# Patient Record
Sex: MALE | Race: WHITE | NOT HISPANIC OR LATINO | Employment: OTHER | URBAN - METROPOLITAN AREA
[De-identification: names, ages, dates, MRNs, and addresses within clinical notes are randomized per-mention and may not be internally consistent; named-entity substitution may affect disease eponyms.]

---

## 2017-01-18 ENCOUNTER — ALLSCRIPTS OFFICE VISIT (OUTPATIENT)
Dept: OTHER | Facility: OTHER | Age: 57
End: 2017-01-18

## 2017-01-18 ENCOUNTER — GENERIC CONVERSION - ENCOUNTER (OUTPATIENT)
Dept: OTHER | Facility: OTHER | Age: 57
End: 2017-01-18

## 2017-03-08 ENCOUNTER — GENERIC CONVERSION - ENCOUNTER (OUTPATIENT)
Dept: OTHER | Facility: OTHER | Age: 57
End: 2017-03-08

## 2017-04-03 DIAGNOSIS — E78.5 HYPERLIPIDEMIA: ICD-10-CM

## 2017-04-03 DIAGNOSIS — E11.9 TYPE 2 DIABETES MELLITUS WITHOUT COMPLICATIONS (HCC): ICD-10-CM

## 2017-04-03 DIAGNOSIS — G47.33 OBSTRUCTIVE SLEEP APNEA: ICD-10-CM

## 2017-04-20 ENCOUNTER — ALLSCRIPTS OFFICE VISIT (OUTPATIENT)
Dept: OTHER | Facility: OTHER | Age: 57
End: 2017-04-20

## 2017-04-24 ENCOUNTER — GENERIC CONVERSION - ENCOUNTER (OUTPATIENT)
Dept: OTHER | Facility: OTHER | Age: 57
End: 2017-04-24

## 2017-05-09 ENCOUNTER — GENERIC CONVERSION - ENCOUNTER (OUTPATIENT)
Dept: OTHER | Facility: OTHER | Age: 57
End: 2017-05-09

## 2017-06-27 ENCOUNTER — ALLSCRIPTS OFFICE VISIT (OUTPATIENT)
Dept: OTHER | Facility: OTHER | Age: 57
End: 2017-06-27

## 2017-06-27 DIAGNOSIS — E78.5 HYPERLIPIDEMIA: ICD-10-CM

## 2017-06-27 DIAGNOSIS — M25.562 PAIN IN LEFT KNEE: ICD-10-CM

## 2017-06-27 DIAGNOSIS — E11.9 TYPE 2 DIABETES MELLITUS WITHOUT COMPLICATIONS (HCC): ICD-10-CM

## 2017-06-27 DIAGNOSIS — I10 ESSENTIAL (PRIMARY) HYPERTENSION: ICD-10-CM

## 2017-06-27 DIAGNOSIS — Z12.5 ENCOUNTER FOR SCREENING FOR MALIGNANT NEOPLASM OF PROSTATE: ICD-10-CM

## 2017-06-30 ENCOUNTER — GENERIC CONVERSION - ENCOUNTER (OUTPATIENT)
Dept: OTHER | Facility: OTHER | Age: 57
End: 2017-06-30

## 2017-07-19 ENCOUNTER — ALLSCRIPTS OFFICE VISIT (OUTPATIENT)
Dept: OTHER | Facility: OTHER | Age: 57
End: 2017-07-19

## 2017-07-19 ENCOUNTER — APPOINTMENT (OUTPATIENT)
Dept: RADIOLOGY | Facility: CLINIC | Age: 57
End: 2017-07-19
Payer: COMMERCIAL

## 2017-07-19 DIAGNOSIS — M25.562 PAIN IN LEFT KNEE: ICD-10-CM

## 2017-07-19 PROCEDURE — 73562 X-RAY EXAM OF KNEE 3: CPT

## 2017-07-28 ENCOUNTER — APPOINTMENT (OUTPATIENT)
Dept: LAB | Facility: CLINIC | Age: 57
End: 2017-07-28
Payer: COMMERCIAL

## 2017-07-28 DIAGNOSIS — G47.33 OBSTRUCTIVE SLEEP APNEA: ICD-10-CM

## 2017-07-28 DIAGNOSIS — I10 ESSENTIAL (PRIMARY) HYPERTENSION: ICD-10-CM

## 2017-07-28 DIAGNOSIS — E78.5 HYPERLIPIDEMIA: ICD-10-CM

## 2017-07-28 DIAGNOSIS — E11.9 TYPE 2 DIABETES MELLITUS WITHOUT COMPLICATIONS (HCC): ICD-10-CM

## 2017-07-28 DIAGNOSIS — Z12.5 ENCOUNTER FOR SCREENING FOR MALIGNANT NEOPLASM OF PROSTATE: ICD-10-CM

## 2017-07-28 LAB
ALBUMIN SERPL BCP-MCNC: 3.9 G/DL (ref 3.5–5)
ALP SERPL-CCNC: 87 U/L (ref 46–116)
ALT SERPL W P-5'-P-CCNC: 22 U/L (ref 12–78)
ANION GAP SERPL CALCULATED.3IONS-SCNC: 7 MMOL/L (ref 4–13)
AST SERPL W P-5'-P-CCNC: 15 U/L (ref 5–45)
BASOPHILS # BLD AUTO: 0.03 THOUSANDS/ΜL (ref 0–0.1)
BASOPHILS NFR BLD AUTO: 0 % (ref 0–1)
BILIRUB SERPL-MCNC: 0.86 MG/DL (ref 0.2–1)
BUN SERPL-MCNC: 18 MG/DL (ref 5–25)
CALCIUM SERPL-MCNC: 9.3 MG/DL (ref 8.3–10.1)
CHLORIDE SERPL-SCNC: 101 MMOL/L (ref 100–108)
CHOLEST SERPL-MCNC: 120 MG/DL (ref 50–200)
CO2 SERPL-SCNC: 30 MMOL/L (ref 21–32)
CREAT SERPL-MCNC: 1.09 MG/DL (ref 0.6–1.3)
CREAT UR-MCNC: 161 MG/DL
EOSINOPHIL # BLD AUTO: 0.45 THOUSAND/ΜL (ref 0–0.61)
EOSINOPHIL NFR BLD AUTO: 6 % (ref 0–6)
ERYTHROCYTE [DISTWIDTH] IN BLOOD BY AUTOMATED COUNT: 14.3 % (ref 11.6–15.1)
EST. AVERAGE GLUCOSE BLD GHB EST-MCNC: 131 MG/DL
GFR SERPL CREATININE-BSD FRML MDRD: 75 ML/MIN/1.73SQ M
GLUCOSE P FAST SERPL-MCNC: 120 MG/DL (ref 65–99)
HBA1C MFR BLD: 6.2 % (ref 4.2–6.3)
HCT VFR BLD AUTO: 42.8 % (ref 36.5–49.3)
HDLC SERPL-MCNC: 40 MG/DL (ref 40–60)
HGB BLD-MCNC: 14.9 G/DL (ref 12–17)
LDLC SERPL CALC-MCNC: 50 MG/DL (ref 0–100)
LYMPHOCYTES # BLD AUTO: 1.99 THOUSANDS/ΜL (ref 0.6–4.47)
LYMPHOCYTES NFR BLD AUTO: 26 % (ref 14–44)
MCH RBC QN AUTO: 29.2 PG (ref 26.8–34.3)
MCHC RBC AUTO-ENTMCNC: 34.8 G/DL (ref 31.4–37.4)
MCV RBC AUTO: 84 FL (ref 82–98)
MICROALBUMIN UR-MCNC: 171 MG/L (ref 0–20)
MICROALBUMIN/CREAT 24H UR: 106 MG/G CREATININE (ref 0–30)
MONOCYTES # BLD AUTO: 0.49 THOUSAND/ΜL (ref 0.17–1.22)
MONOCYTES NFR BLD AUTO: 6 % (ref 4–12)
NEUTROPHILS # BLD AUTO: 4.72 THOUSANDS/ΜL (ref 1.85–7.62)
NEUTS SEG NFR BLD AUTO: 62 % (ref 43–75)
NRBC BLD AUTO-RTO: 0 /100 WBCS
PLATELET # BLD AUTO: 247 THOUSANDS/UL (ref 149–390)
PMV BLD AUTO: 10.7 FL (ref 8.9–12.7)
POTASSIUM SERPL-SCNC: 3.5 MMOL/L (ref 3.5–5.3)
PROT SERPL-MCNC: 7.8 G/DL (ref 6.4–8.2)
PSA SERPL-MCNC: 0.6 NG/ML (ref 0–4)
RBC # BLD AUTO: 5.11 MILLION/UL (ref 3.88–5.62)
SODIUM SERPL-SCNC: 138 MMOL/L (ref 136–145)
TRIGL SERPL-MCNC: 151 MG/DL
TSH SERPL DL<=0.05 MIU/L-ACNC: 2.31 UIU/ML (ref 0.36–3.74)
WBC # BLD AUTO: 7.73 THOUSAND/UL (ref 4.31–10.16)

## 2017-07-28 PROCEDURE — 84443 ASSAY THYROID STIM HORMONE: CPT

## 2017-07-28 PROCEDURE — 36415 COLL VENOUS BLD VENIPUNCTURE: CPT

## 2017-07-28 PROCEDURE — 83036 HEMOGLOBIN GLYCOSYLATED A1C: CPT

## 2017-07-28 PROCEDURE — G0103 PSA SCREENING: HCPCS

## 2017-07-28 PROCEDURE — 82570 ASSAY OF URINE CREATININE: CPT

## 2017-07-28 PROCEDURE — 80061 LIPID PANEL: CPT

## 2017-07-28 PROCEDURE — 80053 COMPREHEN METABOLIC PANEL: CPT

## 2017-07-28 PROCEDURE — 85025 COMPLETE CBC W/AUTO DIFF WBC: CPT

## 2017-07-28 PROCEDURE — 82043 UR ALBUMIN QUANTITATIVE: CPT

## 2017-07-30 ENCOUNTER — GENERIC CONVERSION - ENCOUNTER (OUTPATIENT)
Dept: OTHER | Facility: OTHER | Age: 57
End: 2017-07-30

## 2017-07-31 ENCOUNTER — GENERIC CONVERSION - ENCOUNTER (OUTPATIENT)
Dept: OTHER | Facility: OTHER | Age: 57
End: 2017-07-31

## 2017-08-03 ENCOUNTER — ALLSCRIPTS OFFICE VISIT (OUTPATIENT)
Dept: OTHER | Facility: OTHER | Age: 57
End: 2017-08-03

## 2017-08-07 ENCOUNTER — ALLSCRIPTS OFFICE VISIT (OUTPATIENT)
Dept: OTHER | Facility: OTHER | Age: 57
End: 2017-08-07

## 2017-08-10 ENCOUNTER — ALLSCRIPTS OFFICE VISIT (OUTPATIENT)
Dept: OTHER | Facility: OTHER | Age: 57
End: 2017-08-10

## 2017-08-10 DIAGNOSIS — I10 ESSENTIAL (PRIMARY) HYPERTENSION: ICD-10-CM

## 2017-08-10 DIAGNOSIS — K02.9 DENTAL CARIES: ICD-10-CM

## 2017-09-06 ENCOUNTER — APPOINTMENT (OUTPATIENT)
Dept: LAB | Facility: CLINIC | Age: 57
End: 2017-09-06
Payer: COMMERCIAL

## 2017-09-06 ENCOUNTER — GENERIC CONVERSION - ENCOUNTER (OUTPATIENT)
Dept: OTHER | Facility: OTHER | Age: 57
End: 2017-09-06

## 2017-09-06 DIAGNOSIS — K02.9 DENTAL CARIES: ICD-10-CM

## 2017-09-06 DIAGNOSIS — I10 ESSENTIAL (PRIMARY) HYPERTENSION: ICD-10-CM

## 2017-09-06 LAB
ANION GAP SERPL CALCULATED.3IONS-SCNC: 7 MMOL/L (ref 4–13)
BUN SERPL-MCNC: 24 MG/DL (ref 5–25)
CALCIUM SERPL-MCNC: 9.4 MG/DL (ref 8.3–10.1)
CHLORIDE SERPL-SCNC: 99 MMOL/L (ref 100–108)
CO2 SERPL-SCNC: 31 MMOL/L (ref 21–32)
CREAT SERPL-MCNC: 1.49 MG/DL (ref 0.6–1.3)
CREAT UR-MCNC: 293 MG/DL
GFR SERPL CREATININE-BSD FRML MDRD: 51 ML/MIN/1.73SQ M
GLUCOSE P FAST SERPL-MCNC: 127 MG/DL (ref 65–99)
POTASSIUM SERPL-SCNC: 3.1 MMOL/L (ref 3.5–5.3)
PROT UR-MCNC: 33 MG/DL
PROT/CREAT UR: 0.11 MG/G{CREAT} (ref 0–0.1)
SODIUM SERPL-SCNC: 137 MMOL/L (ref 136–145)
TSH SERPL DL<=0.05 MIU/L-ACNC: 2.19 UIU/ML (ref 0.36–3.74)

## 2017-09-06 PROCEDURE — 84443 ASSAY THYROID STIM HORMONE: CPT

## 2017-09-06 PROCEDURE — 80048 BASIC METABOLIC PNL TOTAL CA: CPT

## 2017-09-06 PROCEDURE — 36415 COLL VENOUS BLD VENIPUNCTURE: CPT

## 2017-09-06 PROCEDURE — 82088 ASSAY OF ALDOSTERONE: CPT

## 2017-09-06 PROCEDURE — 83835 ASSAY OF METANEPHRINES: CPT

## 2017-09-06 PROCEDURE — 82570 ASSAY OF URINE CREATININE: CPT

## 2017-09-06 PROCEDURE — 84156 ASSAY OF PROTEIN URINE: CPT

## 2017-09-06 PROCEDURE — 84244 ASSAY OF RENIN: CPT

## 2017-09-12 LAB
ALDOST SERPL-MCNC: 14.4 NG/DL (ref 0–30)
METANEPH FREE SERPL-MCNC: 20 PG/ML (ref 0–62)
NORMETANEPHRINE SERPL-MCNC: 143 PG/ML (ref 0–145)

## 2017-09-13 LAB — RENIN PLAS-CCNC: <0.167 NG/ML/HR (ref 0.17–5.38)

## 2017-09-14 DIAGNOSIS — I10 ESSENTIAL (PRIMARY) HYPERTENSION: ICD-10-CM

## 2017-09-18 ENCOUNTER — GENERIC CONVERSION - ENCOUNTER (OUTPATIENT)
Dept: OTHER | Facility: OTHER | Age: 57
End: 2017-09-18

## 2017-09-18 DIAGNOSIS — I10 ESSENTIAL (PRIMARY) HYPERTENSION: ICD-10-CM

## 2017-09-29 ENCOUNTER — LAB CONVERSION - ENCOUNTER (OUTPATIENT)
Dept: OTHER | Facility: OTHER | Age: 57
End: 2017-09-29

## 2017-09-29 ENCOUNTER — GENERIC CONVERSION - ENCOUNTER (OUTPATIENT)
Dept: OTHER | Facility: OTHER | Age: 57
End: 2017-09-29

## 2017-09-29 LAB
BUN SERPL-MCNC: 18 MG/DL (ref 7–25)
BUN/CREA RATIO (HISTORICAL): ABNORMAL (CALC) (ref 6–22)
CALCIUM SERPL-MCNC: 9.6 MG/DL (ref 8.6–10.3)
CHLORIDE SERPL-SCNC: 100 MMOL/L (ref 98–110)
CO2 SERPL-SCNC: 30 MMOL/L (ref 20–31)
CREAT SERPL-MCNC: 1.18 MG/DL (ref 0.7–1.33)
CREATININE, RANDOM URINE (HISTORICAL): 168 MG/DL (ref 20–370)
CREATININE, RANDOM URINE (HISTORICAL): 168 MG/DL (ref 20–370)
EGFR AFRICAN AMERICAN (HISTORICAL): 78 ML/MIN/1.73M2
EGFR-AMERICAN CALC (HISTORICAL): 68 ML/MIN/1.73M2
GLUCOSE (HISTORICAL): 132 MG/DL (ref 65–99)
Lab: 33 MMOL/G CREAT (ref 13–101)
POTASSIUM SERPL-SCNC: 3.4 MMOL/L (ref 3.5–5.3)
POTASSIUM URINE RANDOM (HISTORICAL): 56 MMOL/L (ref 12–129)
SODIUM SERPL-SCNC: 139 MMOL/L (ref 135–146)
SODIUM UR-SCNC: 67 MMOL/L (ref 28–272)
SODIUM/CREAT RATIO (HISTORICAL): 40 MMOL/G CREAT (ref 20–233)

## 2017-10-02 ENCOUNTER — LAB CONVERSION - ENCOUNTER (OUTPATIENT)
Dept: OTHER | Facility: OTHER | Age: 57
End: 2017-10-02

## 2017-10-02 LAB
BUN SERPL-MCNC: 18 MG/DL (ref 7–25)
BUN/CREA RATIO (HISTORICAL): ABNORMAL (CALC) (ref 6–22)
CALCIUM SERPL-MCNC: 9.6 MG/DL (ref 8.6–10.3)
CHLORIDE SERPL-SCNC: 100 MMOL/L (ref 98–110)
CO2 SERPL-SCNC: 30 MMOL/L (ref 20–31)
CREAT SERPL-MCNC: 1.18 MG/DL (ref 0.7–1.33)
CREATININE, RANDOM URINE (HISTORICAL): 168 MG/DL (ref 20–370)
CREATININE, RANDOM URINE (HISTORICAL): 168 MG/DL (ref 20–370)
EGFR AFRICAN AMERICAN (HISTORICAL): 78 ML/MIN/1.73M2
EGFR-AMERICAN CALC (HISTORICAL): 68 ML/MIN/1.73M2
GLUCOSE (HISTORICAL): 132 MG/DL (ref 65–99)
Lab: 33 MMOL/G CREAT (ref 13–101)
OSMOLALITY UR: 519 MOSM/KG H2O (ref 50–1400)
POTASSIUM SERPL-SCNC: 3.4 MMOL/L (ref 3.5–5.3)
POTASSIUM URINE RANDOM (HISTORICAL): 56 MMOL/L (ref 12–129)
SODIUM SERPL-SCNC: 139 MMOL/L (ref 135–146)
SODIUM UR-SCNC: 67 MMOL/L (ref 28–272)
SODIUM/CREAT RATIO (HISTORICAL): 40 MMOL/G CREAT (ref 20–233)

## 2017-10-20 ENCOUNTER — GENERIC CONVERSION - ENCOUNTER (OUTPATIENT)
Dept: OTHER | Facility: OTHER | Age: 57
End: 2017-10-20

## 2017-10-28 DIAGNOSIS — E11.9 TYPE 2 DIABETES MELLITUS WITHOUT COMPLICATIONS (HCC): ICD-10-CM

## 2017-10-28 DIAGNOSIS — E78.5 HYPERLIPIDEMIA: ICD-10-CM

## 2017-10-28 DIAGNOSIS — I10 ESSENTIAL (PRIMARY) HYPERTENSION: ICD-10-CM

## 2017-11-02 ENCOUNTER — HOSPITAL ENCOUNTER (OUTPATIENT)
Dept: INFUSION CENTER | Facility: CLINIC | Age: 57
Discharge: HOME | End: 2017-11-02
Payer: COMMERCIAL

## 2017-11-02 VITALS
RESPIRATION RATE: 20 BRPM | OXYGEN SATURATION: 96 % | SYSTOLIC BLOOD PRESSURE: 134 MMHG | HEART RATE: 64 BPM | TEMPERATURE: 98.9 F | DIASTOLIC BLOOD PRESSURE: 88 MMHG

## 2017-11-02 PROCEDURE — 82088 ASSAY OF ALDOSTERONE: CPT | Performed by: INTERNAL MEDICINE

## 2017-11-02 PROCEDURE — 84244 ASSAY OF RENIN: CPT | Performed by: INTERNAL MEDICINE

## 2017-11-02 RX ADMIN — SODIUM CHLORIDE 2000 ML: 0.9 INJECTION, SOLUTION INTRAVENOUS at 09:40

## 2017-11-02 NOTE — PROGRESS NOTES
Pt  Tolerated 2 liters of hydration & labs drawn as ordered post hydration  Pt  Has no further appts  Scheduled at this time   Pt  Declined AVS

## 2017-11-05 LAB
RENIN PLAS-CCNC: <0.167 NG/ML/HR (ref 0.17–5.38)
RENIN PLAS-CCNC: <0.167 NG/ML/HR (ref 0.17–5.38)

## 2017-11-06 ENCOUNTER — GENERIC CONVERSION - ENCOUNTER (OUTPATIENT)
Dept: OTHER | Facility: OTHER | Age: 57
End: 2017-11-06

## 2017-11-07 LAB — ALDOST SERPL-MCNC: 13.5 NG/DL (ref 0–30)

## 2017-11-08 LAB — ALDOST SERPL-MCNC: 6.1 NG/DL (ref 0–30)

## 2017-12-11 ENCOUNTER — ALLSCRIPTS OFFICE VISIT (OUTPATIENT)
Dept: OTHER | Facility: OTHER | Age: 57
End: 2017-12-11

## 2017-12-27 ENCOUNTER — GENERIC CONVERSION - ENCOUNTER (OUTPATIENT)
Dept: OTHER | Facility: OTHER | Age: 57
End: 2017-12-27

## 2017-12-27 DIAGNOSIS — I10 ESSENTIAL (PRIMARY) HYPERTENSION: ICD-10-CM

## 2017-12-27 DIAGNOSIS — E78.5 HYPERLIPIDEMIA: ICD-10-CM

## 2017-12-27 DIAGNOSIS — E11.9 TYPE 2 DIABETES MELLITUS WITHOUT COMPLICATIONS (HCC): ICD-10-CM

## 2018-01-11 NOTE — PSYCH
Behavioral Health Outpatient Intake    Referred By: DR CALDERON [facility]  Intake Questions: there are no developmental disabilities  the patient does not have a hearing impairment  the patient does not have an ICM or CTT  patient is not taking injectable psychiatric medications  Employment: The patient is not employed  at disabled  Emergency Contact Information:   Emergency Contact: FIDEL ALLAN   Relationship to Patient: SON   Phone Number: [phone]   Previous Psychiatric Treatment: He has previously been seen by a psychiatrist  40 YRS AGO  He has previously been seen by a therapist  40 YRS AGO   History: He has served in the [employer]  He has not had combat service  He was not activated into federal active duty as a member of the [employer] or [employer]  Insurance Subscriber: [facility]   Primary Insurance: [employer]   Phone number: [phone]   ID number: [id number]   Group number: [id number]         Presenting Problem (in patient's words): DEPRESSION, SLEEPING DAY AWAY, ANXIETY  Substance Abuse: NO DRUGS IN OVER 20 YRS BUT WILL SOCIALLY HAVE A DRINK  Previous Treatment: The patient has not been seen here in the past      Accepted as Patient   DR Severiano Mungo 2/7/17 @ 2:00     Primary Care Physician: DR JANSEN       Signatures   Electronically signed by : Marciano Franco, ; Jan 18 2017  2:56PM EST                       (Author)

## 2018-01-12 NOTE — MISCELLANEOUS
Dear Mr Nima Hardin missed another appointment with Dr Maria E Boss  on  4/20/2017 at 3:00 PM  This is the seventh missed appointment at [facility]  We understand that many situations arise that occasionally prevent patients from keeping scheduled appointments  It is the policy of our office that patients notify us 24 hours in advance  if unable to keep a scheduled appointment  Missed appointments jeopardize strong physician-patient relationships  The appointments  you missed could have easily been made available to another patient if you had contacted us to cancel  We like to accommodate all of our patients, but when patients miss an appointment it prevents us from being able to help everyone  Please be aware that if you miss another appointment we may need to discharge you from the practice  In the future, we request at least 24 hours notice of cancellation so we can make your appointment  available to someone else in need      Sincerely,  The Physicians and Staff of [facility]          Electronically signed by:Liseth Corbin   May  9 2017  9:59AM EST Administrative

## 2018-01-12 NOTE — RESULT NOTES
Message   can patient be notified that renal function is improved to his baseline approximately  his potassium is still on lower end        1) can patient have instruction to increase potassium to 20 meq twice daily  he should be on 20 meq once daily currently  2) can he have BMP in 2-3 weeks (if he has saline suppression test scheduled already, he can have BMP done prior to this)         above task sent  called patient to review, but no answer  Verified Results  (1) BASIC METABOLIC PROFILE [id number] 03:15PM Elise Dollar   REPORT COMMENT:  ONH OLD  FASTING:NO     Test Name Result Flag Reference   GLUCOSE 132 mg/dL H 65-99   Fasting reference interval     For someone without known diabetes, a glucose  value >125 mg/dL indicates that they may have  diabetes and this should be confirmed with a  follow-up test    UREA NITROGEN (BUN) 18 mg/dL  7-25   CREATININE 1 18 mg/dL  0 70-1 33   For patients >52years of age, the reference limit  for Creatinine is approximately 13% higher for people  identified as -American  eGFR NON-AFR   AMERICAN 68 mL/min/1 73m2  > OR = 60   eGFR AFRICAN AMERICAN 78 mL/min/1 73m2  > OR = 60   BUN/CREATININE RATIO   4-08   NOT APPLICABLE (calc)   SODIUM 139 mmol/L  135-146   POTASSIUM 3 4 mmol/L L 3 5-5 3   CHLORIDE 100 mmol/L     CARBON DIOXIDE 30 mmol/L  20-31   CALCIUM 9 6 mg/dL  8 6-10 3     (1) POTASSIUM, URINE RANDOM [id number] 03:15PM Elise Dollar     Test Name Result Flag Reference   POTASSIUM/CREAT RATIO 33 mmol/g creat     POTASSIUM, RANDOM URINE 56 mmol/L     CREATININE, RANDOM URINE 168 mg/dL       (1) SODIUM, URINE RANDOM [id number] 03:15PM Elise Dollar     Test Name Result Flag Reference   SODIUM/CREAT RATIO 40 mmol/g creat     SODIUM, RANDOM URINE 67 mmol/L     CREATININE, RANDOM URINE 168 mg/dL

## 2018-01-13 VITALS
RESPIRATION RATE: 16 BRPM | BODY MASS INDEX: 44.67 KG/M2 | WEIGHT: 312 LBS | SYSTOLIC BLOOD PRESSURE: 132 MMHG | DIASTOLIC BLOOD PRESSURE: 84 MMHG | OXYGEN SATURATION: 96 % | HEART RATE: 61 BPM | HEIGHT: 70 IN

## 2018-01-13 VITALS
WEIGHT: 308 LBS | HEART RATE: 60 BPM | DIASTOLIC BLOOD PRESSURE: 88 MMHG | SYSTOLIC BLOOD PRESSURE: 134 MMHG | BODY MASS INDEX: 54.57 KG/M2 | HEIGHT: 63 IN

## 2018-01-13 NOTE — RESULT NOTES
Verified Results  (1) HEMOGLOBIN A1C [id number] 12:00AM Celso Figueroa     Test Name Result Flag Reference   HEMOGLOBIN A1C 6 0         Plan  Type 2 diabetes mellitus    · (1) HEMOGLOBIN A1C; Status:Complete;   Done: 69TBW1098 12:00AM

## 2018-01-13 NOTE — RESULT NOTES
Message   K improving  Cr improving  awaiting saline suppression test          Verified Results  (1) POTASSIUM, URINE RANDOM [id number] 03:15PM [employer]     Test Name Result Flag Reference   POTASSIUM/CREAT RATIO 33 mmol/g creat     POTASSIUM, RANDOM URINE 56 mmol/L     CREATININE, RANDOM URINE 168 mg/dL       (1) SODIUM, URINE RANDOM [id number] 03:15PM Raina Speak     Test Name Result Flag Reference   SODIUM/CREAT RATIO 40 mmol/g creat     SODIUM, RANDOM URINE 67 mmol/L     CREATININE, RANDOM URINE 168 mg/dL       (1) BASIC METABOLIC PROFILE [id number] 03:15PM [employer]     Test Name Result Flag Reference   GLUCOSE 132 mg/dL H 65-99   Fasting reference interval     For someone without known diabetes, a glucose  value >125 mg/dL indicates that they may have  diabetes and this should be confirmed with a  follow-up test    UREA NITROGEN (BUN) 18 mg/dL  7-25   CREATININE 1 18 mg/dL  0 70-1 33   For patients >52years of age, the reference limit  for Creatinine is approximately 13% higher for people  identified as -American  eGFR NON-AFR  AMERICAN 68 mL/min/1 73m2  > OR = 60   eGFR AFRICAN AMERICAN 78 mL/min/1 73m2  > OR = 60   BUN/CREATININE RATIO   2-46   NOT APPLICABLE (calc)   SODIUM 139 mmol/L  135-146   POTASSIUM 3 4 mmol/L L 3 5-5 3   CHLORIDE 100 mmol/L     CARBON DIOXIDE 30 mmol/L  20-31   CALCIUM 9 6 mg/dL  8 6-10 3     (1) OSMOLALITY, URINE [id number] 03:15PM [employer] Speak   REPORT COMMENT:  ON[facility] OLD  FASTING:NO     Test Name Result Flag Reference   OSMOLALITY (U) 519 mOsm/kg H2O     The normal kidney can accommodate to dehydration by            producing urine with osmolality >850 mOsm/kg H2O

## 2018-01-13 NOTE — PROGRESS NOTES
Assessment    1  Type 2 diabetes mellitus (250 00) (E11 9)   2  Hypertension (401 9) (I10)   3  Chronic back pain (724 5,338 29) (M54 9,G89 29)   4  Lumbar radiculopathy (724 4) (M54 16)   5  Encounter for diabetic foot exam (250 00) (E11 9)   6  Adjustment disorder with depressed mood (309 0) (F43 21)   7  Acute sinusitis (461 9) (J01 90)    Plan  Acute sinusitis    · Cefuroxime Axetil 500 MG Oral Tablet; TAKE 1 TABLET Every twelve hours for 10 days   · HydrALAZINE HCl - 10 MG Oral Tablet; TAKE 1 TABLET EVERY 12 HOURS DAILY FOR BLOOD  PRESSURE  Chronic back pain    · Nabumetone 500 MG Oral Tablet  Chronic back pain, Type 2 diabetes mellitus    · DULoxetine HCl - 60 MG Oral Capsule Delayed Release Particles (Cymbalta); TAKE 1 CAPSULE  DAILY  Hypertension    · Gemfibrozil 600 MG Oral Tablet  Type 2 diabetes mellitus    · OneTouch Ultra Blue In Vitro Strip; TEST TWICE DAILY    Discussion/Summary  Discussion Summary:   1  blood work  2  ultrasound test  3  eye exam  4  increase cymbalta to 60mg once a day - TAKE AT BEDTIME  5  RETURN TO OFFICE IN 2 WEEKS  6  capsaicin cream to feet 2-3 times per day for next few weeks  Counseling Documentation With Imm: The patient was counseled regarding instructions for management, patient and family education, impressions, importance of compliance with treatment  Medication SE Review and Pt Understands Tx: Possible side effects of new medications were reviewed with the patient/guardian today  The treatment plan was reviewed with the patient/guardian  The patient/guardian understands and agrees with the treatment plan      Chief Complaint  Chief Complaint Chronic Condition [facility]: Patient is here today for follow up of chronic conditions described in HPI        History of Present Illness  HPI: 64 y/o male here for follow up    missed appt last month for repeat bp check  did not complete renal U/S or blood work  went to stay with father 2 days after last PCP appt till now, father had 2 coronary stents placed  came back to [place] and told needs to find new place by landlord  under stress and feeling depressed due to circumstances but denies SI or anxiety  had 'good food' when staying with family in [facility], [facility] - gained weight  checked BS 1x was 117 postprandial    having 1+ week of sinus congestion/runny nose/cough productive of discolored phlegm  no fever, chills, SOB  +sick contacts & symptoms not improved with OTC remedies  c/o burning in feet - reports hx of DM neuropathy - cannot take tramadol, lyrica or gabapentin 2nd to SE  started on cymbalta 20mg BID by podiatry and has been helping neuropathy and mood  denies any other complaints    Hospital Based Practices Required Assessment:    Readiness To Learn: Receptive  Barriers To Learning: none  Education Completed: disease/condition, medications, further treatment/follow-up and treatment/procedure   Teaching Method: verbal   Person Taught: patient   Evaluation Of Learning: verbalized/demonstrated understanding      Review of Systems  Complete-Male:   Constitutional: no fever and no chills  ENT: nasal discharge  Cardiovascular: no chest pain  Respiratory: no shortness of breath    The patient presents with complaints of cough, described as productive  Gastrointestinal: no abdominal pain  Psychiatric: sleep disturbances, depression and + recent stress, but not suicidal    ROS Reviewed:   ROS reviewed  Active Problems    1  Allergic rhinitis (477 9) (J30 9)   2  Chronic back pain (724 5,338 29) (M54 9,G89 29)   3  Encounter for screening colonoscopy (V76 51) (Z12 11)   4  Encounter for weight loss counseling (V65 3) (Z71 3)   5  Erectile dysfunction (607 84) (N52 9)   6  Hyperlipidemia (272 4) (E78 5)   7  Hypertension (401 9) (I10)   8  Hypertension, uncontrolled (401 9) (I10)   9  Insomnia (780 52) (G47 00)   10  Morbid obesity (278 01) (E66 01)   11   Need for vaccination with 13-polyvalent pneumococcal conjugate vaccine (V03 82) (Z23)   12  IMELDA on CPAP (327 23) (G47 33)   13  Presence of intrathecal pump (V45 89) (Z96 89)   14  Rosacea (695 3) (L71 9)   15  Screening for prostate cancer (V76 44) (Z12 5)   16  Skin lesion (709 9) (L98 9)   17  Sleep-disordered breathing (780 59) (G47 30)   18  Type 2 diabetes mellitus (250 00) (E11 9)   19  Vitamin D deficiency (268 9) (E55 9)    Past Medical History    1  History of Acute upper respiratory infection (465 9) (J06 9)   2  History of chest pain (V13 89) (Z87 898)   3  History of hypokalemia (V12 29) (Z86 39)   4  Hypertension, uncontrolled (401 9) (I10)   5  Sleep-disordered breathing (780 59) (G47 30)  Active Problems And Past Medical History Reviewed: The active problems and past medical history were reviewed and updated today  Surgical History    1  History of Lumbar Vertebral Fusion    Family History    1  Family history of Colitis   2  Family history of diabetes mellitus (V18 0) (Z83 3)    3  Denied: Family history of Coronary disease   4  Denied: Family history of malignant neoplasm    Social History    · Former consumption of alcohol (V11 3) (Z65 8)   · History of drug use (305 93) (F19 21)   · Retired   ·  (V61 03) (Z63 5)   · Smoker (305 1) (Z72 0)   · Work history    Current Meds   1  AmLODIPine Besylate 10 MG Oral Tablet; Take 1 tablet daily; Therapy: 24YDG2028 to (Last Rx:16Nov2015)  Requested for: 56JOD7801 Ordered   2  Aspirin 81 MG Oral Tablet; TAKE 1 TABLET DAILY; Therapy: 13MTC2275 to (Evaluate:17Nov2015); Last Rx:16Nov2015 Ordered   3  Atorvastatin Calcium 20 MG Oral Tablet; TAKE 1 TABLET DAILY; Therapy: 97TAJ9325 to (Evaluate:14May2016)  Requested for: 71RCY1208; Last Rx:16Nov2015   Ordered   4  CloNIDine HCl - 0 2 MG Oral Tablet; TAKE 1 TABLET TWICE DAILY; Therapy: 96NBF5195 to (Evaluate:14May2016)  Requested for: 52YWS5134; Last Rx:22Jns4831   Ordered   5   Fluticasone Propionate 50 MCG/ACT Nasal Suspension; USE 1- 2 SPRAYS IN EACH NOSTRIL ONCE   DAILY; Therapy: 80SAC2393 to (Last Rx:37Fgc2808)  Requested for: 29BMK9241 Ordered   6  Gemfibrozil 600 MG Oral Tablet; TAKE 1 TABLET TWICE DAILY 30 MINUTES BEFORE MEALS; Therapy: 40KSL7455 to (Evaluate:86Xxa6059)  Requested for: 28MMK0632; Last Rx:19Jun2015;   Status: ACTIVE - Renewal Voided Ordered   7  Hydrochlorothiazide 50 MG Oral Tablet; TAKE 1 TABLET DAILY; Therapy: 94SCU0951 to (Evaluate:14May2016)  Requested for: 37KFT2052; Last Rx:16Nov2015   Ordered   8  Losartan Potassium 100 MG Oral Tablet; TAKE 1 TABLET ONCE DAILY; Therapy: 83BRB8576 to (Evaluate:14May2016)  Requested for: 92EAD5417; Last Rx:16Nov2015   Ordered   9  MetFORMIN HCl - 1000 MG Oral Tablet; TAKE 1 TABLET TWICE DAILY; Therapy: 96JSX1957 to (Evaluate:14May2016)  Requested for: 35LJV5621; Last Rx:16Nov2015   Ordered   10  Metoprolol Tartrate 100 MG Oral Tablet; Take 1 tablet twice daily  Requested for: 62XAP3158; Last    Rx:16Nov2015 Ordered   11  MetroNIDAZOLE 1 % External Gel; APPLY SPARINGLY TO AFFECTED AREA(S) ONCE DAILY; Therapy: 73YBO2650 to (Last Rx:30Jan2015)  Requested for: 61KAL7972 Ordered   12  Nabumetone 500 MG Oral Tablet; TAKE 1 TABLET EVERY 12 HOURS DAILY; Therapy: 26DGY1195 to Recorded   13  OxyCODONE HCl - 30 MG Oral Tablet; TAKE 1 TABLET EVERY 6 HOURS AS NEEDED FOR PAIN;    Therapy: 64EBN1247 to (Evaluate:17Nov2015); Last Rx:16Nov2015 Ordered   14  Viagra 50 MG Oral Tablet; TAKE 1/2 - 1 TABLET DAILY 1 HOUR BEFORE NEEDED; Therapy: 99HYU0738 to (Evaluate:53Kmy0964)  Requested for: 27ADE5015; Last Rx:30Jan2015    Ordered   15  Vitamin D 2000 UNIT Oral Capsule; TAKE 1 CAPSULE Daily; Therapy: 70Rhp8110 to (Evaluate:85Xxm6170); Last Rx:82Wbf3889 Ordered   16  Zolpidem Tartrate 10 MG Oral Tablet; TAKE 1 TABLET DAILY AT BEDTIME, AS NEEDED; Therapy: 21AEM7041 to (Evaluate:35Chu3786); Last Rx:22Oct2015 Ordered  Medication List Reviewed: The medication list was reviewed and updated today  Allergies    1  tramadol   2  Gabapentin TABS   3  Lyrica CAPS    Vitals  Vital Signs [Data Includes: Current Encounter]    Recorded: [id number] 01:55PM Recorded: 10QSM6525 01:18PM   Temperature  97 3 F   Heart Rate 73 84   Respiration 18 18   Systolic 402 881   Diastolic 88 82   Height  5 ft 9 in   Weight  324 lb 5 oz   BMI Calculated  47 89   BSA Calculated  2 54     Physical Exam    Constitutional   General appearance: No acute distress, well appearing and well nourished  Head and Face   Palpation of the face and sinuses: Abnormal   Examination of the Sinuses: right maxillary tenderness, left maxillary tenderness, right frontal tenderness and left frontal tenderness  Eyes   Pupils and irises: Equal, round, reactive to light  Ears, Nose, Mouth, and Throat   Otoscopic examination: Tympanic membranes translucent with normal light reflex  Canals patent without erythema  Oropharynx: Abnormal   The posterior pharynx was erythematous, but did not have an exudate  Pulmonary   Respiratory effort: No increased work of breathing or signs of respiratory distress  Auscultation of lungs: Clear to auscultation  Cardiovascular   Auscultation of heart: Normal rate and rhythm, normal S1 and S2, no murmurs  Abdomen   Abdomen: Non-tender, no masses  Lymphatic   Palpation of lymph nodes in neck: No lymphadenopathy  Psychiatric   Judgment and insight: Normal     Mood and affect: Normal     Right Foot Findings: dryness and +athelete's foot bottom of foot  The sensory exam showed +onychomycosis  Left Foot Findings: dryness and +athlete's foot bottom of foot  The sensory exam showed +onychomcosis  Monofilament Testing: normal tactile sensation with monofilament testing throughout both feet  Vascular: Pulses: 2+ in the posterior tibialis and 2+ in the dorsalis pedis  Pulses: 2+ in the posterior tibialis and 2+ in the dorsalis pedis  Assign Risk Category: 0: No loss of protective sensation, no deformity  No present risk        Provider Comments  Provider Comments:   lab work  renal U/S  pt reports poor previous compliance with CPAP  start hydralazine - d/w pt SE of med, denies SOB, CP with current elevated bp  f/u after tests & in 2 weeks        Future Appointments    Date/Time Provider Specialty Site   02/03/2016 03:00 PM Gianna Romano DO Internal Medicine [address] [facility] INTER[facility]     Signatures   Electronically signed by : Camden Jones DO; Jan 18 2016  4:43PM EST                       (Author)

## 2018-01-13 NOTE — MISCELLANEOUS
Provider Comments  Provider Comments:   9/12/16-PT  MISSED APPT   TODAY/CW    3rd no show appt since feb 2016 - letter sent to patient      Signatures   Electronically signed by : Jeanne Cowden, DO; Sep 14 2016  9:38AM EST                       (Author)

## 2018-01-13 NOTE — PROGRESS NOTES
History of Present Illness  Care Coordination Encounter Information:   Type of Encounter: Telephonic    Spoke to Patient   9/1/17- left VM, await a call back  Care Coordination  Nurse Rick Levine:   The reason for call is to discuss outreach for follow up/needed services  Case opened in care management, PCP referral for compliance  Documentation in caradi  6/30/17 Spoke to patient, scheduled with ortho and nephrology  7/14/17 Left VM, check for med compliance-remind of appointment, await call back  7/17 Patient left me a VM, will get labs done 7/19, states has all meds and taking as prescribed  8/4 No show to Neph, left VM to discuss  8/10 patient will make nephrology appointment at 1600 today 8/10      Active Problems    1  Adjustment disorder with depressed mood (309 0) (F43 21)   2  Allergic rhinitis (477 9) (J30 9)   3  Chronic back pain (724 5,338 29) (M54 9,G89 29)   4  Depression with anxiety (300 4) (F41 8)   5  Encounter for prostate cancer screening (V76 44) (Z12 5)   6  Erectile dysfunction (607 84) (N52 9)   7  Hyperlipidemia (272 4) (E78 5)   8  Hypertension (401 9) (I10)   9  Hypokalemia (276 8) (E87 6)   10  Insomnia (780 52) (G47 00)   11  Left knee pain (719 46) (M25 562)   12  Lumbar radiculopathy (724 4) (M54 16)   13  Morbid obesity (278 01) (E66 01)   14  IMELDA on CPAP (327 23,V46 8) (G47 33,Z99 89)   15  Presence of intrathecal pump (V45 89) (Z96 89)   16  Rosacea (695 3) (L71 9)   17  Type 2 diabetes mellitus (250 00) (E11 9)   18  Vitamin D deficiency (268 9) (E55 9)    Past Medical History    1  History of Acute pain of both knees (338 19,719 46) (M25 561,M25 562)   2  History of Acute upper respiratory infection (465 9) (J06 9)   3  History of Encounter for diabetic foot exam (250 00) (E11 9)   4  History of Encounter for screening colonoscopy (V76 51) (Z12 11)   5  History of Encounter for weight loss counseling (V65 3) (Z71 3)   6  History of acute sinusitis (V12 69) (Z87 09)   7  History of chest pain (V13 89) (Z87 898)   8  History of urinary retention (V13 09) (Z87 898)   9  History of Hospital discharge follow-up (V67 59) (Z09)    Surgical History    1  History of Lumbar Vertebral Fusion    Family History  Mother    1  Family history of Colitis   2  Family history of diabetes mellitus (V18 0) (Z83 3)  Family History    3  Denied: Family history of Coronary disease   4  Denied: Family history of malignant neoplasm    Social History    · Cigar smoker (305 1) (F17 290)   · Consumes alcohol occasionally (V49 89) (Z78 9)   · History of Former consumption of alcohol (V11 3) (M26 006)   · History of drug use (305 93) (Z87 898)   · Retired   ·  (V61 03) (Z63 5)   · Smoker (305 1) (Z72 0)   · Work history    Current Meds    1  Levocetirizine Dihydrochloride 5 MG Oral Tablet; TAKE 1 TABLET DAILY; Therapy: 76YBM0939 to (Evaluate:23Viv8041)  Requested for: 27Jun2017; Last   PF:47LNO7718 Ordered    2  DULoxetine HCl - 60 MG Oral Capsule Delayed Release Particles (Cymbalta); Take 1   capsule by mouth daily; Therapy: 47KYQ4585 to (Evaluate:07Wqv6158)  Requested for: 27Jun2017; Last   NX:15TCV3755 Ordered    3  Atorvastatin Calcium 20 MG Oral Tablet; Take 1 tablet daily; Therapy: 63CXW8950 to (Carol Abreu)  Requested for: 27Jun2017; Last   MD:68XNQ3221 Ordered    4  AmLODIPine Besylate 10 MG Oral Tablet (Norvasc); TAKE 1 TABLET BY MOUTH   EVERYDAY; Therapy: 38ZSA0845 to (Evaluate:87Kjv2288)  Requested for: 06MLK3795; Last   EJ:28CDT4715 Ordered   5  CloNIDine HCl - 0 2 MG Oral Tablet; TAKE 1 TABLET TWICE DAILY; Therapy: 87XRK1340 to (Evaluate:30Cde0698)  Requested for: 27Jun2017; Last   WR:78NEJ3119 Ordered   6  HydrALAZINE HCl - 25 MG Oral Tablet; TAKE 1 TABLET EVERY 12 HOURS FOR BLOOD   PRESSURE; Therapy: 01ZHX8769 to (Evaluate:20Zkb3494)  Requested for: 27Jun2017; Last   QT:21UQQ4158 Ordered   7  HydroCHLOROthiazide 25 MG Oral Tablet; Take 1 tablet daily;    Therapy: 70YUX3902 to (Verena Madrid)  Requested for: 2017; Last   FU:63HCC4516 Ordered   8  Losartan Potassium 100 MG Oral Tablet; TAKE 1 TABLET ONCE DAILY; Therapy: 65UDI8950 to (Evaluate:01Yek8572)  Requested for: 2017; Last   YZ:36AAU6692 Ordered   9  Metoprolol Tartrate 100 MG Oral Tablet; Take 1 tablet twice daily  Requested for:   2017; Last S38JPN1570 Ordered    10  Aspirin 81 MG TABS; TAKE 1 TABLET DAILY; Therapy: 81EVM8873 to (Evaluate:2015); Last Rx:2015 Ordered   11  OxyCODONE HCl - 30 MG Oral Tablet; TAKE 1 TABLET EVERY 6 HOURS AS NEEDED    FOR PAIN;    Therapy: 13XZR2170 to (Evaluate:2015); Last Rx:2015 Ordered    12  MetFORMIN HCl - 1000 MG Oral Tablet; TAKE 1 TABLET TWICE DAILY; Therapy: 12SDX2913 to (Evaluate:2017)  Requested for: 2017; Last    WS:71VPF8986 Ordered   13  OneTouch Lancets Miscellaneous; TEST 1 TIME DAILY; Therapy: 62MHG7650 to (Last JI:96HNG5635)  Requested for: 2017 Ordered   14  OneTouch Ultra Blue In Vitro Strip; TEST ONCE DAILY; Therapy: 12JES3549 to (Last Rx:2017)  Requested for: 34DRD5653 Ordered    Allergies    1  tramadol   2  Gabapentin TABS   3  Lyrica CAPS    End of Encounter Meds    1  Levocetirizine Dihydrochloride 5 MG Oral Tablet; TAKE 1 TABLET DAILY; Therapy: 35KQB0847 to (Evaluate:85Eta2418)  Requested for: 2017; Last   O69RGD9177 Ordered    2  DULoxetine HCl - 60 MG Oral Capsule Delayed Release Particles (Cymbalta); Take 1   capsule by mouth daily; Therapy: 23PXB5443 to (Evaluate:78Qrx9250)  Requested for: 2017; Last   NL:91SEE9519 Ordered    3  Atorvastatin Calcium 20 MG Oral Tablet; Take 1 tablet daily; Therapy: 48BUB8516 to (Verena Madrid)  Requested for: 2017; Last   L99HRT4024 Ordered    4  AmLODIPine Besylate 10 MG Oral Tablet (Norvasc); TAKE 1 TABLET BY MOUTH   EVERYDAY;    Therapy: 92RIV0310 to (Evaluate:14Qoj6759)  Requested for: 68NTL7663; Last   ZV:09VYR1695 Ordered   5  CloNIDine HCl - 0 2 MG Oral Tablet; TAKE 1 TABLET TWICE DAILY; Therapy: 42OPP4336 to (Evaluate:93Zmi5763)  Requested for: 27Jun2017; Last   BH:59OSW3934 Ordered   6  HydrALAZINE HCl - 25 MG Oral Tablet; TAKE 1 TABLET EVERY 12 HOURS FOR BLOOD   PRESSURE; Therapy: 63SLF3035 to (Evaluate:32Zmn2236)  Requested for: 27Jun2017; Last   IW:56JPV1315 Ordered   7  HydroCHLOROthiazide 25 MG Oral Tablet; Take 1 tablet daily; Therapy: 32SDM4054 to (Evaluate:25Sep2017)  Requested for: 49CEA0103; Last   NT:93TXT8655 Ordered   8  Losartan Potassium 100 MG Oral Tablet; TAKE 1 TABLET ONCE DAILY; Therapy: 24BQA6977 to (Evaluate:25Sep2017)  Requested for: 27Jun2017; Last   JN:40OBP3707 Ordered   9  Metoprolol Tartrate 100 MG Oral Tablet; Take 1 tablet twice daily  Requested for:   27Jun2017; Last AC:43SJV1382 Ordered    10  Aspirin 81 MG TABS; TAKE 1 TABLET DAILY; Therapy: 13NCU6788 to (Evaluate:17Nov2015); Last Rx:16Nov2015 Ordered   11  OxyCODONE HCl - 30 MG Oral Tablet; TAKE 1 TABLET EVERY 6 HOURS AS NEEDED    FOR PAIN;    Therapy: 42OLS4756 to (Evaluate:17Nov2015); Last Rx:16Nov2015 Ordered    12  MetFORMIN HCl - 1000 MG Oral Tablet; TAKE 1 TABLET TWICE DAILY; Therapy: 78PGV6012 to (Evaluate:25Sep2017)  Requested for: 27Jun2017; Last    ZV:87YYW5002 Ordered   13  OneTouch Lancets Miscellaneous; TEST 1 TIME DAILY; Therapy: 38MLP5826 to (Last RG:30SXV8170)  Requested for: 27Jun2017 Ordered   14  OneTouch Ultra Blue In Vitro Strip; TEST ONCE DAILY; Therapy: 97WHO1698 to (Last OR:58XAS9647)  Requested for: 27Jun2017 Ordered    Future Appointments    Date/Time Provider Specialty Site   07/31/2017 01:00 PM Tamara Byrnes [facility]     Patient Care Team    Care Team Member Role Specialty Office Number   Abbe JJ    Gastroenterology Adult [phone]     Signatures   Electronically signed by : Ruth Salamanca, ; Jun 30 2017 10:40AM EST (Author)    Electronically signed by : Buck Real, ; Jul 14 2017  4:12PM EST                       (Author)    Electronically signed by : Buck Real, ; Jul 17 2017  3:16PM EST                       (Author)    Electronically signed by : Buck Real, ; Aug  4 2017 11:43AM EST                       (Author)    Electronically signed by : Buck Real, ; Aug 10 2017 12:02PM EST                       (Author)    Electronically signed by : Buck Real, ; Sep  1 2017  2:43PM EST                       (Author)

## 2018-01-14 VITALS — SYSTOLIC BLOOD PRESSURE: 144 MMHG | DIASTOLIC BLOOD PRESSURE: 90 MMHG

## 2018-01-14 VITALS
BODY MASS INDEX: 46.65 KG/M2 | OXYGEN SATURATION: 98 % | HEIGHT: 69 IN | DIASTOLIC BLOOD PRESSURE: 92 MMHG | HEART RATE: 78 BPM | TEMPERATURE: 98 F | WEIGHT: 315 LBS | SYSTOLIC BLOOD PRESSURE: 144 MMHG | RESPIRATION RATE: 18 BRPM

## 2018-01-14 NOTE — RESULT NOTES
Message   have reviewed with patient last week over phone and also left  last week for f/u  will be setting up patient for saline suppression test  replete K first  repeat BMP tomorrow         Verified Results  (1) Luz Maria 06NBW8829 10:27AM [email or web address] Order Number: [id number]     Test Name Result Flag Reference   SODIUM 137 mmol/L  136-145   POTASSIUM 3 1 mmol/L L 3 5-5 3   CHLORIDE 99 mmol/L L 100-108   CARBON DIOXIDE 31 mmol/L  21-32   ANION GAP (CALC) 7 mmol/L  4-13   BLOOD UREA NITROGEN 24 mg/dL  5-25   CREATININE 1 49 mg/dL H 0 60-1 30   Standardized to IDMS reference method   CALCIUM 9 4 mg/dL  8 3-10 1   eGFR 51 ml/min/1 73sq m     National Kidney Disease Education Program recommendations are as follows:  GFR calculation is accurate only with a steady state creatinine  Chronic Kidney disease less than 60 ml/min/1 73 sq  meters  Kidney failure less than 15 ml/min/1 73 sq  meters  GLUCOSE FASTING 127 mg/dL H 65-99   Specimen collection should occur prior to Sulfasalazine administration due to the potential for falsely depressed results  Specimen collection should occur prior to Sulfapyridine administration due to the potential for falsely elevated results  (1) RENIN ACTIVITY 38ZNC9778 10:27AM [email or web address] Order Number: [id number]     Test Name Result Flag Reference   RENIN <0 167 ng/mL/hr L 0 167 - 5 380   This test was developed and its performance characteristics  determined by [facility]  It has not been cleared or approved  by the Food and Drug Administration      Performed at:  [address]  [id number]  : Nelli Steele MD, Phone:  [phone]     (1) ALDOSTERONE, BLOOD 97FTD3964 10:27AM [email or web address] Order Number: [id number]     Test Name Result Flag Reference   ALDOSTER, BLOOD 14 4 ng/dL  0 0 - 30 0   This test was developed and its performance characteristics  determined by [facility]  It has not been cleared or approved  by the Food and Drug Administration  Performed at:  [address]  [id number]  : Susan Clements MD, Phone:  [phone]     (1) [place], PLASMA 26LAY8121 10:27AM Lawrence [facility] TW Order Number: [id number]     Test Name Result Flag Reference   METANEPHRINE, PLASMA 20 pg/mL  0 - 62   Concentrations of Normetanephrine between 146 and 487 pg/mL, and  Metanephrine between 63 and 255 pg/mL are considered indeterminate  Follow-up biochemical testing is recommended when patient levels fall  within this indeterminate range  These tests include repeat testing of  plasma/urinary fractionated metanephrines and plasma catecholamines  Performed at:  [address]  [id number]  : Susan Clements MD, Phone:  [phone]   NORMETANEPHRINE, PLASMA 143 pg/mL  0 - 145     (1) URINE PROTEIN CREATININE RATIO 39SDI8878 10:27AM Lawrence Got[facility] TW Order Number: [id number]     Test Name Result Flag Reference   CREATININE URINE 293 0 mg/dL     URINE PROTEIN:CREATININE RATIO 0 11 H 0 00-0 10   URINE PROTEIN 33 mg/dL       (1) TSH 42OLR4006 10:27AM Lawrence [facility] Order Number: [id number]     Test Name Result Flag Reference   TSH 2 190 uIU/mL  0 358-3 740   Patients undergoing fluorescein dye angiography may retain small amounts of fluorescein in the body for 48-72 hours post procedure  Samples containing fluorescein can produce falsely depressed TSH values  If the patient had this procedure,a specimen should be resubmitted post fluorescein clearance

## 2018-01-15 NOTE — RESULT NOTES
Verified Results  (1) LIPID PANEL FASTING W DIRECT LDL REFLEX 67HTW3706 10:24AM Leslie Ochoa Order Number: [id number]     Test Name Result Flag Reference   CHOLESTEROL 120 mg/dL     LDL CHOLESTEROL CALCULATED 50 mg/dL  0-100   - Patient Instructions: This is a fasting blood test  Water, black tea or black coffee only after 9:00pm the night before test   Drink 2 glasses of water the morning of test       Triglyceride:         Normal              <150 mg/dl       Borderline High    150-199 mg/dl       High               200-499 mg/dl       Very High          >499 mg/dl  Cholesterol:         Desirable        <200 mg/dl      Borderline High  200-239 mg/dl      High             >239 mg/dl  HDL Cholesterol:        High    >59 mg/dL      Low     <41 mg/dL  LDL Cholesterol:        Optimal          <100 mg/dl        Near Optimal     100-129 mg/dl        Above Optimal          Borderline High   130-159 mg/dl          High              160-189 mg/dl          Very High        >189 mg/dl  LDL CALCULATED:    This screening LDL is a calculated result  It does not have the accuracy of the Direct Measured LDL in the monitoring of patients with hyperlipidemia and/or statin therapy  Direct Measure LDL (IQY532) must be ordered separately in these patients  TRIGLYCERIDES 151 mg/dL H <=150   Specimen collection should occur prior to N-Acetylcysteine or Metamizole administration due to the potential for falsely depressed results  HDL,DIRECT 40 mg/dL  40-60   Specimen collection should occur prior to Metamizole administration due to the potential for falsely depressed results

## 2018-01-15 NOTE — RESULT NOTES
Message   await shane  renin reviewed  renin suppressed  Verified Results  (1) RENIN ACTIVITY [id number] 01:56PM Kedar Gutter     Test Name Result Flag Reference   RENIN <0 167 ng/mL/hr L 0 167 - 5 380   This test was developed and its performance characteristics  determined by [facility]  It has not been cleared or approved  by the Food and Drug Administration  Performed at:  [address]  [id number]  : Raymon Amos MD, Phone:  [phone]     (1) RENIN ACTIVITY 40HFU5667 09:36AM Kedar Gutter     Test Name Result Flag Reference   RENIN <0 167 ng/mL/hr L 0 167 - 5 380   This test was developed and its performance characteristics  determined by [facility]  It has not been cleared or approved  by the Food and Drug Administration    Performed at:  [address]  [id number]  : Raymon Amos MD, Phone:  [phone]

## 2018-01-16 NOTE — RESULT NOTES
Verified Results  (1) COMPREHENSIVE METABOLIC PANEL 42LSM6102 40:03QQ Levy Robison     Test Name Result Flag Reference   SODIUM 138 mmol/L  136-145   POTASSIUM 3 5 mmol/L  3 5-5 3   CHLORIDE 101 mmol/L  100-108   CARBON DIOXIDE 30 mmol/L  21-32   ANION GAP (CALC) 7 mmol/L  4-13   BLOOD UREA NITROGEN 18 mg/dL  5-25   CREATININE 1 09 mg/dL  0 60-1 30   Standardized to IDMS reference method   CALCIUM 9 3 mg/dL  8 3-10 1   BILI, TOTAL 0 86 mg/dL  0 20-1 00   ALK PHOSPHATAS 87 U/L     ALT (SGPT) 22 U/L  12-78   AST(SGOT) 15 U/L  5-45   ALBUMIN 3 9 g/dL  3 5-5 0   TOTAL PROTEIN 7 8 g/dL  6 4-8 2   eGFR 75 ml/min/1 73sq m     National Kidney Disease Education Program recommendations are as follows:  GFR calculation is accurate only with a steady state creatinine  Chronic Kidney disease less than 60 ml/min/1 73 sq  meters  Kidney failure less than 15 ml/min/1 73 sq  meters  GLUCOSE FASTING 120 mg/dL H 65-99     (1) CBC/PLT/DIFF [id number] 10:24AM Levy Robison     Test Name Result Flag Reference   WBC COUNT 7 73 Thousand/uL  4 31-10 16   RBC COUNT 5 11 Million/uL  3 88-5 62   HEMOGLOBIN 14 9 g/dL  12 0-17 0   HEMATOCRIT 42 8 %  36 5-49 3   MCV 84 fL  82-98   MCH 29 2 pg  26 8-34 3   MCHC 34 8 g/dL  31 4-37 4   RDW 14 3 %  11 6-15 1   MPV 10 7 fL  8 9-12 7   PLATELET COUNT 188 Thousands/uL  149-390   nRBC AUTOMATED 0 /100 WBCs     NEUTROPHILS RELATIVE PERCENT 62 %  43-75   LYMPHOCYTES RELATIVE PERCENT 26 %  14-44   MONOCYTES RELATIVE PERCENT 6 %  4-12   EOSINOPHILS RELATIVE PERCENT 6 %  0-6   BASOPHILS RELATIVE PERCENT 0 %  0-1   NEUTROPHILS ABSOLUTE COUNT 4 72 Thousands/? ??L  1 85-7 62   LYMPHOCYTES ABSOLUTE COUNT 1 99 Thousands/? ??L  0 60-4 47   MONOCYTES ABSOLUTE COUNT 0 49 Thousand/? ??L  0 17-1 22   EOSINOPHILS ABSOLUTE COUNT 0 45 Thousand/? ??L  0 00-0 61   BASOPHILS ABSOLUTE COUNT 0 03 Thousands/? ??L  0 00-0 10     (1) TSH WITH FT4 REFLEX [id number] 10:24AM Levy Robison     Test Name Result Flag Reference   TSH 2 310 uIU/mL  0 358-3 740   Patients undergoing fluorescein dye angiography may retain small amounts of fluorescein in the body for 48-72 hours post procedure  Samples containing fluorescein can produce falsely depressed TSH values  If the patient had this procedure,a specimen should be resubmitted post fluorescein clearance  (1) HEMOGLOBIN A1C 04Irm2172 10:24AM Mashintonio, Dione Mcardle     Test Name Result Flag Reference   HEMOGLOBIN A1C 6 2 %  4 2-6 3   EST  AVG  GLUCOSE 131 mg/dl       (1) MICROALBUMIN CREATININE RATIO, RANDOM URINE [id number] 10:24AM Mashintonio, Dione Mcardle     Test Name Result Flag Reference   MICROALBUMIN/ CREAT R 106 mg/g creatinine H 0-30   MICROALBUMIN,URINE 171 0 mg/L H 0 0-20 0   CREATININE URINE 161 0 mg/dL       (1) PSA (SCREEN) (Dx V76 44 Screen for Prostate Cancer) [id number] 10:24AM Mashintonio, Dione Mcardle     Test Name Result Flag Reference   PROSTATE SPECIFIC ANTIGEN 0 6 ng/mL  0 0-4 0   American Urological Association Guidelines define biochemical recurrence of prostate cancer as a detectable or rising PSA value post-radical prostatectomy that is greater than or equal to 0 2 ng/mL with a second confirmatory level of greater than or equal to 0 2 ng/mL

## 2018-01-16 NOTE — MISCELLANEOUS
Provider Comments  Provider Comments:   Patient No Showed for Appt on 8/3/2017 //DL      Signatures   Electronically signed by : Clayton Nguyen MD; Aug  7 2017 11:15AM EST

## 2018-01-17 NOTE — MISCELLANEOUS
Message   Recorded as Task   Date: 03/08/2017 09:59 AM, Created By: Holland Soliz   Task Name: Follow Up   Assigned To: Chano Meredith   Regarding Patient: Mannie Puri, Status: Active   Comment:    Gem Patel - 08 Mar 2017 9:59 AM     TASK CREATED  Caller: CLINICAL PHARMACY S; Other  RE: Addy Bain  THEY HAVE SOME QUESTIONS-NEED DIAGNOSIS CODE AND TRIED AND FAILED ALTERNATIVES  PREF'D IS ZOLPIDEM SL  CALL BACK [phone], FAX [phone]  REF  # [id number]     Chano Meredith - 08 Mar 2017 1:17 PM     TASK REPLIED TO: Previously Assigned To Chano Meredith  called and spoke to clinical pharmacy at phone # provided    they are working on prior authorization and will get back to us on their approval/denial of rozerem        Signatures   Electronically signed by : Oralia Tyson DO; Mar  8 2017  1:22PM EST                       (Author)

## 2018-01-17 NOTE — MISCELLANEOUS
Provider Comments  Provider Comments:   4/20/17-PT  MISSED APPT  TODAY/CW      Signatures   Electronically signed by : Trinity Forte DO;  Apr 24 2017 12:12PM EST                       (Author)

## 2018-01-17 NOTE — MISCELLANEOUS
Message   Recorded as Task   Date: 07/27/2016 02:53 PM, Created By: Yung Prado   Task Name: Follow Up   Assigned To: Sherrell Garber   Regarding Patient: Tasha Kaiser, Status: Active   Comment:    Chano Meredith - 27 Jul 2016 2:53 PM     TASK CREATED  patient called, requesting DM blood work -> wants to  tmrw    let patient know - he had HGA1C in march and looked good(6%)    can wait to repeat HGA1C for another month, if does not want to wait - can  lab slip for blood work(A1C, cholesterol, kidney function,electrolytes) now    thanks   plan - lab tests ordered per pt's request      Plan  Type 2 diabetes mellitus    · (1) BASIC METABOLIC PROFILE; Status:Active; Requested for:74Ugr2385;    · (1) HEMOGLOBIN A1C; Status:Active;  Requested for:35Tzq0161;     Signatures   Electronically signed by : Love Mosher DO; Jul 27 2016  2:54PM EST                       (Author)

## 2018-01-22 VITALS
SYSTOLIC BLOOD PRESSURE: 124 MMHG | OXYGEN SATURATION: 98 % | BODY MASS INDEX: 44.01 KG/M2 | HEIGHT: 70 IN | DIASTOLIC BLOOD PRESSURE: 74 MMHG | HEART RATE: 68 BPM | WEIGHT: 307.38 LBS | RESPIRATION RATE: 18 BRPM

## 2018-01-22 VITALS
HEART RATE: 82 BPM | SYSTOLIC BLOOD PRESSURE: 126 MMHG | RESPIRATION RATE: 17 BRPM | OXYGEN SATURATION: 97 % | DIASTOLIC BLOOD PRESSURE: 82 MMHG | BODY MASS INDEX: 42.68 KG/M2 | WEIGHT: 298.13 LBS | HEIGHT: 70 IN

## 2018-01-23 ENCOUNTER — GENERIC CONVERSION - ENCOUNTER (OUTPATIENT)
Dept: OTHER | Facility: OTHER | Age: 58
End: 2018-01-23

## 2018-01-24 VITALS
HEART RATE: 86 BPM | DIASTOLIC BLOOD PRESSURE: 84 MMHG | BODY MASS INDEX: 42.52 KG/M2 | SYSTOLIC BLOOD PRESSURE: 138 MMHG | HEIGHT: 70 IN | RESPIRATION RATE: 16 BRPM | WEIGHT: 297 LBS

## 2018-01-24 NOTE — RESULT NOTES
Message   pt missed mult appts  shane suppresses but not completed  sent task to see if patient can be rescheduled  Verified Results  (1) ALDOSTERONE, BLOOD [id number] 01:56PM Olevia Em     Test Name Result Flag Reference   ALDOSTER, BLOOD 6 1 ng/dL  0 0 - 30 0   This test was developed and its performance characteristics  determined by [facility]  It has not been cleared or approved  by the Food and Drug Administration  Performed at:  [address]  [id number]  : Buffy Azar MD, Phone:  [phone]     (1) ALDOSTERONE, BLOOD [id number] 09:36AM Olevia Em     Test Name Result Flag Reference   ALDOSTER, BLOOD 13 5 ng/dL  0 0 - 30 0   This test was developed and its performance characteristics  determined by [facility]  It has not been cleared or approved  by the Food and Drug Administration    Performed at:  [address]  [id number]  : Buffy Azar MD, Phone:  [phone]

## 2018-01-27 LAB
ALBUMIN SERPL-MCNC: 4.3 G/DL (ref 3.6–5.1)
ALBUMIN/GLOB SERPL: 1.3 (CALC) (ref 1–2.5)
ALP SERPL-CCNC: 96 U/L (ref 40–115)
ALT SERPL-CCNC: 12 U/L (ref 9–46)
AST SERPL-CCNC: 14 U/L (ref 10–35)
BASOPHILS # BLD AUTO: 38 CELLS/UL (ref 0–200)
BASOPHILS NFR BLD AUTO: 0.5 %
BILIRUB SERPL-MCNC: 0.8 MG/DL (ref 0.2–1.2)
BUN SERPL-MCNC: 18 MG/DL (ref 7–25)
BUN/CREAT SERPL: ABNORMAL (CALC) (ref 6–22)
CALCIUM SERPL-MCNC: 9.4 MG/DL (ref 8.6–10.3)
CHLORIDE SERPL-SCNC: 97 MMOL/L (ref 98–110)
CHOLEST SERPL-MCNC: 113 MG/DL
CHOLEST/HDLC SERPL: 3.3 (CALC)
CO2 SERPL-SCNC: 30 MMOL/L (ref 20–31)
CREAT SERPL-MCNC: 1.11 MG/DL (ref 0.7–1.33)
EOSINOPHIL # BLD AUTO: 473 CELLS/UL (ref 15–500)
EOSINOPHIL NFR BLD AUTO: 6.3 %
ERYTHROCYTE [DISTWIDTH] IN BLOOD BY AUTOMATED COUNT: 13.6 % (ref 11–15)
GLOBULIN SER CALC-MCNC: 3.3 G/DL (CALC) (ref 1.9–3.7)
GLUCOSE SERPL-MCNC: 111 MG/DL (ref 65–99)
HBA1C MFR BLD: 5.3 % OF TOTAL HGB
HCT VFR BLD AUTO: 44.2 % (ref 38.5–50)
HDLC SERPL-MCNC: 34 MG/DL
HGB BLD-MCNC: 15.3 G/DL (ref 13.2–17.1)
LDLC SERPL CALC-MCNC: 58 MG/DL (CALC)
LYMPHOCYTES # BLD AUTO: 1470 CELLS/UL (ref 850–3900)
LYMPHOCYTES NFR BLD AUTO: 19.6 %
MCH RBC QN AUTO: 28.9 PG (ref 27–33)
MCHC RBC AUTO-ENTMCNC: 34.6 G/DL (ref 32–36)
MCV RBC AUTO: 83.6 FL (ref 80–100)
MONOCYTES # BLD AUTO: 480 CELLS/UL (ref 200–950)
MONOCYTES NFR BLD AUTO: 6.4 %
NEUTROPHILS # BLD AUTO: 5040 CELLS/UL (ref 1500–7800)
NEUTROPHILS NFR BLD AUTO: 67.2 %
NONHDLC SERPL-MCNC: 79 MG/DL (CALC)
PLATELET # BLD AUTO: 273 THOUSAND/UL (ref 140–400)
PMV BLD REES-ECKER: 10.5 FL (ref 7.5–12.5)
POTASSIUM SERPL-SCNC: 3.5 MMOL/L (ref 3.5–5.3)
PROT SERPL-MCNC: 7.6 G/DL (ref 6.1–8.1)
RBC # BLD AUTO: 5.29 MILLION/UL (ref 4.2–5.8)
SL AMB EGFR AFRICAN AMERICAN: 84 ML/MIN/1.73M2
SL AMB EGFR NON AFRICAN AMERICAN: 73 ML/MIN/1.73M2
SODIUM SERPL-SCNC: 139 MMOL/L (ref 135–146)
TRIGL SERPL-MCNC: 131 MG/DL
TSH SERPL-ACNC: 0.95 MIU/L (ref 0.4–4.5)
WBC # BLD AUTO: 7.5 THOUSAND/UL (ref 3.8–10.8)

## 2018-01-29 ENCOUNTER — OFFICE VISIT (OUTPATIENT)
Dept: FAMILY MEDICINE CLINIC | Facility: CLINIC | Age: 58
End: 2018-01-29
Payer: COMMERCIAL

## 2018-01-29 ENCOUNTER — OFFICE VISIT (OUTPATIENT)
Dept: BEHAVIORAL/MENTAL HEALTH CLINIC | Facility: CLINIC | Age: 58
End: 2018-01-29
Payer: COMMERCIAL

## 2018-01-29 VITALS
WEIGHT: 288 LBS | HEART RATE: 66 BPM | SYSTOLIC BLOOD PRESSURE: 138 MMHG | RESPIRATION RATE: 16 BRPM | DIASTOLIC BLOOD PRESSURE: 90 MMHG | OXYGEN SATURATION: 96 % | BODY MASS INDEX: 42.65 KG/M2 | HEIGHT: 69 IN

## 2018-01-29 DIAGNOSIS — F41.8 DEPRESSION WITH ANXIETY: Primary | ICD-10-CM

## 2018-01-29 DIAGNOSIS — M54.16 LUMBAR RADICULOPATHY: ICD-10-CM

## 2018-01-29 DIAGNOSIS — F41.8 DEPRESSION WITH ANXIETY: ICD-10-CM

## 2018-01-29 DIAGNOSIS — I10 ESSENTIAL HYPERTENSION: ICD-10-CM

## 2018-01-29 DIAGNOSIS — E66.01 MORBID OBESITY (HCC): ICD-10-CM

## 2018-01-29 DIAGNOSIS — Z12.11 SCREENING FOR COLON CANCER: Primary | ICD-10-CM

## 2018-01-29 DIAGNOSIS — E78.5 HYPERLIPIDEMIA, UNSPECIFIED HYPERLIPIDEMIA TYPE: ICD-10-CM

## 2018-01-29 DIAGNOSIS — E11.9 TYPE 2 DIABETES MELLITUS WITHOUT COMPLICATION, WITHOUT LONG-TERM CURRENT USE OF INSULIN (HCC): ICD-10-CM

## 2018-01-29 PROBLEM — M17.12 PATELLOFEMORAL ARTHRITIS OF LEFT KNEE: Status: ACTIVE | Noted: 2017-07-19

## 2018-01-29 PROBLEM — R80.9 MICROALBUMINURIA: Status: ACTIVE | Noted: 2017-08-07

## 2018-01-29 PROBLEM — K02.9 DENTAL CAVITY: Status: ACTIVE | Noted: 2017-08-10

## 2018-01-29 PROBLEM — M17.12 LOCALIZED OSTEOARTHRITIS OF LEFT KNEE: Status: ACTIVE | Noted: 2017-07-19

## 2018-01-29 PROBLEM — R03.0 ELEVATED BLOOD PRESSURE READING WITHOUT DIAGNOSIS OF HYPERTENSION: Status: RESOLVED | Noted: 2017-08-10 | Resolved: 2018-01-29

## 2018-01-29 PROBLEM — M25.562 LEFT KNEE PAIN: Status: ACTIVE | Noted: 2017-06-27

## 2018-01-29 PROBLEM — R03.0 ELEVATED BLOOD PRESSURE READING WITHOUT DIAGNOSIS OF HYPERTENSION: Status: ACTIVE | Noted: 2017-08-10

## 2018-01-29 PROCEDURE — 90834 PSYTX W PT 45 MINUTES: CPT | Performed by: SOCIAL WORKER

## 2018-01-29 PROCEDURE — 99214 OFFICE O/P EST MOD 30 MIN: CPT | Performed by: NURSE PRACTITIONER

## 2018-01-29 RX ORDER — LOSARTAN POTASSIUM 100 MG/1
100 TABLET ORAL DAILY
COMMUNITY
End: 2018-03-16 | Stop reason: SDUPTHER

## 2018-01-29 RX ORDER — CHLORTHALIDONE 25 MG/1
TABLET ORAL
COMMUNITY
Start: 2018-01-17 | End: 2018-01-29

## 2018-01-29 RX ORDER — LEVOCETIRIZINE DIHYDROCHLORIDE 5 MG/1
TABLET, FILM COATED ORAL
COMMUNITY
Start: 2018-01-18 | End: 2018-02-27 | Stop reason: SDUPTHER

## 2018-01-29 RX ORDER — CHLORTHALIDONE 25 MG/1
25 TABLET ORAL DAILY
COMMUNITY
End: 2018-06-12 | Stop reason: ALTCHOICE

## 2018-01-29 RX ORDER — OXYCODONE HYDROCHLORIDE 30 MG/1
TABLET ORAL
COMMUNITY
Start: 2018-01-24 | End: 2018-01-29

## 2018-01-29 NOTE — PROGRESS NOTES
Assessment/Plan:      Diagnoses and all orders for this visit:    Depression with anxiety          Subjective:     Patient ID: Margarita Colbert  is a 61 y o  male  D:Met with pt individually for an initial session  Pt states that he has been feeling more depressed and anxious over the past year  Discussed the precipitating event being that the pt's ex wife has moved in with him in the last year  Discussed how this has made his home environment uncomfortable and made him feel more discouraged to try to make changes as it is difficult with her there  Explored how the pt spends his free time and what he does to get out of the house  Encouraged pt to try to develop a routine in his mornings that includes his hygiene and to do things that he enjoys outside of the home  A:  Pt reports that he is on multiple medications for health reason and that he has been taking Duloxetine for his anxiety/depression  Pt has not been in any counseling in the past but states that he feels it could be helpful  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  Pt's wife and his son are living with him in his 1 bedroom apartment at this time  Pt has good support in his family and his friends  P: Pt was encouraged to get out of the house when he can to do things that he enjoys  Pt is going to try to get back on track with his healthy eating and working out  Pt will follow up as needed in the future  Review of Systems   Psychiatric/Behavioral: Positive for sleep disturbance  Negative for agitation, confusion, decreased concentration, self-injury and suicidal ideas  The patient is nervous/anxious  Objective:     Physical Exam   Psychiatric: His speech is normal and behavior is normal  Judgment and thought content normal  Cognition and memory are normal  He exhibits a depressed mood

## 2018-01-29 NOTE — PROGRESS NOTES
Assessment/Plan:    Essential hypertension  Patient encouraged to continue to follow-up with nephrology  Type 2 diabetes mellitus without complication (HCC)  Hemoglobin A1C is 5 3  Decreased metformin to 500mg BID  Repeat Hemoglobin A1C and CMP ordered  Lumbar radiculopathy  Patient encouraged to continue to follow-up with pain management  Depression with anxiety  Continue cymbalta  Patient encouraged to continue to follow-up with our behavioral health   Hyperlipidemia  Stable  Continue statin  Morbid obesity ([address] )  Patient encouraged to continue to eat a low carb diet and to exercise on a regular basis  Diagnoses and all orders for this visit:    Screening for colon cancer  -     Ambulatory referral to Gastroenterology; Future    Type 2 diabetes mellitus without complication, without long-term current use of insulin (HCC)  -     metFORMIN (GLUCOPHAGE) 500 mg tablet; Take 1 tablet (500 mg total) by mouth 2 (two) times a day with meals  -     Hemoglobin A1c; Future  -     Comprehensive metabolic panel; Future    Depression with anxiety    Hyperlipidemia, unspecified hyperlipidemia type  -     Comprehensive metabolic panel; Future    Morbid obesity ([address] )  -     Comprehensive metabolic panel; Future    Lumbar radiculopathy    Essential hypertension          Subjective:      Patient ID: Tavia Bonilla  is a 61 y o  male  Patient is here for a follow-up for chronic medical conditions  Patient reports that he feels good  Patient is here for a follow-up for depression  Patient reports that his depression has improved miguel  Patient had a counseling session with our behavioral health  today for depression  Patient reports that it went well  Patient reports that he is taking the cymbalta everyday  Denies any suicidal thoughts  Patient follows up with pain management for chronic lower back pain  Patient follows up with nephrology for HTN  Hypertension   This is a chronic ( ) problem  Pertinent negatives include no chest pain, headaches, palpitations, peripheral edema or shortness of breath  Past treatments include beta blockers, angiotensin blockers, calcium channel blockers and diuretics  Hyperlipidemia   This is a chronic problem  The problem is controlled  Pertinent negatives include no chest pain, myalgias or shortness of breath  Current antihyperlipidemic treatment includes statins  There are no compliance problems  Diabetes   He presents for his follow-up diabetic visit  He has type 2 diabetes mellitus  His disease course has been improving  Pertinent negatives for hypoglycemia include no dizziness or headaches  Pertinent negatives for diabetes include no chest pain, no fatigue, no polyuria, no visual change and no weakness  Current diabetic treatment includes oral agent (monotherapy)  He is compliant with treatment all of the time  He is following a generally healthy diet  (BS at breakfast have been in the 110s and after dinner has been in the 120s  ) An ACE inhibitor/angiotensin II receptor blocker is being taken  He sees a podiatrist       The following portions of the patient's history were reviewed and updated as appropriate: allergies, current medications, past family history, past medical history, past social history, past surgical history and problem list     Review of Systems   Constitutional: Negative for chills, fatigue and fever  HENT: Negative for congestion, ear discharge, ear pain, postnasal drip, sinus pressure and sore throat  Eyes: Negative for pain, discharge and redness  Respiratory: Negative for cough, chest tightness, shortness of breath and wheezing  Cardiovascular: Negative for chest pain and palpitations  Gastrointestinal: Negative for abdominal pain, diarrhea, nausea and vomiting  Endocrine: Negative for polyuria  Genitourinary: Negative for dysuria, frequency, hematuria and urgency  Musculoskeletal: Negative for myalgias  Skin: Negative for rash  Neurological: Negative for dizziness, weakness, light-headedness and headaches  Psychiatric/Behavioral: Negative for suicidal ideas  As noted in HPI  Objective:     Physical Exam   Constitutional: He is oriented to person, place, and time  No distress  obese   HENT:   Right Ear: External ear normal    Left Ear: External ear normal    Nose: Nose normal    Mouth/Throat: Oropharynx is clear and moist    Eyes: Conjunctivae are normal  Pupils are equal, round, and reactive to light  Right eye exhibits no discharge  Left eye exhibits no discharge  Neck: Normal range of motion  Cardiovascular: Normal rate, regular rhythm and normal heart sounds  No murmur heard  Pulmonary/Chest: Effort normal and breath sounds normal  No respiratory distress  He has no wheezes  Neurological: He is alert and oriented to person, place, and time  Skin: No rash noted  Psychiatric: He has a normal mood and affect

## 2018-01-30 NOTE — MISCELLANEOUS
Provider Comments  Provider Comments:   Patient no showed for appt on 12/11/2017  /dl  Sent out certified letter 42/46/05 [id number]        1 Amended By: Kartik Márquez; Dec 13 2017 1:19 PM EST    Signatures   Electronically signed by : ZAYRA Jurado ; Dec 12 2017 12:24PM EST                       (Author)    Electronically signed by : Airam West, ; Dec 13 2017  1:20PM EST                       (Author)

## 2018-02-27 DIAGNOSIS — F32.A DEPRESSION, UNSPECIFIED DEPRESSION TYPE: ICD-10-CM

## 2018-02-27 DIAGNOSIS — J30.9 CHRONIC ALLERGIC RHINITIS, UNSPECIFIED SEASONALITY, UNSPECIFIED TRIGGER: ICD-10-CM

## 2018-02-27 DIAGNOSIS — I10 ESSENTIAL HYPERTENSION: Primary | ICD-10-CM

## 2018-02-28 RX ORDER — LEVOCETIRIZINE DIHYDROCHLORIDE 5 MG/1
5 TABLET, FILM COATED ORAL DAILY PRN
Qty: 30 TABLET | Refills: 2 | Status: SHIPPED | OUTPATIENT
Start: 2018-02-28 | End: 2018-04-09 | Stop reason: SDUPTHER

## 2018-02-28 RX ORDER — METOPROLOL TARTRATE 100 MG/1
100 TABLET ORAL 2 TIMES DAILY
Qty: 60 TABLET | Refills: 2 | Status: SHIPPED | OUTPATIENT
Start: 2018-02-28 | End: 2018-06-12 | Stop reason: SDUPTHER

## 2018-02-28 RX ORDER — DULOXETIN HYDROCHLORIDE 60 MG/1
60 CAPSULE, DELAYED RELEASE ORAL DAILY
Qty: 30 CAPSULE | Refills: 2 | Status: SHIPPED | OUTPATIENT
Start: 2018-02-28 | End: 2018-06-12 | Stop reason: SDUPTHER

## 2018-03-16 DIAGNOSIS — I10 ESSENTIAL HYPERTENSION: Primary | ICD-10-CM

## 2018-03-16 RX ORDER — LOSARTAN POTASSIUM 100 MG/1
100 TABLET ORAL DAILY
Qty: 30 TABLET | Refills: 2 | Status: SHIPPED | OUTPATIENT
Start: 2018-03-16 | End: 2018-06-12 | Stop reason: SDUPTHER

## 2018-04-09 DIAGNOSIS — J30.9 ALLERGIC RHINITIS, UNSPECIFIED SEASONALITY, UNSPECIFIED TRIGGER: Primary | ICD-10-CM

## 2018-04-09 DIAGNOSIS — I10 ESSENTIAL HYPERTENSION: ICD-10-CM

## 2018-04-09 RX ORDER — AMLODIPINE BESYLATE 10 MG/1
10 TABLET ORAL DAILY
Qty: 30 TABLET | Refills: 0 | Status: SHIPPED | OUTPATIENT
Start: 2018-04-09 | End: 2018-05-30 | Stop reason: SDUPTHER

## 2018-04-09 RX ORDER — POTASSIUM CHLORIDE 20 MEQ/1
20 TABLET, EXTENDED RELEASE ORAL DAILY
Qty: 30 TABLET | Refills: 0 | Status: CANCELLED | OUTPATIENT
Start: 2018-04-09

## 2018-04-09 RX ORDER — LEVOCETIRIZINE DIHYDROCHLORIDE 5 MG/1
5 TABLET, FILM COATED ORAL DAILY PRN
Qty: 30 TABLET | Refills: 0 | Status: SHIPPED | OUTPATIENT
Start: 2018-04-09 | End: 2018-06-05 | Stop reason: SDUPTHER

## 2018-04-09 NOTE — TELEPHONE ENCOUNTER
Left message letting him know he will have to have his nephrologist fill the potassium for him, since he is the original prescriber

## 2018-04-09 NOTE — TELEPHONE ENCOUNTER
Please call patient and ask who has been prescribing the potassium and how long he has been taking it

## 2018-04-10 NOTE — TELEPHONE ENCOUNTER
Kiersten Eldridge looks like patient has been [facility]

## 2018-04-19 DIAGNOSIS — E87.6 HYPOKALEMIA: Primary | ICD-10-CM

## 2018-04-19 DIAGNOSIS — E78.2 MIXED HYPERLIPIDEMIA: Primary | ICD-10-CM

## 2018-04-19 RX ORDER — ATORVASTATIN CALCIUM 20 MG/1
20 TABLET, FILM COATED ORAL DAILY
Qty: 30 TABLET | Refills: 0 | Status: SHIPPED | OUTPATIENT
Start: 2018-04-19 | End: 2018-06-05 | Stop reason: SDUPTHER

## 2018-04-19 RX ORDER — POTASSIUM CHLORIDE 20 MEQ/1
20 TABLET, EXTENDED RELEASE ORAL DAILY
Qty: 30 TABLET | Refills: 0 | Status: SHIPPED | OUTPATIENT
Start: 2018-04-19 | End: 2018-06-05 | Stop reason: SDUPTHER

## 2018-05-30 DIAGNOSIS — I10 ESSENTIAL HYPERTENSION: ICD-10-CM

## 2018-05-30 RX ORDER — AMLODIPINE BESYLATE 10 MG/1
10 TABLET ORAL DAILY
Qty: 30 TABLET | Refills: 0 | Status: SHIPPED | OUTPATIENT
Start: 2018-05-30 | End: 2018-06-12 | Stop reason: SDUPTHER

## 2018-06-05 DIAGNOSIS — J30.9 ALLERGIC RHINITIS, UNSPECIFIED SEASONALITY, UNSPECIFIED TRIGGER: ICD-10-CM

## 2018-06-05 DIAGNOSIS — E87.6 HYPOKALEMIA: ICD-10-CM

## 2018-06-05 DIAGNOSIS — E78.2 MIXED HYPERLIPIDEMIA: ICD-10-CM

## 2018-06-06 LAB
ALBUMIN SERPL-MCNC: 4.5 G/DL (ref 3.6–5.1)
ALBUMIN/GLOB SERPL: 1.3 (CALC) (ref 1–2.5)
ALP SERPL-CCNC: 102 U/L (ref 40–115)
ALT SERPL-CCNC: 15 U/L (ref 9–46)
AST SERPL-CCNC: 15 U/L (ref 10–35)
BILIRUB SERPL-MCNC: 1.1 MG/DL (ref 0.2–1.2)
BUN SERPL-MCNC: 14 MG/DL (ref 7–25)
BUN/CREAT SERPL: ABNORMAL (CALC) (ref 6–22)
CALCIUM SERPL-MCNC: 9.8 MG/DL (ref 8.6–10.3)
CHLORIDE SERPL-SCNC: 99 MMOL/L (ref 98–110)
CO2 SERPL-SCNC: 30 MMOL/L (ref 20–31)
CREAT SERPL-MCNC: 1.33 MG/DL (ref 0.7–1.33)
GLOBULIN SER CALC-MCNC: 3.4 G/DL (CALC) (ref 1.9–3.7)
GLUCOSE SERPL-MCNC: 135 MG/DL (ref 65–99)
HBA1C MFR BLD: 5.1 % OF TOTAL HGB
POTASSIUM SERPL-SCNC: 3.4 MMOL/L (ref 3.5–5.3)
PROT SERPL-MCNC: 7.9 G/DL (ref 6.1–8.1)
SL AMB EGFR AFRICAN AMERICAN: 68 ML/MIN/1.73M2
SL AMB EGFR NON AFRICAN AMERICAN: 59 ML/MIN/1.73M2
SODIUM SERPL-SCNC: 141 MMOL/L (ref 135–146)

## 2018-06-06 RX ORDER — ATORVASTATIN CALCIUM 20 MG/1
20 TABLET, FILM COATED ORAL DAILY
Qty: 30 TABLET | Refills: 0 | Status: SHIPPED | OUTPATIENT
Start: 2018-06-06 | End: 2018-06-12 | Stop reason: SDUPTHER

## 2018-06-06 RX ORDER — LEVOCETIRIZINE DIHYDROCHLORIDE 5 MG/1
5 TABLET, FILM COATED ORAL DAILY PRN
Qty: 30 TABLET | Refills: 0 | Status: SHIPPED | OUTPATIENT
Start: 2018-06-06 | End: 2018-06-12 | Stop reason: SDUPTHER

## 2018-06-06 RX ORDER — POTASSIUM CHLORIDE 20 MEQ/1
20 TABLET, EXTENDED RELEASE ORAL DAILY
Qty: 30 TABLET | Refills: 0 | Status: SHIPPED | OUTPATIENT
Start: 2018-06-06 | End: 2018-06-12 | Stop reason: SDUPTHER

## 2018-06-12 ENCOUNTER — OFFICE VISIT (OUTPATIENT)
Dept: FAMILY MEDICINE CLINIC | Facility: CLINIC | Age: 58
End: 2018-06-12
Payer: COMMERCIAL

## 2018-06-12 VITALS
WEIGHT: 303 LBS | DIASTOLIC BLOOD PRESSURE: 86 MMHG | BODY MASS INDEX: 44.88 KG/M2 | HEART RATE: 75 BPM | SYSTOLIC BLOOD PRESSURE: 144 MMHG | HEIGHT: 69 IN | RESPIRATION RATE: 16 BRPM | OXYGEN SATURATION: 97 %

## 2018-06-12 DIAGNOSIS — K02.9 DENTAL CAVITY: ICD-10-CM

## 2018-06-12 DIAGNOSIS — M54.16 LUMBAR RADICULOPATHY: ICD-10-CM

## 2018-06-12 DIAGNOSIS — J30.9 ALLERGIC RHINITIS, UNSPECIFIED SEASONALITY, UNSPECIFIED TRIGGER: ICD-10-CM

## 2018-06-12 DIAGNOSIS — E78.2 MIXED HYPERLIPIDEMIA: ICD-10-CM

## 2018-06-12 DIAGNOSIS — Z99.89 OSA ON CPAP: ICD-10-CM

## 2018-06-12 DIAGNOSIS — G47.33 OSA ON CPAP: ICD-10-CM

## 2018-06-12 DIAGNOSIS — E78.5 HYPERLIPIDEMIA, UNSPECIFIED HYPERLIPIDEMIA TYPE: ICD-10-CM

## 2018-06-12 DIAGNOSIS — F41.8 DEPRESSION WITH ANXIETY: ICD-10-CM

## 2018-06-12 DIAGNOSIS — I10 ESSENTIAL HYPERTENSION: ICD-10-CM

## 2018-06-12 DIAGNOSIS — E11.9 TYPE 2 DIABETES MELLITUS WITHOUT COMPLICATION, WITHOUT LONG-TERM CURRENT USE OF INSULIN (HCC): Primary | ICD-10-CM

## 2018-06-12 DIAGNOSIS — E87.6 HYPOKALEMIA: ICD-10-CM

## 2018-06-12 PROCEDURE — 3725F SCREEN DEPRESSION PERFORMED: CPT | Performed by: NURSE PRACTITIONER

## 2018-06-12 PROCEDURE — 99214 OFFICE O/P EST MOD 30 MIN: CPT | Performed by: NURSE PRACTITIONER

## 2018-06-12 RX ORDER — ATORVASTATIN CALCIUM 20 MG/1
20 TABLET, FILM COATED ORAL DAILY
Qty: 30 TABLET | Refills: 1 | Status: SHIPPED | OUTPATIENT
Start: 2018-06-12 | End: 2018-09-20 | Stop reason: SDUPTHER

## 2018-06-12 RX ORDER — DULOXETIN HYDROCHLORIDE 60 MG/1
60 CAPSULE, DELAYED RELEASE ORAL DAILY
Qty: 30 CAPSULE | Refills: 1 | Status: SHIPPED | OUTPATIENT
Start: 2018-06-12 | End: 2018-11-28 | Stop reason: SDUPTHER

## 2018-06-12 RX ORDER — AMLODIPINE BESYLATE 10 MG/1
10 TABLET ORAL DAILY
Qty: 30 TABLET | Refills: 1 | Status: SHIPPED | OUTPATIENT
Start: 2018-06-12 | End: 2018-10-09 | Stop reason: SDUPTHER

## 2018-06-12 RX ORDER — LOSARTAN POTASSIUM 100 MG/1
100 TABLET ORAL DAILY
Qty: 30 TABLET | Refills: 1 | Status: SHIPPED | OUTPATIENT
Start: 2018-06-12 | End: 2018-10-05 | Stop reason: SDUPTHER

## 2018-06-12 RX ORDER — POTASSIUM CHLORIDE 20 MEQ/1
20 TABLET, EXTENDED RELEASE ORAL DAILY
Qty: 30 TABLET | Refills: 0 | Status: SHIPPED | OUTPATIENT
Start: 2018-06-12 | End: 2018-07-12 | Stop reason: SDUPTHER

## 2018-06-12 RX ORDER — LEVOCETIRIZINE DIHYDROCHLORIDE 5 MG/1
5 TABLET, FILM COATED ORAL DAILY PRN
Qty: 30 TABLET | Refills: 1 | Status: SHIPPED | OUTPATIENT
Start: 2018-06-12 | End: 2018-11-28 | Stop reason: SDUPTHER

## 2018-06-12 RX ORDER — METOPROLOL TARTRATE 100 MG/1
100 TABLET ORAL 2 TIMES DAILY
Qty: 60 TABLET | Refills: 1 | Status: SHIPPED | OUTPATIENT
Start: 2018-06-12 | End: 2018-09-19 | Stop reason: SDUPTHER

## 2018-06-12 NOTE — ASSESSMENT & PLAN NOTE
Lab Results   Component Value Date    HGBA1C 5 1 06/05/2018     Decreased metformin to 500mg daily  Continue low carb diet

## 2018-06-12 NOTE — PROGRESS NOTES
Assessment/Plan:    Dental cavity  Patient encouraged to keep following up with the dentist      Type 2 diabetes mellitus without complication ([facility] )  Lab Results   Component Value Date    HGBA1C 5 1 06/05/2018     Decreased metformin to 500mg daily  Continue low carb diet  Allergic rhinitis  Continue xyzal prn  IMELDA on CPAP  Continue CPAP  Essential hypertension  /86  Patient instructed to follow-up with the nephrologist for his regular follow-up for HTN  Lumbar radiculopathy  Patient encouraged to continue to follow-up with pain management  Hypokalemia  Continue to follow-up with nephrology  Depression with anxiety  Doing well  Continue cymbalta  Hyperlipidemia  Continue atorvastatin  Diagnoses and all orders for this visit:    Type 2 diabetes mellitus without complication, without long-term current use of insulin (HCC)  -     metFORMIN (GLUCOPHAGE) 500 mg tablet; Take 1 tablet (500 mg total) by mouth daily with dinner  -     Comprehensive metabolic panel; Future    Allergic rhinitis, unspecified seasonality, unspecified trigger  Continue Xyzal      Essential hypertension  -     Comprehensive metabolic panel; Future  -     amLODIPine (NORVASC) 10 mg tablet; Take 1 tablet (10 mg total) by mouth daily  -     losartan (COZAAR) 100 MG tablet; Take 1 tablet (100 mg total) by mouth daily  -     metoprolol tartrate (LOPRESSOR) 100 mg tablet; Take 1 tablet (100 mg total) by mouth 2 (two) times a day    Lumbar radiculopathy  Continue to follow-up with pain management  Mixed hyperlipidemia  -     atorvastatin (LIPITOR) 20 mg tablet; Take 1 tablet (20 mg total) by mouth daily    Depression with anxiety  -     DULoxetine (CYMBALTA) 60 mg delayed release capsule; Take 1 capsule (60 mg total) by mouth daily    Hypokalemia  -     potassium chloride (K-DUR,KLOR-CON) 20 mEq tablet;  Take 1 tablet (20 mEq total) by mouth daily    Dental cavity  Continue to follow-up with dentist  Patient follows up with nephrology for HTN  Patient has missed his last few visits  Patient instructed to follow-up with nephrology  Patient instructed to follow-up in 1 month for a blood pressure recheck or sooner prn  Subjective:      Patient ID: Yousuf Peña  is a 61 y o  male  Patient is here for a follow-up for chronic medical conditions  Patient is here for a follow-up for type 2 DM  Patient reports that he takes metformin BID  Patient reports that he has been watching his diet  Patient reports that he has not been checking his blood sugars recently  Denies any Has, dizziness, chest pain, SOB, nausea, vomiting, palpitations, or lightheadedness  Denies any foot pain or ulcers  Patient follows up with the podiatrist    Patient reports that he takes cymbalta for depression and anxiety  Denies any suicidal thoughts  Patient reports that his anxiety and depression has improved  Patient is here for a follow-up for hyperlipidemia  Patient reports that he takes atorvastatin daily  Denies any chest pain or mylagias  Patient reports that he did see a cardiologist a few years ago  Patient follows up with nephrology for HTN  Patient reports that he has not been to the neprhologist in [facility]  Patient reports that his blood pressure has been 100s-110s/70s at home  Patient reports that he needs to follow-up with the nephrologist because he has weaned off of 2 of his blood pressure medications since his last visit  Patient is here for a follow-up for sleep apnea  Patient reports that he wears his CPAP at night  Patient follows up with pain management for chronic back pain  Patient reports that he followed up with the dentist for dental cavities but is awaiting approval for treatment  Patient reports that he takes xyzal prn for allergies and it helps  Colonoscopy UTD per patient           The following portions of the patient's history were reviewed and updated as appropriate: allergies, current medications, past family history, past medical history, past social history, past surgical history and problem list     Review of Systems   Constitutional: Negative for appetite change, chills, fatigue and fever  HENT: Negative for congestion, ear pain, postnasal drip, sinus pressure, sore throat and trouble swallowing  Eyes: Negative for pain, discharge and redness  Respiratory: Negative for cough, chest tightness, shortness of breath and wheezing  Cardiovascular: Negative for chest pain, palpitations and leg swelling  Gastrointestinal: Negative for abdominal pain, blood in stool, diarrhea, nausea and vomiting  Genitourinary: Negative for dysuria, frequency, hematuria and urgency  Musculoskeletal:        As noted in HPI  Skin: Negative for rash  Neurological: Negative for dizziness, seizures, syncope, light-headedness and headaches  Psychiatric/Behavioral: Negative for suicidal ideas  As noted in HPI  Objective:      /86   Pulse 75   Resp 16   Ht 5' 9" (1 753 m)   Wt (!) 137 kg (303 lb)   SpO2 97%   BMI 44 75 kg/m²          Physical Exam   Constitutional: He is oriented to person, place, and time  No distress  obese   HENT:   Right Ear: External ear normal    Left Ear: External ear normal    Nose: Nose normal    Mouth/Throat: Oropharynx is clear and moist    Tympanic membranes wnl  Multiple dental cavities noted    Eyes: Conjunctivae are normal  Pupils are equal, round, and reactive to light  Cardiovascular: Normal rate, regular rhythm and normal heart sounds  Pulses are no weak pulses  Pulses:       Dorsalis pedis pulses are 2+ on the right side, and 2+ on the left side  Radial pulses +2 bilaterally  No edema noted  Pulmonary/Chest: Effort normal and breath sounds normal  No respiratory distress  He has no wheezes  Musculoskeletal:   Gait wnl  Feet:   Right Foot:   Skin Integrity: Positive for dry skin   Negative for ulcer, skin breakdown, erythema, warmth or callus  Left Foot:   Skin Integrity: Positive for dry skin  Negative for ulcer, skin breakdown, erythema, warmth or callus  Lymphadenopathy:     He has no cervical adenopathy  Neurological: He is alert and oriented to person, place, and time  Skin: No rash noted  Psychiatric: He has a normal mood and affect  Vitals reviewed  Patient's shoes and socks removed  Right Foot/Ankle   Right Foot Inspection  Skin Exam: skin intact and dry skin no warmth, no callus, no erythema, no maceration, no abnormal color, no pre-ulcer, no ulcer and no callus                          Toe Exam: ROM and strength within normal limitsno swelling, no tenderness, erythema and  no right toe deformity  Sensory   Vibration: intact  Proprioception: intact   Monofilament testing: intact  Vascular  Capillary refills: < 3 seconds  The right DP pulse is 2+  Left Foot/Ankle  Left Foot Inspection  Skin Exam: skin intact and dry skinno warmth, no erythema, no maceration, normal color, no pre-ulcer, no ulcer and no callus                         Toe Exam: ROM and strength within normal limitsno swelling, no tenderness, no erythema and no left toe deformity                   Sensory   Vibration: intact  Proprioception: intact  Monofilament: intact  Vascular  Capillary refills: < 3 seconds  The left DP pulse is 2+  Assign Risk Category:  No deformity present; No loss of protective sensation;  No weak pulses       Risk: 0

## 2018-07-11 ENCOUNTER — OFFICE VISIT (OUTPATIENT)
Dept: FAMILY MEDICINE CLINIC | Facility: CLINIC | Age: 58
End: 2018-07-11
Payer: COMMERCIAL

## 2018-07-11 VITALS
RESPIRATION RATE: 18 BRPM | HEART RATE: 62 BPM | BODY MASS INDEX: 45.77 KG/M2 | SYSTOLIC BLOOD PRESSURE: 164 MMHG | DIASTOLIC BLOOD PRESSURE: 90 MMHG | OXYGEN SATURATION: 96 % | WEIGHT: 309 LBS | HEIGHT: 69 IN

## 2018-07-11 DIAGNOSIS — M54.9 CHRONIC BACK PAIN, UNSPECIFIED BACK LOCATION, UNSPECIFIED BACK PAIN LATERALITY: ICD-10-CM

## 2018-07-11 DIAGNOSIS — E66.01 MORBID OBESITY (HCC): ICD-10-CM

## 2018-07-11 DIAGNOSIS — F41.8 DEPRESSION WITH ANXIETY: ICD-10-CM

## 2018-07-11 DIAGNOSIS — G89.29 CHRONIC BACK PAIN, UNSPECIFIED BACK LOCATION, UNSPECIFIED BACK PAIN LATERALITY: ICD-10-CM

## 2018-07-11 DIAGNOSIS — E11.9 TYPE 2 DIABETES MELLITUS WITHOUT COMPLICATION, WITHOUT LONG-TERM CURRENT USE OF INSULIN (HCC): ICD-10-CM

## 2018-07-11 DIAGNOSIS — E87.6 HYPOKALEMIA: Chronic | ICD-10-CM

## 2018-07-11 DIAGNOSIS — I10 ESSENTIAL HYPERTENSION: Primary | ICD-10-CM

## 2018-07-11 PROCEDURE — 99214 OFFICE O/P EST MOD 30 MIN: CPT | Performed by: NURSE PRACTITIONER

## 2018-07-11 RX ORDER — CHLORTHALIDONE 25 MG/1
25 TABLET ORAL DAILY
Qty: 30 TABLET | Refills: 1 | Status: SHIPPED | OUTPATIENT
Start: 2018-07-11 | End: 2018-07-12 | Stop reason: SDUPTHER

## 2018-07-11 NOTE — ASSESSMENT & PLAN NOTE
Continue losartan, amlodipine, and metoprolol  Restart chlorthalidone  Repeat CMP ordered  Follow-up for BP check in 2 weeks with blood pressure readings from home and cuff

## 2018-07-11 NOTE — PROGRESS NOTES
Assessment/Plan:    Type 2 diabetes mellitus without complication (HCC)  Last hemoglobin A1C was 5 1  Discussed stopping metformin  Patient reports that he would like to keep taking it til we recheck his A1C  Essential hypertension  Continue losartan, amlodipine, and metoprolol  Restart chlorthalidone  Repeat CMP ordered  Follow-up for BP check in 2 weeks with blood pressure readings from home and cuff  Chronic back pain  Follow-up with pain management  Depression with anxiety  Continue cymbalta  Morbid obesity ([facility] )  Patient encouraged to exercise on a regular basis and to eat a low carb diet  Diagnoses and all orders for this visit:    Essential hypertension  -     chlorthalidone 25 mg tablet; Take 1 tablet (25 mg total) by mouth daily  -     Comprehensive metabolic panel; Future    Type 2 diabetes mellitus without complication, without long-term current use of insulin (HCC)  Continue low carb diet  Chronic back pain, unspecified back location, unspecified back pain laterality  Continue to follow-up with pain management  Morbid obesity ([facility] )  Patient encouraged to exercise on a regular basis and to eat a low fat diet  Hypokalemia  -     Comprehensive metabolic panel; Future      Patient is here for a follow-up for chronic medical conditions  /90  Patient reports that his BP is 130s/90s-100s at home  Patient reports that he is not sure why the chlorthalidone was stopped or if he just didn't get a refill  Restart chlorthalidone  Repeat CMP ordered  Continue amlodipine, metoprolol, and losartan  Patient instructed to follow-up with nephrology  Patient instructed to check his BP everyday at home and to bring his BP readings to his follow-up visit in 2 weeks along with his blood pressure cuff  Subjective:      Patient ID: Reggie Portillo  is a 61 y o  male  Patient is here for a follow-up for HTN   Patient reports that he did not get his lab work done til this morning  Denies any chest pain, SOB, palpitations, dizziness, or Has  Patient reports that his blood pressure has been running 130s/90s-100s at home  Patient reports that his blood pressure is always higher in the doctor's office  Patient reports that he takes amlodipine, metoprolol, and losartan  Patient reports that he has a follow-up with the nephrologist in August for his high blood pressure  Patient is here for a follow-up for DM  Patient reports that he takes metformin once a day  Last hemoglogin A1C was 5 1  Denies any urinary frequency, diarrhea, nausea, dizziness, or Has  Patient reports that he has been watching his diet  Patient reports that he has not checked his blood sugar recently  Patient is here for a follow-up for obesity  Patient reports that he has not been exercising on a regular basis recently  Patient reports that he watches his diet  Patient follows up with pain management for chronic back pain  Patient reports that he takes cymbalta for depression and anxiety and it helps  Denies any suicidal thoughts  The following portions of the patient's history were reviewed and updated as appropriate: allergies, current medications, past family history, past medical history, past social history, past surgical history and problem list     Review of Systems   Constitutional: Negative for chills and fever  HENT: Negative for congestion, ear pain, sore throat and trouble swallowing  Eyes: Negative for pain, discharge and redness  Respiratory: Negative for cough, chest tightness, shortness of breath and wheezing  Cardiovascular: Negative for chest pain, palpitations and leg swelling  Gastrointestinal: Negative for abdominal pain, diarrhea, nausea and vomiting  Genitourinary: Negative for dysuria, frequency and hematuria  Musculoskeletal:        As noted in HPI  Skin: Negative for rash  Neurological: Negative for dizziness, syncope, light-headedness and headaches  Psychiatric/Behavioral: Negative for suicidal ideas  As noted in HPI  Objective:      /90   Pulse 62   Resp 18   Ht 5' 9" (1 753 m)   Wt (!) 140 kg (309 lb)   SpO2 96%   BMI 45 63 kg/m²          Physical Exam   Constitutional: He is oriented to person, place, and time  No distress  obese   HENT:   Right Ear: External ear normal    Left Ear: External ear normal    Tympanic membranes wnl  Posterior pharynx wnl  Eyes: Conjunctivae are normal  Pupils are equal, round, and reactive to light  Cardiovascular: Normal rate, regular rhythm and normal heart sounds  Radial pulses +2 bilaterally  No edema noted  Pulmonary/Chest: Effort normal and breath sounds normal  He has no wheezes  Musculoskeletal:   Gait wnl  Neurological: He is alert and oriented to person, place, and time  Skin: No rash noted  Psychiatric: He has a normal mood and affect  Vitals reviewed

## 2018-07-11 NOTE — ASSESSMENT & PLAN NOTE
Last hemoglobin A1C was 5 1  Discussed stopping metformin  Patient reports that he would like to keep taking it til we recheck his A1C

## 2018-07-12 ENCOUNTER — TELEPHONE (OUTPATIENT)
Dept: FAMILY MEDICINE CLINIC | Facility: CLINIC | Age: 58
End: 2018-07-12

## 2018-07-12 DIAGNOSIS — E87.6 HYPOKALEMIA: ICD-10-CM

## 2018-07-12 DIAGNOSIS — I10 ESSENTIAL HYPERTENSION: ICD-10-CM

## 2018-07-12 LAB
ALBUMIN SERPL-MCNC: 4.3 G/DL (ref 3.6–5.1)
ALBUMIN/GLOB SERPL: 1.5 (CALC) (ref 1–2.5)
ALP SERPL-CCNC: 88 U/L (ref 40–115)
ALT SERPL-CCNC: 15 U/L (ref 9–46)
AST SERPL-CCNC: 16 U/L (ref 10–35)
BILIRUB SERPL-MCNC: 0.6 MG/DL (ref 0.2–1.2)
BUN SERPL-MCNC: 15 MG/DL (ref 7–25)
BUN/CREAT SERPL: ABNORMAL (CALC) (ref 6–22)
CALCIUM SERPL-MCNC: 9.5 MG/DL (ref 8.6–10.3)
CHLORIDE SERPL-SCNC: 101 MMOL/L (ref 98–110)
CO2 SERPL-SCNC: 33 MMOL/L (ref 20–31)
CREAT SERPL-MCNC: 1.09 MG/DL (ref 0.7–1.33)
GLOBULIN SER CALC-MCNC: 2.9 G/DL (CALC) (ref 1.9–3.7)
GLUCOSE SERPL-MCNC: 133 MG/DL (ref 65–99)
POTASSIUM SERPL-SCNC: 3.4 MMOL/L (ref 3.5–5.3)
PROT SERPL-MCNC: 7.2 G/DL (ref 6.1–8.1)
SL AMB EGFR AFRICAN AMERICAN: 86 ML/MIN/1.73M2
SL AMB EGFR NON AFRICAN AMERICAN: 74 ML/MIN/1.73M2
SODIUM SERPL-SCNC: 141 MMOL/L (ref 135–146)

## 2018-07-12 RX ORDER — CHLORTHALIDONE 25 MG/1
12.5 TABLET ORAL DAILY
Qty: 30 TABLET | Refills: 0
Start: 2018-07-12 | End: 2018-10-05 | Stop reason: SDUPTHER

## 2018-07-12 RX ORDER — POTASSIUM CHLORIDE 20 MEQ/1
20 TABLET, EXTENDED RELEASE ORAL DAILY
Qty: 30 TABLET | Refills: 1 | Status: SHIPPED | OUTPATIENT
Start: 2018-07-12 | End: 2018-08-16

## 2018-07-12 NOTE — TELEPHONE ENCOUNTER
Myself and Dione Mcardle have been trying to call pt with no luck  If pt calls back, please instruct him that he should only be taking half of his chlorthalidone daily, instead of a whole tablet  Also, Please make sure he is taking his Potassium daily, and ask if he needs a refill  Can we try to contact him regarding this if he does not call back by lunch time? Thanks

## 2018-07-23 ENCOUNTER — OFFICE VISIT (OUTPATIENT)
Dept: FAMILY MEDICINE CLINIC | Facility: CLINIC | Age: 58
End: 2018-07-23
Payer: COMMERCIAL

## 2018-07-23 VITALS
WEIGHT: 309 LBS | DIASTOLIC BLOOD PRESSURE: 84 MMHG | HEIGHT: 69 IN | SYSTOLIC BLOOD PRESSURE: 142 MMHG | OXYGEN SATURATION: 98 % | BODY MASS INDEX: 45.77 KG/M2 | RESPIRATION RATE: 16 BRPM | HEART RATE: 82 BPM

## 2018-07-23 DIAGNOSIS — M79.644 PAIN IN FINGER OF BOTH HANDS: ICD-10-CM

## 2018-07-23 DIAGNOSIS — I10 ESSENTIAL HYPERTENSION: Primary | ICD-10-CM

## 2018-07-23 DIAGNOSIS — M65.332 TRIGGER FINGER, LEFT MIDDLE FINGER: ICD-10-CM

## 2018-07-23 DIAGNOSIS — M79.645 PAIN IN FINGER OF BOTH HANDS: ICD-10-CM

## 2018-07-23 PROCEDURE — 99214 OFFICE O/P EST MOD 30 MIN: CPT | Performed by: NURSE PRACTITIONER

## 2018-07-23 NOTE — PROGRESS NOTES
Assessment/Plan:      Diagnoses and all orders for this visit:    Essential hypertension  Continue current medications  Patient instructed to get his CMP done  Will follow-up results with the patient  Patient instructed to follow-up with nephrology  Pain in finger of both hands  -     Ambulatory referral to Orthopedic Surgery; Future    Trigger finger, left middle finger  -     Ambulatory referral to Orthopedic Surgery; Future      Patient instructed to follow-up in 1 month for blood pressure check and to make sure that he follows up with his nephrologist  Patient has missed his last few appointments with nephrology  Patient referred to ortho for finger pain and left middle trigger finger  Subjective:     Patient ID: Ubaldo Chavarria  is a 61 y o  male  Patient is here for a follow-up for HTN  Patient reports that he is taking losartan, amlodipine, metoprolol, and chlorthalidone daily  Patient reports that he is also taking his potassium supplement  Denies any chest pain, SOB, palpitations, or dizziness  Patient reports that he has not been checking his blood pressure at home because his cuff broke  Patient reports that he has to get a new one  Patient reports that he will follow-up with his nephrologist for his blood pressure in August      Patient reports pain in all of his fingers for the past couple of months  Denies any redness or swelling  Patient reports that his left middle finger also occasionally gets stuck especially in the morning  Patient reports that he takes oxycodone prn for pain for his back and his hands  Patient reports that the pain is not going away  Review of Systems   Constitutional: Negative for chills and fever  Respiratory: Negative for cough, shortness of breath and wheezing  Cardiovascular: Negative for chest pain and palpitations  Gastrointestinal: Negative for abdominal pain, diarrhea, nausea and vomiting  Musculoskeletal:        As noted in HPI      Skin: Negative for rash  Neurological: Negative for dizziness, syncope, light-headedness and headaches  Objective:     Physical Exam   Constitutional: He is oriented to person, place, and time  No distress  HENT:   Right Ear: External ear normal    Left Ear: External ear normal    Mouth/Throat: Oropharynx is clear and moist    Eyes: Conjunctivae are normal  Pupils are equal, round, and reactive to light  Cardiovascular: Normal rate, regular rhythm and normal heart sounds  Radial pulses +2 bilaterally  No edema noted  Pulmonary/Chest: Effort normal and breath sounds normal  He has no wheezes  Musculoskeletal:   Gait wnl  No swelling or redness noted of the finger joints  Left middle trigger finger noted  Neurological: He is alert and oriented to person, place, and time  Skin: No rash noted  Psychiatric: He has a normal mood and affect  Vitals reviewed

## 2018-08-16 ENCOUNTER — OFFICE VISIT (OUTPATIENT)
Dept: NEPHROLOGY | Facility: CLINIC | Age: 58
End: 2018-08-16
Payer: COMMERCIAL

## 2018-08-16 VITALS
HEART RATE: 70 BPM | WEIGHT: 307 LBS | HEIGHT: 69 IN | SYSTOLIC BLOOD PRESSURE: 144 MMHG | BODY MASS INDEX: 45.47 KG/M2 | DIASTOLIC BLOOD PRESSURE: 84 MMHG

## 2018-08-16 DIAGNOSIS — N28.1 RENAL CYST: ICD-10-CM

## 2018-08-16 DIAGNOSIS — I10 BENIGN ESSENTIAL HTN: Primary | ICD-10-CM

## 2018-08-16 DIAGNOSIS — N18.2 CKD (CHRONIC KIDNEY DISEASE), STAGE II: ICD-10-CM

## 2018-08-16 PROCEDURE — 3066F NEPHROPATHY DOC TX: CPT | Performed by: INTERNAL MEDICINE

## 2018-08-16 PROCEDURE — 99213 OFFICE O/P EST LOW 20 MIN: CPT | Performed by: INTERNAL MEDICINE

## 2018-08-16 RX ORDER — SPIRONOLACTONE 25 MG/1
25 TABLET ORAL DAILY
Qty: 30 TABLET | Refills: 3 | Status: SHIPPED | OUTPATIENT
Start: 2018-08-16 | End: 2018-09-17 | Stop reason: SDUPTHER

## 2018-08-16 NOTE — LETTER
August 16, 2018     MARJORIE Ortiz  [address]  [facility] [address]    Patient: Larua Milton  YOB: 1960   Date of Visit: 8/16/2018       Dear Dr Isatu Garcia: Thank you for referring Cj Arnold to me for evaluation  Below are my notes for this consultation  If you have questions, please do not hesitate to call me  I look forward to following your patient along with you  Sincerely,        Ramona Britton MD        CC: No Recipients  Ramona Britton MD  8/16/2018  2:29 PM  Sign at close encounter  [address]  61 y o  male MRN: [id number]  8/16/2018    Reason for Visit:   Hypertensio    ASSESSMENT and PLAN:    I had the pleasure of seeing Mr Lala Brumfield today in the renal clinic for the continued management of HTN  Since our last visit, there has been no ER visits or hospitalizations  He currently has no complaints at this time and is feeling well  Patient denies any chest pain, shortness of breath and swelling  The last blood work was done on 7/11, which we have reviewed together  60 yo male retired , formerly from [employer], [id number] (15 years), DM (years), back surgery 10 years ago Who presents for follow up evaluation of hypertension  Overall, the patient has not been seen since last year  It is unclear why the patient has missed multiple appointments, nor return multiple phone calls  But the patient states that he will follow-up from now on  Patient has stopped using NSAIDs since last visit       1) HTN - BP today 140s    -prior renin and aldosterone level with a renin level suppressed, aldosterone level is not significantly elevated  - check metanephrine if the blood pressures do not become controlled with so the dish no spironolactone today  - check renal u/s with doplar flow-patient is not completed yet  -given the cyst on the kidney 2 years ago, will 1st complete a renal ultrasound  -then will plan for renal Doppler flow study if needed  - check UA, UPCR-no recent labs  - eventually, will switch metoprolol to carvedilol  -patient needs to continue to lose weight  -continue amlodipine, losartan, metoprolol, chlorthalidone  -add spironolactone  -hold potassium supplement  -BMP on Tuesday along with UA, urine protein creatinine ratio, renal ultrasound  -blood pressure check in 2-3 weeks  2) poor dentition    -patient smokes cigars daily  -advised to the risk of oral cancer  -patient is awaiting insurance clearance for further dental work    3) proteinuria -may be related to hypertension versus obesity    - recheck  - if elevated, will check SPEP, UPEP, FLC    4) prior Renal function stable, but monitor K closely     It was a pleasure evaluating Mr Mikki Espinosa in the renal office; thank you for allowing our team to participate in the care of your patient and please do not hesitate to contact our team if any questions arise in the interim  No problem-specific Assessment & Plan notes found for this encounter  HPI:    Patient denies complaints  Denies nausea, vomiting, diarrhea  Taking medications as prescribed  Avoid NSAIDs    PATIENT INSTRUCTIONS:    Patient Instructions   1) start spironolactone 1 tablet once a day in the evening  2) please do your labwork next Tuesday  3) BP check in 2-3 weeks in the office  4) STOP the potassium tablets for now        OBJECTIVE:  Current Weight: Weight - Scale: (!) 139 kg (307 lb)  Vitals:    08/16/18 1346   BP: 144/84   BP Location: Right arm   Patient Position: Sitting   Cuff Size: Adult   Pulse: 70   Weight: (!) 139 kg (307 lb)   Height: 5' 9" (1 753 m)    Body mass index is 45 34 kg/m²  REVIEW OF SYSTEMS:    Review of Systems   Constitutional: Negative  Negative for appetite change and fatigue  HENT: Negative  Eyes: Negative  Respiratory: Negative  Negative for shortness of breath  Cardiovascular: Negative  Negative for leg swelling  Gastrointestinal: Negative  Endocrine: Negative  Genitourinary: Negative  Negative for difficulty urinating  Musculoskeletal: Negative  Allergic/Immunologic: Negative  Neurological: Negative  Hematological: Negative  Psychiatric/Behavioral: Negative  All other systems reviewed and are negative  PHYSICAL EXAM:      Physical Exam   Constitutional: He is oriented to person, place, and time  He appears well-developed and well-nourished  No distress  HENT:   Head: Normocephalic and atraumatic  Mouth/Throat: No oropharyngeal exudate  Eyes: Conjunctivae are normal  Right eye exhibits no discharge  Left eye exhibits no discharge  No scleral icterus  Neck: Normal range of motion  Neck supple  No JVD present  Cardiovascular: Normal rate, regular rhythm and normal heart sounds  Exam reveals no gallop and no friction rub  No murmur heard  Pulmonary/Chest: Effort normal and breath sounds normal  No respiratory distress  He has no wheezes  He has no rales  He exhibits no tenderness  Abdominal: Soft  Bowel sounds are normal  He exhibits no distension  There is no tenderness  There is no rebound  Musculoskeletal: Normal range of motion  He exhibits no edema, tenderness or deformity  Neurological: He is alert and oriented to person, place, and time  He has normal reflexes  No cranial nerve deficit  Coordination normal    Skin: Skin is warm and dry  No rash noted  He is not diaphoretic  No erythema  No pallor  Psychiatric: He has a normal mood and affect  His behavior is normal  Judgment and thought content normal    Nursing note and vitals reviewed        Medications:    Current Outpatient Prescriptions:     amLODIPine (NORVASC) 10 mg tablet, Take 1 tablet (10 mg total) by mouth daily, Disp: 30 tablet, Rfl: 1    atorvastatin (LIPITOR) 20 mg tablet, Take 1 tablet (20 mg total) by mouth daily, Disp: 30 tablet, Rfl: 1    chlorthalidone 25 mg tablet, Take 0 5 tablets (12 5 mg total) by mouth daily, Disp: 30 tablet, Rfl: 0    DULoxetine (CYMBALTA) 60 mg delayed release capsule, Take 1 capsule (60 mg total) by mouth daily, Disp: 30 capsule, Rfl: 1    levocetirizine (XYZAL) 5 MG tablet, Take 1 tablet (5 mg total) by mouth daily as needed for allergies, Disp: 30 tablet, Rfl: 1    losartan (COZAAR) 100 MG tablet, Take 1 tablet (100 mg total) by mouth daily, Disp: 30 tablet, Rfl: 1    metFORMIN (GLUCOPHAGE) 500 mg tablet, Take 1 tablet (500 mg total) by mouth daily with dinner, Disp: 30 tablet, Rfl: 3    metoprolol tartrate (LOPRESSOR) 100 mg tablet, Take 1 tablet (100 mg total) by mouth 2 (two) times a day, Disp: 60 tablet, Rfl: 1    OxyCODONE HCl ER (OxyCONTIN) 30 MG T12A, Take 30 mg by mouth 3 (three) times a day Indications: Chronic Pain , Disp: , Rfl:     tadalafil (CIALIS) 5 MG tablet, Take 5 mg by mouth daily as needed for erectile dysfunction (unsure dosage) Indications: Erectile Dysfunction  , Disp: , Rfl:     spironolactone (ALDACTONE) 25 mg tablet, Take 1 tablet (25 mg total) by mouth daily, Disp: 30 tablet, Rfl: 3    Laboratory Results:        Results for orders placed or performed in visit on 07/11/18   Comprehensive metabolic panel   Result Value Ref Range    SL AMB GLUCOSE 133 (H) 65 - 99 mg/dL    BUN 15 7 - 25 mg/dL    Creatinine, Serum 1 09 0 70 - 1 33 mg/dL    eGFR Non  74 > OR = 60 mL/min/1 73m2    SL AMB EGFR  86 > OR = 60 mL/min/1 73m2    SL AMB BUN/CREATININE RATIO NOT APPLICABLE 6 - 22 (calc)    SL AMB SODIUM 141 135 - 146 mmol/L    SL AMB POTASSIUM 3 4 (L) 3 5 - 5 3 mmol/L    SL AMB CHLORIDE 101 98 - 110 mmol/L    SL AMB CARBON DIOXIDE 33 (H) 20 - 31 mmol/L    SL AMB CALCIUM 9 5 8 6 - 10 3 mg/dL    SL AMB PROTEIN, TOTAL 7 2 6 1 - 8 1 g/dL    Serum Albumin 4 3 3 6 - 5 1 g/dL    Globulin 2 9 1 9 - 3 7 g/dL (calc)    SL AMB ALBUMIN/GLOBULIN RATIO 1 5 1 0 - 2 5 (calc)    SL AMB BILIRUBIN, TOTAL 0 6 0 2 - 1 2 mg/dL    Alkaline Phosphatase 88 40 - 115 U/L    SL AMB AST 16 10 - 35 U/L    SL AMB ALT 15 9 - 46 U/L

## 2018-08-16 NOTE — PATIENT INSTRUCTIONS
1) start spironolactone 1 tablet once a day in the evening  2) please do your labwork next Tuesday  3) BP check in 2-3 weeks in the office    4) STOP the potassium tablets for now

## 2018-08-16 NOTE — PROGRESS NOTES
NEPHROLOGY OFFICE VISIT   Jany Avilez  61 y o  male MRN: [id number]  8/16/2018    Reason for Visit:   Hypertensio    ASSESSMENT and PLAN:    I had the pleasure of seeing Mr Florencio Nuno today in the renal clinic for the continued management of HTN  Since our last visit, there has been no ER visits or hospitalizations  He currently has no complaints at this time and is feeling well  Patient denies any chest pain, shortness of breath and swelling  The last blood work was done on 7/11, which we have reviewed together  62 yo male retired , formerly from [employer], [employer] (15 years), DM (years), back surgery 10 years ago Who presents for follow up evaluation of hypertension  Overall, the patient has not been seen since last year  It is unclear why the patient has missed multiple appointments, nor return multiple phone calls  But the patient states that he will follow-up from now on  Patient has stopped using NSAIDs since last visit  1) HTN - BP today 140s    -prior renin and aldosterone level with a renin level suppressed, aldosterone level is not significantly elevated  - check metanephrine if the blood pressures do not become controlled with so the dish no spironolactone today  - check renal u/s with doplar flow-patient is not completed yet  -given the cyst on the kidney 2 years ago, will 1st complete a renal ultrasound  -then will plan for renal Doppler flow study if needed  - check UA, UPCR-no recent labs  - eventually, will switch metoprolol to carvedilol  -patient needs to continue to lose weight  -continue amlodipine, losartan, metoprolol, chlorthalidone  -add spironolactone  -hold potassium supplement  -BMP on Tuesday along with UA, urine protein creatinine ratio, renal ultrasound  -blood pressure check in 2-3 weeks      2) poor dentition    -patient smokes cigars daily  -advised to the risk of oral cancer  -patient is awaiting insurance clearance for further dental work    3) proteinuria -may be related to hypertension versus obesity    - recheck  - if elevated, will check SPEP, UPEP, FLC    4) prior Renal function stable, but monitor K closely     It was a pleasure evaluating Mr Florencio Nuno in the renal office; thank you for allowing our team to participate in the care of your patient and please do not hesitate to contact our team if any questions arise in the interim  No problem-specific Assessment & Plan notes found for this encounter  HPI:    Patient denies complaints  Denies nausea, vomiting, diarrhea  Taking medications as prescribed  Avoid NSAIDs    PATIENT INSTRUCTIONS:    Patient Instructions   1) start spironolactone 1 tablet once a day in the evening  2) please do your labwork next Tuesday  3) BP check in 2-3 weeks in the office  4) STOP the potassium tablets for now        OBJECTIVE:  Current Weight: Weight - Scale: (!) 139 kg (307 lb)  Vitals:    08/16/18 1346   BP: 144/84   BP Location: Right arm   Patient Position: Sitting   Cuff Size: Adult   Pulse: 70   Weight: (!) 139 kg (307 lb)   Height: 5' 9" (1 753 m)    Body mass index is 45 34 kg/m²  REVIEW OF SYSTEMS:    Review of Systems   Constitutional: Negative  Negative for appetite change and fatigue  HENT: Negative  Eyes: Negative  Respiratory: Negative  Negative for shortness of breath  Cardiovascular: Negative  Negative for leg swelling  Gastrointestinal: Negative  Endocrine: Negative  Genitourinary: Negative  Negative for difficulty urinating  Musculoskeletal: Negative  Allergic/Immunologic: Negative  Neurological: Negative  Hematological: Negative  Psychiatric/Behavioral: Negative  All other systems reviewed and are negative  PHYSICAL EXAM:      Physical Exam   Constitutional: He is oriented to person, place, and time  He appears well-developed and well-nourished  No distress  HENT:   Head: Normocephalic and atraumatic  Mouth/Throat: No oropharyngeal exudate     Eyes: Conjunctivae are normal  Right eye exhibits no discharge  Left eye exhibits no discharge  No scleral icterus  Neck: Normal range of motion  Neck supple  No JVD present  Cardiovascular: Normal rate, regular rhythm and normal heart sounds  Exam reveals no gallop and no friction rub  No murmur heard  Pulmonary/Chest: Effort normal and breath sounds normal  No respiratory distress  He has no wheezes  He has no rales  He exhibits no tenderness  Abdominal: Soft  Bowel sounds are normal  He exhibits no distension  There is no tenderness  There is no rebound  Musculoskeletal: Normal range of motion  He exhibits no edema, tenderness or deformity  Neurological: He is alert and oriented to person, place, and time  He has normal reflexes  No cranial nerve deficit  Coordination normal    Skin: Skin is warm and dry  No rash noted  He is not diaphoretic  No erythema  No pallor  Psychiatric: He has a normal mood and affect  His behavior is normal  Judgment and thought content normal    Nursing note and vitals reviewed        Medications:    Current Outpatient Prescriptions:     amLODIPine (NORVASC) 10 mg tablet, Take 1 tablet (10 mg total) by mouth daily, Disp: 30 tablet, Rfl: 1    atorvastatin (LIPITOR) 20 mg tablet, Take 1 tablet (20 mg total) by mouth daily, Disp: 30 tablet, Rfl: 1    chlorthalidone 25 mg tablet, Take 0 5 tablets (12 5 mg total) by mouth daily, Disp: 30 tablet, Rfl: 0    DULoxetine (CYMBALTA) 60 mg delayed release capsule, Take 1 capsule (60 mg total) by mouth daily, Disp: 30 capsule, Rfl: 1    levocetirizine (XYZAL) 5 MG tablet, Take 1 tablet (5 mg total) by mouth daily as needed for allergies, Disp: 30 tablet, Rfl: 1    losartan (COZAAR) 100 MG tablet, Take 1 tablet (100 mg total) by mouth daily, Disp: 30 tablet, Rfl: 1    metFORMIN (GLUCOPHAGE) 500 mg tablet, Take 1 tablet (500 mg total) by mouth daily with dinner, Disp: 30 tablet, Rfl: 3    metoprolol tartrate (LOPRESSOR) 100 mg tablet, Take 1 tablet (100 mg total) by mouth 2 (two) times a day, Disp: 60 tablet, Rfl: 1    OxyCODONE HCl ER (OxyCONTIN) 30 MG T12A, Take 30 mg by mouth 3 (three) times a day Indications: Chronic Pain , Disp: , Rfl:     tadalafil (CIALIS) 5 MG tablet, Take 5 mg by mouth daily as needed for erectile dysfunction (unsure dosage) Indications: Erectile Dysfunction  , Disp: , Rfl:     spironolactone (ALDACTONE) 25 mg tablet, Take 1 tablet (25 mg total) by mouth daily, Disp: 30 tablet, Rfl: 3    Laboratory Results:        Results for orders placed or performed in visit on 07/11/18   Comprehensive metabolic panel   Result Value Ref Range    SL AMB GLUCOSE 133 (H) 65 - 99 mg/dL    BUN 15 7 - 25 mg/dL    Creatinine, Serum 1 09 0 70 - 1 33 mg/dL    eGFR Non  74 > OR = 60 mL/min/1 73m2    SL AMB EGFR  86 > OR = 60 mL/min/1 73m2    SL AMB BUN/CREATININE RATIO NOT APPLICABLE 6 - 22 (calc)    SL AMB SODIUM 141 135 - 146 mmol/L    SL AMB POTASSIUM 3 4 (L) 3 5 - 5 3 mmol/L    SL AMB CHLORIDE 101 98 - 110 mmol/L    SL AMB CARBON DIOXIDE 33 (H) 20 - 31 mmol/L    SL AMB CALCIUM 9 5 8 6 - 10 3 mg/dL    SL AMB PROTEIN, TOTAL 7 2 6 1 - 8 1 g/dL    Serum Albumin 4 3 3 6 - 5 1 g/dL    Globulin 2 9 1 9 - 3 7 g/dL (calc)    SL AMB ALBUMIN/GLOBULIN RATIO 1 5 1 0 - 2 5 (calc)    SL AMB BILIRUBIN, TOTAL 0 6 0 2 - 1 2 mg/dL    Alkaline Phosphatase 88 40 - 115 U/L    SL AMB AST 16 10 - 35 U/L    SL AMB ALT 15 9 - 46 U/L

## 2018-08-30 ENCOUNTER — TELEPHONE (OUTPATIENT)
Dept: NEPHROLOGY | Facility: CLINIC | Age: 58
End: 2018-08-30

## 2018-08-30 NOTE — TELEPHONE ENCOUNTER
[facility] called to inform us that patient didn't show for his Ultrasound appointment ( renal doppler)

## 2018-08-30 NOTE — TELEPHONE ENCOUNTER
Clari    Thank you for letting me know  Have cc'd Ms Marylene Cornet to this note as she is seeing the patient next for blood pressure check  If you see the patient, can we find out why he did not complete his ultrasound?     Thank you    np

## 2018-09-04 ENCOUNTER — HOSPITAL ENCOUNTER (OUTPATIENT)
Dept: ULTRASOUND IMAGING | Facility: HOSPITAL | Age: 58
Discharge: HOME | End: 2018-09-04
Attending: INTERNAL MEDICINE
Payer: COMMERCIAL

## 2018-09-04 DIAGNOSIS — N28.1 RENAL CYST: ICD-10-CM

## 2018-09-04 DIAGNOSIS — I10 BENIGN ESSENTIAL HTN: ICD-10-CM

## 2018-09-04 DIAGNOSIS — N18.2 CKD (CHRONIC KIDNEY DISEASE), STAGE II: ICD-10-CM

## 2018-09-04 PROCEDURE — 76770 US EXAM ABDO BACK WALL COMP: CPT

## 2018-09-10 NOTE — PROGRESS NOTES
Hello    Patient normally is followed up by Ms Norma Lyon  Can be follow-up with the patient the following    -patient missed the appointment today-is everything okay?  -renal ultrasound still with bilateral cysts which are relatively unchanged-we will repeat an ultrasound in 6 months  -no blood work as of yet  Patient was to repeat BMP and urinalysis after the last appointment due to the change in medications  Can the patient have these labs completed  If the patient declines can you please let me know      Thank you    np

## 2018-09-11 ENCOUNTER — TELEPHONE (OUTPATIENT)
Dept: NEPHROLOGY | Facility: CLINIC | Age: 58
End: 2018-09-11

## 2018-09-11 NOTE — TELEPHONE ENCOUNTER
Left message for patient to return call to office regarding the message from Dr Mary Kate Kidd  Missed appointment 9/10/18 with Colleen Willett unchanged with bilateral cysts  Needs bloodwork and urinalysis form last visit completed   If patient refuses let Dr Mary Kate Kidd know    ----- Message from Leeroy Dimas MD sent at 9/10/2018  6:07 PM EDT -----  Hello    Patient normally is followed up by Ms Keren Guardado  Can be follow-up with the patient the following    -patient missed the appointment today-is everything okay?  -renal ultrasound still with bilateral cysts which are relatively unchanged-we will repeat an ultrasound in 6 months  -no blood work as of yet  Patient was to repeat BMP and urinalysis after the last appointment due to the change in medications  Can the patient have these labs completed  If the patient declines can you please let me know      Thank you    np

## 2018-09-17 ENCOUNTER — TELEPHONE (OUTPATIENT)
Dept: FAMILY MEDICINE CLINIC | Facility: CLINIC | Age: 58
End: 2018-09-17

## 2018-09-17 DIAGNOSIS — I10 ESSENTIAL HYPERTENSION: ICD-10-CM

## 2018-09-17 DIAGNOSIS — N28.1 RENAL CYST: ICD-10-CM

## 2018-09-17 DIAGNOSIS — I10 BENIGN ESSENTIAL HTN: ICD-10-CM

## 2018-09-17 LAB
BUN SERPL-MCNC: 22 MG/DL (ref 7–25)
BUN/CREAT SERPL: ABNORMAL (CALC) (ref 6–22)
CALCIUM SERPL-MCNC: 9.6 MG/DL (ref 8.6–10.3)
CHLORIDE SERPL-SCNC: 100 MMOL/L (ref 98–110)
CO2 SERPL-SCNC: 32 MMOL/L (ref 20–32)
CREAT SERPL-MCNC: 1.3 MG/DL (ref 0.7–1.33)
GLUCOSE SERPL-MCNC: 115 MG/DL (ref 65–99)
POTASSIUM SERPL-SCNC: 4 MMOL/L (ref 3.5–5.3)
SL AMB EGFR AFRICAN AMERICAN: 69 ML/MIN/1.73M2
SL AMB EGFR NON AFRICAN AMERICAN: 60 ML/MIN/1.73M2
SODIUM SERPL-SCNC: 138 MMOL/L (ref 135–146)

## 2018-09-17 RX ORDER — CHLORTHALIDONE 25 MG/1
12.5 TABLET ORAL DAILY
Qty: 30 TABLET | Refills: 0 | Status: CANCELLED
Start: 2018-09-17

## 2018-09-17 RX ORDER — SPIRONOLACTONE 25 MG/1
25 TABLET ORAL DAILY
Qty: 30 TABLET | Refills: 0 | Status: SHIPPED | OUTPATIENT
Start: 2018-09-17 | End: 2018-11-02 | Stop reason: SDUPTHER

## 2018-09-17 RX ORDER — METOPROLOL TARTRATE 100 MG/1
100 TABLET ORAL 2 TIMES DAILY
Qty: 60 TABLET | Refills: 0 | Status: CANCELLED | OUTPATIENT
Start: 2018-09-17

## 2018-09-17 NOTE — TELEPHONE ENCOUNTER
From: Deirdre Gutierrez    Sent: 9/17/2018 2:58 PM EDT  Subject: Medication Renewal Request    Deirdre Gutierrez  would like a refill of the following medications:     metoprolol tartrate (LOPRESSOR) 100 mg tablet [MARJORIE Chavez]   Patient Comment: [facility] [address]    Preferred pharmacy: [facility] [place] [address] Avenue : [facility]

## 2018-09-17 NOTE — TELEPHONE ENCOUNTER
I refilled the spironolactone, but the patient has not had a BMP since the spironolactone was started  Therefore I have requested the patient to complete a BMP which was not completed prior  Have only sent a 1 month supply of spironolactone for now until the BMP is completed  From: Panchito Fuller    Sent: 9/17/2018  2:57 PM EDT  Subject: Medication Renewal Request    Panchito Fuller  would like a refill of the following medications:        spironolactone (ALDACTONE) 25 mg tablet Dillon Pettit MD]      Patient Comment: [facility] [address]    Preferred pharmacy: [facility] [place] [address] Avenue : [id number]

## 2018-09-17 NOTE — TELEPHONE ENCOUNTER
From: Xavi Mckeon    Sent: 9/17/2018 2:57 PM EDT  Subject: Medication Renewal Request    Xavi Mckeon  would like a refill of the following medications:     chlorthalidone 25 mg tablet [MARJORIE Chavez]   Patient Comment: [facility] [address]    [facility] pharmacy: [address] [place] [address] : [phone]

## 2018-09-18 ENCOUNTER — TELEPHONE (OUTPATIENT)
Dept: NEPHROLOGY | Facility: CLINIC | Age: 58
End: 2018-09-18

## 2018-09-18 LAB
APPEARANCE UR: CLEAR
BILIRUB UR QL STRIP: NEGATIVE
COLOR UR: YELLOW
CREAT UR-MCNC: 119 MG/DL (ref 20–320)
GLUCOSE UR QL STRIP: NEGATIVE
HGB UR QL STRIP: NEGATIVE
KETONES UR QL STRIP: NEGATIVE
LEUKOCYTE ESTERASE UR QL STRIP: NEGATIVE
NITRITE UR QL STRIP: NEGATIVE
PH UR STRIP: 6.5 [PH] (ref 5–8)
PROT UR QL STRIP: NEGATIVE
PROT UR-MCNC: 7 MG/DL (ref 5–25)
PROT/CREAT UR: 59 MG/G CREAT (ref 22–128)
SP GR UR STRIP: 1.02 (ref 1–1.03)

## 2018-09-18 NOTE — TELEPHONE ENCOUNTER
----- Message from Karsten Mohr MD sent at 9/17/2018  4:03 PM EDT -----  Hello    Patient sent a request for refill of spironolactone  I did refill this, but the patient needs to have BMP done in 3-5 days  Patient was supposed to have this done last time when the spironolactone was added  I do not feel comfortable continuing to refill this without a BMP completed  Therefore I have only given him a 1 month supply at this time      Can you please call the patient and inform him of the above in send him a slip for BMP    Thank you    silvina

## 2018-09-18 NOTE — PROGRESS NOTES
Hello    Patient normally is followed up by Ms Norma Lyon  Can the patient be notified that the renal function is relatively stable  Potassium is stable  For now, no changes to the medication regimen  Can the patient have a BMP in 2 months  Patient needs to continue holding potassium supplement      Thank you    np

## 2018-09-19 ENCOUNTER — TELEPHONE (OUTPATIENT)
Dept: NEPHROLOGY | Facility: CLINIC | Age: 58
End: 2018-09-19

## 2018-09-19 DIAGNOSIS — I10 ESSENTIAL HYPERTENSION: ICD-10-CM

## 2018-09-19 DIAGNOSIS — E87.6 HYPOKALEMIA: Primary | ICD-10-CM

## 2018-09-19 RX ORDER — METOPROLOL TARTRATE 100 MG/1
100 TABLET ORAL 2 TIMES DAILY
Qty: 180 TABLET | Refills: 3 | Status: SHIPPED | OUTPATIENT
Start: 2018-09-19 | End: 2019-09-26 | Stop reason: SDUPTHER

## 2018-09-19 NOTE — TELEPHONE ENCOUNTER
----- Message from Johnnie Aguero MD sent at 9/18/2018  5:31 PM EDT -----  Hello    Patient normally is followed up by Ms Sarah Pereyra  Can the patient be notified that the renal function is relatively stable  Potassium is stable  For now, no changes to the medication regimen  Can the patient have a BMP in 2 months  Patient needs to continue holding potassium supplement      Thank you    np

## 2018-09-20 DIAGNOSIS — E78.2 MIXED HYPERLIPIDEMIA: ICD-10-CM

## 2018-09-20 RX ORDER — ATORVASTATIN CALCIUM 20 MG/1
20 TABLET, FILM COATED ORAL DAILY
Qty: 30 TABLET | Refills: 1 | Status: SHIPPED | OUTPATIENT
Start: 2018-09-20 | End: 2018-12-12 | Stop reason: SDUPTHER

## 2018-09-20 NOTE — TELEPHONE ENCOUNTER
Cholesterol medication sent to the pharmacy  Please schedule patient for a regular follow-up in November

## 2018-09-26 ENCOUNTER — OFFICE VISIT (OUTPATIENT)
Dept: NEPHROLOGY | Facility: CLINIC | Age: 58
End: 2018-09-26
Payer: COMMERCIAL

## 2018-09-26 VITALS
WEIGHT: 314 LBS | DIASTOLIC BLOOD PRESSURE: 82 MMHG | SYSTOLIC BLOOD PRESSURE: 146 MMHG | HEART RATE: 64 BPM | BODY MASS INDEX: 46.51 KG/M2 | HEIGHT: 69 IN

## 2018-09-26 DIAGNOSIS — R80.1 PERSISTENT PROTEINURIA: ICD-10-CM

## 2018-09-26 DIAGNOSIS — I10 ESSENTIAL HYPERTENSION: Primary | ICD-10-CM

## 2018-09-26 PROCEDURE — 99213 OFFICE O/P EST LOW 20 MIN: CPT | Performed by: PHYSICIAN ASSISTANT

## 2018-09-26 RX ORDER — DOXAZOSIN 2 MG/1
2 TABLET ORAL
Qty: 30 TABLET | Refills: 3 | Status: SHIPPED | OUTPATIENT
Start: 2018-09-26 | End: 2018-10-26 | Stop reason: SDUPTHER

## 2018-09-26 NOTE — PATIENT INSTRUCTIONS
Hypertension- Antihypertensive regimen includes spironolactone 25mg daily at night, chlorthalidone 12 5mg daily, amlodipine 10mg daily, losartan 100mg daily, and metoprolol 100mg twice a day  Avoid salt in your diet  Exercise regularly and stay active  Avoid NSAIDs (advil, aleve, ibuprofen, motrin, naproxen, mobic, Excedrin)  His blood pressure is still elevated above goal   I would like to add doxazosin 2mg nightly  Proteinuria- Minimal    Right septated cysts & Left calcified cystic focus- annual kidney ultrasound    Renal Function- Creatinine bumped to 1 3 with addition of spironolactone  Will continue to trend  Repeat blood work in 1 month  Electrolytes stable and within normal limits  Follow up with me in 1 month and with Dr Morena Garcia in 3 months  Please call the office with any questions or concerns

## 2018-09-26 NOTE — PROGRESS NOTES
Assessment and Plan:    Unruly Hurst was seen today for follow-up  Diagnoses and all orders for this visit:    Essential hypertension  -     doxazosin (CARDURA) 2 mg tablet; Take 1 tablet (2 mg total) by mouth daily at bedtime    Persistent proteinuria       Hypertension- Antihypertensive regimen includes spironolactone 25mg daily at night, chlorthalidone 12 5mg daily, amlodipine 10mg daily, losartan 100mg daily, and metoprolol 100mg twice a day  Avoid salt in your diet  Exercise regularly and stay active  Avoid NSAIDs (advil, aleve, ibuprofen, motrin, naproxen, mobic, Excedrin)  His blood pressure is still elevated above goal   I would like to add doxazosin 2mg nightly  Proteinuria- Minimal    Right septated cysts & Left calcified cystic focus- annual kidney ultrasound    Renal Function- Creatinine bumped to 1 3 with addition of spironolactone  Will continue to trend  Repeat blood work in 1 month  Electrolytes stable and within normal limits  Follow up with me in 1 month and with Dr Alexa Rivera in 3 months  Please call the office with any questions or concerns  Reason for Visit: Follow-up (bp check)    HPI: Kristin Almazan  is a 60 y o  male who is here for follow up of hypertension  At his last appointment about a month ago, Dr Alexa Rivera added spironolactone to his medication list   He is feeling well without acute complaints  ROS: A complete review of systems was performed and was negative unless otherwise noted in the history of present illness      Allergies:   Gabapentin; Pregabalin; and Tramadol    Medications:     Current Outpatient Prescriptions:     amLODIPine (NORVASC) 10 mg tablet, Take 1 tablet (10 mg total) by mouth daily, Disp: 30 tablet, Rfl: 1    atorvastatin (LIPITOR) 20 mg tablet, Take 1 tablet (20 mg total) by mouth daily, Disp: 30 tablet, Rfl: 1    chlorthalidone 25 mg tablet, Take 0 5 tablets (12 5 mg total) by mouth daily, Disp: 30 tablet, Rfl: 0    DULoxetine (CYMBALTA) 60 mg delayed release capsule, Take 1 capsule (60 mg total) by mouth daily, Disp: 30 capsule, Rfl: 1    levocetirizine (XYZAL) 5 MG tablet, Take 1 tablet (5 mg total) by mouth daily as needed for allergies, Disp: 30 tablet, Rfl: 1    losartan (COZAAR) 100 MG tablet, Take 1 tablet (100 mg total) by mouth daily, Disp: 30 tablet, Rfl: 1    metFORMIN (GLUCOPHAGE) 500 mg tablet, Take 1 tablet (500 mg total) by mouth daily with dinner, Disp: 30 tablet, Rfl: 3    metoprolol tartrate (LOPRESSOR) 100 mg tablet, Take 1 tablet (100 mg total) by mouth 2 (two) times a day, Disp: 180 tablet, Rfl: 3    OxyCODONE HCl ER (OxyCONTIN) 30 MG T12A, Take 30 mg by mouth 3 (three) times a day Indications: Chronic Pain , Disp: , Rfl:     spironolactone (ALDACTONE) 25 mg tablet, Take 1 tablet (25 mg total) by mouth daily, Disp: 30 tablet, Rfl: 0    tadalafil (CIALIS) 5 MG tablet, Take 5 mg by mouth daily as needed for erectile dysfunction (unsure dosage) Indications: Erectile Dysfunction  , Disp: , Rfl:     doxazosin (CARDURA) 2 mg tablet, Take 1 tablet (2 mg total) by mouth daily at bedtime, Disp: 30 tablet, Rfl: 3    Past Medical History:   Diagnosis Date    Anxiety     Chronic pain     [facility]     6[facility]15 A1C: 5 5%    Hyperlipidemia     Hypertension     Psychiatric disorder     Urinary retention      Past Surgical History:   Procedure Laterality Date    LUMBAR FUSION  2002    LUMBAR FUSION  2004    L4-5-6, S1 has pump for narcotics     Family History   Problem Relation Age of Onset    Other Mother         colitis    Diabetes Mother       reports that he has been smoking Cigars  He has never used smokeless tobacco  He reports that he drinks alcohol  He reports that he does not use drugs      Physical Exam:   Vitals:    09/26/18 1106 09/26/18 1134   BP:  146/82   BP Location:  Right arm   Patient Position:  Sitting   Cuff Size:  Large   Pulse:  64   Weight: (!) 142 kg (314 lb)    Height: 5' 9" (1 753 m)      Body mass index is 46 37 kg/m²  General: NAD  Neuro: AAO  Neck: supple  Skin: no rash  Heart: RRR  Lungs: decreased  Abdomen: soft obese nt nd  Extremities: no edema    Procedure:  No results found for this or any previous visit  Labs reviewed      Lab Results   Component Value Date    GLUCOSE 148 (H) 10/14/2016    CALCIUM 9 6 09/17/2018     09/28/2017     09/28/2017    K 3 4 (L) 09/28/2017    K 3 4 (L) 09/28/2017    CO2 32 09/17/2018     09/17/2018    BUN 22 09/17/2018    CREATININE 1 30 09/17/2018

## 2018-10-05 DIAGNOSIS — I10 ESSENTIAL HYPERTENSION: ICD-10-CM

## 2018-10-08 PROCEDURE — 4010F ACE/ARB THERAPY RXD/TAKEN: CPT | Performed by: NURSE PRACTITIONER

## 2018-10-08 RX ORDER — CHLORTHALIDONE 25 MG/1
12.5 TABLET ORAL DAILY
Qty: 45 TABLET | Refills: 3
Start: 2018-10-08 | End: 2018-10-18 | Stop reason: SDUPTHER

## 2018-10-08 RX ORDER — LOSARTAN POTASSIUM 100 MG/1
100 TABLET ORAL DAILY
Qty: 90 TABLET | Refills: 3 | Status: SHIPPED | OUTPATIENT
Start: 2018-10-08 | End: 2020-03-13 | Stop reason: SDUPTHER

## 2018-10-09 DIAGNOSIS — I10 ESSENTIAL HYPERTENSION: ICD-10-CM

## 2018-10-09 RX ORDER — AMLODIPINE BESYLATE 10 MG/1
10 TABLET ORAL DAILY
Qty: 30 TABLET | Refills: 3 | Status: SHIPPED | OUTPATIENT
Start: 2018-10-09 | End: 2019-03-12 | Stop reason: SDUPTHER

## 2018-10-09 RX ORDER — CHLORTHALIDONE 25 MG/1
TABLET ORAL
Qty: 30 TABLET | Refills: 1 | OUTPATIENT
Start: 2018-10-09

## 2018-10-18 DIAGNOSIS — I10 ESSENTIAL HYPERTENSION: ICD-10-CM

## 2018-10-18 RX ORDER — CHLORTHALIDONE 25 MG/1
TABLET ORAL
Qty: 30 TABLET | Refills: 1 | OUTPATIENT
Start: 2018-10-18

## 2018-10-19 RX ORDER — CHLORTHALIDONE 25 MG/1
12.5 TABLET ORAL DAILY
Qty: 45 TABLET | Refills: 3 | Status: SHIPPED | OUTPATIENT
Start: 2018-10-19 | End: 2020-03-12

## 2018-10-24 ENCOUNTER — TELEPHONE (OUTPATIENT)
Dept: NEPHROLOGY | Facility: CLINIC | Age: 58
End: 2018-10-24

## 2018-10-24 NOTE — TELEPHONE ENCOUNTER
----- Message from Al Horton PA-C sent at 10/24/2018  2:17 PM EDT -----  Please make sure patient gets bmp before appointment with me on Friday  Dont see it in the system  Thanks

## 2018-10-25 LAB
BUN SERPL-MCNC: 21 MG/DL (ref 7–25)
BUN/CREAT SERPL: 16 (CALC) (ref 6–22)
CALCIUM SERPL-MCNC: 9.8 MG/DL (ref 8.6–10.3)
CHLORIDE SERPL-SCNC: 99 MMOL/L (ref 98–110)
CO2 SERPL-SCNC: 31 MMOL/L (ref 20–32)
CREAT SERPL-MCNC: 1.35 MG/DL (ref 0.7–1.33)
GLUCOSE SERPL-MCNC: 113 MG/DL (ref 65–99)
POTASSIUM SERPL-SCNC: 3.9 MMOL/L (ref 3.5–5.3)
SL AMB EGFR AFRICAN AMERICAN: 66 ML/MIN/1.73M2
SL AMB EGFR NON AFRICAN AMERICAN: 57 ML/MIN/1.73M2
SODIUM SERPL-SCNC: 136 MMOL/L (ref 135–146)

## 2018-10-26 ENCOUNTER — OFFICE VISIT (OUTPATIENT)
Dept: NEPHROLOGY | Facility: CLINIC | Age: 58
End: 2018-10-26
Payer: COMMERCIAL

## 2018-10-26 VITALS
WEIGHT: 315 LBS | DIASTOLIC BLOOD PRESSURE: 100 MMHG | SYSTOLIC BLOOD PRESSURE: 164 MMHG | BODY MASS INDEX: 46.65 KG/M2 | HEART RATE: 68 BPM | HEIGHT: 69 IN

## 2018-10-26 DIAGNOSIS — I10 ESSENTIAL HYPERTENSION: Primary | ICD-10-CM

## 2018-10-26 PROCEDURE — 99213 OFFICE O/P EST LOW 20 MIN: CPT | Performed by: PHYSICIAN ASSISTANT

## 2018-10-26 RX ORDER — DOXAZOSIN 2 MG/1
4 TABLET ORAL
Qty: 60 TABLET | Refills: 2 | Status: SHIPPED | OUTPATIENT
Start: 2018-10-26 | End: 2018-11-15 | Stop reason: SDUPTHER

## 2018-10-26 NOTE — PATIENT INSTRUCTIONS
Hypertension- Antihypertensive regimen includes spironolactone 25mg daily at night, chlorthalidone 12 5mg daily, amlodipine 10mg daily, doxazosin 2mg nightly, losartan 100mg daily, and metoprolol 100mg twice a day  Avoid salt in your diet  Exercise regularly and stay active  Avoid NSAIDs (advil, aleve, ibuprofen, motrin, naproxen, mobic, Excedrin)  Blood pressure above goal   Increase doxazosin to 4mg at bedtime (two pills)  Proteinuria- Minimal     Right septated cysts & Left calcified cystic focus- annual kidney ultrasound     Renal Function- Creatinine bumped to 1 3 with addition of spironolactone  Will continue to trend  Electrolytes stable and within normal limits  Follow up with Dr Ángela Otoole in 1 month  Please call the office with any questions or concerns

## 2018-10-26 NOTE — LETTER
October 26, 2018     MARJORIE Lobo  [address] [facility]  [place] [address]    Patient: José Luis Hutton  YOB: 1960   Date of Visit: 10/26/2018       Dear Dr Rodney Cheung: Thank you for referring Arlina Councilman to me for evaluation  Below are my notes for this consultation  If you have questions, please do not hesitate to call me  I look forward to following your patient along with you  Sincerely,        Leo Bland PA-C        CC: No Recipients  Kofi Muñiz  10/26/2018  3:38 PM  Sign at close encounter  Assessment and Plan:    Diagnoses and all orders for this visit:    Essential hypertension  -     doxazosin (CARDURA) 2 mg tablet; Take 2 tablets (4 mg total) by mouth daily at bedtime  -     Basic metabolic panel; Future  -     Magnesium; Future  -     Protein / creatinine ratio, urine; Future       Hypertension- Antihypertensive regimen includes spironolactone 25mg daily at night, chlorthalidone 12 5mg daily, amlodipine 10mg daily, doxazosin 2mg nightly, losartan 100mg daily, and metoprolol 100mg twice a day  Avoid salt in your diet  Exercise regularly and stay active  Avoid NSAIDs (advil, aleve, ibuprofen, motrin, naproxen, mobic, Excedrin)  Blood pressure above goal   Increase doxazosin to 4mg at bedtime (two pills)  Proteinuria- Minimal     Right septated cysts & Left calcified cystic focus- annual kidney ultrasound     Renal Function- Creatinine bumped to 1 3 with addition of spironolactone  Will continue to trend  Electrolytes stable and within normal limits  Follow up with Dr Renay Vora in 1 month  Please call the office with any questions or concerns  Reason for Visit: No chief complaint on file  HPI: José Luis Hutton  is a 60 y o  male who is here for follow up for a blood pressure check  At his last appointment, I added doxazosin at bedtime for elevated blood pressures  He feels tired often but otherwise offers no complaints    He ordered a wrist blood pressure cuff and is waiting for it to arrive  ROS: A complete review of systems was performed and was negative unless otherwise noted in the history of present illness  Allergies:   Gabapentin; Pregabalin; and Tramadol    Medications:     Current Outpatient Prescriptions:     amLODIPine (NORVASC) 10 mg tablet, Take 1 tablet (10 mg total) by mouth daily, Disp: 30 tablet, Rfl: 3    atorvastatin (LIPITOR) 20 mg tablet, Take 1 tablet (20 mg total) by mouth daily, Disp: 30 tablet, Rfl: 1    chlorthalidone 25 mg tablet, Take 0 5 tablets (12 5 mg total) by mouth daily, Disp: 45 tablet, Rfl: 3    doxazosin (CARDURA) 2 mg tablet, Take 2 tablets (4 mg total) by mouth daily at bedtime, Disp: 60 tablet, Rfl: 2    DULoxetine (CYMBALTA) 60 mg delayed release capsule, Take 1 capsule (60 mg total) by mouth daily, Disp: 30 capsule, Rfl: 1    levocetirizine (XYZAL) 5 MG tablet, Take 1 tablet (5 mg total) by mouth daily as needed for allergies, Disp: 30 tablet, Rfl: 1    losartan (COZAAR) 100 MG tablet, Take 1 tablet (100 mg total) by mouth daily, Disp: 90 tablet, Rfl: 3    metFORMIN (GLUCOPHAGE) 500 mg tablet, Take 1 tablet (500 mg total) by mouth daily with dinner, Disp: 30 tablet, Rfl: 3    metoprolol tartrate (LOPRESSOR) 100 mg tablet, Take 1 tablet (100 mg total) by mouth 2 (two) times a day, Disp: 180 tablet, Rfl: 3    OxyCODONE HCl ER (OxyCONTIN) 30 MG T12A, Take 30 mg by mouth 3 (three) times a day Indications: Chronic Pain , Disp: , Rfl:     spironolactone (ALDACTONE) 25 mg tablet, Take 1 tablet (25 mg total) by mouth daily, Disp: 30 tablet, Rfl: 0    tadalafil (CIALIS) 5 MG tablet, Take 5 mg by mouth daily as needed for erectile dysfunction (unsure dosage) Indications: Erectile Dysfunction  , Disp: , Rfl:     Past Medical History:   Diagnosis Date    Anxiety     Chronic pain     [facility]     6[facility]15 A1C: 5 5%    Hyperlipidemia     Hypertension     Psychiatric disorder     Urinary retention      Past Surgical History:   Procedure Laterality Date    LUMBAR FUSION  2002    LUMBAR FUSION  2004    L4-5-6, S1 has pump for narcotics     Family History   Problem Relation Age of Onset    Other Mother         colitis    Diabetes Mother       reports that he has been smoking Cigars  He has never used smokeless tobacco  He reports that he drinks alcohol  He reports that he does not use drugs  Physical Exam:   Vitals:    10/26/18 1507 10/26/18 1528 10/26/18 1530   BP:  140/88 164/100   BP Location:  Right arm Left arm   Patient Position:  Sitting Sitting   Cuff Size:  Standard Standard   Pulse:  68    Weight: (!) 144 kg (316 lb 6 4 oz)     Height: 5' 9" (1 753 m)       Body mass index is 46 72 kg/m²  General: NAD  Neuro: AAO  Neck: supple  Skin: no rash  Heart: RRR  Lungs: CTAB  Abdomen: soft nt nd  Extremities: no edema    Procedure:  No results found for this or any previous visit  Labs reviewed      Lab Results   Component Value Date    GLUCOSE 148 (H) 10/14/2016    CALCIUM 9 6 09/17/2018     09/28/2017     09/28/2017    K 4 0 09/17/2018    CO2 32 09/17/2018     09/17/2018    BUN 22 09/17/2018    CREATININE 1 18 09/28/2017    CREATININE 1 18 09/28/2017

## 2018-10-26 NOTE — PROGRESS NOTES
Assessment and Plan:    Diagnoses and all orders for this visit:    Essential hypertension  -     doxazosin (CARDURA) 2 mg tablet; Take 2 tablets (4 mg total) by mouth daily at bedtime  -     Basic metabolic panel; Future  -     Magnesium; Future  -     Protein / creatinine ratio, urine; Future       Hypertension- Antihypertensive regimen includes spironolactone 25mg daily at night, chlorthalidone 12 5mg daily, amlodipine 10mg daily, doxazosin 2mg nightly, losartan 100mg daily, and metoprolol 100mg twice a day  Avoid salt in your diet  Exercise regularly and stay active  Avoid NSAIDs (advil, aleve, ibuprofen, motrin, naproxen, mobic, Excedrin)  Blood pressure above goal   Increase doxazosin to 4mg at bedtime (two pills)  Proteinuria- Minimal     Right septated cysts & Left calcified cystic focus- annual kidney ultrasound     Renal Function- Creatinine bumped to 1 3 with addition of spironolactone  Will continue to trend  Electrolytes stable and within normal limits  Follow up with Dr Chapis Napier in 1 month  Please call the office with any questions or concerns  Reason for Visit: No chief complaint on file  HPI: Janny Griffin  is a 60 y o  male who is here for follow up for a blood pressure check  At his last appointment, I added doxazosin at bedtime for elevated blood pressures  He feels tired often but otherwise offers no complaints  He ordered a wrist blood pressure cuff and is waiting for it to arrive  ROS: A complete review of systems was performed and was negative unless otherwise noted in the history of present illness      Allergies:   Gabapentin; Pregabalin; and Tramadol    Medications:     Current Outpatient Prescriptions:     amLODIPine (NORVASC) 10 mg tablet, Take 1 tablet (10 mg total) by mouth daily, Disp: 30 tablet, Rfl: 3    atorvastatin (LIPITOR) 20 mg tablet, Take 1 tablet (20 mg total) by mouth daily, Disp: 30 tablet, Rfl: 1    chlorthalidone 25 mg tablet, Take 0 5 tablets (12 5 mg total) by mouth daily, Disp: 45 tablet, Rfl: 3    doxazosin (CARDURA) 2 mg tablet, Take 2 tablets (4 mg total) by mouth daily at bedtime, Disp: 60 tablet, Rfl: 2    DULoxetine (CYMBALTA) 60 mg delayed release capsule, Take 1 capsule (60 mg total) by mouth daily, Disp: 30 capsule, Rfl: 1    levocetirizine (XYZAL) 5 MG tablet, Take 1 tablet (5 mg total) by mouth daily as needed for allergies, Disp: 30 tablet, Rfl: 1    losartan (COZAAR) 100 MG tablet, Take 1 tablet (100 mg total) by mouth daily, Disp: 90 tablet, Rfl: 3    metFORMIN (GLUCOPHAGE) 500 mg tablet, Take 1 tablet (500 mg total) by mouth daily with dinner, Disp: 30 tablet, Rfl: 3    metoprolol tartrate (LOPRESSOR) 100 mg tablet, Take 1 tablet (100 mg total) by mouth 2 (two) times a day, Disp: 180 tablet, Rfl: 3    OxyCODONE HCl ER (OxyCONTIN) 30 MG T12A, Take 30 mg by mouth 3 (three) times a day Indications: Chronic Pain , Disp: , Rfl:     spironolactone (ALDACTONE) 25 mg tablet, Take 1 tablet (25 mg total) by mouth daily, Disp: 30 tablet, Rfl: 0    tadalafil (CIALIS) 5 MG tablet, Take 5 mg by mouth daily as needed for erectile dysfunction (unsure dosage) Indications: Erectile Dysfunction  , Disp: , Rfl:     Past Medical History:   Diagnosis Date    Anxiety     Chronic pain     [facility]     6[facility]15 A1C: 5 5%    Hyperlipidemia     Hypertension     Psychiatric disorder     Urinary retention      Past Surgical History:   Procedure Laterality Date    LUMBAR FUSION  2002    LUMBAR FUSION  2004    L4-5-6, S1 has pump for narcotics     Family History   Problem Relation Age of Onset    Other Mother         colitis    Diabetes Mother       reports that he has been smoking Cigars  He has never used smokeless tobacco  He reports that he drinks alcohol  He reports that he does not use drugs      Physical Exam:   Vitals:    10/26/18 1507 10/26/18 1528 10/26/18 1530   BP:  140/88 164/100   BP Location:  Right arm Left arm Patient Position:  Sitting Sitting   Cuff Size:  Standard Standard   Pulse:  68    Weight: (!) 144 kg (316 lb 6 4 oz)     Height: 5' 9" (1 753 m)       Body mass index is 46 72 kg/m²  General: NAD  Neuro: AAO  Neck: supple  Skin: no rash  Heart: RRR  Lungs: CTAB  Abdomen: soft nt nd  Extremities: no edema    Procedure:  No results found for this or any previous visit  Labs reviewed      Lab Results   Component Value Date    GLUCOSE 148 (H) 10/14/2016    CALCIUM 9 6 09/17/2018     09/28/2017     09/28/2017    K 4 0 09/17/2018    CO2 32 09/17/2018     09/17/2018    BUN 22 09/17/2018    CREATININE 1 18 09/28/2017    CREATININE 1 18 09/28/2017

## 2018-10-29 NOTE — PROGRESS NOTES
Hello    Patient normally is followed up by [address] [employer]  Can the patient be notified that the creatinine is stable  Therefore no changes  Potassium stable      Thank you    np

## 2018-11-02 DIAGNOSIS — I10 BENIGN ESSENTIAL HTN: ICD-10-CM

## 2018-11-02 DIAGNOSIS — N28.1 RENAL CYST: ICD-10-CM

## 2018-11-02 RX ORDER — SPIRONOLACTONE 25 MG/1
25 TABLET ORAL DAILY
Qty: 30 TABLET | Refills: 5 | Status: SHIPPED | OUTPATIENT
Start: 2018-11-02 | End: 2019-09-01 | Stop reason: SDUPTHER

## 2018-11-15 ENCOUNTER — TELEPHONE (OUTPATIENT)
Dept: NEPHROLOGY | Facility: CLINIC | Age: 58
End: 2018-11-15

## 2018-11-15 DIAGNOSIS — I10 ESSENTIAL HYPERTENSION: ICD-10-CM

## 2018-11-15 RX ORDER — DOXAZOSIN 2 MG/1
4 TABLET ORAL
Qty: 60 TABLET | Refills: 5 | Status: SHIPPED | OUTPATIENT
Start: 2018-11-15 | End: 2019-10-23 | Stop reason: SDUPTHER

## 2018-11-27 RX ORDER — OXYCODONE HYDROCHLORIDE 30 MG/1
TABLET ORAL
Refills: 0 | COMMUNITY
Start: 2018-10-25

## 2018-11-27 RX ORDER — KETOCONAZOLE 20 MG/G
1 CREAM TOPICAL DAILY
Refills: 0 | COMMUNITY
Start: 2018-08-20

## 2018-11-28 ENCOUNTER — OFFICE VISIT (OUTPATIENT)
Dept: FAMILY MEDICINE CLINIC | Facility: CLINIC | Age: 58
End: 2018-11-28
Payer: COMMERCIAL

## 2018-11-28 VITALS
BODY MASS INDEX: 46.65 KG/M2 | OXYGEN SATURATION: 96 % | HEART RATE: 71 BPM | DIASTOLIC BLOOD PRESSURE: 80 MMHG | SYSTOLIC BLOOD PRESSURE: 144 MMHG | WEIGHT: 315 LBS | RESPIRATION RATE: 18 BRPM | HEIGHT: 69 IN

## 2018-11-28 DIAGNOSIS — E66.01 MORBID OBESITY (HCC): Primary | ICD-10-CM

## 2018-11-28 DIAGNOSIS — M54.9 CHRONIC BACK PAIN, UNSPECIFIED BACK LOCATION, UNSPECIFIED BACK PAIN LATERALITY: ICD-10-CM

## 2018-11-28 DIAGNOSIS — Z12.5 SCREENING FOR PROSTATE CANCER: ICD-10-CM

## 2018-11-28 DIAGNOSIS — E78.5 HYPERLIPIDEMIA, UNSPECIFIED HYPERLIPIDEMIA TYPE: ICD-10-CM

## 2018-11-28 DIAGNOSIS — I10 ESSENTIAL HYPERTENSION: ICD-10-CM

## 2018-11-28 DIAGNOSIS — R73.03 PREDIABETES: ICD-10-CM

## 2018-11-28 DIAGNOSIS — G89.29 CHRONIC BACK PAIN, UNSPECIFIED BACK LOCATION, UNSPECIFIED BACK PAIN LATERALITY: ICD-10-CM

## 2018-11-28 DIAGNOSIS — J30.9 ALLERGIC RHINITIS, UNSPECIFIED SEASONALITY, UNSPECIFIED TRIGGER: ICD-10-CM

## 2018-11-28 DIAGNOSIS — F41.8 DEPRESSION WITH ANXIETY: ICD-10-CM

## 2018-11-28 PROBLEM — M25.562 LEFT KNEE PAIN: Status: RESOLVED | Noted: 2017-06-27 | Resolved: 2018-11-28

## 2018-11-28 PROCEDURE — 3008F BODY MASS INDEX DOCD: CPT | Performed by: FAMILY MEDICINE

## 2018-11-28 PROCEDURE — 4004F PT TOBACCO SCREEN RCVD TLK: CPT | Performed by: FAMILY MEDICINE

## 2018-11-28 PROCEDURE — 99214 OFFICE O/P EST MOD 30 MIN: CPT | Performed by: FAMILY MEDICINE

## 2018-11-28 RX ORDER — LEVOCETIRIZINE DIHYDROCHLORIDE 5 MG/1
5 TABLET, FILM COATED ORAL DAILY PRN
Qty: 30 TABLET | Refills: 5 | Status: SHIPPED | OUTPATIENT
Start: 2018-11-28 | End: 2019-07-08 | Stop reason: SDUPTHER

## 2018-11-28 RX ORDER — DULOXETIN HYDROCHLORIDE 60 MG/1
60 CAPSULE, DELAYED RELEASE ORAL DAILY
Qty: 30 CAPSULE | Refills: 5 | Status: SHIPPED | OUTPATIENT
Start: 2018-11-28 | End: 2019-04-09 | Stop reason: SDUPTHER

## 2018-11-28 NOTE — PROGRESS NOTES
Assessment/Plan:      Diagnoses and all orders for this visit:    Morbid obesity ([address] )  -     TSH, 3rd generation with Free T4 reflex; Future  -     CBC and differential; Future  -     Hepatic function panel; Future  -     Lipid Panel with Direct LDL reflex; Future  -     Ambulatory referral to Weight Management; Future    Patient encouraged to exercise on a regular basis and to eat a low fat diet  Patient referred to the weight management center for follow-up  Prediabetes  -     metFORMIN (GLUCOPHAGE) 500 mg tablet; Take 1 tablet (500 mg total) by mouth daily with dinner  -     TSH, 3rd generation with Free T4 reflex; Future  -     HEMOGLOBIN A1C W/ EAG ESTIMATION; Future    Depression with anxiety  -     DULoxetine (CYMBALTA) 60 mg delayed release capsule; Take 1 capsule (60 mg total) by mouth daily  -     CBC and differential; Future  Stable  Continue cymbalta  Allergic rhinitis, unspecified seasonality, unspecified trigger  -     levocetirizine (XYZAL) 5 MG tablet; Take 1 tablet (5 mg total) by mouth daily as needed for allergies  Stable  Continue xyzal prn  Essential hypertension  -     Hepatic function panel; Future  -     Lipid Panel with Direct LDL reflex; Future  Patient instructed to continue to follow-up with nephrology  Hyperlipidemia, unspecified hyperlipidemia type  -     Hepatic function panel; Future  -     Lipid Panel with Direct LDL reflex; Future    Chronic back pain, unspecified back location, unspecified back pain laterality  Patient encouraged to continue to follow-up with pain management  Screening for prostate cancer  -     PSA, Total Screen; Future      Patient instructed to follow-up with the dentist for a cleaning and to evaluate his cavities  Lab work ordered  Patient instructed to follow-up in 1 month or sooner prn  Subjective:     Patient ID: Margarita Colbert  is a 60 y o  male  Patient is here for a follow-up for chronic medical conditions     Patient is here for a follow-up for prediabetes  Patient reports that he is taking metformin every night with dinner  Last hemoglobin A1C was 5 1 Patient wants to stay on his medication until he has his next hemoglobin A1C done  Denies any nausea, vomiting, or dizziness  Patient reports that he follows up with nephrology for HTN  Patient reports that he takes his medications as prescribed by nephrology  Patient is here for a follow-up for hyperlipidemia  Patient reports that he takes atorvastatin daily  Denies any chest pain, SOB, palpitations, or dizziness  Patient is here for a follow-up for depression and anxiety  Patient reports that his symptoms are stable  Patient reports that he takes cymbalta daily  Denies any suicidal thoughts  Patient is here for a follow-up for obesity  Patient reports that he has not been exercising on a regular basis  Patient reports that he tries to watch his diet  Patient reports that he is interested in going to the weight management center  Patient reports that he takes xyzal prn for allergies and it helps  Patient denies any symptoms currently  Colonoscopy UTD per patient  Patient follows up with pain management for chronic back pain  Review of Systems   Constitutional: Negative for appetite change, chills, fatigue and fever  HENT: Negative for congestion, ear pain, sinus pressure, sore throat and trouble swallowing  Eyes: Negative for pain, discharge and redness  Respiratory: Negative for cough, chest tightness, shortness of breath and wheezing  Cardiovascular: Negative for chest pain, palpitations and leg swelling  Gastrointestinal: Negative for abdominal pain, blood in stool, diarrhea, nausea and vomiting  Genitourinary: Negative for dysuria, frequency, hematuria and urgency  Musculoskeletal:        As noted in HPI  Skin: Negative for rash  Neurological: Negative for dizziness, seizures, syncope, light-headedness and numbness  Psychiatric/Behavioral: Negative for suicidal ideas  As noted in HPI  Objective:     Physical Exam   Constitutional: He is oriented to person, place, and time  No distress  obese   HENT:   Right Ear: External ear normal    Left Ear: External ear normal    Mouth/Throat: Oropharynx is clear and moist    Eyes: Pupils are equal, round, and reactive to light  Conjunctivae are normal    Cardiovascular: Normal rate, regular rhythm and normal heart sounds  Radial pulses +2 bilaterally  No edema noted  Pulmonary/Chest: Effort normal and breath sounds normal  No respiratory distress  He has no wheezes  Musculoskeletal:   Gait wnl  Lymphadenopathy:     He has no cervical adenopathy  Neurological: He is alert and oriented to person, place, and time  Skin: No rash noted  Psychiatric: He has a normal mood and affect  Vitals reviewed  BMI Counseling: Body mass index is 46 67 kg/m²  Discussed with patient's BMI with him  The BMI is above average  BMI counseling and education was provided to the patient  Nutrition recommendations include reducing portion sizes, 3-5 servings of fruits/vegetables daily, increasing intake of lean protein, reducing intake of saturated fat and trans fat and reducing intake of cholesterol  Exercise recommendations include exercising 3-5 times per week  Referral to weight management was provided to the patient

## 2018-12-12 DIAGNOSIS — E78.2 MIXED HYPERLIPIDEMIA: ICD-10-CM

## 2018-12-12 RX ORDER — ATORVASTATIN CALCIUM 20 MG/1
TABLET, FILM COATED ORAL
Qty: 30 TABLET | Refills: 1 | Status: SHIPPED | OUTPATIENT
Start: 2018-12-12 | End: 2019-04-09 | Stop reason: SDUPTHER

## 2019-01-22 DIAGNOSIS — R73.03 PREDIABETES: ICD-10-CM

## 2019-01-22 DIAGNOSIS — E78.2 MIXED HYPERLIPIDEMIA: ICD-10-CM

## 2019-01-22 RX ORDER — ATORVASTATIN CALCIUM 20 MG/1
20 TABLET, FILM COATED ORAL DAILY
Qty: 30 TABLET | Refills: 0 | OUTPATIENT
Start: 2019-01-22

## 2019-03-12 DIAGNOSIS — I10 ESSENTIAL HYPERTENSION: ICD-10-CM

## 2019-03-13 ENCOUNTER — TELEPHONE (OUTPATIENT)
Dept: NEPHROLOGY | Facility: CLINIC | Age: 59
End: 2019-03-13

## 2019-03-13 DIAGNOSIS — N18.2 CKD (CHRONIC KIDNEY DISEASE), STAGE II: Primary | ICD-10-CM

## 2019-03-13 RX ORDER — AMLODIPINE BESYLATE 10 MG/1
TABLET ORAL
Qty: 30 TABLET | Refills: 3 | Status: SHIPPED | OUTPATIENT
Start: 2019-03-13 | End: 2019-10-04 | Stop reason: SDUPTHER

## 2019-03-13 NOTE — TELEPHONE ENCOUNTER
I scheduled patient follow up and I faxed labs to [facility] per patient's request  He stated he will go sometime this week

## 2019-03-14 ENCOUNTER — TELEPHONE (OUTPATIENT)
Dept: FAMILY MEDICINE CLINIC | Facility: CLINIC | Age: 59
End: 2019-03-14

## 2019-03-14 LAB
BUN SERPL-MCNC: 16 MG/DL (ref 7–25)
BUN/CREAT SERPL: ABNORMAL (CALC) (ref 6–22)
CALCIUM SERPL-MCNC: 9.4 MG/DL (ref 8.6–10.3)
CHLORIDE SERPL-SCNC: 101 MMOL/L (ref 98–110)
CO2 SERPL-SCNC: 29 MMOL/L (ref 20–32)
CREAT SERPL-MCNC: 1.26 MG/DL (ref 0.7–1.33)
GLUCOSE SERPL-MCNC: 126 MG/DL (ref 65–99)
POTASSIUM SERPL-SCNC: 3.9 MMOL/L (ref 3.5–5.3)
SL AMB EGFR AFRICAN AMERICAN: 72 ML/MIN/1.73M2
SL AMB EGFR NON AFRICAN AMERICAN: 62 ML/MIN/1.73M2
SODIUM SERPL-SCNC: 139 MMOL/L (ref 135–146)

## 2019-03-14 NOTE — TELEPHONE ENCOUNTER
Patient called in to request Janes Zhang order him his labwork  Once she does, he would like them mailed to his house  He will then schedule an appointment with her to follow-up on his labwork results  He mentioned it his for his medication refill?

## 2019-03-15 LAB
APPEARANCE UR: CLEAR
BACTERIA UR QL AUTO: ABNORMAL /HPF
BILIRUB UR QL STRIP: NEGATIVE
COLOR UR: ABNORMAL
CREAT UR-MCNC: 284 MG/DL (ref 20–320)
GLUCOSE UR QL STRIP: NEGATIVE
HGB UR QL STRIP: NEGATIVE
HYALINE CASTS #/AREA URNS LPF: ABNORMAL /LPF
KETONES UR QL STRIP: NEGATIVE
LEUKOCYTE ESTERASE UR QL STRIP: NEGATIVE
NITRITE UR QL STRIP: NEGATIVE
PH UR STRIP: 5.5 [PH] (ref 5–8)
PROT UR QL STRIP: ABNORMAL
PROT UR-MCNC: 41 MG/DL (ref 5–25)
PROT/CREAT UR: 144 MG/G CREAT (ref 22–128)
RBC #/AREA URNS HPF: ABNORMAL /HPF
SP GR UR STRIP: 1.02 (ref 1–1.03)
SQUAMOUS #/AREA URNS HPF: ABNORMAL /HPF
WBC #/AREA URNS HPF: ABNORMAL /HPF

## 2019-03-19 NOTE — PROGRESS NOTES
NEPHROLOGY OFFICE VISIT   Snehal Kennedy  60 y o  male MRN: [id number]  3/20/2019    Reason for Visit:  Hypertension    Interval history and subjective    I had the pleasure of seeing Loi Carvalho today in the renal clinic for the continued management of hypertension and CKD  Follows with Dr Sheila Navarro  He also has a history of pre diabetes on metformin, morbid obesity, depression and anxiety  He was last seen in the nephrology clinic October 2018  At that visit blood pressure was above goal and doxazosin dose was increased to 4 mg hs  Since the last visit, there has been no ER visits or hospitilizations  Patient denies any chest pain, shortness of breath and swelling  C/o difficulty sleeping and fatigue  Previously evaluated for sleep apnea  "Can't turn off his mind and fall asleep"  The last blood work was done on 03/14/2019, which we have reviewed together  ASSESSMENT and PLAN:  1  Hypertension:    · History of hypertension for >15 years  · Prior renin and aldosterone level:  Renin level suppressed, aldosterone level not significantly elevated  · Antihypertensives:  on Aldactone 25 mg daily in the evening, chlorthalidone 12 5 mg daily, amlodipine 10 mg daily, doxazosin 4 mg nightly, losartan 100 mg daily and metoprolol 100 mg twice a day  · BP controlled  · No significant proteinuria- goal <135/85  · Questioning the safety of losartan-Will check and make sure it was not 1 of the lots affected  2  CKD, likely stage II:    · Baseline creatinine near 1 3  · Creatinine 1 26; GFR 62  · Renal function stable  · Protein excretion minimal   Urine protein creatinine ratio 144 mg/g (59 milligrams/gram 09/2018)  · Urinalysis:  No RBCs, 1+ protein  · Not using NSAIDS- don't work  3  Renal cysts:    · Bilateral septated renal cysts  Last ultrasound 09/2018  · Follow-up ultrasound yearly- can be scheduled at next appointment in September 4  Electrolytes:  Potassium level acceptable  5   Mineral bone disorder:  Calcium level within normal limits  6  Poor dentition: follow up with his dentist  7  Pre diabetes: On metformin  8  Chronic back pain/knee pain: On prescription pain relievers  9  Fatigue: previously evaluated for sleep apnea     Renal ultrasound from 09/2018    Right kidney:  12 9 x 5 8 cm  Left kidney:  13 7 x 6 6 cm  Right kidney-normal echogenicity and contour  Right renal cyst measuring 2 4 x 2 9 x 3 1 cm, previously 2 5 x 2 5 x 2 4 cm  There is a thin avascular septation  No hydronephrosis  Slight increase in size since previous study  Left kidney  There is cortical scarring  Calcified cyst in the left upper pole measuring 2 0 x 1 6 x 2 4 cm, not significantly changed from the previous CT  There is a small left upper pole septated cyst present on CT measuring 1 1 x 1 2 x 1 0 cm  No hydronephrosis       PATIENT INSTRUCTIONS:    Patient Instructions   1  NO NSAIDS  2  No medication changes at this time  3  Goal blood pressure less than 135/85  Please call if blood pressure readings remain consistently greater  4  Follow-up blood work and urine studies before next appointment in September 5  Please call with any questions or concerns [phone]        Orders Placed This Encounter   Procedures    Comprehensive metabolic panel     This is a patient instruction: Patient fasting for 8 hours or longer recommended       Standing Status:   Future     Standing Expiration Date:   1/20/2020    PTH, intact     Standing Status:   Future     Standing Expiration Date:   1/20/2020    CBC and Platelet     Standing Status:   Future     Standing Expiration Date:   1/20/2020    Urinalysis with reflex to microscopic     Standing Status:   Future     Standing Expiration Date:   1/20/2020    Protein / creatinine ratio, urine     Standing Status:   Future     Standing Expiration Date:   1/20/2020    Phosphorus     Standing Status:   Future     Standing Expiration Date:   1/20/2020    Vitamin D 25 hydroxy     Standing Status: Future     Standing Expiration Date:   3/20/2020       OBJECTIVE:  Current Weight: Weight - Scale: (!) 150 kg (331 lb 3 2 oz)  Vitals:    03/20/19 1539 03/20/19 1550 03/20/19 1551   BP:  134/80 128/80   BP Location:  Right arm Left arm   Patient Position:  Sitting Sitting   Cuff Size:  Large Large   Weight: (!) 150 kg (331 lb 3 2 oz)     Height: 5' 9" (1 753 m)      Body mass index is 48 91 kg/m²  REVIEW OF SYSTEMS:    Review of Systems   Constitutional: Positive for fatigue  Negative for activity change, appetite change, chills, fever and unexpected weight change  HENT: Negative  Respiratory: Negative for shortness of breath and wheezing  Cardiovascular: Negative for chest pain, palpitations and leg swelling  Gastrointestinal: Negative  Genitourinary: Negative  Musculoskeletal: Positive for arthralgias and back pain  Skin: Negative  Allergic/Immunologic: Negative for immunocompromised state  Neurological: Negative  Hematological: Negative  Psychiatric/Behavioral: Positive for sleep disturbance  PHYSICAL EXAM:      Physical Exam   Constitutional: He is oriented to person, place, and time  He appears well-developed and well-nourished  No distress  HENT:   Head: Normocephalic and atraumatic  Mouth/Throat: Oropharynx is clear and moist  No oropharyngeal exudate  Poor dentitiion   Eyes: Pupils are equal, round, and reactive to light  Conjunctivae and EOM are normal  Right eye exhibits no discharge  Left eye exhibits no discharge  No scleral icterus  Neck: Normal range of motion  Neck supple  No JVD present  No bruit   Cardiovascular: Normal rate, regular rhythm and normal heart sounds  Exam reveals no gallop and no friction rub  No murmur heard  Pulmonary/Chest: Effort normal and breath sounds normal  No stridor  No respiratory distress  He has no wheezes  He has no rales  Abdominal: Soft  Bowel sounds are normal  He exhibits no distension and no mass   There is no tenderness  There is no rebound and no guarding  Musculoskeletal: He exhibits no edema or tenderness  Neurological: He is alert and oriented to person, place, and time  Skin: Skin is warm and dry  No rash noted  He is not diaphoretic  No erythema  No pallor  Psychiatric: He has a normal mood and affect  His behavior is normal  Judgment and thought content normal        Medications:    Current Outpatient Medications:     amLODIPine (NORVASC) 10 mg tablet, take 1 tablet by mouth once daily, Disp: 30 tablet, Rfl: 3    atorvastatin (LIPITOR) 20 mg tablet, take 1 tablet by mouth once daily, Disp: 30 tablet, Rfl: 1    chlorthalidone 25 mg tablet, Take 0 5 tablets (12 5 mg total) by mouth daily, Disp: 45 tablet, Rfl: 3    doxazosin (CARDURA) 2 mg tablet, Take 2 tablets (4 mg total) by mouth daily at bedtime, Disp: 60 tablet, Rfl: 5    DULoxetine (CYMBALTA) 60 mg delayed release capsule, Take 1 capsule (60 mg total) by mouth daily, Disp: 30 capsule, Rfl: 5    ketoconazole (NIZORAL) 2 % cream, 1 application daily Apply to affected area, Disp: , Rfl: 0    levocetirizine (XYZAL) 5 MG tablet, Take 1 tablet (5 mg total) by mouth daily as needed for allergies, Disp: 30 tablet, Rfl: 5    losartan (COZAAR) 100 MG tablet, Take 1 tablet (100 mg total) by mouth daily, Disp: 90 tablet, Rfl: 3    metFORMIN (GLUCOPHAGE) 500 mg tablet, Take 1 tablet (500 mg total) by mouth daily with dinner, Disp: 30 tablet, Rfl: 5    metoprolol tartrate (LOPRESSOR) 100 mg tablet, Take 1 tablet (100 mg total) by mouth 2 (two) times a day, Disp: 180 tablet, Rfl: 3    morphine (MS CONTIN) 15 mg 12 hr tablet, take 1 tablet every 8 hours if needed pain, Disp: , Rfl: 0    oxyCODONE (ROXICODONE) 30 MG immediate release tablet, take 1 tablet by mouth every 5 hours if needed for pain   Cathlebernarda Dawsones MAX 4/DAY, Disp: , Rfl: 0    spironolactone (ALDACTONE) 25 mg tablet, Take 1 tablet (25 mg total) by mouth daily, Disp: 30 tablet, Rfl: 5    OxyCODONE HCl ER (OxyCONTIN) 30 MG T12A, Take 30 mg by mouth 3 (three) times a day Indications: Chronic Pain , Disp: , Rfl:     tadalafil (CIALIS) 5 MG tablet, Take 5 mg by mouth daily as needed for erectile dysfunction (unsure dosage) Indications: Erectile Dysfunction  , Disp: , Rfl:     Laboratory Results:  Results from last 7 days   Lab Units 03/14/19  0824   POTASSIUM mmol/L 3 9   CHLORIDE mmol/L 101   CO2 mmol/L 29   BUN mg/dL 16   SL AMB CALCIUM mg/dL 9 4       Results for orders placed or performed in visit on 03/14/19   Urinalysis with microscopic   Result Value Ref Range    Color UA DARK YELLOW YELLOW    Urine Appearance CLEAR CLEAR    Specific Gravity 1 024 1 001 - 1 035    Ph 5 5 5 0 - 8 0    Glucose, Urine NEGATIVE NEGATIVE    Bilirubin, Urine NEGATIVE NEGATIVE    Ketone, Urine NEGATIVE NEGATIVE    Blood, Urine NEGATIVE NEGATIVE    Protein, Urine 1+ (A) NEGATIVE    SL AMB NITRITES URINE, QUAL   NEGATIVE NEGATIVE    Leukocyte Esterase NEGATIVE NEGATIVE    SL AMB WBC, URINE 0-5 < OR = 5 /HPF    RBC, Urine NONE SEEN < OR = 2 /HPF    Squamous Epithelial Cells NONE SEEN < OR = 5 /HPF    Bacteria, UA NONE SEEN NONE SEEN /HPF    Hyaline Casts NONE SEEN NONE SEEN /LPF   Protein, Total w/Creat, Random Urine   Result Value Ref Range    Creatinine, Urine 284 20 - 320 mg/dL    Protein/Creat Ratio 144 (H) 22 - 128 mg/g creat    Total Protein, Urine 41 (H) 5 - 25 mg/dL

## 2019-03-20 ENCOUNTER — OFFICE VISIT (OUTPATIENT)
Dept: NEPHROLOGY | Facility: CLINIC | Age: 59
End: 2019-03-20
Payer: COMMERCIAL

## 2019-03-20 VITALS
BODY MASS INDEX: 46.65 KG/M2 | HEIGHT: 69 IN | SYSTOLIC BLOOD PRESSURE: 128 MMHG | DIASTOLIC BLOOD PRESSURE: 80 MMHG | WEIGHT: 315 LBS

## 2019-03-20 DIAGNOSIS — E87.6 HYPOKALEMIA: Chronic | ICD-10-CM

## 2019-03-20 DIAGNOSIS — E78.5 HYPERLIPIDEMIA, UNSPECIFIED HYPERLIPIDEMIA TYPE: ICD-10-CM

## 2019-03-20 DIAGNOSIS — I10 ESSENTIAL HYPERTENSION: Primary | ICD-10-CM

## 2019-03-20 DIAGNOSIS — E55.9 VITAMIN D DEFICIENCY: ICD-10-CM

## 2019-03-20 DIAGNOSIS — R80.1 PERSISTENT PROTEINURIA: ICD-10-CM

## 2019-03-20 PROCEDURE — 3066F NEPHROPATHY DOC TX: CPT | Performed by: NURSE PRACTITIONER

## 2019-03-20 PROCEDURE — 99214 OFFICE O/P EST MOD 30 MIN: CPT | Performed by: NURSE PRACTITIONER

## 2019-03-20 NOTE — PATIENT INSTRUCTIONS
1  NO NSAIDS  2  No medication changes at this time  3  Goal blood pressure less than 135/85  Please call if blood pressure readings remain consistently greater  4  Follow-up blood work and urine studies before next appointment in September 5   Please call with any questions or concerns [phone]

## 2019-03-29 ENCOUNTER — TELEPHONE (OUTPATIENT)
Dept: OTHER | Facility: HOSPITAL | Age: 59
End: 2019-03-29

## 2019-03-29 NOTE — TELEPHONE ENCOUNTER
Mr  Serina Joy had a question regarding his losartan and the recall  I assured him that all the pharmacy is have checked the lots and made sure that the medications are safe  He is feeling great and has no current issues

## 2019-03-30 LAB
ALBUMIN SERPL-MCNC: 4.2 G/DL (ref 3.6–5.1)
ALBUMIN/GLOB SERPL: 1.3 (CALC) (ref 1–2.5)
ALP SERPL-CCNC: 79 U/L (ref 40–115)
ALT SERPL-CCNC: 18 U/L (ref 9–46)
AST SERPL-CCNC: 16 U/L (ref 10–35)
BASOPHILS # BLD AUTO: 52 CELLS/UL (ref 0–200)
BASOPHILS NFR BLD AUTO: 0.8 %
BILIRUB DIRECT SERPL-MCNC: 0.1 MG/DL
BILIRUB INDIRECT SERPL-MCNC: 0.6 MG/DL (CALC) (ref 0.2–1.2)
BILIRUB SERPL-MCNC: 0.7 MG/DL (ref 0.2–1.2)
CHOLEST SERPL-MCNC: 162 MG/DL
CHOLEST/HDLC SERPL: 4.5 (CALC)
EOSINOPHIL # BLD AUTO: 488 CELLS/UL (ref 15–500)
EOSINOPHIL NFR BLD AUTO: 7.5 %
ERYTHROCYTE [DISTWIDTH] IN BLOOD BY AUTOMATED COUNT: 13.5 % (ref 11–15)
EST. AVERAGE GLUCOSE BLD GHB EST-MCNC: 123 (CALC)
EST. AVERAGE GLUCOSE BLD GHB EST-SCNC: 6.8 (CALC)
GLOBULIN SER CALC-MCNC: 3.3 G/DL (CALC) (ref 1.9–3.7)
HBA1C MFR BLD: 5.9 % OF TOTAL HGB
HCT VFR BLD AUTO: 43.8 % (ref 38.5–50)
HDLC SERPL-MCNC: 36 MG/DL
HGB BLD-MCNC: 15.2 G/DL (ref 13.2–17.1)
LDLC SERPL CALC-MCNC: 99 MG/DL (CALC)
LYMPHOCYTES # BLD AUTO: 1398 CELLS/UL (ref 850–3900)
LYMPHOCYTES NFR BLD AUTO: 21.5 %
MCH RBC QN AUTO: 29.3 PG (ref 27–33)
MCHC RBC AUTO-ENTMCNC: 34.7 G/DL (ref 32–36)
MCV RBC AUTO: 84.4 FL (ref 80–100)
MONOCYTES # BLD AUTO: 416 CELLS/UL (ref 200–950)
MONOCYTES NFR BLD AUTO: 6.4 %
NEUTROPHILS # BLD AUTO: 4147 CELLS/UL (ref 1500–7800)
NEUTROPHILS NFR BLD AUTO: 63.8 %
NONHDLC SERPL-MCNC: 126 MG/DL (CALC)
PLATELET # BLD AUTO: 220 THOUSAND/UL (ref 140–400)
PMV BLD REES-ECKER: 10.8 FL (ref 7.5–12.5)
PROT SERPL-MCNC: 7.5 G/DL (ref 6.1–8.1)
PSA SERPL-MCNC: 1 NG/ML
RBC # BLD AUTO: 5.19 MILLION/UL (ref 4.2–5.8)
TRIGL SERPL-MCNC: 175 MG/DL
TSH SERPL-ACNC: 1.87 MIU/L (ref 0.4–4.5)
WBC # BLD AUTO: 6.5 THOUSAND/UL (ref 3.8–10.8)

## 2019-04-09 ENCOUNTER — OFFICE VISIT (OUTPATIENT)
Dept: FAMILY MEDICINE CLINIC | Facility: CLINIC | Age: 59
End: 2019-04-09
Payer: COMMERCIAL

## 2019-04-09 VITALS
HEART RATE: 73 BPM | RESPIRATION RATE: 20 BRPM | SYSTOLIC BLOOD PRESSURE: 130 MMHG | DIASTOLIC BLOOD PRESSURE: 86 MMHG | OXYGEN SATURATION: 98 % | WEIGHT: 315 LBS | HEIGHT: 69 IN | BODY MASS INDEX: 46.65 KG/M2

## 2019-04-09 DIAGNOSIS — Z99.89 OSA ON CPAP: ICD-10-CM

## 2019-04-09 DIAGNOSIS — E66.01 MORBID OBESITY (HCC): ICD-10-CM

## 2019-04-09 DIAGNOSIS — G47.33 OSA ON CPAP: ICD-10-CM

## 2019-04-09 DIAGNOSIS — I10 ESSENTIAL HYPERTENSION: ICD-10-CM

## 2019-04-09 DIAGNOSIS — Z12.11 SCREENING FOR COLON CANCER: ICD-10-CM

## 2019-04-09 DIAGNOSIS — K02.9 DENTAL CAVITY: ICD-10-CM

## 2019-04-09 DIAGNOSIS — G89.29 CHRONIC BACK PAIN, UNSPECIFIED BACK LOCATION, UNSPECIFIED BACK PAIN LATERALITY: ICD-10-CM

## 2019-04-09 DIAGNOSIS — F41.8 DEPRESSION WITH ANXIETY: Primary | ICD-10-CM

## 2019-04-09 DIAGNOSIS — M54.9 CHRONIC BACK PAIN, UNSPECIFIED BACK LOCATION, UNSPECIFIED BACK PAIN LATERALITY: ICD-10-CM

## 2019-04-09 DIAGNOSIS — N52.9 ERECTILE DYSFUNCTION, UNSPECIFIED ERECTILE DYSFUNCTION TYPE: ICD-10-CM

## 2019-04-09 DIAGNOSIS — E78.5 HYPERLIPIDEMIA, UNSPECIFIED HYPERLIPIDEMIA TYPE: ICD-10-CM

## 2019-04-09 DIAGNOSIS — R73.03 PREDIABETES: ICD-10-CM

## 2019-04-09 PROCEDURE — 3079F DIAST BP 80-89 MM HG: CPT | Performed by: NURSE PRACTITIONER

## 2019-04-09 PROCEDURE — 4004F PT TOBACCO SCREEN RCVD TLK: CPT | Performed by: NURSE PRACTITIONER

## 2019-04-09 PROCEDURE — 3075F SYST BP GE 130 - 139MM HG: CPT | Performed by: NURSE PRACTITIONER

## 2019-04-09 PROCEDURE — 3008F BODY MASS INDEX DOCD: CPT | Performed by: NURSE PRACTITIONER

## 2019-04-09 PROCEDURE — 99214 OFFICE O/P EST MOD 30 MIN: CPT | Performed by: NURSE PRACTITIONER

## 2019-04-09 RX ORDER — ATORVASTATIN CALCIUM 20 MG/1
20 TABLET, FILM COATED ORAL DAILY
Qty: 30 TABLET | Refills: 5 | Status: SHIPPED | OUTPATIENT
Start: 2019-04-09 | End: 2019-04-09 | Stop reason: SDUPTHER

## 2019-04-09 RX ORDER — ATORVASTATIN CALCIUM 20 MG/1
20 TABLET, FILM COATED ORAL DAILY
Qty: 30 TABLET | Refills: 5
Start: 2019-04-09 | End: 2019-10-28 | Stop reason: SDUPTHER

## 2019-04-09 RX ORDER — DULOXETIN HYDROCHLORIDE 60 MG/1
60 CAPSULE, DELAYED RELEASE ORAL DAILY
Qty: 30 CAPSULE | Refills: 5 | Status: SHIPPED | OUTPATIENT
Start: 2019-04-09 | End: 2019-10-28 | Stop reason: SDUPTHER

## 2019-06-14 ENCOUNTER — APPOINTMENT (OUTPATIENT)
Dept: RADIOLOGY | Facility: AMBULARY SURGERY CENTER | Age: 59
End: 2019-06-14
Payer: COMMERCIAL

## 2019-06-14 ENCOUNTER — OFFICE VISIT (OUTPATIENT)
Dept: OBGYN CLINIC | Facility: CLINIC | Age: 59
End: 2019-06-14
Payer: COMMERCIAL

## 2019-06-14 VITALS
WEIGHT: 315 LBS | HEIGHT: 70 IN | DIASTOLIC BLOOD PRESSURE: 77 MMHG | BODY MASS INDEX: 45.1 KG/M2 | HEART RATE: 63 BPM | SYSTOLIC BLOOD PRESSURE: 131 MMHG

## 2019-06-14 DIAGNOSIS — M76.31 IT BAND SYNDROME, RIGHT: ICD-10-CM

## 2019-06-14 DIAGNOSIS — M25.562 PAIN IN BOTH KNEES, UNSPECIFIED CHRONICITY: ICD-10-CM

## 2019-06-14 DIAGNOSIS — M25.561 PAIN IN BOTH KNEES, UNSPECIFIED CHRONICITY: ICD-10-CM

## 2019-06-14 DIAGNOSIS — M17.0 BILATERAL PRIMARY OSTEOARTHRITIS OF KNEE: Primary | ICD-10-CM

## 2019-06-14 PROCEDURE — 73562 X-RAY EXAM OF KNEE 3: CPT

## 2019-06-14 PROCEDURE — 20610 DRAIN/INJ JOINT/BURSA W/O US: CPT | Performed by: ORTHOPAEDIC SURGERY

## 2019-06-14 PROCEDURE — 99214 OFFICE O/P EST MOD 30 MIN: CPT | Performed by: ORTHOPAEDIC SURGERY

## 2019-06-14 RX ORDER — BUPIVACAINE HYDROCHLORIDE 2.5 MG/ML
1 INJECTION, SOLUTION INFILTRATION; PERINEURAL
Status: COMPLETED | OUTPATIENT
Start: 2019-06-14 | End: 2019-06-14

## 2019-06-14 RX ORDER — LIDOCAINE HYDROCHLORIDE 10 MG/ML
1 INJECTION, SOLUTION INFILTRATION; PERINEURAL
Status: COMPLETED | OUTPATIENT
Start: 2019-06-14 | End: 2019-06-14

## 2019-06-14 RX ORDER — BETAMETHASONE SODIUM PHOSPHATE AND BETAMETHASONE ACETATE 3; 3 MG/ML; MG/ML
12 INJECTION, SUSPENSION INTRA-ARTICULAR; INTRALESIONAL; INTRAMUSCULAR; SOFT TISSUE
Status: COMPLETED | OUTPATIENT
Start: 2019-06-14 | End: 2019-06-14

## 2019-06-14 RX ADMIN — BETAMETHASONE SODIUM PHOSPHATE AND BETAMETHASONE ACETATE 12 MG: 3; 3 INJECTION, SUSPENSION INTRA-ARTICULAR; INTRALESIONAL; INTRAMUSCULAR; SOFT TISSUE at 09:37

## 2019-06-14 RX ADMIN — LIDOCAINE HYDROCHLORIDE 1 ML: 10 INJECTION, SOLUTION INFILTRATION; PERINEURAL at 09:37

## 2019-06-14 RX ADMIN — BUPIVACAINE HYDROCHLORIDE 1 ML: 2.5 INJECTION, SOLUTION INFILTRATION; PERINEURAL at 09:37

## 2019-07-08 DIAGNOSIS — J30.9 ALLERGIC RHINITIS, UNSPECIFIED SEASONALITY, UNSPECIFIED TRIGGER: ICD-10-CM

## 2019-07-15 RX ORDER — LEVOCETIRIZINE DIHYDROCHLORIDE 5 MG/1
TABLET, FILM COATED ORAL
Qty: 30 TABLET | Refills: 0 | Status: SHIPPED | OUTPATIENT
Start: 2019-07-15 | End: 2019-09-10 | Stop reason: SDUPTHER

## 2019-08-16 ENCOUNTER — TELEPHONE (OUTPATIENT)
Dept: NEPHROLOGY | Facility: CLINIC | Age: 59
End: 2019-08-16

## 2019-08-16 NOTE — TELEPHONE ENCOUNTER
I left a message for patient to schedule September follow up with Dr Tonya Avalos in the [facility]

## 2019-09-01 DIAGNOSIS — N28.1 RENAL CYST: ICD-10-CM

## 2019-09-01 DIAGNOSIS — I10 BENIGN ESSENTIAL HTN: ICD-10-CM

## 2019-09-01 RX ORDER — SPIRONOLACTONE 25 MG/1
TABLET ORAL
Qty: 30 TABLET | Refills: 5 | Status: SHIPPED | OUTPATIENT
Start: 2019-09-01 | End: 2020-03-13 | Stop reason: SDUPTHER

## 2019-09-01 NOTE — TELEPHONE ENCOUNTER
I have refilled the spironolactone for now    Patient needs blood work prior to appointment with [facility] - BMP, UA, UPCR, CBC    Thank you    np

## 2019-09-03 DIAGNOSIS — N18.2 CKD (CHRONIC KIDNEY DISEASE), STAGE II: Primary | ICD-10-CM

## 2019-09-10 ENCOUNTER — OFFICE VISIT (OUTPATIENT)
Dept: FAMILY MEDICINE CLINIC | Facility: CLINIC | Age: 59
End: 2019-09-10
Payer: COMMERCIAL

## 2019-09-10 VITALS
DIASTOLIC BLOOD PRESSURE: 80 MMHG | RESPIRATION RATE: 18 BRPM | WEIGHT: 315 LBS | OXYGEN SATURATION: 97 % | BODY MASS INDEX: 45.1 KG/M2 | HEIGHT: 70 IN | HEART RATE: 70 BPM | SYSTOLIC BLOOD PRESSURE: 130 MMHG

## 2019-09-10 DIAGNOSIS — J30.9 ALLERGIC RHINITIS, UNSPECIFIED SEASONALITY, UNSPECIFIED TRIGGER: ICD-10-CM

## 2019-09-10 DIAGNOSIS — R73.03 PREDIABETES: ICD-10-CM

## 2019-09-10 DIAGNOSIS — E66.01 MORBID OBESITY (HCC): ICD-10-CM

## 2019-09-10 DIAGNOSIS — G47.33 OSA ON CPAP: ICD-10-CM

## 2019-09-10 DIAGNOSIS — F41.8 DEPRESSION WITH ANXIETY: ICD-10-CM

## 2019-09-10 DIAGNOSIS — F43.21 GRIEF: Primary | ICD-10-CM

## 2019-09-10 DIAGNOSIS — Z12.11 COLON CANCER SCREENING: ICD-10-CM

## 2019-09-10 DIAGNOSIS — Z99.89 OSA ON CPAP: ICD-10-CM

## 2019-09-10 DIAGNOSIS — I10 ESSENTIAL HYPERTENSION: ICD-10-CM

## 2019-09-10 DIAGNOSIS — E78.5 HYPERLIPIDEMIA, UNSPECIFIED HYPERLIPIDEMIA TYPE: ICD-10-CM

## 2019-09-10 PROCEDURE — 99214 OFFICE O/P EST MOD 30 MIN: CPT | Performed by: NURSE PRACTITIONER

## 2019-09-10 PROCEDURE — G0438 PPPS, INITIAL VISIT: HCPCS | Performed by: NURSE PRACTITIONER

## 2019-09-10 RX ORDER — MELOXICAM 7.5 MG/1
TABLET ORAL
COMMUNITY
End: 2019-11-13

## 2019-09-10 RX ORDER — LEVOCETIRIZINE DIHYDROCHLORIDE 5 MG/1
5 TABLET, FILM COATED ORAL DAILY PRN
Qty: 30 TABLET | Refills: 3 | Status: SHIPPED | OUTPATIENT
Start: 2019-09-10 | End: 2020-03-11

## 2019-09-10 RX ORDER — CLONIDINE HYDROCHLORIDE 0.2 MG/1
0.1 TABLET ORAL DAILY
COMMUNITY
End: 2020-03-13

## 2019-09-10 RX ORDER — POTASSIUM CHLORIDE 20 MEQ/1
TABLET, EXTENDED RELEASE ORAL
COMMUNITY
End: 2019-11-13

## 2019-09-10 NOTE — PATIENT INSTRUCTIONS
Medicare Preventive Visit Patient Instructions  Thank you for completing your Welcome to Medicare Visit or Medicare Annual Wellness Visit today  Your next wellness visit will be due in one year (9/10/2020)  The screening/preventive services that you may require over the next 5-10 years are detailed below  Some tests may not apply to you based off risk factors and/or age  Screening tests ordered at today's visit but not completed yet may show as past due  Also, please note that scanned in results may not display below  Preventive Screenings:  Service Recommendations Previous Testing/Comments   Colorectal Cancer Screening  · Colonoscopy    · Fecal Occult Blood Test (FOBT)/Fecal Immunochemical Test (FIT)  · Fecal DNA/Cologuard Test  · Flexible Sigmoidoscopy Age: 54-65 years old   Colonoscopy: every 10 years (May be performed more frequently if at higher risk)  OR  FOBT/FIT: every 1 year  OR  Cologuard: every 3 years  OR  Sigmoidoscopy: every 5 years  Screening may be recommended earlier than age 48 if at higher risk for colorectal cancer  Also, an individualized decision between you and your healthcare provider will decide whether screening between the ages of 74-80 would be appropriate   Colonoscopy: Not on file  FOBT/FIT: Not on file  Cologuard: Not on file  Sigmoidoscopy: Not on file         Prostate Cancer Screening Individualized decision between patient and health care provider in men between ages of 53-78   Medicare will cover every 12 months beginning on the day after your 50th birthday PSA: 1 0 ng/mL     Screening Current     Hepatitis C Screening Once for adults born between [facility]  More frequently in patients at high risk for Hepatitis C Hep C Antibody: Not on file       Diabetes Screening 1-2 times per year if you're at risk for diabetes or have pre-diabetes Fasting glucose: 127 mg/dL   A1C: 5 9 % of total Hgb    Screening Current   Cholesterol Screening Once every 5 years if you don't have a lipid disorder  May order more often based on risk factors  Lipid panel: 03/29/2019    Screening Not Indicated  History Lipid Disorder      Other Preventive Screenings Covered by Medicare:  1  Abdominal Aortic Aneurysm (AAA) Screening: covered once if your at risk  You're considered to be at risk if you have a family history of AAA or a male between the age of 73-68 who smoking at least 100 cigarettes in your lifetime  2  Lung Cancer Screening: covers low dose CT scan once per year if you meet all of the following conditions: (1) Age 50-69; (2) No signs or symptoms of lung cancer; (3) Current smoker or have quit smoking within the last 15 years; (4) You have a tobacco smoking history of at least 30 pack years (packs per day x number of years you smoked); (5) You get a written order from a healthcare provider  3  Glaucoma Screening: covered annually if you're considered high risk: (1) You have diabetes OR (2) Family history of glaucoma OR (3)  aged 48 and older OR (3)  American aged 72 and older  3  Osteoporosis Screening: covered every 2 years if you meet one of the following conditions: (1) Have a vertebral abnormality; (2) On glucocorticoid therapy for more than 3 months; (3) Have primary hyperparathyroidism; (4) On osteoporosis medications and need to assess response to drug therapy  5  HIV Screening: covered annually if you're between the age of 12-76  Also covered annually if you are younger than 13 and older than 72 with risk factors for HIV infection  For pregnant patients, it is covered up to 3 times per pregnancy      Immunizations:  Immunization Recommendations   Influenza Vaccine Annual influenza vaccination during flu season is recommended for all persons aged >= 6 months who do not have contraindications   Pneumococcal Vaccine (Prevnar and Pneumovax)  * Prevnar = PCV13  * Pneumovax = PPSV23 Adults 25-60 years old: 1-3 doses may be recommended based on certain risk factors  Adults 72 years old: Prevnar (PCV13) vaccine recommended followed by Pneumovax (PPSV23) vaccine  If already received PPSV23 since turning 65, then PCV13 recommended at least one year after PPSV23 dose  Hepatitis B Vaccine 3 dose series if at intermediate or high risk (ex: diabetes, end stage renal disease, liver disease)   Tetanus (Td) Vaccine - COST NOT COVERED BY MEDICARE PART B Following completion of primary series, a booster dose should be given every 10 years to maintain immunity against tetanus  Td may also be given as tetanus wound prophylaxis  Tdap Vaccine - COST NOT COVERED BY MEDICARE PART B Recommended at least once for all adults  For pregnant patients, recommended with each pregnancy  Shingles Vaccine (Shingrix) - COST NOT COVERED BY MEDICARE PART B  2 shot series recommended in those aged 48 and above     Health Maintenance Due:      Topic Date Due    Hepatitis C Screening  1960     Immunizations Due:      Topic Date Due    Pneumococcal Vaccine: Pediatrics (0 to 5 Years) and At-Risk Patients (6 to 59 Years) (2 of 3 - PCV13) 11/16/2016    INFLUENZA VACCINE  07/01/2019     Advance Directives   What are advance directives? Advance directives are legal documents that state your wishes and plans for medical care  These plans are made ahead of time in case you lose your ability to make decisions for yourself  Advance directives can apply to any medical decision, such as the treatments you want, and if you want to donate organs  What are the types of advance directives? There are many types of advance directives, and each state has rules about how to use them  You may choose a combination of any of the following:  · Living will: This is a written record of the treatment you want  You can also choose which treatments you do not want, which to limit, and which to stop at a certain time  This includes surgery, medicine, IV fluid, and tube feedings     · Durable power of  for [facility]): This is a written record that states who you want to make healthcare choices for you when you are unable to make them for yourself  This person, called a proxy, is usually a family member or a friend  You may choose more than 1 proxy  · Do not resuscitate (DNR) order:  A DNR order is used in case your heart stops beating or you stop breathing  It is a request not to have certain forms of treatment, such as CPR  A DNR order may be included in other types of advance directives  · Medical directive: This covers the care that you want if you are in a coma, near death, or unable to make decisions for yourself  You can list the treatments you want for each condition  Treatment may include pain medicine, surgery, blood transfusions, dialysis, IV or tube feedings, and a ventilator (breathing machine)  · Values history: This document has questions about your views, beliefs, and how you feel and think about life  This information can help others choose the care that you would choose  Why are advance directives important? An advance directive helps you control your care  Although spoken wishes may be used, it is better to have your wishes written down  Spoken wishes can be misunderstood, or not followed  Treatments may be given even if you do not want them  An advance directive may make it easier for your family to make difficult choices about your care  Depression   Depression  is a medical condition that causes feelings of sadness or hopelessness that do not go away  Depression may cause you to lose interest in things you used to enjoy  These feelings may interfere with your daily life  Call your local emergency number (911 in the [address] [place] [address],[address]) if:   · You think about harming yourself or someone else  · You have done something on purpose to hurt yourself    The following resources are available at any time to help you, if needed:   · [address] [place] [address]: 5-886.909.1565 (1-495-534-TH) · Suicide Hotline: 0-781.842.2148 (8-048-LTYCEDS)   · For a list of international numbers: https://save org/find-help/international-resources/  Treatment for depression may include medicine to relieve depression  Medicine is often used together with therapy  Therapy is a way for you to talk about your feelings and anything that may be causing depression  Therapy can be done alone or in a group  It may also be done with family members or a significant other  · Get regular physical activity  · Create a regular sleep schedule  · Eat a variety of healthy foods  · Do not drink alcohol or use drugs  Cigarette Smoking and Your Health   Risks to your health if you smoke:  Nicotine and other chemicals found in tobacco damage every cell in your body  Even if you are a light smoker, you have an increased risk for cancer, heart disease, and lung disease  If you are pregnant or have diabetes, smoking increases your risk for complications  Benefits to your health if you stop smoking:   · You decrease respiratory symptoms such as coughing, wheezing, and shortness of breath  · You reduce your risk for cancers of the lung, mouth, throat, kidney, bladder, pancreas, stomach, and cervix  If you already have cancer, you increase the benefits of chemotherapy  You also reduce your risk for cancer returning or a second cancer from developing  · You reduce your risk for heart disease, blood clots, heart attack, and stroke  · You reduce your risk for lung infections, and diseases such as pneumonia, asthma, chronic bronchitis, and emphysema  · Your circulation improves  More oxygen can be delivered to your body  If you have diabetes, you lower your risk for complications, such as kidney, artery, and eye diseases  You also lower your risk for nerve damage  Nerve damage can lead to amputations, poor vision, and blindness  · You improve your body's ability to heal and to fight infections    For more information and support to stop smoking:   · [employer]  Phone: 0- 280 - 995-3984  Web Address: www [employer]  Weight Management   Why it is important to manage your weight:  Being overweight increases your risk of health conditions such as heart disease, high blood pressure, type 2 diabetes, and certain types of cancer  It can also increase your risk for osteoarthritis, sleep apnea, and other respiratory problems  Aim for a slow, steady weight loss  Even a small amount of weight loss can lower your risk of health problems  How to lose weight safely:  A safe and healthy way to lose weight is to eat fewer calories and get regular exercise  You can lose up about 1 pound a week by decreasing the number of calories you eat by 500 calories each day  Healthy meal plan for weight management:  A healthy meal plan includes a variety of foods, contains fewer calories, and helps you stay healthy  A healthy meal plan includes the following:  · Eat whole-grain foods more often  A healthy meal plan should contain fiber  Fiber is the part of grains, fruits, and vegetables that is not broken down by your body  Whole-grain foods are healthy and provide extra fiber in your diet  Some examples of whole-grain foods are whole-wheat breads and pastas, oatmeal, brown rice, and bulgur  · Eat a variety of vegetables every day  Include dark, leafy greens such as spinach, kale, christy greens, and mustard greens  Eat yellow and orange vegetables such as carrots, sweet potatoes, and winter squash  · Eat a variety of fruits every day  Choose fresh or canned fruit (canned in its own juice or light syrup) instead of juice  Fruit juice has very little or no fiber  · Eat low-fat dairy foods  Drink fat-free (skim) milk or 1% milk  Eat fat-free yogurt and low-fat cottage cheese  Try low-fat cheeses such as mozzarella and other reduced-fat cheeses  · Choose meat and other protein foods that are low in fat    Choose beans or other legumes such as split peas or lentils  Choose fish, skinless poultry (chicken or turkey), or lean cuts of red meat (beef or pork)  Before you cook meat or poultry, cut off any visible fat  · Use less fat and oil  Try baking foods instead of frying them  Add less fat, such as margarine, sour cream, regular salad dressing and mayonnaise to foods  Eat fewer high-fat foods  Some examples of high-fat foods include french fries, doughnuts, ice cream, and cakes  · Eat fewer sweets  Limit foods and drinks that are high in sugar  This includes candy, cookies, regular soda, and sweetened drinks  Exercise:  Exercise at least 30 minutes per day on most days of the week  Some examples of exercise include walking, biking, dancing, and swimming  You can also fit in more physical activity by taking the stairs instead of the elevator or parking farther away from stores  Ask your healthcare provider about the best exercise plan for you  © Copyright [employer] 2018 Information is for End User's use only and may not be sold, redistributed or otherwise used for commercial purposes   All illustrations and images included in CareNotes® are the copyrighted property of A [employer] A M , Inc  or [address] [place] [address]

## 2019-09-10 NOTE — PROGRESS NOTES
Assessment/Plan:      Diagnoses and all orders for this visit:    Grief  Patient just lost his son 2 months ago  Patient contacted a psychiatrist for follow-up  Depression with anxiety  Denies any suicidal or homicidal thoughts  Continue cymbalta  Patient encouraged to follow-up with the psychiatrist      Essential hypertension  -     Ambulatory referral to Cardiology; Future  Continue to follow-up with nephrology  Patient referred to cardiology due to son's death and multiple risk factors for heart disease  Hyperlipidemia, unspecified hyperlipidemia type  -     Ambulatory referral to Cardiology; Future  Continue atorvastatin  Morbid obesity ([address] )  -     Ambulatory referral to Cardiology; Future  Patient encouraged to exercise on a regular basis and to eat a low fat diet  Prediabetes  Continue low carb diet  IMELDA on CPAP  Continue CPAP    Allergic rhinitis, unspecified seasonality, unspecified trigger  -     levocetirizine (XYZAL) 5 MG tablet; Take 1 tablet (5 mg total) by mouth daily as needed for allergies    Colon cancer screening  -     Ambulatory referral to Gastroenterology; Future      Patient instructed to follow-up in 1 month or sooner prn  Will review lab results at follow-up visit  Subjective:     Patient ID: Reggie Portillo  is a 60 y o  male  Patient is here for a follow-up for chronic medical conditions  Patient reports that his son passed away 2 months ago from a heart attack  Patient reports that he really misses his son  Patient reports that he contacted a psychiatrist to set up a follow-up appointment  Patient reports that he is taking the cymbalta daily for depression and anxiety  Denies any suicidal or homicidal thoughts  Patient reports that he needs to stay healthy to take care of his grandchildren  Patient follows up with nephrology for HTN  Patient reports that he is taking his medications as prescribed  Denies any chest pain, SOB, or palpitations  Patient is here for a follow-up for hyperlipidemia  Patient reports that he takes atorvastatin daily  Denies any side effects  Patient is here for a follow-up for prediabetes  Patient reports that he has not been watching his diet recently  Patient reports that he is taking the metformin daily  Denies any Has, dizziness, vomiting, numbness, or tingling  Patient follows up with pain management for chronic back pain  Patient reports that he wears his CPAP at night for sleep apnea  Patient reports that he got lab work done today  Patient reports that he takes xyzal for allergies and it helps  Review of Systems   Constitutional: Negative for chills, fatigue and fever  HENT: Negative for congestion, ear pain and sore throat  Eyes: Negative for pain, discharge and redness  Respiratory: Negative for cough, chest tightness, shortness of breath and wheezing  Cardiovascular: Negative for chest pain and palpitations  Gastrointestinal: Negative for abdominal pain, blood in stool, diarrhea, nausea and vomiting  Genitourinary: Negative for dysuria, frequency and hematuria  Musculoskeletal:        As noted in HPI  Skin: Negative for rash  Neurological: Negative for dizziness, seizures, syncope, light-headedness and headaches  Psychiatric/Behavioral: Negative for suicidal ideas  As noted in HPI  Objective:     Physical Exam   Constitutional: He is oriented to person, place, and time  No distress  obese   HENT:   Right Ear: External ear normal    Left Ear: External ear normal    Posterior pharynx wnl  Eyes: Pupils are equal, round, and reactive to light  Conjunctivae are normal    Cardiovascular: Normal rate, regular rhythm and normal heart sounds  Radial pulses +2 bilaterally  No edema noted  Pulmonary/Chest: Effort normal and breath sounds normal  He has no wheezes  Musculoskeletal:   Gait wnl  Neurological: He is alert and oriented to person, place, and time  Skin: No rash noted  Psychiatric: He has a normal mood and affect  Vitals reviewed

## 2019-09-10 NOTE — PROGRESS NOTES
Assessment and Plan:     Problem List Items Addressed This Visit        Respiratory    Allergic rhinitis    Relevant Medications    levocetirizine (XYZAL) 5 MG tablet    IMELDA on CPAP       Cardiovascular and Mediastinum    Essential hypertension    Relevant Orders    Ambulatory referral to Cardiology       Other    Depression with anxiety    Hyperlipidemia    Relevant Orders    Ambulatory referral to Cardiology    Morbid obesity [facility])    Relevant Orders    Ambulatory referral to Cardiology    Prediabetes    Grief - Primary      Other Visit Diagnoses     Colon cancer screening        Relevant Orders    Ambulatory referral to Gastroenterology          Depression Screening and Follow-up Plan: Patient's depression screening was positive with a PHQ-2 score of 3  Their PHQ-9 score was 12  Patient assessed for underlying major depression  Brief counseling provided and recommend additional follow-up/re-evaluation next office visit  Patient is currently on cymbalta for depression and anxiety  Patient recently lost his son  Denies any suicidal thoughts  Patient is going to follow-up with a psychiatrist      Tobacco Cessation Counseling: Tobacco cessation counseling was provided  The patient is sincerely urged to quit consumption of tobacco  He is not ready to quit tobacco  Patient occasionally smokes cigars  Preventive health issues were discussed with patient, and age appropriate screening tests were ordered as noted in patient's After Visit Summary  Personalized health advice and appropriate referrals for health education or preventive services given if needed, as noted in patient's After Visit Summary       History of Present Illness:     Patient presents for Medicare Annual Wellness visit    Patient Care Team:  Ana Lilia Shin as PCP - Curt Hunter MD Sueann Skill, MD (Nephrology)     Problem List:     Patient Active Problem List   Diagnosis    Hypokalemia    Allergic rhinitis    Chronic back pain    Dental cavity    Depression with anxiety    Erectile dysfunction    Hyperlipidemia    Essential hypertension    Localized osteoarthritis of left knee    Lumbar radiculopathy    Microalbuminuria    Morbid obesity (HCC)    IMELDA on CPAP    Patellofemoral arthritis of left knee    Rosacea    Vitamin D deficiency    Pain in finger of both hands    Trigger finger, left middle finger    Persistent proteinuria    Screening for prostate cancer    Prediabetes    Screening for colon cancer    It band syndrome, right    Bilateral primary osteoarthritis of knee    Grief      Past Medical and Surgical History:     Past Medical History:   Diagnosis Date    Anxiety     Chronic pain     Diabetes mellitus ([place] [address] )     6/19/15 A1C: 5 5%    Hyperlipidemia     Hypertension     Psychiatric disorder     Type 2 diabetes mellitus without complication ([place] [address] ) 8/73/0633    Urinary retention      Past Surgical History:   Procedure Laterality Date    LUMBAR FUSION  2002    LUMBAR FUSION  2004    L4-5-6, S1 has pump for narcotics      Family History:     Family History   Problem Relation Age of Onset    Other Mother         colitis    Diabetes Mother       Social History:     Social History     Socioeconomic History    Marital status:      Spouse name: None    Number of children: None    Years of education: None    Highest education level: None   Occupational History    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[place], injured back   Social Needs    Financial resource strain: None    Food insecurity:     Worry: None     Inability: None    Transportation needs:     Medical: None     Non-medical: None   Tobacco Use    Smoking status: Current Every Day Smoker     Types: Cigars    Smokeless tobacco: Never Used    Tobacco comment: 1 cigar daily,  50 pack years, 1/2 pack at age 12, 2 ppd x 20 yrs, quit 2004   Substance and Sexual Activity    Alcohol use:  Yes Comment: occasional    Drug use: No     Comment: History of drug use, meena, cocaine, heroind, no IVDA-as per [employer]    Sexual activity: Yes     Partners: Female     Birth control/protection: Condom Male   Lifestyle    Physical activity:     Days per week: None     Minutes per session: None    Stress: None   Relationships    Social connections:     Talks on phone: None     Gets together: None     Attends Catholic service: None     Active member of club or organization: None     Attends meetings of clubs or organizations: None     Relationship status: None    Intimate partner violence:     Fear of current or ex partner: None     Emotionally abused: None     Physically abused: None     Forced sexual activity: None   Other Topics Concern    None   Social History Narrative    Patient lives with his ex wife and son          Medications and Allergies:     Current Outpatient Medications   Medication Sig Dispense Refill    amLODIPine (NORVASC) 10 mg tablet take 1 tablet by mouth once daily 30 tablet 3    atorvastatin (LIPITOR) 20 mg tablet Take 1 tablet (20 mg total) by mouth daily 30 tablet 5    chlorthalidone 25 mg tablet Take 0 5 tablets (12 5 mg total) by mouth daily 45 tablet 3    cloNIDine (CATAPRES) 0 2 mg tablet clonidine HCl 0 2 mg tablet      doxazosin (CARDURA) 2 mg tablet Take 2 tablets (4 mg total) by mouth daily at bedtime 60 tablet 5    DULoxetine (CYMBALTA) 60 mg delayed release capsule Take 1 capsule (60 mg total) by mouth daily 30 capsule 5    ketoconazole (NIZORAL) 2 % cream 1 application daily Apply to affected area  0    levocetirizine (XYZAL) 5 MG tablet Take 1 tablet (5 mg total) by mouth daily as needed for allergies 30 tablet 3    losartan (COZAAR) 100 MG tablet Take 1 tablet (100 mg total) by mouth daily 90 tablet 3    meloxicam (MOBIC) 7 5 mg tablet meloxicam 7 5 mg tablet      metFORMIN (GLUCOPHAGE) 500 mg tablet Take 1 tablet (500 mg total) by mouth daily with dinner 30 tablet 5    metoprolol tartrate (LOPRESSOR) 100 mg tablet Take 1 tablet (100 mg total) by mouth 2 (two) times a day 180 tablet 3    morphine (MS CONTIN) 15 mg 12 hr tablet take 1 tablet every 8 hours if needed pain  0    oxyCODONE (ROXICODONE) 30 MG immediate release tablet take 1 tablet by mouth every 5 hours if needed for pain   Ferol Chente MAX 4/DAY  0    potassium chloride (K-DUR,KLOR-CON) 20 mEq tablet potassium chloride ER 20 mEq tablet,extended release(part/cryst)      spironolactone (ALDACTONE) 25 mg tablet take 1 tablet by mouth once daily 30 tablet 5    tadalafil (CIALIS) 5 MG tablet Take 5 mg by mouth daily as needed for erectile dysfunction (unsure dosage) Indications: Erectile Dysfunction  No current facility-administered medications for this visit  Allergies   Allergen Reactions    Gabapentin      Annotation - [id number]: dizziness    Pregabalin      Annotation - [id number]: vision changes and mood changes    Tramadol Nausea Only      Immunizations:     Immunization History   Administered Date(s) Administered    Influenza Quadrivalent, 6-35 Months IM 11/16/2015, 10/19/2016    Influenza TIV (IM) 01/01/2013    Pneumococcal Polysaccharide PPV23 11/16/2015      Health Maintenance:         Topic Date Due    Hepatitis C Screening  1960         Topic Date Due    Pneumococcal Vaccine: Pediatrics (0 to 5 Years) and At-Risk Patients (6 to 64 Years) (2 of 3 - PCV13) 11/16/2016    INFLUENZA VACCINE  07/01/2019      Medicare Health Risk Assessment:     /80   Pulse 70   Resp 18   Ht 5' 9 5" (1 765 m)   Wt (!) 149 kg (328 lb)   SpO2 97%   BMI 47 74 kg/m²      Roro Cavanaugh is here for his Initial Wellness visit  Health Risk Assessment:   Patient rates overall health as fair  Patient feels that their physical health rating is same  Eyesight was rated as same  Hearing was rated as same  Patient feels that their emotional and mental health rating is slightly worse   Pain experienced in the last 7 days has been a lot  Patient's pain rating has been 9/10  Patient states that he has experienced no weight loss or gain in last 6 months  Patient follows up with pain management for chronic back pain  Depression Screening:   PHQ-2 Score: 3  PHQ-9 Score: 12      Fall Risk Screening: In the past year, patient has experienced: no history of falling in past year      Home Safety:  Patient does not have trouble with stairs inside or outside of their home  Patient has working smoke alarms and has working carbon monoxide detector  Home safety hazards include: none  Nutrition:   Current diet is Diabetic, No Added Salt and Limited junk food  Medications:   Patient is not currently taking any over-the-counter supplements  Patient is able to manage medications  Activities of Daily Living (ADLs)/Instrumental Activities of Daily Living (IADLs):   Walk and transfer into and out of bed and chair?: Yes  Dress and groom yourself?: Yes    Bathe or shower yourself?: Yes    Feed yourself?  Yes  Do your laundry/housekeeping?: Yes  Manage your money, pay your bills and track your expenses?: Yes  Make your own meals?: Yes    Do your own shopping?: Yes    Previous Hospitalizations:   Any hospitalizations or ED visits within the last 12 months?: No      Advance Care Planning:   Living will: No    Durable POA for healthcare: No    Advanced directive: No    Advanced directive counseling given: Yes      Cognitive Screening:   Provider or family/friend/caregiver concerned regarding cognition?: No    PREVENTIVE SCREENINGS      Cardiovascular Screening:    General: History Lipid Disorder, Screening Current and Risks and Benefits Discussed      Diabetes Screening:     General: Risks and Benefits Discussed and Screening Current      Colorectal Cancer Screening:     General: Risks and Benefits Discussed    Due for: Colonoscopy - Low Risk      Prostate Cancer Screening:    General: Screening Current and Risks and Benefits Discussed      Osteoporosis Screening:    General: Risks and Benefits Discussed and Patient Declines      Abdominal Aortic Aneurysm (AAA) Screening:    Risk factors include: tobacco use        General: Risks and Benefits Discussed and Patient Declines      Lung Cancer Screening:     General: Risks and Benefits Discussed and Patient Declines      Hepatitis C Screening:    General: Risks and Benefits Discussed and Patient Declines    Hep C Screening Accepted: No     Other Counseling Topics:   Car/seat belt/driving safety, skin self-exam, sunscreen and regular weightbearing exercise         Sharon Brewer

## 2019-09-11 ENCOUNTER — OFFICE VISIT (OUTPATIENT)
Dept: OBGYN CLINIC | Facility: CLINIC | Age: 59
End: 2019-09-11
Payer: COMMERCIAL

## 2019-09-11 VITALS
SYSTOLIC BLOOD PRESSURE: 142 MMHG | DIASTOLIC BLOOD PRESSURE: 74 MMHG | BODY MASS INDEX: 46.65 KG/M2 | HEIGHT: 69 IN | HEART RATE: 96 BPM | WEIGHT: 315 LBS

## 2019-09-11 DIAGNOSIS — M17.0 BILATERAL PRIMARY OSTEOARTHRITIS OF KNEE: Primary | ICD-10-CM

## 2019-09-11 LAB
ALBUMIN SERPL-MCNC: 4.5 G/DL (ref 3.6–5.1)
ALBUMIN/GLOB SERPL: 1.4 (CALC) (ref 1–2.5)
ALP SERPL-CCNC: 105 U/L (ref 40–115)
ALT SERPL-CCNC: 12 U/L (ref 9–46)
AST SERPL-CCNC: 12 U/L (ref 10–35)
BASOPHILS # BLD AUTO: 58 CELLS/UL (ref 0–200)
BASOPHILS NFR BLD AUTO: 0.7 %
BILIRUB SERPL-MCNC: 0.6 MG/DL (ref 0.2–1.2)
BUN SERPL-MCNC: 17 MG/DL (ref 7–25)
BUN/CREAT SERPL: 13 (CALC) (ref 6–22)
CALCIUM SERPL-MCNC: 9.8 MG/DL (ref 8.6–10.3)
CHLORIDE SERPL-SCNC: 102 MMOL/L (ref 98–110)
CHOLEST SERPL-MCNC: 125 MG/DL
CHOLEST/HDLC SERPL: 3.4 (CALC)
CO2 SERPL-SCNC: 31 MMOL/L (ref 20–32)
CREAT SERPL-MCNC: 1.36 MG/DL (ref 0.7–1.33)
EOSINOPHIL # BLD AUTO: 573 CELLS/UL (ref 15–500)
EOSINOPHIL NFR BLD AUTO: 6.9 %
ERYTHROCYTE [DISTWIDTH] IN BLOOD BY AUTOMATED COUNT: 13.5 % (ref 11–15)
EST. AVERAGE GLUCOSE BLD GHB EST-MCNC: 128 (CALC)
EST. AVERAGE GLUCOSE BLD GHB EST-SCNC: 7.1 (CALC)
GLOBULIN SER CALC-MCNC: 3.2 G/DL (CALC) (ref 1.9–3.7)
GLUCOSE SERPL-MCNC: 124 MG/DL (ref 65–99)
HBA1C MFR BLD: 6.1 % OF TOTAL HGB
HCT VFR BLD AUTO: 43.9 % (ref 38.5–50)
HDLC SERPL-MCNC: 37 MG/DL
HGB BLD-MCNC: 14.8 G/DL (ref 13.2–17.1)
LDLC SERPL CALC-MCNC: 65 MG/DL (CALC)
LYMPHOCYTES # BLD AUTO: 1544 CELLS/UL (ref 850–3900)
LYMPHOCYTES NFR BLD AUTO: 18.6 %
MCH RBC QN AUTO: 28.8 PG (ref 27–33)
MCHC RBC AUTO-ENTMCNC: 33.7 G/DL (ref 32–36)
MCV RBC AUTO: 85.6 FL (ref 80–100)
MONOCYTES # BLD AUTO: 531 CELLS/UL (ref 200–950)
MONOCYTES NFR BLD AUTO: 6.4 %
NEUTROPHILS # BLD AUTO: 5594 CELLS/UL (ref 1500–7800)
NEUTROPHILS NFR BLD AUTO: 67.4 %
NONHDLC SERPL-MCNC: 88 MG/DL (CALC)
PLATELET # BLD AUTO: 237 THOUSAND/UL (ref 140–400)
PMV BLD REES-ECKER: 10.9 FL (ref 7.5–12.5)
POTASSIUM SERPL-SCNC: 4.5 MMOL/L (ref 3.5–5.3)
PROT SERPL-MCNC: 7.7 G/DL (ref 6.1–8.1)
RBC # BLD AUTO: 5.13 MILLION/UL (ref 4.2–5.8)
SL AMB EGFR AFRICAN AMERICAN: 66 ML/MIN/1.73M2
SL AMB EGFR NON AFRICAN AMERICAN: 57 ML/MIN/1.73M2
SODIUM SERPL-SCNC: 138 MMOL/L (ref 135–146)
TRIGL SERPL-MCNC: 142 MG/DL
WBC # BLD AUTO: 8.3 THOUSAND/UL (ref 3.8–10.8)

## 2019-09-11 PROCEDURE — 99213 OFFICE O/P EST LOW 20 MIN: CPT | Performed by: ORTHOPAEDIC SURGERY

## 2019-09-11 PROCEDURE — 20610 DRAIN/INJ JOINT/BURSA W/O US: CPT | Performed by: ORTHOPAEDIC SURGERY

## 2019-09-11 RX ORDER — BETAMETHASONE SODIUM PHOSPHATE AND BETAMETHASONE ACETATE 3; 3 MG/ML; MG/ML
12 INJECTION, SUSPENSION INTRA-ARTICULAR; INTRALESIONAL; INTRAMUSCULAR; SOFT TISSUE
Status: COMPLETED | OUTPATIENT
Start: 2019-09-11 | End: 2019-09-11

## 2019-09-11 RX ORDER — LIDOCAINE HYDROCHLORIDE 10 MG/ML
1 INJECTION, SOLUTION INFILTRATION; PERINEURAL
Status: COMPLETED | OUTPATIENT
Start: 2019-09-11 | End: 2019-09-11

## 2019-09-11 RX ORDER — BUPIVACAINE HYDROCHLORIDE 5 MG/ML
2 INJECTION, SOLUTION EPIDURAL; INTRACAUDAL
Status: COMPLETED | OUTPATIENT
Start: 2019-09-11 | End: 2019-09-11

## 2019-09-11 RX ADMIN — BETAMETHASONE SODIUM PHOSPHATE AND BETAMETHASONE ACETATE 12 MG: 3; 3 INJECTION, SUSPENSION INTRA-ARTICULAR; INTRALESIONAL; INTRAMUSCULAR; SOFT TISSUE at 15:34

## 2019-09-11 RX ADMIN — LIDOCAINE HYDROCHLORIDE 1 ML: 10 INJECTION, SOLUTION INFILTRATION; PERINEURAL at 15:34

## 2019-09-11 RX ADMIN — BUPIVACAINE HYDROCHLORIDE 2 ML: 5 INJECTION, SOLUTION EPIDURAL; INTRACAUDAL at 15:34

## 2019-09-11 NOTE — PROGRESS NOTES
Assessment/Plan:  1  Bilateral primary osteoarthritis of knee  Medial  Knee Brace    Injection procedure prior authorization    Large joint arthrocentesis: R knee     Patient Active Problem List   Diagnosis    Hypokalemia    Allergic rhinitis    Chronic back pain    Dental cavity    Depression with anxiety    Erectile dysfunction    Hyperlipidemia    Essential hypertension    Localized osteoarthritis of left knee    Lumbar radiculopathy    Microalbuminuria    Morbid obesity (HCC)    IMELDA on CPAP    Patellofemoral arthritis of left knee    Rosacea    Vitamin D deficiency    Pain in finger of both hands    Trigger finger, left middle finger    Persistent proteinuria    Screening for prostate cancer    Prediabetes    Screening for colon cancer    It band syndrome, right    Bilateral primary osteoarthritis of knee    Grief       Discussion/Summary:    60 y o  male  Bilateral knee pain secondary to osteoarthritis  He received intra-articular cortisone injection of the right knee today tolerated this well  We will order prior authorization for Visco injections for the bilateral knees left knee pain was not symptomatic enough today that he wanted a cortisone injection for that  Gave him prescription for medial  brace for the right knee today  He will follow up on Visco injections ready  The assessment and plan were formulated by Dr Angélica Kan and I assisted in carrying it out  Subjective:   Patient ID: Kwasi Tan  is a 60 y o  male   HPI    Patient presents to the office for follow up of bilateral knee pain  Since the last visit, the patient reports that left knee has been doing great since CSI 3 months ago, no real pain there, right knee started hurting again about a week ago  Pain is worse anteromedial knee worse with activity better with rest  Patient denies any new injuries to the right knee since the last visit  No fevers chills or swelling      The following portions of the patient's history were reviewed and updated as appropriate: allergies, current medications, past family history, past social history, past surgical history and problem list     Social History     Socioeconomic History    Marital status:      Spouse name: Not on file    Number of children: Not on file    Years of education: Not on file    Highest education level: Not on file   Occupational History    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[place], injured back     [address] strain: Not on file    Food insecurity:     Worry: Not on file     Inability: Not on file   [employer] needs:     Medical: Not on file     Non-medical: Not on file   Tobacco Use    Smoking status: Current Every Day Smoker     Types: Cigars    Smokeless tobacco: Never Used    Tobacco comment: 1 cigar daily,  50 pack years, 1/2 pack at age 12, 3 ppd x 21 yrs, quit 2004   Substance and Sexual Activity    Alcohol use: Yes     Comment: occasional    Drug use: No     Comment: History of drug use, meena, cocaine, heroind, no IVDA-as per [employer]    Sexual activity: Yes     Partners: Female     Birth control/protection: Condom Male   Lifestyle    Physical activity:     Days per week: Not on file     Minutes per session: Not on file    Stress: Not on file   Relationships    Social connections:     Talks on phone: Not on file     Gets together: Not on file     Attends Muslim service: Not on file     Active member of club or organization: Not on file     Attends meetings of clubs or organizations: Not on file     Relationship status: Not on file    Intimate partner violence:     Fear of current or ex partner: Not on file     Emotionally abused: Not on file     Physically abused: Not on file     Forced sexual activity: Not on file   Other Topics Concern    Not on file   Social History Narrative    Patient lives with his ex wife and son        Past Medical History:   Diagnosis Date  Anxiety     Chronic pain     Diabetes mellitus ([facility] )     6/19/15 A1C: 5 5%    Hyperlipidemia     Hypertension     Psychiatric disorder     Type 2 diabetes mellitus without complication ([place] [address] ) 5/78/6346    Urinary retention      Past Surgical History:   Procedure Laterality Date    LUMBAR FUSION  2002    LUMBAR FUSION  2004    L4-5-6, S1 has pump for narcotics     Allergies   Allergen Reactions    Gabapentin      Annotation - [id number]: dizziness    Pregabalin      Annotation - [id number]: vision changes and mood changes    Tramadol Nausea Only     Current Outpatient Medications on File Prior to Visit   Medication Sig Dispense Refill    amLODIPine (NORVASC) 10 mg tablet take 1 tablet by mouth once daily 30 tablet 3    atorvastatin (LIPITOR) 20 mg tablet Take 1 tablet (20 mg total) by mouth daily 30 tablet 5    chlorthalidone 25 mg tablet Take 0 5 tablets (12 5 mg total) by mouth daily 45 tablet 3    cloNIDine (CATAPRES) 0 2 mg tablet clonidine HCl 0 2 mg tablet      doxazosin (CARDURA) 2 mg tablet Take 2 tablets (4 mg total) by mouth daily at bedtime 60 tablet 5    DULoxetine (CYMBALTA) 60 mg delayed release capsule Take 1 capsule (60 mg total) by mouth daily 30 capsule 5    ketoconazole (NIZORAL) 2 % cream 1 application daily Apply to affected area  0    levocetirizine (XYZAL) 5 MG tablet Take 1 tablet (5 mg total) by mouth daily as needed for allergies 30 tablet 3    losartan (COZAAR) 100 MG tablet Take 1 tablet (100 mg total) by mouth daily 90 tablet 3    meloxicam (MOBIC) 7 5 mg tablet meloxicam 7 5 mg tablet      metFORMIN (GLUCOPHAGE) 500 mg tablet Take 1 tablet (500 mg total) by mouth daily with dinner 30 tablet 5    metoprolol tartrate (LOPRESSOR) 100 mg tablet Take 1 tablet (100 mg total) by mouth 2 (two) times a day 180 tablet 3    morphine (MS CONTIN) 15 mg 12 hr tablet take 1 tablet every 8 hours if needed pain  0    oxyCODONE (ROXICODONE) 30 MG immediate release tablet take 1 tablet by mouth every 5 hours if needed for pain   Kaye Lay MAX 4/DAY  0    potassium chloride (K-DUR,KLOR-CON) 20 mEq tablet potassium chloride ER 20 mEq tablet,extended release(part/cryst)      spironolactone (ALDACTONE) 25 mg tablet take 1 tablet by mouth once daily 30 tablet 5    tadalafil (CIALIS) 5 MG tablet Take 5 mg by mouth daily as needed for erectile dysfunction (unsure dosage) Indications: Erectile Dysfunction  No current facility-administered medications on file prior to visit  Review of Systems      Objective:    Vitals:    09/11/19 1511   BP: 142/74   Pulse: 96       Physical Exam   Constitutional: He is oriented to person, place, and time  He appears well-developed and well-nourished  No distress  HENT:   Head: Normocephalic and atraumatic  Eyes: Conjunctivae are normal  No scleral icterus  Neck: Neck supple  No tracheal deviation present  Cardiovascular:   No discernible arrhthymias    Pulmonary/Chest: Effort normal  No respiratory distress  Musculoskeletal:        Right knee: He exhibits no effusion  Left knee: He exhibits no effusion  Neurological: He is alert and oriented to person, place, and time  Skin: Skin is warm and dry  Psychiatric: He has a normal mood and affect  His behavior is normal        Right Knee Exam     Tenderness   The patient is experiencing tenderness in the medial joint line  Range of Motion   The patient has normal right knee ROM  Extension: normal   Flexion: normal     Tests   Varus: negative Valgus: negative    Other   Erythema: absent  Scars: absent  Sensation: normal  Pulse: present  Swelling: none  Effusion: no effusion present    Comments:  No ecchymosis or deformity        Left Knee Exam     Muscle Strength   The patient has normal left knee strength  Range of Motion   The patient has normal left knee ROM    Extension: normal   Flexion: normal     Tests   Varus: negative Valgus: negative    Other   Erythema: absent  Scars: absent  Sensation: normal  Pulse: present  Swelling: none  Effusion: no effusion present    Comments:  No ecchymosis or deformity              I have personally reviewed pertinent films in PACS  Large joint arthrocentesis: R knee  Date/Time: 9/11/2019 3:34 PM  Consent given by: patient  Site marked: site marked  Timeout: Immediately prior to procedure a time out was called to verify the correct patient, procedure, equipment, support staff and site/side marked as required   Supporting Documentation  Indications: pain   Procedure Details  Location: knee - R knee  Preparation: Patient was prepped and draped in the usual sterile fashion  Needle size: 22 G  Approach: anterolateral  Medications administered: 1 mL lidocaine 1 %; 12 mg betamethasone acetate-betamethasone sodium phosphate 6 (3-3) mg/mL; 2 mL bupivacaine (PF) 0 5 %    Patient tolerance: patient tolerated the procedure well with no immediate complications  Dressing:  Sterile dressing applied             Portions of the record may have been created with voice recognition software  Occasional wrong word or "sound a like" substitutions may have occurred due to the inherent limitations of voice recognition software  Read the chart carefully and recognize, using context, where substitutions have occurred

## 2019-09-16 ENCOUNTER — TELEPHONE (OUTPATIENT)
Dept: OBGYN CLINIC | Facility: OTHER | Age: 59
End: 2019-09-16

## 2019-09-16 NOTE — TELEPHONE ENCOUNTER
TO BE COMPLETED BY :     Office location appointment scheduled: [facility]    Date of First Appointment scheduled: 10/23/19    Additional Comments:

## 2019-09-16 NOTE — TELEPHONE ENCOUNTER
TO BE COMPLETED BY CENTRAL AUTH TEAM:     Physician: DR Anabella Fragoso    Medication:  Amada Filter 3    Number of Injections in Series  3   (Appointments scheduled 1 week apart from one another):     Schedule after this date: 10/23/19    Billing Info: Buy and Bill/Specialty Pharmacy-Patient Supply  BUY & BILL     Appointment Message Line:  [address], [address], 3  B&B    (please copy and paste appointment message line when scheduling appointment)    Additional Comments:    LEFT MSG TO SCHEDULE 3 SHOT VISCO

## 2019-09-25 LAB
BUN SERPL-MCNC: 22 MG/DL (ref 7–25)
BUN/CREAT SERPL: 17 (CALC) (ref 6–22)
CALCIUM SERPL-MCNC: 9.4 MG/DL (ref 8.6–10.3)
CHLORIDE SERPL-SCNC: 101 MMOL/L (ref 98–110)
CO2 SERPL-SCNC: 30 MMOL/L (ref 20–32)
CREAT SERPL-MCNC: 1.33 MG/DL (ref 0.7–1.25)
ERYTHROCYTE [DISTWIDTH] IN BLOOD BY AUTOMATED COUNT: 13.6 % (ref 11–15)
GLUCOSE SERPL-MCNC: 122 MG/DL (ref 65–99)
HCT VFR BLD AUTO: 39.1 % (ref 38.5–50)
HGB BLD-MCNC: 13 G/DL (ref 13.2–17.1)
MCH RBC QN AUTO: 28.4 PG (ref 27–33)
MCHC RBC AUTO-ENTMCNC: 33.2 G/DL (ref 32–36)
MCV RBC AUTO: 85.4 FL (ref 80–100)
PLATELET # BLD AUTO: 229 THOUSAND/UL (ref 140–400)
PMV BLD REES-ECKER: 11 FL (ref 7.5–12.5)
POTASSIUM SERPL-SCNC: 4.3 MMOL/L (ref 3.5–5.3)
RBC # BLD AUTO: 4.58 MILLION/UL (ref 4.2–5.8)
SL AMB EGFR AFRICAN AMERICAN: 67 ML/MIN/1.73M2
SL AMB EGFR NON AFRICAN AMERICAN: 58 ML/MIN/1.73M2
SODIUM SERPL-SCNC: 138 MMOL/L (ref 135–146)
WBC # BLD AUTO: 8.7 THOUSAND/UL (ref 3.8–10.8)

## 2019-09-25 NOTE — RESULT ENCOUNTER NOTE
Hello    You are seeing pt this week  Renal function stable  Electrolytes/K stable      Thank you    np

## 2019-09-26 DIAGNOSIS — I10 ESSENTIAL HYPERTENSION: ICD-10-CM

## 2019-09-27 PROBLEM — N18.2 STAGE 2 CHRONIC KIDNEY DISEASE: Status: ACTIVE | Noted: 2019-09-27

## 2019-09-27 RX ORDER — METOPROLOL TARTRATE 100 MG/1
TABLET ORAL
Qty: 180 TABLET | Refills: 3 | Status: SHIPPED | OUTPATIENT
Start: 2019-09-27 | End: 2020-03-13 | Stop reason: SDUPTHER

## 2019-10-01 ENCOUNTER — TELEPHONE (OUTPATIENT)
Dept: NEPHROLOGY | Facility: CLINIC | Age: 59
End: 2019-10-01

## 2019-10-01 DIAGNOSIS — I10 BENIGN ESSENTIAL HTN: Primary | ICD-10-CM

## 2019-10-01 NOTE — TELEPHONE ENCOUNTER
----- Message from Willy Colon MD sent at 9/30/2019  3:04 PM EDT -----  Hello    Thank you    MA team - can we send pt letter and also see if he wants to reschedule  We cannot keep refilling meds if he is not able to make appts  He missed a few last year but he had emergency in his home country   I am not sure the reason for missing this time  - can pt have renal art doplar prior to next appt to eval for renal art stenosis    Thank you    np  ----- Message -----  From: DIETER Ceballos  Sent: 9/30/2019   8:33 AM EDT  To: Willy Colon MD    Patient did not show for his appointment with me last week     ----- Message -----  From: Willy Colon MD  Sent: 9/27/2019   4:19 PM EDT  To: DIETER Ceballos    I believe you are seeing pt today or have seen pt    I have not seen him in a while    Would you mind having him complete a renal doplar u/s to look for renal art stenosis due to sig HTN requiring many meds    Thank you    np

## 2019-10-04 DIAGNOSIS — I10 ESSENTIAL HYPERTENSION: ICD-10-CM

## 2019-10-04 RX ORDER — AMLODIPINE BESYLATE 10 MG/1
TABLET ORAL
Qty: 30 TABLET | Refills: 3 | Status: SHIPPED | OUTPATIENT
Start: 2019-10-04 | End: 2020-03-13 | Stop reason: SDUPTHER

## 2019-10-10 ENCOUNTER — TELEPHONE (OUTPATIENT)
Dept: PSYCHIATRY | Facility: CLINIC | Age: 59
End: 2019-10-10

## 2019-10-10 NOTE — TELEPHONE ENCOUNTER
Behavorial Health Outpatient Intake Questions    Referred by: PCP    Please advised interviewee that they need to answer all questions truthfully to allow for best care and any misrepresentations of information may affect their ability to be seen at this clinic   => Was this discussed? Yes     Behavorial Health Outpatient Intake History -     Presenting Problem (in patient's words): depression, child passed away in July, doesn't want to leave bed, easily triggered    Has the patient ever seen or is currently seeing a psychiatrist? No   If yes who/when? If seen as outpatient, have they been seen here (and by whom)? If not seen here, which provider(s) did the patient see and for how long? Has the patient ever seen or currently see a therapist? Yes If yes who/when? Long time ago-doesn't remember who  Has a member of the patient's family been in therapy here? No  If yes, with whom? Has the patient been hospitalized for mental health? No   If yes, how long ago was last hospitalization and where was it? Substance Abuse:No concerns of substance abuse are reported  Does the patient have ICM or CTT? No    Is the patient taking injectable psychiatric medications? No    => If yes, patient cannot be seen here  Communications  Are there any developmental disabilities? No    Does the patient have hearing impairment? No       History-    Has the patient served in the [place] [address]? Yes  [employer]  If yes, have you had combat services? Yes    Was the patient activated into federal active duty as a member of the [employer] or reserve? No    Legal History-     Does the patient have any history of arrests, assisted/[facility] time, or DUIs? No  If Yes-  1) What types of charges? 2) When were they last incarcerated? 3) Are they currently on parole or probation? Minor Child-    Who has custody of the child? Is there a custody agreement?      If there is a custody agreement remind parent that they must bring a copy to the first appt or they will not be seen  Intake Team, please check with provider before scheduling if flags come up such as:  - complex case  - legal history (other than DUI)  - communication barrier concerns are present  - if, in your judgment, this needs further review    ACCEPTED as a patient Yes  => Appointment Date: Wednesday, 11/27/19 @ 1pm w/ Dr Abby Titus? No    Name of Insurance Co: [place] ID# [id number]  Insurance Phone # [phone]  If ins is primary or secondary  If patient is a minor, parents information such as Name, D  O B of guarantor

## 2019-10-23 ENCOUNTER — OFFICE VISIT (OUTPATIENT)
Dept: OBGYN CLINIC | Facility: CLINIC | Age: 59
End: 2019-10-23
Payer: COMMERCIAL

## 2019-10-23 VITALS — BODY MASS INDEX: 46.65 KG/M2 | WEIGHT: 315 LBS | HEIGHT: 69 IN

## 2019-10-23 DIAGNOSIS — M17.11 LOCALIZED OSTEOARTHRITIS OF RIGHT KNEE: ICD-10-CM

## 2019-10-23 DIAGNOSIS — M17.12 LOCALIZED OSTEOARTHRITIS OF LEFT KNEE: Primary | ICD-10-CM

## 2019-10-23 DIAGNOSIS — I10 ESSENTIAL HYPERTENSION: ICD-10-CM

## 2019-10-23 PROCEDURE — 20610 DRAIN/INJ JOINT/BURSA W/O US: CPT | Performed by: ORTHOPAEDIC SURGERY

## 2019-10-23 RX ORDER — DOXAZOSIN 2 MG/1
TABLET ORAL
Qty: 60 TABLET | Refills: 2 | Status: SHIPPED | OUTPATIENT
Start: 2019-10-23 | End: 2020-02-04

## 2019-10-28 ENCOUNTER — OFFICE VISIT (OUTPATIENT)
Dept: FAMILY MEDICINE CLINIC | Facility: CLINIC | Age: 59
End: 2019-10-28
Payer: COMMERCIAL

## 2019-10-28 VITALS
RESPIRATION RATE: 18 BRPM | HEIGHT: 69 IN | HEART RATE: 77 BPM | BODY MASS INDEX: 46.65 KG/M2 | DIASTOLIC BLOOD PRESSURE: 80 MMHG | SYSTOLIC BLOOD PRESSURE: 138 MMHG | WEIGHT: 315 LBS | OXYGEN SATURATION: 98 %

## 2019-10-28 DIAGNOSIS — E66.01 MORBID OBESITY (HCC): ICD-10-CM

## 2019-10-28 DIAGNOSIS — G89.29 CHRONIC BACK PAIN, UNSPECIFIED BACK LOCATION, UNSPECIFIED BACK PAIN LATERALITY: ICD-10-CM

## 2019-10-28 DIAGNOSIS — F41.8 DEPRESSION WITH ANXIETY: ICD-10-CM

## 2019-10-28 DIAGNOSIS — E78.5 HYPERLIPIDEMIA, UNSPECIFIED HYPERLIPIDEMIA TYPE: ICD-10-CM

## 2019-10-28 DIAGNOSIS — Z11.59 NEED FOR HEPATITIS C SCREENING TEST: ICD-10-CM

## 2019-10-28 DIAGNOSIS — F43.21 GRIEF: Primary | ICD-10-CM

## 2019-10-28 DIAGNOSIS — D64.9 ANEMIA, UNSPECIFIED TYPE: ICD-10-CM

## 2019-10-28 DIAGNOSIS — M54.9 CHRONIC BACK PAIN, UNSPECIFIED BACK LOCATION, UNSPECIFIED BACK PAIN LATERALITY: ICD-10-CM

## 2019-10-28 DIAGNOSIS — R73.03 PREDIABETES: ICD-10-CM

## 2019-10-28 DIAGNOSIS — G47.33 OSA ON CPAP: ICD-10-CM

## 2019-10-28 DIAGNOSIS — Z99.89 OSA ON CPAP: ICD-10-CM

## 2019-10-28 DIAGNOSIS — I10 ESSENTIAL HYPERTENSION: ICD-10-CM

## 2019-10-28 PROBLEM — E11.51 TYPE 2 DIABETES MELLITUS WITH PERIPHERAL ANGIOPATHY (HCC): Status: ACTIVE | Noted: 2019-10-28

## 2019-10-28 PROCEDURE — 3008F BODY MASS INDEX DOCD: CPT | Performed by: NURSE PRACTITIONER

## 2019-10-28 PROCEDURE — 3079F DIAST BP 80-89 MM HG: CPT | Performed by: NURSE PRACTITIONER

## 2019-10-28 PROCEDURE — 3075F SYST BP GE 130 - 139MM HG: CPT | Performed by: NURSE PRACTITIONER

## 2019-10-28 PROCEDURE — 99214 OFFICE O/P EST MOD 30 MIN: CPT | Performed by: NURSE PRACTITIONER

## 2019-10-28 RX ORDER — ATORVASTATIN CALCIUM 20 MG/1
20 TABLET, FILM COATED ORAL DAILY
Qty: 30 TABLET | Refills: 5
Start: 2019-10-28 | End: 2020-01-03

## 2019-10-28 RX ORDER — DULOXETIN HYDROCHLORIDE 60 MG/1
60 CAPSULE, DELAYED RELEASE ORAL DAILY
Qty: 30 CAPSULE | Refills: 5 | Status: SHIPPED | OUTPATIENT
Start: 2019-10-28 | End: 2020-01-09

## 2019-10-28 NOTE — PROGRESS NOTES
Assessment/Plan:      Diagnoses and all orders for this visit:    Grief  -     Ambulatory referral to Social Work; Future    Patient's son passed away in July  Denies any suicidal or homicidal thoughts  Patient has a follow-up in psychiatry in November  Patient referred to our behavioral health  for counseling  Appointment scheduled for next week  Prediabetes  -     metFORMIN (GLUCOPHAGE) 500 mg tablet; Take 1 tablet (500 mg total) by mouth daily with dinner  Hemoglobin A1C 6 1 Continue metformin  Essential hypertension  Continue to follow-up with nephrology  Anemia, unspecified type  -     CBC and differential; Future  Hemoglobin 13 0 Repeat CBC ordered  Depression with anxiety  -     Ambulatory referral to Social Work; Future  -     DULoxetine (CYMBALTA) 60 mg delayed release capsule; Take 1 capsule (60 mg total) by mouth daily  Continue cymbalta  Patient has a follow-up scheduled with psychiatry  Morbid obesity ([facility] [address] )  Patient instructed to eat a low fat diet  Patient instructed to exercise on a regular basis  Hyperlipidemia, unspecified hyperlipidemia type  -     atorvastatin (LIPITOR) 20 mg tablet; Take 1 tablet (20 mg total) by mouth daily    IMELDA on CPAP  Continue CPAP  Chronic back pain, unspecified back location, unspecified back pain laterality  Continue to follow-up with pain management  Need for hepatitis C screening test  -     Hepatitis C antibody; Future    Patient refuses the influenza and pneumococcal vaccines  Patient instructed to follow-up in 2 months or sooner prn  Subjective:     Patient ID: Juaquin Orellana  is a 60 y o  male  Patient is here for a follow-up for chronic medical conditions  Patient is here for a follow-up for grief  Patient's son passed away in July  Patient has a follow-up with a psychiatrist in November  Patient reports that his granddaughter was born 2 weeks ago   Patient reports that he takes cymbalta for anxiety and depression  Denies any suicidal or homicidal thoughts  Patient is interested in seeing our behavioral health  for counseling until he sees the psychiatrist    Patient follows up with nephrology for his HTN  Patient reports that he takes his medications as prescribed  Denies any chest pain, SOB, or palpitations  Patient reports that he thinks he has a follow-up scheduled with cardiology  Patient reports that he wears CPAP at night for sleep apnea  Patient reports that he takes metformin for prediabetes  Denies any dizziness, Has, vomiting, or nausea  Patient reports that he has a follow-up scheduled with GI for a consultation for colonoscopy  Patient follows up with pain management for chronic back pain  Review of Systems   Constitutional: Negative for chills and fever  HENT: Negative for congestion, ear pain and sore throat  Respiratory: Negative for cough and shortness of breath  Cardiovascular: Negative for chest pain and palpitations  Gastrointestinal: Negative for abdominal pain, diarrhea, nausea and vomiting  Musculoskeletal:        As noted in HPI  Skin: Negative for rash  Neurological: Negative for dizziness, seizures, syncope, light-headedness and headaches  Psychiatric/Behavioral: Negative for suicidal ideas  As noted in HPI  Objective:     Physical Exam   Constitutional: He is oriented to person, place, and time  No distress  obese   HENT:   Right Ear: External ear normal    Left Ear: External ear normal    Mouth/Throat: Oropharynx is clear and moist    Eyes: Pupils are equal, round, and reactive to light  Conjunctivae are normal    Cardiovascular: Normal rate, regular rhythm and normal heart sounds  Pulmonary/Chest: Effort normal and breath sounds normal  He has no wheezes  Musculoskeletal:   Gait wnl  Neurological: He is alert and oriented to person, place, and time  Skin: No rash noted     Psychiatric: He has a normal mood and affect  Vitals reviewed

## 2019-10-30 ENCOUNTER — OFFICE VISIT (OUTPATIENT)
Dept: OBGYN CLINIC | Facility: CLINIC | Age: 59
End: 2019-10-30
Payer: COMMERCIAL

## 2019-10-30 VITALS — HEIGHT: 69 IN | WEIGHT: 315 LBS | BODY MASS INDEX: 46.65 KG/M2

## 2019-10-30 DIAGNOSIS — M17.11 LOCALIZED OSTEOARTHRITIS OF RIGHT KNEE: ICD-10-CM

## 2019-10-30 DIAGNOSIS — M17.12 LOCALIZED OSTEOARTHRITIS OF LEFT KNEE: Primary | ICD-10-CM

## 2019-10-30 PROCEDURE — 20610 DRAIN/INJ JOINT/BURSA W/O US: CPT | Performed by: SURGERY

## 2019-10-30 NOTE — PROGRESS NOTES
1  Localized osteoarthritis of left knee  Large joint arthrocentesis   2  Localized osteoarthritis of right knee  Large joint arthrocentesis     Patient is here for his  2nd injection of  Orthovisc into the bilateral knee  Patient reports slight improvement in his pain since last week injections  Physical exam of the knee shows no effusion no ecchymosis        Large joint arthrocentesis: R knee  Date/Time: 10/30/2019 3:41 PM  Consent given by: patient  Site marked: site marked  Timeout: Immediately prior to procedure a time out was called to verify the correct patient, procedure, equipment, support staff and site/side marked as required   Supporting Documentation  Indications: pain   Procedure Details  Location: knee - R knee  Preparation: Patient was prepped and draped in the usual sterile fashion  Needle size: 22 G  Ultrasound guidance: no  Approach: anterolateral  Medications administered: 30 mg sodium hyaluronate 30 mg/2 mL    Patient tolerance: patient tolerated the procedure well with no immediate complications  Dressing:  Sterile dressing applied    Large joint arthrocentesis: L knee  Date/Time: 10/30/2019 3:41 PM  Consent given by: patient  Site marked: site marked  Timeout: Immediately prior to procedure a time out was called to verify the correct patient, procedure, equipment, support staff and site/side marked as required   Supporting Documentation  Indications: pain   Procedure Details  Location: knee - L knee  Preparation: Patient was prepped and draped in the usual sterile fashion  Needle size: 22 G  Ultrasound guidance: no  Approach: anterolateral  Medications administered: 30 mg sodium hyaluronate 30 mg/2 mL    Patient tolerance: patient tolerated the procedure well with no immediate complications  Dressing:  Sterile dressing applied          Patient tolerated procedure follow up  1 week

## 2019-11-04 ENCOUNTER — SOCIAL WORK (OUTPATIENT)
Dept: BEHAVIORAL/MENTAL HEALTH CLINIC | Facility: CLINIC | Age: 59
End: 2019-11-04
Payer: COMMERCIAL

## 2019-11-04 ENCOUNTER — HOSPITAL ENCOUNTER (OUTPATIENT)
Dept: NON INVASIVE DIAGNOSTICS | Facility: CLINIC | Age: 59
Discharge: HOME | End: 2019-11-04
Payer: COMMERCIAL

## 2019-11-04 DIAGNOSIS — F33.2 SEVERE EPISODE OF RECURRENT MAJOR DEPRESSIVE DISORDER, WITHOUT PSYCHOTIC FEATURES (HCC): Primary | ICD-10-CM

## 2019-11-04 DIAGNOSIS — I10 BENIGN ESSENTIAL HTN: ICD-10-CM

## 2019-11-04 DIAGNOSIS — F43.21 GRIEF REACTION: ICD-10-CM

## 2019-11-04 PROBLEM — F43.20 GRIEF REACTION: Status: ACTIVE | Noted: 2019-09-10

## 2019-11-04 PROCEDURE — 93975 VASCULAR STUDY: CPT

## 2019-11-04 PROCEDURE — 90834 PSYTX W PT 45 MINUTES: CPT | Performed by: PSYCHIATRY & NEUROLOGY

## 2019-11-04 PROCEDURE — 93975 VASCULAR STUDY: CPT | Performed by: SURGERY

## 2019-11-04 NOTE — PSYCH
Assessment/Plan:      Diagnoses and all orders for this visit:    Severe episode of recurrent major depressive disorder, without psychotic features ([facility] [address] )    Grief reaction      Session time 0346-7135 (total time 46 minutes)    Subjective:     Patient ID: Alison Lazcano  is a 60 y o  male  HPI Met with Jeremie Pena for initial session  He shared that his 44year old son, his only child,  of a massive heart attack two months ago  They worked together in a hotel, and were together that day  Jeremie Pena said that his son complained that he was feeling lightheaded and collapsed, and Jeremie Pena was told he was dead by the time he hit the floor  He shared intense grief over this loss, especially because he was not always "there" as a father when his son was young due to drug use (has been sober for 25 years)  They had more recently been getting close and were "best friends "  His son was engaged, and left behind a 15year old daughter and another daughter that was born two weeks after his death  Jeremie Pena shared feelings of guilt and anger, that he should have been the one to die first because he has HTN and diabetes and was not feeling well at the time  He now feels very depressed, unmotivated, has racing thoughts, extreme difficulty sleeping, fatigue and poor appetite  He also gets angry or tearful extremely easily  He reports not being suicidal, but that sometimes he wishes that he was reunited with his son  He is struggling to connect with his granddaughters, but needs to "get off the pot" and reconnect with them, as they need a father figure in their lives  He also said that he is stressed due to his ex wife, from whom he has been  for 5 years, is living with him again, which is stressful for him because he does not love her anymore  She does not contribute to the household at all, financially or in cleaning, etc   He wants her to leave but feels guilt knowing that she has no place to go    Jeremie Pena will work on having a talk with his older granddaughter and getting out of his house more, and will return in two weeks for follow up  Review of Systems      Objective:     Physical Exam    Psychiatric: calm and cooperative with good eye contact; mood anxious and depressed; affect congruent with mood; thought process logical and organized; concentration and memory grossly intact; speech and behavior normal; insight and judgment intact; denies SI HI and psychosis

## 2019-11-05 ENCOUNTER — TELEPHONE (OUTPATIENT)
Dept: NEPHROLOGY | Facility: CLINIC | Age: 59
End: 2019-11-05

## 2019-11-05 ENCOUNTER — TELEPHONE (OUTPATIENT)
Dept: OBGYN CLINIC | Facility: HOSPITAL | Age: 59
End: 2019-11-05

## 2019-11-05 NOTE — RESULT ENCOUNTER NOTE
Hello    Patient normally is followed up by Ms Michelle James  Can the patient be notified that there is no significant renal artery stenosis  No changes for now      Thank you    np

## 2019-11-05 NOTE — TELEPHONE ENCOUNTER
Patient sees Dr Florence Cornejo from [facility] left a voicemail stating that the order for the knee brace needs to be signed  Please fax when completed to [phone]

## 2019-11-05 NOTE — TELEPHONE ENCOUNTER
----- Message from Medina Prabhakar MD sent at 11/5/2019  8:29 AM EST -----  Hello    Patient normally is followed up by Ms Westley Sommers  Can the patient be notified that there is no significant renal artery stenosis  No changes for now      Thank you    np

## 2019-11-06 ENCOUNTER — OFFICE VISIT (OUTPATIENT)
Dept: OBGYN CLINIC | Facility: CLINIC | Age: 59
End: 2019-11-06
Payer: COMMERCIAL

## 2019-11-06 VITALS — HEIGHT: 69 IN | WEIGHT: 315 LBS | BODY MASS INDEX: 46.65 KG/M2

## 2019-11-06 DIAGNOSIS — M17.12 LOCALIZED OSTEOARTHRITIS OF LEFT KNEE: Primary | ICD-10-CM

## 2019-11-06 DIAGNOSIS — M17.11 LOCALIZED OSTEOARTHRITIS OF RIGHT KNEE: ICD-10-CM

## 2019-11-06 PROCEDURE — 20610 DRAIN/INJ JOINT/BURSA W/O US: CPT | Performed by: ORTHOPAEDIC SURGERY

## 2019-11-06 NOTE — PROGRESS NOTES
1  Localized osteoarthritis of left knee     2  Localized osteoarthritis of right knee       Patient is here for his 3/3 injection of Gelsyn into the bilateral knee  Patient reports improvement following the 1st and 2nd injections, bilaterally  He can expect improvements up to 1 month following the last injection  All organ systems normal  Physical exam of the knee shows no effusion no ecchymosis  Large joint arthrocentesis: bilateral knee  Date/Time: 11/6/2019 3:06 PM  Consent given by: patient  Site marked: site marked  Timeout: Immediately prior to procedure a time out was called to verify the correct patient, procedure, equipment, support staff and site/side marked as required   Supporting Documentation  Indications: pain   Procedure Details  Location: knee - bilateral knee  Preparation: Patient was prepped and draped in the usual sterile fashion  Needle size: 22 G  Ultrasound guidance: no    Medications (Right): 2 mL sodium hyaluronate 16 8 MG/2MLMedications (Left): 2 mL sodium hyaluronate 16 8 MG/2ML   Patient tolerance: patient tolerated the procedure well with no immediate complications  Dressing:  Sterile dressing applied          Patient tolerated procedure follow up as needed if symptoms worsen or fail to improve         Scribe Attestation    I,:   Fadi Cabral am acting as a scribe while in the presence of the attending physician :        I,:   Yvette Lubin DO personally performed the services described in this documentation    as scribed in my presence :

## 2019-11-13 ENCOUNTER — OFFICE VISIT (OUTPATIENT)
Dept: NEPHROLOGY | Facility: CLINIC | Age: 59
End: 2019-11-13
Payer: COMMERCIAL

## 2019-11-13 VITALS
BODY MASS INDEX: 46.65 KG/M2 | WEIGHT: 315 LBS | DIASTOLIC BLOOD PRESSURE: 80 MMHG | SYSTOLIC BLOOD PRESSURE: 132 MMHG | HEIGHT: 69 IN

## 2019-11-13 DIAGNOSIS — N28.1 RENAL CYST: ICD-10-CM

## 2019-11-13 DIAGNOSIS — N18.30 CKD (CHRONIC KIDNEY DISEASE), STAGE III (HCC): ICD-10-CM

## 2019-11-13 DIAGNOSIS — I10 ESSENTIAL HYPERTENSION: Primary | ICD-10-CM

## 2019-11-13 PROCEDURE — 3066F NEPHROPATHY DOC TX: CPT | Performed by: INTERNAL MEDICINE

## 2019-11-13 PROCEDURE — 99214 OFFICE O/P EST MOD 30 MIN: CPT | Performed by: INTERNAL MEDICINE

## 2019-11-13 NOTE — LETTER
November 13, 2019     MARJORIE Dee  [address]  [place] [address]    Patient: Eve Davis  YOB: 1960   Date of Visit: 11/13/2019       Dear Dr Yodit Myers: Thank you for referring Sindy Raimrez to me for evaluation  Below are my notes for this consultation  If you have questions, please do not hesitate to call me  I look forward to following your patient along with you  Sincerely,        Kerry Bryan MD        CC: No Recipients  Kerry Bryan MD  11/13/2019 11:09 AM  Sign at close encounter  [address]  60 y o  male MRN: [id number]  11/13/2019    Reason for Visit: HTN    ASSESSMENT and PLAN:    I had the pleasure of seeing Mr Rosie Linton today in the renal clinic for the continued management of HTN  Since our last visit, patient's son has passed away in September 2019  Patient has not been able to cope with this well thus far  Patient is able to take his daily medications and complete is daily ADLs  But has moments where his mood is very depressed he states  And has other days where he is feeling a little bit better  59 yo male retired , formerly from [employer] (15 years), DM (years), back surgery 10 years ago Who presents for follow up evaluation of hypertension  patient has had sporadic follow-up but has been slightly more consistent with follow-up since end of last year  I last saw the patient in August 2018 and then patient followed up with our advanced practitioner  Patient returns today for follow-up visit      Patient has stopped using NSAIDs since last visit        1) HTN - BP today 140s     -prior renin and aldosterone level with a renin level suppressed, aldosterone level is not significantly elevated  - prior metanephrine unrevealing  - prior CT scan with unremarkable adrenals  - renal Doppler study in November 2019 with no significant occlusive disease   - patient is on spironolactone, chlorthalidone, amlodipine, losartan, metoprolol, doxazosin, clonidine   -BMP in 3 months  Appointment in 3-4 months  - repeat renal ultrasound   -continue with Psychiatry appointments in 2 weeks     2) poor dentition     -patient smokes cigars daily  -patient needs to see dentist     3) proteinuria -may be related to hypertension versus obesity     - recheck  - if elevated, will check SPEP, UPEP, FLC     4) CKD with baseline creatinine approximately 1 3 mg/dL     -renal ultrasound in September 2018 was septated cyst bilaterally with mildly increased on the right kidney  And a calcified cystic foci in the left kidney  -patient needs repeat ultrasound  Ordered  5) grief counseling-patient needs to see psychiatrist and psychologist and patient has the appointment scheduled      It was a pleasure evaluating Mr Tyler Lewis in the renal office; thank you for allowing our team to participate in the care of your patient and please do not hesitate to contact our team if any questions arise in the interim            No problem-specific Assessment & Plan notes found for this encounter  HPI:    Patient denies fevers, chills, nausea, vomiting  Recently lost his son 2 months ago  Has not been doing well from a mental health perspective since this  PATIENT INSTRUCTIONS:    Patient Instructions   1) Avoid NSAIDS - (Example - motrin, advil, ibuprofen, aleve, exederin, etc)  2) Always follow a low salt diet  3) labwork in 3 months  4) appointment in 3-4 months  5) please call me any time you need a listening ear        OBJECTIVE:  Current Weight: Weight - Scale: (!) 152 kg (335 lb 9 6 oz)  Vitals:    11/13/19 1029   BP: 132/80   BP Location: Left arm   Patient Position: Sitting   Cuff Size: Large   Weight: (!) 152 kg (335 lb 9 6 oz)   Height: 5' 9" (1 753 m)    Body mass index is 49 56 kg/m²  REVIEW OF SYSTEMS:    Review of Systems   Constitutional: Negative  Negative for appetite change and fatigue  HENT: Negative  Eyes: Negative  Respiratory: Negative  Negative for shortness of breath  Cardiovascular: Negative  Negative for leg swelling  Gastrointestinal: Negative  Endocrine: Negative  Genitourinary: Negative  Negative for difficulty urinating  Musculoskeletal: Negative  Allergic/Immunologic: Negative  Neurological: Negative  Hematological: Negative  Psychiatric/Behavioral: Negative  Depressed mood   All other systems reviewed and are negative  PHYSICAL EXAM:      Physical Exam   Constitutional: He is oriented to person, place, and time  He appears well-developed and well-nourished  No distress  HENT:   Head: Normocephalic and atraumatic  Mouth/Throat: No oropharyngeal exudate  Eyes: Conjunctivae are normal  Right eye exhibits no discharge  Left eye exhibits no discharge  No scleral icterus  Neck: Normal range of motion  Neck supple  No JVD present  Cardiovascular: Normal rate, regular rhythm and normal heart sounds  Exam reveals no gallop and no friction rub  No murmur heard  Pulmonary/Chest: Effort normal and breath sounds normal  No respiratory distress  He has no wheezes  He has no rales  He exhibits no tenderness  Abdominal: Soft  Bowel sounds are normal  He exhibits no distension  There is no tenderness  There is no rebound  Musculoskeletal: Normal range of motion  He exhibits no edema, tenderness or deformity  Neurological: He is alert and oriented to person, place, and time  He has normal reflexes  No cranial nerve deficit  Coordination normal    Skin: Skin is warm and dry  No rash noted  He is not diaphoretic  No erythema  No pallor  Psychiatric: He has a normal mood and affect  His behavior is normal  Judgment and thought content normal    Nursing note and vitals reviewed        Medications:    Current Outpatient Medications:     amLODIPine (NORVASC) 10 mg tablet, take 1 tablet by mouth once daily, Disp: 30 tablet, Rfl: 3   atorvastatin (LIPITOR) 20 mg tablet, Take 1 tablet (20 mg total) by mouth daily, Disp: 30 tablet, Rfl: 5    chlorthalidone 25 mg tablet, Take 0 5 tablets (12 5 mg total) by mouth daily, Disp: 45 tablet, Rfl: 3    cloNIDine (CATAPRES) 0 2 mg tablet, daily , Disp: , Rfl:     doxazosin (CARDURA) 2 mg tablet, take 2 tablets by mouth at bedtime, Disp: 60 tablet, Rfl: 2    DULoxetine (CYMBALTA) 60 mg delayed release capsule, Take 1 capsule (60 mg total) by mouth daily, Disp: 30 capsule, Rfl: 5    levocetirizine (XYZAL) 5 MG tablet, Take 1 tablet (5 mg total) by mouth daily as needed for allergies, Disp: 30 tablet, Rfl: 3    losartan (COZAAR) 100 MG tablet, Take 1 tablet (100 mg total) by mouth daily, Disp: 90 tablet, Rfl: 3    metFORMIN (GLUCOPHAGE) 500 mg tablet, Take 1 tablet (500 mg total) by mouth daily with dinner, Disp: 30 tablet, Rfl: 5    metoprolol tartrate (LOPRESSOR) 100 mg tablet, take 1 tablet by mouth twice a day, Disp: 180 tablet, Rfl: 3    morphine (MS CONTIN) 15 mg 12 hr tablet, take 1 tablet every 8 hours if needed pain, Disp: , Rfl: 0    oxyCODONE (ROXICODONE) 30 MG immediate release tablet, take 1 tablet by mouth every 5 hours if needed for pain   Patsi Reil MAX 4/DAY, Disp: , Rfl: 0    spironolactone (ALDACTONE) 25 mg tablet, take 1 tablet by mouth once daily, Disp: 30 tablet, Rfl: 5    tadalafil (CIALIS) 5 MG tablet, Take 5 mg by mouth daily as needed for erectile dysfunction (unsure dosage) Indications: Erectile Dysfunction  , Disp: , Rfl:     ketoconazole (NIZORAL) 2 % cream, 1 application daily Apply to affected area, Disp: , Rfl: 0    Laboratory Results:        Invalid input(s): ALBUMIN    Results for orders placed or performed in visit on 27/23/21   Basic metabolic panel   Result Value Ref Range    Glucose, Random 122 (H) 65 - 99 mg/dL    BUN 22 7 - 25 mg/dL    Creatinine 1 33 (H) 0 70 - 1 25 mg/dL    eGFR Non  58 (L) > OR = 60 mL/min/1 73m2    eGFR  67 > OR = 60 mL/min/1 73m2    SL AMB BUN/CREATININE RATIO 17 6 - 22 (calc)    Sodium 138 135 - 146 mmol/L    Potassium 4 3 3 5 - 5 3 mmol/L    Chloride 101 98 - 110 mmol/L    CO2 30 20 - 32 mmol/L    SL AMB CALCIUM 9 4 8 6 - 10 3 mg/dL   CBC   Result Value Ref Range    White Blood Cell Count 8 7 3 8 - 10 8 Thousand/uL    Red Blood Cell Count 4 58 4 20 - 5 80 Million/uL    Hemoglobin 13 0 (L) 13 2 - 17 1 g/dL    HCT 39 1 38 5 - 50 0 %    MCV 85 4 80 0 - 100 0 fL    MCH 28 4 27 0 - 33 0 pg    MCHC 33 2 32 0 - 36 0 g/dL    RDW 13 6 11 0 - 15 0 %    Platelet Count 517 327 - 400 Thousand/uL    SL AMB MPV 11 0 7 5 - 12 5 fL    Always Message

## 2019-11-13 NOTE — PROGRESS NOTES
NEPHROLOGY OFFICE VISIT   Princess Shabazz  60 y o  male MRN: [id number]  11/13/2019    Reason for Visit: HTN    ASSESSMENT and PLAN:    I had the pleasure of seeing Mr Farideh Lange today in the renal clinic for the continued management of HTN  Since our last visit, patient's son has passed away in September 2019  Patient has not been able to cope with this well thus far  Patient is able to take his daily medications and complete is daily ADLs  But has moments where his mood is very depressed he states  And has other days where he is feeling a little bit better  59 yo male retired , formerly from [address] [place] [address] (15 years), DM (years), back surgery 10 years ago Who presents for follow up evaluation of hypertension  patient has had sporadic follow-up but has been slightly more consistent with follow-up since end of last year  I last saw the patient in August 2018 and then patient followed up with our advanced practitioner  Patient returns today for follow-up visit      Patient has stopped using NSAIDs since last visit     1) HTN - BP today 140s     -prior renin and aldosterone level with a renin level suppressed, aldosterone level is not significantly elevated  - prior metanephrine unrevealing  - prior CT scan with unremarkable adrenals  - renal Doppler study in November 2019 with no significant occlusive disease   - patient is on spironolactone, chlorthalidone, amlodipine, losartan, metoprolol, doxazosin, clonidine   -BMP in 3 months  Appointment in 3-4 months    - repeat renal ultrasound   -continue with Psychiatry appointments in 2 weeks     2) poor dentition     -patient smokes cigars daily  -patient needs to see dentist     3) proteinuria -may be related to hypertension versus obesity     - recheck  - if elevated, will check SPEP, UPEP, FLC     4) CKD with baseline creatinine approximately 1 3 mg/dL     -renal ultrasound in September 2018 was septated cyst bilaterally with mildly increased on the right kidney  And a calcified cystic foci in the left kidney  -patient needs repeat ultrasound  Ordered  5) grief counseling-patient needs to see psychiatrist and psychologist and patient has the appointment scheduled      It was a pleasure evaluating Mr Johnson Palacio in the renal office; thank you for allowing our team to participate in the care of your patient and please do not hesitate to contact our team if any questions arise in the interim            No problem-specific Assessment & Plan notes found for this encounter  HPI:    Patient denies fevers, chills, nausea, vomiting  Recently lost his son 2 months ago  Has not been doing well from a mental health perspective since this  PATIENT INSTRUCTIONS:    Patient Instructions   1) Avoid NSAIDS - (Example - motrin, advil, ibuprofen, aleve, exederin, etc)  2) Always follow a low salt diet  3) labwork in 3 months  4) appointment in 3-4 months  5) please call me any time you need a listening ear        OBJECTIVE:  Current Weight: Weight - Scale: (!) 152 kg (335 lb 9 6 oz)  Vitals:    11/13/19 1029   BP: 132/80   BP Location: Left arm   Patient Position: Sitting   Cuff Size: Large   Weight: (!) 152 kg (335 lb 9 6 oz)   Height: 5' 9" (1 753 m)    Body mass index is 49 56 kg/m²  REVIEW OF SYSTEMS:    Review of Systems   Constitutional: Negative  Negative for appetite change and fatigue  HENT: Negative  Eyes: Negative  Respiratory: Negative  Negative for shortness of breath  Cardiovascular: Negative  Negative for leg swelling  Gastrointestinal: Negative  Endocrine: Negative  Genitourinary: Negative  Negative for difficulty urinating  Musculoskeletal: Negative  Allergic/Immunologic: Negative  Neurological: Negative  Hematological: Negative  Psychiatric/Behavioral: Negative  Depressed mood   All other systems reviewed and are negative        PHYSICAL EXAM:      Physical Exam   Constitutional: He is oriented to person, place, and time  He appears well-developed and well-nourished  No distress  HENT:   Head: Normocephalic and atraumatic  Mouth/Throat: No oropharyngeal exudate  Eyes: Conjunctivae are normal  Right eye exhibits no discharge  Left eye exhibits no discharge  No scleral icterus  Neck: Normal range of motion  Neck supple  No JVD present  Cardiovascular: Normal rate, regular rhythm and normal heart sounds  Exam reveals no gallop and no friction rub  No murmur heard  Pulmonary/Chest: Effort normal and breath sounds normal  No respiratory distress  He has no wheezes  He has no rales  He exhibits no tenderness  Abdominal: Soft  Bowel sounds are normal  He exhibits no distension  There is no tenderness  There is no rebound  Musculoskeletal: Normal range of motion  He exhibits no edema, tenderness or deformity  Neurological: He is alert and oriented to person, place, and time  He has normal reflexes  No cranial nerve deficit  Coordination normal    Skin: Skin is warm and dry  No rash noted  He is not diaphoretic  No erythema  No pallor  Psychiatric: He has a normal mood and affect  His behavior is normal  Judgment and thought content normal    Nursing note and vitals reviewed        Medications:    Current Outpatient Medications:     amLODIPine (NORVASC) 10 mg tablet, take 1 tablet by mouth once daily, Disp: 30 tablet, Rfl: 3    atorvastatin (LIPITOR) 20 mg tablet, Take 1 tablet (20 mg total) by mouth daily, Disp: 30 tablet, Rfl: 5    chlorthalidone 25 mg tablet, Take 0 5 tablets (12 5 mg total) by mouth daily, Disp: 45 tablet, Rfl: 3    cloNIDine (CATAPRES) 0 2 mg tablet, daily , Disp: , Rfl:     doxazosin (CARDURA) 2 mg tablet, take 2 tablets by mouth at bedtime, Disp: 60 tablet, Rfl: 2    DULoxetine (CYMBALTA) 60 mg delayed release capsule, Take 1 capsule (60 mg total) by mouth daily, Disp: 30 capsule, Rfl: 5    levocetirizine (XYZAL) 5 MG tablet, Take 1 tablet (5 mg total) by mouth daily as needed for allergies, Disp: 30 tablet, Rfl: 3    losartan (COZAAR) 100 MG tablet, Take 1 tablet (100 mg total) by mouth daily, Disp: 90 tablet, Rfl: 3    metFORMIN (GLUCOPHAGE) 500 mg tablet, Take 1 tablet (500 mg total) by mouth daily with dinner, Disp: 30 tablet, Rfl: 5    metoprolol tartrate (LOPRESSOR) 100 mg tablet, take 1 tablet by mouth twice a day, Disp: 180 tablet, Rfl: 3    morphine (MS CONTIN) 15 mg 12 hr tablet, take 1 tablet every 8 hours if needed pain, Disp: , Rfl: 0    oxyCODONE (ROXICODONE) 30 MG immediate release tablet, take 1 tablet by mouth every 5 hours if needed for pain   [place] MAX 4/DAY, Disp: , Rfl: 0    spironolactone (ALDACTONE) 25 mg tablet, take 1 tablet by mouth once daily, Disp: 30 tablet, Rfl: 5    tadalafil (CIALIS) 5 MG tablet, Take 5 mg by mouth daily as needed for erectile dysfunction (unsure dosage) Indications: Erectile Dysfunction  , Disp: , Rfl:     ketoconazole (NIZORAL) 2 % cream, 1 application daily Apply to affected area, Disp: , Rfl: 0    Laboratory Results:        Invalid input(s): ALBUMIN    Results for orders placed or performed in visit on 57/19/13   Basic metabolic panel   Result Value Ref Range    Glucose, Random 122 (H) 65 - 99 mg/dL    BUN 22 7 - 25 mg/dL    Creatinine 1 33 (H) 0 70 - 1 25 mg/dL    eGFR Non  58 (L) > OR = 60 mL/min/1 73m2    eGFR  67 > OR = 60 mL/min/1 73m2    SL AMB BUN/CREATININE RATIO 17 6 - 22 (calc)    Sodium 138 135 - 146 mmol/L    Potassium 4 3 3 5 - 5 3 mmol/L    Chloride 101 98 - 110 mmol/L    CO2 30 20 - 32 mmol/L    SL AMB CALCIUM 9 4 8 6 - 10 3 mg/dL   CBC   Result Value Ref Range    White Blood Cell Count 8 7 3 8 - 10 8 Thousand/uL    Red Blood Cell Count 4 58 4 20 - 5 80 Million/uL    Hemoglobin 13 0 (L) 13 2 - 17 1 g/dL    HCT 39 1 38 5 - 50 0 %    MCV 85 4 80 0 - 100 0 fL    MCH 28 4 27 0 - 33 0 pg    MCHC 33 2 32 0 - 36 0 g/dL    RDW 13 6 11 0 - 15 0 %    Platelet Count 122 765 - 400 Thousand/uL SL AMB MPV 11 0 7 5 - 12 5 fL    Always Message

## 2019-11-13 NOTE — PATIENT INSTRUCTIONS
1) Avoid NSAIDS - (Example - motrin, advil, ibuprofen, aleve, exederin, etc)  2) Always follow a low salt diet  3) labwork in 3 months  4) appointment in 3-4 months  5) please call me any time you need a listening ear

## 2019-11-27 ENCOUNTER — OFFICE VISIT (OUTPATIENT)
Dept: PSYCHIATRY | Facility: CLINIC | Age: 59
End: 2019-11-27
Payer: COMMERCIAL

## 2019-11-27 VITALS
HEIGHT: 69 IN | BODY MASS INDEX: 46.65 KG/M2 | SYSTOLIC BLOOD PRESSURE: 130 MMHG | WEIGHT: 315 LBS | DIASTOLIC BLOOD PRESSURE: 73 MMHG | HEART RATE: 71 BPM

## 2019-11-27 DIAGNOSIS — F32.1 CURRENT MODERATE EPISODE OF MAJOR DEPRESSIVE DISORDER WITHOUT PRIOR EPISODE (HCC): Primary | ICD-10-CM

## 2019-11-27 PROCEDURE — 90792 PSYCH DIAG EVAL W/MED SRVCS: CPT | Performed by: PSYCHIATRY & NEUROLOGY

## 2019-11-27 RX ORDER — TRAZODONE HYDROCHLORIDE 50 MG/1
50 TABLET ORAL
Qty: 30 TABLET | Refills: 0 | Status: SHIPPED | OUTPATIENT
Start: 2019-11-27 | End: 2020-01-03

## 2019-11-27 RX ORDER — ATORVASTATIN CALCIUM 20 MG/1
TABLET, FILM COATED ORAL
Qty: 30 TABLET | Refills: 5 | OUTPATIENT
Start: 2019-11-27

## 2019-11-27 RX ORDER — DULOXETIN HYDROCHLORIDE 20 MG/1
20 CAPSULE, DELAYED RELEASE ORAL DAILY
Qty: 30 CAPSULE | Refills: 0 | Status: SHIPPED | OUTPATIENT
Start: 2019-11-27 | End: 2020-01-03

## 2019-11-27 NOTE — PSYCH
[facility]    Name and Date of Birth:  Eve Davis  60 y o  1960 MRN: [id number]    Date of Visit: 2019    Source of Information:  The patient himself who seems to be good historian  His medical records in epic was also briefly reviewed  Chief Complaint:  Depression    HPI:  This is a 48-year-old  male, divorce, had only 1 child (son) who passed away in July this year from cardiac arrest, currently lives with his ex-wife in his own house in [address]  The patient is on disability since   The patient was referred to me by his primary care physician for his mental health management  The patient reported that he has struggled with depression and anxiety since last 2 years and was treated by his primary care physician for it  He denies any history of any mental health illness or management before that  He reported his depression and anxiety over last 2 years was mostly situational due to the stress of his ex-wife living with him  He reported that usually will experience sad mood for couple of days at a time, where he will not feel like doing anything, wanting to sleep all the time but had broken sleep, getting angry  He denied ever lasting more than 2 days at a time  He though reported since his son passed away in July this year unexpectedly, he has been feeling very depressed  He reports his son left behind 15 yr old daughter, and had another daughter two weeks after he   He describes feeling like not doing anything since July, no motivation, wants to sleep all the time, though reports broken sleep, getting angry at times, yelling but denies any physical violence or breaking or throwing stuff  He denied any suicidal ideation but he reports occasionally mild hopelessness  He though denies having any feeling of not wanting to live anymore    He reports his focus has been very poor, reports low energy level, rates his depression nowadays as 8 on a scale of 0-10 with 10 being the worst depression  He denies his depression has improved over last 3-4 months and has been the same every day  He reports his appetite has been poor  He also reports poor concentration and endorses anhedonia  The patient reported that he felt guilt about his son initially but it has resolved now  He also reported he is very stressed out due to his ex-wife living at home with him for last couple of years  He reports he does not love her and due to her some behaviors, he gets upset  He denies ever having any thoughts to hurt her  He reports his sleep for last 3 days has been extremely poor with not even getting 1 hour of sleep  He though reported at times his sleep too much and before 3 days, he was sleeping most of the time  The patient reported that 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 and was standing  next to his son when he collapsed and passed away  Denies any nightmares but sometime weird dreams  Denies any other symptoms for PTSD  The patient also reports feeling anxious since last couple of years, initially it would be mostly situational but since his son passed away, he reports he worries about his grandkids future or getting stressed out due to his ex wife  The patient reports having racing mind all the time  He denies any panic attacks or any history of social anxiety  He though reported for last few weeks he has been having anxiety going out of the home  He denies any history of auditory or visual hallucination but reports since his son passed away, He thought he heard his son voice and 2-3 times thought he saw his son in the crowd  He does not seem to endorse any paranoia and, delusional or grandiose ideation    He denies any history of manic or hypomanic episodes ever in the past   He also denies any history of obsessive-compulsive disorder symptoms or any history of eating disorder  Review Of Systems:    Constitutional feeling tired   ENT negative   Cardiovascular negative   Respiratory negative   Gastrointestinal negative   Genitourinary negative   Musculoskeletal back pain and knee pain   Integumentary negative   Neurological negative   Endocrine negative   Other Symptoms none       Past Psychiatric History:  As mentioned above the patient has been in mental health treatment for last 2 years, mainly by his primary care physician for depression and anxiety  The patient reports being on Cymbalta for last 2 years and denies being on any other psychiatric medication ever in the past   He denies ever seeing a psychiatrist or ever admitted in a psychiatric inpatient unit  The patient also denies ever being in counseling in the past   Denies any history of suicide ideation or suicide attempt in the past   Also denies any history of violence        Traumatic History:     Abuse: no history of physical or sexual abuse  Other Traumatic Events: none       Substance Abuse History:  The patient reported that he used to use substances and abuse alcohol in the past but since last 20 year has not been using any substances  He reported 1 of the main drug of choice was cocaine which he started using at age 12 years but was heavily using for 8-9 years on daily basis  Last use was 20 years back  The patient also reported he started smoking marijuana when he was 14year-old and last use was when he was 13-year-old  Denies any history of opiates or heroin use  Denies ever using benzodiazepines  He reports he drinks alcohol occasionally and last use was 2 days after his son passed away  He though reported he started drinking alcohol at age 14 years and was drinking heavily till he was 13-year-old  Since then he was he drinking alcohol occasionally, couple of beers at a time  Reports history of being charged with either selling or possession of drugs when he was young  Longest clean time: 20 years  History of Inpatient/Outpatient rehabilitation program: no  Smoking history: Smokes cigar 1-2 a day  Use of caffeine: a few cups of coffee per week    Family Psychiatric/Substance Use History:     Psychiatric Illness:  no family history of psychiatric illness  Substance Abuse:  no family history of substance abuse  Suicide Attempts:  no family history of suicide attempts    Social History:  Born & Raised in :  [place]  Childhood Experiences:  Difficult  The patient reported his parents  when he was very young and raised by his mother  Denies though any trauma during childhood    Education: some college  Learning Disabilities: none  Marital History:   Children: 1 son  Living Arrangement: lives in home with ex-wife  Occupational History: on permanent disability  Functioning Relationships: good support system his mother  Legal History: no current legal problems   History: discharge type: honorable discharge      Past Medical History:    Past Medical History:   Diagnosis Date    Anxiety     Back pain     Chronic pain     Diabetes mellitus ([facility] )     6/19/15 A1C: 5 5%    Hyperlipidemia     Hypertension     Knee pain     Psychiatric disorder     Type 2 diabetes mellitus without complication ([place] [address] ) 4/89/1165    Urinary retention      Past Medical History Pertinent Negatives:   Diagnosis Date Noted    Heart failure ([facility] ) 03/17/2016     Past Surgical History:   Procedure Laterality Date    BACK SURGERY      LUMBAR FUSION  2002    LUMBAR FUSION  2004    L4-5-6, S1 has pump for narcotics     Allergies   Allergen Reactions    Gabapentin      Annotation - [id number]: dizziness    Pregabalin      Annotation - [id number]: vision changes and mood changes    Tramadol Nausea Only         Current Medications:      Current Outpatient Medications:     amLODIPine (NORVASC) 10 mg tablet, take 1 tablet by mouth once daily, Disp: 30 tablet, Rfl: 3    atorvastatin (LIPITOR) 20 mg tablet, Take 1 tablet (20 mg total) by mouth daily, Disp: 30 tablet, Rfl: 5    chlorthalidone 25 mg tablet, Take 0 5 tablets (12 5 mg total) by mouth daily, Disp: 45 tablet, Rfl: 3    cloNIDine (CATAPRES) 0 2 mg tablet, Take 0 1 mg by mouth daily , Disp: , Rfl:     doxazosin (CARDURA) 2 mg tablet, take 2 tablets by mouth at bedtime, Disp: 60 tablet, Rfl: 2    DULoxetine (CYMBALTA) 60 mg delayed release capsule, Take 1 capsule (60 mg total) by mouth daily, Disp: 30 capsule, Rfl: 5    levocetirizine (XYZAL) 5 MG tablet, Take 1 tablet (5 mg total) by mouth daily as needed for allergies, Disp: 30 tablet, Rfl: 3    losartan (COZAAR) 100 MG tablet, Take 1 tablet (100 mg total) by mouth daily, Disp: 90 tablet, Rfl: 3    metFORMIN (GLUCOPHAGE) 500 mg tablet, Take 1 tablet (500 mg total) by mouth daily with dinner, Disp: 30 tablet, Rfl: 5    metoprolol tartrate (LOPRESSOR) 100 mg tablet, take 1 tablet by mouth twice a day, Disp: 180 tablet, Rfl: 3    morphine (MS CONTIN) 15 mg 12 hr tablet, take 1 tablet every 8 hours if needed pain, Disp: , Rfl: 0    oxyCODONE (ROXICODONE) 30 MG immediate release tablet, take 1 tablet by mouth every 5 hours if needed for pain   Darletta Pac MAX 4/DAY, Disp: , Rfl: 0    spironolactone (ALDACTONE) 25 mg tablet, take 1 tablet by mouth once daily, Disp: 30 tablet, Rfl: 5    DULoxetine (CYMBALTA) 20 mg capsule, Take 1 capsule (20 mg total) by mouth daily In addition to 60 mg po daily for ttd 80mg daily, Disp: 30 capsule, Rfl: 0    ketoconazole (NIZORAL) 2 % cream, 1 application daily Apply to affected area, Disp: , Rfl: 0    tadalafil (CIALIS) 5 MG tablet, Take 5 mg by mouth daily as needed for erectile dysfunction (unsure dosage) Indications: Erectile Dysfunction  , Disp: , Rfl:     traZODone (DESYREL) 50 mg tablet, Take 1 tablet (50 mg total) by mouth daily at bedtime, Disp: 30 tablet, Rfl: 0       OBJECTIVE:    Vital signs in last 24 hours:    Vitals:    11/27/19 1513   BP: 130/73   Pulse: 71   Weight: (!) 150 kg (330 lb)   Height: 5' 9" (1 753 m)       Mental Status Evaluation:    Appearance age appropriate, casually dressed, overweight   Behavior cooperative, calm   Speech normal rate, normal volume, normal pitch   Mood depressed   Affect constricted   Thought Processes organized, goal directed   Associations intact associations   Thought Content no overt delusions   Perceptual Disturbances: no auditory hallucinations, no visual hallucinations   Abnormal Thoughts  Risk Potential Suicidal ideation - None  Homicidal ideation - None  Potential for aggression - No   Orientation oriented to person, place, time/date and situation   Memory recent and remote memory grossly intact   Consciousness alert and awake   Attention Span Concentration Span attention span and concentration are age appropriate   Intellect appears to be of average intelligence   Insight intact   Judgement intact   Muscle Strength and  Gait normal gait and normal balance   Motor Activity no abnormal movements   Language no difficulty naming common objects   Fund of Knowledge adequate knowledge of current events  adequate fund of knowledge regarding past history  adequate fund of knowledge regarding vocabulary    Pain mild   Pain Scale 3       Laboratory Results: cbc, cmp and s tsh has been ordered again  I have personally reviewed all pertinent laboratory/tests results    Most Recent Labs:   Lab Results   Component Value Date    WBC 8 7 09/24/2019    RBC 4 58 09/24/2019    HGB 13 0 (L) 09/24/2019    HCT 39 1 09/24/2019     09/24/2019    RDW 13 6 09/24/2019    NEUTROABS 5,594 09/10/2019     09/28/2017     09/28/2017    K 4 3 09/24/2019     09/24/2019    CO2 30 09/24/2019    BUN 22 09/24/2019    CREATININE 1 33 (H) 09/24/2019    GLUCOSE 148 (H) 10/14/2016    CALCIUM 9 4 09/24/2019    AST 12 09/10/2019    ALT 12 09/10/2019    ALKPHOS 105 09/10/2019    PROT 8 3 (H) 06/19/2015    BILITOT 0 5 06/19/2015 CHOL 109 10/14/2016    HDL 37 (L) 09/10/2019    TRIG 142 09/10/2019    LDLCALC 50 07/28/2017    [id number] 2 190 09/06/2017       Assessment/Plan:  From evaluation of the patient today and review of the history, I believe the patient meets the criteria for major depressive disorder, single episode without psychotic features  The patient reports history of depression and anxiety for last 2 years but from evaluation it seems it did not fit the criteria for major depressive episode as it would not last for more than 1-2 days  The patient though has been feeling depressed continuously for last 4 months after his son passed away  Initially his symptoms can be explained by grief but given his symptom lasting more than 2 months without any change in the intensity and presence of most of the neurovegetative symptoms of depression as mentioned above, I feel the patient meets the criteria for major depressive disorder  The patient also is struggling with going out of his home specially to attend any family or social gatherings  The patient initially was feeling lot of guilt though it has been better  The patient also does not report any suicidal ideation  But given the duration of the symptoms along with presence of most of the neurovegetative symptoms of depression along with affecting his social life, I still feel at this time it is meets the criteria for major depressive episode  The patient also struggles with anxiety which she has was mostly situational before, he but since July seems to be more on daily basis  The generalized anxiety disorder criteria still not being fully met but needs to be assessed further over next few visits to confirm or rule it out  The patient reports occasionally hearing his son voice and seeing him at times in the crowd but it seems due to recent loss and does not seem psychotic in nature    The patient though needs to be assessed for it over next few visits to see if it has been gradually resolving  The patient does not endorse any other symptoms for mental health disorder  He also denies any current alcohol abuse or substance use  Reports good support in his mother and other family members  Still stressed about his ex-wife who lives with him it increases depression and anxiety, but reports he is focusing on himself nowadays to get better  Reports his granddaughters are his protective factor  Plan:  I discussed with the patient in detail both pharmacologic and non pharmacologic treatment options  The patient had seen a  recently but was not sure if he is going to follow up again with her for therapy  The patient will check with her and in case the patient needs to get connected with the different therapist, he has been advised to call me for me to send a referral   The patient is currently on duloxetine 60 mg once a day for depression and anxiety and he agreed for the increase in the dose to 80 mg once a day for depression and anxiety  Given that the patient not sleeping well, I will add trazodone 50 mg half to 1 tablet at bedtime for poor sleep  Patient serum creatinine level is slightly elevated with slight decrease in the GFR but I believe it's not bad enough to restrict any change in the duloxetine dose at this time  Patient will get blood work for [facility] soon and will monitor for any change in the kidney function test   Will follow with me in 4 weeks or sooner if needed  He has been educated in detail about benefit, risk, side effects, alternatives, dosage, frequency and contraindication of his current medications  He has been advised to call us if there is any concern and to call or visit nearby ER if he has any thoughts to hurt himself, others or any emergency  The patient agreed     Diagnoses and all orders for this visit:    Current moderate episode of major depressive disorder without prior episode (HCC)  -     DULoxetine (CYMBALTA) 20 mg capsule;  Take 1 capsule (20 mg total) by mouth daily In addition to 60 mg po daily for ttd 80mg daily  -     traZODone (DESYREL) 50 mg tablet; Take 1 tablet (50 mg total) by mouth daily at bedtime  -     TSH Stimulation; Future          Treatment Recommendations:    Check Assessment/Plan section for details  Risks/Benefits/Precautions:      Risks, Benefits And Possible Side Effects Of Medications:    Risks, benefits, and possible side effects of medications explained to [facility] [place] and he verbalizes understanding and agreement for treatment      Controlled Medication Discussion:     Not applicable    Treatment Plan;    Completed and signed during the session: Yes - with Nader Corbett MD 11/27/19

## 2019-12-01 RX ORDER — ATORVASTATIN CALCIUM 20 MG/1
TABLET, FILM COATED ORAL
Qty: 30 TABLET | Refills: 5 | OUTPATIENT
Start: 2019-12-01

## 2020-01-03 ENCOUNTER — TELEPHONE (OUTPATIENT)
Dept: PSYCHIATRY | Facility: CLINIC | Age: 60
End: 2020-01-03

## 2020-01-03 DIAGNOSIS — F32.1 CURRENT MODERATE EPISODE OF MAJOR DEPRESSIVE DISORDER WITHOUT PRIOR EPISODE (HCC): ICD-10-CM

## 2020-01-03 DIAGNOSIS — F41.8 DEPRESSION WITH ANXIETY: ICD-10-CM

## 2020-01-03 DIAGNOSIS — E78.5 HYPERLIPIDEMIA, UNSPECIFIED HYPERLIPIDEMIA TYPE: ICD-10-CM

## 2020-01-03 RX ORDER — ATORVASTATIN CALCIUM 20 MG/1
TABLET, FILM COATED ORAL
Qty: 30 TABLET | Refills: 1 | Status: SHIPPED | OUTPATIENT
Start: 2020-01-03 | End: 2020-01-20 | Stop reason: SDUPTHER

## 2020-01-03 RX ORDER — TRAZODONE HYDROCHLORIDE 50 MG/1
TABLET ORAL
Qty: 30 TABLET | Refills: 0 | Status: SHIPPED | OUTPATIENT
Start: 2020-01-03 | End: 2020-01-08 | Stop reason: SDUPTHER

## 2020-01-03 RX ORDER — DULOXETIN HYDROCHLORIDE 20 MG/1
CAPSULE, DELAYED RELEASE ORAL
Qty: 30 CAPSULE | Refills: 0 | Status: SHIPPED | OUTPATIENT
Start: 2020-01-03 | End: 2020-01-09

## 2020-01-03 NOTE — TELEPHONE ENCOUNTER
Refill sent  Please call patient and schedule him for his regular follow-up  Patient no showed in December

## 2020-01-08 ENCOUNTER — OFFICE VISIT (OUTPATIENT)
Dept: PSYCHIATRY | Facility: CLINIC | Age: 60
End: 2020-01-08
Payer: COMMERCIAL

## 2020-01-08 ENCOUNTER — APPOINTMENT (OUTPATIENT)
Dept: LAB | Facility: HOSPITAL | Age: 60
End: 2020-01-08
Attending: PSYCHIATRY & NEUROLOGY
Payer: COMMERCIAL

## 2020-01-08 ENCOUNTER — APPOINTMENT (OUTPATIENT)
Dept: LAB | Facility: HOSPITAL | Age: 60
End: 2020-01-08
Payer: COMMERCIAL

## 2020-01-08 VITALS — HEART RATE: 73 BPM | SYSTOLIC BLOOD PRESSURE: 108 MMHG | DIASTOLIC BLOOD PRESSURE: 69 MMHG

## 2020-01-08 DIAGNOSIS — F32.1 CURRENT MODERATE EPISODE OF MAJOR DEPRESSIVE DISORDER WITHOUT PRIOR EPISODE (HCC): ICD-10-CM

## 2020-01-08 DIAGNOSIS — E66.01 MORBID OBESITY (HCC): ICD-10-CM

## 2020-01-08 DIAGNOSIS — E87.6 HYPOKALEMIA: Chronic | ICD-10-CM

## 2020-01-08 DIAGNOSIS — R80.1 PERSISTENT PROTEINURIA: ICD-10-CM

## 2020-01-08 DIAGNOSIS — D64.9 ANEMIA, UNSPECIFIED TYPE: ICD-10-CM

## 2020-01-08 DIAGNOSIS — E55.9 VITAMIN D DEFICIENCY: ICD-10-CM

## 2020-01-08 DIAGNOSIS — Z11.59 NEED FOR HEPATITIS C SCREENING TEST: ICD-10-CM

## 2020-01-08 DIAGNOSIS — I10 ESSENTIAL HYPERTENSION: ICD-10-CM

## 2020-01-08 DIAGNOSIS — F41.8 DEPRESSION WITH ANXIETY: ICD-10-CM

## 2020-01-08 DIAGNOSIS — E78.5 HYPERLIPIDEMIA, UNSPECIFIED HYPERLIPIDEMIA TYPE: ICD-10-CM

## 2020-01-08 DIAGNOSIS — R73.03 PREDIABETES: ICD-10-CM

## 2020-01-08 LAB
25(OH)D3 SERPL-MCNC: 10.6 NG/ML (ref 30–100)
ALBUMIN SERPL BCP-MCNC: 4.4 G/DL (ref 3–5.2)
ALP SERPL-CCNC: 70 U/L (ref 43–122)
ALT SERPL W P-5'-P-CCNC: 22 U/L (ref 9–52)
ANION GAP SERPL CALCULATED.3IONS-SCNC: 9 MMOL/L (ref 5–14)
AST SERPL W P-5'-P-CCNC: 16 U/L (ref 17–59)
BACTERIA UR QL AUTO: ABNORMAL /HPF
BASOPHILS # BLD AUTO: 0.1 THOUSANDS/ΜL (ref 0–0.1)
BASOPHILS NFR BLD AUTO: 1 % (ref 0–1)
BILIRUB SERPL-MCNC: 0.4 MG/DL
BILIRUB UR QL STRIP: NEGATIVE
BUN SERPL-MCNC: 23 MG/DL (ref 5–25)
CALCIUM SERPL-MCNC: 9.8 MG/DL (ref 8.4–10.2)
CHLORIDE SERPL-SCNC: 100 MMOL/L (ref 97–108)
CHOLEST SERPL-MCNC: 175 MG/DL
CLARITY UR: CLEAR
CO2 SERPL-SCNC: 27 MMOL/L (ref 22–30)
COLOR UR: YELLOW
CREAT SERPL-MCNC: 1.44 MG/DL (ref 0.7–1.5)
CREAT UR-MCNC: 131 MG/DL
EOSINOPHIL # BLD AUTO: 0.5 THOUSAND/ΜL (ref 0–0.4)
EOSINOPHIL NFR BLD AUTO: 7 % (ref 0–6)
ERYTHROCYTE [DISTWIDTH] IN BLOOD BY AUTOMATED COUNT: 13.8 %
EST. AVERAGE GLUCOSE BLD GHB EST-MCNC: 126 MG/DL
GFR SERPL CREATININE-BSD FRML MDRD: 52 ML/MIN/1.73SQ M
GLUCOSE SERPL-MCNC: 190 MG/DL (ref 70–99)
GLUCOSE UR STRIP-MCNC: NEGATIVE MG/DL
HBA1C MFR BLD: 6 % (ref 4.2–6.3)
HCT VFR BLD AUTO: 40.9 % (ref 41–53)
HCV AB SER QL: NORMAL
HDLC SERPL-MCNC: 30 MG/DL
HGB BLD-MCNC: 14.2 G/DL (ref 13.5–17.5)
HGB UR QL STRIP.AUTO: NEGATIVE
KETONES UR STRIP-MCNC: NEGATIVE MG/DL
LDLC SERPL CALC-MCNC: 108 MG/DL
LEUKOCYTE ESTERASE UR QL STRIP: NEGATIVE
LYMPHOCYTES # BLD AUTO: 1.3 THOUSANDS/ΜL (ref 0.5–4)
LYMPHOCYTES NFR BLD AUTO: 18 % (ref 25–45)
MCH RBC QN AUTO: 29.2 PG (ref 26–34)
MCHC RBC AUTO-ENTMCNC: 34.8 G/DL (ref 31–36)
MCV RBC AUTO: 84 FL (ref 80–100)
MONOCYTES # BLD AUTO: 0.5 THOUSAND/ΜL (ref 0.2–0.9)
MONOCYTES NFR BLD AUTO: 7 % (ref 1–10)
NEUTROPHILS # BLD AUTO: 4.8 THOUSANDS/ΜL (ref 1.8–7.8)
NEUTS SEG NFR BLD AUTO: 66 % (ref 45–65)
NITRITE UR QL STRIP: NEGATIVE
NON-SQ EPI CELLS URNS QL MICRO: ABNORMAL /HPF
PH UR STRIP.AUTO: 6 [PH]
PLATELET # BLD AUTO: 214 THOUSANDS/UL (ref 150–450)
PMV BLD AUTO: 7.8 FL (ref 8.9–12.7)
POTASSIUM SERPL-SCNC: 4 MMOL/L (ref 3.6–5)
PROT SERPL-MCNC: 8.1 G/DL (ref 5.9–8.4)
PROT UR STRIP-MCNC: NEGATIVE MG/DL
PROT UR-MCNC: 10 MG/DL
PROT/CREAT UR: 0.08 MG/G{CREAT} (ref 0–0.1)
RBC # BLD AUTO: 4.87 MILLION/UL (ref 4.5–5.9)
RBC #/AREA URNS AUTO: ABNORMAL /HPF
SODIUM SERPL-SCNC: 136 MMOL/L (ref 137–147)
SP GR UR STRIP.AUTO: 1.01 (ref 1–1.04)
TRIGL SERPL-MCNC: 183 MG/DL
TSH SERPL DL<=0.05 MIU/L-ACNC: 1.94 UIU/ML (ref 0.47–4.68)
UROBILINOGEN UA: NEGATIVE MG/DL
WBC # BLD AUTO: 7.2 THOUSAND/UL (ref 4.5–11)
WBC #/AREA URNS AUTO: ABNORMAL /HPF

## 2020-01-08 PROCEDURE — 85025 COMPLETE CBC W/AUTO DIFF WBC: CPT

## 2020-01-08 PROCEDURE — 84156 ASSAY OF PROTEIN URINE: CPT

## 2020-01-08 PROCEDURE — 83036 HEMOGLOBIN GLYCOSYLATED A1C: CPT

## 2020-01-08 PROCEDURE — 99214 OFFICE O/P EST MOD 30 MIN: CPT | Performed by: PSYCHIATRY & NEUROLOGY

## 2020-01-08 PROCEDURE — 80061 LIPID PANEL: CPT

## 2020-01-08 PROCEDURE — 82570 ASSAY OF URINE CREATININE: CPT

## 2020-01-08 PROCEDURE — 86803 HEPATITIS C AB TEST: CPT

## 2020-01-08 PROCEDURE — 84443 ASSAY THYROID STIM HORMONE: CPT

## 2020-01-08 PROCEDURE — 80053 COMPREHEN METABOLIC PANEL: CPT

## 2020-01-08 PROCEDURE — 82306 VITAMIN D 25 HYDROXY: CPT

## 2020-01-08 PROCEDURE — 81001 URINALYSIS AUTO W/SCOPE: CPT

## 2020-01-08 PROCEDURE — 36415 COLL VENOUS BLD VENIPUNCTURE: CPT

## 2020-01-08 RX ORDER — TRAZODONE HYDROCHLORIDE 100 MG/1
100 TABLET ORAL
Qty: 30 TABLET | Refills: 1 | Status: SHIPPED | OUTPATIENT
Start: 2020-01-08 | End: 2020-02-18 | Stop reason: SDUPTHER

## 2020-01-08 NOTE — PSYCH
MEDICATION MANAGEMENT NOTE        [facility]      Name and Date of Birth:  Graeme Ware  60 y o  1960 MRN: [id number]    Date of Visit: January 8, 2020    Reason for Visit:  Follow-up for medication management    Subjective: The patient reports he is still feels the same  He reports increase in Cymbalta does help him to cope with the symptoms better but there are days when he can get terribly depressed  He reports specially the holidays was tough for him as it was 1st time without his son  He also reports his son birthday is coming mid of this month and he is anxious  Patient denies any suicidal thoughts but reports feeling hopeless at times  He reports his sleep slightly has been better on trazodone but still at times he would wake up in the middle of the night and will have difficulty falling back to sleep  He reports his appetite has been okay  Feels exhausted most of the time  He also reports he can get anxious specially when he has to get out of his home  Denies though any panic attack  Denies any other concern today  Reports frustration with his wife who stays with her but does not take care of the home  The patient has finally decided to see a counselor and will follow with therapist in his primary care physician office  He has next appointment on 20th of this month  He reports compliant with the medication and denied any side effects  Patient got the blood work done just today before he came to my appointment  Result is still not available  Review Of Systems:      Constitutional negative   ENT negative   Cardiovascular negative   Respiratory negative   Gastrointestinal negative   Genitourinary negative   Musculoskeletal knee pain   Integumentary negative   Neurological negative   Endocrine negative   Other Symptoms none       Alcohol/Substance Abuse:  Denies          Past Medical History:    Past Medical History:   Diagnosis Date  Anxiety     Back pain     Chronic pain     Diabetes mellitus ([place] [address] )     6/19/15 A1C: 5 5%    Hyperlipidemia     Hypertension     Knee pain     Psychiatric disorder     Type 2 diabetes mellitus without complication ([place] [address] ) 6/37/1140    Urinary retention      Past Medical History Pertinent Negatives:   Diagnosis Date Noted    Heart failure ([place] [address] ) 03/17/2016     Past Surgical History:   Procedure Laterality Date    BACK SURGERY      LUMBAR FUSION  2002    LUMBAR FUSION  2004    L4-5-6, S1 has pump for narcotics     Allergies   Allergen Reactions    Gabapentin      Annotation - [id number]: dizziness    Pregabalin      Annotation - [id number]: vision changes and mood changes    Tramadol Nausea Only       Current Medications:       Current Outpatient Medications:     amLODIPine (NORVASC) 10 mg tablet, take 1 tablet by mouth once daily, Disp: 30 tablet, Rfl: 3    atorvastatin (LIPITOR) 20 mg tablet, take 1 tablet by mouth daily, Disp: 30 tablet, Rfl: 1    chlorthalidone 25 mg tablet, Take 0 5 tablets (12 5 mg total) by mouth daily, Disp: 45 tablet, Rfl: 3    cloNIDine (CATAPRES) 0 2 mg tablet, Take 0 1 mg by mouth daily , Disp: , Rfl:     doxazosin (CARDURA) 2 mg tablet, take 2 tablets by mouth at bedtime, Disp: 60 tablet, Rfl: 2    DULoxetine (CYMBALTA) 20 mg capsule, take 1 capsule by mouth once daily IN ADDITION TO 60MG CAP, Disp: 30 capsule, Rfl: 0    DULoxetine (CYMBALTA) 60 mg delayed release capsule, Take 1 capsule (60 mg total) by mouth daily, Disp: 30 capsule, Rfl: 5    ketoconazole (NIZORAL) 2 % cream, 1 application daily Apply to affected area, Disp: , Rfl: 0    levocetirizine (XYZAL) 5 MG tablet, Take 1 tablet (5 mg total) by mouth daily as needed for allergies, Disp: 30 tablet, Rfl: 3    losartan (COZAAR) 100 MG tablet, Take 1 tablet (100 mg total) by mouth daily, Disp: 90 tablet, Rfl: 3    metoprolol tartrate (LOPRESSOR) 100 mg tablet, take 1 tablet by mouth twice a day, Disp: 180 tablet, Rfl: 3    morphine (MS CONTIN) 15 mg 12 hr tablet, take 1 tablet every 8 hours if needed pain, Disp: , Rfl: 0    oxyCODONE (ROXICODONE) 30 MG immediate release tablet, take 1 tablet by mouth every 5 hours if needed for pain   Surinder Given MAX 4/DAY, Disp: , Rfl: 0    spironolactone (ALDACTONE) 25 mg tablet, take 1 tablet by mouth once daily, Disp: 30 tablet, Rfl: 5    tadalafil (CIALIS) 5 MG tablet, Take 5 mg by mouth daily as needed for erectile dysfunction (unsure dosage) Indications: Erectile Dysfunction  , Disp: , Rfl:     traZODone (DESYREL) 100 mg tablet, Take 1 tablet (100 mg total) by mouth daily at bedtime, Disp: 30 tablet, Rfl: 1    metFORMIN (GLUCOPHAGE) 500 mg tablet, Take 1 tablet (500 mg total) by mouth daily with dinner, Disp: 30 tablet, Rfl: 5       History Review:  The following portions of the patient's history were reviewed and updated as appropriate: allergies, current medications, past family history, past medical history, past social history, past surgical history and problem list          OBJECTIVE:     Vital signs in last 24 hours:    Vitals:    01/08/20 1513   BP: 108/69   Pulse: 73       Mental Status Evaluation:    Appearance age appropriate, casually dressed, overweight   Behavior cooperative, calm   Speech normal rate, normal volume, normal pitch   Mood depressed   Affect constricted   Thought Processes organized, goal directed   Associations intact associations   Thought Content no overt delusions   Perceptual Disturbances: no auditory hallucinations, no visual hallucinations   Abnormal Thoughts  Risk Potential Suicidal ideation - None  Homicidal ideation - None  Potential for aggression - No   Orientation oriented to person, place, time/date and situation   Memory recent and remote memory grossly intact   Consciousness alert and awake   Attention Span Concentration Span attention span and concentration are age appropriate   Intellect appears to be of average intelligence   Insight intact   Judgement intact   Muscle Strength and  Gait normal gait and normal balance   Motor activity no abnormal movements   Language no difficulty naming common objects   Fund of Knowledge adequate knowledge of current events  adequate fund of knowledge regarding past history  adequate fund of knowledge regarding vocabulary    Pain moderate   Pain Scale 7       Laboratory Results:   I have personally reviewed all pertinent laboratory/tests results  Most Recent Labs:   Lab Results   Component Value Date    WBC 8 7 09/24/2019    RBC 4 58 09/24/2019    HGB 13 0 (L) 09/24/2019    HCT 39 1 09/24/2019     09/24/2019    RDW 13 6 09/24/2019    NEUTROABS 5,594 09/10/2019     09/28/2017     09/28/2017    K 4 3 09/24/2019     09/24/2019    CO2 30 09/24/2019    BUN 22 09/24/2019    CREATININE 1 33 (H) 09/24/2019    GLUCOSE 148 (H) 10/14/2016    CALCIUM 9 4 09/24/2019    AST 12 09/10/2019    ALT 12 09/10/2019    ALKPHOS 105 09/10/2019    PROT 8 3 (H) 06/19/2015    BILITOT 0 5 06/19/2015    CHOL 109 10/14/2016    HDL 37 (L) 09/10/2019    TRIG 142 09/10/2019    LDLCALC 50 07/28/2017    HJY0HGSBXQIE 2 190 09/06/2017       Assessment/Plan:  Depression and anxiety mildly better but still struggles with depression some days specially during holiday season recently  Sleep also slightly better  No SI or HI  Plan:  I will increase the dose of trazodone to 100 mg 1 tablet at bedtime for poor sleep  I will wait for the result of his blood work and if his kidney function is not bad then will consider increasing the dose of Cymbalta to 120 mg once a day for depression and anxiety  The patient was educated in detail about the plan, he verbalized understanding and agrees with it  He specially was advised about risk of serotonin syndrome and if he notices any symptoms for it which was educated to him to call us or visit nearby ER    He also was advised to call us if there is any concern and to call 911 or visit nearby ER if he has any thoughts to hurt himself, others or any emergency  The patient agreed  Diagnoses and all orders for this visit:    Current moderate episode of major depressive disorder without prior episode ([facility] [address] )  -     traZODone (DESYREL) 100 mg tablet; Take 1 tablet (100 mg total) by mouth daily at bedtime          Treatment Recommendations/Precautions:        Aware of 24 hour and weekend coverage for urgent situations accessed by calling [facility] main practice number    Risks/Benefits      Risks, Benefits And Possible Side Effects Of Medications:    Risks, benefits, and possible side effects of medications explained to [facility] and he verbalizes understanding and agreement for treatment      Controlled Medication Discussion:     [facility] has been filling controlled prescriptions on time as prescribed according to [facility] Monitoring Program    Psychotherapy Provided:     Individual psychotherapy provided: No     Treatment Plan;    Completed and signed during the session: Not applicable - Treatment Plan not due at this session    Monica Garcia MD 01/08/20

## 2020-01-09 DIAGNOSIS — E55.9 VITAMIN D DEFICIENCY: Primary | ICD-10-CM

## 2020-01-09 DIAGNOSIS — F41.8 DEPRESSION WITH ANXIETY: ICD-10-CM

## 2020-01-09 RX ORDER — DULOXETIN HYDROCHLORIDE 60 MG/1
120 CAPSULE, DELAYED RELEASE ORAL DAILY
Qty: 60 CAPSULE | Refills: 1 | Status: SHIPPED | OUTPATIENT
Start: 2020-01-09 | End: 2020-02-18 | Stop reason: SDUPTHER

## 2020-01-09 RX ORDER — ERGOCALCIFEROL (VITAMIN D2) 1250 MCG
50000 CAPSULE ORAL WEEKLY
Qty: 12 CAPSULE | Refills: 0 | Status: SHIPPED | OUTPATIENT
Start: 2020-01-09 | End: 2020-11-23

## 2020-01-09 NOTE — PROGRESS NOTES
Patient kidney function result still shows elevated creatinine and decrease GFR at 52  But given gfr above 30, I will increase the dose of duloxetine as discussed with patient yesterday to 120 mg po daily  His sodium is slightly low at 136  Will continue to monitor and check again at next visit

## 2020-01-10 ENCOUNTER — TELEPHONE (OUTPATIENT)
Dept: NEPHROLOGY | Facility: CLINIC | Age: 60
End: 2020-01-10

## 2020-01-10 NOTE — TELEPHONE ENCOUNTER
----- Message from [address] [place] [address] sent at 1/9/2020  4:31 PM EST -----  Please call Zayra Baca and tell him that overall labs are stable  His vitamin-D level is low and I would like him to start a weekly dose of ergo calciferol 38767 units times 12 weeks  He should follow-up with Dr Davis Choudhury next month for his 3 month follow-up    I placed the order

## 2020-01-14 NOTE — TELEPHONE ENCOUNTER
Pt called back and is aware to start Vitamin D  He has f/u visit scheduled with Dr Ana Palencia in February

## 2020-01-20 ENCOUNTER — OFFICE VISIT (OUTPATIENT)
Dept: FAMILY MEDICINE CLINIC | Facility: CLINIC | Age: 60
End: 2020-01-20
Payer: COMMERCIAL

## 2020-01-20 VITALS
OXYGEN SATURATION: 97 % | SYSTOLIC BLOOD PRESSURE: 126 MMHG | HEIGHT: 69 IN | DIASTOLIC BLOOD PRESSURE: 82 MMHG | HEART RATE: 92 BPM | WEIGHT: 315 LBS | RESPIRATION RATE: 18 BRPM | BODY MASS INDEX: 46.65 KG/M2

## 2020-01-20 DIAGNOSIS — F41.8 DEPRESSION WITH ANXIETY: ICD-10-CM

## 2020-01-20 DIAGNOSIS — R79.89 ABNORMAL CBC: ICD-10-CM

## 2020-01-20 DIAGNOSIS — E66.01 MORBID OBESITY (HCC): ICD-10-CM

## 2020-01-20 DIAGNOSIS — R73.03 PREDIABETES: ICD-10-CM

## 2020-01-20 DIAGNOSIS — I10 ESSENTIAL HYPERTENSION: ICD-10-CM

## 2020-01-20 DIAGNOSIS — E78.5 HYPERLIPIDEMIA, UNSPECIFIED HYPERLIPIDEMIA TYPE: Primary | ICD-10-CM

## 2020-01-20 PROBLEM — E11.51 TYPE 2 DIABETES MELLITUS WITH PERIPHERAL ANGIOPATHY (HCC): Status: RESOLVED | Noted: 2019-10-28 | Resolved: 2020-01-20

## 2020-01-20 PROCEDURE — 99214 OFFICE O/P EST MOD 30 MIN: CPT | Performed by: NURSE PRACTITIONER

## 2020-01-20 RX ORDER — ATORVASTATIN CALCIUM 20 MG/1
20 TABLET, FILM COATED ORAL DAILY
Qty: 30 TABLET | Refills: 2 | Status: SHIPPED | OUTPATIENT
Start: 2020-01-20 | End: 2020-03-30 | Stop reason: SDUPTHER

## 2020-01-21 NOTE — PROGRESS NOTES
Assessment/Plan:      Diagnoses and all orders for this visit:    Hyperlipidemia, unspecified hyperlipidemia type  -     atorvastatin (LIPITOR) 20 mg tablet; Take 1 tablet (20 mg total) by mouth daily  -     Lipid Panel with Direct LDL reflex; Future    Total cholesterol 175, triglycerides 183, and   Patient reports that he has not been watching his diet recently  Continue atorvastatin  Patient encouraged to eat a healthy low fat/low carb diet and to exercise on a regular basis  Repeat lipid panel ordered  Prediabetes  -     Lipid Panel with Direct LDL reflex; Future    Hemoglobin A1C 6 0  Continue metformin  Continue low carb diet  Morbid obesity ([address] )  Patient encouraged to continue to exercise on a regular basis and to eat a low fat diet  Essential hypertension  Continue to follow-up with nephrology  Depression with anxiety  Denies any suicidal thoughts  Patient reports that his symptoms have improved  Continue to follow-up with the psychiatrist and our behavioral health  for counseling  Abnormal CBC  -     CBC and differential; Future  Will follow-up results with the patient  Patient instructed to follow-up in 3 months or sooner prn  Subjective:     Patient ID: Alison Lazcano  is a 60 y o  male  Patient is here for a follow-up for chronic medical conditions  Patient follows up with a psychiatrist for depression and anxiety  Denies any suicidal or homicidal thoughts  Patient reports that his symptoms have improved with his medications  Patient reports that he would like to see the  here for counseling  Patient reports that he needs to make a follow-up appointment  Patient follows up with nephrology for HTN  Denies any chest pain, SOB, dizziness, or Has  Patient reports that he takes his medications as prescribed  Patient is here for a follow-up for prediabetes  Patient reports that he takes metformin daily   Denies any dizziness, Has, nausea, or vomiting  Patient reports that he has not been watching his diet as well recently  Patient is here for a follow-up for hyperlipidemia and obesity  Patient reports that he has not been exercising on a regular basis  Patient reports that he has not been watching his diet as well recently  Patient reports that he got lab work done  Patient reports that he follows up with pain management for chronic low back pain  Review of Systems   Constitutional: Negative for appetite change, chills and fever  HENT: Negative for congestion, ear pain and sore throat  Respiratory: Negative for cough, chest tightness, shortness of breath and wheezing  Cardiovascular: Negative for chest pain and palpitations  Gastrointestinal: Negative for abdominal pain, blood in stool, diarrhea, nausea and vomiting  Genitourinary: Negative for dysuria and hematuria  Musculoskeletal:        As noted in HPI  Skin: Negative for rash  Neurological: Negative for dizziness, seizures, syncope, light-headedness and headaches  Psychiatric/Behavioral: Negative for suicidal ideas  As noted in HPI  Objective:     Physical Exam   Constitutional: He is oriented to person, place, and time  No distress  obese   HENT:   Right Ear: External ear normal    Left Ear: External ear normal    Mouth/Throat: Oropharynx is clear and moist    Eyes: Conjunctivae are normal    Cardiovascular: Normal rate, regular rhythm and normal heart sounds  Radial pulses +2 bilaterally  Pulmonary/Chest: Effort normal and breath sounds normal    Musculoskeletal:   Gait wnl  Neurological: He is alert and oriented to person, place, and time  Skin: No rash noted  Psychiatric: He has a normal mood and affect  Vitals reviewed

## 2020-01-24 ENCOUNTER — OFFICE VISIT (OUTPATIENT)
Dept: OBGYN CLINIC | Facility: CLINIC | Age: 60
End: 2020-01-24
Payer: COMMERCIAL

## 2020-01-24 VITALS — HEIGHT: 69 IN | BODY MASS INDEX: 46.65 KG/M2 | WEIGHT: 315 LBS

## 2020-01-24 DIAGNOSIS — M17.0 BILATERAL PRIMARY OSTEOARTHRITIS OF KNEE: Primary | ICD-10-CM

## 2020-01-24 PROCEDURE — 20610 DRAIN/INJ JOINT/BURSA W/O US: CPT | Performed by: ORTHOPAEDIC SURGERY

## 2020-01-24 PROCEDURE — 99212 OFFICE O/P EST SF 10 MIN: CPT | Performed by: ORTHOPAEDIC SURGERY

## 2020-01-24 RX ORDER — BETAMETHASONE SODIUM PHOSPHATE AND BETAMETHASONE ACETATE 3; 3 MG/ML; MG/ML
6 INJECTION, SUSPENSION INTRA-ARTICULAR; INTRALESIONAL; INTRAMUSCULAR; SOFT TISSUE
Status: COMPLETED | OUTPATIENT
Start: 2020-01-24 | End: 2020-01-24

## 2020-01-24 RX ORDER — LIDOCAINE HYDROCHLORIDE 10 MG/ML
1 INJECTION, SOLUTION INFILTRATION; PERINEURAL
Status: COMPLETED | OUTPATIENT
Start: 2020-01-24 | End: 2020-01-24

## 2020-01-24 RX ADMIN — BETAMETHASONE SODIUM PHOSPHATE AND BETAMETHASONE ACETATE 6 MG: 3; 3 INJECTION, SUSPENSION INTRA-ARTICULAR; INTRALESIONAL; INTRAMUSCULAR; SOFT TISSUE at 14:24

## 2020-01-24 RX ADMIN — LIDOCAINE HYDROCHLORIDE 1 ML: 10 INJECTION, SOLUTION INFILTRATION; PERINEURAL at 14:24

## 2020-01-24 NOTE — PROGRESS NOTES
Assessment:  1  Bilateral primary osteoarthritis of knee       Patient Active Problem List   Diagnosis    Hypokalemia    Allergic rhinitis    Chronic back pain    Dental cavity    Depression with anxiety    Erectile dysfunction    Hyperlipidemia    Essential hypertension    Localized osteoarthritis of left knee    Lumbar radiculopathy    Microalbuminuria    Morbid obesity (HCC)    IMELDA on CPAP    Patellofemoral arthritis of left knee    Rosacea    Vitamin D deficiency    Pain in finger of both hands    Trigger finger, left middle finger    Persistent proteinuria    Screening for prostate cancer    Prediabetes    Screening for colon cancer    It band syndrome, right    Bilateral primary osteoarthritis of knee    Grief reaction    Stage 2 chronic kidney disease    Localized osteoarthritis of right knee    Need for hepatitis C screening test    Anemia    Abnormal CBC           Plan      Cortisone injections given into both knees and physical therapy ordered  Injections were given medially because that is where most of his pain is  I did explain that the lubricant and cortisone should get their even for given 3 lateral but I given the medial side to also give some local anesthetic injection into the soft tissue            Subjective:     Patient ID:    Chief Complaint:Nasir Tidwell  60 y o  male      HPI    Patient comes in today with regards to bilateral knee pain  He had lubricant injections done about 3 months ago  He is having pain on the medial side which is where his arthritis is        The following portions of the patient's history were reviewed and updated as appropriate: allergies, current medications, past family history, past social history, past surgical history and problem list     All organ systems normal    Social History     Socioeconomic History    Marital status:      Spouse name: Not on file    Number of children: Not on file    Years of education: Not on file    Highest education level: Not on file   Occupational History    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[place], injured back     J Carlos Muller strain: Not on file    Food insecurity:     Worry: Not on file     Inability: Not on file   [place] needs:     Medical: Not on file     Non-medical: Not on file   Tobacco Use    Smoking status: Current Every Day Smoker     Types: Cigars    Smokeless tobacco: Never Used    Tobacco comment: 1 cigar daily,  50 pack years, 1/2 pack at age 12, 3 ppd x 20 yrs, quit 2004   Substance and Sexual Activity    Alcohol use: Yes     Comment: occasional    Drug use: No     Comment: History of drug use, meena, cocaine, heroind, no IVDA-as per [employer]    Sexual activity: Yes     Partners: Female     Birth control/protection: Condom Male   Lifestyle    Physical activity:     Days per week: Not on file     Minutes per session: Not on file    Stress: Not on file   Relationships    Social connections:     Talks on phone: Not on file     Gets together: Not on file     Attends Mosque service: Not on file     Active member of club or organization: Not on file     Attends meetings of clubs or organizations: Not on file     Relationship status: Not on file    Intimate partner violence:     Fear of current or ex partner: Not on file     Emotionally abused: Not on file     Physically abused: Not on file     Forced sexual activity: Not on file   Other Topics Concern    Not on file   Social History Narrative    Patient lives with his ex wife and son        Past Medical History:   Diagnosis Date    Anxiety     Back pain     Chronic pain     Diabetes mellitus ([facility] )     6/19/15 A1C: 5 5%    Hyperlipidemia     Hypertension     Knee pain     Psychiatric disorder     Type 2 diabetes mellitus without complication ([facility] ) 1/59/4896    Urinary retention      Past Surgical History:   Procedure Laterality Date    BACK SURGERY      LUMBAR FUSION 2002    LUMBAR FUSION  2004    L4-5-6, S1 has pump for narcotics     Allergies   Allergen Reactions    Gabapentin      Annotation - [id number]: dizziness    Pregabalin      Annotation - [id number]: vision changes and mood changes    Tramadol Nausea Only     Current Outpatient Medications on File Prior to Visit   Medication Sig Dispense Refill    amLODIPine (NORVASC) 10 mg tablet take 1 tablet by mouth once daily 30 tablet 3    atorvastatin (LIPITOR) 20 mg tablet Take 1 tablet (20 mg total) by mouth daily 30 tablet 2    chlorthalidone 25 mg tablet Take 0 5 tablets (12 5 mg total) by mouth daily 45 tablet 3    cloNIDine (CATAPRES) 0 2 mg tablet Take 0 1 mg by mouth daily       doxazosin (CARDURA) 2 mg tablet take 2 tablets by mouth at bedtime 60 tablet 2    DULoxetine (CYMBALTA) 60 mg delayed release capsule Take 2 capsules (120 mg total) by mouth daily 60 capsule 1    ergocalciferol (ERGOCALCIFEROL) 1 25 MG (81312 UT) capsule Take 1 capsule (50,000 Units total) by mouth once a week 12 capsule 0    ketoconazole (NIZORAL) 2 % cream 1 application daily Apply to affected area  0    levocetirizine (XYZAL) 5 MG tablet Take 1 tablet (5 mg total) by mouth daily as needed for allergies 30 tablet 3    losartan (COZAAR) 100 MG tablet Take 1 tablet (100 mg total) by mouth daily 90 tablet 3    metFORMIN (GLUCOPHAGE) 500 mg tablet Take 1 tablet (500 mg total) by mouth daily with dinner 30 tablet 5    metoprolol tartrate (LOPRESSOR) 100 mg tablet take 1 tablet by mouth twice a day 180 tablet 3    morphine (MS CONTIN) 15 mg 12 hr tablet take 1 tablet every 8 hours if needed pain  0    oxyCODONE (ROXICODONE) 30 MG immediate release tablet take 1 tablet by mouth every 5 hours if needed for pain   Unk Nikunj MAX 4/DAY  0    spironolactone (ALDACTONE) 25 mg tablet take 1 tablet by mouth once daily 30 tablet 5    tadalafil (CIALIS) 5 MG tablet Take 5 mg by mouth daily as needed for erectile dysfunction (unsure dosage) Indications: Erectile Dysfunction   traZODone (DESYREL) 100 mg tablet Take 1 tablet (100 mg total) by mouth daily at bedtime 30 tablet 1     No current facility-administered medications on file prior to visit  Objective:    Large joint arthrocentesis: R knee  Date/Time: 1/24/2020 2:24 PM  Consent given by: patient  Supporting Documentation  Indications: pain   Procedure Details  Location: knee - R knee  Needle size: 22 G  Ultrasound guidance: no  Approach: anteromedial  Medications administered: 1 mL lidocaine 1 %; 6 mg betamethasone acetate-betamethasone sodium phosphate 6 (3-3) mg/mL      Large joint arthrocentesis: L knee  Date/Time: 1/24/2020 2:24 PM  Consent given by: patient  Timeout: Immediately prior to procedure a time out was called to verify the correct patient, procedure, equipment, support staff and site/side marked as required   Supporting Documentation  Indications: pain   Procedure Details  Location: knee - L knee  Needle size: 22 G  Ultrasound guidance: no  Approach: anteromedial  Medications administered: 1 mL lidocaine 1 %; 6 mg betamethasone acetate-betamethasone sodium phosphate 6 (3-3) mg/mL              Ortho Exam    Pinpoint tender on anterior medial joint line  Crepitus minimal   No effusion no ecchymosis  I have personally reviewed pertinent films in PACS and my interpretation is Medial joint arthritis  Portions of the record may have been created with voice recognition software   Occasional wrong word or "sound a like" substitutions may have occurred due to the inherent limitations of voice recognition software   Read the chart carefully and recognize, using context, where substitutions have occurred

## 2020-02-04 DIAGNOSIS — I10 ESSENTIAL HYPERTENSION: ICD-10-CM

## 2020-02-04 RX ORDER — DOXAZOSIN 2 MG/1
TABLET ORAL
Qty: 90 TABLET | Refills: 3 | Status: SHIPPED | OUTPATIENT
Start: 2020-02-04 | End: 2020-03-13 | Stop reason: SDUPTHER

## 2020-02-15 ENCOUNTER — TELEPHONE (OUTPATIENT)
Dept: GASTROENTEROLOGY | Facility: AMBULARY SURGERY CENTER | Age: 60
End: 2020-02-15

## 2020-02-18 ENCOUNTER — OFFICE VISIT (OUTPATIENT)
Dept: PSYCHIATRY | Facility: CLINIC | Age: 60
End: 2020-02-18
Payer: COMMERCIAL

## 2020-02-18 ENCOUNTER — APPOINTMENT (OUTPATIENT)
Dept: LAB | Facility: HOSPITAL | Age: 60
End: 2020-02-18
Payer: COMMERCIAL

## 2020-02-18 ENCOUNTER — LAB (OUTPATIENT)
Dept: LAB | Facility: HOSPITAL | Age: 60
End: 2020-02-18
Attending: INTERNAL MEDICINE
Payer: COMMERCIAL

## 2020-02-18 VITALS — SYSTOLIC BLOOD PRESSURE: 119 MMHG | HEART RATE: 68 BPM | DIASTOLIC BLOOD PRESSURE: 73 MMHG

## 2020-02-18 DIAGNOSIS — N18.30 CKD (CHRONIC KIDNEY DISEASE), STAGE III (HCC): ICD-10-CM

## 2020-02-18 DIAGNOSIS — F41.8 DEPRESSION WITH ANXIETY: ICD-10-CM

## 2020-02-18 DIAGNOSIS — F32.1 CURRENT MODERATE EPISODE OF MAJOR DEPRESSIVE DISORDER WITHOUT PRIOR EPISODE (HCC): ICD-10-CM

## 2020-02-18 DIAGNOSIS — R79.89 ABNORMAL CBC: ICD-10-CM

## 2020-02-18 DIAGNOSIS — I10 ESSENTIAL HYPERTENSION: ICD-10-CM

## 2020-02-18 DIAGNOSIS — N28.1 RENAL CYST: ICD-10-CM

## 2020-02-18 LAB
BACTERIA UR QL AUTO: ABNORMAL /HPF
BASOPHILS # BLD AUTO: 0 THOUSANDS/ΜL (ref 0–0.1)
BASOPHILS NFR BLD AUTO: 1 % (ref 0–1)
BILIRUB UR QL STRIP: NEGATIVE
CLARITY UR: CLEAR
COLOR UR: ABNORMAL
CREAT UR-MCNC: 310 MG/DL
EOSINOPHIL # BLD AUTO: 0.5 THOUSAND/ΜL (ref 0–0.4)
EOSINOPHIL NFR BLD AUTO: 7 % (ref 0–6)
ERYTHROCYTE [DISTWIDTH] IN BLOOD BY AUTOMATED COUNT: 14.2 %
GLUCOSE UR STRIP-MCNC: NEGATIVE MG/DL
HCT VFR BLD AUTO: 41.3 % (ref 41–53)
HGB BLD-MCNC: 14.3 G/DL (ref 13.5–17.5)
HGB UR QL STRIP.AUTO: NEGATIVE
KETONES UR STRIP-MCNC: ABNORMAL MG/DL
LEUKOCYTE ESTERASE UR QL STRIP: NEGATIVE
LYMPHOCYTES # BLD AUTO: 1.6 THOUSANDS/ΜL (ref 0.5–4)
LYMPHOCYTES NFR BLD AUTO: 21 % (ref 25–45)
MAGNESIUM SERPL-MCNC: 2.1 MG/DL (ref 1.6–2.3)
MCH RBC QN AUTO: 29.3 PG (ref 26–34)
MCHC RBC AUTO-ENTMCNC: 34.7 G/DL (ref 31–36)
MCV RBC AUTO: 85 FL (ref 80–100)
MONOCYTES # BLD AUTO: 0.4 THOUSAND/ΜL (ref 0.2–0.9)
MONOCYTES NFR BLD AUTO: 6 % (ref 1–10)
MUCOUS THREADS UR QL AUTO: ABNORMAL
NEUTROPHILS # BLD AUTO: 5.2 THOUSANDS/ΜL (ref 1.8–7.8)
NEUTS SEG NFR BLD AUTO: 67 % (ref 45–65)
NITRITE UR QL STRIP: NEGATIVE
NON-SQ EPI CELLS URNS QL MICRO: ABNORMAL /HPF
PH UR STRIP.AUTO: 5 [PH]
PHOSPHATE SERPL-MCNC: 3.7 MG/DL (ref 2.5–4.8)
PLATELET # BLD AUTO: 205 THOUSANDS/UL (ref 150–450)
PMV BLD AUTO: 8.6 FL (ref 8.9–12.7)
PROT UR STRIP-MCNC: NEGATIVE MG/DL
PROT UR-MCNC: 19 MG/DL
PROT/CREAT UR: 0.06 MG/G{CREAT} (ref 0–0.1)
PTH-INTACT SERPL-MCNC: 56 PG/ML (ref 16.7–78.9)
RBC # BLD AUTO: 4.88 MILLION/UL (ref 4.5–5.9)
RBC #/AREA URNS AUTO: ABNORMAL /HPF
SP GR UR STRIP.AUTO: 1.02 (ref 1–1.04)
UROBILINOGEN UA: 1 MG/DL
WBC # BLD AUTO: 7.8 THOUSAND/UL (ref 4.5–11)
WBC #/AREA URNS AUTO: ABNORMAL /HPF

## 2020-02-18 PROCEDURE — 85025 COMPLETE CBC W/AUTO DIFF WBC: CPT

## 2020-02-18 PROCEDURE — 3066F NEPHROPATHY DOC TX: CPT | Performed by: PSYCHIATRY & NEUROLOGY

## 2020-02-18 PROCEDURE — 83970 ASSAY OF PARATHORMONE: CPT

## 2020-02-18 PROCEDURE — 4004F PT TOBACCO SCREEN RCVD TLK: CPT | Performed by: PSYCHIATRY & NEUROLOGY

## 2020-02-18 PROCEDURE — 36415 COLL VENOUS BLD VENIPUNCTURE: CPT

## 2020-02-18 PROCEDURE — 3074F SYST BP LT 130 MM HG: CPT | Performed by: PSYCHIATRY & NEUROLOGY

## 2020-02-18 PROCEDURE — 81001 URINALYSIS AUTO W/SCOPE: CPT

## 2020-02-18 PROCEDURE — 80048 BASIC METABOLIC PNL TOTAL CA: CPT

## 2020-02-18 PROCEDURE — 84156 ASSAY OF PROTEIN URINE: CPT

## 2020-02-18 PROCEDURE — 99214 OFFICE O/P EST MOD 30 MIN: CPT | Performed by: PSYCHIATRY & NEUROLOGY

## 2020-02-18 PROCEDURE — 84100 ASSAY OF PHOSPHORUS: CPT

## 2020-02-18 PROCEDURE — 3078F DIAST BP <80 MM HG: CPT | Performed by: PSYCHIATRY & NEUROLOGY

## 2020-02-18 PROCEDURE — 82570 ASSAY OF URINE CREATININE: CPT

## 2020-02-18 PROCEDURE — 3044F HG A1C LEVEL LT 7.0%: CPT | Performed by: PSYCHIATRY & NEUROLOGY

## 2020-02-18 PROCEDURE — 83735 ASSAY OF MAGNESIUM: CPT

## 2020-02-18 RX ORDER — TRAZODONE HYDROCHLORIDE 100 MG/1
100 TABLET ORAL
Qty: 30 TABLET | Refills: 1 | Status: SHIPPED | OUTPATIENT
Start: 2020-02-18 | End: 2020-04-17 | Stop reason: SDUPTHER

## 2020-02-18 RX ORDER — DULOXETIN HYDROCHLORIDE 60 MG/1
120 CAPSULE, DELAYED RELEASE ORAL DAILY
Qty: 60 CAPSULE | Refills: 1 | Status: SHIPPED | OUTPATIENT
Start: 2020-02-18 | End: 2020-04-17

## 2020-02-18 RX ORDER — BUSPIRONE HYDROCHLORIDE 5 MG/1
5 TABLET ORAL 2 TIMES DAILY
Qty: 60 TABLET | Refills: 0 | Status: SHIPPED | OUTPATIENT
Start: 2020-02-18 | End: 2020-03-20 | Stop reason: SDUPTHER

## 2020-02-18 NOTE — PSYCH
MEDICATION MANAGEMENT NOTE        [facility]      Name and Date of Birth:  Jenna Poole  60 y o  1960 MRN: [id number]    Date of Visit: February 18, 2020    Reason for Visit:  Follow-up for medication management  Subjective: The patient reports her depression has been better but he feels anxiety has been worsening over last to 3 weeks  He reports he has been having recurrent thoughts about his son dying in front of him and and it has been increasing his anxiety to the point that he will start feeling shaky at times  He also reports he is not doing much nowadays and mostly stays at home  He denies feeling sad or hopeless  Denies any suicidal thoughts  Reports his appetite has been good  He reports sleep has been mostly good  Denies any nightmares  Reports energy level has been better  Reports compliant with the medication and denied any side effect  He still talks about his ex-wife who is major stressor but reports he is coping with it well  Denies any any other concern nowadays  Review Of Systems:      Constitutional negative   ENT negative   Cardiovascular negative   Respiratory negative   Gastrointestinal negative   Genitourinary negative   Musculoskeletal knee pain   Integumentary negative   Neurological negative   Endocrine negative   Other Symptoms none       Alcohol/Substance Abuse:  Denies  The patient reports taking his narcotic pain medication as prescribed          Past Medical History:    Past Medical History:   Diagnosis Date    Anxiety     Back pain     Chronic pain     Diabetes mellitus [facility]     6/19/15 A1C: 5 5%    Hyperlipidemia     Hypertension     Knee pain     Psychiatric disorder     Type 2 diabetes mellitus without complication ([facility] ) 8/29/3159    Urinary retention      Past Medical History Pertinent Negatives:   Diagnosis Date Noted    Heart failure ([facility] ) 03/17/2016     Past Surgical History:   Procedure Laterality Date    BACK SURGERY      LUMBAR FUSION  2002    LUMBAR FUSION  2004    L4-5-6, S1 has pump for narcotics     Allergies   Allergen Reactions    Gabapentin      Annotation - [id number]: dizziness    Pregabalin      Annotation - [id number]: vision changes and mood changes    Tramadol Nausea Only       Current Medications:       Current Outpatient Medications:     amLODIPine (NORVASC) 10 mg tablet, take 1 tablet by mouth once daily, Disp: 30 tablet, Rfl: 3    atorvastatin (LIPITOR) 20 mg tablet, Take 1 tablet (20 mg total) by mouth daily, Disp: 30 tablet, Rfl: 2    busPIRone (BUSPAR) 5 mg tablet, Take 1 tablet (5 mg total) by mouth 2 (two) times a day, Disp: 60 tablet, Rfl: 0    chlorthalidone 25 mg tablet, Take 0 5 tablets (12 5 mg total) by mouth daily, Disp: 45 tablet, Rfl: 3    cloNIDine (CATAPRES) 0 2 mg tablet, Take 0 1 mg by mouth daily , Disp: , Rfl:     doxazosin (CARDURA) 2 mg tablet, take 2 tablets by mouth at bedtime, Disp: 90 tablet, Rfl: 3    DULoxetine (CYMBALTA) 60 mg delayed release capsule, Take 2 capsules (120 mg total) by mouth daily, Disp: 60 capsule, Rfl: 1    ergocalciferol (ERGOCALCIFEROL) 1 25 MG (88249 UT) capsule, Take 1 capsule (50,000 Units total) by mouth once a week, Disp: 12 capsule, Rfl: 0    ketoconazole (NIZORAL) 2 % cream, 1 application daily Apply to affected area, Disp: , Rfl: 0    levocetirizine (XYZAL) 5 MG tablet, Take 1 tablet (5 mg total) by mouth daily as needed for allergies, Disp: 30 tablet, Rfl: 3    losartan (COZAAR) 100 MG tablet, Take 1 tablet (100 mg total) by mouth daily, Disp: 90 tablet, Rfl: 3    metFORMIN (GLUCOPHAGE) 500 mg tablet, Take 1 tablet (500 mg total) by mouth daily with dinner, Disp: 30 tablet, Rfl: 5    metoprolol tartrate (LOPRESSOR) 100 mg tablet, take 1 tablet by mouth twice a day, Disp: 180 tablet, Rfl: 3    morphine (MS CONTIN) 15 mg 12 hr tablet, take 1 tablet every 8 hours if needed pain, Disp: , Rfl: 0    oxyCODONE (ROXICODONE) 30 MG immediate release tablet, take 1 tablet by mouth every 5 hours if needed for pain   Larri Angeles MAX 4/DAY, Disp: , Rfl: 0    spironolactone (ALDACTONE) 25 mg tablet, take 1 tablet by mouth once daily, Disp: 30 tablet, Rfl: 5    tadalafil (CIALIS) 5 MG tablet, Take 5 mg by mouth daily as needed for erectile dysfunction (unsure dosage) Indications: Erectile Dysfunction  , Disp: , Rfl:     traZODone (DESYREL) 100 mg tablet, Take 1 tablet (100 mg total) by mouth daily at bedtime, Disp: 30 tablet, Rfl: 1       History Review:  The following portions of the patient's history were reviewed and updated as appropriate: allergies, current medications, past family history, past medical history, past social history, past surgical history and problem list          OBJECTIVE:     Vital signs in last 24 hours:    Vitals:    02/18/20 1534   BP: 119/73   Pulse: 68       Mental Status Evaluation:    Appearance age appropriate, casually dressed   Behavior cooperative, calm   Speech normal rate, normal volume, normal pitch   Mood anxious   Affect normal range and intensity, appropriate   Thought Processes organized, goal directed   Associations intact associations   Thought Content no overt delusions   Perceptual Disturbances: no auditory hallucinations, no visual hallucinations   Abnormal Thoughts  Risk Potential Suicidal ideation - None  Homicidal ideation - None  Potential for aggression - No   Orientation oriented to person, place, time/date and situation   Memory recent and remote memory grossly intact   Consciousness alert and awake   Attention Span Concentration Span attention span and concentration are age appropriate   Intellect appears to be of average intelligence   Insight intact   Judgement intact   Muscle Strength and  Gait normal gait and normal balance   Motor activity no abnormal movements   Language no difficulty naming common objects   Fund of Knowledge adequate knowledge of current events  adequate fund of knowledge regarding past history  adequate fund of knowledge regarding vocabulary    Pain mild   Pain Scale 3       Laboratory Results:   Most Recent Labs:   Lab Results   Component Value Date    WBC 7 20 01/08/2020    RBC 4 87 01/08/2020    HGB 14 2 01/08/2020    HCT 40 9 (L) 01/08/2020     01/08/2020    RDW 13 8 01/08/2020    NEUTROABS 4 80 01/08/2020     09/28/2017     09/28/2017    K 4 0 01/08/2020     01/08/2020    CO2 27 01/08/2020    BUN 23 01/08/2020    CREATININE 1 44 01/08/2020    GLUCOSE 148 (H) 10/14/2016    CALCIUM 9 8 01/08/2020    AST 16 (L) 01/08/2020    ALT 22 01/08/2020    ALKPHOS 70 01/08/2020    PROT 8 3 (H) 06/19/2015    BILITOT 0 5 06/19/2015    CHOL 109 10/14/2016    HDL 30 (L) 01/08/2020    TRIG 183 (H) 01/08/2020    LDLCALC 108 01/08/2020    DHC8MXTTGFAK 2 190 09/06/2017     Most Recent Labs ([facility]):   Lab Results   Component Value Date    WBC 7 20 01/08/2020    RBC 4 87 01/08/2020    HGB 14 2 01/08/2020    HCT 40 9 (L) 01/08/2020     01/08/2020    RDW 13 8 01/08/2020    NEUTROABS 4 80 01/08/2020    SODIUM 136 (L) 01/08/2020    K 4 0 01/08/2020     01/08/2020    CO2 27 01/08/2020    BUN 23 01/08/2020    CREATININE 1 44 01/08/2020    GLUC 190 (H) 01/08/2020    CALCIUM 9 8 01/08/2020    AST 16 (L) 01/08/2020    ALT 22 01/08/2020    ALKPHOS 70 01/08/2020    TP 8 1 01/08/2020    TBILI 0 40 01/08/2020    CHOL 109 10/14/2016    HDL 30 (L) 01/08/2020    TRIG 183 (H) 01/08/2020    LDLCALC 108 01/08/2020    [id number] 2 190 09/06/2017     I have personally reviewed all pertinent laboratory/tests results  Assessment/Plan:  The patient struggling with anxiety nowadays  Depression has been better but the patient still seems to be isolating himself at home as per evaluation  No SI HI reported    Plan:  Patient lab result was reviewed from last visit  His creatinine stills remains elevated at 1 44    His duloxetine was increased to 120 mg once a day for depression and anxiety which she reports has been working well for depression  Given his still struggles anxiety I will add buspirone 5 mg twice a day for now for his anxiety and if he tolerates the medication well, will then consider further increase in the dose  The patient agreed with the plan  He will be continued on his other meds with no change  The patient was educated in detail about benefit, risk, side effects, alternative, contraindication, dosage, frequency of his medication  He was specially educated about the serotonin syndrome and was advised to call us or visit nearby ER if he notices or experiences any of its symptoms  The patient also was advised to call us if there is any concern and to call or visit ER if he has any thoughts to hurt himself, others or any emergency  Patient agreed  Diagnoses and all orders for this visit:    Depression with anxiety  -     DULoxetine (CYMBALTA) 60 mg delayed release capsule; Take 2 capsules (120 mg total) by mouth daily    Current moderate episode of major depressive disorder without prior episode (HCC)  -     busPIRone (BUSPAR) 5 mg tablet; Take 1 tablet (5 mg total) by mouth 2 (two) times a day  -     traZODone (DESYREL) 100 mg tablet; Take 1 tablet (100 mg total) by mouth daily at bedtime          Treatment Recommendations/Precautions:      Aware of 24 hour and weekend coverage for urgent situations accessed by calling [facility] main practice number    Risks/Benefits      Risks, Benefits And Possible Side Effects Of Medications:    Risks, benefits, and possible side effects of medications explained to [facility] and he verbalizes understanding and agreement for treatment      Controlled Medication Discussion:     Not applicable    Psychotherapy Provided:     Individual psychotherapy provided: No     Treatment Plan;    Completed and signed during the session: Not applicable - Treatment Plan not due at this session    Lynda Dolan Lynnell Koyanagi, MD 02/18/20

## 2020-02-19 DIAGNOSIS — N18.30 CKD (CHRONIC KIDNEY DISEASE), STAGE III (HCC): Primary | ICD-10-CM

## 2020-02-19 LAB
ANION GAP SERPL CALCULATED.3IONS-SCNC: 10 MMOL/L (ref 5–14)
BUN SERPL-MCNC: 24 MG/DL (ref 5–25)
CALCIUM SERPL-MCNC: 9.9 MG/DL (ref 8.4–10.2)
CHLORIDE SERPL-SCNC: 100 MMOL/L (ref 97–108)
CO2 SERPL-SCNC: 29 MMOL/L (ref 22–30)
CREAT SERPL-MCNC: 1.42 MG/DL (ref 0.7–1.5)
GFR SERPL CREATININE-BSD FRML MDRD: 53 ML/MIN/1.73SQ M
GLUCOSE SERPL-MCNC: 125 MG/DL (ref 70–99)
POTASSIUM SERPL-SCNC: 5 MMOL/L (ref 3.6–5)
SODIUM SERPL-SCNC: 139 MMOL/L (ref 137–147)

## 2020-02-19 NOTE — RESULT ENCOUNTER NOTE
Hello    Patient normally is followed up by Ms Conrad Pandya  Can we see if lab can run BMP on yest labs       Thank you    np

## 2020-02-20 NOTE — RESULT ENCOUNTER NOTE
Hello    Patient normally is followed up by Ms Westley Sommers  Will review labs in detail with pt next week  For now can you please let pt know that Cr stable  Potassium upper limit of nl   If he can follow low K diet    Thank you    np

## 2020-02-21 ENCOUNTER — TELEPHONE (OUTPATIENT)
Dept: NEPHROLOGY | Facility: CLINIC | Age: 60
End: 2020-02-21

## 2020-02-21 NOTE — TELEPHONE ENCOUNTER
----- Message from Patricia Ramirez MD sent at 2/20/2020  3:35 PM EST -----  Hello    Patient normally is followed up by Ms Bhavani Lord  Will review labs in detail with pt next week  For now can you please let pt know that Cr stable  Potassium upper limit of nl   If he can follow low K diet    Thank you    np

## 2020-02-24 DIAGNOSIS — R79.89 ABNORMAL CBC: Primary | ICD-10-CM

## 2020-02-28 ENCOUNTER — TELEPHONE (OUTPATIENT)
Dept: NEPHROLOGY | Facility: CLINIC | Age: 60
End: 2020-02-28

## 2020-02-28 NOTE — TELEPHONE ENCOUNTER
I called and left message for patient to call back and reschedule no show appointment from today w/ Dr Negrete Patches for follow up  Patient's phone went right to voicemail

## 2020-03-02 NOTE — TELEPHONE ENCOUNTER
Hello    Can we call the patient again for a wellness check  Patient has had significant family issues and we should make sure that he is safe      If the phone goes to voicemail, please send a certified letter    Thank you    np

## 2020-03-03 NOTE — TELEPHONE ENCOUNTER
Thank you    Ms Mariana Chen  We have had difficulty reaching pt so sending a certified letter  Could your office try to reach him too? And if you are not successful, then he may need a wellness check   He has times when we do not hear from him for months, but he recently lost his son so wanted to make sure he is safe    Thank you    np

## 2020-03-03 NOTE — TELEPHONE ENCOUNTER
Patient has a regular follow-up with me in April  Can you try to reach out to the patient and make sure that he follows up with nephrology and if he needs anything to please schedule a sooner follow-up with me

## 2020-03-11 DIAGNOSIS — J30.9 ALLERGIC RHINITIS, UNSPECIFIED SEASONALITY, UNSPECIFIED TRIGGER: ICD-10-CM

## 2020-03-11 RX ORDER — LEVOCETIRIZINE DIHYDROCHLORIDE 5 MG/1
TABLET, FILM COATED ORAL
Qty: 30 TABLET | Refills: 3 | Status: SHIPPED | OUTPATIENT
Start: 2020-03-11 | End: 2020-06-19 | Stop reason: SDUPTHER

## 2020-03-12 DIAGNOSIS — I10 ESSENTIAL HYPERTENSION: ICD-10-CM

## 2020-03-12 RX ORDER — CHLORTHALIDONE 25 MG/1
TABLET ORAL
Qty: 45 TABLET | Refills: 3 | Status: SHIPPED | OUTPATIENT
Start: 2020-03-12 | End: 2020-03-13 | Stop reason: SDUPTHER

## 2020-03-13 ENCOUNTER — OFFICE VISIT (OUTPATIENT)
Dept: NEPHROLOGY | Facility: CLINIC | Age: 60
End: 2020-03-13
Payer: COMMERCIAL

## 2020-03-13 VITALS
WEIGHT: 315 LBS | DIASTOLIC BLOOD PRESSURE: 80 MMHG | BODY MASS INDEX: 46.65 KG/M2 | HEIGHT: 69 IN | SYSTOLIC BLOOD PRESSURE: 128 MMHG

## 2020-03-13 DIAGNOSIS — N28.1 RENAL CYST: ICD-10-CM

## 2020-03-13 DIAGNOSIS — I10 BENIGN ESSENTIAL HTN: ICD-10-CM

## 2020-03-13 DIAGNOSIS — N18.30 CKD (CHRONIC KIDNEY DISEASE), STAGE III (HCC): Primary | ICD-10-CM

## 2020-03-13 DIAGNOSIS — I10 ESSENTIAL HYPERTENSION: ICD-10-CM

## 2020-03-13 PROCEDURE — 3074F SYST BP LT 130 MM HG: CPT | Performed by: INTERNAL MEDICINE

## 2020-03-13 PROCEDURE — 3008F BODY MASS INDEX DOCD: CPT | Performed by: INTERNAL MEDICINE

## 2020-03-13 PROCEDURE — 4010F ACE/ARB THERAPY RXD/TAKEN: CPT | Performed by: INTERNAL MEDICINE

## 2020-03-13 PROCEDURE — 3066F NEPHROPATHY DOC TX: CPT | Performed by: INTERNAL MEDICINE

## 2020-03-13 PROCEDURE — 99214 OFFICE O/P EST MOD 30 MIN: CPT | Performed by: INTERNAL MEDICINE

## 2020-03-13 PROCEDURE — 3044F HG A1C LEVEL LT 7.0%: CPT | Performed by: INTERNAL MEDICINE

## 2020-03-13 PROCEDURE — 3079F DIAST BP 80-89 MM HG: CPT | Performed by: INTERNAL MEDICINE

## 2020-03-13 PROCEDURE — 4004F PT TOBACCO SCREEN RCVD TLK: CPT | Performed by: INTERNAL MEDICINE

## 2020-03-13 RX ORDER — SPIRONOLACTONE 25 MG/1
25 TABLET ORAL DAILY
Qty: 30 TABLET | Refills: 5 | Status: SHIPPED | OUTPATIENT
Start: 2020-03-13 | End: 2021-01-03

## 2020-03-13 RX ORDER — DOXAZOSIN 2 MG/1
4 TABLET ORAL
Qty: 180 TABLET | Refills: 3 | Status: SHIPPED | OUTPATIENT
Start: 2020-03-13 | End: 2021-05-27

## 2020-03-13 RX ORDER — METOPROLOL TARTRATE 100 MG/1
100 TABLET ORAL 2 TIMES DAILY
Qty: 180 TABLET | Refills: 3 | Status: SHIPPED | OUTPATIENT
Start: 2020-03-13 | End: 2021-05-27

## 2020-03-13 RX ORDER — CHLORTHALIDONE 25 MG/1
25 TABLET ORAL DAILY
Qty: 90 TABLET | Refills: 3 | Status: SHIPPED | OUTPATIENT
Start: 2020-03-13 | End: 2021-05-27

## 2020-03-13 RX ORDER — AMLODIPINE BESYLATE 10 MG/1
10 TABLET ORAL DAILY
Qty: 90 TABLET | Refills: 3 | Status: SHIPPED | OUTPATIENT
Start: 2020-03-13 | End: 2020-05-01 | Stop reason: SDUPTHER

## 2020-03-13 RX ORDER — LOSARTAN POTASSIUM 100 MG/1
100 TABLET ORAL DAILY
Qty: 90 TABLET | Refills: 3 | Status: SHIPPED | OUTPATIENT
Start: 2020-03-13 | End: 2021-05-27

## 2020-03-13 NOTE — PATIENT INSTRUCTIONS
1) Avoid NSAIDS - (Example - motrin, advil, ibuprofen, aleve, exederin, etc)  2) Always follow a low salt diet  3) follow a low potassium diet  4) please have labwork in 2-4 weeks   5) CLONIDINE - only take Sunday, Tuesday, Thursday next week and stop  6) increase chlorthalidone to full tablet (25 mg) daily starting tomorrow  7) if any questions about above when you go home and you find that you are on different doses of meds, please call first

## 2020-03-13 NOTE — LETTER
March 13, 2020     MARJORIE Rene  [address]  [facility] [address]    Patient: Cora Dickson  YOB: 1960   Date of Visit: 3/13/2020       Dear Dr Jackeline Felix: Thank you for referring Twan Gregory to me for evaluation  Below are my notes for this consultation  If you have questions, please do not hesitate to call me  I look forward to following your patient along with you  Sincerely,        Waqas Arenas MD        CC: No Recipients  Waqas Arenas MD  3/13/2020 11:11 AM  Sign at close encounter  [address]  60 y o  male MRN: [id number]  3/13/2020    Reason for Visit: HTN    ASSESSMENT and PLAN:    I had the pleasure of seeing Mr Saroj Joyner today in the renal clinic for the continued management of HTN  59 yo male retired , formerly from [employer], [employer] (15 years), DM (years), back surgery 10 years ago Who presents for follow up evaluation of hypertension   patient has had sporadic follow-up but has been slightly more consistent with follow-up since end of last year  I last saw the patient in August 2018 and then patient followed up with our advanced practitioner  Patient returns today for follow-up visit      Patient has stopped using NSAIDs since last visit     1) HTN - BP DULSQ 373Z     -prior renin and aldosterone level with a renin level suppressed, aldosterone level is not significantly elevated  - prior metanephrine unrevealing  - prior CT scan with unremarkable adrenals  - renal Doppler study in November 2019 with no significant occlusive disease   - patient is on spironolactone, chlorthalidone, amlodipine, losartan, metoprolol, doxazosin, clonidine   -to note, patient is taking clonidine once a day only  We will taper this and increase chlorthalidone to 25 mg a day  Given the higher potassium level also  -BMP in 3 months  Appointment in 3-4 months      Plan:  - low-potassium diet  - lab work in 2 weeks  - repeat renal ultrasound is pending  -chlorthalidone to 25 mg  -taper clonidine to off     2) poor dentition     -patient smokes cigars daily  -patient needs to see dentist     3) proteinuria -may be related to hypertension versus obesity     - recheck at next visit  - if elevated, will check SPEP, UPEP, FLC     4) CKD with baseline creatinine approximately 1 4 mg/dL      -renal ultrasound in September 2018 was septated cyst bilaterally with mildly increased on the right kidney  And a calcified cystic foci in the left kidney  -patient needs repeat ultrasound  Ordered  prior     5) grief counseling-patient needs to see psychiatrist and psychologist and patient has the appointment scheduled  6) Vit D deficiency    - pt was started on 50,000 units vit D weekly on 1/10/2020    7) osteoarthritis of knees - sees Ortho team  PT and cortisone inj started 1/2020    8) upper limit normal potassium level    - check BMP in 1-2 weeks  - low-potassium diet  - see above     It was a pleasure evaluating Mr Sharif Palm in the renal office; thank you for allowing our team to participate in the care of your patient and please do not hesitate to contact our team if any questions arise in the interim         No problem-specific Assessment & Plan notes found for this encounter  HPI:    Patient denies complaints  No nausea, vomiting, diarrhea, fevers, chills  Still depressed  Following closely with Psychiatry and primary team regarding this  PATIENT INSTRUCTIONS:    There are no Patient Instructions on file for this visit  OBJECTIVE:  Current Weight:    There were no vitals filed for this visit  There is no height or weight on file to calculate BMI  REVIEW OF SYSTEMS:    Review of Systems   Constitutional: Negative  Negative for appetite change and fatigue  HENT: Negative  Eyes: Negative  Respiratory: Negative  Negative for shortness of breath  Cardiovascular: Negative  Negative for leg swelling  Gastrointestinal: Negative  Endocrine: Negative  Genitourinary: Negative  Negative for difficulty urinating  Musculoskeletal: Negative  Allergic/Immunologic: Negative  Neurological: Negative  Hematological: Negative  Psychiatric/Behavioral: Negative  All other systems reviewed and are negative  PHYSICAL EXAM:      Physical Exam   Constitutional: He is oriented to person, place, and time  He appears well-developed and well-nourished  No distress  HENT:   Head: Normocephalic and atraumatic  Mouth/Throat: No oropharyngeal exudate  Eyes: Conjunctivae are normal  Right eye exhibits no discharge  Left eye exhibits no discharge  No scleral icterus  Neck: Normal range of motion  Neck supple  No JVD present  Cardiovascular: Normal rate, regular rhythm and normal heart sounds  Exam reveals no gallop and no friction rub  No murmur heard  Pulmonary/Chest: Effort normal and breath sounds normal  No respiratory distress  He has no wheezes  He has no rales  He exhibits no tenderness  Abdominal: Soft  Bowel sounds are normal  He exhibits no distension  There is no tenderness  There is no rebound  Musculoskeletal: Normal range of motion  He exhibits no edema, tenderness or deformity  Neurological: He is alert and oriented to person, place, and time  He has normal reflexes  No cranial nerve deficit  Coordination normal    Skin: Skin is warm and dry  No rash noted  He is not diaphoretic  No erythema  No pallor  Psychiatric: He has a normal mood and affect  His behavior is normal  Judgment and thought content normal    Nursing note and vitals reviewed        Medications:    Current Outpatient Medications:     amLODIPine (NORVASC) 10 mg tablet, take 1 tablet by mouth once daily, Disp: 30 tablet, Rfl: 3    atorvastatin (LIPITOR) 20 mg tablet, Take 1 tablet (20 mg total) by mouth daily, Disp: 30 tablet, Rfl: 2    busPIRone (BUSPAR) 5 mg tablet, Take 1 tablet (5 mg total) by mouth 2 (two) times a day, Disp: 60 tablet, Rfl: 0    chlorthalidone 25 mg tablet, take 1/2 tablet by mouth once daily, Disp: 45 tablet, Rfl: 3    cloNIDine (CATAPRES) 0 2 mg tablet, Take 0 1 mg by mouth daily , Disp: , Rfl:     doxazosin (CARDURA) 2 mg tablet, take 2 tablets by mouth at bedtime, Disp: 90 tablet, Rfl: 3    DULoxetine (CYMBALTA) 60 mg delayed release capsule, Take 2 capsules (120 mg total) by mouth daily, Disp: 60 capsule, Rfl: 1    ergocalciferol (ERGOCALCIFEROL) 1 25 MG (21305 UT) capsule, Take 1 capsule (50,000 Units total) by mouth once a week, Disp: 12 capsule, Rfl: 0    ketoconazole (NIZORAL) 2 % cream, 1 application daily Apply to affected area, Disp: , Rfl: 0    levocetirizine (XYZAL) 5 MG tablet, take 1 tablet by mouth once daily if needed for allergies, Disp: 30 tablet, Rfl: 3    losartan (COZAAR) 100 MG tablet, Take 1 tablet (100 mg total) by mouth daily, Disp: 90 tablet, Rfl: 3    metFORMIN (GLUCOPHAGE) 500 mg tablet, Take 1 tablet (500 mg total) by mouth daily with dinner, Disp: 30 tablet, Rfl: 5    metoprolol tartrate (LOPRESSOR) 100 mg tablet, take 1 tablet by mouth twice a day, Disp: 180 tablet, Rfl: 3    morphine (MS CONTIN) 15 mg 12 hr tablet, take 1 tablet every 8 hours if needed pain, Disp: , Rfl: 0    oxyCODONE (ROXICODONE) 30 MG immediate release tablet, take 1 tablet by mouth every 5 hours if needed for pain   Miguel Ángel Dorothy MAX 4/DAY, Disp: , Rfl: 0    spironolactone (ALDACTONE) 25 mg tablet, take 1 tablet by mouth once daily, Disp: 30 tablet, Rfl: 5    tadalafil (CIALIS) 5 MG tablet, Take 5 mg by mouth daily as needed for erectile dysfunction (unsure dosage) Indications: Erectile Dysfunction  , Disp: , Rfl:     traZODone (DESYREL) 100 mg tablet, Take 1 tablet (100 mg total) by mouth daily at bedtime, Disp: 30 tablet, Rfl: 1    Laboratory Results:        Invalid input(s): ALBUMIN    Results for orders placed or performed in visit on 02/18/20   CBC and differential   Result Value Ref Range    WBC 7 80 4 50 - 11 00 Thousand/uL    RBC 4 88 4 50 - 5 90 Million/uL    Hemoglobin 14 3 13 5 - 17 5 g/dL    Hematocrit 41 3 41 0 - 53 0 %    MCV 85 80 - 100 fL    MCH 29 3 26 0 - 34 0 pg    MCHC 34 7 31 0 - 36 0 g/dL    RDW 14 2 <15 3 %    MPV 8 6 (L) 8 9 - 12 7 fL    Platelets 078 678 - 338 Thousands/uL    Neutrophils Relative 67 (H) 45 - 65 %    Lymphocytes Relative 21 (L) 25 - 45 %    Monocytes Relative 6 1 - 10 %    Eosinophils Relative 7 (H) 0 - 6 %    Basophils Relative 1 0 - 1 %    Neutrophils Absolute 5 20 1 80 - 7 80 Thousands/µL    Lymphocytes Absolute 1 60 0 50 - 4 00 Thousands/µL    Monocytes Absolute 0 40 0 20 - 0 90 Thousand/µL    Eosinophils Absolute 0 50 (H) 0 00 - 0 40 Thousand/µL    Basophils Absolute 0 00 0 00 - 0 10 Thousands/µL

## 2020-03-13 NOTE — PROGRESS NOTES
NEPHROLOGY OFFICE VISIT   Kulwinder Strauss  60 y o  male MRN: [id number]  3/13/2020    Reason for Visit: HTN    ASSESSMENT and PLAN:    I had the pleasure of seeing Mr Gordon Pierce today in the renal clinic for the continued management of HTN  59 yo male retired , formerly from [employer], [employer] (15 years), DM (years), back surgery 10 years ago Who presents for follow up evaluation of hypertension   patient has had sporadic follow-up but has been slightly more consistent with follow-up since end of last year  I last saw the patient in August 2018 and then patient followed up with our advanced practitioner  Patient returns today for follow-up visit      Patient has stopped using NSAIDs since last visit     1) HTN - BP GALCR 726E     -prior renin and aldosterone level with a renin level suppressed, aldosterone level is not significantly elevated  - prior metanephrine unrevealing  - prior CT scan with unremarkable adrenals  - renal Doppler study in November 2019 with no significant occlusive disease   - patient is on spironolactone, chlorthalidone, amlodipine, losartan, metoprolol, doxazosin, clonidine   -to note, patient is taking clonidine once a day only  We will taper this and increase chlorthalidone to 25 mg a day  Given the higher potassium level also  -BMP in 3 months  Appointment in 3-4 months  Plan:  - low-potassium diet  - lab work in 2 weeks  - repeat renal ultrasound is pending  -chlorthalidone to 25 mg  -taper clonidine to off     2) poor dentition     -patient smokes cigars daily  -patient needs to see dentist     3) proteinuria -may be related to hypertension versus obesity     - recheck at next visit  - if elevated, will check SPEP, UPEP, FLC     4) CKD with baseline creatinine approximately 1 4 mg/dL      -renal ultrasound in September 2018 was septated cyst bilaterally with mildly increased on the right kidney    And a calcified cystic foci in the left kidney  -patient needs repeat ultrasound  Ordered prior     5) grief counseling-patient needs to see psychiatrist and psychologist and patient has the appointment scheduled  6) Vit D deficiency    - pt was started on 50,000 units vit D weekly on 1/10/2020    7) osteoarthritis of knees - sees Ortho team  PT and cortisone inj started 1/2020    8) upper limit normal potassium level    - check BMP in 1-2 weeks  - low-potassium diet  - see above     It was a pleasure evaluating Mr Dom Lang in the renal office; thank you for allowing our team to participate in the care of your patient and please do not hesitate to contact our team if any questions arise in the interim         No problem-specific Assessment & Plan notes found for this encounter  HPI:    Patient denies complaints  No nausea, vomiting, diarrhea, fevers, chills  Still depressed  Following closely with Psychiatry and primary team regarding this  PATIENT INSTRUCTIONS:    There are no Patient Instructions on file for this visit  OBJECTIVE:  Current Weight:    There were no vitals filed for this visit  There is no height or weight on file to calculate BMI  REVIEW OF SYSTEMS:    Review of Systems   Constitutional: Negative  Negative for appetite change and fatigue  HENT: Negative  Eyes: Negative  Respiratory: Negative  Negative for shortness of breath  Cardiovascular: Negative  Negative for leg swelling  Gastrointestinal: Negative  Endocrine: Negative  Genitourinary: Negative  Negative for difficulty urinating  Musculoskeletal: Negative  Allergic/Immunologic: Negative  Neurological: Negative  Hematological: Negative  Psychiatric/Behavioral: Negative  All other systems reviewed and are negative  PHYSICAL EXAM:      Physical Exam   Constitutional: He is oriented to person, place, and time  He appears well-developed and well-nourished  No distress  HENT:   Head: Normocephalic and atraumatic     Mouth/Throat: No oropharyngeal exudate  Eyes: Conjunctivae are normal  Right eye exhibits no discharge  Left eye exhibits no discharge  No scleral icterus  Neck: Normal range of motion  Neck supple  No JVD present  Cardiovascular: Normal rate, regular rhythm and normal heart sounds  Exam reveals no gallop and no friction rub  No murmur heard  Pulmonary/Chest: Effort normal and breath sounds normal  No respiratory distress  He has no wheezes  He has no rales  He exhibits no tenderness  Abdominal: Soft  Bowel sounds are normal  He exhibits no distension  There is no tenderness  There is no rebound  Musculoskeletal: Normal range of motion  He exhibits no edema, tenderness or deformity  Neurological: He is alert and oriented to person, place, and time  He has normal reflexes  No cranial nerve deficit  Coordination normal    Skin: Skin is warm and dry  No rash noted  He is not diaphoretic  No erythema  No pallor  Psychiatric: He has a normal mood and affect  His behavior is normal  Judgment and thought content normal    Nursing note and vitals reviewed        Medications:    Current Outpatient Medications:     amLODIPine (NORVASC) 10 mg tablet, take 1 tablet by mouth once daily, Disp: 30 tablet, Rfl: 3    atorvastatin (LIPITOR) 20 mg tablet, Take 1 tablet (20 mg total) by mouth daily, Disp: 30 tablet, Rfl: 2    busPIRone (BUSPAR) 5 mg tablet, Take 1 tablet (5 mg total) by mouth 2 (two) times a day, Disp: 60 tablet, Rfl: 0    chlorthalidone 25 mg tablet, take 1/2 tablet by mouth once daily, Disp: 45 tablet, Rfl: 3    cloNIDine (CATAPRES) 0 2 mg tablet, Take 0 1 mg by mouth daily , Disp: , Rfl:     doxazosin (CARDURA) 2 mg tablet, take 2 tablets by mouth at bedtime, Disp: 90 tablet, Rfl: 3    DULoxetine (CYMBALTA) 60 mg delayed release capsule, Take 2 capsules (120 mg total) by mouth daily, Disp: 60 capsule, Rfl: 1    ergocalciferol (ERGOCALCIFEROL) 1 25 MG (23738 UT) capsule, Take 1 capsule (50,000 Units total) by mouth once a week, Disp: 12 capsule, Rfl: 0    ketoconazole (NIZORAL) 2 % cream, 1 application daily Apply to affected area, Disp: , Rfl: 0    levocetirizine (XYZAL) 5 MG tablet, take 1 tablet by mouth once daily if needed for allergies, Disp: 30 tablet, Rfl: 3    losartan (COZAAR) 100 MG tablet, Take 1 tablet (100 mg total) by mouth daily, Disp: 90 tablet, Rfl: 3    metFORMIN (GLUCOPHAGE) 500 mg tablet, Take 1 tablet (500 mg total) by mouth daily with dinner, Disp: 30 tablet, Rfl: 5    metoprolol tartrate (LOPRESSOR) 100 mg tablet, take 1 tablet by mouth twice a day, Disp: 180 tablet, Rfl: 3    morphine (MS CONTIN) 15 mg 12 hr tablet, take 1 tablet every 8 hours if needed pain, Disp: , Rfl: 0    oxyCODONE (ROXICODONE) 30 MG immediate release tablet, take 1 tablet by mouth every 5 hours if needed for pain   Durga KNIGHT 4/DAY, Disp: , Rfl: 0    spironolactone (ALDACTONE) 25 mg tablet, take 1 tablet by mouth once daily, Disp: 30 tablet, Rfl: 5    tadalafil (CIALIS) 5 MG tablet, Take 5 mg by mouth daily as needed for erectile dysfunction (unsure dosage) Indications: Erectile Dysfunction  , Disp: , Rfl:     traZODone (DESYREL) 100 mg tablet, Take 1 tablet (100 mg total) by mouth daily at bedtime, Disp: 30 tablet, Rfl: 1    Laboratory Results:        Invalid input(s): ALBUMIN    Results for orders placed or performed in visit on 02/18/20   CBC and differential   Result Value Ref Range    WBC 7 80 4 50 - 11 00 Thousand/uL    RBC 4 88 4 50 - 5 90 Million/uL    Hemoglobin 14 3 13 5 - 17 5 g/dL    Hematocrit 41 3 41 0 - 53 0 %    MCV 85 80 - 100 fL    MCH 29 3 26 0 - 34 0 pg    MCHC 34 7 31 0 - 36 0 g/dL    RDW 14 2 <15 3 %    MPV 8 6 (L) 8 9 - 12 7 fL    Platelets 176 131 - 828 Thousands/uL    Neutrophils Relative 67 (H) 45 - 65 %    Lymphocytes Relative 21 (L) 25 - 45 %    Monocytes Relative 6 1 - 10 %    Eosinophils Relative 7 (H) 0 - 6 %    Basophils Relative 1 0 - 1 %    Neutrophils Absolute 5 20 1 80 - 7 80 Thousands/µL Lymphocytes Absolute 1 60 0 50 - 4 00 Thousands/µL    Monocytes Absolute 0 40 0 20 - 0 90 Thousand/µL    Eosinophils Absolute 0 50 (H) 0 00 - 0 40 Thousand/µL    Basophils Absolute 0 00 0 00 - 0 10 Thousands/µL

## 2020-03-19 ENCOUNTER — TELEPHONE (OUTPATIENT)
Dept: PSYCHIATRY | Facility: CLINIC | Age: 60
End: 2020-03-19

## 2020-03-19 NOTE — TELEPHONE ENCOUNTER
LM for Aniyah Conde to call us back in regards to his appt tomorrow to please call  back and confirm if he is still coming in to office and if so, explained we will need to ask him some screening questions

## 2020-03-19 NOTE — TELEPHONE ENCOUNTER
"Hello, we are calling to ask a few questions as we prepare for your appointment tomorrow  "  Are you able to attend your appointment in person tomorrow? Yes  (if yes, proceed with the next questions) If NO, reschedule or offer the appointment to be a Brief telephone visit with their provider (15 minute check in that is a billable service)    Have you traveled to a community with widespread COVID-19 in the past 14 days? No  (IF YES, provide quarantine recommendations)    Have you had close contact with someone that has traveled to an affected area or tested positive to COVID-19? No  (IF YES, go to next question)     Do you currently have a fever, trouble breathing or a cough?  No   (If YES, refer them to PCP AND reschedule this patient or offer the appointment to be a Brief telephone visit with their provider (15 minute check in and that is a billable service)

## 2020-03-20 ENCOUNTER — OFFICE VISIT (OUTPATIENT)
Dept: PSYCHIATRY | Facility: CLINIC | Age: 60
End: 2020-03-20
Payer: COMMERCIAL

## 2020-03-20 DIAGNOSIS — F32.1 CURRENT MODERATE EPISODE OF MAJOR DEPRESSIVE DISORDER WITHOUT PRIOR EPISODE (HCC): ICD-10-CM

## 2020-03-20 PROCEDURE — 3066F NEPHROPATHY DOC TX: CPT | Performed by: PSYCHIATRY & NEUROLOGY

## 2020-03-20 PROCEDURE — 99214 OFFICE O/P EST MOD 30 MIN: CPT | Performed by: PSYCHIATRY & NEUROLOGY

## 2020-03-20 PROCEDURE — 3074F SYST BP LT 130 MM HG: CPT | Performed by: PSYCHIATRY & NEUROLOGY

## 2020-03-20 PROCEDURE — 4004F PT TOBACCO SCREEN RCVD TLK: CPT | Performed by: PSYCHIATRY & NEUROLOGY

## 2020-03-20 PROCEDURE — 3079F DIAST BP 80-89 MM HG: CPT | Performed by: PSYCHIATRY & NEUROLOGY

## 2020-03-20 PROCEDURE — 3044F HG A1C LEVEL LT 7.0%: CPT | Performed by: PSYCHIATRY & NEUROLOGY

## 2020-03-20 RX ORDER — BUSPIRONE HYDROCHLORIDE 10 MG/1
10 TABLET ORAL 2 TIMES DAILY
Qty: 60 TABLET | Refills: 0 | Status: SHIPPED | OUTPATIENT
Start: 2020-03-20 | End: 2020-04-17 | Stop reason: SDUPTHER

## 2020-03-20 NOTE — PSYCH
MEDICATION MANAGEMENT NOTE        [facility]      Name and Date of Birth:  Dallas Edward  60 y o  1960 MRN: [id number]    Date of Visit: March 20, 2020    Reason for Visit:  Follow-up for medication management    Subjective:  Patient reports he feels better than last visit reports his anxiety has been slightly better and rated at 7 on a scale of 0-10 with 10 being the worst   He reported last visit he was feeling more anxious and rated around 9  Patient also reports depression has been better but still misses his son  Still reports having dreams related to him but not any nightmares  Reports has low energy  Goes out for grocery and other activities of daily living  Denies any suicidal thoughts  Denies any urges to hurt others  Reports appetite has been okay  Compliant with the medication and denies any side effect  Has seen a nephrologist recently and blood work was ordered in 2 weeks  Review Of Systems:      Constitutional low energy   ENT negative   Cardiovascular negative   Respiratory negative   Gastrointestinal negative   Genitourinary negative   Musculoskeletal negative   Integumentary negative   Neurological negative   Endocrine negative   Other Symptoms none       Alcohol/Substance Abuse:  Denies          Past Medical History:    Past Medical History:   Diagnosis Date    Anxiety     Back pain     Chronic pain     Diabetes mellitus ([facility] [address] )     6/19/15 A1C: 5 5%    Hyperlipidemia     Hypertension     Knee pain     Psychiatric disorder     Type 2 diabetes mellitus without complication ([facility] [address] ) 7/16/4365    Urinary retention      Past Medical History Pertinent Negatives:   Diagnosis Date Noted    Heart failure ([facility] [address] ) 03/17/2016     Past Surgical History:   Procedure Laterality Date    BACK SURGERY      LUMBAR FUSION  2002    LUMBAR FUSION  2004    L4-5-6, S1 has pump for narcotics     Allergies   Allergen Reactions    Gabapentin Annotation - [id number]: dizziness    Pregabalin      Annotation - [id number]: vision changes and mood changes    Tramadol Nausea Only       Current Medications:       Current Outpatient Medications:     amLODIPine (NORVASC) 10 mg tablet, Take 1 tablet (10 mg total) by mouth daily, Disp: 90 tablet, Rfl: 3    atorvastatin (LIPITOR) 20 mg tablet, Take 1 tablet (20 mg total) by mouth daily, Disp: 30 tablet, Rfl: 2    busPIRone (BUSPAR) 10 mg tablet, Take 1 tablet (10 mg total) by mouth 2 (two) times a day, Disp: 60 tablet, Rfl: 0    chlorthalidone 25 mg tablet, Take 1 tablet (25 mg total) by mouth daily, Disp: 90 tablet, Rfl: 3    doxazosin (CARDURA) 2 mg tablet, Take 2 tablets (4 mg total) by mouth daily at bedtime, Disp: 180 tablet, Rfl: 3    DULoxetine (CYMBALTA) 60 mg delayed release capsule, Take 2 capsules (120 mg total) by mouth daily, Disp: 60 capsule, Rfl: 1    ergocalciferol (ERGOCALCIFEROL) 1 25 MG (79725 UT) capsule, Take 1 capsule (50,000 Units total) by mouth once a week, Disp: 12 capsule, Rfl: 0    ketoconazole (NIZORAL) 2 % cream, 1 application daily Apply to affected area, Disp: , Rfl: 0    levocetirizine (XYZAL) 5 MG tablet, take 1 tablet by mouth once daily if needed for allergies, Disp: 30 tablet, Rfl: 3    losartan (COZAAR) 100 MG tablet, Take 1 tablet (100 mg total) by mouth daily, Disp: 90 tablet, Rfl: 3    metFORMIN (GLUCOPHAGE) 500 mg tablet, Take 1 tablet (500 mg total) by mouth daily with dinner, Disp: 30 tablet, Rfl: 5    metoprolol tartrate (LOPRESSOR) 100 mg tablet, Take 1 tablet (100 mg total) by mouth 2 (two) times a day, Disp: 180 tablet, Rfl: 3    morphine (MS CONTIN) 15 mg 12 hr tablet, take 1 tablet every 8 hours if needed pain, Disp: , Rfl: 0    oxyCODONE (ROXICODONE) 30 MG immediate release tablet, take 1 tablet by mouth every 5 hours if needed for pain   Francella Crooked MAX 4/DAY, Disp: , Rfl: 0    spironolactone (ALDACTONE) 25 mg tablet, Take 1 tablet (25 mg total) by mouth daily, Disp: 30 tablet, Rfl: 5    traZODone (DESYREL) 100 mg tablet, Take 1 tablet (100 mg total) by mouth daily at bedtime, Disp: 30 tablet, Rfl: 1    tadalafil (CIALIS) 5 MG tablet, Take 5 mg by mouth daily as needed for erectile dysfunction (unsure dosage) Indications: Erectile Dysfunction  , Disp: , Rfl:        History Review: The following portions of the patient's history were reviewed and updated as appropriate: allergies, current medications, past family history, past medical history, past social history, past surgical history and problem list          OBJECTIVE:     Vital signs in last 24 hours: There were no vitals filed for this visit      Mental Status Evaluation:    Appearance age appropriate, casually dressed   Behavior cooperative, calm   Speech normal rate, normal volume, normal pitch   Mood depressed, anxious   Affect normal range and intensity, appropriate   Thought Processes organized, goal directed   Associations intact associations   Thought Content no overt delusions   Perceptual Disturbances: no auditory hallucinations, no visual hallucinations   Abnormal Thoughts  Risk Potential Suicidal ideation - None  Homicidal ideation - None  Potential for aggression - No   Orientation oriented to person, place, time/date and situation   Memory recent and remote memory grossly intact   Consciousness alert and awake   Attention Span Concentration Span attention span and concentration are age appropriate   Intellect appears to be of average intelligence   Insight intact   Judgement intact   Muscle Strength and  Gait normal gait and normal balance   Motor activity no abnormal movements   Language no difficulty naming common objects   Fund of Knowledge adequate knowledge of current events  adequate fund of knowledge regarding past history  adequate fund of knowledge regarding vocabulary    Pain none   Pain Scale 0       Laboratory Results:   Most Recent Labs:   Lab Results   Component Value Date    WBC 7 80 02/18/2020    RBC 4 88 02/18/2020    HGB 14 3 02/18/2020    HCT 41 3 02/18/2020     02/18/2020    RDW 14 2 02/18/2020    NEUTROABS 5 20 02/18/2020     09/28/2017     09/28/2017    K 5 0 02/18/2020     02/18/2020    CO2 29 02/18/2020    BUN 24 02/18/2020    CREATININE 1 42 02/18/2020    GLUCOSE 148 (H) 10/14/2016    CALCIUM 9 9 02/18/2020    AST 16 (L) 01/08/2020    ALT 22 01/08/2020    ALKPHOS 70 01/08/2020    PROT 8 3 (H) 06/19/2015    BILITOT 0 5 06/19/2015    CHOL 109 10/14/2016    HDL 30 (L) 01/08/2020    TRIG 183 (H) 01/08/2020    LDLCALC 108 01/08/2020    LRI9AHFCJKLR 2 190 09/06/2017     I have personally reviewed all pertinent laboratory/tests results  Assessment/Plan:  The patient anxiety and depression slightly better but still present as mentioned above  No SI reported  Plan:  I will increase the dose of BuSpar to 10 mg twice a day for anxiety and depression  I will continue with other medication which includes duloxetine and trazodone with no change  Patient will follow with me in 4 weeks or sooner if needed  He was advised to call us if there is any concern and to call 911 or visit nearby ER if he has any emergency or any SI or HI  The patient agreed the patient was also educated about his medication including benefit, risk, side effects, alternative, contraindication, dosage and frequency  Patient verbalized understanding agrees with the plan  Diagnoses and all orders for this visit:    Current moderate episode of major depressive disorder without prior episode (HCC)  -     busPIRone (BUSPAR) 10 mg tablet;  Take 1 tablet (10 mg total) by mouth 2 (two) times a day          Treatment Recommendations/Precautions:      Aware of 24 hour and weekend coverage for urgent situations accessed by calling [facility] main practice number    Risks/Benefits      Risks, Benefits And Possible Side Effects Of Medications:    Risks, benefits, and possible side effects of medications explained to [facility] and he verbalizes understanding and agreement for treatment  Controlled Medication Discussion:     Not applicable    Psychotherapy Provided:     Individual psychotherapy provided: Counseling was provided during the session today for 10 minutes  Emotional support was provided  Brief grief counseling was also given      Treatment Plan;    Completed and signed during the session: Not applicable - Treatment Plan not due at this session    Shiva Zuniga MD 03/20/20

## 2020-03-30 DIAGNOSIS — E78.5 HYPERLIPIDEMIA, UNSPECIFIED HYPERLIPIDEMIA TYPE: ICD-10-CM

## 2020-03-30 RX ORDER — ATORVASTATIN CALCIUM 20 MG/1
20 TABLET, FILM COATED ORAL DAILY
Qty: 90 TABLET | Refills: 0 | Status: SHIPPED | OUTPATIENT
Start: 2020-03-30 | End: 2020-06-29

## 2020-04-03 ENCOUNTER — TELEPHONE (OUTPATIENT)
Dept: OBGYN CLINIC | Facility: CLINIC | Age: 60
End: 2020-04-03

## 2020-04-03 DIAGNOSIS — M17.0 BILATERAL PRIMARY OSTEOARTHRITIS OF KNEE: Primary | ICD-10-CM

## 2020-04-17 ENCOUNTER — TELEMEDICINE (OUTPATIENT)
Dept: PSYCHIATRY | Facility: CLINIC | Age: 60
End: 2020-04-17
Payer: COMMERCIAL

## 2020-04-17 DIAGNOSIS — F32.1 CURRENT MODERATE EPISODE OF MAJOR DEPRESSIVE DISORDER WITHOUT PRIOR EPISODE (HCC): ICD-10-CM

## 2020-04-17 PROCEDURE — 99214 OFFICE O/P EST MOD 30 MIN: CPT | Performed by: PSYCHIATRY & NEUROLOGY

## 2020-04-17 RX ORDER — FLUOXETINE 10 MG/1
CAPSULE ORAL
Qty: 49 CAPSULE | Refills: 0 | Status: SHIPPED | OUTPATIENT
Start: 2020-04-17 | End: 2020-05-08 | Stop reason: SDUPTHER

## 2020-04-17 RX ORDER — BUSPIRONE HYDROCHLORIDE 10 MG/1
10 TABLET ORAL 2 TIMES DAILY
Qty: 60 TABLET | Refills: 0 | Status: SHIPPED | OUTPATIENT
Start: 2020-04-17 | End: 2020-05-08 | Stop reason: SDUPTHER

## 2020-04-17 RX ORDER — TRAZODONE HYDROCHLORIDE 100 MG/1
100 TABLET ORAL
Qty: 30 TABLET | Refills: 0 | Status: SHIPPED | OUTPATIENT
Start: 2020-04-17 | End: 2020-05-12

## 2020-04-17 RX ORDER — DULOXETIN HYDROCHLORIDE 30 MG/1
30 CAPSULE, DELAYED RELEASE ORAL DAILY
Qty: 7 CAPSULE | Refills: 0 | Status: SHIPPED | OUTPATIENT
Start: 2020-04-24 | End: 2020-05-08

## 2020-04-24 ENCOUNTER — TELEPHONE (OUTPATIENT)
Dept: OBGYN CLINIC | Facility: MEDICAL CENTER | Age: 60
End: 2020-04-24

## 2020-05-01 DIAGNOSIS — I10 ESSENTIAL HYPERTENSION: ICD-10-CM

## 2020-05-04 RX ORDER — AMLODIPINE BESYLATE 10 MG/1
10 TABLET ORAL DAILY
Qty: 90 TABLET | Refills: 0 | Status: SHIPPED | OUTPATIENT
Start: 2020-05-04 | End: 2020-07-30

## 2020-05-08 ENCOUNTER — TELEMEDICINE (OUTPATIENT)
Dept: PSYCHIATRY | Facility: CLINIC | Age: 60
End: 2020-05-08
Payer: COMMERCIAL

## 2020-05-08 DIAGNOSIS — F32.1 CURRENT MODERATE EPISODE OF MAJOR DEPRESSIVE DISORDER WITHOUT PRIOR EPISODE (HCC): ICD-10-CM

## 2020-05-08 PROCEDURE — 99214 OFFICE O/P EST MOD 30 MIN: CPT | Performed by: PSYCHIATRY & NEUROLOGY

## 2020-05-08 RX ORDER — FLUOXETINE 10 MG/1
30 CAPSULE ORAL DAILY
Qty: 90 CAPSULE | Refills: 0 | Status: SHIPPED | OUTPATIENT
Start: 2020-05-08 | End: 2020-05-27 | Stop reason: SDUPTHER

## 2020-05-08 RX ORDER — BUSPIRONE HYDROCHLORIDE 10 MG/1
10 TABLET ORAL 2 TIMES DAILY
Qty: 60 TABLET | Refills: 0 | Status: SHIPPED | OUTPATIENT
Start: 2020-05-08 | End: 2020-05-27 | Stop reason: SDUPTHER

## 2020-05-12 DIAGNOSIS — F32.1 CURRENT MODERATE EPISODE OF MAJOR DEPRESSIVE DISORDER WITHOUT PRIOR EPISODE (HCC): ICD-10-CM

## 2020-05-12 RX ORDER — TRAZODONE HYDROCHLORIDE 100 MG/1
TABLET ORAL
Qty: 30 TABLET | Refills: 0 | Status: SHIPPED | OUTPATIENT
Start: 2020-05-12 | End: 2020-06-11

## 2020-05-27 ENCOUNTER — TELEMEDICINE (OUTPATIENT)
Dept: PSYCHIATRY | Facility: CLINIC | Age: 60
End: 2020-05-27
Payer: COMMERCIAL

## 2020-05-27 DIAGNOSIS — F32.1 CURRENT MODERATE EPISODE OF MAJOR DEPRESSIVE DISORDER WITHOUT PRIOR EPISODE (HCC): ICD-10-CM

## 2020-05-27 PROCEDURE — 99213 OFFICE O/P EST LOW 20 MIN: CPT | Performed by: PSYCHIATRY & NEUROLOGY

## 2020-05-27 RX ORDER — BUSPIRONE HYDROCHLORIDE 10 MG/1
10 TABLET ORAL 2 TIMES DAILY
Qty: 60 TABLET | Refills: 1 | Status: SHIPPED | OUTPATIENT
Start: 2020-05-27 | End: 2020-08-04

## 2020-05-27 RX ORDER — FLUOXETINE HYDROCHLORIDE 40 MG/1
40 CAPSULE ORAL DAILY
Qty: 30 CAPSULE | Refills: 1 | Status: SHIPPED | OUTPATIENT
Start: 2020-05-27 | End: 2020-08-04

## 2020-05-29 ENCOUNTER — TELEPHONE (OUTPATIENT)
Dept: OBGYN CLINIC | Facility: CLINIC | Age: 60
End: 2020-05-29

## 2020-06-02 DIAGNOSIS — F32.1 CURRENT MODERATE EPISODE OF MAJOR DEPRESSIVE DISORDER WITHOUT PRIOR EPISODE (HCC): ICD-10-CM

## 2020-06-02 RX ORDER — FLUOXETINE 10 MG/1
CAPSULE ORAL
Qty: 90 CAPSULE | Refills: 0 | OUTPATIENT
Start: 2020-06-02

## 2020-06-11 ENCOUNTER — TELEPHONE (OUTPATIENT)
Dept: NEPHROLOGY | Facility: CLINIC | Age: 60
End: 2020-06-11

## 2020-06-11 DIAGNOSIS — F32.1 CURRENT MODERATE EPISODE OF MAJOR DEPRESSIVE DISORDER WITHOUT PRIOR EPISODE (HCC): ICD-10-CM

## 2020-06-11 RX ORDER — TRAZODONE HYDROCHLORIDE 100 MG/1
TABLET ORAL
Qty: 30 TABLET | Refills: 0 | Status: SHIPPED | OUTPATIENT
Start: 2020-06-11 | End: 2020-08-04

## 2020-06-12 DIAGNOSIS — F32.1 CURRENT MODERATE EPISODE OF MAJOR DEPRESSIVE DISORDER WITHOUT PRIOR EPISODE (HCC): ICD-10-CM

## 2020-06-15 ENCOUNTER — TELEPHONE (OUTPATIENT)
Dept: NEPHROLOGY | Facility: CLINIC | Age: 60
End: 2020-06-15

## 2020-06-15 ENCOUNTER — TELEMEDICINE (OUTPATIENT)
Dept: NEPHROLOGY | Facility: CLINIC | Age: 60
End: 2020-06-15
Payer: COMMERCIAL

## 2020-06-15 DIAGNOSIS — I10 BENIGN ESSENTIAL HTN: Primary | ICD-10-CM

## 2020-06-15 DIAGNOSIS — E55.9 VITAMIN D DEFICIENCY: ICD-10-CM

## 2020-06-15 DIAGNOSIS — N28.1 RENAL CYST: ICD-10-CM

## 2020-06-15 DIAGNOSIS — F32.1 CURRENT MODERATE EPISODE OF MAJOR DEPRESSIVE DISORDER WITHOUT PRIOR EPISODE (HCC): ICD-10-CM

## 2020-06-15 PROCEDURE — 99213 OFFICE O/P EST LOW 20 MIN: CPT | Performed by: INTERNAL MEDICINE

## 2020-06-16 RX ORDER — TRAZODONE HYDROCHLORIDE 50 MG/1
TABLET ORAL
Qty: 30 TABLET | Refills: 0 | OUTPATIENT
Start: 2020-06-16

## 2020-06-16 RX ORDER — FLUOXETINE 10 MG/1
CAPSULE ORAL
Qty: 90 CAPSULE | Refills: 0 | OUTPATIENT
Start: 2020-06-16

## 2020-06-16 RX ORDER — TRAZODONE HYDROCHLORIDE 100 MG/1
100 TABLET ORAL
Qty: 30 TABLET | Refills: 0 | OUTPATIENT
Start: 2020-06-16

## 2020-06-16 RX ORDER — FLUOXETINE HYDROCHLORIDE 40 MG/1
40 CAPSULE ORAL DAILY
Qty: 30 CAPSULE | Refills: 1 | OUTPATIENT
Start: 2020-06-16 | End: 2020-08-15

## 2020-06-19 ENCOUNTER — OFFICE VISIT (OUTPATIENT)
Dept: OBGYN CLINIC | Facility: CLINIC | Age: 60
End: 2020-06-19
Payer: COMMERCIAL

## 2020-06-19 DIAGNOSIS — E66.01 MORBID OBESITY (HCC): ICD-10-CM

## 2020-06-19 DIAGNOSIS — J30.9 ALLERGIC RHINITIS, UNSPECIFIED SEASONALITY, UNSPECIFIED TRIGGER: ICD-10-CM

## 2020-06-19 DIAGNOSIS — M17.0 BILATERAL PRIMARY OSTEOARTHRITIS OF KNEE: Primary | ICD-10-CM

## 2020-06-19 PROCEDURE — 3066F NEPHROPATHY DOC TX: CPT | Performed by: ORTHOPAEDIC SURGERY

## 2020-06-19 PROCEDURE — 4004F PT TOBACCO SCREEN RCVD TLK: CPT | Performed by: ORTHOPAEDIC SURGERY

## 2020-06-19 PROCEDURE — 3079F DIAST BP 80-89 MM HG: CPT | Performed by: ORTHOPAEDIC SURGERY

## 2020-06-19 PROCEDURE — 3074F SYST BP LT 130 MM HG: CPT | Performed by: ORTHOPAEDIC SURGERY

## 2020-06-19 PROCEDURE — 20610 DRAIN/INJ JOINT/BURSA W/O US: CPT | Performed by: ORTHOPAEDIC SURGERY

## 2020-06-19 PROCEDURE — 3044F HG A1C LEVEL LT 7.0%: CPT | Performed by: ORTHOPAEDIC SURGERY

## 2020-06-19 RX ORDER — LEVOCETIRIZINE DIHYDROCHLORIDE 5 MG/1
5 TABLET, FILM COATED ORAL DAILY PRN
Qty: 30 TABLET | Refills: 0 | Status: SHIPPED | OUTPATIENT
Start: 2020-06-19 | End: 2021-05-03

## 2020-06-26 ENCOUNTER — OFFICE VISIT (OUTPATIENT)
Dept: OBGYN CLINIC | Facility: CLINIC | Age: 60
End: 2020-06-26
Payer: COMMERCIAL

## 2020-06-26 DIAGNOSIS — M17.0 BILATERAL PRIMARY OSTEOARTHRITIS OF KNEE: Primary | ICD-10-CM

## 2020-06-26 PROCEDURE — 20610 DRAIN/INJ JOINT/BURSA W/O US: CPT | Performed by: ORTHOPAEDIC SURGERY

## 2020-06-28 DIAGNOSIS — E78.5 HYPERLIPIDEMIA, UNSPECIFIED HYPERLIPIDEMIA TYPE: ICD-10-CM

## 2020-06-29 RX ORDER — ATORVASTATIN CALCIUM 20 MG/1
TABLET, FILM COATED ORAL
Qty: 90 TABLET | Refills: 0 | Status: SHIPPED | OUTPATIENT
Start: 2020-06-29 | End: 2020-08-17 | Stop reason: SDUPTHER

## 2020-07-01 ENCOUNTER — OFFICE VISIT (OUTPATIENT)
Dept: OBGYN CLINIC | Facility: CLINIC | Age: 60
End: 2020-07-01
Payer: COMMERCIAL

## 2020-07-01 DIAGNOSIS — M17.11 LOCALIZED OSTEOARTHRITIS OF RIGHT KNEE: ICD-10-CM

## 2020-07-01 DIAGNOSIS — M17.12 LOCALIZED OSTEOARTHRITIS OF LEFT KNEE: Primary | ICD-10-CM

## 2020-07-01 PROCEDURE — 20610 DRAIN/INJ JOINT/BURSA W/O US: CPT | Performed by: ORTHOPAEDIC SURGERY

## 2020-07-01 NOTE — PROGRESS NOTES
1  Localized osteoarthritis of left knee     2  Localized osteoarthritis of right knee       Patient is here for his 3rd injection of gelsyn 3 into the bilateral knee  Patient reports improvements  All organ systems normal  Physical exam of the knee shows no effusion no ecchymosis  Large joint arthrocentesis: L knee  Date/Time: 7/1/2020 3:00 PM  Consent given by: patient  Timeout: Immediately prior to procedure a time out was called to verify the correct patient, procedure, equipment, support staff and site/side marked as required   Supporting Documentation  Indications: pain   Procedure Details  Location: knee - L knee  Needle size: 22 G  Ultrasound guidance: no  Approach: anterolateral  Medications administered: 2 mL sodium hyaluronate 16 8 MG/2ML    Patient tolerance: patient tolerated the procedure well with no immediate complications    Large joint arthrocentesis: R knee  Date/Time: 7/1/2020 3:00 PM  Consent given by: patient  Supporting Documentation  Indications: pain   Procedure Details  Location: knee - R knee  Needle size: 22 G  Ultrasound guidance: no  Approach: anterolateral  Medications administered: 2 mL sodium hyaluronate 16 8 MG/2ML    Patient tolerance: patient tolerated the procedure well with no immediate complications          Patient tolerated procedure follow up p r n

## 2020-07-16 ENCOUNTER — TELEPHONE (OUTPATIENT)
Dept: BARIATRICS | Facility: CLINIC | Age: 60
End: 2020-07-16

## 2020-07-24 ENCOUNTER — TELEPHONE (OUTPATIENT)
Dept: NEPHROLOGY | Facility: CLINIC | Age: 60
End: 2020-07-24

## 2020-07-30 DIAGNOSIS — I10 ESSENTIAL HYPERTENSION: ICD-10-CM

## 2020-07-30 RX ORDER — AMLODIPINE BESYLATE 10 MG/1
TABLET ORAL
Qty: 90 TABLET | Refills: 0 | Status: SHIPPED | OUTPATIENT
Start: 2020-07-30 | End: 2021-10-18

## 2020-08-04 DIAGNOSIS — R73.03 PREDIABETES: ICD-10-CM

## 2020-08-04 DIAGNOSIS — F32.1 CURRENT MODERATE EPISODE OF MAJOR DEPRESSIVE DISORDER WITHOUT PRIOR EPISODE (HCC): ICD-10-CM

## 2020-08-04 RX ORDER — BUSPIRONE HYDROCHLORIDE 10 MG/1
TABLET ORAL
Qty: 60 TABLET | Refills: 1 | Status: SHIPPED | OUTPATIENT
Start: 2020-08-04 | End: 2020-10-26

## 2020-08-04 RX ORDER — TRAZODONE HYDROCHLORIDE 100 MG/1
TABLET ORAL
Qty: 30 TABLET | Refills: 0 | Status: SHIPPED | OUTPATIENT
Start: 2020-08-04 | End: 2020-09-02

## 2020-08-04 RX ORDER — FLUOXETINE HYDROCHLORIDE 40 MG/1
CAPSULE ORAL
Qty: 30 CAPSULE | Refills: 1 | Status: SHIPPED | OUTPATIENT
Start: 2020-08-04 | End: 2020-10-26

## 2020-08-11 ENCOUNTER — LAB (OUTPATIENT)
Dept: LAB | Facility: CLINIC | Age: 60
End: 2020-08-11
Payer: COMMERCIAL

## 2020-08-11 ENCOUNTER — TELEPHONE (OUTPATIENT)
Dept: OTHER | Facility: HOSPITAL | Age: 60
End: 2020-08-11

## 2020-08-11 ENCOUNTER — TRANSCRIBE ORDERS (OUTPATIENT)
Dept: LAB | Facility: CLINIC | Age: 60
End: 2020-08-11

## 2020-08-11 DIAGNOSIS — I10 BENIGN ESSENTIAL HTN: ICD-10-CM

## 2020-08-11 DIAGNOSIS — E55.9 VITAMIN D DEFICIENCY: ICD-10-CM

## 2020-08-11 DIAGNOSIS — E78.5 HYPERLIPIDEMIA, UNSPECIFIED HYPERLIPIDEMIA TYPE: ICD-10-CM

## 2020-08-11 DIAGNOSIS — N17.9 AKI (ACUTE KIDNEY INJURY) (HCC): Primary | ICD-10-CM

## 2020-08-11 DIAGNOSIS — R73.03 PREDIABETES: ICD-10-CM

## 2020-08-11 LAB
25(OH)D3 SERPL-MCNC: 25.5 NG/ML (ref 30–100)
ANION GAP SERPL CALCULATED.3IONS-SCNC: 7 MMOL/L (ref 4–13)
BACTERIA UR QL AUTO: ABNORMAL /HPF
BILIRUB UR QL STRIP: NEGATIVE
BUN SERPL-MCNC: 30 MG/DL (ref 5–25)
CALCIUM SERPL-MCNC: 9.3 MG/DL (ref 8.3–10.1)
CHLORIDE SERPL-SCNC: 101 MMOL/L (ref 100–108)
CHOLEST SERPL-MCNC: 101 MG/DL (ref 50–200)
CLARITY UR: CLEAR
CO2 SERPL-SCNC: 28 MMOL/L (ref 21–32)
COLOR UR: YELLOW
CREAT SERPL-MCNC: 2.24 MG/DL (ref 0.6–1.3)
CREAT UR-MCNC: 386 MG/DL
ERYTHROCYTE [DISTWIDTH] IN BLOOD BY AUTOMATED COUNT: 13.2 % (ref 11.6–15.1)
GFR SERPL CREATININE-BSD FRML MDRD: 31 ML/MIN/1.73SQ M
GLUCOSE P FAST SERPL-MCNC: 113 MG/DL (ref 65–99)
GLUCOSE UR STRIP-MCNC: NEGATIVE MG/DL
HCT VFR BLD AUTO: 40.2 % (ref 36.5–49.3)
HDLC SERPL-MCNC: 29 MG/DL
HGB BLD-MCNC: 13.9 G/DL (ref 12–17)
HGB UR QL STRIP.AUTO: NEGATIVE
HYALINE CASTS #/AREA URNS LPF: ABNORMAL /LPF
KETONES UR STRIP-MCNC: ABNORMAL MG/DL
LDLC SERPL CALC-MCNC: 49 MG/DL (ref 0–100)
LEUKOCYTE ESTERASE UR QL STRIP: NEGATIVE
MAGNESIUM SERPL-MCNC: 1.9 MG/DL (ref 1.6–2.6)
MCH RBC QN AUTO: 29 PG (ref 26.8–34.3)
MCHC RBC AUTO-ENTMCNC: 34.6 G/DL (ref 31.4–37.4)
MCV RBC AUTO: 84 FL (ref 82–98)
NITRITE UR QL STRIP: NEGATIVE
NON-SQ EPI CELLS URNS QL MICRO: ABNORMAL /HPF
OTHER STN SPEC: ABNORMAL
PH UR STRIP.AUTO: 5.5 [PH]
PHOSPHATE SERPL-MCNC: 3.3 MG/DL (ref 2.3–4.1)
PLATELET # BLD AUTO: 214 THOUSANDS/UL (ref 149–390)
PMV BLD AUTO: 10.1 FL (ref 8.9–12.7)
POTASSIUM SERPL-SCNC: 4.1 MMOL/L (ref 3.5–5.3)
PROT UR STRIP-MCNC: NEGATIVE MG/DL
PROT UR-MCNC: 22 MG/DL
PROT/CREAT UR: 0.06 MG/G{CREAT} (ref 0–0.1)
RBC # BLD AUTO: 4.8 MILLION/UL (ref 3.88–5.62)
RBC #/AREA URNS AUTO: ABNORMAL /HPF
SODIUM SERPL-SCNC: 136 MMOL/L (ref 136–145)
SP GR UR STRIP.AUTO: 1.02 (ref 1–1.03)
TRIGL SERPL-MCNC: 113 MG/DL
UROBILINOGEN UR QL STRIP.AUTO: 0.2 E.U./DL
WBC # BLD AUTO: 8.94 THOUSAND/UL (ref 4.31–10.16)
WBC #/AREA URNS AUTO: ABNORMAL /HPF

## 2020-08-11 PROCEDURE — 81001 URINALYSIS AUTO W/SCOPE: CPT

## 2020-08-11 PROCEDURE — 36415 COLL VENOUS BLD VENIPUNCTURE: CPT

## 2020-08-11 PROCEDURE — 82306 VITAMIN D 25 HYDROXY: CPT

## 2020-08-11 PROCEDURE — 83735 ASSAY OF MAGNESIUM: CPT

## 2020-08-11 PROCEDURE — 84100 ASSAY OF PHOSPHORUS: CPT

## 2020-08-11 PROCEDURE — 85027 COMPLETE CBC AUTOMATED: CPT

## 2020-08-11 PROCEDURE — 82570 ASSAY OF URINE CREATININE: CPT

## 2020-08-11 PROCEDURE — 80061 LIPID PANEL: CPT

## 2020-08-11 PROCEDURE — 84156 ASSAY OF PROTEIN URINE: CPT

## 2020-08-11 PROCEDURE — 80048 BASIC METABOLIC PNL TOTAL CA: CPT

## 2020-08-11 NOTE — RESULT ENCOUNTER NOTE
Hello    Patient normally is followed up by Ms Gloria Sutton  Please mail pt BMP slip  Pt is aware   Will go friday    Thank you    np

## 2020-08-11 NOTE — TELEPHONE ENCOUNTER
Called pt to review abn labs  Cr 2 2  pt feeling well  No fevers, chills, nausea, vomiting  States may not be drinking enough fluids but otherwise feels well    Will ask pt to hold losartan for two days and restart Friday    BMP Friday    Increase fluid intake    Pt has other agents that can also contribute to NORRIS with spironolactone, chlorthalidone  But will attempt one change at a time due to pt's HTN issues    BP stable 118-120 which is unchanged

## 2020-08-14 ENCOUNTER — LAB (OUTPATIENT)
Dept: LAB | Facility: CLINIC | Age: 60
End: 2020-08-14
Payer: COMMERCIAL

## 2020-08-14 DIAGNOSIS — N17.9 AKI (ACUTE KIDNEY INJURY) (HCC): ICD-10-CM

## 2020-08-14 LAB
ANION GAP SERPL CALCULATED.3IONS-SCNC: 7 MMOL/L (ref 4–13)
BUN SERPL-MCNC: 34 MG/DL (ref 5–25)
CALCIUM SERPL-MCNC: 9.1 MG/DL (ref 8.3–10.1)
CHLORIDE SERPL-SCNC: 101 MMOL/L (ref 100–108)
CO2 SERPL-SCNC: 26 MMOL/L (ref 21–32)
CREAT SERPL-MCNC: 1.97 MG/DL (ref 0.6–1.3)
GFR SERPL CREATININE-BSD FRML MDRD: 36 ML/MIN/1.73SQ M
GLUCOSE SERPL-MCNC: 90 MG/DL (ref 65–140)
POTASSIUM SERPL-SCNC: 4.8 MMOL/L (ref 3.5–5.3)
SODIUM SERPL-SCNC: 134 MMOL/L (ref 136–145)

## 2020-08-14 PROCEDURE — 80048 BASIC METABOLIC PNL TOTAL CA: CPT

## 2020-08-14 PROCEDURE — 36415 COLL VENOUS BLD VENIPUNCTURE: CPT

## 2020-08-16 NOTE — RESULT ENCOUNTER NOTE
Hello    Patient normally is followed up by Ms Kyle Garay  Please let the patient know that the renal function is improving but not yet back to baseline   -please have the patient continue his medications with the exception of the following  -please ask the patient to lower chlorthalidone to half a tablet a day    Patient is currently on 1 tablet of 25 mg a day, if you can confirm  -please have the patient repeat a BMP in 1 week    Thank you    np

## 2020-08-17 ENCOUNTER — OFFICE VISIT (OUTPATIENT)
Dept: FAMILY MEDICINE CLINIC | Facility: CLINIC | Age: 60
End: 2020-08-17
Payer: COMMERCIAL

## 2020-08-17 ENCOUNTER — DOCUMENTATION (OUTPATIENT)
Dept: NEPHROLOGY | Facility: CLINIC | Age: 60
End: 2020-08-17

## 2020-08-17 ENCOUNTER — TELEPHONE (OUTPATIENT)
Dept: NEPHROLOGY | Facility: CLINIC | Age: 60
End: 2020-08-17

## 2020-08-17 VITALS
DIASTOLIC BLOOD PRESSURE: 78 MMHG | HEART RATE: 68 BPM | RESPIRATION RATE: 18 BRPM | HEIGHT: 69 IN | TEMPERATURE: 97.7 F | OXYGEN SATURATION: 98 % | WEIGHT: 315 LBS | BODY MASS INDEX: 46.65 KG/M2 | SYSTOLIC BLOOD PRESSURE: 128 MMHG

## 2020-08-17 DIAGNOSIS — R73.03 PREDIABETES: Primary | ICD-10-CM

## 2020-08-17 DIAGNOSIS — I10 ESSENTIAL HYPERTENSION: ICD-10-CM

## 2020-08-17 DIAGNOSIS — E66.01 MORBID OBESITY (HCC): ICD-10-CM

## 2020-08-17 DIAGNOSIS — R79.89 ELEVATED SERUM CREATININE: Primary | ICD-10-CM

## 2020-08-17 DIAGNOSIS — E78.5 HYPERLIPIDEMIA, UNSPECIFIED HYPERLIPIDEMIA TYPE: ICD-10-CM

## 2020-08-17 DIAGNOSIS — F41.8 DEPRESSION WITH ANXIETY: ICD-10-CM

## 2020-08-17 LAB — SL AMB POCT HEMOGLOBIN AIC: 5.6 (ref ?–6.5)

## 2020-08-17 PROCEDURE — 3066F NEPHROPATHY DOC TX: CPT | Performed by: NURSE PRACTITIONER

## 2020-08-17 PROCEDURE — 99214 OFFICE O/P EST MOD 30 MIN: CPT | Performed by: NURSE PRACTITIONER

## 2020-08-17 PROCEDURE — 3008F BODY MASS INDEX DOCD: CPT | Performed by: PSYCHIATRY & NEUROLOGY

## 2020-08-17 PROCEDURE — 83036 HEMOGLOBIN GLYCOSYLATED A1C: CPT | Performed by: NURSE PRACTITIONER

## 2020-08-17 PROCEDURE — 3074F SYST BP LT 130 MM HG: CPT | Performed by: NURSE PRACTITIONER

## 2020-08-17 PROCEDURE — 4004F PT TOBACCO SCREEN RCVD TLK: CPT | Performed by: NURSE PRACTITIONER

## 2020-08-17 PROCEDURE — 3044F HG A1C LEVEL LT 7.0%: CPT | Performed by: NURSE PRACTITIONER

## 2020-08-17 PROCEDURE — 3078F DIAST BP <80 MM HG: CPT | Performed by: NURSE PRACTITIONER

## 2020-08-17 PROCEDURE — 3008F BODY MASS INDEX DOCD: CPT | Performed by: NURSE PRACTITIONER

## 2020-08-17 RX ORDER — ATORVASTATIN CALCIUM 20 MG/1
20 TABLET, FILM COATED ORAL DAILY
Qty: 90 TABLET | Refills: 0 | Status: SHIPPED | OUTPATIENT
Start: 2020-08-17 | End: 2020-12-18

## 2020-08-17 NOTE — PROGRESS NOTES
Assessment/Plan:      Diagnoses and all orders for this visit:    Prediabetes  -     POCT hemoglobin A1c  -     Lipid Panel with Direct LDL reflex; Future  -     HEMOGLOBIN A1C W/ EAG ESTIMATION; Future    POCT hemoglobin A1C 5 6  Patient's GRF 36 and Creatinine is 1 97  Patient instructed to stop his metformin  Low carb diet reviewed  Repeat Hemoglobin A1C ordered  Hyperlipidemia, unspecified hyperlipidemia type  -     atorvastatin (LIPITOR) 20 mg tablet; Take 1 tablet (20 mg total) by mouth daily  -     Lipid Panel with Direct LDL reflex; Future  LDL 49  Continue lipitor 20mg daily  Repeat lipid panel ordered  Patient instructed to follow-up with cardiology due to multiple risk factors for cardiac disease  Essential hypertension  -     Lipid Panel with Direct LDL reflex; Future  Continue to follow-up with nephrology  Depression with anxiety  Denies any suicidal thoughts  Patient reports that his symptoms have improved  Continue to follow-up with psychiatry  Morbid obesity ([facility] [address] )  -     Lipid Panel with Direct LDL reflex; Future  -     HEMOGLOBIN A1C W/ EAG ESTIMATION; Future  Patient instructed to eat a healthy low fat/low carb diet  Patient instructed to exercise on a regular basis  Patient instructed to follow-up with weight management  Patient instructed to follow-up in 6 months or sooner prn  Subjective:     Patient ID: Janny Griffin  is a 60 y o  male  Patient is here for a follow-up for chronic medical conditions  Patient is here for a follow-up for prediabetes and obesity  Patient reports that he takes metformin with dinner  Patient reports that he tries to watch his diet  Patient is here for a follow-up for hyperlipidemia  Patient reports that he takes atorvastatin daily  Denies any side effects  Patient reports that he tries to watch his diet  Patient follows up with psychiatry for depression and anxiety   Patient reports that he takes his medications as prescribed  Patient reports that his symptoms have improved  Denies any suicidal or homicidal thoughts  Patient follows up with nephrology for HTN  Patient reports that he takes his medications prescribed  Patient reports that he recently had lab work done for nephrology  Denies any chest pain, SOB, palpitations, Has, or dizziness  Patient follows up with pain management for chronic back pain  Review of Systems   Constitutional: Negative for chills and fever  HENT: Negative for congestion, ear pain and sore throat  Respiratory: Negative for cough, chest tightness, shortness of breath and wheezing  Cardiovascular: Negative for chest pain, palpitations and leg swelling  Gastrointestinal: Negative for abdominal pain, diarrhea, nausea and vomiting  Musculoskeletal:        As noted in HPI  Skin: Negative for rash  Neurological: Negative for dizziness, seizures, syncope, light-headedness and headaches  Psychiatric/Behavioral: Negative for suicidal ideas  As noted in HPI  Objective:     Physical Exam  Constitutional:       General: He is not in acute distress  Appearance: He is obese  He is not ill-appearing  HENT:      Right Ear: External ear normal       Left Ear: External ear normal       Mouth/Throat:      Mouth: Mucous membranes are moist       Pharynx: Oropharynx is clear  Eyes:      Conjunctiva/sclera: Conjunctivae normal       Pupils: Pupils are equal, round, and reactive to light  Cardiovascular:      Rate and Rhythm: Normal rate and regular rhythm  Comments: No edema noted  Pulmonary:      Effort: Pulmonary effort is normal  No respiratory distress  Breath sounds: Normal breath sounds  No wheezing  Musculoskeletal:      Comments: Gait wnl  Skin:     Findings: No rash  Neurological:      Mental Status: He is alert and oriented to person, place, and time  Psychiatric:         Mood and Affect: Mood normal        BMI Counseling:  Body mass index is 47 55 kg/m²  The BMI is above normal  Nutrition recommendations include 3-5 servings of fruits/vegetables daily, consuming healthier snacks, reducing intake of saturated fat and trans fat and reducing intake of cholesterol  Exercise recommendations include exercising 3-5 times per week

## 2020-08-17 NOTE — TELEPHONE ENCOUNTER
----- Message from Eduin Key MD sent at 8/16/2020  3:51 PM EDT -----  Hello    Patient normally is followed up by Ms Bernie Vallecillo  Please let the patient know that the renal function is improving but not yet back to baseline   -please have the patient continue his medications with the exception of the following  -please ask the patient to lower chlorthalidone to half a tablet a day    Patient is currently on 1 tablet of 25 mg a day, if you can confirm  -please have the patient repeat a BMP in 1 week    Thank you    np

## 2020-08-17 NOTE — TELEPHONE ENCOUNTER
Pt advised to decrease Chlorthalidone to 1/2 tablet daily and repeat BMP in one week per Dr Damian   CR improving, not quite back to baseline

## 2020-08-19 ENCOUNTER — OFFICE VISIT (OUTPATIENT)
Dept: PSYCHIATRY | Facility: CLINIC | Age: 60
End: 2020-08-19
Payer: COMMERCIAL

## 2020-08-19 DIAGNOSIS — F41.9 ANXIETY DISORDER, UNSPECIFIED TYPE: Primary | ICD-10-CM

## 2020-08-19 DIAGNOSIS — F32.1 CURRENT MODERATE EPISODE OF MAJOR DEPRESSIVE DISORDER WITHOUT PRIOR EPISODE (HCC): ICD-10-CM

## 2020-08-19 PROCEDURE — 4004F PT TOBACCO SCREEN RCVD TLK: CPT | Performed by: PSYCHIATRY & NEUROLOGY

## 2020-08-19 PROCEDURE — 99213 OFFICE O/P EST LOW 20 MIN: CPT | Performed by: PSYCHIATRY & NEUROLOGY

## 2020-08-19 NOTE — PSYCH
MEDICATION MANAGEMENT NOTE        [place] [address] [employer] [facility]      Name and Date of Birth:  Razia Esquivel  60 y o  1960 MRN: [id number]    Date of Visit: August 19, 2020    Reason for Visit:  Follow-up for medication management    Subjective: The patient reports July was tough month for him as it was 1 year anniversary of his son passing away  The patient reports he was emotional, feeling very low, tired all the time, not motivated but has been doing better since then  Reports still gets emotional at times but able to cope with it well  Reports though his sleep has been erratic lately with some days he might sleep too much and other days not sleeping too well  The patient was educated about sleep hygiene and was advised to follow recommendation  He also takes trazodone for sleep at night  The patient also reported he has been anxious lately procrastinating about everything  Denies any suicidal thoughts  Reports appetite has been okay  Compliant with the medication and denies any side effect  Followed with primary care physician recently  The patient's sodium was slightly low last check on August 14 and also kidney function test was abnormal   The patient is going to do follow-up blood work again this Friday  Review Of Systems:      Constitutional negative   ENT negative   Cardiovascular negative   Respiratory negative   Gastrointestinal negative   Genitourinary negative   Musculoskeletal negative   Integumentary negative   Neurological negative   Endocrine negative   Other Symptoms none       Alcohol/Substance Abuse:  Denies          Past Medical History:    Past Medical History:   Diagnosis Date    Anxiety     Back pain     Chronic pain     Diabetes mellitus ([facility] [address] )     6/19/15 A1C: 5 5%    Hyperlipidemia     Hypertension     Knee pain     Psychiatric disorder     Type 2 diabetes mellitus without complication ([facility] [address] ) 9/36/0935    Urinary retention Past Medical History Pertinent Negatives:   Diagnosis Date Noted    Heart failure ([facility] [address] ) 03/17/2016     Past Surgical History:   Procedure Laterality Date    BACK SURGERY      LUMBAR FUSION  2002    LUMBAR FUSION  2004    L4-5-6, S1 has pump for narcotics     Allergies   Allergen Reactions    Gabapentin      Annotation - [id number]: dizziness    Pregabalin      Annotation - [id number]: vision changes and mood changes    Tramadol Nausea Only       Current Medications:       Current Outpatient Medications:     amLODIPine (NORVASC) 10 mg tablet, take 1 tablet by mouth once daily, Disp: 90 tablet, Rfl: 0    atorvastatin (LIPITOR) 20 mg tablet, Take 1 tablet (20 mg total) by mouth daily, Disp: 90 tablet, Rfl: 0    busPIRone (BUSPAR) 10 mg tablet, take 1 tablet by mouth twice a day, Disp: 60 tablet, Rfl: 1    chlorthalidone 25 mg tablet, Take 1 tablet (25 mg total) by mouth daily (Patient taking differently: Take 25 mg by mouth daily Take one half tablet daily), Disp: 90 tablet, Rfl: 3    diclofenac sodium (VOLTAREN) 1 %, Apply 2 g topically 4 (four) times a day, Disp: 1 Tube, Rfl: 3    doxazosin (CARDURA) 2 mg tablet, Take 2 tablets (4 mg total) by mouth daily at bedtime, Disp: 180 tablet, Rfl: 3    ergocalciferol (ERGOCALCIFEROL) 1 25 MG (66228 UT) capsule, Take 1 capsule (50,000 Units total) by mouth once a week, Disp: 12 capsule, Rfl: 0    FLUoxetine (PROzac) 40 MG capsule, take 1 capsule by mouth once daily, Disp: 30 capsule, Rfl: 1    ketoconazole (NIZORAL) 2 % cream, 1 application daily Apply to affected area, Disp: , Rfl: 0    levocetirizine (XYZAL) 5 MG tablet, Take 1 tablet (5 mg total) by mouth daily as needed for allergies, Disp: 30 tablet, Rfl: 0    losartan (COZAAR) 100 MG tablet, Take 1 tablet (100 mg total) by mouth daily, Disp: 90 tablet, Rfl: 3    metoprolol tartrate (LOPRESSOR) 100 mg tablet, Take 1 tablet (100 mg total) by mouth 2 (two) times a day, Disp: 180 tablet, Rfl: 3   morphine (MS CONTIN) 15 mg 12 hr tablet, take 1 tablet every 8 hours if needed pain, Disp: , Rfl: 0    oxyCODONE (ROXICODONE) 30 MG immediate release tablet, take 1 tablet by mouth every 5 hours if needed for pain   Ericka Freeman MAX 4/DAY, Disp: , Rfl: 0    spironolactone (ALDACTONE) 25 mg tablet, Take 1 tablet (25 mg total) by mouth daily, Disp: 30 tablet, Rfl: 5    tadalafil (CIALIS) 5 MG tablet, Take 5 mg by mouth daily as needed for erectile dysfunction (unsure dosage) Indications: Erectile Dysfunction  , Disp: , Rfl:     traZODone (DESYREL) 100 mg tablet, take 1 tablet by mouth at bedtime if needed for sleep, Disp: 30 tablet, Rfl: 0       History Review: The following portions of the patient's history were reviewed and updated as appropriate: allergies, current medications, past family history, past medical history, past social history, past surgical history and problem list          OBJECTIVE:     Vital signs in last 24 hours: There were no vitals filed for this visit      Mental Status Evaluation:    Appearance age appropriate, casually dressed   Behavior cooperative, calm   Speech normal rate, normal volume, normal pitch   Mood depressed, anxious   Affect normal range and intensity, appropriate   Thought Processes organized, goal directed   Associations intact associations   Thought Content no overt delusions   Perceptual Disturbances: no auditory hallucinations, no visual hallucinations   Abnormal Thoughts  Risk Potential Suicidal ideation - None  Homicidal ideation - None  Potential for aggression - No   Orientation oriented to person, place, time/date and situation   Memory recent and remote memory grossly intact   Consciousness alert and awake   Attention Span Concentration Span attention span and concentration are age appropriate   Intellect appears to be of average intelligence   Insight intact   Judgement intact   Muscle Strength and  Gait normal gait and normal balance   Motor activity no abnormal movements Language no difficulty naming common objects, no difficulty repeating a phrase, no difficulty writing a sentence   Fund of Knowledge adequate knowledge of current events  adequate fund of knowledge regarding past history  adequate fund of knowledge regarding vocabulary    Pain none   Pain Scale 0       Laboratory Results:   Most Recent Labs:   Lab Results   Component Value Date    WBC 8 94 08/11/2020    RBC 4 80 08/11/2020    HGB 13 9 08/11/2020    HCT 40 2 08/11/2020     08/11/2020    RDW 13 2 08/11/2020    NEUTROABS 5 20 02/18/2020     09/28/2017     09/28/2017    K 4 8 08/14/2020     08/14/2020    CO2 26 08/14/2020    BUN 34 (H) 08/14/2020    CREATININE 1 97 (H) 08/14/2020    GLUCOSE 148 (H) 10/14/2016    CALCIUM 9 1 08/14/2020    AST 16 (L) 01/08/2020    ALT 22 01/08/2020    ALKPHOS 70 01/08/2020    PROT 8 3 (H) 06/19/2015    BILITOT 0 5 06/19/2015    CHOL 109 10/14/2016    HDL 29 (L) 08/11/2020    TRIG 113 08/11/2020    LDLCALC 49 08/11/2020    RQZ1TRENKNBR 2 190 09/06/2017     I have personally reviewed all pertinent laboratory/tests results  Assessment/Plan:  The patient endorsing mild to moderate depression and anxiety recently  No SI  Sleep varies from night to night  Plan is to continue with the current medication at this time with no change  The patient has been advised to call us after he does the blood work  If the patient's sodium level is back to normal range and improvement in kidney function then I will consider going up on the BuSpar dose to 15 mg twice a day for anxiety and depression  Patient was educated about this plan and he agrees with it  Patient was again educated about medication including benefit, risk, side effects, alternative, contraindication, dosage and frequency  He verbalized understanding agrees with the plan  He will follow-up with me in 6 weeks or sooner if needed    He was advised to call us if there is any concern and to call 911 or visit nearby ER in case of any emergency or having any SI  The patient agreed  Diagnoses and all orders for this visit:    Anxiety disorder, unspecified type    Current moderate episode of major depressive disorder without prior episode [facility])          Treatment Recommendations/Precautions:      Aware of 24 hour and weekend coverage for urgent situations accessed by calling [facility] main practice number    Risks/Benefits      Risks, Benefits And Possible Side Effects Of Medications:    Risks, benefits, and possible side effects of medications explained to [facility] and he verbalizes understanding and agreement for treatment  Controlled Medication Discussion:     Not applicable    Psychotherapy Provided:     Individual psychotherapy provided: Reassurance and supportive therapy provided        Treatment Plan;    Completed and signed during the session: Not applicable - Treatment Plan not due at this session    Windy Drummond MD 08/19/20

## 2020-09-02 DIAGNOSIS — F32.1 CURRENT MODERATE EPISODE OF MAJOR DEPRESSIVE DISORDER WITHOUT PRIOR EPISODE (HCC): ICD-10-CM

## 2020-09-02 RX ORDER — TRAZODONE HYDROCHLORIDE 100 MG/1
TABLET ORAL
Qty: 30 TABLET | Refills: 0 | Status: SHIPPED | OUTPATIENT
Start: 2020-09-02 | End: 2020-10-26

## 2020-10-19 DIAGNOSIS — F32.1 CURRENT MODERATE EPISODE OF MAJOR DEPRESSIVE DISORDER WITHOUT PRIOR EPISODE (HCC): ICD-10-CM

## 2020-10-23 ENCOUNTER — TELEPHONE (OUTPATIENT)
Dept: PSYCHIATRY | Facility: CLINIC | Age: 60
End: 2020-10-23

## 2020-10-26 RX ORDER — FLUOXETINE HYDROCHLORIDE 40 MG/1
CAPSULE ORAL
Qty: 30 CAPSULE | Refills: 1 | Status: SHIPPED | OUTPATIENT
Start: 2020-10-26 | End: 2020-10-27 | Stop reason: SDUPTHER

## 2020-10-26 RX ORDER — BUSPIRONE HYDROCHLORIDE 10 MG/1
TABLET ORAL
Qty: 60 TABLET | Refills: 1 | Status: SHIPPED | OUTPATIENT
Start: 2020-10-26 | End: 2020-10-27 | Stop reason: SDUPTHER

## 2020-10-26 RX ORDER — TRAZODONE HYDROCHLORIDE 100 MG/1
TABLET ORAL
Qty: 30 TABLET | Refills: 0 | Status: SHIPPED | OUTPATIENT
Start: 2020-10-26 | End: 2020-10-27 | Stop reason: SDUPTHER

## 2020-10-27 ENCOUNTER — TELEPHONE (OUTPATIENT)
Dept: PSYCHIATRY | Facility: CLINIC | Age: 60
End: 2020-10-27

## 2020-10-27 DIAGNOSIS — F32.1 CURRENT MODERATE EPISODE OF MAJOR DEPRESSIVE DISORDER WITHOUT PRIOR EPISODE (HCC): ICD-10-CM

## 2020-10-27 RX ORDER — FLUOXETINE HYDROCHLORIDE 40 MG/1
40 CAPSULE ORAL DAILY
Qty: 30 CAPSULE | Refills: 1 | Status: SHIPPED | OUTPATIENT
Start: 2020-10-27 | End: 2021-05-19

## 2020-10-27 RX ORDER — BUSPIRONE HYDROCHLORIDE 10 MG/1
10 TABLET ORAL 2 TIMES DAILY
Qty: 60 TABLET | Refills: 1 | Status: SHIPPED | OUTPATIENT
Start: 2020-10-27 | End: 2021-04-04

## 2020-10-27 RX ORDER — TRAZODONE HYDROCHLORIDE 100 MG/1
100 TABLET ORAL
Qty: 30 TABLET | Refills: 1 | Status: SHIPPED | OUTPATIENT
Start: 2020-10-27 | End: 2020-12-11

## 2020-11-06 ENCOUNTER — LAB (OUTPATIENT)
Dept: LAB | Facility: CLINIC | Age: 60
End: 2020-11-06
Payer: COMMERCIAL

## 2020-11-06 DIAGNOSIS — R79.89 ELEVATED SERUM CREATININE: ICD-10-CM

## 2020-11-06 LAB
ANION GAP SERPL CALCULATED.3IONS-SCNC: 10 MMOL/L (ref 4–13)
BUN SERPL-MCNC: 23 MG/DL (ref 5–25)
CALCIUM SERPL-MCNC: 9.3 MG/DL (ref 8.3–10.1)
CHLORIDE SERPL-SCNC: 102 MMOL/L (ref 100–108)
CO2 SERPL-SCNC: 26 MMOL/L (ref 21–32)
CREAT SERPL-MCNC: 1.65 MG/DL (ref 0.6–1.3)
GFR SERPL CREATININE-BSD FRML MDRD: 44 ML/MIN/1.73SQ M
GLUCOSE P FAST SERPL-MCNC: 120 MG/DL (ref 65–99)
POTASSIUM SERPL-SCNC: 4.1 MMOL/L (ref 3.5–5.3)
SODIUM SERPL-SCNC: 138 MMOL/L (ref 136–145)

## 2020-11-06 PROCEDURE — 36415 COLL VENOUS BLD VENIPUNCTURE: CPT

## 2020-11-06 PROCEDURE — 80048 BASIC METABOLIC PNL TOTAL CA: CPT

## 2020-11-09 ENCOUNTER — TELEPHONE (OUTPATIENT)
Dept: NEPHROLOGY | Facility: CLINIC | Age: 60
End: 2020-11-09

## 2020-11-11 ENCOUNTER — TELEMEDICINE (OUTPATIENT)
Dept: PSYCHIATRY | Facility: CLINIC | Age: 60
End: 2020-11-11
Payer: COMMERCIAL

## 2020-11-11 DIAGNOSIS — F32.1 CURRENT MODERATE EPISODE OF MAJOR DEPRESSIVE DISORDER WITHOUT PRIOR EPISODE (HCC): Primary | ICD-10-CM

## 2020-11-11 PROCEDURE — 99213 OFFICE O/P EST LOW 20 MIN: CPT | Performed by: PSYCHIATRY & NEUROLOGY

## 2020-11-11 RX ORDER — ARIPIPRAZOLE 5 MG/1
TABLET ORAL
Qty: 28 TABLET | Refills: 0 | Status: SHIPPED | OUTPATIENT
Start: 2020-11-11 | End: 2020-12-01 | Stop reason: SDUPTHER

## 2020-11-16 ENCOUNTER — OFFICE VISIT (OUTPATIENT)
Dept: OBGYN CLINIC | Facility: CLINIC | Age: 60
End: 2020-11-16
Payer: COMMERCIAL

## 2020-11-16 VITALS
DIASTOLIC BLOOD PRESSURE: 70 MMHG | BODY MASS INDEX: 46.65 KG/M2 | HEART RATE: 70 BPM | WEIGHT: 315 LBS | HEIGHT: 69 IN | SYSTOLIC BLOOD PRESSURE: 128 MMHG

## 2020-11-16 DIAGNOSIS — M17.0 BILATERAL PRIMARY OSTEOARTHRITIS OF KNEE: Primary | ICD-10-CM

## 2020-11-16 PROCEDURE — 3078F DIAST BP <80 MM HG: CPT | Performed by: ORTHOPAEDIC SURGERY

## 2020-11-16 PROCEDURE — 3074F SYST BP LT 130 MM HG: CPT | Performed by: ORTHOPAEDIC SURGERY

## 2020-11-16 PROCEDURE — 20610 DRAIN/INJ JOINT/BURSA W/O US: CPT | Performed by: ORTHOPAEDIC SURGERY

## 2020-11-16 PROCEDURE — 99213 OFFICE O/P EST LOW 20 MIN: CPT | Performed by: ORTHOPAEDIC SURGERY

## 2020-11-16 RX ORDER — BETAMETHASONE SODIUM PHOSPHATE AND BETAMETHASONE ACETATE 3; 3 MG/ML; MG/ML
12 INJECTION, SUSPENSION INTRA-ARTICULAR; INTRALESIONAL; INTRAMUSCULAR; SOFT TISSUE
Status: COMPLETED | OUTPATIENT
Start: 2020-11-16 | End: 2020-11-16

## 2020-11-16 RX ORDER — LIDOCAINE HYDROCHLORIDE 10 MG/ML
1 INJECTION, SOLUTION INFILTRATION; PERINEURAL
Status: COMPLETED | OUTPATIENT
Start: 2020-11-16 | End: 2020-11-16

## 2020-11-16 RX ORDER — BUPIVACAINE HYDROCHLORIDE 5 MG/ML
2 INJECTION, SOLUTION EPIDURAL; INTRACAUDAL
Status: COMPLETED | OUTPATIENT
Start: 2020-11-16 | End: 2020-11-16

## 2020-11-16 RX ADMIN — BUPIVACAINE HYDROCHLORIDE 2 ML: 5 INJECTION, SOLUTION EPIDURAL; INTRACAUDAL at 11:25

## 2020-11-16 RX ADMIN — BETAMETHASONE SODIUM PHOSPHATE AND BETAMETHASONE ACETATE 12 MG: 3; 3 INJECTION, SUSPENSION INTRA-ARTICULAR; INTRALESIONAL; INTRAMUSCULAR; SOFT TISSUE at 11:25

## 2020-11-16 RX ADMIN — BUPIVACAINE HYDROCHLORIDE 2 ML: 5 INJECTION, SOLUTION EPIDURAL; INTRACAUDAL at 11:24

## 2020-11-16 RX ADMIN — LIDOCAINE HYDROCHLORIDE 1 ML: 10 INJECTION, SOLUTION INFILTRATION; PERINEURAL at 11:24

## 2020-11-16 RX ADMIN — LIDOCAINE HYDROCHLORIDE 1 ML: 10 INJECTION, SOLUTION INFILTRATION; PERINEURAL at 11:25

## 2020-11-16 RX ADMIN — BETAMETHASONE SODIUM PHOSPHATE AND BETAMETHASONE ACETATE 12 MG: 3; 3 INJECTION, SUSPENSION INTRA-ARTICULAR; INTRALESIONAL; INTRAMUSCULAR; SOFT TISSUE at 11:24

## 2020-11-23 ENCOUNTER — OFFICE VISIT (OUTPATIENT)
Dept: NEPHROLOGY | Facility: CLINIC | Age: 60
End: 2020-11-23
Payer: COMMERCIAL

## 2020-11-23 VITALS
WEIGHT: 315 LBS | HEIGHT: 69 IN | DIASTOLIC BLOOD PRESSURE: 78 MMHG | BODY MASS INDEX: 46.65 KG/M2 | SYSTOLIC BLOOD PRESSURE: 132 MMHG

## 2020-11-23 DIAGNOSIS — N18.31 STAGE 3A CHRONIC KIDNEY DISEASE (HCC): Primary | ICD-10-CM

## 2020-11-23 PROCEDURE — 3008F BODY MASS INDEX DOCD: CPT | Performed by: INTERNAL MEDICINE

## 2020-11-23 PROCEDURE — 99214 OFFICE O/P EST MOD 30 MIN: CPT | Performed by: INTERNAL MEDICINE

## 2020-11-23 PROCEDURE — 4004F PT TOBACCO SCREEN RCVD TLK: CPT | Performed by: INTERNAL MEDICINE

## 2020-12-01 ENCOUNTER — TELEMEDICINE (OUTPATIENT)
Dept: PSYCHIATRY | Facility: CLINIC | Age: 60
End: 2020-12-01
Payer: COMMERCIAL

## 2020-12-01 ENCOUNTER — HOSPITAL ENCOUNTER (OUTPATIENT)
Dept: ULTRASOUND IMAGING | Facility: HOSPITAL | Age: 60
Discharge: HOME | End: 2020-12-01
Attending: INTERNAL MEDICINE
Payer: COMMERCIAL

## 2020-12-01 DIAGNOSIS — F32.1 CURRENT MODERATE EPISODE OF MAJOR DEPRESSIVE DISORDER WITHOUT PRIOR EPISODE (HCC): ICD-10-CM

## 2020-12-01 DIAGNOSIS — N28.1 RENAL CYST: ICD-10-CM

## 2020-12-01 DIAGNOSIS — I10 BENIGN ESSENTIAL HTN: ICD-10-CM

## 2020-12-01 PROCEDURE — 76770 US EXAM ABDO BACK WALL COMP: CPT

## 2020-12-01 PROCEDURE — 99214 OFFICE O/P EST MOD 30 MIN: CPT | Performed by: PSYCHIATRY & NEUROLOGY

## 2020-12-01 RX ORDER — ARIPIPRAZOLE 5 MG/1
7.5 TABLET ORAL DAILY
Qty: 45 TABLET | Refills: 0 | Status: SHIPPED | OUTPATIENT
Start: 2020-12-01 | End: 2020-12-11 | Stop reason: SDUPTHER

## 2020-12-02 ENCOUNTER — TELEPHONE (OUTPATIENT)
Dept: NEPHROLOGY | Facility: CLINIC | Age: 60
End: 2020-12-02

## 2020-12-10 DIAGNOSIS — F32.1 CURRENT MODERATE EPISODE OF MAJOR DEPRESSIVE DISORDER WITHOUT PRIOR EPISODE (HCC): ICD-10-CM

## 2020-12-11 ENCOUNTER — TELEPHONE (OUTPATIENT)
Dept: PSYCHIATRY | Facility: CLINIC | Age: 60
End: 2020-12-11

## 2020-12-11 DIAGNOSIS — F32.1 CURRENT MODERATE EPISODE OF MAJOR DEPRESSIVE DISORDER WITHOUT PRIOR EPISODE (HCC): Primary | ICD-10-CM

## 2020-12-11 DIAGNOSIS — F32.1 CURRENT MODERATE EPISODE OF MAJOR DEPRESSIVE DISORDER WITHOUT PRIOR EPISODE (HCC): ICD-10-CM

## 2020-12-11 RX ORDER — TRAZODONE HYDROCHLORIDE 100 MG/1
TABLET ORAL
Qty: 30 TABLET | Refills: 1 | Status: SHIPPED | OUTPATIENT
Start: 2020-12-11 | End: 2021-05-03

## 2020-12-11 RX ORDER — ARIPIPRAZOLE 15 MG/1
7.5 TABLET ORAL DAILY
Qty: 15 TABLET | Refills: 0 | Status: SHIPPED | OUTPATIENT
Start: 2020-12-11 | End: 2021-01-07

## 2020-12-11 RX ORDER — ARIPIPRAZOLE 5 MG/1
5 TABLET ORAL DAILY
Qty: 3 TABLET | Refills: 0 | Status: SHIPPED | OUTPATIENT
Start: 2020-12-11 | End: 2021-05-03

## 2020-12-14 DIAGNOSIS — F32.1 CURRENT MODERATE EPISODE OF MAJOR DEPRESSIVE DISORDER WITHOUT PRIOR EPISODE (HCC): ICD-10-CM

## 2020-12-14 RX ORDER — ARIPIPRAZOLE 5 MG/1
TABLET ORAL
Qty: 3 TABLET | Refills: 0 | OUTPATIENT
Start: 2020-12-14

## 2020-12-18 DIAGNOSIS — E78.5 HYPERLIPIDEMIA, UNSPECIFIED HYPERLIPIDEMIA TYPE: ICD-10-CM

## 2020-12-18 RX ORDER — ATORVASTATIN CALCIUM 20 MG/1
TABLET, FILM COATED ORAL
Qty: 90 TABLET | Refills: 0 | Status: SHIPPED | OUTPATIENT
Start: 2020-12-18 | End: 2021-05-27

## 2021-01-02 DIAGNOSIS — I10 BENIGN ESSENTIAL HTN: ICD-10-CM

## 2021-01-02 DIAGNOSIS — N28.1 RENAL CYST: ICD-10-CM

## 2021-01-03 RX ORDER — SPIRONOLACTONE 25 MG/1
TABLET ORAL
Qty: 30 TABLET | Refills: 5 | Status: SHIPPED | OUTPATIENT
Start: 2021-01-03 | End: 2021-11-11

## 2021-01-07 DIAGNOSIS — F32.1 CURRENT MODERATE EPISODE OF MAJOR DEPRESSIVE DISORDER WITHOUT PRIOR EPISODE (HCC): ICD-10-CM

## 2021-01-07 RX ORDER — ARIPIPRAZOLE 15 MG/1
TABLET ORAL
Qty: 15 TABLET | Refills: 0 | Status: SHIPPED | OUTPATIENT
Start: 2021-01-07 | End: 2021-02-08

## 2021-01-08 ENCOUNTER — APPOINTMENT (OUTPATIENT)
Dept: LAB | Facility: CLINIC | Age: 61
End: 2021-01-08
Payer: COMMERCIAL

## 2021-01-08 ENCOUNTER — TRANSCRIBE ORDERS (OUTPATIENT)
Dept: LAB | Facility: CLINIC | Age: 61
End: 2021-01-08

## 2021-01-08 DIAGNOSIS — F11.20 OPIOID TYPE DEPENDENCE, CONTINUOUS (HCC): Primary | ICD-10-CM

## 2021-01-08 PROCEDURE — 80307 DRUG TEST PRSMV CHEM ANLYZR: CPT | Performed by: PHYSICIAN ASSISTANT

## 2021-01-08 PROCEDURE — 80365 DRUG SCREENING OXYCODONE: CPT | Performed by: PHYSICIAN ASSISTANT

## 2021-01-12 LAB
AMPHETAMINES UR QL SCN: NEGATIVE NG/ML
BARBITURATES UR QL SCN: NEGATIVE NG/ML
BENZODIAZ UR QL: NEGATIVE NG/ML
BZE UR QL: NEGATIVE NG/ML
CANNABINOIDS UR QL SCN: NEGATIVE NG/ML
METHADONE UR QL SCN: NEGATIVE NG/ML
OPIATES UR QL: POSITIVE
PCP UR QL: NEGATIVE NG/ML
PROPOXYPH UR QL SCN: NEGATIVE NG/ML

## 2021-01-18 LAB
OXYCODONE UR QL CFM: 2154 NG/ML
OXYCODONE+OXYMORPHONE SERPLBLD QL SCN: POSITIVE
OXYCODONE+OXYMORPHONE UR QL SCN: NORMAL NG/ML
OXYCODONE+OXYMORPHONE UR QL SCN: POSITIVE
OXYMORPHONE UR CFM-MCNC: 116 NG/ML
OXYMORPHONE UR QL CFM: POSITIVE

## 2021-02-08 DIAGNOSIS — F32.1 CURRENT MODERATE EPISODE OF MAJOR DEPRESSIVE DISORDER WITHOUT PRIOR EPISODE (HCC): ICD-10-CM

## 2021-02-08 RX ORDER — ARIPIPRAZOLE 15 MG/1
TABLET ORAL
Qty: 15 TABLET | Refills: 1 | Status: SHIPPED | OUTPATIENT
Start: 2021-02-08 | End: 2021-05-03

## 2021-02-16 ENCOUNTER — TELEPHONE (OUTPATIENT)
Dept: NEPHROLOGY | Facility: CLINIC | Age: 61
End: 2021-02-16

## 2021-03-09 ENCOUNTER — TELEPHONE (OUTPATIENT)
Dept: FAMILY MEDICINE CLINIC | Facility: CLINIC | Age: 61
End: 2021-03-09

## 2021-03-28 DIAGNOSIS — F32.1 CURRENT MODERATE EPISODE OF MAJOR DEPRESSIVE DISORDER WITHOUT PRIOR EPISODE (HCC): ICD-10-CM

## 2021-03-29 RX ORDER — FLUOXETINE HYDROCHLORIDE 40 MG/1
CAPSULE ORAL
Qty: 30 CAPSULE | Refills: 1 | Status: SHIPPED | OUTPATIENT
Start: 2021-03-29 | End: 2021-05-03

## 2021-03-30 DIAGNOSIS — F32.1 CURRENT MODERATE EPISODE OF MAJOR DEPRESSIVE DISORDER WITHOUT PRIOR EPISODE (HCC): ICD-10-CM

## 2021-04-04 RX ORDER — BUSPIRONE HYDROCHLORIDE 10 MG/1
TABLET ORAL
Qty: 60 TABLET | Refills: 1 | Status: SHIPPED | OUTPATIENT
Start: 2021-04-04 | End: 2021-05-03

## 2021-05-03 ENCOUNTER — OFFICE VISIT (OUTPATIENT)
Dept: FAMILY MEDICINE CLINIC | Facility: CLINIC | Age: 61
End: 2021-05-03
Payer: COMMERCIAL

## 2021-05-03 ENCOUNTER — TELEPHONE (OUTPATIENT)
Dept: NEPHROLOGY | Facility: CLINIC | Age: 61
End: 2021-05-03

## 2021-05-03 VITALS
OXYGEN SATURATION: 96 % | RESPIRATION RATE: 18 BRPM | TEMPERATURE: 98.3 F | DIASTOLIC BLOOD PRESSURE: 84 MMHG | HEIGHT: 69 IN | WEIGHT: 315 LBS | BODY MASS INDEX: 46.65 KG/M2 | SYSTOLIC BLOOD PRESSURE: 144 MMHG | HEART RATE: 92 BPM

## 2021-05-03 DIAGNOSIS — Z12.5 ENCOUNTER FOR PROSTATE CANCER SCREENING: ICD-10-CM

## 2021-05-03 DIAGNOSIS — E78.5 HYPERLIPIDEMIA, UNSPECIFIED HYPERLIPIDEMIA TYPE: ICD-10-CM

## 2021-05-03 DIAGNOSIS — R73.03 PREDIABETES: ICD-10-CM

## 2021-05-03 DIAGNOSIS — F41.9 ANXIETY DISORDER, UNSPECIFIED TYPE: ICD-10-CM

## 2021-05-03 DIAGNOSIS — E66.01 MORBID OBESITY (HCC): ICD-10-CM

## 2021-05-03 DIAGNOSIS — I10 ESSENTIAL HYPERTENSION: ICD-10-CM

## 2021-05-03 DIAGNOSIS — Z12.11 SCREENING FOR COLON CANCER: ICD-10-CM

## 2021-05-03 DIAGNOSIS — Z00.00 MEDICARE ANNUAL WELLNESS VISIT, SUBSEQUENT: Primary | ICD-10-CM

## 2021-05-03 PROBLEM — F32.1 CURRENT MODERATE EPISODE OF MAJOR DEPRESSIVE DISORDER WITHOUT PRIOR EPISODE (HCC): Status: RESOLVED | Noted: 2020-08-19 | Resolved: 2021-05-03

## 2021-05-03 PROCEDURE — 99214 OFFICE O/P EST MOD 30 MIN: CPT | Performed by: NURSE PRACTITIONER

## 2021-05-03 PROCEDURE — G0439 PPPS, SUBSEQ VISIT: HCPCS | Performed by: NURSE PRACTITIONER

## 2021-05-03 NOTE — PATIENT INSTRUCTIONS
Medicare Preventive Visit Patient Instructions  Thank you for completing your Welcome to Medicare Visit or Medicare Annual Wellness Visit today  Your next wellness visit will be due in one year (5/4/2022)  The screening/preventive services that you may require over the next 5-10 years are detailed below  Some tests may not apply to you based off risk factors and/or age  Screening tests ordered at today's visit but not completed yet may show as past due  Also, please note that scanned in results may not display below  Preventive Screenings:  Service Recommendations Previous Testing/Comments   Colorectal Cancer Screening  · Colonoscopy    · Fecal Occult Blood Test (FOBT)/Fecal Immunochemical Test (FIT)  · Fecal DNA/Cologuard Test  · Flexible Sigmoidoscopy Age: 54-65 years old   Colonoscopy: every 10 years (May be performed more frequently if at higher risk)  OR  FOBT/FIT: every 1 year  OR  Cologuard: every 3 years  OR  Sigmoidoscopy: every 5 years  Screening may be recommended earlier than age 48 if at higher risk for colorectal cancer  Also, an individualized decision between you and your healthcare provider will decide whether screening between the ages of 74-80 would be appropriate  Colonoscopy: Not on file  FOBT/FIT: Not on file  Cologuard: Not on file  Sigmoidoscopy: Not on file          Prostate Cancer Screening Individualized decision between patient and health care provider in men between ages of 53-78   Medicare will cover every 12 months beginning on the day after your 50th birthday PSA: 1 0 ng/mL           Hepatitis C Screening Once for adults born between 1945 and 1965  More frequently in patients at high risk for Hepatitis C Hep C Antibody: 01/08/2020    Screening Current   Diabetes Screening 1-2 times per year if you're at risk for diabetes or have pre-diabetes Fasting glucose: 120 mg/dL   A1C: 5 6    Screening Current   Cholesterol Screening Once every 5 years if you don't have a lipid disorder   May order more often based on risk factors  Lipid panel: 08/11/2020    Screening Not Indicated  History Lipid Disorder      Other Preventive Screenings Covered by Medicare:  1  Abdominal Aortic Aneurysm (AAA) Screening: covered once if your at risk  You're considered to be at risk if you have a family history of AAA or a male between the age of 73-68 who smoking at least 100 cigarettes in your lifetime  2  Lung Cancer Screening: covers low dose CT scan once per year if you meet all of the following conditions: (1) Age 50-69; (2) No signs or symptoms of lung cancer; (3) Current smoker or have quit smoking within the last 15 years; (4) You have a tobacco smoking history of at least 30 pack years (packs per day x number of years you smoked); (5) You get a written order from a healthcare provider  3  Glaucoma Screening: covered annually if you're considered high risk: (1) You have diabetes OR (2) Family history of glaucoma OR (3)  aged 48 and older OR (3)  American aged 72 and older  3  Osteoporosis Screening: covered every 2 years if you meet one of the following conditions: (1) Have a vertebral abnormality; (2) On glucocorticoid therapy for more than 3 months; (3) Have primary hyperparathyroidism; (4) On osteoporosis medications and need to assess response to drug therapy  5  HIV Screening: covered annually if you're between the age of 12-76  Also covered annually if you are younger than 13 and older than 72 with risk factors for HIV infection  For pregnant patients, it is covered up to 3 times per pregnancy      Immunizations:  Immunization Recommendations   Influenza Vaccine Annual influenza vaccination during flu season is recommended for all persons aged >= 6 months who do not have contraindications   Pneumococcal Vaccine (Prevnar and Pneumovax)  * Prevnar = PCV13  * Pneumovax = PPSV23 Adults 25-60 years old: 1-3 doses may be recommended based on certain risk factors  Adults 72 years old: Prevnar (PCV13) vaccine recommended followed by Pneumovax (PPSV23) vaccine  If already received PPSV23 since turning 65, then PCV13 recommended at least one year after PPSV23 dose  Hepatitis B Vaccine 3 dose series if at intermediate or high risk (ex: diabetes, end stage renal disease, liver disease)   Tetanus (Td) Vaccine - COST NOT COVERED BY MEDICARE PART B Following completion of primary series, a booster dose should be given every 10 years to maintain immunity against tetanus  Td may also be given as tetanus wound prophylaxis  Tdap Vaccine - COST NOT COVERED BY MEDICARE PART B Recommended at least once for all adults  For pregnant patients, recommended with each pregnancy  Shingles Vaccine (Shingrix) - COST NOT COVERED BY MEDICARE PART B  2 shot series recommended in those aged 48 and above     Health Maintenance Due:      Topic Date Due    Colorectal Cancer Screening  Never done    HIV Screening  02/20/2023 (Originally 9/15/1975)    Hepatitis C Screening  Completed     Immunizations Due:      Topic Date Due    COVID-19 Vaccine (1) Never done     Advance Directives   What are advance directives? Advance directives are legal documents that state your wishes and plans for medical care  These plans are made ahead of time in case you lose your ability to make decisions for yourself  Advance directives can apply to any medical decision, such as the treatments you want, and if you want to donate organs  What are the types of advance directives? There are many types of advance directives, and each state has rules about how to use them  You may choose a combination of any of the following:  · Living will: This is a written record of the treatment you want  You can also choose which treatments you do not want, which to limit, and which to stop at a certain time  This includes surgery, medicine, IV fluid, and tube feedings  · Durable power of  for healthcare [facility]):   This is a written record that states who you want to make healthcare choices for you when you are unable to make them for yourself  This person, called a proxy, is usually a family member or a friend  You may choose more than 1 proxy  · Do not resuscitate (DNR) order:  A DNR order is used in case your heart stops beating or you stop breathing  It is a request not to have certain forms of treatment, such as CPR  A DNR order may be included in other types of advance directives  · Medical directive: This covers the care that you want if you are in a coma, near death, or unable to make decisions for yourself  You can list the treatments you want for each condition  Treatment may include pain medicine, surgery, blood transfusions, dialysis, IV or tube feedings, and a ventilator (breathing machine)  · Values history: This document has questions about your views, beliefs, and how you feel and think about life  This information can help others choose the care that you would choose  Why are advance directives important? An advance directive helps you control your care  Although spoken wishes may be used, it is better to have your wishes written down  Spoken wishes can be misunderstood, or not followed  Treatments may be given even if you do not want them  An advance directive may make it easier for your family to make difficult choices about your care  Cigarette Smoking and Your Health   Risks to your health if you smoke:  Nicotine and other chemicals found in tobacco damage every cell in your body  Even if you are a light smoker, you have an increased risk for cancer, heart disease, and lung disease  If you are pregnant or have diabetes, smoking increases your risk for complications  Benefits to your health if you stop smoking:   · You decrease respiratory symptoms such as coughing, wheezing, and shortness of breath  · You reduce your risk for cancers of the lung, mouth, throat, kidney, bladder, pancreas, stomach, and cervix   If you already have cancer, you increase the benefits of chemotherapy  You also reduce your risk for cancer returning or a second cancer from developing  · You reduce your risk for heart disease, blood clots, heart attack, and stroke  · You reduce your risk for lung infections, and diseases such as pneumonia, asthma, chronic bronchitis, and emphysema  · Your circulation improves  More oxygen can be delivered to your body  If you have diabetes, you lower your risk for complications, such as kidney, artery, and eye diseases  You also lower your risk for nerve damage  Nerve damage can lead to amputations, poor vision, and blindness  · You improve your body's ability to heal and to fight infections  For more information and support to stop smoking:   · [place]  Phone: 8- 750 - [phone]  Web Address: [employer]  Weight Management   Why it is important to manage your weight:  Being overweight increases your risk of health conditions such as heart disease, high blood pressure, type 2 diabetes, and certain types of cancer  It can also increase your risk for osteoarthritis, sleep apnea, and other respiratory problems  Aim for a slow, steady weight loss  Even a small amount of weight loss can lower your risk of health problems  How to lose weight safely:  A safe and healthy way to lose weight is to eat fewer calories and get regular exercise  You can lose up about 1 pound a week by decreasing the number of calories you eat by 500 calories each day  Healthy meal plan for weight management:  A healthy meal plan includes a variety of foods, contains fewer calories, and helps you stay healthy  A healthy meal plan includes the following:  · Eat whole-grain foods more often  A healthy meal plan should contain fiber  Fiber is the part of grains, fruits, and vegetables that is not broken down by your body  Whole-grain foods are healthy and provide extra fiber in your diet   Some examples of whole-grain foods are whole-wheat breads and pastas, oatmeal, brown rice, and bulgur  · Eat a variety of vegetables every day  Include dark, leafy greens such as spinach, kale, christy greens, and mustard greens  Eat yellow and orange vegetables such as carrots, sweet potatoes, and winter squash  · Eat a variety of fruits every day  Choose fresh or canned fruit (canned in its own juice or light syrup) instead of juice  Fruit juice has very little or no fiber  · Eat low-fat dairy foods  Drink fat-free (skim) milk or 1% milk  Eat fat-free yogurt and low-fat cottage cheese  Try low-fat cheeses such as mozzarella and other reduced-fat cheeses  · Choose meat and other protein foods that are low in fat  Choose beans or other legumes such as split peas or lentils  Choose fish, skinless poultry (chicken or turkey), or lean cuts of red meat (beef or pork)  Before you cook meat or poultry, cut off any visible fat  · Use less fat and oil  Try baking foods instead of frying them  Add less fat, such as margarine, sour cream, regular salad dressing and mayonnaise to foods  Eat fewer high-fat foods  Some examples of high-fat foods include french fries, doughnuts, ice cream, and cakes  · Eat fewer sweets  Limit foods and drinks that are high in sugar  This includes candy, cookies, regular soda, and sweetened drinks  Exercise:  Exercise at least 30 minutes per day on most days of the week  Some examples of exercise include walking, biking, dancing, and swimming  You can also fit in more physical activity by taking the stairs instead of the elevator or parking farther away from stores  Ask your healthcare provider about the best exercise plan for you     Narcotic (Opioid) Safety    Use narcotics safely:  · Take prescribed narcotics exactly as directed  · Do not give narcotics to others or take narcotics that belong to someone else  · Do not mix narcotics without medicines or alcohol  · Do not drive or operate heavy machinery after you take the narcotic  · Monitor for side effects and notify your healthcare provider if you experienced side effects such as nausea, sleepiness, itching, or trouble thinking clearly  Manage constipation:    Constipation is the most common side effect of narcotic medicine  Constipation is when you have hard, dry bowel movements, or you go longer than usual between bowel movements  Tell your healthcare provider about all changes in your bowel movements while you are taking narcotics  He or she may recommend laxative medicine to help you have a bowel movement  He or she may also change the kind of narcotic you are taking, or change when you take it  The following are more ways you can prevent or relieve constipation:    · Drink liquids as directed  You may need to drink extra liquids to help soften and move your bowels  Ask how much liquid to drink each day and which liquids are best for you  · Eat high-fiber foods  This may help decrease constipation by adding bulk to your bowel movements  High-fiber foods include fruits, vegetables, whole-grain breads and cereals, and beans  Your healthcare provider or dietitian can help you create a high-fiber meal plan  Your provider may also recommend a fiber supplement if you cannot get enough fiber from food  · Exercise regularly  Regular physical activity can help stimulate your intestines  Walking is a good exercise to prevent or relieve constipation  Ask which exercises are best for you  · Schedule a time each day to have a bowel movement  This may help train your body to have regular bowel movements  Bend forward while you are on the toilet to help move the bowel movement out  Sit on the toilet for at least 10 minutes, even if you do not have a bowel movement  Store narcotics safely:   · Store narcotics where others cannot easily get them  Keep them in a locked cabinet or secure area  Do not  keep them in a purse or other bag you carry with you   A person may be looking for something else and find the narcotics  · Make sure narcotics are stored out of the reach of children  A child can easily overdose on narcotics  Narcotics may look like candy to a small child  The best way to dispose of narcotics: The laws vary by country and area  In the [place], the best way is to return the narcotics through a take-back program  This program is offered by the [employer]  The following are options for using the program:  · Take the narcotics to a [employer] collection site  The site is often a law enforcement center  Call your local law enforcement center for scheduled take-back days in your area  You will be given information on where to go if the collection site is in a different location  · Take the narcotics to an approved pharmacy or hospital   A pharmacy or hospital may be set up as a collection site  You will need to ask if it is a [employer] collection site if you were not directed there  A pharmacy or doctor's office may not be able to take back narcotics unless it is a [employer] site  · Use a mail-back system  This means you are given containers to put the narcotics into  You will then mail them in the containers  · Use a take-back drop box  This is a place to leave the narcotics at any time  People and animals will not be able to get into the box  Your local law enforcement agency can tell you where to find a drop box in your area  Other ways to manage pain:   · Ask your healthcare provider about non-narcotic medicines to control pain  Nonprescription medicines include NSAIDs (such as ibuprofen) and acetaminophen  Prescription medicines include muscle relaxers, antidepressants, and steroids  · Pain may be managed without any medicines  Some ways to relieve pain include massage, aromatherapy, or meditation  Physical or occupational therapy may also help      For more information:   · Drug Enforcement Administration  [facility] [place] [address]  Phone: 8- 749 - 502-6119  Web Address: [facility] gov/drug_disposal/    · [address]  and Drug Administration  [place]  [place] , [address]  Phone: 3- 154 - 219-2706  Web Address: http://[employer]/     © Copyright [address] 2018 Information is for End User's use only and may not be sold, redistributed or otherwise used for commercial purposes   All illustrations and images included in CareNotes® are the copyrighted property of A D A M , Inc  or [address] [place] [address]

## 2021-05-03 NOTE — PROGRESS NOTES
Assessment and Plan:     Problem List Items Addressed This Visit        Cardiovascular and Mediastinum    Essential hypertension  Continue to follow-up with nephrology  Other    Anxiety disorder  Patient instructed to follow-up with a counselor  Hyperlipidemia  Patient instructed to get his lab work done  Morbid obesity ([address] )  Patient instructed to eat a healthy low fat/low carb diet  Prediabetes  Patient instructed to eat a healthy low fat/low carb diet  Screening for colon cancer    Relevant Orders    Cologuard    Medicare annual wellness visit, subsequent - Primary  Healthy diet reviewed  Other Visit Diagnoses     Encounter for prostate cancer screening        Relevant Orders    PSA, Total Screen        BMI Counseling: Body mass index is 48 73 kg/m²  The BMI is above normal  Nutrition recommendations include encouraging healthy choices of fruits and vegetables, consuming healthier snacks, reducing intake of saturated and trans fat and reducing intake of cholesterol  Exercise recommendations include exercising 3-5 times per week  Tobacco Cessation Counseling: Tobacco cessation counseling was provided  The patient is sincerely urged to quit consumption of tobacco  He is not ready to quit tobacco      Preventive health issues were discussed with patient, and age appropriate screening tests were ordered as noted in patient's After Visit Summary  Personalized health advice and appropriate referrals for health education or preventive services given if needed, as noted in patient's After Visit Summary       History of Present Illness:     Patient presents for Medicare Annual Wellness visit    Patient Care Team:  MARJORIE Villa as PCP - Arun Amos MD Ismael Curlin, MD (Nephrology)     Problem List:     Patient Active Problem List   Diagnosis    Hypokalemia    Allergic rhinitis    Chronic back pain    Dental cavity    Anxiety disorder    Erectile dysfunction    Hyperlipidemia    Essential hypertension    Localized osteoarthritis of left knee    Lumbar radiculopathy    Microalbuminuria    Morbid obesity (HCC)    IMELDA on CPAP    Patellofemoral arthritis of left knee    Rosacea    Vitamin D deficiency    Pain in finger of both hands    Trigger finger, left middle finger    Persistent proteinuria    Screening for prostate cancer    Prediabetes    Screening for colon cancer    It band syndrome, right    Bilateral primary osteoarthritis of knee    Grief reaction    Stage 2 chronic kidney disease    Localized osteoarthritis of right knee    Need for hepatitis C screening test    Anemia    Abnormal CBC    Medicare annual wellness visit, subsequent      Past Medical and Surgical History:     Past Medical History:   Diagnosis Date    Anxiety     Back pain     Chronic pain     Diabetes mellitus ([facility] )     6/19/15 A1C: 5 5%    Hyperlipidemia     Hypertension     Knee pain     Psychiatric disorder     Type 2 diabetes mellitus without complication [facility] ) 2/66/1186    Urinary retention      Past Surgical History:   Procedure Laterality Date    BACK SURGERY      LUMBAR FUSION  2002    LUMBAR FUSION  2004    L4-5-6, S1 has pump for narcotics      Family History:     Family History   Problem Relation Age of Onset    Other Mother         colitis    Diabetes Mother       Social History:        Social History     Socioeconomic History    Marital status: Legally      Spouse name: None    Number of children: None    Years of education: None    Highest education level: None   Occupational History    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[place], injured back   Social Needs    Financial resource strain: None    Food insecurity     Worry: None     Inability: None    Transportation needs     Medical: None     Non-medical: None   Tobacco Use    Smoking status: Current Every Day Smoker     Types: Cigars  Smokeless tobacco: Never Used    Tobacco comment: 1 cigar daily,  50 pack years, 1/2 pack at age 12, 3 ppd x 20 yrs, quit 2004   Substance and Sexual Activity    Alcohol use: Yes     Comment: occasional    Drug use: No     Comment: History of drug use, meena, cocaine, heroind, no IVDA-as per [employer]    Sexual activity: Yes     Partners: Female     Birth control/protection: Condom Male   Lifestyle    Physical activity     Days per week: None     Minutes per session: None    Stress: None   Relationships    Social connections     Talks on phone: None     Gets together: None     Attends Sikhism service: None     Active member of club or organization: None     Attends meetings of clubs or organizations: None     Relationship status: None    Intimate partner violence     Fear of current or ex partner: None     Emotionally abused: None     Physically abused: None     Forced sexual activity: None   Other Topics Concern    None   Social History Narrative    Patient lives with his ex wife and son         Medications and Allergies:     Current Outpatient Medications   Medication Sig Dispense Refill    amLODIPine (NORVASC) 10 mg tablet take 1 tablet by mouth once daily 90 tablet 0    atorvastatin (LIPITOR) 20 mg tablet take 1 tablet by mouth once daily 90 tablet 0    chlorthalidone 25 mg tablet Take 1 tablet (25 mg total) by mouth daily (Patient taking differently: Take 25 mg by mouth daily Take one half tablet daily) 90 tablet 3    diclofenac sodium (VOLTAREN) 1 % Apply 2 g topically 4 (four) times a day 1 Tube 3    doxazosin (CARDURA) 2 mg tablet Take 2 tablets (4 mg total) by mouth daily at bedtime 180 tablet 3    losartan (COZAAR) 100 MG tablet Take 1 tablet (100 mg total) by mouth daily 90 tablet 3    metoprolol tartrate (LOPRESSOR) 100 mg tablet Take 1 tablet (100 mg total) by mouth 2 (two) times a day 180 tablet 3    morphine (MS CONTIN) 15 mg 12 hr tablet take 1 tablet every 8 hours if needed pain  0    oxyCODONE (ROXICODONE) 30 MG immediate release tablet take 1 tablet by mouth every 5 hours if needed for pain   [place] MAX 4/DAY  0    spironolactone (ALDACTONE) 25 mg tablet take 1 tablet by mouth once daily 30 tablet 5    tadalafil (CIALIS) 5 MG tablet Take 5 mg by mouth daily as needed for erectile dysfunction (unsure dosage) Indications: Erectile Dysfunction   ketoconazole (NIZORAL) 2 % cream 1 application daily Apply to affected area  0     No current facility-administered medications for this visit  Allergies   Allergen Reactions    Gabapentin      Annotation - [id number]: dizziness    Pregabalin      Annotation - [id number]: vision changes and mood changes    Tramadol Nausea Only      Immunizations:     Immunization History   Administered Date(s) Administered    Influenza Quadrivalent, 6-35 Months IM 11/16/2015, 10/19/2016    Influenza, seasonal, injectable 01/01/2013    Pneumococcal Polysaccharide PPV23 11/16/2015      Health Maintenance:         Topic Date Due    Colorectal Cancer Screening  Never done    HIV Screening  02/20/2023 (Originally 9/15/1975)    Hepatitis C Screening  Completed         Topic Date Due    COVID-19 Vaccine (1) Never done      Medicare Health Risk Assessment:     /84   Pulse 92   Temp 98 3 °F (36 8 °C) (Tympanic)   Resp 18   Ht 5' 9" (1 753 m)   Wt (!) 150 kg (330 lb)   SpO2 96%   BMI 48 73 kg/m²      Charles Osman is here for his Subsequent Wellness visit  Health Risk Assessment:   Patient rates overall health as good  Patient feels that their physical health rating is slightly better  Patient is satisfied with their life  Eyesight was rated as same  Hearing was rated as same  Patient feels that their emotional and mental health rating is much better  Patients states they are sometimes angry  Patient states they are sometimes unusually tired/fatigued  Pain experienced in the last 7 days has been a lot  Patient's pain rating has been 8/10   Patient states that he has experienced no weight loss or gain in last 6 months  Patient follows up with pain management for chronic back pain  Depression Screening:   PHQ-2 Score: 1  PHQ-9 Score: 6      Fall Risk Screening: In the past year, patient has experienced: no history of falling in past year      Home Safety:  Patient does not have trouble with stairs inside or outside of their home  Patient has working smoke alarms and has working carbon monoxide detector  Home safety hazards include: none  Nutrition:   Current diet is Regular  Medications:   Patient is not currently taking any over-the-counter supplements  Patient is able to manage medications  Activities of Daily Living (ADLs)/Instrumental Activities of Daily Living (IADLs):   Walk and transfer into and out of bed and chair?: Yes  Dress and groom yourself?: Yes    Bathe or shower yourself?: Yes    Feed yourself?  Yes  Do your laundry/housekeeping?: Yes  Manage your money, pay your bills and track your expenses?: Yes  Make your own meals?: Yes    Do your own shopping?: Yes    Previous Hospitalizations:   Any hospitalizations or ED visits within the last 12 months?: No      Advance Care Planning:   Living will: No    Durable POA for healthcare: No    Advanced directive: No    Advanced directive counseling given: Yes      Cognitive Screening:   Provider or family/friend/caregiver concerned regarding cognition?: No    PREVENTIVE SCREENINGS      Cardiovascular Screening:    General: History Lipid Disorder    Due for: Lipid Panel      Diabetes Screening:     General: Risks and Benefits Discussed    Due for: Blood Glucose      Colorectal Cancer Screening:     General: Risks and Benefits Discussed    Due for: Cologuard      Prostate Cancer Screening:    General: Risks and Benefits Discussed    Due for: PSA      Osteoporosis Screening:    General: Risks and Benefits Discussed and Patient Declines      Abdominal Aortic Aneurysm (AAA) Screening:    Risk factors include: tobacco use        General: Risks and Benefits Discussed and Patient Declines      Lung Cancer Screening:     General: Risks and Benefits Discussed and Patient Declines      Hepatitis C Screening:    General: Screening Current    Screening, Brief Intervention, and Referral to Treatment (SBIRT)    Screening  Typical number of drinks in a day: 0  Typical number of drinks in a week: 2  Interpretation: Low risk drinking behavior  AUDIT-C Screenin) How often did you have a drink containing alcohol in the past year? 2 to 4 times a month  2) How many drinks did you have on a typical day when you were drinking in the past year? 1 to 2  3) How often did you have 6 or more drinks on one occasion in the past year? less than monthly    AUDIT-C Score: 3  Interpretation: Score 0-3 (male): Negative screen for alcohol misuse    Single Item Drug Screening:  How often have you used an illegal drug (including marijuana) or a prescription medication for non-medical reasons in the past year? never    Single Item Drug Screen Score: 0  Interpretation: Negative screen for possible drug use disorder    Brief Intervention  Alcohol & drug use screenings were reviewed  No concerns regarding substance use disorder identified  Review of Current Opioid Use  Opioid Risk Tool (ORT) Score: 7  Opioid Risk Tool (ORT) Interpretation: Score 4-7: Moderate risk for opioid misuse    [place]MP or [place]  reviewed  No red flags were identified    Other Counseling Topics:   Car/seat belt/driving safety, skin self-exam, sunscreen and calcium and vitamin D intake and regular weightbearing exercise         Elizabeth Alexander

## 2021-05-03 NOTE — TELEPHONE ENCOUNTER
We received an appointment requested through [facility] from the patient for a follow up with Dr Rojelio Lebron  I left message for patient to call the office to schedule a follow up appointment with Dr Rojelio Lebron

## 2021-05-03 NOTE — PROGRESS NOTES
Assessment/Plan:      Diagnoses and all orders for this visit:    Medicare annual wellness visit, subsequent  Healthy diet reviewed  Essential hypertension  Continue to follow-up with nephrology  Hyperlipidemia, unspecified hyperlipidemia type  Patient instructed to get his lab work done  Morbid obesity ([address] )  Patient instructed to eat a healthy low fat/low carb diet  Prediabetes  Patient instructed to eat a healthy low fat/low carb diet  Anxiety disorder, unspecified type  Patient instructed to follow-up with a counselor  Encounter for prostate cancer screening  -     PSA, Total Screen; Future    Screening for colon cancer  -     Cologuard; Future    Patient instructed to get his lab work done  Patient instructed to follow-up in 1 month or sooner prn  Subjective:     Patient ID: Ana Paula Malcolm  is a 63 y o  male  Patient is here for a follow-up for chronic medical conditions  Patient reports that he did not get his lab work done  Patient follows up with nephrology for HTN  Denies any chest pain, SOB, dizziness, or Has  Patient was following up with a psychiatrist for depression and anxiety  Patient stopped following up with psychiatrist  Denies anymore depression  Denies any suicidal thoughts  Patient reports that his anxiety has improved  Patient is here for a follow-up for obesity and hyperlipidemia  Patient reports that he takes atorvastatin daily  Denies any side effects  Patient reports that he watches his diet  Patient is here for a follow-up for prediabetes  Patient reports that he will get his lab work done tomorrow  Review of Systems   Constitutional: Negative for chills, fatigue and fever  HENT: Negative for ear pain, sinus pressure and sore throat  Respiratory: Negative for cough, chest tightness, shortness of breath and wheezing  Cardiovascular: Negative for chest pain, palpitations and leg swelling     Gastrointestinal: Negative for abdominal pain, diarrhea, nausea and vomiting  Genitourinary: Negative for dysuria, frequency and hematuria  Skin: Negative for rash  Neurological: Negative for dizziness, syncope, light-headedness and headaches  Psychiatric/Behavioral: Negative for suicidal ideas  As noted in HPI  Objective:     Physical Exam  Vitals signs reviewed  Constitutional:       General: He is not in acute distress  Appearance: He is obese  He is not ill-appearing or diaphoretic  HENT:      Right Ear: External ear normal       Left Ear: External ear normal       Mouth/Throat:      Mouth: Mucous membranes are moist       Pharynx: Oropharynx is clear  No posterior oropharyngeal erythema  Eyes:      Conjunctiva/sclera: Conjunctivae normal       Pupils: Pupils are equal, round, and reactive to light  Cardiovascular:      Rate and Rhythm: Normal rate and regular rhythm  Pulses: Normal pulses  Heart sounds: Normal heart sounds  Comments: No edema noted  Pulmonary:      Effort: Pulmonary effort is normal  No respiratory distress  Breath sounds: Normal breath sounds  No wheezing  Musculoskeletal:      Comments: Gait wnl  Skin:     Findings: No rash  Neurological:      Mental Status: He is alert and oriented to person, place, and time  Psychiatric:         Mood and Affect: Mood normal        Depression Screening Follow-up Plan: Patient's depression screening was positive with a PHQ-2 score of 1  Their PHQ-9 score was 6  Patient assessed for underlying major depression  They have no active suicidal ideations  Brief counseling provided and recommend additional follow-up/re-evaluation next office visit

## 2021-05-07 NOTE — TELEPHONE ENCOUNTER
I left message for patient to called office to schedule a follow up appointment with Dr Jennifer Dietz

## 2021-05-13 ENCOUNTER — TRANSCRIBE ORDERS (OUTPATIENT)
Dept: LAB | Facility: CLINIC | Age: 61
End: 2021-05-13

## 2021-05-13 ENCOUNTER — LAB (OUTPATIENT)
Dept: LAB | Facility: CLINIC | Age: 61
End: 2021-05-13
Payer: COMMERCIAL

## 2021-05-13 DIAGNOSIS — Z12.5 ENCOUNTER FOR PROSTATE CANCER SCREENING: ICD-10-CM

## 2021-05-13 DIAGNOSIS — R73.03 PREDIABETES: ICD-10-CM

## 2021-05-13 DIAGNOSIS — I10 ESSENTIAL HYPERTENSION: ICD-10-CM

## 2021-05-13 DIAGNOSIS — N18.31 STAGE 3A CHRONIC KIDNEY DISEASE (HCC): ICD-10-CM

## 2021-05-13 DIAGNOSIS — E66.01 MORBID OBESITY (HCC): ICD-10-CM

## 2021-05-13 DIAGNOSIS — E78.5 HYPERLIPIDEMIA, UNSPECIFIED HYPERLIPIDEMIA TYPE: ICD-10-CM

## 2021-05-13 LAB
ANION GAP SERPL CALCULATED.3IONS-SCNC: 5 MMOL/L (ref 4–13)
BACTERIA UR QL AUTO: ABNORMAL /HPF
BILIRUB UR QL STRIP: NEGATIVE
BUN SERPL-MCNC: 13 MG/DL (ref 5–25)
CALCIUM SERPL-MCNC: 9.5 MG/DL (ref 8.3–10.1)
CHLORIDE SERPL-SCNC: 101 MMOL/L (ref 100–108)
CHOLEST SERPL-MCNC: 125 MG/DL (ref 50–200)
CLARITY UR: CLEAR
CO2 SERPL-SCNC: 30 MMOL/L (ref 21–32)
COLOR UR: ABNORMAL
CREAT SERPL-MCNC: 1.3 MG/DL (ref 0.6–1.3)
CREAT UR-MCNC: 215 MG/DL
ERYTHROCYTE [DISTWIDTH] IN BLOOD BY AUTOMATED COUNT: 13.3 % (ref 11.6–15.1)
EST. AVERAGE GLUCOSE BLD GHB EST-MCNC: 117 MG/DL
GFR SERPL CREATININE-BSD FRML MDRD: 59 ML/MIN/1.73SQ M
GLUCOSE P FAST SERPL-MCNC: 116 MG/DL (ref 65–99)
GLUCOSE UR STRIP-MCNC: NEGATIVE MG/DL
HBA1C MFR BLD: 5.7 %
HCT VFR BLD AUTO: 41.7 % (ref 36.5–49.3)
HDLC SERPL-MCNC: 46 MG/DL
HGB BLD-MCNC: 14.1 G/DL (ref 12–17)
HGB UR QL STRIP.AUTO: NEGATIVE
HYALINE CASTS #/AREA URNS LPF: ABNORMAL /LPF
KETONES UR STRIP-MCNC: NEGATIVE MG/DL
LDLC SERPL CALC-MCNC: 64 MG/DL (ref 0–100)
LEUKOCYTE ESTERASE UR QL STRIP: NEGATIVE
MAGNESIUM SERPL-MCNC: 2 MG/DL (ref 1.6–2.6)
MCH RBC QN AUTO: 29.1 PG (ref 26.8–34.3)
MCHC RBC AUTO-ENTMCNC: 33.8 G/DL (ref 31.4–37.4)
MCV RBC AUTO: 86 FL (ref 82–98)
NITRITE UR QL STRIP: NEGATIVE
NON-SQ EPI CELLS URNS QL MICRO: ABNORMAL /HPF
PH UR STRIP.AUTO: 6 [PH]
PHOSPHATE SERPL-MCNC: 2.7 MG/DL (ref 2.3–4.1)
PLATELET # BLD AUTO: 191 THOUSANDS/UL (ref 149–390)
PMV BLD AUTO: 11.9 FL (ref 8.9–12.7)
POTASSIUM SERPL-SCNC: 3.8 MMOL/L (ref 3.5–5.3)
PROT UR STRIP-MCNC: NEGATIVE MG/DL
PROT UR-MCNC: 16 MG/DL
PROT/CREAT UR: 0.07 MG/G{CREAT} (ref 0–0.1)
PSA SERPL-MCNC: 1.1 NG/ML (ref 0–4)
PTH-INTACT SERPL-MCNC: 77.9 PG/ML (ref 18.4–80.1)
RBC # BLD AUTO: 4.85 MILLION/UL (ref 3.88–5.62)
RBC #/AREA URNS AUTO: ABNORMAL /HPF
SODIUM SERPL-SCNC: 136 MMOL/L (ref 136–145)
SP GR UR STRIP.AUTO: 1.02 (ref 1–1.03)
TRIGL SERPL-MCNC: 75 MG/DL
UROBILINOGEN UR QL STRIP.AUTO: 1 E.U./DL
WBC # BLD AUTO: 8.1 THOUSAND/UL (ref 4.31–10.16)
WBC #/AREA URNS AUTO: ABNORMAL /HPF

## 2021-05-13 PROCEDURE — 82570 ASSAY OF URINE CREATININE: CPT

## 2021-05-13 PROCEDURE — 83970 ASSAY OF PARATHORMONE: CPT

## 2021-05-13 PROCEDURE — 36415 COLL VENOUS BLD VENIPUNCTURE: CPT

## 2021-05-13 PROCEDURE — 80061 LIPID PANEL: CPT

## 2021-05-13 PROCEDURE — 83036 HEMOGLOBIN GLYCOSYLATED A1C: CPT

## 2021-05-13 PROCEDURE — 81001 URINALYSIS AUTO W/SCOPE: CPT

## 2021-05-13 PROCEDURE — 84100 ASSAY OF PHOSPHORUS: CPT

## 2021-05-13 PROCEDURE — G0103 PSA SCREENING: HCPCS

## 2021-05-13 PROCEDURE — 83735 ASSAY OF MAGNESIUM: CPT

## 2021-05-13 PROCEDURE — 85027 COMPLETE CBC AUTOMATED: CPT

## 2021-05-13 PROCEDURE — 80048 BASIC METABOLIC PNL TOTAL CA: CPT

## 2021-05-13 PROCEDURE — 84156 ASSAY OF PROTEIN URINE: CPT

## 2021-05-16 NOTE — RESULT ENCOUNTER NOTE
Hello    Patient normally is followed up by Ms Patricia Hong  please let the patient know that the renal function is stable  No changes for now  Will review in detail the appointment next month  Creatinine is below recent baseline      Thank you    np

## 2021-05-17 ENCOUNTER — TELEPHONE (OUTPATIENT)
Dept: NEPHROLOGY | Facility: CLINIC | Age: 61
End: 2021-05-17

## 2021-05-17 NOTE — TELEPHONE ENCOUNTER
----- Message from Ambrosio Myrick MD sent at 5/16/2021  3:58 PM EDT -----  Hello    Patient normally is followed up by Ms Nestor Cueva  please let the patient know that the renal function is stable  No changes for now  Will review in detail the appointment next month  Creatinine is below recent baseline      Thank you    np

## 2021-05-19 DIAGNOSIS — F32.1 CURRENT MODERATE EPISODE OF MAJOR DEPRESSIVE DISORDER WITHOUT PRIOR EPISODE (HCC): ICD-10-CM

## 2021-05-19 RX ORDER — FLUOXETINE HYDROCHLORIDE 40 MG/1
CAPSULE ORAL
Qty: 30 CAPSULE | Refills: 1 | Status: SHIPPED | OUTPATIENT
Start: 2021-05-19 | End: 2022-01-17 | Stop reason: ALTCHOICE

## 2021-05-19 RX ORDER — BUSPIRONE HYDROCHLORIDE 10 MG/1
TABLET ORAL
Qty: 60 TABLET | Refills: 1 | Status: SHIPPED | OUTPATIENT
Start: 2021-05-19 | End: 2022-01-07 | Stop reason: ALTCHOICE

## 2021-05-27 DIAGNOSIS — I10 ESSENTIAL HYPERTENSION: ICD-10-CM

## 2021-05-27 DIAGNOSIS — E78.5 HYPERLIPIDEMIA, UNSPECIFIED HYPERLIPIDEMIA TYPE: ICD-10-CM

## 2021-05-27 RX ORDER — CHLORTHALIDONE 25 MG/1
25 TABLET ORAL DAILY
Qty: 90 TABLET | Refills: 3 | Status: SHIPPED | OUTPATIENT
Start: 2021-05-27

## 2021-05-27 RX ORDER — ATORVASTATIN CALCIUM 20 MG/1
TABLET, FILM COATED ORAL
Qty: 90 TABLET | Refills: 0 | Status: SHIPPED | OUTPATIENT
Start: 2021-05-27 | End: 2021-11-08

## 2021-05-27 RX ORDER — DOXAZOSIN 2 MG/1
TABLET ORAL
Qty: 180 TABLET | Refills: 3 | Status: SHIPPED | OUTPATIENT
Start: 2021-05-27 | End: 2022-06-14

## 2021-05-27 RX ORDER — METOPROLOL TARTRATE 100 MG/1
TABLET ORAL
Qty: 180 TABLET | Refills: 3 | Status: SHIPPED | OUTPATIENT
Start: 2021-05-27 | End: 2022-05-31

## 2021-05-27 RX ORDER — LOSARTAN POTASSIUM 100 MG/1
TABLET ORAL
Qty: 90 TABLET | Refills: 3 | Status: SHIPPED | OUTPATIENT
Start: 2021-05-27

## 2021-06-02 ENCOUNTER — OFFICE VISIT (OUTPATIENT)
Dept: NEPHROLOGY | Facility: CLINIC | Age: 61
End: 2021-06-02
Payer: COMMERCIAL

## 2021-06-02 VITALS
BODY MASS INDEX: 46.65 KG/M2 | DIASTOLIC BLOOD PRESSURE: 82 MMHG | WEIGHT: 315 LBS | HEART RATE: 57 BPM | HEIGHT: 69 IN | SYSTOLIC BLOOD PRESSURE: 134 MMHG

## 2021-06-02 DIAGNOSIS — E66.01 CLASS 3 SEVERE OBESITY WITH SERIOUS COMORBIDITY AND BODY MASS INDEX (BMI) OF 45.0 TO 49.9 IN ADULT, UNSPECIFIED OBESITY TYPE (HCC): ICD-10-CM

## 2021-06-02 DIAGNOSIS — N18.31 STAGE 3A CHRONIC KIDNEY DISEASE (HCC): Primary | ICD-10-CM

## 2021-06-02 PROCEDURE — 99214 OFFICE O/P EST MOD 30 MIN: CPT | Performed by: INTERNAL MEDICINE

## 2021-06-02 PROCEDURE — 3079F DIAST BP 80-89 MM HG: CPT | Performed by: INTERNAL MEDICINE

## 2021-06-02 NOTE — LETTER
June 2, 2021     MARJORIE Guerrier  [address]  [facility] [address]    Patient: Lili Bailey  YOB: 1960   Date of Visit: 6/2/2021       Dear Dr Lexii Iyer: Thank you for referring Yasmeen Be to me for evaluation  Below are my notes for this consultation  If you have questions, please do not hesitate to call me  I look forward to following your patient along with you  Sincerely,        Catarina Tomlinson MD        CC: No Recipients  Catarina Tomlisnon MD  6/2/2021  4:42 PM  Sign when Signing Visit  NEPHROLOGY OFFICE VISIT   Lili Bailey  63 y o  male MRN: [id number]  6/2/2021    Reason for Visit: HTN    ASSESSMENT and PLAN:    I had the pleasure of seeing Mr Elli Meyer today in the renal clinic for the continued management of HTN  55 yo male retired , formerly from [employer], [place] (15 years), DM (years), back surgery 10 years ago Who presents for follow up evaluation of hypertension   patient has had sporadic follow-up but has been slightly more consistent with follow-up since end of last year  Thomas Jackson last saw the patient in August 2018 and then patient followed up with our advanced practitioner  Pieter Adames returns today for follow-up visit      Patient has stopped using NSAIDs since 2019      1) HTN -      -prior renin and aldosterone level with a renin level suppressed, aldosterone level is not significantly elevated  - prior metanephrine unrevealing  - prior CT scan with unremarkable adrenals  - renal Doppler study in November 2019 with no significant occlusive disease   - patient is on spironolactone, chlorthalidone, amlodipine, losartan, metoprolol, doxazosin,   - during visit in march clonidine was tapered off, chlorthalidone was increased chlorthalidone to 25 mg daily  -  Creatinine stable in May 2021 at 1 3 mg/dL  Blood pressures are appropriate    Patient is still gaining weight      Plan:     - repeat renal ultrasound  And this year or early next year   - bariatric program referral for weight loss management  - check TSH with next lab work due to weight gain without increasing portion intake and actually patient has reduced portion intake  Though has been sedentary  - no changes to medication regimen - reviewed with pt    - labwork in 3 months  - appointment 3-4 month     2) poor dentition     -patient smokes cigars daily  -patient needs to see dentist     3) proteinuria -  Stable U PCR 0 07     - recheck at next visit  - if elevated, will check SPEP, UPEP, FLC     4) CKD III with baseline creatinine approximately 1 4 mg/dL      -renal ultrasound in September 2018 was septated cyst bilaterally with mildly increased on the right kidney   And a calcified cystic foci in the left kidney  -patient needs repeat ultrasound   Ordered prior  Will assist pt in scheduling  - creat 2 2 in august  Held losartan for 2 days and restarted  Inc fluid intake  - 8/17 - chlorthalidone reduced to 12 5 mg daily as creat improving but not back to baseline  -  05/2021-creatinine improving to baseline 1 3 mg/dL     5) grief counseling-patient needs to see psychiatrist and psychologist and patient has the appointment scheduled      - pt is depressed - seeing psychiatrist      6) Vit D deficiency     - pt was started on 50,000 units vit D weekly on 1/10/2020  - Vit D improving 25 5 on 8/2020 -->   Continuevit D supplement     7) osteoarthritis of knees - sees Ortho team  PT and cortisone inj started 1/2020     8) DM     - A1c 5 6%  - pt states is no longer on metformin    9)  Weight gain -patient advised to see bariatric program   Patient agreeable      It was a pleasure evaluating Mr Janeth Byrd; thank you for allowing our team to participate in the care of your patient and please do not hesitate to contact our team if any questions arise in the interim         No problem-specific Assessment & Plan notes found for this encounter        HPI:      Patient denies complaints with the exception of knee pain  Weight gain  No shortness of breath  PATIENT INSTRUCTIONS:    Patient Instructions   1) Avoid NSAIDS - (Example - motrin, advil, ibuprofen, aleve, exederin, etc)  2) Always follow a low salt diet  3) labwork before next appointment  4) appointment in 4 months  5) no changes to your medications  6) please schedule to see the bariatric program        OBJECTIVE:  Current Weight: Weight - Scale: (!) 153 kg (337 lb)  Vitals:    06/02/21 1600   BP: 134/82   BP Location: Right arm   Patient Position: Sitting   Cuff Size: Large   Pulse: 57   Weight: (!) 153 kg (337 lb)   Height: 5' 9" (1 753 m)    Body mass index is 49 77 kg/m²  REVIEW OF SYSTEMS:    Review of Systems   Constitutional:          Gaining weight  Musculoskeletal:          Knee pain  All other systems reviewed and are negative  PHYSICAL EXAM:      Physical Exam  Vitals signs and nursing note reviewed  Constitutional:       General: He is not in acute distress  Appearance: He is well-developed  He is not diaphoretic  HENT:      Head: Normocephalic and atraumatic  Eyes:      General: No scleral icterus  Right eye: No discharge  Left eye: No discharge  Conjunctiva/sclera: Conjunctivae normal    Neck:      Musculoskeletal: Normal range of motion and neck supple  Vascular: No JVD  Cardiovascular:      Rate and Rhythm: Normal rate and regular rhythm  Heart sounds: Normal heart sounds  No murmur  No friction rub  No gallop  Pulmonary:      Effort: Pulmonary effort is normal  No respiratory distress  Breath sounds: Normal breath sounds  No wheezing or rales  Chest:      Chest wall: No tenderness  Abdominal:      General: Bowel sounds are normal  There is no distension  Palpations: Abdomen is soft  Tenderness: There is no abdominal tenderness  There is no rebound  Musculoskeletal: Normal range of motion  General: No tenderness or deformity        Comments: Trace lower extremity edema bilaterally  Skin:     General: Skin is warm and dry  Coloration: Skin is not pale  Findings: No erythema or rash  Neurological:      Mental Status: He is alert and oriented to person, place, and time  Cranial Nerves: No cranial nerve deficit  Coordination: Coordination normal       Deep Tendon Reflexes: Reflexes are normal and symmetric  Psychiatric:         Behavior: Behavior normal          Thought Content: Thought content normal          Judgment: Judgment normal          Medications:    Current Outpatient Medications:     amLODIPine (NORVASC) 10 mg tablet, take 1 tablet by mouth once daily, Disp: 90 tablet, Rfl: 0    atorvastatin (LIPITOR) 20 mg tablet, take 1 tablet by mouth once daily, Disp: 90 tablet, Rfl: 0    busPIRone (BUSPAR) 10 mg tablet, take 1 tablet by mouth twice a day, Disp: 60 tablet, Rfl: 1    chlorthalidone 25 mg tablet, Take 1 tablet (25 mg total) by mouth daily Take one half tablet daily, Disp: 90 tablet, Rfl: 3    diclofenac sodium (VOLTAREN) 1 %, Apply 2 g topically 4 (four) times a day, Disp: 1 Tube, Rfl: 3    doxazosin (CARDURA) 2 mg tablet, take 2 tablets by mouth daily at bedtime, Disp: 180 tablet, Rfl: 3    FLUoxetine (PROzac) 40 MG capsule, take 1 capsule by mouth once daily, Disp: 30 capsule, Rfl: 1    ketoconazole (NIZORAL) 2 % cream, 1 application daily Apply to affected area, Disp: , Rfl: 0    losartan (COZAAR) 100 MG tablet, take 1 tablet by mouth once daily, Disp: 90 tablet, Rfl: 3    metoprolol tartrate (LOPRESSOR) 100 mg tablet, take 1 tablet by mouth twice a day, Disp: 180 tablet, Rfl: 3    morphine (MS CONTIN) 15 mg 12 hr tablet, take 1 tablet every 8 hours if needed pain, Disp: , Rfl: 0    oxyCODONE (ROXICODONE) 30 MG immediate release tablet, take 1 tablet by mouth every 5 hours if needed for pain   Lajean Cassette MAX 4/DAY, Disp: , Rfl: 0    spironolactone (ALDACTONE) 25 mg tablet, take 1 tablet by mouth once daily, Disp: 30 tablet, Rfl: 5    tadalafil (CIALIS) 5 MG tablet, Take 5 mg by mouth daily as needed for erectile dysfunction (unsure dosage) Indications: Erectile Dysfunction  , Disp: , Rfl:     Laboratory Results:        Invalid input(s): ALBUMIN    Results for orders placed or performed in visit on 05/13/21   Lipid Panel with Direct LDL reflex   Result Value Ref Range    Cholesterol 125 50 - 200 mg/dL    Triglycerides 75 <=150 mg/dL    HDL, Direct 46 >=40 mg/dL    LDL Calculated 64 0 - 100 mg/dL   HEMOGLOBIN A1C W/ EAG ESTIMATION   Result Value Ref Range    Hemoglobin A1C 5 7 (H) Normal 3 8-5 6%; PreDiabetic 5 7-6 4%;  Diabetic >=6 5%; Glycemic control for adults with diabetes <7 0% %     mg/dl   Basic metabolic panel   Result Value Ref Range    Sodium 136 136 - 145 mmol/L    Potassium 3 8 3 5 - 5 3 mmol/L    Chloride 101 100 - 108 mmol/L    CO2 30 21 - 32 mmol/L    ANION GAP 5 4 - 13 mmol/L    BUN 13 5 - 25 mg/dL    Creatinine 1 30 0 60 - 1 30 mg/dL    Glucose, Fasting 116 (H) 65 - 99 mg/dL    Calcium 9 5 8 3 - 10 1 mg/dL    eGFR 59 ml/min/1 73sq m   CBC   Result Value Ref Range    WBC 8 10 4 31 - 10 16 Thousand/uL    RBC 4 85 3 88 - 5 62 Million/uL    Hemoglobin 14 1 12 0 - 17 0 g/dL    Hematocrit 41 7 36 5 - 49 3 %    MCV 86 82 - 98 fL    MCH 29 1 26 8 - 34 3 pg    MCHC 33 8 31 4 - 37 4 g/dL    RDW 13 3 11 6 - 15 1 %    Platelets 456 700 - 011 Thousands/uL    MPV 11 9 8 9 - 12 7 fL   Magnesium   Result Value Ref Range    Magnesium 2 0 1 6 - 2 6 mg/dL   Phosphorus   Result Value Ref Range    Phosphorus 2 7 2 3 - 4 1 mg/dL   Protein / creatinine ratio, urine   Result Value Ref Range    Creatinine, Ur 215 0 mg/dL    Protein Urine Random 16 mg/dL    Prot/Creat Ratio, Ur 0 07 0 00 - 0 10   Urinalysis with microscopic   Result Value Ref Range    Clarity, UA Clear     Color, UA Dk Yellow     Specific [place], UA 1 017 1 003 - 1 030    pH, UA 6 0 4 5, 5 0, 5 5, 6 0, 6 5, 7 0, 7 5, 8 0    Glucose, UA Negative Negative mg/dl    Ketones, UA Negative Negative mg/dl    Blood, UA Negative Negative    Protein, UA Negative Negative mg/dl    Nitrite, UA Negative Negative    Bilirubin, UA Negative Negative    Urobilinogen, UA 1 0 0 2, 1 0 E U /dl E U /dl    Leukocytes, UA Negative Negative    WBC, UA None Seen None Seen, 2-4 /hpf    RBC, UA None Seen None Seen, 2-4 /hpf    Hyaline Casts, UA 3-5 (A) None Seen /lpf    Bacteria, UA None Seen None Seen, Occasional /hpf    Epithelial Cells None Seen None Seen, Occasional /hpf   PTH, intact   Result Value Ref Range    PTH 77 9 18 4 - 80 1 pg/mL   PSA, Total Screen   Result Value Ref Range    PSA 1 1 0 0 - 4 0 ng/mL

## 2021-06-02 NOTE — PATIENT INSTRUCTIONS
1) Avoid NSAIDS - (Example - motrin, advil, ibuprofen, aleve, exederin, etc)  2) Always follow a low salt diet  3) labwork before next appointment  4) appointment in 4 months  5) no changes to your medications  6) please schedule to see the bariatric program

## 2021-06-02 NOTE — PROGRESS NOTES
NEPHROLOGY OFFICE VISIT   Stanley Nieto  63 y o  male MRN: [id number]  6/2/2021    Reason for Visit: HTN    ASSESSMENT and PLAN:    I had the pleasure of seeing Mr Earl Espitia today in the renal clinic for the continued management of HTN  55 yo male retired , formerly from [place], [place] [address] (15 years), DM (years), back surgery 10 years ago Who presents for follow up evaluation of hypertension   patient has had sporadic follow-up but has been slightly more consistent with follow-up since end of last year  Lucia Causey last saw the patient in August 2018 and then patient followed up with our advanced practitioner  Claude Gums returns today for follow-up visit      Patient has stopped using NSAIDs since 2019      1) HTN -      -prior renin and aldosterone level with a renin level suppressed, aldosterone level is not significantly elevated  - prior metanephrine unrevealing  - prior CT scan with unremarkable adrenals  - renal Doppler study in November 2019 with no significant occlusive disease   - patient is on spironolactone, chlorthalidone, amlodipine, losartan, metoprolol, doxazosin,   - during visit in march clonidine was tapered off, chlorthalidone was increased chlorthalidone to 25 mg daily  -  Creatinine stable in May 2021 at 1 3 mg/dL  Blood pressures are appropriate  Patient is still gaining weight      Plan:     - repeat renal ultrasound  And this year or early next year   - bariatric program referral for weight loss management  - check TSH with next lab work due to weight gain without increasing portion intake and actually patient has reduced portion intake  Though has been sedentary    - no changes to medication regimen - reviewed with pt    - labwork in 3 months  - appointment 3-4 month     2) poor dentition     -patient smokes cigars daily  -patient needs to see dentist     3) proteinuria -  Stable U PCR 0 07     - recheck at next visit  - if elevated, will check SPEP, UPEP, FLC     4) CKD III with baseline creatinine approximately 1 4 mg/dL      -renal ultrasound in September 2018 was septated cyst bilaterally with mildly increased on the right kidney   And a calcified cystic foci in the left kidney  -patient needs repeat ultrasound   Ordered prior  Will assist pt in scheduling  - creat 2 2 in august  Held losartan for 2 days and restarted  Inc fluid intake  - 8/17 - chlorthalidone reduced to 12 5 mg daily as creat improving but not back to baseline  -  05/2021-creatinine improving to baseline 1 3 mg/dL     5) grief counseling-patient needs to see psychiatrist and psychologist and patient has the appointment scheduled      - pt is depressed - seeing psychiatrist      6) Vit D deficiency     - pt was started on 50,000 units vit D weekly on 1/10/2020  - Vit D improving 25 5 on 8/2020 -->   Continuevit D supplement     7) osteoarthritis of knees - sees Ortho team  PT and cortisone inj started 1/2020     8) DM     - A1c 5 6%  - pt states is no longer on metformin    9)  Weight gain -patient advised to see bariatric program   Patient agreeable      It was a pleasure evaluating Mr Roxann Aguilera; thank you for allowing our team to participate in the care of your patient and please do not hesitate to contact our team if any questions arise in the interim         No problem-specific Assessment & Plan notes found for this encounter  HPI:      Patient denies complaints with the exception of knee pain  Weight gain  No shortness of breath      PATIENT INSTRUCTIONS:    Patient Instructions   1) Avoid NSAIDS - (Example - motrin, advil, ibuprofen, aleve, exederin, etc)  2) Always follow a low salt diet  3) labwork before next appointment  4) appointment in 4 months  5) no changes to your medications  6) please schedule to see the bariatric program        OBJECTIVE:  Current Weight: Weight - Scale: (!) 153 kg (337 lb)  Vitals:    06/02/21 1600   BP: 134/82   BP Location: Right arm   Patient Position: Sitting   Cuff Size: Large Pulse: 57   Weight: (!) 153 kg (337 lb)   Height: 5' 9" (1 753 m)    Body mass index is 49 77 kg/m²  REVIEW OF SYSTEMS:    Review of Systems   Constitutional:          Gaining weight  Musculoskeletal:          Knee pain  All other systems reviewed and are negative  PHYSICAL EXAM:      Physical Exam  Vitals signs and nursing note reviewed  Constitutional:       General: He is not in acute distress  Appearance: He is well-developed  He is not diaphoretic  HENT:      Head: Normocephalic and atraumatic  Eyes:      General: No scleral icterus  Right eye: No discharge  Left eye: No discharge  Conjunctiva/sclera: Conjunctivae normal    Neck:      Musculoskeletal: Normal range of motion and neck supple  Vascular: No JVD  Cardiovascular:      Rate and Rhythm: Normal rate and regular rhythm  Heart sounds: Normal heart sounds  No murmur  No friction rub  No gallop  Pulmonary:      Effort: Pulmonary effort is normal  No respiratory distress  Breath sounds: Normal breath sounds  No wheezing or rales  Chest:      Chest wall: No tenderness  Abdominal:      General: Bowel sounds are normal  There is no distension  Palpations: Abdomen is soft  Tenderness: There is no abdominal tenderness  There is no rebound  Musculoskeletal: Normal range of motion  General: No tenderness or deformity  Comments:   Trace lower extremity edema bilaterally  Skin:     General: Skin is warm and dry  Coloration: Skin is not pale  Findings: No erythema or rash  Neurological:      Mental Status: He is alert and oriented to person, place, and time  Cranial Nerves: No cranial nerve deficit  Coordination: Coordination normal       Deep Tendon Reflexes: Reflexes are normal and symmetric  Psychiatric:         Behavior: Behavior normal          Thought Content:  Thought content normal          Judgment: Judgment normal  Medications:    Current Outpatient Medications:     amLODIPine (NORVASC) 10 mg tablet, take 1 tablet by mouth once daily, Disp: 90 tablet, Rfl: 0    atorvastatin (LIPITOR) 20 mg tablet, take 1 tablet by mouth once daily, Disp: 90 tablet, Rfl: 0    busPIRone (BUSPAR) 10 mg tablet, take 1 tablet by mouth twice a day, Disp: 60 tablet, Rfl: 1    chlorthalidone 25 mg tablet, Take 1 tablet (25 mg total) by mouth daily Take one half tablet daily, Disp: 90 tablet, Rfl: 3    diclofenac sodium (VOLTAREN) 1 %, Apply 2 g topically 4 (four) times a day, Disp: 1 Tube, Rfl: 3    doxazosin (CARDURA) 2 mg tablet, take 2 tablets by mouth daily at bedtime, Disp: 180 tablet, Rfl: 3    FLUoxetine (PROzac) 40 MG capsule, take 1 capsule by mouth once daily, Disp: 30 capsule, Rfl: 1    ketoconazole (NIZORAL) 2 % cream, 1 application daily Apply to affected area, Disp: , Rfl: 0    losartan (COZAAR) 100 MG tablet, take 1 tablet by mouth once daily, Disp: 90 tablet, Rfl: 3    metoprolol tartrate (LOPRESSOR) 100 mg tablet, take 1 tablet by mouth twice a day, Disp: 180 tablet, Rfl: 3    morphine (MS CONTIN) 15 mg 12 hr tablet, take 1 tablet every 8 hours if needed pain, Disp: , Rfl: 0    oxyCODONE (ROXICODONE) 30 MG immediate release tablet, take 1 tablet by mouth every 5 hours if needed for pain   Gutierrez Ingles MAX 4/DAY, Disp: , Rfl: 0    spironolactone (ALDACTONE) 25 mg tablet, take 1 tablet by mouth once daily, Disp: 30 tablet, Rfl: 5    tadalafil (CIALIS) 5 MG tablet, Take 5 mg by mouth daily as needed for erectile dysfunction (unsure dosage) Indications: Erectile Dysfunction  , Disp: , Rfl:     Laboratory Results:        Invalid input(s): ALBUMIN    Results for orders placed or performed in visit on 05/13/21   Lipid Panel with Direct LDL reflex   Result Value Ref Range    Cholesterol 125 50 - 200 mg/dL    Triglycerides 75 <=150 mg/dL    HDL, Direct 46 >=40 mg/dL    LDL Calculated 64 0 - 100 mg/dL   HEMOGLOBIN A1C W/ EAG ESTIMATION Result Value Ref Range    Hemoglobin A1C 5 7 (H) Normal 3 8-5 6%; PreDiabetic 5 7-6 4%;  Diabetic >=6 5%; Glycemic control for adults with diabetes <7 0% %     mg/dl   Basic metabolic panel   Result Value Ref Range    Sodium 136 136 - 145 mmol/L    Potassium 3 8 3 5 - 5 3 mmol/L    Chloride 101 100 - 108 mmol/L    CO2 30 21 - 32 mmol/L    ANION GAP 5 4 - 13 mmol/L    BUN 13 5 - 25 mg/dL    Creatinine 1 30 0 60 - 1 30 mg/dL    Glucose, Fasting 116 (H) 65 - 99 mg/dL    Calcium 9 5 8 3 - 10 1 mg/dL    eGFR 59 ml/min/1 73sq m   CBC   Result Value Ref Range    WBC 8 10 4 31 - 10 16 Thousand/uL    RBC 4 85 3 88 - 5 62 Million/uL    Hemoglobin 14 1 12 0 - 17 0 g/dL    Hematocrit 41 7 36 5 - 49 3 %    MCV 86 82 - 98 fL    MCH 29 1 26 8 - 34 3 pg    MCHC 33 8 31 4 - 37 4 g/dL    RDW 13 3 11 6 - 15 1 %    Platelets 114 351 - 139 Thousands/uL    MPV 11 9 8 9 - 12 7 fL   Magnesium   Result Value Ref Range    Magnesium 2 0 1 6 - 2 6 mg/dL   Phosphorus   Result Value Ref Range    Phosphorus 2 7 2 3 - 4 1 mg/dL   Protein / creatinine ratio, urine   Result Value Ref Range    Creatinine, Ur 215 0 mg/dL    Protein Urine Random 16 mg/dL    Prot/Creat Ratio, Ur 0 07 0 00 - 0 10   Urinalysis with microscopic   Result Value Ref Range    Clarity, UA Clear     Color, UA Dk Yellow     Specific [place], UA 1 017 1 003 - 1 030    pH, UA 6 0 4 5, 5 0, 5 5, 6 0, 6 5, 7 0, 7 5, 8 0    Glucose, UA Negative Negative mg/dl    Ketones, UA Negative Negative mg/dl    Blood, UA Negative Negative    Protein, UA Negative Negative mg/dl    Nitrite, UA Negative Negative    Bilirubin, UA Negative Negative    Urobilinogen, UA 1 0 0 2, 1 0 E U /dl E U /dl    Leukocytes, UA Negative Negative    WBC, UA None Seen None Seen, 2-4 /hpf    RBC, UA None Seen None Seen, 2-4 /hpf    Hyaline Casts, UA 3-5 (A) None Seen /lpf    Bacteria, UA None Seen None Seen, Occasional /hpf    Epithelial Cells None Seen None Seen, Occasional /hpf   PTH, intact   Result Value Ref Range    PTH 77 9 18 4 - 80 1 pg/mL   PSA, Total Screen   Result Value Ref Range    PSA 1 1 0 0 - 4 0 ng/mL

## 2021-06-03 ENCOUNTER — RA CDI HCC (OUTPATIENT)
Dept: OTHER | Facility: HOSPITAL | Age: 61
End: 2021-06-03

## 2021-06-03 ENCOUNTER — TELEPHONE (OUTPATIENT)
Dept: NEPHROLOGY | Facility: CLINIC | Age: 61
End: 2021-06-03

## 2021-06-03 NOTE — PROGRESS NOTES
[facility]  coding opportunities          Chart reviewed, no opportunity found: CHART REVIEWED, NO OPPORTUNITY FOUND              Patients insurance company: [employer]

## 2021-06-07 ENCOUNTER — OFFICE VISIT (OUTPATIENT)
Dept: FAMILY MEDICINE CLINIC | Facility: CLINIC | Age: 61
End: 2021-06-07
Payer: COMMERCIAL

## 2021-06-07 VITALS
OXYGEN SATURATION: 96 % | HEIGHT: 69 IN | RESPIRATION RATE: 16 BRPM | DIASTOLIC BLOOD PRESSURE: 80 MMHG | TEMPERATURE: 98.5 F | BODY MASS INDEX: 46.65 KG/M2 | HEART RATE: 64 BPM | SYSTOLIC BLOOD PRESSURE: 110 MMHG | WEIGHT: 315 LBS

## 2021-06-07 DIAGNOSIS — I10 ESSENTIAL HYPERTENSION: ICD-10-CM

## 2021-06-07 DIAGNOSIS — R73.03 PREDIABETES: Primary | ICD-10-CM

## 2021-06-07 DIAGNOSIS — F32.A DEPRESSION, UNSPECIFIED DEPRESSION TYPE: ICD-10-CM

## 2021-06-07 DIAGNOSIS — E78.5 HYPERLIPIDEMIA, UNSPECIFIED HYPERLIPIDEMIA TYPE: ICD-10-CM

## 2021-06-07 DIAGNOSIS — E66.01 MORBID OBESITY (HCC): ICD-10-CM

## 2021-06-07 PROCEDURE — 99214 OFFICE O/P EST MOD 30 MIN: CPT | Performed by: NURSE PRACTITIONER

## 2021-06-07 PROCEDURE — 3008F BODY MASS INDEX DOCD: CPT | Performed by: NURSE PRACTITIONER

## 2021-06-07 PROCEDURE — 4004F PT TOBACCO SCREEN RCVD TLK: CPT | Performed by: NURSE PRACTITIONER

## 2021-06-07 NOTE — PROGRESS NOTES
Assessment/Plan:      Diagnoses and all orders for this visit:    Prediabetes  -     HEMOGLOBIN A1C W/ EAG ESTIMATION; Future  -     Lipid Panel with Direct LDL reflex; Future    Hemoglobin A1C 5 7  Low fat/low carb diet reviewed  Repeat hemoglobin A1C in 6 months  Morbid obesity ([address] )  -     HEMOGLOBIN A1C W/ EAG ESTIMATION; Future  -     Lipid Panel with Direct LDL reflex; Future    Patient instructed to eat a healthy low fat/low carb diet  Hyperlipidemia, unspecified hyperlipidemia type  -     HEMOGLOBIN A1C W/ EAG ESTIMATION; Future  -     Lipid Panel with Direct LDL reflex; Future    Stable  Continue atorvastatin  Essential hypertension  Continue to follow-up with nephrology  Patient is going to check with his nephrologist about continuing cialis prn for ED  Depression, unspecified depression type  Denies any suicidal thoughts  Continue to follow-up with psychiatry  Reviewed lab results with the patient  Patient instructed to follow-up in 6 months or sooner prn  Subjective:     Patient ID: Angie Patton  is a 63 y o  male  Patient is here for a follow-up for chronic medical conditions  Patient reports that he feels well  Patient is here for a follow-up for prediabetes and obesity  Patient reports that he is trying to watch his diet  Patient is here for a follow-up for hyperlipidemia  Patient reports that he takes the atorvastatin daily  Denies any side effects  Patient follows up with nephrology for HTN  Denies any chest pain, SOB, dizziness, or Has  Patient follows up with psychiatry for depression  Patient reports that he takes his medication as prescribed  Denies any suicidal thoughts  Patient reports that he takes cialis prn for ED  Patient reports that he needs a refill  Review of Systems   Constitutional: Negative for chills, fatigue and fever  HENT: Negative for congestion, ear pain and sore throat      Respiratory: Negative for cough, chest tightness, shortness of breath and wheezing  Cardiovascular: Negative for chest pain and palpitations  Gastrointestinal: Negative for abdominal pain, blood in stool, diarrhea, nausea and vomiting  Genitourinary: Negative for dysuria and hematuria  Skin: Negative for rash  Neurological: Negative for dizziness, seizures, syncope, light-headedness and headaches  Psychiatric/Behavioral: Negative for suicidal ideas  As noted in HPI  Objective:     Physical Exam  Vitals signs reviewed  Constitutional:       General: He is not in acute distress  Appearance: He is obese  He is not ill-appearing or diaphoretic  HENT:      Right Ear: External ear normal       Left Ear: External ear normal    Eyes:      Conjunctiva/sclera: Conjunctivae normal       Pupils: Pupils are equal, round, and reactive to light  Cardiovascular:      Rate and Rhythm: Normal rate and regular rhythm  Pulses: Normal pulses  Heart sounds: Normal heart sounds  Pulmonary:      Effort: Pulmonary effort is normal  No respiratory distress  Breath sounds: Normal breath sounds  No wheezing  Musculoskeletal:      Comments: Gait wnl  Skin:     Findings: No rash  Neurological:      Mental Status: He is alert and oriented to person, place, and time     Psychiatric:         Mood and Affect: Mood normal

## 2021-07-20 ENCOUNTER — OFFICE VISIT (OUTPATIENT)
Dept: BARIATRICS | Facility: CLINIC | Age: 61
End: 2021-07-20
Payer: COMMERCIAL

## 2021-07-20 VITALS
BODY MASS INDEX: 45.1 KG/M2 | RESPIRATION RATE: 12 BRPM | TEMPERATURE: 99.4 F | HEART RATE: 60 BPM | WEIGHT: 315 LBS | DIASTOLIC BLOOD PRESSURE: 70 MMHG | SYSTOLIC BLOOD PRESSURE: 128 MMHG | HEIGHT: 70 IN

## 2021-07-20 DIAGNOSIS — I10 ESSENTIAL HYPERTENSION: ICD-10-CM

## 2021-07-20 DIAGNOSIS — E66.01 MORBID (SEVERE) OBESITY DUE TO EXCESS CALORIES (HCC): Primary | ICD-10-CM

## 2021-07-20 DIAGNOSIS — N18.2 STAGE 2 CHRONIC KIDNEY DISEASE: ICD-10-CM

## 2021-07-20 DIAGNOSIS — Z99.89 OSA ON CPAP: ICD-10-CM

## 2021-07-20 DIAGNOSIS — F32.A DEPRESSION, UNSPECIFIED DEPRESSION TYPE: ICD-10-CM

## 2021-07-20 DIAGNOSIS — G47.33 OSA ON CPAP: ICD-10-CM

## 2021-07-20 DIAGNOSIS — Z87.891 HISTORY OF CIGAR SMOKING: ICD-10-CM

## 2021-07-20 DIAGNOSIS — M17.0 BILATERAL PRIMARY OSTEOARTHRITIS OF KNEE: ICD-10-CM

## 2021-07-20 DIAGNOSIS — E78.5 HYPERLIPIDEMIA, UNSPECIFIED HYPERLIPIDEMIA TYPE: ICD-10-CM

## 2021-07-20 DIAGNOSIS — G89.29 CHRONIC BACK PAIN, UNSPECIFIED BACK LOCATION, UNSPECIFIED BACK PAIN LATERALITY: ICD-10-CM

## 2021-07-20 DIAGNOSIS — M54.9 CHRONIC BACK PAIN, UNSPECIFIED BACK LOCATION, UNSPECIFIED BACK PAIN LATERALITY: ICD-10-CM

## 2021-07-20 PROCEDURE — 4004F PT TOBACCO SCREEN RCVD TLK: CPT | Performed by: SURGERY

## 2021-07-20 PROCEDURE — 3074F SYST BP LT 130 MM HG: CPT | Performed by: SURGERY

## 2021-07-20 PROCEDURE — 99204 OFFICE O/P NEW MOD 45 MIN: CPT | Performed by: SURGERY

## 2021-07-20 PROCEDURE — 3008F BODY MASS INDEX DOCD: CPT | Performed by: SURGERY

## 2021-07-20 PROCEDURE — 3078F DIAST BP <80 MM HG: CPT | Performed by: SURGERY

## 2021-07-20 NOTE — PROGRESS NOTES
BARIATRIC INITIAL CONSULT - BARIATRIC SURGERY    Emma Quiroz  63 y o  male MRN: [id number]  Unit/Bed#:  Encounter: [id number]      HPI:  Emma Quiroz  is a very pleasant 63 y o  male who presents with a longstanding history of morbid obesity and inability to sustain a meaningful weight loss  Here today to discuss bariatric options  He is a former  from [facility] who currently resides in [facility]  Body mass index is 49 34 kg/m²  ++Suffers from HTN, OA knees, HLD, CKD2, chronic back pain, IMELDA on CPAP  S/p lumbar sx    Visit type: consultation     Symptoms: excess weight, weight increase, inability to loss weight, fatigue and knee pain    Associated Symptoms: depressed mood    Associated Conditions: hyperlipidemia, sleep apnea and abdominal obesity  Disease Complications: hypertension, sleep apnea and osteoarthritis  Weight Loss Interest: high  Previous Diet Trials: low calorie     Exercise Frequency:infrequency  Types of Exercise: walking    Review of Systems   Constitutional: Negative  Respiratory: Negative  Cardiovascular: Negative  Gastrointestinal: Negative  Musculoskeletal: Negative  Neurological: Negative  All other systems reviewed and are negative        Historical Information   Past Medical History:   Diagnosis Date    Anxiety     Back pain     Chronic pain     Diabetes mellitus ([facility] )     6/19/15 A1C: 5 5%    Hyperlipidemia     Hypertension     Knee pain     Morbid obesity with BMI of 50 0-59 9, adult ([facility] )     Psychiatric disorder     Type 2 diabetes mellitus without complication ([place] [address] ) 46/62/9363    Urinary retention      Past Surgical History:   Procedure Laterality Date    BACK SURGERY      LUMBAR FUSION  2002    LUMBAR FUSION  2004    L4-5-6, S1 has pump for narcotics     Social History   Social History     Substance and Sexual Activity   Alcohol Use Yes    Comment: occasional     Social History     Substance and Sexual Activity   Drug Use No    Comment: History of drug use, meena, cocaine, heroind, no IVDA-as per [employer]     Social History     Tobacco Use   Smoking Status Current Every Day Smoker    Types: Cigars   Smokeless Tobacco Never Used   Tobacco Comment    1 cigar daily,  50 pack years, 1/2 pack at age 12, 2 ppd x 21 yrs, quit 2004     Family History:   Family History   Problem Relation Age of Onset    Other Mother         colitis    Diabetes Mother        Meds/Allergies   all medications and allergies reviewed  Allergies   Allergen Reactions    Gabapentin      Annotation - [id number]: dizziness    Pregabalin      Annotation - [id number]: vision changes and mood changes    Tramadol Nausea Only       Objective     Current Vitals:   /70   Pulse 60   Temp 99 4 °F (37 4 °C)   Resp 12   Ht 5' 9 75" (1 772 m)   Wt (!) 155 kg (341 lb 6 4 oz)   BMI 49 34 kg/m²       Physical Exam  Vitals reviewed  Constitutional:       Appearance: He is well-developed  HENT:      Head: Normocephalic  Eyes:      Extraocular Movements: Extraocular movements intact  Cardiovascular:      Rate and Rhythm: Normal rate  Pulmonary:      Effort: Pulmonary effort is normal    Abdominal:      General: There is no distension  Musculoskeletal:         General: Normal range of motion  Cervical back: Normal range of motion  Neurological:      Mental Status: He is alert and oriented to person, place, and time  Psychiatric:         Mood and Affect: Mood normal          Behavior: Behavior normal          Thought Content: Thought content normal          Judgment: Judgment normal          Lab Results: I have personally reviewed pertinent lab results  Imaging: I have personally reviewed pertinent reports  EKG, Pathology, and Other Studies: I have personally reviewed pertinent reports          Assessment/PLAN:    63 y o  yo male with a long standing h/o of obesity and inability to sustain any meaningful weight loss on his own despite several attempts  He is interested in the Laparoscopic didi-en-y gastric bypass  ++Suffers from HTN, OA knees, HLD, CKD2, chronic back pain, IMELDA on CPAP  S/p lumbar sx    I have explained our Enhanced Recovery After Bariatric Surgery (ERABS) protocol and benefits including preoperative, intraoperative and postoperative elements  As a part of his pre op process, he will undergo evaluation appointments with out dietician, licensed care , and   After the evaluation, he will be referred to a cardiologist for risk stratification  He needs an EGD to evaluate the anatomy of his GI tract  I have spent over 45 minutes with him face to face in the office today discussing his options and details of the surgery  We have seen an animation of the surgery on the computer that illustrates how the operation is done and how the anatomy will be altered with the procedure  Over 50% of this was coordinating care  I have discussed and educated the patient with regards to the components of our multidisciplinary program and the importance of compliance and follow up in the post operative period  He was given the opportunity to ask questions and I have answered all of them  The patient was also instructed with regards to the importance of behavior modification, nutritional counseling, support meeting attendance and lifestyle changes that are important to ensure success  Although there is a great statistical chance of improvement or even resolution of most of his associated comorbidities, the results vary from patient to patient and they largely depend on his commitment and compliance         Dee Dee Kat MD, FACS, [facility]  7/20/2021  2:33 PM

## 2021-07-20 NOTE — LETTER
July 20, 2021     Magalie Forte DO  [address]  [address]  [place]    Patient: Alea Rosario  YOB: 1960   Date of Visit: 7/20/2021       Dear Dr Omar Eldridge:    Thank you for referring Ni Shepherd to me for evaluation for bariatric surgery  Below are my notes for this consultation  If you have questions, please do not hesitate to call me on my cell phone at [phone]  I look forward to following your patient along with you  Sincerely,    Ofe Chavarria MD, Fadia Reilly, [facility]  Metabolic & Bariatric Surgery Director  [facility] - [facility]   7/20/2021  2:37 PM      CC: Arloa Gaucher, CRNP Adonica [place], MD Nila Perone, MD  7/20/2021  2:36 PM  Sign when Signing Visit      BARIATRIC INITIAL CONSULT - [address]  63 y o  male MRN: [id number]  Unit/Bed#:  Encounter: [id number]      HPI:  Alea Rosario  is a very pleasant 63 y o  male who presents with a longstanding history of morbid obesity and inability to sustain a meaningful weight loss  Here today to discuss bariatric options  He is a former  from [facility] who currently resides in [place]  Body mass index is 49 34 kg/m²  ++Suffers from HTN, OA knees, HLD, CKD2, chronic back pain, IMELDA on CPAP  S/p lumbar sx    Visit type: consultation     Symptoms: excess weight, weight increase, inability to loss weight, fatigue and knee pain    Associated Symptoms: depressed mood    Associated Conditions: hyperlipidemia, sleep apnea and abdominal obesity  Disease Complications: hypertension, sleep apnea and osteoarthritis  Weight Loss Interest: high  Previous Diet Trials: low calorie     Exercise Frequency:infrequency  Types of Exercise: walking    Review of Systems   Constitutional: Negative  Respiratory: Negative  Cardiovascular: Negative  Gastrointestinal: Negative  Musculoskeletal: Negative  Neurological: Negative      All other systems reviewed and are negative  Historical Information   Past Medical History:   Diagnosis Date    Anxiety     Back pain     Chronic pain     Diabetes mellitus ([facility] )     6/19/15 A1C: 5 5%    Hyperlipidemia     Hypertension     Knee pain     Morbid obesity with BMI of 50 0-59 9, adult ([facility] )     Psychiatric disorder     Type 2 diabetes mellitus without complication ([facility] ) 77/91/5673    Urinary retention      Past Surgical History:   Procedure Laterality Date    BACK SURGERY      LUMBAR FUSION  2002    LUMBAR FUSION  2004    L4-5-6, S1 has pump for narcotics     Social History   Social History     Substance and Sexual Activity   Alcohol Use Yes    Comment: occasional     Social History     Substance and Sexual Activity   Drug Use No    Comment: History of drug use, meena, cocaine, heroind, no IVDA-as per [employer]     Social History     Tobacco Use   Smoking Status Current Every Day Smoker    Types: Cigars   Smokeless Tobacco Never Used   Tobacco Comment    1 cigar daily,  50 pack years, 1/2 pack at age 12, 2 ppd x 21 yrs, quit 2004     Family History:   Family History   Problem Relation Age of Onset    Other Mother         colitis    Diabetes Mother        Meds/Allergies   all medications and allergies reviewed  Allergies   Allergen Reactions    Gabapentin      Annotation - [id number]: dizziness    Pregabalin      Annotation - [id number]: vision changes and mood changes    Tramadol Nausea Only       Objective     Current Vitals:   /70   Pulse 60   Temp 99 4 °F (37 4 °C)   Resp 12   Ht 5' 9 75" (1 772 m)   Wt (!) 155 kg (341 lb 6 4 oz)   BMI 49 34 kg/m²       Physical Exam  Vitals reviewed  Constitutional:       Appearance: He is well-developed  HENT:      Head: Normocephalic  Eyes:      Extraocular Movements: Extraocular movements intact  Cardiovascular:      Rate and Rhythm: Normal rate     Pulmonary:      Effort: Pulmonary effort is normal    Abdominal:      General: There is no distension  Musculoskeletal:         General: Normal range of motion  Cervical back: Normal range of motion  Neurological:      Mental Status: He is alert and oriented to person, place, and time  Psychiatric:         Mood and Affect: Mood normal          Behavior: Behavior normal          Thought Content: Thought content normal          Judgment: Judgment normal          Lab Results: I have personally reviewed pertinent lab results  Imaging: I have personally reviewed pertinent reports  EKG, Pathology, and Other Studies: I have personally reviewed pertinent reports  Assessment/PLAN:    63 y o  yo male with a long standing h/o of obesity and inability to sustain any meaningful weight loss on his own despite several attempts  He is interested in the Laparoscopic didi-en-y gastric bypass  ++Suffers from HTN, OA knees, HLD, CKD2, chronic back pain, IMELDA on CPAP  S/p lumbar sx    I have explained our Enhanced Recovery After Bariatric Surgery (ERABS) protocol and benefits including preoperative, intraoperative and postoperative elements  As a part of his pre op process, he will undergo evaluation appointments with out dietician, licensed care , and   After the evaluation, he will be referred to a cardiologist for risk stratification  He needs an EGD to evaluate the anatomy of his GI tract  I have spent over 45 minutes with him face to face in the office today discussing his options and details of the surgery  We have seen an animation of the surgery on the computer that illustrates how the operation is done and how the anatomy will be altered with the procedure  Over 50% of this was coordinating care  I have discussed and educated the patient with regards to the components of our multidisciplinary program and the importance of compliance and follow up in the post operative period      He was given the opportunity to ask questions and I have answered all of them  The patient was also instructed with regards to the importance of behavior modification, nutritional counseling, support meeting attendance and lifestyle changes that are important to ensure success  Although there is a great statistical chance of improvement or even resolution of most of his associated comorbidities, the results vary from patient to patient and they largely depend on his commitment and compliance         Evie Peña MD, FACS, [facility]  7/20/2021  2:33 PM

## 2021-08-31 ENCOUNTER — TELEPHONE (OUTPATIENT)
Dept: NEPHROLOGY | Facility: CLINIC | Age: 61
End: 2021-08-31

## 2021-08-31 NOTE — TELEPHONE ENCOUNTER
Called patient to schedule follow up visit with Dr Bobbi Newell in October  [facility]

## 2021-09-08 ENCOUNTER — TELEPHONE (OUTPATIENT)
Dept: NEPHROLOGY | Facility: CLINIC | Age: 61
End: 2021-09-08

## 2021-09-10 ENCOUNTER — TELEPHONE (OUTPATIENT)
Dept: NEPHROLOGY | Facility: CLINIC | Age: 61
End: 2021-09-10

## 2021-09-10 NOTE — TELEPHONE ENCOUNTER
Tried to call patient to schedule October follow up with Dr Kaur Lazar -- received dialtone  Letter sent

## 2021-10-16 DIAGNOSIS — I10 ESSENTIAL HYPERTENSION: ICD-10-CM

## 2021-10-18 RX ORDER — AMLODIPINE BESYLATE 10 MG/1
TABLET ORAL
Qty: 90 TABLET | Refills: 0 | Status: SHIPPED | OUTPATIENT
Start: 2021-10-18 | End: 2022-01-10

## 2021-10-21 ENCOUNTER — TELEPHONE (OUTPATIENT)
Dept: NEPHROLOGY | Facility: CLINIC | Age: 61
End: 2021-10-21

## 2021-10-25 ENCOUNTER — TELEPHONE (OUTPATIENT)
Dept: NEPHROLOGY | Facility: CLINIC | Age: 61
End: 2021-10-25

## 2021-10-25 ENCOUNTER — LAB (OUTPATIENT)
Dept: LAB | Facility: CLINIC | Age: 61
End: 2021-10-25
Payer: COMMERCIAL

## 2021-10-25 DIAGNOSIS — N18.31 STAGE 3A CHRONIC KIDNEY DISEASE (HCC): ICD-10-CM

## 2021-10-25 LAB
ANION GAP SERPL CALCULATED.3IONS-SCNC: 14 MMOL/L (ref 4–13)
BUN SERPL-MCNC: 22 MG/DL (ref 5–25)
CALCIUM SERPL-MCNC: 9.2 MG/DL (ref 8.3–10.1)
CHLORIDE SERPL-SCNC: 100 MMOL/L (ref 100–108)
CO2 SERPL-SCNC: 23 MMOL/L (ref 21–32)
CREAT SERPL-MCNC: 1.73 MG/DL (ref 0.6–1.3)
ERYTHROCYTE [DISTWIDTH] IN BLOOD BY AUTOMATED COUNT: 13.4 % (ref 11.6–15.1)
GFR SERPL CREATININE-BSD FRML MDRD: 41 ML/MIN/1.73SQ M
GLUCOSE SERPL-MCNC: 190 MG/DL (ref 65–140)
HCT VFR BLD AUTO: 42.2 % (ref 36.5–49.3)
HGB BLD-MCNC: 14 G/DL (ref 12–17)
MAGNESIUM SERPL-MCNC: 1.7 MG/DL (ref 1.6–2.6)
MCH RBC QN AUTO: 28.5 PG (ref 26.8–34.3)
MCHC RBC AUTO-ENTMCNC: 33.2 G/DL (ref 31.4–37.4)
MCV RBC AUTO: 86 FL (ref 82–98)
PHOSPHATE SERPL-MCNC: 3.1 MG/DL (ref 2.3–4.1)
PLATELET # BLD AUTO: 240 THOUSANDS/UL (ref 149–390)
PMV BLD AUTO: 10.5 FL (ref 8.9–12.7)
POTASSIUM SERPL-SCNC: 4 MMOL/L (ref 3.5–5.3)
PTH-INTACT SERPL-MCNC: 96.8 PG/ML (ref 18.4–80.1)
RBC # BLD AUTO: 4.92 MILLION/UL (ref 3.88–5.62)
SODIUM SERPL-SCNC: 137 MMOL/L (ref 136–145)
TSH SERPL DL<=0.05 MIU/L-ACNC: 4.75 UIU/ML (ref 0.36–3.74)
WBC # BLD AUTO: 9.22 THOUSAND/UL (ref 4.31–10.16)

## 2021-10-25 PROCEDURE — 36415 COLL VENOUS BLD VENIPUNCTURE: CPT

## 2021-10-25 PROCEDURE — 80048 BASIC METABOLIC PNL TOTAL CA: CPT

## 2021-10-25 PROCEDURE — 84443 ASSAY THYROID STIM HORMONE: CPT

## 2021-10-25 PROCEDURE — 83735 ASSAY OF MAGNESIUM: CPT

## 2021-10-25 PROCEDURE — 83970 ASSAY OF PARATHORMONE: CPT

## 2021-10-25 PROCEDURE — 84100 ASSAY OF PHOSPHORUS: CPT

## 2021-10-25 PROCEDURE — 85027 COMPLETE CBC AUTOMATED: CPT

## 2021-10-26 ENCOUNTER — TELEPHONE (OUTPATIENT)
Dept: NEPHROLOGY | Facility: CLINIC | Age: 61
End: 2021-10-26

## 2021-10-26 DIAGNOSIS — N28.1 RENAL CYST: Primary | ICD-10-CM

## 2021-10-26 DIAGNOSIS — N18.31 STAGE 3A CHRONIC KIDNEY DISEASE (HCC): ICD-10-CM

## 2021-11-07 DIAGNOSIS — E78.5 HYPERLIPIDEMIA, UNSPECIFIED HYPERLIPIDEMIA TYPE: ICD-10-CM

## 2021-11-08 RX ORDER — ATORVASTATIN CALCIUM 20 MG/1
TABLET, FILM COATED ORAL
Qty: 90 TABLET | Refills: 0 | Status: SHIPPED | OUTPATIENT
Start: 2021-11-08 | End: 2022-02-07 | Stop reason: SDUPTHER

## 2021-11-11 DIAGNOSIS — N28.1 RENAL CYST: ICD-10-CM

## 2021-11-11 DIAGNOSIS — I10 BENIGN ESSENTIAL HTN: ICD-10-CM

## 2021-11-11 RX ORDER — SPIRONOLACTONE 25 MG/1
TABLET ORAL
Qty: 30 TABLET | Refills: 5 | Status: SHIPPED | OUTPATIENT
Start: 2021-11-11 | End: 2021-12-27 | Stop reason: SDUPTHER

## 2021-11-22 ENCOUNTER — HOSPITAL ENCOUNTER (OUTPATIENT)
Dept: ULTRASOUND IMAGING | Facility: HOSPITAL | Age: 61
Discharge: HOME | End: 2021-11-22
Attending: INTERNAL MEDICINE
Payer: COMMERCIAL

## 2021-11-22 DIAGNOSIS — N28.1 RENAL CYST: ICD-10-CM

## 2021-11-22 PROCEDURE — 76770 US EXAM ABDO BACK WALL COMP: CPT

## 2021-11-29 ENCOUNTER — TELEPHONE (OUTPATIENT)
Dept: NEPHROLOGY | Facility: CLINIC | Age: 61
End: 2021-11-29

## 2021-12-01 DIAGNOSIS — N28.1 RENAL CYST: Primary | ICD-10-CM

## 2021-12-27 DIAGNOSIS — N28.1 RENAL CYST: ICD-10-CM

## 2021-12-27 DIAGNOSIS — I10 BENIGN ESSENTIAL HTN: ICD-10-CM

## 2021-12-28 RX ORDER — SPIRONOLACTONE 25 MG/1
25 TABLET ORAL DAILY
Qty: 30 TABLET | Refills: 0 | Status: SHIPPED | OUTPATIENT
Start: 2021-12-28 | End: 2022-02-04

## 2022-01-07 ENCOUNTER — RA CDI HCC (OUTPATIENT)
Dept: OTHER | Facility: HOSPITAL | Age: 62
End: 2022-01-07

## 2022-01-07 NOTE — PROGRESS NOTES
[facility]  coding opportunities          Number of diagnosis code(s) already on the problem list added to FYI fla     Chart Reviewed * (Number of) Inbasket suggestions sent to Provider: 1            Number of suggestions used: 2      Number of suggestions NOT actually used: 1     Patients insurance company: [facility]     Visit status: Patient arrived for their scheduled appointment        [place] [address]  coding opportunities     N18 31 Chronic kidney disease, stage 3a  Per Nephrology on 2021    DX: E66 01 Morbid (severe) obesity due to excess calories- on problem list  DX: F32 1 Current moderate episode of major depressive disorder without prior episode- Per Rx    If this is correct, please document and assess at your next visit on 1/10/2022         Number of diagnosis code(s) already on the problem list added to FYI fla     Chart Reviewed * (Number of) Inbasket suggestions sent to Provider: 1                  Patients insurance company: [facility]

## 2022-01-10 DIAGNOSIS — I10 ESSENTIAL HYPERTENSION: ICD-10-CM

## 2022-01-10 RX ORDER — AMLODIPINE BESYLATE 10 MG/1
TABLET ORAL
Qty: 90 TABLET | Refills: 0 | Status: SHIPPED | OUTPATIENT
Start: 2022-01-10 | End: 2022-04-25

## 2022-01-13 ENCOUNTER — APPOINTMENT (OUTPATIENT)
Dept: LAB | Facility: CLINIC | Age: 62
End: 2022-01-13
Payer: COMMERCIAL

## 2022-01-13 DIAGNOSIS — E78.5 HYPERLIPIDEMIA, UNSPECIFIED HYPERLIPIDEMIA TYPE: ICD-10-CM

## 2022-01-13 DIAGNOSIS — R73.03 PREDIABETES: ICD-10-CM

## 2022-01-13 DIAGNOSIS — E66.01 MORBID OBESITY (HCC): ICD-10-CM

## 2022-01-13 LAB
CHOLEST SERPL-MCNC: 104 MG/DL
EST. AVERAGE GLUCOSE BLD GHB EST-MCNC: 212 MG/DL
HBA1C MFR BLD: 9 %
HDLC SERPL-MCNC: 44 MG/DL
LDLC SERPL CALC-MCNC: 47 MG/DL (ref 0–100)
TRIGL SERPL-MCNC: 64 MG/DL

## 2022-01-13 PROCEDURE — 36415 COLL VENOUS BLD VENIPUNCTURE: CPT

## 2022-01-13 PROCEDURE — 83036 HEMOGLOBIN GLYCOSYLATED A1C: CPT

## 2022-01-13 PROCEDURE — 80061 LIPID PANEL: CPT

## 2022-02-04 DIAGNOSIS — N28.1 RENAL CYST: ICD-10-CM

## 2022-02-04 DIAGNOSIS — I10 BENIGN ESSENTIAL HTN: ICD-10-CM

## 2022-02-04 PROCEDURE — 3066F NEPHROPATHY DOC TX: CPT | Performed by: INTERNAL MEDICINE

## 2022-02-04 RX ORDER — SPIRONOLACTONE 25 MG/1
TABLET ORAL
Qty: 30 TABLET | Refills: 0 | Status: SHIPPED | OUTPATIENT
Start: 2022-02-04 | End: 2022-03-11

## 2022-02-07 ENCOUNTER — OFFICE VISIT (OUTPATIENT)
Dept: NEPHROLOGY | Facility: CLINIC | Age: 62
End: 2022-02-07
Payer: COMMERCIAL

## 2022-02-07 ENCOUNTER — OFFICE VISIT (OUTPATIENT)
Dept: FAMILY MEDICINE CLINIC | Facility: CLINIC | Age: 62
End: 2022-02-07
Payer: COMMERCIAL

## 2022-02-07 VITALS
HEART RATE: 96 BPM | WEIGHT: 315 LBS | SYSTOLIC BLOOD PRESSURE: 124 MMHG | HEIGHT: 70 IN | BODY MASS INDEX: 45.1 KG/M2 | DIASTOLIC BLOOD PRESSURE: 70 MMHG

## 2022-02-07 VITALS
DIASTOLIC BLOOD PRESSURE: 76 MMHG | SYSTOLIC BLOOD PRESSURE: 128 MMHG | OXYGEN SATURATION: 98 % | WEIGHT: 315 LBS | BODY MASS INDEX: 45.1 KG/M2 | HEART RATE: 88 BPM | HEIGHT: 70 IN | RESPIRATION RATE: 18 BRPM

## 2022-02-07 DIAGNOSIS — R06.02 SOB (SHORTNESS OF BREATH) ON EXERTION: ICD-10-CM

## 2022-02-07 DIAGNOSIS — I10 ESSENTIAL HYPERTENSION: ICD-10-CM

## 2022-02-07 DIAGNOSIS — N18.31 STAGE 3A CHRONIC KIDNEY DISEASE (HCC): ICD-10-CM

## 2022-02-07 DIAGNOSIS — R68.81 EARLY SATIETY: Primary | ICD-10-CM

## 2022-02-07 DIAGNOSIS — E66.01 MORBID OBESITY (HCC): ICD-10-CM

## 2022-02-07 DIAGNOSIS — E11.22 TYPE 2 DIABETES MELLITUS WITH STAGE 3B CHRONIC KIDNEY DISEASE, WITHOUT LONG-TERM CURRENT USE OF INSULIN (HCC): Primary | ICD-10-CM

## 2022-02-07 DIAGNOSIS — F32.A DEPRESSION, UNSPECIFIED DEPRESSION TYPE: ICD-10-CM

## 2022-02-07 DIAGNOSIS — E78.5 HYPERLIPIDEMIA, UNSPECIFIED HYPERLIPIDEMIA TYPE: ICD-10-CM

## 2022-02-07 DIAGNOSIS — N18.32 TYPE 2 DIABETES MELLITUS WITH STAGE 3B CHRONIC KIDNEY DISEASE, WITHOUT LONG-TERM CURRENT USE OF INSULIN (HCC): Primary | ICD-10-CM

## 2022-02-07 PROBLEM — E11.9 TYPE 2 DIABETES MELLITUS, WITHOUT LONG-TERM CURRENT USE OF INSULIN (HCC): Status: ACTIVE | Noted: 2022-02-07

## 2022-02-07 PROCEDURE — 3074F SYST BP LT 130 MM HG: CPT | Performed by: NURSE PRACTITIONER

## 2022-02-07 PROCEDURE — 4004F PT TOBACCO SCREEN RCVD TLK: CPT | Performed by: NURSE PRACTITIONER

## 2022-02-07 PROCEDURE — 3008F BODY MASS INDEX DOCD: CPT | Performed by: NURSE PRACTITIONER

## 2022-02-07 PROCEDURE — 99214 OFFICE O/P EST MOD 30 MIN: CPT | Performed by: INTERNAL MEDICINE

## 2022-02-07 PROCEDURE — 3078F DIAST BP <80 MM HG: CPT | Performed by: NURSE PRACTITIONER

## 2022-02-07 PROCEDURE — 99214 OFFICE O/P EST MOD 30 MIN: CPT | Performed by: NURSE PRACTITIONER

## 2022-02-07 RX ORDER — ATORVASTATIN CALCIUM 20 MG/1
20 TABLET, FILM COATED ORAL DAILY
Qty: 90 TABLET | Refills: 1 | Status: SHIPPED | OUTPATIENT
Start: 2022-02-07

## 2022-02-07 NOTE — LETTER
February 9, 2022     MARJORIE Borrego  [address]  [place] [address] [place] [address]    Patient: Spring Paulino  YOB: 1960   Date of Visit: 2/7/2022       Dear Dr Svetlana Nugent: Thank you for referring Lane Clemons to me for evaluation  Below are my notes for this consultation  If you have questions, please do not hesitate to call me  I look forward to following your patient along with you  Sincerely,        Heriberto Spencer MD        CC: No Recipients  Heriberto Spencer MD  2/9/2022  4:45 PM  Sign when Signing Visit  NEPHROLOGY OFFICE VISIT   Spring Paulino  57 y o  male MRN: [id number]  2/7/2022    Reason for Visit: HTN, CKD    ASSESSMENT and PLAN:    I had the pleasure of seeing Mr Jany Quinn today in the renal clinic for the continued management of HTN  61 yo male retired , formerly from [employer], [place] (15 years), DM (years), back surgery 10 years ago Who presents for follow up evaluation of hypertension   patient has had sporadic follow-up but has been slightly more consistent with follow-up since end of last year  Danelle Singh last saw the patient in August 2018 and then patient followed up with our advanced practitioner  Joana Diamond returns today for follow-up visit      Patient has stopped using NSAIDs since 2019      1) HTN -      -prior renin and aldosterone level with a renin level suppressed, aldosterone level is not significantly elevated  - prior metanephrine unrevealing  - prior CT scan with unremarkable adrenals  - renal Doppler study in November 2019 with no significant occlusive disease   - patient is on spironolactone, chlorthalidone, amlodipine, losartan, metoprolol, doxazosin,   - during visit in march clonidine was tapered off, chlorthalidone was increased chlorthalidone to 25 mg daily  -  Creatinine stable in May 2021 at 1 3 mg/dL  Blood pressures are appropriate  Patient is still gaining weight   -February 2022 appointment-no lab work since October  Creatinine in October was rising and patient has not gone for follow-up lab work      Plan:     - repeat renal ultrasound   end of 2022 to follow cyst  - bariatric program referral for weight loss management-patient advised to reestablish care  - patient appears significantly depressed-patient is agreeable to see Psychiatry and Psychology teams  -lab work this month and further changes if needed at that time     2) poor dentition     -patient smokes cigars daily  -patient needs to see dentist     3) proteinuria -  Stable U PCR 0 07     - recheck at next visit  - if elevated, will check SPEP, UPEP, FLC     4) CKD III with baseline creatinine approximately 1 4 mg/dL      -renal ultrasound in September 2018 was septated cyst bilaterally with mildly increased on the right kidney   And a calcified cystic foci in the left kidney  -patient needs repeat ultrasound   Ordered prior  Will assist pt in scheduling  - creat 2 2 in august  Held losartan for 2 days and restarted  Inc fluid intake  - 8/17 - chlorthalidone reduced to 12 5 mg daily as creat improving but not back to baseline  -  05/2021-creatinine improving to baseline 1 3 mg/dL  -creatinine worsening October 2022 but no follow-up lab work  Patient will get lab work now     5) grief counseling-patient needs to see psychiatrist and psychologist and patient has the appointment scheduled      - pt is depressed - referred to Psychiatry and Psychology       6) Vit D deficiency     - pt was started on 50,000 units vit D weekly on 1/10/2020  - Vit D improving 25 5 on 8/2020 -->   Continuevit D supplement     7) osteoarthritis of knees - sees Ortho team  PT and cortisone inj started 1/2020     8) DM     - A1c worsening     9)  Weight gain -patient advised to see bariatric program   Patient agreeable     -patient saw the bariatric team July 2022  -needs to reestablish care    10) abdominal discomfort-patient is awaiting GI appointment     It was a pleasure evaluating Mr Tawana De La Torre; thank you for allowing our team to participate in the care of your patient and please do not hesitate to contact our team if any questions arise in the interim            No problem-specific Assessment & Plan notes found for this encounter  HPI:    Patient continue so abdominal discomfort and belching significant  Poor appetite  Early satiety  Feeling palpitation in anxiety when leaving his house  Still feeling significantly depressed  PATIENT INSTRUCTIONS:    There are no Patient Instructions on file for this visit  OBJECTIVE:  Current Weight: Weight - Scale: (!) 156 kg (345 lb)  Vitals:    02/07/22 1551   BP: 124/70   BP Location: Left arm   Patient Position: Sitting   Cuff Size: Extra-Large   Pulse: 96   Weight: (!) 156 kg (345 lb)   Height: 5' 9 75" (1 772 m)    Body mass index is 49 86 kg/m²  REVIEW OF SYSTEMS:    Review of Systems   All other systems reviewed and are negative  PHYSICAL EXAM:      Physical Exam  Vitals and nursing note reviewed  Constitutional:       General: He is not in acute distress  Appearance: He is well-developed  He is not diaphoretic  HENT:      Head: Normocephalic and atraumatic  Eyes:      General: No scleral icterus  Right eye: No discharge  Left eye: No discharge  Conjunctiva/sclera: Conjunctivae normal    Neck:      Vascular: No JVD  Cardiovascular:      Rate and Rhythm: Normal rate and regular rhythm  Heart sounds: Normal heart sounds  No murmur heard  No friction rub  No gallop  Pulmonary:      Effort: Pulmonary effort is normal  No respiratory distress  Breath sounds: Normal breath sounds  No wheezing or rales  Chest:      Chest wall: No tenderness  Abdominal:      General: Bowel sounds are normal  There is no distension  Palpations: Abdomen is soft  Tenderness: There is no abdominal tenderness  There is no rebound  Musculoskeletal:         General: No tenderness or deformity   Normal range of motion  Cervical back: Normal range of motion and neck supple  Skin:     General: Skin is warm and dry  Coloration: Skin is not pale  Findings: No erythema or rash  Neurological:      Mental Status: He is alert and oriented to person, place, and time  Cranial Nerves: No cranial nerve deficit  Coordination: Coordination normal       Deep Tendon Reflexes: Reflexes are normal and symmetric  Psychiatric:         Behavior: Behavior normal          Thought Content: Thought content normal          Judgment: Judgment normal          Medications:    Current Outpatient Medications:     amLODIPine (NORVASC) 10 mg tablet, take 1 tablet by mouth once daily, Disp: 90 tablet, Rfl: 0    atorvastatin (LIPITOR) 20 mg tablet, take 1 tablet by mouth once daily, Disp: 90 tablet, Rfl: 0    chlorthalidone 25 mg tablet, Take 1 tablet (25 mg total) by mouth daily Take one half tablet daily, Disp: 90 tablet, Rfl: 3    diclofenac sodium (VOLTAREN) 1 %, Apply 2 g topically 4 (four) times a day, Disp: 1 Tube, Rfl: 3    doxazosin (CARDURA) 2 mg tablet, take 2 tablets by mouth daily at bedtime, Disp: 180 tablet, Rfl: 3    ketoconazole (NIZORAL) 2 % cream, 1 application daily Apply to affected area , Disp: , Rfl: 0    losartan (COZAAR) 100 MG tablet, take 1 tablet by mouth once daily, Disp: 90 tablet, Rfl: 3    metoprolol tartrate (LOPRESSOR) 100 mg tablet, take 1 tablet by mouth twice a day, Disp: 180 tablet, Rfl: 3    morphine (MS CONTIN) 15 mg 12 hr tablet, take 1 tablet every 8 hours if needed pain, Disp: , Rfl: 0    oxyCODONE (ROXICODONE) 30 MG immediate release tablet, take 1 tablet by mouth every 5 hours if needed for pain   [place] MAX 4/DAY, Disp: , Rfl: 0    spironolactone (ALDACTONE) 25 mg tablet, take 1 tablet by mouth once daily, Disp: 30 tablet, Rfl: 0    tadalafil (CIALIS) 5 MG tablet, Take 5 mg by mouth daily as needed for erectile dysfunction (unsure dosage) Indications: Erectile Dysfunction  , Disp: , Rfl:     Laboratory Results:        Invalid input(s): ALBUMIN    Results for orders placed or performed in visit on 01/13/22   HEMOGLOBIN A1C W/ EAG ESTIMATION   Result Value Ref Range    Hemoglobin A1C 9 0 (H) Normal 3 8-5 6%; PreDiabetic 5 7-6 4%;  Diabetic >=6 5%; Glycemic control for adults with diabetes <7 0% %     mg/dl   Lipid Panel with Direct LDL reflex   Result Value Ref Range    Cholesterol 104 See Comment mg/dL    Triglycerides 64 See Comment mg/dL    HDL, Direct 44 >=40 mg/dL    LDL Calculated 47 0 - 100 mg/dL

## 2022-02-07 NOTE — PATIENT INSTRUCTIONS
1) Avoid NSAIDS - (Example - motrin, advil, ibuprofen, aleve, exederin, etc)  2) Always follow a low salt diet  3) please see the GI team due to stomach issues we discussed  4) please establish care with psychiatrist    5) please call your urologist back to schedule appointment (for the renal cyst)  6) labwork this week or next week  7) appointment in 4 months

## 2022-02-07 NOTE — PROGRESS NOTES
NEPHROLOGY OFFICE VISIT   Judith Wilcox  57 y o  male MRN: [id number]  2/7/2022    Reason for Visit: HTN, CKD    ASSESSMENT and PLAN:    I had the pleasure of seeing Mr Denise Beckford today in the renal clinic for the continued management of HTN  63 yo male retired , formerly from [place], [place] [address] (15 years), DM (years), back surgery 10 years ago Who presents for follow up evaluation of hypertension   patient has had sporadic follow-up but has been slightly more consistent with follow-up since end of last year  Roxann Zhong last saw the patient in August 2018 and then patient followed up with our advanced practitioner  Daniela Hogan returns today for follow-up visit      Patient has stopped using NSAIDs since 2019      1) HTN -      -prior renin and aldosterone level with a renin level suppressed, aldosterone level is not significantly elevated  - prior metanephrine unrevealing  - prior CT scan with unremarkable adrenals  - renal Doppler study in November 2019 with no significant occlusive disease   - patient is on spironolactone, chlorthalidone, amlodipine, losartan, metoprolol, doxazosin,   - during visit in march clonidine was tapered off, chlorthalidone was increased chlorthalidone to 25 mg daily  -  Creatinine stable in May 2021 at 1 3 mg/dL  Blood pressures are appropriate  Patient is still gaining weight   -February 2022 appointment-no lab work since October    Creatinine in October was rising and patient has not gone for follow-up lab work      Plan:     - repeat renal ultrasound   end of 2022 to follow cyst  - bariatric program referral for weight loss management-patient advised to reestablish care  - patient appears significantly depressed-patient is agreeable to see Psychiatry and Psychology teams  -lab work this month and further changes if needed at that time     2) poor dentition     -patient smokes cigars daily  -patient needs to see dentist     3) proteinuria -  Stable U PCR 0 07     - recheck at next visit  - if elevated, will check SPEP, UPEP, FLC     4) CKD III with baseline creatinine approximately 1 4 mg/dL      -renal ultrasound in September 2018 was septated cyst bilaterally with mildly increased on the right kidney   And a calcified cystic foci in the left kidney  -patient needs repeat ultrasound   Ordered prior  Will assist pt in scheduling  - creat 2 2 in august  Held losartan for 2 days and restarted  Inc fluid intake  - 8/17 - chlorthalidone reduced to 12 5 mg daily as creat improving but not back to baseline  -  05/2021-creatinine improving to baseline 1 3 mg/dL  -creatinine worsening October 2022 but no follow-up lab work  Patient will get lab work now     5) grief counseling-patient needs to see psychiatrist and psychologist and patient has the appointment scheduled      - pt is depressed - referred to Psychiatry and Psychology     6) Vit D deficiency     - pt was started on 50,000 units vit D weekly on 1/10/2020  - Vit D improving 25 5 on 8/2020 -->   Continuevit D supplement     7) osteoarthritis of knees - sees Ortho team  PT and cortisone inj started 1/2020     8) DM     - A1c worsening     9)  Weight gain -patient advised to see bariatric program   Patient agreeable     -patient saw the bariatric team July 2022  -needs to reestablish care    10) abdominal discomfort-patient is awaiting GI appointment     It was a pleasure evaluating Mr Katie Booker; thank you for allowing our team to participate in the care of your patient and please do not hesitate to contact our team if any questions arise in the interim            No problem-specific Assessment & Plan notes found for this encounter  HPI:    Patient continue so abdominal discomfort and belching significant  Poor appetite  Early satiety  Feeling palpitation in anxiety when leaving his house  Still feeling significantly depressed      PATIENT INSTRUCTIONS:    There are no Patient Instructions on file for this visit  OBJECTIVE:  Current Weight: Weight - Scale: (!) 156 kg (345 lb)  Vitals:    02/07/22 1551   BP: 124/70   BP Location: Left arm   Patient Position: Sitting   Cuff Size: Extra-Large   Pulse: 96   Weight: (!) 156 kg (345 lb)   Height: 5' 9 75" (1 772 m)    Body mass index is 49 86 kg/m²  REVIEW OF SYSTEMS:    Review of Systems   All other systems reviewed and are negative  PHYSICAL EXAM:      Physical Exam  Vitals and nursing note reviewed  Constitutional:       General: He is not in acute distress  Appearance: He is well-developed  He is not diaphoretic  HENT:      Head: Normocephalic and atraumatic  Eyes:      General: No scleral icterus  Right eye: No discharge  Left eye: No discharge  Conjunctiva/sclera: Conjunctivae normal    Neck:      Vascular: No JVD  Cardiovascular:      Rate and Rhythm: Normal rate and regular rhythm  Heart sounds: Normal heart sounds  No murmur heard  No friction rub  No gallop  Pulmonary:      Effort: Pulmonary effort is normal  No respiratory distress  Breath sounds: Normal breath sounds  No wheezing or rales  Chest:      Chest wall: No tenderness  Abdominal:      General: Bowel sounds are normal  There is no distension  Palpations: Abdomen is soft  Tenderness: There is no abdominal tenderness  There is no rebound  Musculoskeletal:         General: No tenderness or deformity  Normal range of motion  Cervical back: Normal range of motion and neck supple  Skin:     General: Skin is warm and dry  Coloration: Skin is not pale  Findings: No erythema or rash  Neurological:      Mental Status: He is alert and oriented to person, place, and time  Cranial Nerves: No cranial nerve deficit  Coordination: Coordination normal       Deep Tendon Reflexes: Reflexes are normal and symmetric  Psychiatric:         Behavior: Behavior normal          Thought Content:  Thought content normal  Judgment: Judgment normal          Medications:    Current Outpatient Medications:     amLODIPine (NORVASC) 10 mg tablet, take 1 tablet by mouth once daily, Disp: 90 tablet, Rfl: 0    atorvastatin (LIPITOR) 20 mg tablet, take 1 tablet by mouth once daily, Disp: 90 tablet, Rfl: 0    chlorthalidone 25 mg tablet, Take 1 tablet (25 mg total) by mouth daily Take one half tablet daily, Disp: 90 tablet, Rfl: 3    diclofenac sodium (VOLTAREN) 1 %, Apply 2 g topically 4 (four) times a day, Disp: 1 Tube, Rfl: 3    doxazosin (CARDURA) 2 mg tablet, take 2 tablets by mouth daily at bedtime, Disp: 180 tablet, Rfl: 3    ketoconazole (NIZORAL) 2 % cream, 1 application daily Apply to affected area , Disp: , Rfl: 0    losartan (COZAAR) 100 MG tablet, take 1 tablet by mouth once daily, Disp: 90 tablet, Rfl: 3    metoprolol tartrate (LOPRESSOR) 100 mg tablet, take 1 tablet by mouth twice a day, Disp: 180 tablet, Rfl: 3    morphine (MS CONTIN) 15 mg 12 hr tablet, take 1 tablet every 8 hours if needed pain, Disp: , Rfl: 0    oxyCODONE (ROXICODONE) 30 MG immediate release tablet, take 1 tablet by mouth every 5 hours if needed for pain   Mi KNIGHT 4/DAY, Disp: , Rfl: 0    spironolactone (ALDACTONE) 25 mg tablet, take 1 tablet by mouth once daily, Disp: 30 tablet, Rfl: 0    tadalafil (CIALIS) 5 MG tablet, Take 5 mg by mouth daily as needed for erectile dysfunction (unsure dosage) Indications: Erectile Dysfunction  , Disp: , Rfl:     Laboratory Results:        Invalid input(s): ALBUMIN    Results for orders placed or performed in visit on 01/13/22   HEMOGLOBIN A1C W/ EAG ESTIMATION   Result Value Ref Range    Hemoglobin A1C 9 0 (H) Normal 3 8-5 6%; PreDiabetic 5 7-6 4%;  Diabetic >=6 5%; Glycemic control for adults with diabetes <7 0% %     mg/dl   Lipid Panel with Direct LDL reflex   Result Value Ref Range    Cholesterol 104 See Comment mg/dL    Triglycerides 64 See Comment mg/dL    HDL, Direct 44 >=40 mg/dL    LDL Calculated 47 0 - 100 mg/dL

## 2022-02-07 NOTE — PROGRESS NOTES
Assessment/Plan:      Diagnoses and all orders for this visit:    Type 2 diabetes mellitus with stage 3b chronic kidney disease, without long-term current use of insulin ([facility] )  -     Ambulatory referral to Diabetic Education; Future  -     sitaGLIPtin (JANUVIA) 50 mg tablet; Take 1 tablet (50 mg total) by mouth daily  -     HEMOGLOBIN A1C W/ EAG ESTIMATION; Future    Hemoglobin A1C 9 0  Patient instructed to eat a diabetic diet  October 2021 GFR 41  Januvia 50mg daily prescribed  Medication information and side effects reviewed  Repeat hemoglobin A1C in 3 months  Patient referred for Diabetic education  SOB (shortness of breath) on exertion  -     Ambulatory Referral to Cardiology; Future    Patient reports occasional SOB with going up stairs for the past year  Denies any chest pain with rest or exertion  Denies any palpitations  Discussed cardiac testing  Patient would like to see a cardiologist first    Patient referred to [facility]  Essential hypertension  Continue to follow-up with nephrology  Hyperlipidemia, unspecified hyperlipidemia type  -     atorvastatin (LIPITOR) 20 mg tablet; Take 1 tablet (20 mg total) by mouth daily    Total cholesterol 104, LDL 47  Continue atorvastatin 20mg daily  Morbid obesity ([facility] )  Patient instructed to eat a healthy low fat/diabetic diet  Reviewed lab results with the patient  Patient has additional lab work ordered by nephrology  Patient instructed to follow-up in 3 months or sooner prn  Subjective:     Patient ID: Arin Dominique  is a 57 y o  male  Patient is here for a follow-up for chronic medical conditions  Patient follows up with nephrology for HTN and chronic kidney disease  Patient is here for a follow-up for DM 2  Patient's hemoglobin A1C was 9 0  Denies any Has  Denies any vomiting  Patient reports that he is only eating alittle bit at a time   Patient reports that nephrology referred him to a gastroenterologist    Denies any abdominal pain, diarrhea, or blood in the stool  Patient is here for a follow-up for hyperlipidemia and morbid obesity  Patient reports that he takes atorvastatin daily  Denies any chest pain with exertion or at rest  Denies any palpitations  Patient reports occasional SOB going up stairs for the past year  Denies any wheezing or cough  Review of Systems   Constitutional: Negative for chills and fever  HENT: Negative for congestion, ear pain and sore throat  Respiratory: Positive for shortness of breath  Negative for cough and wheezing  Cardiovascular: Negative for chest pain and palpitations  Gastrointestinal: Negative for abdominal pain, diarrhea and vomiting  Skin: Negative for rash  Neurological: Negative for seizures, syncope and headaches  Objective:     Physical Exam  Vitals reviewed  Constitutional:       General: He is not in acute distress  Appearance: He is obese  He is not ill-appearing or diaphoretic  HENT:      Right Ear: External ear normal       Left Ear: External ear normal       Mouth/Throat:      Comments: Patient is masked  Cardiovascular:      Rate and Rhythm: Normal rate and regular rhythm  Pulses: Normal pulses  Heart sounds: Normal heart sounds  Pulmonary:      Effort: Pulmonary effort is normal  No respiratory distress  Breath sounds: Normal breath sounds  No wheezing  Musculoskeletal:      Comments: Gait wnl  Skin:     Findings: No rash  Neurological:      Mental Status: He is alert and oriented to person, place, and time     Psychiatric:         Mood and Affect: Mood normal

## 2022-02-10 ENCOUNTER — TELEPHONE (OUTPATIENT)
Dept: PSYCHIATRY | Facility: CLINIC | Age: 62
End: 2022-02-10

## 2022-03-11 PROCEDURE — 3066F NEPHROPATHY DOC TX: CPT | Performed by: NURSE PRACTITIONER

## 2022-04-25 DIAGNOSIS — I10 ESSENTIAL HYPERTENSION: ICD-10-CM

## 2022-04-25 RX ORDER — AMLODIPINE BESYLATE 10 MG/1
TABLET ORAL
Qty: 90 TABLET | Refills: 0 | Status: SHIPPED | OUTPATIENT
Start: 2022-04-25

## 2022-05-09 ENCOUNTER — RA CDI HCC (OUTPATIENT)
Dept: OTHER | Facility: HOSPITAL | Age: 62
End: 2022-05-09

## 2022-05-10 NOTE — PROGRESS NOTES
[facility]  coding opportunities       Chart reviewed, no opportunity found:   [address]        Patients Insurance     Medicare Insurance: [employer]

## 2022-05-16 ENCOUNTER — APPOINTMENT (OUTPATIENT)
Dept: LAB | Facility: CLINIC | Age: 62
End: 2022-05-16
Payer: COMMERCIAL

## 2022-05-16 DIAGNOSIS — E11.22 TYPE 2 DIABETES MELLITUS WITH STAGE 3B CHRONIC KIDNEY DISEASE, WITHOUT LONG-TERM CURRENT USE OF INSULIN (HCC): ICD-10-CM

## 2022-05-16 DIAGNOSIS — N18.32 TYPE 2 DIABETES MELLITUS WITH STAGE 3B CHRONIC KIDNEY DISEASE, WITHOUT LONG-TERM CURRENT USE OF INSULIN (HCC): ICD-10-CM

## 2022-05-16 LAB
EST. AVERAGE GLUCOSE BLD GHB EST-MCNC: 137 MG/DL
HBA1C MFR BLD: 6.4 %

## 2022-05-16 PROCEDURE — 83036 HEMOGLOBIN GLYCOSYLATED A1C: CPT

## 2022-05-16 PROCEDURE — 36415 COLL VENOUS BLD VENIPUNCTURE: CPT

## 2022-05-16 PROCEDURE — 3066F NEPHROPATHY DOC TX: CPT | Performed by: NURSE PRACTITIONER

## 2022-05-16 PROCEDURE — 3044F HG A1C LEVEL LT 7.0%: CPT | Performed by: NURSE PRACTITIONER

## 2022-05-25 ENCOUNTER — OFFICE VISIT (OUTPATIENT)
Dept: FAMILY MEDICINE CLINIC | Facility: CLINIC | Age: 62
End: 2022-05-25
Payer: COMMERCIAL

## 2022-05-25 VITALS
BODY MASS INDEX: 45.1 KG/M2 | RESPIRATION RATE: 16 BRPM | SYSTOLIC BLOOD PRESSURE: 124 MMHG | DIASTOLIC BLOOD PRESSURE: 78 MMHG | WEIGHT: 315 LBS | HEIGHT: 70 IN | HEART RATE: 67 BPM | OXYGEN SATURATION: 96 %

## 2022-05-25 DIAGNOSIS — Z12.5 SCREENING FOR PROSTATE CANCER: ICD-10-CM

## 2022-05-25 DIAGNOSIS — E66.01 MORBID OBESITY (HCC): ICD-10-CM

## 2022-05-25 DIAGNOSIS — I10 ESSENTIAL HYPERTENSION: ICD-10-CM

## 2022-05-25 DIAGNOSIS — N52.9 ERECTILE DYSFUNCTION, UNSPECIFIED ERECTILE DYSFUNCTION TYPE: ICD-10-CM

## 2022-05-25 DIAGNOSIS — N18.32 TYPE 2 DIABETES MELLITUS WITH STAGE 3B CHRONIC KIDNEY DISEASE, WITHOUT LONG-TERM CURRENT USE OF INSULIN (HCC): Primary | ICD-10-CM

## 2022-05-25 DIAGNOSIS — E11.22 TYPE 2 DIABETES MELLITUS WITH STAGE 3B CHRONIC KIDNEY DISEASE, WITHOUT LONG-TERM CURRENT USE OF INSULIN (HCC): Primary | ICD-10-CM

## 2022-05-25 DIAGNOSIS — E78.5 HYPERLIPIDEMIA, UNSPECIFIED HYPERLIPIDEMIA TYPE: ICD-10-CM

## 2022-05-25 DIAGNOSIS — R06.02 SOB (SHORTNESS OF BREATH) ON EXERTION: ICD-10-CM

## 2022-05-25 PROBLEM — F32.A DEPRESSION: Status: RESOLVED | Noted: 2021-06-07 | Resolved: 2022-05-25

## 2022-05-25 PROBLEM — F43.21 GRIEF REACTION: Status: RESOLVED | Noted: 2019-09-10 | Resolved: 2022-05-25

## 2022-05-25 PROBLEM — F43.20 GRIEF REACTION: Status: RESOLVED | Noted: 2019-09-10 | Resolved: 2022-05-25

## 2022-05-25 PROCEDURE — 3074F SYST BP LT 130 MM HG: CPT | Performed by: NURSE PRACTITIONER

## 2022-05-25 PROCEDURE — 99214 OFFICE O/P EST MOD 30 MIN: CPT | Performed by: NURSE PRACTITIONER

## 2022-05-25 PROCEDURE — 4004F PT TOBACCO SCREEN RCVD TLK: CPT | Performed by: NURSE PRACTITIONER

## 2022-05-25 PROCEDURE — 3078F DIAST BP <80 MM HG: CPT | Performed by: NURSE PRACTITIONER

## 2022-05-25 PROCEDURE — 3008F BODY MASS INDEX DOCD: CPT | Performed by: NURSE PRACTITIONER

## 2022-05-25 NOTE — PROGRESS NOTES
Assessment/Plan:      Diagnoses and all orders for this visit:    Type 2 diabetes mellitus with stage 3b chronic kidney disease, without long-term current use of insulin ([facility])  -     HEMOGLOBIN A1C W/ EAG ESTIMATION; Future  -     Lipid Panel with Direct LDL reflex; Future  -     TSH, 3rd generation with Free T4 reflex; Future    Hemoglobin A1c went from 9 to 6 4  Patient reports that he never started the [facility] [place] because it was too expensive and not covered by insurance  Patient reports that he has been watching his diet  Continue to eat a healthy diabetic diet  Repeat hemoglobin A1c in 3 months  SOB (shortness of breath) on exertion  Patient reports that he still has occasional SOB with going up stairs  Patient reports that it has been going on for over a year and is most likely due to his weight  Patient reports that he never followed up with cardiology  Discussed cardiac testing but patient wants to follow-up with cardiology first    Denies any chest pain with exertion or at rest    Denies any palpitations or dizziness  Patient instructed to follow-up with cardiology  Essential hypertension  -     Lipid Panel with Direct LDL reflex; Future  -     TSH, 3rd generation with Free T4 reflex; Future    Continue to follow-up with nephrology  Hyperlipidemia, unspecified hyperlipidemia type  Lipid panel ordered  Patient instructed to eat a healthy diabetic diet  Morbid obesity ([address] )  -     HEMOGLOBIN A1C W/ EAG ESTIMATION; Future  -     Lipid Panel with Direct LDL reflex; Future  -     TSH, 3rd generation with Free T4 reflex; Future    Lab work ordered  Patient instructed to eat a healthy diabetic diet  Screening for prostate cancer  -     PSA, Total Screen; Future    Erectile dysfunction  Patient reports that he will discuss further treatment with his nephrologist due to his multiple blood pressure medications and chronic kidney disease       Patient instructed to follow-up in 3 months or sooner prn  Subjective:     Patient ID: Cora Dickson  is a 57 y o  male  Patient is here for a follow-up for chronic medical conditions  Patient reports that he feels well  Patient is here for a follow-up for DM 2  Patient reports that he has been watching his diet  Patient reports that he never started the [facility] [employer] [place] because it was too expensive and not covered by insurance  Denies any Has, dizziness, nausea, or vomiting  Patient follows up with nephrology for HTN and chronic kidney disease  Denies any chest pain, SOB, dizziness, or Has  Patient reports that he still has occasional SOB with going up stairs  Denies any chest pain with exertion or at rest  Denies any palpitations or lightheadedness  Patient reports that he never followed up with cardiology  Patient reports that he would like the information again  Patient reports that he takes atorvastatin for hyperlipidemia  Patient reports that he would like to take cialis for erectile dysfunction and will discuss with his nephrologist        Review of Systems   Constitutional: Negative for chills and fever  HENT: Negative for congestion, ear pain, sore throat and trouble swallowing  Eyes: Negative for pain, discharge and redness  Respiratory: Negative for cough, chest tightness and wheezing  Cardiovascular: Negative for chest pain and palpitations  Gastrointestinal: Negative for abdominal pain, blood in stool, diarrhea, nausea and vomiting  Genitourinary: Negative for dysuria and hematuria  Skin: Negative for rash  Neurological: Negative for dizziness, seizures, syncope, light-headedness and headaches  Objective:     Physical Exam  Vitals reviewed  Constitutional:       General: He is not in acute distress  Appearance: He is obese  He is not ill-appearing or diaphoretic     HENT:      Right Ear: External ear normal       Left Ear: External ear normal       Mouth/Throat:      Mouth: Mucous membranes are moist       Pharynx: Oropharynx is clear  No posterior oropharyngeal erythema  Eyes:      Conjunctiva/sclera: Conjunctivae normal       Pupils: Pupils are equal, round, and reactive to light  Cardiovascular:      Rate and Rhythm: Normal rate and regular rhythm  Pulses: Normal pulses  Heart sounds: Normal heart sounds  Pulmonary:      Effort: Pulmonary effort is normal  No respiratory distress  Breath sounds: Normal breath sounds  No wheezing  Musculoskeletal:      Comments: Gait wnl  Skin:     Findings: No rash  Neurological:      Mental Status: He is alert and oriented to person, place, and time     Psychiatric:         Mood and Affect: Mood normal

## 2022-05-30 DIAGNOSIS — I10 ESSENTIAL HYPERTENSION: ICD-10-CM

## 2022-05-31 RX ORDER — METOPROLOL TARTRATE 100 MG/1
TABLET ORAL
Qty: 180 TABLET | Refills: 3 | Status: SHIPPED | OUTPATIENT
Start: 2022-05-31

## 2022-06-10 ENCOUNTER — TELEPHONE (OUTPATIENT)
Dept: NEPHROLOGY | Facility: CLINIC | Age: 62
End: 2022-06-10

## 2022-06-14 DIAGNOSIS — I10 ESSENTIAL HYPERTENSION: ICD-10-CM

## 2022-06-14 RX ORDER — DOXAZOSIN 2 MG/1
TABLET ORAL
Qty: 180 TABLET | Refills: 3 | Status: SHIPPED | OUTPATIENT
Start: 2022-06-14

## 2022-08-23 ENCOUNTER — TELEPHONE (OUTPATIENT)
Dept: FAMILY MEDICINE CLINIC | Facility: CLINIC | Age: 62
End: 2022-08-23

## 2022-08-23 ENCOUNTER — TELEPHONE (OUTPATIENT)
Dept: NEPHROLOGY | Facility: CLINIC | Age: 62
End: 2022-08-23

## 2022-08-23 ENCOUNTER — RA CDI HCC (OUTPATIENT)
Dept: OTHER | Facility: HOSPITAL | Age: 62
End: 2022-08-23

## 2022-08-23 NOTE — TELEPHONE ENCOUNTER
Patient is scheduled for a 3 month follow up AWV on 8/29  He called and stated he thought he was to follow up with Meryle Broker after following up with Nephrology, but is not sure  His appointment with Nephrology is on 9/22 and he wanted to know if he should reschedule the AWV follow up until after the Nephrology appointment

## 2022-08-23 NOTE — PROGRESS NOTES
[facility]  coding opportunities       Chart reviewed, no opportunity found:   [address]        Patients Insurance     Medicare Insurance: [employer]

## 2022-08-23 NOTE — TELEPHONE ENCOUNTER
Spoke with Patient and schedule appointment for 09/22 with Dr Venus Flanagan in the [facility] [place]

## 2022-09-09 ENCOUNTER — LAB (OUTPATIENT)
Dept: LAB | Facility: CLINIC | Age: 62
End: 2022-09-09
Payer: COMMERCIAL

## 2022-09-09 DIAGNOSIS — I10 ESSENTIAL HYPERTENSION: ICD-10-CM

## 2022-09-09 DIAGNOSIS — E66.01 MORBID OBESITY (HCC): ICD-10-CM

## 2022-09-09 DIAGNOSIS — N18.31 STAGE 3A CHRONIC KIDNEY DISEASE (HCC): ICD-10-CM

## 2022-09-09 DIAGNOSIS — Z12.5 SCREENING FOR PROSTATE CANCER: ICD-10-CM

## 2022-09-09 DIAGNOSIS — E11.22 TYPE 2 DIABETES MELLITUS WITH STAGE 3B CHRONIC KIDNEY DISEASE, WITHOUT LONG-TERM CURRENT USE OF INSULIN (HCC): ICD-10-CM

## 2022-09-09 DIAGNOSIS — N18.32 TYPE 2 DIABETES MELLITUS WITH STAGE 3B CHRONIC KIDNEY DISEASE, WITHOUT LONG-TERM CURRENT USE OF INSULIN (HCC): ICD-10-CM

## 2022-09-09 LAB
ALBUMIN SERPL BCP-MCNC: 3.8 G/DL (ref 3.5–5)
ALP SERPL-CCNC: 76 U/L (ref 46–116)
ALT SERPL W P-5'-P-CCNC: 23 U/L (ref 12–78)
ANION GAP SERPL CALCULATED.3IONS-SCNC: 1 MMOL/L (ref 4–13)
AST SERPL W P-5'-P-CCNC: 7 U/L (ref 5–45)
BILIRUB SERPL-MCNC: 0.3 MG/DL (ref 0.2–1)
BUN SERPL-MCNC: 32 MG/DL (ref 5–25)
CALCIUM SERPL-MCNC: 9.5 MG/DL (ref 8.3–10.1)
CHLORIDE SERPL-SCNC: 109 MMOL/L (ref 96–108)
CHOLEST SERPL-MCNC: 114 MG/DL
CO2 SERPL-SCNC: 28 MMOL/L (ref 21–32)
CREAT SERPL-MCNC: 1.61 MG/DL (ref 0.6–1.3)
ERYTHROCYTE [DISTWIDTH] IN BLOOD BY AUTOMATED COUNT: 14.3 % (ref 11.6–15.1)
EST. AVERAGE GLUCOSE BLD GHB EST-MCNC: 128 MG/DL
GFR SERPL CREATININE-BSD FRML MDRD: 45 ML/MIN/1.73SQ M
GLUCOSE P FAST SERPL-MCNC: 125 MG/DL (ref 65–99)
HBA1C MFR BLD: 6.1 %
HCT VFR BLD AUTO: 40.9 % (ref 36.5–49.3)
HDLC SERPL-MCNC: 42 MG/DL
HGB BLD-MCNC: 13.2 G/DL (ref 12–17)
LDLC SERPL CALC-MCNC: 57 MG/DL (ref 0–100)
MAGNESIUM SERPL-MCNC: 2.2 MG/DL (ref 1.6–2.6)
MCH RBC QN AUTO: 28 PG (ref 26.8–34.3)
MCHC RBC AUTO-ENTMCNC: 32.3 G/DL (ref 31.4–37.4)
MCV RBC AUTO: 87 FL (ref 82–98)
PHOSPHATE SERPL-MCNC: 3.4 MG/DL (ref 2.3–4.1)
PLATELET # BLD AUTO: 228 THOUSANDS/UL (ref 149–390)
PMV BLD AUTO: 11.3 FL (ref 8.9–12.7)
POTASSIUM SERPL-SCNC: 4.5 MMOL/L (ref 3.5–5.3)
PROT SERPL-MCNC: 8.4 G/DL (ref 6.4–8.4)
PSA SERPL-MCNC: 1.1 NG/ML (ref 0–4)
PTH-INTACT SERPL-MCNC: 69.4 PG/ML (ref 18.4–80.1)
RBC # BLD AUTO: 4.71 MILLION/UL (ref 3.88–5.62)
SODIUM SERPL-SCNC: 138 MMOL/L (ref 135–147)
TRIGL SERPL-MCNC: 73 MG/DL
TSH SERPL DL<=0.05 MIU/L-ACNC: 1.58 UIU/ML (ref 0.45–4.5)
WBC # BLD AUTO: 8.93 THOUSAND/UL (ref 4.31–10.16)

## 2022-09-09 PROCEDURE — 83970 ASSAY OF PARATHORMONE: CPT

## 2022-09-09 PROCEDURE — 85027 COMPLETE CBC AUTOMATED: CPT

## 2022-09-09 PROCEDURE — 83036 HEMOGLOBIN GLYCOSYLATED A1C: CPT

## 2022-09-09 PROCEDURE — 84100 ASSAY OF PHOSPHORUS: CPT

## 2022-09-09 PROCEDURE — 83735 ASSAY OF MAGNESIUM: CPT

## 2022-09-09 PROCEDURE — 3066F NEPHROPATHY DOC TX: CPT | Performed by: ORTHOPAEDIC SURGERY

## 2022-09-09 PROCEDURE — 80053 COMPREHEN METABOLIC PANEL: CPT

## 2022-09-09 PROCEDURE — 84443 ASSAY THYROID STIM HORMONE: CPT

## 2022-09-09 PROCEDURE — G0103 PSA SCREENING: HCPCS

## 2022-09-09 PROCEDURE — 80061 LIPID PANEL: CPT

## 2022-09-09 PROCEDURE — 36415 COLL VENOUS BLD VENIPUNCTURE: CPT

## 2022-09-09 PROCEDURE — 3044F HG A1C LEVEL LT 7.0%: CPT | Performed by: INTERNAL MEDICINE

## 2022-09-11 NOTE — RESULT ENCOUNTER NOTE
Hello    Patient normally is followed up by Ms Nadine Bah  Can you please let the patient know that the creatinine is 1 6 mg/dL  Within his baseline  Hemoglobin is stable  Electrolytes are stable    No changes for now    Thank you    np

## 2022-09-12 ENCOUNTER — OFFICE VISIT (OUTPATIENT)
Dept: FAMILY MEDICINE CLINIC | Facility: CLINIC | Age: 62
End: 2022-09-12
Payer: COMMERCIAL

## 2022-09-12 ENCOUNTER — APPOINTMENT (OUTPATIENT)
Dept: LAB | Facility: CLINIC | Age: 62
End: 2022-09-12
Payer: COMMERCIAL

## 2022-09-12 ENCOUNTER — TELEPHONE (OUTPATIENT)
Dept: NEPHROLOGY | Facility: CLINIC | Age: 62
End: 2022-09-12

## 2022-09-12 VITALS
BODY MASS INDEX: 45.1 KG/M2 | HEART RATE: 67 BPM | DIASTOLIC BLOOD PRESSURE: 72 MMHG | WEIGHT: 315 LBS | SYSTOLIC BLOOD PRESSURE: 120 MMHG | RESPIRATION RATE: 18 BRPM | OXYGEN SATURATION: 97 % | HEIGHT: 70 IN

## 2022-09-12 DIAGNOSIS — E66.01 MORBID OBESITY (HCC): ICD-10-CM

## 2022-09-12 DIAGNOSIS — Z00.00 MEDICARE ANNUAL WELLNESS VISIT, SUBSEQUENT: Primary | ICD-10-CM

## 2022-09-12 DIAGNOSIS — Z59.9 FINANCIAL DIFFICULTIES: ICD-10-CM

## 2022-09-12 DIAGNOSIS — E78.5 HYPERLIPIDEMIA, UNSPECIFIED HYPERLIPIDEMIA TYPE: ICD-10-CM

## 2022-09-12 DIAGNOSIS — I10 ESSENTIAL HYPERTENSION: ICD-10-CM

## 2022-09-12 DIAGNOSIS — N18.32 TYPE 2 DIABETES MELLITUS WITH STAGE 3B CHRONIC KIDNEY DISEASE, WITHOUT LONG-TERM CURRENT USE OF INSULIN (HCC): ICD-10-CM

## 2022-09-12 DIAGNOSIS — M54.16 LUMBAR RADICULOPATHY: ICD-10-CM

## 2022-09-12 DIAGNOSIS — E11.22 TYPE 2 DIABETES MELLITUS WITH STAGE 3B CHRONIC KIDNEY DISEASE, WITHOUT LONG-TERM CURRENT USE OF INSULIN (HCC): ICD-10-CM

## 2022-09-12 DIAGNOSIS — F11.20 CONTINUOUS OPIOID DEPENDENCE (HCC): ICD-10-CM

## 2022-09-12 PROBLEM — R73.03 PREDIABETES: Status: RESOLVED | Noted: 2019-04-09 | Resolved: 2022-09-12

## 2022-09-12 PROBLEM — R06.02 SOB (SHORTNESS OF BREATH) ON EXERTION: Status: RESOLVED | Noted: 2022-02-07 | Resolved: 2022-09-12

## 2022-09-12 LAB
BACTERIA UR QL AUTO: ABNORMAL /HPF
BILIRUB UR QL STRIP: NEGATIVE
CLARITY UR: CLEAR
COLOR UR: YELLOW
CREAT UR-MCNC: 178 MG/DL
GLUCOSE UR STRIP-MCNC: NEGATIVE MG/DL
HGB UR QL STRIP.AUTO: NEGATIVE
KETONES UR STRIP-MCNC: NEGATIVE MG/DL
LEUKOCYTE ESTERASE UR QL STRIP: ABNORMAL
NITRITE UR QL STRIP: NEGATIVE
NON-SQ EPI CELLS URNS QL MICRO: ABNORMAL /HPF
PH UR STRIP.AUTO: 5.5 [PH]
PROT UR STRIP-MCNC: ABNORMAL MG/DL
PROT UR-MCNC: 12 MG/DL
PROT/CREAT UR: 0.07 MG/G{CREAT} (ref 0–0.1)
RBC #/AREA URNS AUTO: ABNORMAL /HPF
SP GR UR STRIP.AUTO: 1.02 (ref 1–1.03)
UROBILINOGEN UR STRIP-ACNC: <2 MG/DL
WBC #/AREA URNS AUTO: ABNORMAL /HPF

## 2022-09-12 PROCEDURE — G0439 PPPS, SUBSEQ VISIT: HCPCS | Performed by: NURSE PRACTITIONER

## 2022-09-12 PROCEDURE — 82570 ASSAY OF URINE CREATININE: CPT

## 2022-09-12 PROCEDURE — 84156 ASSAY OF PROTEIN URINE: CPT

## 2022-09-12 PROCEDURE — 3061F NEG MICROALBUMINURIA REV: CPT | Performed by: INTERNAL MEDICINE

## 2022-09-12 PROCEDURE — 99214 OFFICE O/P EST MOD 30 MIN: CPT | Performed by: NURSE PRACTITIONER

## 2022-09-12 PROCEDURE — 3725F SCREEN DEPRESSION PERFORMED: CPT | Performed by: NURSE PRACTITIONER

## 2022-09-12 PROCEDURE — 81001 URINALYSIS AUTO W/SCOPE: CPT

## 2022-09-12 RX ORDER — ATORVASTATIN CALCIUM 20 MG/1
20 TABLET, FILM COATED ORAL DAILY
Qty: 90 TABLET | Refills: 1 | Status: SHIPPED | OUTPATIENT
Start: 2022-09-12

## 2022-09-12 SDOH — ECONOMIC STABILITY - INCOME SECURITY: PROBLEM RELATED TO HOUSING AND ECONOMIC CIRCUMSTANCES, UNSPECIFIED: Z59.9

## 2022-09-12 NOTE — TELEPHONE ENCOUNTER
----- Message from Pal Hernandez MD sent at 9/11/2022  5:56 PM EDT -----  Hello    Patient normally is followed up by Ms Graham Michaels  Can you please let the patient know that the creatinine is 1 6 mg/dL  Within his baseline  Hemoglobin is stable  Electrolytes are stable    No changes for now    Thank you    np

## 2022-09-12 NOTE — PROGRESS NOTES
Assessment and Plan:     Problem List Items Addressed This Visit        Cardiovascular and Mediastinum    Essential hypertension  Continue to follow-up with nephrology  Other        Hyperlipidemia    Total cholesterol 114, LDL 57  Continue atorvastatin 20mg daily  Morbid obesity ([address] )  Patient instructed to eat a healthy low fat/low carb diet  Type 2 diabetes mellitus with stage 3b chronic kidney disease without long term current use of insulin  Hemoglobin A1c 6 1  Patient instructed to eat a healthy low fat/low carb diet  Repeat hemoglobin A1c in 6 months  Medicare annual wellness visit, subsequent - Primary  Healthy diet reviewed  Lumbar radiculopathy  Continue to follow-up with pain management  Continuous opioid dependence  Patient follows up with pain management  Reviewed lab results with the patient  Patient instructed to follow-up in 6 months or sooner prn  BMI Counseling: Body mass index is 46 87 kg/m²  The BMI is above normal  Nutrition recommendations include encouraging healthy choices of fruits and vegetables, consuming healthier snacks, reducing intake of saturated and trans fat and reducing intake of cholesterol  Exercise recommendations include exercising 3-5 times per week  Depression Screening and Follow-up Plan: Patient was screened for depression during today's encounter  They screened negative with a PHQ-2 score of 1  Tobacco Cessation Counseling: Tobacco cessation counseling was provided  The patient is sincerely urged to quit consumption of tobacco  He is not ready to quit tobacco        Preventive health issues were discussed with patient, and age appropriate screening tests were ordered as noted in patient's After Visit Summary  Personalized health advice and appropriate referrals for health education or preventive services given if needed, as noted in patient's After Visit Summary       History of Present Illness:     Patient presents for Medicare Annual Wellness visit    Patient Care Team:  Keysha Najera as PCP - Juan Sofia MD Lequita Cass, MD (Nephrology)     Problem List:     Patient Active Problem List   Diagnosis    Hypokalemia    Allergic rhinitis    Chronic back pain    Dental cavity    Anxiety disorder    Erectile dysfunction    Hyperlipidemia    Essential hypertension    Localized osteoarthritis of left knee    Lumbar radiculopathy    Microalbuminuria    Morbid obesity ([facility] )    IMELDA on CPAP    Patellofemoral arthritis of left knee    Rosacea    Vitamin D deficiency    Pain in finger of both hands    Trigger finger, left middle finger    Persistent proteinuria    Screening for prostate cancer    Prediabetes    Screening for colon cancer    It band syndrome, right    Bilateral primary osteoarthritis of knee    Stage 3b chronic kidney disease ([facility] )    Localized osteoarthritis of right knee    Need for hepatitis C screening test    Anemia    Abnormal CBC    Medicare annual wellness visit, subsequent    History of cigar smoking    Type 2 diabetes mellitus, without long-term current use of insulin ([facility] )    SOB (shortness of breath) on exertion      Past Medical and Surgical History:     Past Medical History:   Diagnosis Date    Anxiety     Back pain     Chronic pain     Depression 6/7/2021    Diabetes mellitus ([facility] )     6/19/15 A1C: 5 5%    Grief reaction 9/10/2019    Hyperlipidemia     Hypertension     Knee pain     Morbid obesity with BMI of 50 0-59 9, adult ([facility] )     Psychiatric disorder     Type 2 diabetes mellitus without complication ([facility] ) 44/44/3664    Urinary retention      Past Surgical History:   Procedure Laterality Date    BACK SURGERY      LUMBAR FUSION  2002    LUMBAR FUSION  2004    L4-5-6, S1 has pump for narcotics      Family History:     Family History   Problem Relation Age of Onset    Other Mother         colitis    Diabetes Mother       Social History:     E-Cigarette/Vaping    E-Cigarette Use Never User      E-Cigarette/Vaping Substances    Nicotine No     THC No     CBD No     Flavoring No     Other No      Social History     Socioeconomic History    Marital status: Legally      Spouse name: None    Number of children: None    Years of education: None    Highest education level: None   Occupational History    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[place], injured back   Tobacco Use    Smoking status: Current Every Day Smoker     Types: Cigars    Smokeless tobacco: Never Used    Tobacco comment: 1 cigar daily,  50 pack years, 1/2 pack at age 12, 3 ppd x 21 yrs, quit 2004   Vaping Use    Vaping Use: Never used   Substance and Sexual Activity    Alcohol use: Yes     Comment: occasional    Drug use: No     Comment: History of drug use, meena, cocaine, heroind, no IVDA-as per [employer]    Sexual activity: Yes     Partners: Female     Birth control/protection: Condom Male   Other Topics Concern    None   Social History Narrative    Patient lives with his ex wife and son  Social Determinants of Health     Financial Resource Strain: Medium Risk    Difficulty of Paying Living Expenses: Somewhat hard   Food Insecurity: Not on file   Transportation Needs: No Transportation Needs    Lack of Transportation (Medical): No    Lack of Transportation (Non-Medical):  No   Physical Activity: Not on file   Stress: Not on file   Social Connections: Not on file   Intimate Partner Violence: Not on file   Housing Stability: Not on file      Medications and Allergies:     Current Outpatient Medications   Medication Sig Dispense Refill    amLODIPine (NORVASC) 10 mg tablet take 1 tablet by mouth once daily 90 tablet 0    atorvastatin (LIPITOR) 20 mg tablet Take 1 tablet (20 mg total) by mouth daily 90 tablet 1    chlorthalidone 25 mg tablet Take 1 tablet (25 mg total) by mouth daily Take one half tablet daily 90 tablet 3    diclofenac sodium (VOLTAREN) 1 % Apply 2 g topically 4 (four) times a day 1 Tube 3    doxazosin (CARDURA) 2 mg tablet take 2 tablets by mouth daily at bedtime 180 tablet 3    ketoconazole (NIZORAL) 2 % cream 1 application daily Apply to affected area   0    losartan (COZAAR) 100 MG tablet take 1 tablet by mouth once daily 90 tablet 3    metoprolol tartrate (LOPRESSOR) 100 mg tablet take 1 tablet by mouth twice a day 180 tablet 3    morphine (MS CONTIN) 15 mg 12 hr tablet take 1 tablet every 8 hours if needed pain  0    oxyCODONE (ROXICODONE) 30 MG immediate release tablet take 1 tablet by mouth every 5 hours if needed for pain   Shelvy Mingle MAX 4/DAY  0    spironolactone (ALDACTONE) 25 mg tablet take 1 tablet by mouth once daily 30 tablet 3    tadalafil (CIALIS) 5 MG tablet Take 5 mg by mouth daily as needed for erectile dysfunction (unsure dosage) Indications: Erectile Dysfunction  No current facility-administered medications for this visit       Allergies   Allergen Reactions    Gabapentin      Annotation - [id number]: dizziness    Pregabalin      Annotation - [id number]: vision changes and mood changes    Tramadol Nausea Only      Immunizations:     Immunization History   Administered Date(s) Administered    Influenza Quadrivalent, 6-35 Months IM 11/16/2015, 10/19/2016    Influenza, seasonal, injectable 01/01/2013    Pneumococcal Polysaccharide PPV23 11/16/2015      Health Maintenance:         Topic Date Due    HIV Screening  02/20/2023 (Originally 9/15/1975)    Colorectal Cancer Screening  06/01/2024    Hepatitis C Screening  Completed         Topic Date Due    COVID-19 Vaccine (1) Never done    Pneumococcal Vaccine: Pediatrics (0 to 5 Years) and At-Risk Patients (6 to 59 Years) (2 - PCV) 11/16/2016    Influenza Vaccine (1) 09/01/2022      Medicare Health Risk Assessment:     /72   Pulse 67   Ht 5' 9 5" (1 765 m)   Wt (!) 146 kg (322 lb)   SpO2 97% BMI 46 87 kg/m²      Roberto Frank is here for his Subsequent Wellness visit  Health Risk Assessment:   Patient rates overall health as good  Patient feels that their physical health rating is same  Patient is satisfied with their life  Eyesight was rated as slightly worse  Hearing was rated as same  Patient feels that their emotional and mental health rating is slightly better  Patients states they are never, rarely angry  Patient states they are never, rarely unusually tired/fatigued  Pain experienced in the last 7 days has been a lot  Patient's pain rating has been 7/10  Patient states that he has experienced weight loss or gain in last 6 months  Patient follows up with pain management for lumbar radiculopathy  Depression Screening:   PHQ-2 Score: 1      Fall Risk Screening: In the past year, patient has experienced: no history of falling in past year      Home Safety:  Patient does not have trouble with stairs inside or outside of their home  Patient has working smoke alarms and has working carbon monoxide detector  Home safety hazards include: none  Nutrition:   Current diet is Regular  Medications:   Patient is currently taking over-the-counter supplements  OTC medications include: multi vitamans , melatonin  Patient is able to manage medications  Activities of Daily Living (ADLs)/Instrumental Activities of Daily Living (IADLs):   Walk and transfer into and out of bed and chair?: Yes  Dress and groom yourself?: Yes    Bathe or shower yourself?: Yes    Feed yourself?  Yes  Do your laundry/housekeeping?: Yes  Manage your money, pay your bills and track your expenses?: Yes  Make your own meals?: Yes    Do your own shopping?: Yes    Previous Hospitalizations:   Any hospitalizations or ED visits within the last 12 months?: No      Advance Care Planning:   Living will: No    Durable POA for healthcare: No    Advanced directive: No    Advanced directive counseling given: Yes      Cognitive Screening: Provider or family/friend/caregiver concerned regarding cognition?: No    PREVENTIVE SCREENINGS      Cardiovascular Screening:    General: History Lipid Disorder and Screening Current      Diabetes Screening:     General: History Diabetes and Screening Current      Colorectal Cancer Screening:     General: Screening Current      Prostate Cancer Screening:    General: Screening Current      Osteoporosis Screening:    General: Risks and Benefits Discussed and Patient Declines      Abdominal Aortic Aneurysm (AAA) Screening:    Risk factors include: tobacco use        General: Risks and Benefits Discussed and Patient Declines      Lung Cancer Screening:     General: Risks and Benefits Discussed and Patient Declines      Hepatitis C Screening:    General: Screening Current    Screening, Brief Intervention, and Referral to Treatment (SBIRT)    Screening  Typical number of drinks in a day: 0  Typical number of drinks in a week: 0  Interpretation: Low risk drinking behavior  AUDIT-C Screenin) How often did you have a drink containing alcohol in the past year? 2 to 4 times a month  2) How many drinks did you have on a typical day when you were drinking in the past year? 1 to 2  3) How often did you have 6 or more drinks on one occasion in the past year? never    AUDIT-C Score: 2  Interpretation: Score 0-3 (male): Negative screen for alcohol misuse    Single Item Drug Screening:  How often have you used an illegal drug (including marijuana) or a prescription medication for non-medical reasons in the past year? never    Single Item Drug Screen Score: 0  Interpretation: Negative screen for possible drug use disorder    Brief Intervention  Alcohol & drug use screenings were reviewed  No concerns regarding substance use disorder identified  Review of Current Opioid Use  Opioid Risk Tool (ORT) Score: 4  Opioid Risk Tool (ORT) Interpretation: Score 4-7: Moderate risk for opioid misuse    [place]MP or [place]  reviewed   No red flags were identified    Patient follows up with pain management for chronic back pain       Other Counseling Topics:   Car/seat belt/driving safety, skin self-exam, sunscreen and calcium and vitamin D intake and regular weightbearing exercise  MARJORIE Shoemaker    Patient is here for a follow-up for chronic medical conditions  Patient follows up with nephrology for HTN and chronic kidney disease  Patient follows up with pain management for lumbar radiculopathy  Patient reports that he takes atorvastatin for hyperlipidemia  Denies any chest pain or palpitations  Denies any SOB  Patient reports that he is going to follow-up with cardiology due to his risk factors for heart disease  Patient is here for a follow-up for type 2 diabetes  Patient reports that he watches his diet  Denies any dizziness, Has, nausea, or vomiting  Review of Systems   Constitutional: Negative for chills, fatigue and fever  HENT: Negative for congestion, ear pain, sinus pressure, sore throat and trouble swallowing  Respiratory: Negative for cough, chest tightness and shortness of breath  Cardiovascular: Negative for chest pain, palpitations and leg swelling  Gastrointestinal: Negative for abdominal pain, blood in stool, diarrhea, nausea and vomiting  Genitourinary: Negative for dysuria, frequency, hematuria and urgency  Musculoskeletal:        As noted in HPI  Skin: Negative for rash  Neurological: Negative for dizziness, seizures, syncope, light-headedness and headaches  Psychiatric/Behavioral: Negative for suicidal ideas  Denies any depression  Physical Exam  Vitals reviewed  Constitutional:       General: He is not in acute distress  Appearance: He is obese  He is not ill-appearing or diaphoretic     HENT:      Right Ear: External ear normal       Left Ear: External ear normal       Nose: Nose normal       Mouth/Throat:      Mouth: Mucous membranes are moist  Pharynx: Oropharynx is clear  No posterior oropharyngeal erythema  Eyes:      Conjunctiva/sclera: Conjunctivae normal       Pupils: Pupils are equal, round, and reactive to light  Cardiovascular:      Rate and Rhythm: Normal rate and regular rhythm  Pulses: Normal pulses  no weak pulses          Dorsalis pedis pulses are 2+ on the right side and 2+ on the left side  Heart sounds: Normal heart sounds  Pulmonary:      Effort: Pulmonary effort is normal  No respiratory distress  Breath sounds: Normal breath sounds  No wheezing  Musculoskeletal:      Comments: Gait wnl  Feet:      Right foot:      Skin integrity: Dry skin present  No ulcer, skin breakdown, erythema, warmth or callus  Left foot:      Skin integrity: Dry skin present  No ulcer, skin breakdown, erythema, warmth or callus  Skin:     Findings: No rash  Neurological:      Mental Status: He is alert and oriented to person, place, and time  Psychiatric:         Mood and Affect: Mood normal      Patient's shoes and socks removed  Right Foot/Ankle   Right Foot Inspection  Skin Exam: skin intact and dry skin  No warmth, no callus, no erythema, no maceration, no abnormal color, no pre-ulcer, no ulcer and no callus  Toe Exam: ROM and strength within normal limits and right toe deformity (thickened brittle nails)  No swelling, no tenderness and erythema    Sensory   Monofilament testing: intact    Vascular  Capillary refills: < 3 seconds  The right DP pulse is 2+  Left Foot/Ankle  Left Foot Inspection  Skin Exam: skin intact and dry skin  No warmth, no erythema, no maceration, normal color, no pre-ulcer, no ulcer and no callus  Toe Exam: ROM and strength within normal limits and left toe deformity (thickened brittle nails)  No swelling, no tenderness and no erythema  Sensory   Monofilament testing: intact    Vascular  Capillary refills: < 3 seconds  The left DP pulse is 2+       Assign Risk Category  Deformity present  No loss of protective sensation  No weak pulses  Risk: 2 (Patient follows up with podiatry)

## 2022-09-12 NOTE — PATIENT INSTRUCTIONS
Medicare Preventive Visit Patient Instructions  Thank you for completing your Welcome to Medicare Visit or Medicare Annual Wellness Visit today  Your next wellness visit will be due in one year (9/13/2023)  The screening/preventive services that you may require over the next 5-10 years are detailed below  Some tests may not apply to you based off risk factors and/or age  Screening tests ordered at today's visit but not completed yet may show as past due  Also, please note that scanned in results may not display below  Preventive Screenings:  Service Recommendations Previous Testing/Comments   Colorectal Cancer Screening  · Colonoscopy    · Fecal Occult Blood Test (FOBT)/Fecal Immunochemical Test (FIT)  · Fecal DNA/Cologuard Test  · Flexible Sigmoidoscopy Age: 39-70 years old   Colonoscopy: every 10 years (May be performed more frequently if at higher risk)  OR  FOBT/FIT: every 1 year  OR  Cologuard: every 3 years  OR  Sigmoidoscopy: every 5 years  Screening may be recommended earlier than age 39 if at higher risk for colorectal cancer  Also, an individualized decision between you and your healthcare provider will decide whether screening between the ages of 74-80 would be appropriate   Colonoscopy: Not on file  FOBT/FIT: Not on file  Cologuard: 06/01/2021  Sigmoidoscopy: Not on file    Screening Current  Due for Cologuard     Prostate Cancer Screening Individualized decision between patient and health care provider in men between ages of 53-78   Medicare will cover every 12 months beginning on the day after your 50th birthday PSA: 1 1 ng/mL     Screening Current  Due for PSA     Hepatitis C Screening Once for adults born between 1945 and 1965  More frequently in patients at high risk for Hepatitis C Hep C Antibody: 01/08/2020    Screening Current   Diabetes Screening 1-2 times per year if you're at risk for diabetes or have pre-diabetes Fasting glucose: 125 mg/dL (9/9/2022)  A1C: 6 1 % (9/9/2022)  Screening Not Indicated  History Diabetes  Due for Blood Glucose   Cholesterol Screening Once every 5 years if you don't have a lipid disorder  May order more often based on risk factors  Lipid panel: 09/09/2022  Screening Not Indicated  History Lipid Disorder  Due for Lipid Panel      Other Preventive Screenings Covered by Medicare:  1  Abdominal Aortic Aneurysm (AAA) Screening: covered once if your at risk  You're considered to be at risk if you have a family history of AAA or a male between the age of 73-68 who smoking at least 100 cigarettes in your lifetime  2  Lung Cancer Screening: covers low dose CT scan once per year if you meet all of the following conditions: (1) Age 50-69; (2) No signs or symptoms of lung cancer; (3) Current smoker or have quit smoking within the last 15 years; (4) You have a tobacco smoking history of at least 20 pack years (packs per day x number of years you smoked); (5) You get a written order from a healthcare provider  3  Glaucoma Screening: covered annually if you're considered high risk: (1) You have diabetes OR (2) Family history of glaucoma OR (3)  aged 48 and older OR (3)  American aged 72 and older  3  Osteoporosis Screening: covered every 2 years if you meet one of the following conditions: (1) Have a vertebral abnormality; (2) On glucocorticoid therapy for more than 3 months; (3) Have primary hyperparathyroidism; (4) On osteoporosis medications and need to assess response to drug therapy  5  HIV Screening: covered annually if you're between the age of 12-76  Also covered annually if you are younger than 13 and older than 72 with risk factors for HIV infection  For pregnant patients, it is covered up to 3 times per pregnancy      Immunizations:  Immunization Recommendations   Influenza Vaccine Annual influenza vaccination during flu season is recommended for all persons aged >= 6 months who do not have contraindications   Pneumococcal Vaccine   * Pneumococcal conjugate vaccine = PCV13 (Prevnar 13), PCV15 (Vaxneuvance), PCV20 (Prevnar 20)  * Pneumococcal polysaccharide vaccine = PPSV23 (Pneumovax) Adults 2364 years old: 1-3 doses may be recommended based on certain risk factors  Adults 72 years old: 1-2 doses may be recommended based off what pneumonia vaccine you previously received   Hepatitis B Vaccine 3 dose series if at intermediate or high risk (ex: diabetes, end stage renal disease, liver disease)   Tetanus (Td) Vaccine - COST NOT COVERED BY MEDICARE PART B Following completion of primary series, a booster dose should be given every 10 years to maintain immunity against tetanus  Td may also be given as tetanus wound prophylaxis  Tdap Vaccine - COST NOT COVERED BY MEDICARE PART B Recommended at least once for all adults  For pregnant patients, recommended with each pregnancy  Shingles Vaccine (Shingrix) - COST NOT COVERED BY MEDICARE PART B  2 shot series recommended in those aged 48 and above     Health Maintenance Due:      Topic Date Due    HIV Screening  02/20/2023 (Originally 9/15/1975)    Colorectal Cancer Screening  06/01/2024    Hepatitis C Screening  Completed     Immunizations Due:      Topic Date Due    COVID-19 Vaccine (1) Never done    Pneumococcal Vaccine: Pediatrics (0 to 5 Years) and At-Risk Patients (6 to 59 Years) (2 - PCV) 11/16/2016    Influenza Vaccine (1) 09/01/2022     Advance Directives   What are advance directives? Advance directives are legal documents that state your wishes and plans for medical care  These plans are made ahead of time in case you lose your ability to make decisions for yourself  Advance directives can apply to any medical decision, such as the treatments you want, and if you want to donate organs  What are the types of advance directives? There are many types of advance directives, and each state has rules about how to use them  You may choose a combination of any of the following:  · Living will:   This is a written record of the treatment you want  You can also choose which treatments you do not want, which to limit, and which to stop at a certain time  This includes surgery, medicine, IV fluid, and tube feedings  · Durable power of  for healthcare [facility]): This is a written record that states who you want to make healthcare choices for you when you are unable to make them for yourself  This person, called a proxy, is usually a family member or a friend  You may choose more than 1 proxy  · Do not resuscitate (DNR) order:  A DNR order is used in case your heart stops beating or you stop breathing  It is a request not to have certain forms of treatment, such as CPR  A DNR order may be included in other types of advance directives  · Medical directive: This covers the care that you want if you are in a coma, near death, or unable to make decisions for yourself  You can list the treatments you want for each condition  Treatment may include pain medicine, surgery, blood transfusions, dialysis, IV or tube feedings, and a ventilator (breathing machine)  · Values history: This document has questions about your views, beliefs, and how you feel and think about life  This information can help others choose the care that you would choose  Why are advance directives important? An advance directive helps you control your care  Although spoken wishes may be used, it is better to have your wishes written down  Spoken wishes can be misunderstood, or not followed  Treatments may be given even if you do not want them  An advance directive may make it easier for your family to make difficult choices about your care  Cigarette Smoking and Your Health   Risks to your health if you smoke:  Nicotine and other chemicals found in tobacco damage every cell in your body  Even if you are a light smoker, you have an increased risk for cancer, heart disease, and lung disease   If you are pregnant or have diabetes, smoking increases your risk for complications  Benefits to your health if you stop smoking:   · You decrease respiratory symptoms such as coughing, wheezing, and shortness of breath  · You reduce your risk for cancers of the lung, mouth, throat, kidney, bladder, pancreas, stomach, and cervix  If you already have cancer, you increase the benefits of chemotherapy  You also reduce your risk for cancer returning or a second cancer from developing  · You reduce your risk for heart disease, blood clots, heart attack, and stroke  · You reduce your risk for lung infections, and diseases such as pneumonia, asthma, chronic bronchitis, and emphysema  · Your circulation improves  More oxygen can be delivered to your body  If you have diabetes, you lower your risk for complications, such as kidney, artery, and eye diseases  You also lower your risk for nerve damage  Nerve damage can lead to amputations, poor vision, and blindness  · You improve your body's ability to heal and to fight infections  For more information and support to stop smoking:   · [employer]  Phone: 0- 352 - 462-3538  Web Address: [employer]  Weight Management   Why it is important to manage your weight:  Being overweight increases your risk of health conditions such as heart disease, high blood pressure, type 2 diabetes, and certain types of cancer  It can also increase your risk for osteoarthritis, sleep apnea, and other respiratory problems  Aim for a slow, steady weight loss  Even a small amount of weight loss can lower your risk of health problems  How to lose weight safely:  A safe and healthy way to lose weight is to eat fewer calories and get regular exercise  You can lose up about 1 pound a week by decreasing the number of calories you eat by 500 calories each day  Healthy meal plan for weight management:  A healthy meal plan includes a variety of foods, contains fewer calories, and helps you stay healthy   A healthy meal plan includes the following:  · Eat whole-grain foods more often  A healthy meal plan should contain fiber  Fiber is the part of grains, fruits, and vegetables that is not broken down by your body  Whole-grain foods are healthy and provide extra fiber in your diet  Some examples of whole-grain foods are whole-wheat breads and pastas, oatmeal, brown rice, and bulgur  · Eat a variety of vegetables every day  Include dark, leafy greens such as spinach, kale, christy greens, and mustard greens  Eat yellow and orange vegetables such as carrots, sweet potatoes, and winter squash  · Eat a variety of fruits every day  Choose fresh or canned fruit (canned in its own juice or light syrup) instead of juice  Fruit juice has very little or no fiber  · Eat low-fat dairy foods  Drink fat-free (skim) milk or 1% milk  Eat fat-free yogurt and low-fat cottage cheese  Try low-fat cheeses such as mozzarella and other reduced-fat cheeses  · Choose meat and other protein foods that are low in fat  Choose beans or other legumes such as split peas or lentils  Choose fish, skinless poultry (chicken or turkey), or lean cuts of red meat (beef or pork)  Before you cook meat or poultry, cut off any visible fat  · Use less fat and oil  Try baking foods instead of frying them  Add less fat, such as margarine, sour cream, regular salad dressing and mayonnaise to foods  Eat fewer high-fat foods  Some examples of high-fat foods include french fries, doughnuts, ice cream, and cakes  · Eat fewer sweets  Limit foods and drinks that are high in sugar  This includes candy, cookies, regular soda, and sweetened drinks  Exercise:  Exercise at least 30 minutes per day on most days of the week  Some examples of exercise include walking, biking, dancing, and swimming  You can also fit in more physical activity by taking the stairs instead of the elevator or parking farther away from stores  Ask your healthcare provider about the best exercise plan for you  Narcotic (Opioid) Safety    Use narcotics safely:  · Take prescribed narcotics exactly as directed  · Do not give narcotics to others or take narcotics that belong to someone else  · Do not mix narcotics without medicines or alcohol  · Do not drive or operate heavy machinery after you take the narcotic  · Monitor for side effects and notify your healthcare provider if you experienced side effects such as nausea, sleepiness, itching, or trouble thinking clearly  Manage constipation:    Constipation is the most common side effect of narcotic medicine  Constipation is when you have hard, dry bowel movements, or you go longer than usual between bowel movements  Tell your healthcare provider about all changes in your bowel movements while you are taking narcotics  He or she may recommend laxative medicine to help you have a bowel movement  He or she may also change the kind of narcotic you are taking, or change when you take it  The following are more ways you can prevent or relieve constipation:    · Drink liquids as directed  You may need to drink extra liquids to help soften and move your bowels  Ask how much liquid to drink each day and which liquids are best for you  · Eat high-fiber foods  This may help decrease constipation by adding bulk to your bowel movements  High-fiber foods include fruits, vegetables, whole-grain breads and cereals, and beans  Your healthcare provider or dietitian can help you create a high-fiber meal plan  Your provider may also recommend a fiber supplement if you cannot get enough fiber from food  · Exercise regularly  Regular physical activity can help stimulate your intestines  Walking is a good exercise to prevent or relieve constipation  Ask which exercises are best for you  · Schedule a time each day to have a bowel movement  This may help train your body to have regular bowel movements  Bend forward while you are on the toilet to help move the bowel movement out   Sit on the toilet for at least 10 minutes, even if you do not have a bowel movement  Store narcotics safely:   · Store narcotics where others cannot easily get them  Keep them in a locked cabinet or secure area  Do not  keep them in a purse or other bag you carry with you  A person may be looking for something else and find the narcotics  · Make sure narcotics are stored out of the reach of children  A child can easily overdose on narcotics  Narcotics may look like candy to a small child  The best way to dispose of narcotics: The laws vary by country and area  In the [place] [facility], the best way is to return the narcotics through a take-back program  This program is offered by the [employer]  The following are options for using the program:  · Take the narcotics to a [employer] collection site  The site is often a law enforcement center  Call your local law enforcement center for scheduled take-back days in your area  You will be given information on where to go if the collection site is in a different location  · Take the narcotics to an approved pharmacy or hospital   A pharmacy or hospital may be set up as a collection site  You will need to ask if it is a [employer] collection site if you were not directed there  A pharmacy or doctor's office may not be able to take back narcotics unless it is a [employer] site  · Use a mail-back system  This means you are given containers to put the narcotics into  You will then mail them in the containers  · Use a take-back drop box  This is a place to leave the narcotics at any time  People and animals will not be able to get into the box  Your local law enforcement agency can tell you where to find a drop box in your area  Other ways to manage pain:   · Ask your healthcare provider about non-narcotic medicines to control pain  Nonprescription medicines include NSAIDs (such as ibuprofen) and acetaminophen   Prescription medicines include muscle relaxers, antidepressants, and steroids  · Pain may be managed without any medicines  Some ways to relieve pain include massage, aromatherapy, or meditation  Physical or occupational therapy may also help  For more information:   · Drug Enforcement Administration  [facility] [address]  Phone: 1- 512 - 826-5400  Web Address: [facility]/drug_disposal/    · [address]  and Drug Administration  [facility] , [address] [facility]  Phone: 9- 737 - 118-6961  Web Address: http://[employer]/     © Copyright [employer] 2018 Information is for End User's use only and may not be sold, redistributed or otherwise used for commercial purposes   All illustrations and images included in CareNotes® are the copyrighted property of A D A M , Inc  or [address] [place] [address]

## 2022-09-13 NOTE — RESULT ENCOUNTER NOTE
Hello    Patient normally is followed up by Ms Tereso Jackson  Please see if the patient has any urinary complaints  Small amount of white cells in urine  If no urinary complaints, no changes for now    Will review in more detail  at the upcoming appointment    Thank you    np

## 2022-09-14 ENCOUNTER — TELEPHONE (OUTPATIENT)
Dept: NEPHROLOGY | Facility: CLINIC | Age: 62
End: 2022-09-14

## 2022-09-14 PROCEDURE — 4010F ACE/ARB THERAPY RXD/TAKEN: CPT | Performed by: INTERNAL MEDICINE

## 2022-09-14 NOTE — TELEPHONE ENCOUNTER
----- Message from Catarina Tomlinson MD sent at 9/13/2022  3:48 PM EDT -----  Hello    Patient normally is followed up by Ms Tereso Jackson  Please see if the patient has any urinary complaints  Small amount of white cells in urine  If no urinary complaints, no changes for now    Will review in more detail  at the upcoming appointment    Thank you    np

## 2022-09-19 ENCOUNTER — PATIENT OUTREACH (OUTPATIENT)
Dept: FAMILY MEDICINE CLINIC | Facility: CLINIC | Age: 62
End: 2022-09-19

## 2022-09-19 NOTE — PROGRESS NOTES
[facility] SW received SDOH referral from PCP office  Chart reviewed and phone call placed to patient  Patient not available, voicemail left, awaiting call back from patient

## 2022-09-22 ENCOUNTER — APPOINTMENT (OUTPATIENT)
Dept: RADIOLOGY | Facility: AMBULARY SURGERY CENTER | Age: 62
End: 2022-09-22
Attending: ORTHOPAEDIC SURGERY
Payer: COMMERCIAL

## 2022-09-22 ENCOUNTER — OFFICE VISIT (OUTPATIENT)
Dept: OBGYN CLINIC | Facility: CLINIC | Age: 62
End: 2022-09-22
Payer: COMMERCIAL

## 2022-09-22 ENCOUNTER — OFFICE VISIT (OUTPATIENT)
Dept: NEPHROLOGY | Facility: CLINIC | Age: 62
End: 2022-09-22
Payer: COMMERCIAL

## 2022-09-22 VITALS
DIASTOLIC BLOOD PRESSURE: 72 MMHG | HEIGHT: 70 IN | BODY MASS INDEX: 45.1 KG/M2 | SYSTOLIC BLOOD PRESSURE: 124 MMHG | WEIGHT: 315 LBS | HEART RATE: 67 BPM

## 2022-09-22 VITALS
WEIGHT: 315 LBS | DIASTOLIC BLOOD PRESSURE: 58 MMHG | HEIGHT: 70 IN | SYSTOLIC BLOOD PRESSURE: 103 MMHG | BODY MASS INDEX: 45.1 KG/M2 | HEART RATE: 66 BPM

## 2022-09-22 DIAGNOSIS — N18.31 STAGE 3A CHRONIC KIDNEY DISEASE (HCC): Primary | ICD-10-CM

## 2022-09-22 DIAGNOSIS — M17.0 BILATERAL PRIMARY OSTEOARTHRITIS OF KNEE: Primary | ICD-10-CM

## 2022-09-22 DIAGNOSIS — M17.0 BILATERAL PRIMARY OSTEOARTHRITIS OF KNEE: ICD-10-CM

## 2022-09-22 PROCEDURE — 3074F SYST BP LT 130 MM HG: CPT | Performed by: ORTHOPAEDIC SURGERY

## 2022-09-22 PROCEDURE — 73562 X-RAY EXAM OF KNEE 3: CPT

## 2022-09-22 PROCEDURE — 99212 OFFICE O/P EST SF 10 MIN: CPT | Performed by: ORTHOPAEDIC SURGERY

## 2022-09-22 PROCEDURE — 3078F DIAST BP <80 MM HG: CPT | Performed by: ORTHOPAEDIC SURGERY

## 2022-09-22 PROCEDURE — 20610 DRAIN/INJ JOINT/BURSA W/O US: CPT | Performed by: ORTHOPAEDIC SURGERY

## 2022-09-22 PROCEDURE — 99213 OFFICE O/P EST LOW 20 MIN: CPT | Performed by: INTERNAL MEDICINE

## 2022-09-22 RX ORDER — BUPIVACAINE HYDROCHLORIDE 2.5 MG/ML
1 INJECTION, SOLUTION EPIDURAL; INFILTRATION; INTRACAUDAL
Status: COMPLETED | OUTPATIENT
Start: 2022-09-22 | End: 2022-09-22

## 2022-09-22 RX ORDER — BETAMETHASONE SODIUM PHOSPHATE AND BETAMETHASONE ACETATE 3; 3 MG/ML; MG/ML
6 INJECTION, SUSPENSION INTRA-ARTICULAR; INTRALESIONAL; INTRAMUSCULAR; SOFT TISSUE
Status: COMPLETED | OUTPATIENT
Start: 2022-09-22 | End: 2022-09-22

## 2022-09-22 RX ADMIN — BUPIVACAINE HYDROCHLORIDE 1 ML: 2.5 INJECTION, SOLUTION EPIDURAL; INFILTRATION; INTRACAUDAL at 13:24

## 2022-09-22 RX ADMIN — BETAMETHASONE SODIUM PHOSPHATE AND BETAMETHASONE ACETATE 6 MG: 3; 3 INJECTION, SUSPENSION INTRA-ARTICULAR; INTRALESIONAL; INTRAMUSCULAR; SOFT TISSUE at 13:24

## 2022-09-22 NOTE — PROGRESS NOTES
Assessment:  1  Bilateral primary osteoarthritis of knee  XR knee 3 vw right non injury    XR knee 3 vw left non injury    Large joint arthrocentesis: bilateral knee    Injection Procedure Prior Authorization     Patient Active Problem List   Diagnosis    Hypokalemia    Allergic rhinitis    Chronic back pain    Dental cavity    Anxiety disorder    Erectile dysfunction    Hyperlipidemia    Essential hypertension    Localized osteoarthritis of left knee    Lumbar radiculopathy    Microalbuminuria    Morbid obesity (HCC)    IMELDA on CPAP    Patellofemoral arthritis of left knee    Rosacea    Vitamin D deficiency    Pain in finger of both hands    Trigger finger, left middle finger    Persistent proteinuria    Screening for prostate cancer    Screening for colon cancer    It band syndrome, right    Bilateral primary osteoarthritis of knee    Stage 3b chronic kidney disease (HCC)    Localized osteoarthritis of right knee    Need for hepatitis C screening test    Anemia    Abnormal CBC    Medicare annual wellness visit, subsequent    History of cigar smoking    Type 2 diabetes mellitus, without long-term current use of insulin ([facility])    Financial difficulties    Continuous opioid dependence ([facility] )           Plan      60 y o  male with primary osteoarthritis bilateral knees  · X-ray's taken and reviewed  · Repeat corticosteroid injection given, procedure tolerated well  Post injection protocol advised  · Lubricant injections were ordered, patient will follow up in 2 weeks            Subjective:     Patient ID:    Chief Complaint:Nasir Mccormack  60 y o  male      HPI    Patient comes in today with regards to bilateral knee pain  Patient has previously been diagnosed with bilateral knee osteoarthritis  Patient states since his last visit a year ago his knee pain has progressively worsened  He indicates the entire knee is location of his pain  He describes it as a severe ache    He says that it is constant and throbbing  Prolonged weight-bearing, and stairs aggravate will rest alleviates  He denies any new trauma or injury  He has gotten symptomatic relief in the past from corticosteroid injections and he would like another today  The following portions of the patient's history were reviewed and updated as appropriate: allergies, current medications, past family history, past social history, past surgical history and problem list         Social History     Socioeconomic History    Marital status: Legally      Spouse name: Not on file    Number of children: Not on file    Years of education: Not on file    Highest education level: Not on file   Occupational History    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[place], injured back   Tobacco Use    Smoking status: Current Every Day Smoker     Types: Cigars    Smokeless tobacco: Never Used    Tobacco comment: 1 cigar daily,  50 pack years, 1/2 pack at age 12, 2 ppd x 21 yrs, quit 2004   Vaping Use    Vaping Use: Never used   Substance and Sexual Activity    Alcohol use: Yes     Comment: occasional    Drug use: No     Comment: History of drug use, meena, cocaine, heroind, no IVDA-as per [employer]    Sexual activity: Yes     Partners: Female     Birth control/protection: Condom Male   Other Topics Concern    Not on file   Social History Narrative    Patient lives with his ex wife and son  Social Determinants of Health     Financial Resource Strain: Medium Risk    Difficulty of Paying Living Expenses: Somewhat hard   Food Insecurity: Not on file   Transportation Needs: No Transportation Needs    Lack of Transportation (Medical): No    Lack of Transportation (Non-Medical):  No   Physical Activity: Not on file   Stress: Not on file   Social Connections: Not on file   Intimate Partner Violence: Not on file   Housing Stability: Not on file     Past Medical History:   Diagnosis Date    Anxiety     Back pain     Chronic pain  Depression 6/7/2021    Diabetes mellitus ([facility] )     6/19/15 A1C: 5 5%    Grief reaction 9/10/2019    Hyperlipidemia     Hypertension     Knee pain     Morbid obesity with BMI of 50 0-59 9, adult ([place] [address] )     Psychiatric disorder     Type 2 diabetes mellitus without complication ([place] [address] ) 44/09/4969    Urinary retention      Past Surgical History:   Procedure Laterality Date    BACK SURGERY      LUMBAR FUSION  2002    LUMBAR FUSION  2004    L4-5-6, S1 has pump for narcotics     Allergies   Allergen Reactions    Gabapentin      Annotation - [id number]: dizziness    Pregabalin      Annotation - [id number]: vision changes and mood changes    Tramadol Nausea Only     Current Outpatient Medications on File Prior to Visit   Medication Sig Dispense Refill    amLODIPine (NORVASC) 10 mg tablet take 1 tablet by mouth once daily 90 tablet 3    atorvastatin (LIPITOR) 20 mg tablet Take 1 tablet (20 mg total) by mouth daily 90 tablet 1    chlorthalidone 25 mg tablet Take 1 tablet (25 mg total) by mouth daily Take one half tablet daily 90 tablet 3    diclofenac sodium (VOLTAREN) 1 % Apply 2 g topically 4 (four) times a day 1 Tube 3    doxazosin (CARDURA) 2 mg tablet take 2 tablets by mouth daily at bedtime 180 tablet 3    ketoconazole (NIZORAL) 2 % cream 1 application daily Apply to affected area   0    losartan (COZAAR) 100 MG tablet take 1 tablet by mouth once daily 90 tablet 3    metoprolol tartrate (LOPRESSOR) 100 mg tablet take 1 tablet by mouth twice a day 180 tablet 3    morphine (MS CONTIN) 15 mg 12 hr tablet take 1 tablet every 8 hours if needed pain  0    oxyCODONE (ROXICODONE) 30 MG immediate release tablet take 1 tablet by mouth every 5 hours if needed for pain   Keaton Po MAX 4/DAY  0    spironolactone (ALDACTONE) 25 mg tablet take 1 tablet by mouth once daily 30 tablet 3    tadalafil (CIALIS) 5 MG tablet Take 5 mg by mouth daily as needed for erectile dysfunction (unsure dosage) Indications: Erectile Dysfunction  No current facility-administered medications on file prior to visit  Objective:    Review of Systems   Constitutional: Negative for chills and fever  HENT: Negative for ear pain and sore throat  Eyes: Negative for pain and visual disturbance  Respiratory: Negative for cough and shortness of breath  Cardiovascular: Negative for chest pain and palpitations  Gastrointestinal: Negative for abdominal pain and vomiting  Genitourinary: Negative for dysuria and hematuria  Musculoskeletal: Negative for arthralgias and back pain  Skin: Negative for color change and rash  Neurological: Negative for seizures and syncope  All other systems reviewed and are negative  Right Knee Exam     Tenderness   The patient is experiencing tenderness in the medial joint line  Range of Motion   The patient has normal right knee ROM  Other   Erythema: absent  Sensation: normal  Swelling: none  Effusion: no effusion present      Left Knee Exam     Tenderness   The patient is experiencing tenderness in the medial joint line  Other   Erythema: absent  Sensation: normal  Swelling: none  Effusion: no effusion present            Physical Exam  HENT:      Head: Normocephalic  Eyes:      Pupils: Pupils are equal, round, and reactive to light  Cardiovascular:      Rate and Rhythm: Normal rate  Pulmonary:      Effort: Pulmonary effort is normal    Musculoskeletal:         General: Normal range of motion  Cervical back: Normal range of motion  Right knee: No effusion  Left knee: No effusion  Skin:     General: Skin is warm and dry  Neurological:      General: No focal deficit present  Mental Status: He is alert  Psychiatric:         Behavior: Behavior normal          Large joint arthrocentesis: bilateral knee  Universal Protocol:  Consent: Verbal consent obtained    Risks and benefits: risks, benefits and alternatives were discussed  Consent given by: patient  Timeout called at: 9/22/2022 1:23 PM   Patient understanding: patient states understanding of the procedure being performed  Patient identity confirmed: verbally with patient    Supporting Documentation  Indications: pain and diagnostic evaluation   Procedure Details  Location: knee - bilateral knee  Preparation: Patient was prepped and draped in the usual sterile fashion  Needle size: 22 G  Ultrasound guidance: no    Medications (Right): 1 mL bupivacaine (PF) 0 25 %; 6 mg betamethasone acetate-betamethasone sodium phosphate 6 (3-3) mg/mLMedications (Left): 1 mL bupivacaine (PF) 0 25 %; 6 mg betamethasone acetate-betamethasone sodium phosphate 6 (3-3) mg/mL   Patient tolerance: patient tolerated the procedure well with no immediate complications  Dressing:  Sterile dressing applied             I have personally reviewed pertinent films in PACS and my interpretation is knee x-rays show bilateral patellofemoral arthritis, medial joint space narrowing  Scribe Attestation    I,:  Kenneth Kuhn am acting as a scribe while in the presence of the attending physician :       I,:  Mynor Arellano DO personally performed the services described in this documentation    as scribed in my presence :           Portions of the record may have been created with voice recognition software   Occasional wrong word or "sound a like" substitutions may have occurred due to the inherent limitations of voice recognition software   Read the chart carefully and recognize, using context, where substitutions have occurred

## 2022-09-22 NOTE — LETTER
September 22, 2022     MARJORIE Calvo  [address]  [place] [address]    Patient: Roxana Dubose  YOB: 1960   Date of Visit: 9/22/2022       Dear Dr Yvette Meadeoter: Thank you for referring Roger Sparrow to me for evaluation  Below are my notes for this consultation  If you have questions, please do not hesitate to call me  I look forward to following your patient along with you  Sincerely,        Sabine Keys MD        CC: No Recipients  Sabine Keys MD  9/22/2022  4:46 PM  Sign when Signing Visit  NEPHROLOGY OFFICE VISIT   Roxana Dubose  60 y o  male MRN: [id number]  9/22/2022    Reason for Visit: CKD III    ASSESSMENT and PLAN:    I had the pleasure of seeing Mr Miah Ledezma today in the renal clinic for the continued management of CKD III  58 yo male retired , formerly from [place], [place] [address] (15 years), DM (years), back surgery 10 years ago Who presents for follow up evaluation of hypertension   patient has had sporadic follow-up but has been slightly more consistent with follow-up since end of last year  Veronica Valerio last saw the patient in August 2018 and then patient followed up with our advanced practitioner  Marco A Mabry returns today for follow-up visit      Patient has stopped using NSAIDs since 2019      1) HTN -      -prior renin and aldosterone level with a renin level suppressed, aldosterone level is not significantly elevated  - prior metanephrine unrevealing  - prior CT scan with unremarkable adrenals  - renal Doppler study in November 2019 with no significant occlusive disease   - patient is on spironolactone, chlorthalidone, amlodipine, losartan, metoprolol,    - during visit in march clonidine was tapered off, chlorthalidone was increased chlorthalidone to 25 mg daily  -  Creatinine stable in May 2021 at 1 3 mg/dL   Blood pressures are appropriate   Patient is still gaining weight   -February 2022 appointment-no lab work since October  Creatinine in October was rising and patient has not gone for follow-up lab work  -September 2022-hold doxazosin    Overall, the patient's creatinine is slightly above baseline at 1 6 mg/dL  Patient has not had blood work since October 2021 and then completed blood work in September 2022 and renal function has stayed relatively stable  Patient has now moved closer to his granddaughter, his mood has improved, stress has improved and is home and social situation has improved  He is more active  His blood pressures are decreased at times to low 788 systolic  Slight rising creatinine may be in the setting of progression of renal dysfunction versus hypotension  For now we will hold doxazosin  Patient's sodium, potassium, bicarbonate, magnesium and phosphorus are appropriate  Protein creatinine ratio is minimal 0 07  Blood sugar control has also improved     Plan:     - repeat renal ultrasound   end of 2022 to follow cyst-ordered  -patient is appropriately losing weight   -blood pressures are improved and will hold doxazosin   -lab work in 3 months   -appointment in 3-4 months     2) poor dentition     -patient smokes cigars daily  -patient needs to see dentist     3) proteinuria -  Stable U PCR 0 07     - recheck at next visit  - if elevated, will check SPEP, UPEP, FLC     4) CKD III with baseline creatinine approximately 1 4 mg/dL with slight progression     -renal ultrasound in September 2018 was septated cyst bilaterally with mildly increased on the right kidney   And a calcified cystic foci in the left kidney  -patient needs repeat ultrasound   Ordered prior  Will assist pt in scheduling  - creat 2 2 in august  Held losartan for 2 days and restarted  Inc fluid intake  - 8/17 - chlorthalidone reduced to 12 5 mg daily as creat improving but not back to baseline  -  05/2021-creatinine improving to baseline 1 3 mg/dL  -September 2022-creatinine 1 6 mg/dL  Holding doxazosin       5) grief counseling-patient needs to see psychiatrist and psychologist and patient has the appointment scheduled      -patient's mood is improving     6) Vit D deficiency     - pt was started on 50,000 units vit D weekly on 1/10/2020  - Vit D improving 25 5 on 8/2020 -->   Continuevit D supplement  -check PTH next appointment     7) osteoarthritis of knees - sees Ortho team  PT and cortisone inj started 1/2020    -patient had recent injection this morning 09/2022     8) DM     - A1c worsening     9)  Weight gain -patient advised to see bariatric program   Patient agreeable      -patient saw the bariatric team July 2022    10) minimal pyuria  Patient without urinary complaints  Patient states on the day of the urinalysis or the day before, felt slight burning but since then no issues  Therefore will monitor for now  It was a pleasure evaluating Mr Romina Núñez; thank you for allowing our team to participate in the care of your patient and please do not hesitate to contact our team if any questions arise in the interim               No problem-specific Assessment & Plan notes found for this encounter  HPI:    No fevers, no nausea, no vomiting  More active  Moved to [facility]  PATIENT INSTRUCTIONS:    Patient Instructions   1) Avoid NSAIDS - (Example - motrin, advil, ibuprofen, aleve, exederin, etc)  2) Always follow a low salt diet  3) please have ultrasound completed before next appointment  4) labwork in 3 months  5) appointment in 3 months  6) please hold doxazosin for now  Check your BP once daily  If your blood pressures are above 140 (top number) please start it back and please call        OBJECTIVE:  Current Weight: Weight - Scale: (!) 147 kg (325 lb)  Vitals:    09/22/22 1553   BP: 124/72   BP Location: Left arm   Patient Position: Sitting   Cuff Size: Large   Pulse: 67   Weight: (!) 147 kg (325 lb)   Height: 5' 9 5" (1 765 m)    Body mass index is 47 31 kg/m²        REVIEW OF SYSTEMS:    Review of Systems   Constitutional: Negative  Negative for appetite change and fatigue  HENT: Negative  Eyes: Negative  Respiratory: Negative  Negative for shortness of breath  Cardiovascular: Negative  Negative for leg swelling  Gastrointestinal: Negative  Endocrine: Negative  Genitourinary: Negative  Negative for difficulty urinating  Musculoskeletal: Negative  Allergic/Immunologic: Negative  Neurological: Negative  Hematological: Negative  Psychiatric/Behavioral: Negative  All other systems reviewed and are negative  PHYSICAL EXAM:      Physical Exam  Vitals and nursing note reviewed  Constitutional:       General: He is not in acute distress  Appearance: He is well-developed  He is not diaphoretic  HENT:      Head: Normocephalic and atraumatic  Eyes:      General: No scleral icterus  Right eye: No discharge  Left eye: No discharge  Conjunctiva/sclera: Conjunctivae normal    Neck:      Vascular: No JVD  Cardiovascular:      Rate and Rhythm: Normal rate and regular rhythm  Heart sounds: Normal heart sounds  No murmur heard  No friction rub  No gallop  Pulmonary:      Effort: Pulmonary effort is normal  No respiratory distress  Breath sounds: Normal breath sounds  No wheezing or rales  Chest:      Chest wall: No tenderness  Abdominal:      General: Bowel sounds are normal  There is no distension  Palpations: Abdomen is soft  Tenderness: There is no abdominal tenderness  There is no rebound  Musculoskeletal:         General: No tenderness or deformity  Normal range of motion  Cervical back: Normal range of motion and neck supple  Skin:     General: Skin is warm and dry  Coloration: Skin is not pale  Findings: No erythema or rash  Neurological:      Mental Status: He is alert and oriented to person, place, and time  Cranial Nerves: No cranial nerve deficit        Coordination: Coordination normal       Deep Tendon Reflexes: Reflexes are normal and symmetric  Psychiatric:         Behavior: Behavior normal          Thought Content: Thought content normal          Judgment: Judgment normal          Medications:    Current Outpatient Medications:     amLODIPine (NORVASC) 10 mg tablet, take 1 tablet by mouth once daily, Disp: 90 tablet, Rfl: 3    chlorthalidone 25 mg tablet, Take 1 tablet (25 mg total) by mouth daily Take one half tablet daily, Disp: 90 tablet, Rfl: 3    doxazosin (CARDURA) 2 mg tablet, take 2 tablets by mouth daily at bedtime, Disp: 180 tablet, Rfl: 3    losartan (COZAAR) 100 MG tablet, take 1 tablet by mouth once daily, Disp: 90 tablet, Rfl: 3    metoprolol tartrate (LOPRESSOR) 100 mg tablet, take 1 tablet by mouth twice a day, Disp: 180 tablet, Rfl: 3    morphine (MS CONTIN) 15 mg 12 hr tablet, take 1 tablet every 8 hours if needed pain, Disp: , Rfl: 0    oxyCODONE (ROXICODONE) 30 MG immediate release tablet, take 1 tablet by mouth every 5 hours if needed for pain   Juan Manifold MAX 4/DAY, Disp: , Rfl: 0    spironolactone (ALDACTONE) 25 mg tablet, take 1 tablet by mouth once daily, Disp: 30 tablet, Rfl: 3    tadalafil (CIALIS) 5 MG tablet, Take 5 mg by mouth daily as needed for erectile dysfunction (unsure dosage) Indications: Erectile Dysfunction  , Disp: , Rfl:     atorvastatin (LIPITOR) 20 mg tablet, Take 1 tablet (20 mg total) by mouth daily (Patient not taking: Reported on 9/22/2022), Disp: 90 tablet, Rfl: 1    diclofenac sodium (VOLTAREN) 1 %, Apply 2 g topically 4 (four) times a day, Disp: 1 Tube, Rfl: 3    ketoconazole (NIZORAL) 2 % cream, 1 application daily Apply to affected area , Disp: , Rfl: 0  No current facility-administered medications for this visit      Laboratory Results:        Invalid input(s): ALBUMIN    Results for orders placed or performed in visit on 09/09/22   Comprehensive metabolic panel   Result Value Ref Range    Sodium 138 135 - 147 mmol/L    Potassium 4 5 3 5 - 5 3 mmol/L    Chloride 109 (H) 96 - 108 mmol/L    CO2 28 21 - 32 mmol/L    ANION GAP 1 (L) 4 - 13 mmol/L    BUN 32 (H) 5 - 25 mg/dL    Creatinine 1 61 (H) 0 60 - 1 30 mg/dL    Glucose, Fasting 125 (H) 65 - 99 mg/dL    Calcium 9 5 8 3 - 10 1 mg/dL    AST 7 5 - 45 U/L    ALT 23 12 - 78 U/L    Alkaline Phosphatase 76 46 - 116 U/L    Total Protein 8 4 6 4 - 8 4 g/dL    Albumin 3 8 3 5 - 5 0 g/dL    Total Bilirubin 0 30 0 20 - 1 00 mg/dL    eGFR 45 ml/min/1 73sq m   CBC   Result Value Ref Range    WBC 8 93 4 31 - 10 16 Thousand/uL    RBC 4 71 3 88 - 5 62 Million/uL    Hemoglobin 13 2 12 0 - 17 0 g/dL    Hematocrit 40 9 36 5 - 49 3 %    MCV 87 82 - 98 fL    MCH 28 0 26 8 - 34 3 pg    MCHC 32 3 31 4 - 37 4 g/dL    RDW 14 3 11 6 - 15 1 %    Platelets 386 353 - 876 Thousands/uL    MPV 11 3 8 9 - 12 7 fL   Magnesium   Result Value Ref Range    Magnesium 2 2 1 6 - 2 6 mg/dL   Phosphorus   Result Value Ref Range    Phosphorus 3 4 2 3 - 4 1 mg/dL   Protein / creatinine ratio, urine   Result Value Ref Range    Creatinine, Ur 178 0 mg/dL    Protein Urine Random 12 mg/dL    Prot/Creat Ratio, Ur 0 07 0 00 - 0 10   PTH, intact   Result Value Ref Range    PTH 69 4 18 4 - 80 1 pg/mL   Urinalysis with microscopic   Result Value Ref Range    Color, UA Yellow     Clarity, UA Clear     Specific Gravity, UA 1 020 1 003 - 1 030    pH, UA 5 5 4 5, 5 0, 5 5, 6 0, 6 5, 7 0, 7 5, 8 0    Leukocytes, UA Small (A) Negative    Nitrite, UA Negative Negative    Protein, UA Trace (A) Negative mg/dl    Glucose, UA Negative Negative mg/dl    Ketones, UA Negative Negative mg/dl    Urobilinogen, UA <2 0 <2 0 mg/dl mg/dl    Bilirubin, UA Negative Negative    Occult Blood, UA Negative Negative    RBC, UA 1-2 None Seen, 1-2 /hpf    WBC, UA 4-10 (A) None Seen, 1-2 /hpf    Epithelial Cells Occasional None Seen, Occasional /hpf    Bacteria, UA None Seen None Seen, Occasional /hpf   HEMOGLOBIN A1C W/ EAG ESTIMATION   Result Value Ref Range    Hemoglobin A1C 6 1 (H) Normal 3 8-5 6%; PreDiabetic 5 7-6 4%;  Diabetic >=6 5%; Glycemic control for adults with diabetes <7 0% %     mg/dl   Lipid Panel with Direct LDL reflex   Result Value Ref Range    Cholesterol 114 See Comment mg/dL    Triglycerides 73 See Comment mg/dL    HDL, Direct 42 >=40 mg/dL    LDL Calculated 57 0 - 100 mg/dL   TSH, 3rd generation with Free T4 reflex   Result Value Ref Range    TSH 3RD GENERATON 1 580 0 450 - 4 500 uIU/mL   PSA, Total Screen   Result Value Ref Range    PSA 1 1 0 0 - 4 0 ng/mL

## 2022-09-22 NOTE — PROGRESS NOTES
NEPHROLOGY OFFICE VISIT   Ana Paula Maclolm  60 y o  male MRN: [id number]  9/22/2022    Reason for Visit: CKD III    ASSESSMENT and PLAN:    I had the pleasure of seeing Mr Chelsey Villareal today in the renal clinic for the continued management of CKD III  58 yo male retired , formerly from [employer], [employer] (15 years), DM (years), back surgery 10 years ago Who presents for follow up evaluation of hypertension   patient has had sporadic follow-up but has been slightly more consistent with follow-up since end of last year  Lanny Sanchez last saw the patient in August 2018 and then patient followed up with our advanced practitioner  Christina Salvador returns today for follow-up visit      Patient has stopped using NSAIDs since 2019      1) HTN -      -prior renin and aldosterone level with a renin level suppressed, aldosterone level is not significantly elevated  - prior metanephrine unrevealing  - prior CT scan with unremarkable adrenals  - renal Doppler study in November 2019 with no significant occlusive disease   - patient is on spironolactone, chlorthalidone, amlodipine, losartan, metoprolol,    - during visit in march clonidine was tapered off, chlorthalidone was increased chlorthalidone to 25 mg daily  -  Creatinine stable in May 2021 at 1 3 mg/dL   Blood pressures are appropriate   Patient is still gaining weight   -February 2022 appointment-no lab work since October  Creatinine in October was rising and patient has not gone for follow-up lab work  -September 2022-hold doxazosin    Overall, the patient's creatinine is slightly above baseline at 1 6 mg/dL  Patient has not had blood work since October 2021 and then completed blood work in September 2022 and renal function has stayed relatively stable  Patient has now moved closer to his granddaughter, his mood has improved, stress has improved and is home and social situation has improved  He is more active  His blood pressures are decreased at times to low 014 systolic  Slight rising creatinine may be in the setting of progression of renal dysfunction versus hypotension  For now we will hold doxazosin  Patient's sodium, potassium, bicarbonate, magnesium and phosphorus are appropriate  Protein creatinine ratio is minimal 0 07  Blood sugar control has also improved     Plan:     - repeat renal ultrasound   end of 2022 to follow cyst-ordered  -patient is appropriately losing weight   -blood pressures are improved and will hold doxazosin   -lab work in 3 months   -appointment in 3-4 months     2) poor dentition     -patient smokes cigars daily  -patient needs to see dentist     3) proteinuria -  Stable U PCR 0 07     - recheck at next visit  - if elevated, will check SPEP, UPEP, FLC     4) CKD III with baseline creatinine approximately 1 4 mg/dL with slight progression     -renal ultrasound in September 2018 was septated cyst bilaterally with mildly increased on the right kidney   And a calcified cystic foci in the left kidney  -patient needs repeat ultrasound   Ordered prior  Will assist pt in scheduling  - creat 2 2 in august  Held losartan for 2 days and restarted  Inc fluid intake  - 8/17 - chlorthalidone reduced to 12 5 mg daily as creat improving but not back to baseline  -  05/2021-creatinine improving to baseline 1 3 mg/dL  -September 2022-creatinine 1 6 mg/dL  Holding doxazosin       5) grief counseling-patient needs to see psychiatrist and psychologist and patient has the appointment scheduled      -patient's mood is improving     6) Vit D deficiency     - pt was started on 50,000 units vit D weekly on 1/10/2020  - Vit D improving 25 5 on 8/2020 -->   Continuevit D supplement  -check PTH next appointment     7) osteoarthritis of knees - sees Ortho team  PT and cortisone inj started 1/2020    -patient had recent injection this morning 09/2022     8) DM     - A1c worsening     9)  Weight gain -patient advised to see bariatric program   Patient agreeable      -patient saw the bariatric team July 2022    10) minimal pyuria  Patient without urinary complaints  Patient states on the day of the urinalysis or the day before, felt slight burning but since then no issues  Therefore will monitor for now  It was a pleasure evaluating Mr Earl Espitia; thank you for allowing our team to participate in the care of your patient and please do not hesitate to contact our team if any questions arise in the interim               No problem-specific Assessment & Plan notes found for this encounter  HPI:    No fevers, no nausea, no vomiting  More active  Moved to [facility], [place]  PATIENT INSTRUCTIONS:    Patient Instructions   1) Avoid NSAIDS - (Example - motrin, advil, ibuprofen, aleve, exederin, etc)  2) Always follow a low salt diet  3) please have ultrasound completed before next appointment  4) labwork in 3 months  5) appointment in 3 months  6) please hold doxazosin for now  Check your BP once daily  If your blood pressures are above 140 (top number) please start it back and please call        OBJECTIVE:  Current Weight: Weight - Scale: (!) 147 kg (325 lb)  Vitals:    09/22/22 1553   BP: 124/72   BP Location: Left arm   Patient Position: Sitting   Cuff Size: Large   Pulse: 67   Weight: (!) 147 kg (325 lb)   Height: 5' 9 5" (1 765 m)    Body mass index is 47 31 kg/m²  REVIEW OF SYSTEMS:    Review of Systems   Constitutional: Negative  Negative for appetite change and fatigue  HENT: Negative  Eyes: Negative  Respiratory: Negative  Negative for shortness of breath  Cardiovascular: Negative  Negative for leg swelling  Gastrointestinal: Negative  Endocrine: Negative  Genitourinary: Negative  Negative for difficulty urinating  Musculoskeletal: Negative  Allergic/Immunologic: Negative  Neurological: Negative  Hematological: Negative  Psychiatric/Behavioral: Negative  All other systems reviewed and are negative        PHYSICAL EXAM:      Physical Exam  Vitals and nursing note reviewed  Constitutional:       General: He is not in acute distress  Appearance: He is well-developed  He is not diaphoretic  HENT:      Head: Normocephalic and atraumatic  Eyes:      General: No scleral icterus  Right eye: No discharge  Left eye: No discharge  Conjunctiva/sclera: Conjunctivae normal    Neck:      Vascular: No JVD  Cardiovascular:      Rate and Rhythm: Normal rate and regular rhythm  Heart sounds: Normal heart sounds  No murmur heard  No friction rub  No gallop  Pulmonary:      Effort: Pulmonary effort is normal  No respiratory distress  Breath sounds: Normal breath sounds  No wheezing or rales  Chest:      Chest wall: No tenderness  Abdominal:      General: Bowel sounds are normal  There is no distension  Palpations: Abdomen is soft  Tenderness: There is no abdominal tenderness  There is no rebound  Musculoskeletal:         General: No tenderness or deformity  Normal range of motion  Cervical back: Normal range of motion and neck supple  Skin:     General: Skin is warm and dry  Coloration: Skin is not pale  Findings: No erythema or rash  Neurological:      Mental Status: He is alert and oriented to person, place, and time  Cranial Nerves: No cranial nerve deficit  Coordination: Coordination normal       Deep Tendon Reflexes: Reflexes are normal and symmetric  Psychiatric:         Behavior: Behavior normal          Thought Content:  Thought content normal          Judgment: Judgment normal          Medications:    Current Outpatient Medications:     amLODIPine (NORVASC) 10 mg tablet, take 1 tablet by mouth once daily, Disp: 90 tablet, Rfl: 3    chlorthalidone 25 mg tablet, Take 1 tablet (25 mg total) by mouth daily Take one half tablet daily, Disp: 90 tablet, Rfl: 3    doxazosin (CARDURA) 2 mg tablet, take 2 tablets by mouth daily at bedtime, Disp: 180 tablet, Rfl: 3   losartan (COZAAR) 100 MG tablet, take 1 tablet by mouth once daily, Disp: 90 tablet, Rfl: 3    metoprolol tartrate (LOPRESSOR) 100 mg tablet, take 1 tablet by mouth twice a day, Disp: 180 tablet, Rfl: 3    morphine (MS CONTIN) 15 mg 12 hr tablet, take 1 tablet every 8 hours if needed pain, Disp: , Rfl: 0    oxyCODONE (ROXICODONE) 30 MG immediate release tablet, take 1 tablet by mouth every 5 hours if needed for pain   [place] MAX 4/DAY, Disp: , Rfl: 0    spironolactone (ALDACTONE) 25 mg tablet, take 1 tablet by mouth once daily, Disp: 30 tablet, Rfl: 3    tadalafil (CIALIS) 5 MG tablet, Take 5 mg by mouth daily as needed for erectile dysfunction (unsure dosage) Indications: Erectile Dysfunction  , Disp: , Rfl:     atorvastatin (LIPITOR) 20 mg tablet, Take 1 tablet (20 mg total) by mouth daily (Patient not taking: Reported on 9/22/2022), Disp: 90 tablet, Rfl: 1    diclofenac sodium (VOLTAREN) 1 %, Apply 2 g topically 4 (four) times a day, Disp: 1 Tube, Rfl: 3    ketoconazole (NIZORAL) 2 % cream, 1 application daily Apply to affected area , Disp: , Rfl: 0  No current facility-administered medications for this visit      Laboratory Results:        Invalid input(s): ALBUMIN    Results for orders placed or performed in visit on 09/09/22   Comprehensive metabolic panel   Result Value Ref Range    Sodium 138 135 - 147 mmol/L    Potassium 4 5 3 5 - 5 3 mmol/L    Chloride 109 (H) 96 - 108 mmol/L    CO2 28 21 - 32 mmol/L    ANION GAP 1 (L) 4 - 13 mmol/L    BUN 32 (H) 5 - 25 mg/dL    Creatinine 1 61 (H) 0 60 - 1 30 mg/dL    Glucose, Fasting 125 (H) 65 - 99 mg/dL    Calcium 9 5 8 3 - 10 1 mg/dL    AST 7 5 - 45 U/L    ALT 23 12 - 78 U/L    Alkaline Phosphatase 76 46 - 116 U/L    Total Protein 8 4 6 4 - 8 4 g/dL    Albumin 3 8 3 5 - 5 0 g/dL    Total Bilirubin 0 30 0 20 - 1 00 mg/dL    eGFR 45 ml/min/1 73sq m   CBC   Result Value Ref Range    WBC 8 93 4 31 - 10 16 Thousand/uL    RBC 4 71 3 88 - 5 62 Million/uL    Hemoglobin 13 2 12 0 - 17 0 g/dL    Hematocrit 40 9 36 5 - 49 3 %    MCV 87 82 - 98 fL    MCH 28 0 26 8 - 34 3 pg    MCHC 32 3 31 4 - 37 4 g/dL    RDW 14 3 11 6 - 15 1 %    Platelets 349 928 - 370 Thousands/uL    MPV 11 3 8 9 - 12 7 fL   Magnesium   Result Value Ref Range    Magnesium 2 2 1 6 - 2 6 mg/dL   Phosphorus   Result Value Ref Range    Phosphorus 3 4 2 3 - 4 1 mg/dL   Protein / creatinine ratio, urine   Result Value Ref Range    Creatinine, Ur 178 0 mg/dL    Protein Urine Random 12 mg/dL    Prot/Creat Ratio, Ur 0 07 0 00 - 0 10   PTH, intact   Result Value Ref Range    PTH 69 4 18 4 - 80 1 pg/mL   Urinalysis with microscopic   Result Value Ref Range    Color, UA Yellow     Clarity, UA Clear     Specific Gravity, UA 1 020 1 003 - 1 030    pH, UA 5 5 4 5, 5 0, 5 5, 6 0, 6 5, 7 0, 7 5, 8 0    Leukocytes, UA Small (A) Negative    Nitrite, UA Negative Negative    Protein, UA Trace (A) Negative mg/dl    Glucose, UA Negative Negative mg/dl    Ketones, UA Negative Negative mg/dl    Urobilinogen, UA <2 0 <2 0 mg/dl mg/dl    Bilirubin, UA Negative Negative    Occult Blood, UA Negative Negative    RBC, UA 1-2 None Seen, 1-2 /hpf    WBC, UA 4-10 (A) None Seen, 1-2 /hpf    Epithelial Cells Occasional None Seen, Occasional /hpf    Bacteria, UA None Seen None Seen, Occasional /hpf   HEMOGLOBIN A1C W/ EAG ESTIMATION   Result Value Ref Range    Hemoglobin A1C 6 1 (H) Normal 3 8-5 6%; PreDiabetic 5 7-6 4%;  Diabetic >=6 5%; Glycemic control for adults with diabetes <7 0% %     mg/dl   Lipid Panel with Direct LDL reflex   Result Value Ref Range    Cholesterol 114 See Comment mg/dL    Triglycerides 73 See Comment mg/dL    HDL, Direct 42 >=40 mg/dL    LDL Calculated 57 0 - 100 mg/dL   TSH, 3rd generation with Free T4 reflex   Result Value Ref Range    TSH 3RD GENERATON 1 580 0 450 - 4 500 uIU/mL   PSA, Total Screen   Result Value Ref Range    PSA 1 1 0 0 - 4 0 ng/mL

## 2022-09-22 NOTE — PATIENT INSTRUCTIONS
1) Avoid NSAIDS - (Example - motrin, advil, ibuprofen, aleve, exederin, etc)  2) Always follow a low salt diet  3) please have ultrasound completed before next appointment  4) labwork in 3 months  5) appointment in 3 months  6) please hold doxazosin for now  Check your BP once daily   If your blood pressures are above 140 (top number) please start it back and please call

## 2022-09-26 ENCOUNTER — PATIENT OUTREACH (OUTPATIENT)
Dept: FAMILY MEDICINE CLINIC | Facility: CLINIC | Age: 62
End: 2022-09-26

## 2022-09-26 NOTE — PROGRESS NOTES
OP[facility] SW placed phone call to patient to follow up on SDOH referral   Patient reports that he had recently moved and fell behind on some bills  At this time, all bills are paid to date and patient is registered in all necessary assistance programs  Patient reports not food insecurity or transpiration needs  Patient provided contact information or can reach this SW through office for any future needs    Referral closed at this time

## 2022-10-07 ENCOUNTER — TELEPHONE (OUTPATIENT)
Dept: PSYCHIATRY | Facility: CLINIC | Age: 62
End: 2022-10-07

## 2022-10-07 NOTE — TELEPHONE ENCOUNTER
Called patient off med mgmt wait list to see if services were still needed  LVM for patient to call intake dept

## 2022-10-10 ENCOUNTER — TELEPHONE (OUTPATIENT)
Dept: PSYCHIATRY | Facility: CLINIC | Age: 62
End: 2022-10-10

## 2022-10-10 NOTE — TELEPHONE ENCOUNTER
" Called ( patient) regarding, LVM advising to return call to intake @ [phone] to be ( scheduled)  "

## 2022-10-10 NOTE — TELEPHONE ENCOUNTER
Patient returned call in regards to if services are still needed   Patient stated services are no longer need and can me taken off wait list

## 2022-10-12 PROBLEM — Z00.00 MEDICARE ANNUAL WELLNESS VISIT, SUBSEQUENT: Status: RESOLVED | Noted: 2021-05-03 | Resolved: 2022-10-12

## 2022-10-24 ENCOUNTER — APPOINTMENT (OUTPATIENT)
Dept: RADIOLOGY | Facility: CLINIC | Age: 62
End: 2022-10-24
Payer: COMMERCIAL

## 2022-10-24 ENCOUNTER — OFFICE VISIT (OUTPATIENT)
Dept: URGENT CARE | Facility: CLINIC | Age: 62
End: 2022-10-24
Payer: COMMERCIAL

## 2022-10-24 VITALS
SYSTOLIC BLOOD PRESSURE: 132 MMHG | HEART RATE: 74 BPM | TEMPERATURE: 99.3 F | RESPIRATION RATE: 20 BRPM | BODY MASS INDEX: 45.1 KG/M2 | OXYGEN SATURATION: 94 % | DIASTOLIC BLOOD PRESSURE: 78 MMHG | WEIGHT: 315 LBS | HEIGHT: 70 IN

## 2022-10-24 DIAGNOSIS — S86.912A STRAIN OF LEFT KNEE, INITIAL ENCOUNTER: ICD-10-CM

## 2022-10-24 DIAGNOSIS — S86.912A STRAIN OF LEFT KNEE, INITIAL ENCOUNTER: Primary | ICD-10-CM

## 2022-10-24 PROCEDURE — 73562 X-RAY EXAM OF KNEE 3: CPT

## 2022-10-24 PROCEDURE — S9088 SERVICES PROVIDED IN URGENT: HCPCS

## 2022-10-24 PROCEDURE — 99213 OFFICE O/P EST LOW 20 MIN: CPT

## 2022-10-24 NOTE — PROGRESS NOTES
[facility]        NAME: Serg Howe  is a 60 y o  male  : 1960    MRN: [id number]  DATE: 2022  TIME: 6:21 PM    /78   Pulse 74   Temp 99 3 °F (37 4 °C)   Resp 20   Ht 5' 10" (1 778 m)   Wt (!) 147 kg (325 lb)   SpO2 94%   BMI 46 63 kg/m²     Assessment and Plan   Strain of left knee, initial encounter [S86 912A]  1  Strain of left knee, initial encounter  Diclofenac Sodium (VOLTAREN) 1 %    CANCELED: XR knee 4+ vw left injury         Patient Instructions       Follow up with PCP in 3-5 days  Proceed to  ER if symptoms worsen  Chief Complaint     Chief Complaint   Patient presents with   • Mass     Pt has had large massw on lateral side of left knee x 2d of unknown cause-causing 8/10 sharp burning pain to knee-denies falls or trauma to area         History of Present Illness       Pt with left knee pain for several days,  Pt with no known trauma       Review of Systems   Review of Systems   Constitutional: Negative  HENT: Negative  Eyes: Negative  Respiratory: Negative  Cardiovascular: Negative  Gastrointestinal: Negative  Endocrine: Negative  Genitourinary: Negative  Musculoskeletal: Negative  Skin: Negative  Allergic/Immunologic: Negative  Neurological: Negative  Hematological: Negative  Psychiatric/Behavioral: Negative  All other systems reviewed and are negative          Current Medications       Current Outpatient Medications:   •  amLODIPine (NORVASC) 10 mg tablet, take 1 tablet by mouth once daily, Disp: 90 tablet, Rfl: 3  •  atorvastatin (LIPITOR) 20 mg tablet, Take 1 tablet (20 mg total) by mouth daily, Disp: 90 tablet, Rfl: 1  •  chlorthalidone 25 mg tablet, Take 1 tablet (25 mg total) by mouth daily Take one half tablet daily, Disp: 90 tablet, Rfl: 3  •  Diclofenac Sodium (VOLTAREN) 1 %, Apply 2 g topically 4 (four) times a day, Disp: 150 g, Rfl: 0  •  losartan (COZAAR) 100 MG tablet, take 1 tablet by mouth once daily, Disp: 90 tablet, Rfl: 3  •  metoprolol tartrate (LOPRESSOR) 100 mg tablet, take 1 tablet by mouth twice a day, Disp: 180 tablet, Rfl: 3  •  morphine (MS CONTIN) 15 mg 12 hr tablet, take 1 tablet every 8 hours if needed pain, Disp: , Rfl: 0  •  oxyCODONE (ROXICODONE) 30 MG immediate release tablet, take 1 tablet by mouth every 5 hours if needed for pain   [place] MAX 4/DAY, Disp: , Rfl: 0  •  spironolactone (ALDACTONE) 25 mg tablet, take 1 tablet by mouth once daily, Disp: 30 tablet, Rfl: 3  •  tadalafil (CIALIS) 5 MG tablet, Take 5 mg by mouth daily as needed for erectile dysfunction (unsure dosage) Indications: Erectile Dysfunction  , Disp: , Rfl:   •  diclofenac sodium (VOLTAREN) 1 %, Apply 2 g topically 4 (four) times a day (Patient not taking: Reported on 10/24/2022), Disp: 1 Tube, Rfl: 3  •  doxazosin (CARDURA) 2 mg tablet, take 2 tablets by mouth daily at bedtime (Patient not taking: Reported on 10/24/2022), Disp: 180 tablet, Rfl: 3  •  ketoconazole (NIZORAL) 2 % cream, 1 application daily Apply to affected area  (Patient not taking: Reported on 10/24/2022), Disp: , Rfl: 0    Current Allergies     Allergies as of 10/24/2022 - Reviewed 10/24/2022   Allergen Reaction Noted   • Gabapentin  11/16/2015   • Pregabalin  11/16/2015   • Tramadol Nausea Only 01/18/2016            The following portions of the patient's history were reviewed and updated as appropriate: allergies, current medications, past family history, past medical history, past social history, past surgical history and problem list      Past Medical History:   Diagnosis Date   • Anxiety    • Back pain    • Chronic pain    • Depression 6/7/2021   • [facility] )     6/19/15 A1C: 5 5%   • Grief reaction 9/10/2019   • Hyperlipidemia    • Hypertension    • Knee pain    • Morbid obesity with BMI of 50 0-59 9, adult ([facility] )    • Psychiatric disorder    • Type 2 diabetes mellitus without complication ([facility] ) 43/87/4498   • Urinary retention        Past Surgical History:   Procedure Laterality Date   • BACK SURGERY     • LUMBAR FUSION  2002   • LUMBAR FUSION  2004    L4-5-6, S1 has pump for narcotics       Family History   Problem Relation Age of Onset   • Other Mother         colitis   • Diabetes Mother          Medications have been verified  Objective   /78   Pulse 74   Temp 99 3 °F (37 4 °C)   Resp 20   Ht 5' 10" (1 778 m)   Wt (!) 147 kg (325 lb)   SpO2 94%   BMI 46 63 kg/m²        Physical Exam     Physical Exam  Vitals reviewed  Constitutional:       Appearance: Normal appearance  He is normal weight  HENT:      Head: Normocephalic and atraumatic  Right Ear: Tympanic membrane, ear canal and external ear normal       Left Ear: Tympanic membrane, ear canal and external ear normal       Nose: Nose normal       Mouth/Throat:      Mouth: Mucous membranes are moist       Pharynx: Oropharynx is clear  Eyes:      Extraocular Movements: Extraocular movements intact  Conjunctiva/sclera: Conjunctivae normal       Pupils: Pupils are equal, round, and reactive to light  Cardiovascular:      Rate and Rhythm: Normal rate and regular rhythm  Pulses: Normal pulses  Heart sounds: Normal heart sounds  Pulmonary:      Effort: Pulmonary effort is normal       Breath sounds: Normal breath sounds  Abdominal:      General: Abdomen is flat  Bowel sounds are normal       Palpations: Abdomen is soft  Musculoskeletal:         General: Normal range of motion  Cervical back: Normal range of motion and neck supple  Comments: Left knee from, lateral swelling  No erythema no warmth  No crepitace no ballotment no popliteal tenderness no ant/post drawer lateral ligament tenderness no laxity    Skin:     General: Skin is warm  Capillary Refill: Capillary refill takes less than 2 seconds  Neurological:      General: No focal deficit present  Mental Status: He is alert and oriented to person, place, and time  Psychiatric:         Mood and Affect: Mood normal          Behavior: Behavior normal

## 2022-10-27 ENCOUNTER — PROCEDURE VISIT (OUTPATIENT)
Dept: OBGYN CLINIC | Facility: CLINIC | Age: 62
End: 2022-10-27
Payer: COMMERCIAL

## 2022-10-27 VITALS — HEIGHT: 70 IN | WEIGHT: 315 LBS | BODY MASS INDEX: 45.1 KG/M2

## 2022-10-27 DIAGNOSIS — M25.462 EFFUSION OF LEFT KNEE: ICD-10-CM

## 2022-10-27 DIAGNOSIS — M17.0 BILATERAL PRIMARY OSTEOARTHRITIS OF KNEE: Primary | ICD-10-CM

## 2022-10-27 PROCEDURE — 20610 DRAIN/INJ JOINT/BURSA W/O US: CPT | Performed by: PHYSICIAN ASSISTANT

## 2022-10-27 RX ORDER — HYALURONATE SODIUM 10 MG/ML
20 SYRINGE (ML) INTRAARTICULAR
Status: COMPLETED | OUTPATIENT
Start: 2022-10-27 | End: 2022-10-27

## 2022-10-27 RX ADMIN — Medication 20 MG: at 13:37

## 2022-10-27 NOTE — PROGRESS NOTES
1  Bilateral primary osteoarthritis of knee  Large joint arthrocentesis: bilateral knee   2  Effusion of left knee  Large joint arthrocentesis: bilateral knee     Patient is here for his 1st injection of Euflexxa into the bilateral knee  Patient reports he woke up the other day with pain in the left knee  Also had some swelling  No injury  ALEXUS Mcgregor Physical exam of the knee shows no effusion in the right knee there is a moderate joint effusion left knee, no ecchymosis  All other organ systems normal    Large joint arthrocentesis: bilateral knee  Universal Protocol:  Consent given by: patient  Time out: Immediately prior to procedure a "time out" was called to verify the correct patient, procedure, equipment, support staff and site/side marked as required  Site marked: the operative site was marked  Supporting Documentation  Indications: pain   Procedure Details  Location: knee - bilateral knee  Preparation: Patient was prepped and draped in the usual sterile fashion  Needle size: 22 G  Ultrasound guidance: no  Approach: anterolateral    Medications (Right): 20 mg Sodium Hyaluronate 20 MG/2MLMedications (Left): 20 mg Sodium Hyaluronate 20 MG/2ML   Aspirate amount (Left): 45 mL  Aspirate (Left): clear, serous and yellow    Patient tolerance: patient tolerated the procedure well with no immediate complications  Dressing:  Sterile dressing applied          Patient tolerated procedure follow up 1 week  Aspiration left knee was performed today healed in 45 cc slightly cloudy synovial fluid  Likely arthritic flare    If this recurs may consider testing for crystals as synovial fluid analysis

## 2022-11-03 ENCOUNTER — PROCEDURE VISIT (OUTPATIENT)
Dept: OBGYN CLINIC | Facility: CLINIC | Age: 62
End: 2022-11-03

## 2022-11-03 VITALS — BODY MASS INDEX: 45.1 KG/M2 | HEIGHT: 70 IN | WEIGHT: 315 LBS

## 2022-11-03 DIAGNOSIS — M17.0 BILATERAL PRIMARY OSTEOARTHRITIS OF KNEE: Primary | ICD-10-CM

## 2022-11-03 RX ORDER — HYALURONATE SODIUM 10 MG/ML
20 SYRINGE (ML) INTRAARTICULAR
Status: COMPLETED | OUTPATIENT
Start: 2022-11-03 | End: 2022-11-03

## 2022-11-03 RX ADMIN — Medication 20 MG: at 14:11

## 2022-11-03 NOTE — PROGRESS NOTES
1  Bilateral primary osteoarthritis of knee  Large joint arthrocentesis     Patient is here for his 2nd injection of Euflexxa into the bilateral knee  Patient reports no changes yet  Physical exam of the knee shows no effusion no ecchymosis  All other organ systems normal    Large joint arthrocentesis: bilateral knee  Universal Protocol:  Consent given by: patient  Time out: Immediately prior to procedure a "time out" was called to verify the correct patient, procedure, equipment, support staff and site/side marked as required    Site marked: the operative site was marked  Supporting Documentation  Indications: pain   Procedure Details  Location: knee - bilateral knee  Preparation: Patient was prepped and draped in the usual sterile fashion  Needle size: 22 G  Ultrasound guidance: no  Approach: anterolateral    Medications (Right): 20 mg Sodium Hyaluronate 20 MG/2MLMedications (Left): 20 mg Sodium Hyaluronate 20 MG/2ML   Patient tolerance: patient tolerated the procedure well with no immediate complications  Dressing:  Sterile dressing applied          Patient tolerated procedure follow up 1 week

## 2022-11-10 ENCOUNTER — PROCEDURE VISIT (OUTPATIENT)
Dept: OBGYN CLINIC | Facility: CLINIC | Age: 62
End: 2022-11-10

## 2022-11-10 VITALS — WEIGHT: 315 LBS | BODY MASS INDEX: 45.1 KG/M2 | HEIGHT: 70 IN

## 2022-11-10 DIAGNOSIS — M17.0 BILATERAL PRIMARY OSTEOARTHRITIS OF KNEE: Primary | ICD-10-CM

## 2022-11-10 RX ORDER — MELOXICAM 7.5 MG/1
7.5 TABLET ORAL DAILY
Qty: 60 TABLET | Refills: 0 | Status: SHIPPED | OUTPATIENT
Start: 2022-11-10

## 2022-11-10 RX ORDER — HYALURONATE SODIUM 10 MG/ML
20 SYRINGE (ML) INTRAARTICULAR
Status: COMPLETED | OUTPATIENT
Start: 2022-11-10 | End: 2022-11-10

## 2022-11-10 RX ADMIN — Medication 20 MG: at 11:32

## 2022-11-10 NOTE — PROGRESS NOTES
1  Bilateral primary osteoarthritis of knee       Patient is here for his 3rd injection of Euflexxa into the bilateral knee  Patient reports right knees bother the left knee is okay  All organ systems normal  Physical exam of the knee shows no effusion no ecchymosis  Large joint arthrocentesis: bilateral knee  Universal Protocol:  Consent: Verbal consent obtained    Risks and benefits: risks, benefits and alternatives were discussed  Consent given by: patient  Timeout called at: 11/10/2022 11:32 AM   Patient understanding: patient states understanding of the procedure being performed  Patient identity confirmed: verbally with patient    Supporting Documentation  Indications: pain and diagnostic evaluation   Procedure Details  Location: knee - bilateral knee  Needle size: 22 G  Ultrasound guidance: no    Medications (Right): 20 mg Sodium Hyaluronate 20 MG/2MLMedications (Left): 20 mg Sodium Hyaluronate 20 MG/2ML           Patient tolerated procedure follow up PRN

## 2022-11-30 ENCOUNTER — TELEPHONE (OUTPATIENT)
Dept: NEPHROLOGY | Facility: CLINIC | Age: 62
End: 2022-11-30

## 2022-11-30 NOTE — TELEPHONE ENCOUNTER
Left message for patient to reschedule his 1/25 appointment with Dr Cristofer Delgado in the [place] office due to provider out of the office that day  This is the 1st attempt

## 2022-12-09 DIAGNOSIS — I10 BENIGN ESSENTIAL HTN: ICD-10-CM

## 2022-12-09 DIAGNOSIS — N28.1 RENAL CYST: ICD-10-CM

## 2022-12-09 RX ORDER — SPIRONOLACTONE 25 MG/1
TABLET ORAL
Qty: 30 TABLET | Refills: 3 | Status: SHIPPED | OUTPATIENT
Start: 2022-12-09 | End: 2022-12-14

## 2022-12-14 DIAGNOSIS — I10 ESSENTIAL HYPERTENSION: ICD-10-CM

## 2022-12-14 DIAGNOSIS — N28.1 RENAL CYST: ICD-10-CM

## 2022-12-14 DIAGNOSIS — I10 BENIGN ESSENTIAL HTN: ICD-10-CM

## 2022-12-14 RX ORDER — CHLORTHALIDONE 25 MG/1
TABLET ORAL
Qty: 90 TABLET | Refills: 3 | Status: SHIPPED | OUTPATIENT
Start: 2022-12-14

## 2022-12-14 RX ORDER — SPIRONOLACTONE 25 MG/1
TABLET ORAL
Qty: 30 TABLET | Refills: 3 | Status: SHIPPED | OUTPATIENT
Start: 2022-12-14

## 2023-01-27 ENCOUNTER — HOSPITAL ENCOUNTER (OUTPATIENT)
Dept: ULTRASOUND IMAGING | Facility: HOSPITAL | Age: 63
Discharge: HOME | End: 2023-01-27
Attending: INTERNAL MEDICINE

## 2023-01-27 DIAGNOSIS — N18.31 STAGE 3A CHRONIC KIDNEY DISEASE (HCC): ICD-10-CM

## 2023-02-02 ENCOUNTER — APPOINTMENT (OUTPATIENT)
Dept: LAB | Facility: CLINIC | Age: 63
End: 2023-02-02

## 2023-02-02 DIAGNOSIS — E78.5 HYPERLIPIDEMIA, UNSPECIFIED HYPERLIPIDEMIA TYPE: ICD-10-CM

## 2023-02-02 DIAGNOSIS — N18.31 STAGE 3A CHRONIC KIDNEY DISEASE (HCC): ICD-10-CM

## 2023-02-02 DIAGNOSIS — E66.01 MORBID OBESITY (HCC): ICD-10-CM

## 2023-02-02 DIAGNOSIS — I10 ESSENTIAL HYPERTENSION: ICD-10-CM

## 2023-02-02 DIAGNOSIS — N18.32 TYPE 2 DIABETES MELLITUS WITH STAGE 3B CHRONIC KIDNEY DISEASE, WITHOUT LONG-TERM CURRENT USE OF INSULIN (HCC): ICD-10-CM

## 2023-02-02 DIAGNOSIS — E11.22 TYPE 2 DIABETES MELLITUS WITH STAGE 3B CHRONIC KIDNEY DISEASE, WITHOUT LONG-TERM CURRENT USE OF INSULIN (HCC): ICD-10-CM

## 2023-02-02 LAB
ANION GAP SERPL CALCULATED.3IONS-SCNC: 7 MMOL/L (ref 4–13)
BACTERIA UR QL AUTO: NORMAL /HPF
BILIRUB UR QL STRIP: NEGATIVE
BUN SERPL-MCNC: 34 MG/DL (ref 5–25)
CALCIUM SERPL-MCNC: 9.8 MG/DL (ref 8.3–10.1)
CHLORIDE SERPL-SCNC: 105 MMOL/L (ref 96–108)
CLARITY UR: CLEAR
CO2 SERPL-SCNC: 24 MMOL/L (ref 21–32)
COLOR UR: COLORLESS
CREAT SERPL-MCNC: 1.76 MG/DL (ref 0.6–1.3)
CREAT UR-MCNC: 58.3 MG/DL
ERYTHROCYTE [DISTWIDTH] IN BLOOD BY AUTOMATED COUNT: 13.2 % (ref 11.6–15.1)
GFR SERPL CREATININE-BSD FRML MDRD: 40 ML/MIN/1.73SQ M
GLUCOSE P FAST SERPL-MCNC: 129 MG/DL (ref 65–99)
GLUCOSE UR STRIP-MCNC: NEGATIVE MG/DL
HCT VFR BLD AUTO: 44.4 % (ref 36.5–49.3)
HGB BLD-MCNC: 14.2 G/DL (ref 12–17)
HGB UR QL STRIP.AUTO: NEGATIVE
KETONES UR STRIP-MCNC: NEGATIVE MG/DL
LEUKOCYTE ESTERASE UR QL STRIP: NEGATIVE
MAGNESIUM SERPL-MCNC: 1.8 MG/DL (ref 1.6–2.6)
MCH RBC QN AUTO: 28.5 PG (ref 26.8–34.3)
MCHC RBC AUTO-ENTMCNC: 32 G/DL (ref 31.4–37.4)
MCV RBC AUTO: 89 FL (ref 82–98)
NITRITE UR QL STRIP: NEGATIVE
NON-SQ EPI CELLS URNS QL MICRO: NORMAL /HPF
PH UR STRIP.AUTO: 6 [PH]
PHOSPHATE SERPL-MCNC: 3.3 MG/DL (ref 2.3–4.1)
PLATELET # BLD AUTO: 260 THOUSANDS/UL (ref 149–390)
PMV BLD AUTO: 10.2 FL (ref 8.9–12.7)
POTASSIUM SERPL-SCNC: 4.6 MMOL/L (ref 3.5–5.3)
PROT UR STRIP-MCNC: NEGATIVE MG/DL
PROT UR-MCNC: 6 MG/DL
PROT/CREAT UR: 0.1 MG/G{CREAT} (ref 0–0.1)
RBC # BLD AUTO: 4.99 MILLION/UL (ref 3.88–5.62)
RBC #/AREA URNS AUTO: NORMAL /HPF
SODIUM SERPL-SCNC: 136 MMOL/L (ref 135–147)
SP GR UR STRIP.AUTO: 1.01 (ref 1–1.03)
UROBILINOGEN UR STRIP-ACNC: <2 MG/DL
WBC # BLD AUTO: 10.49 THOUSAND/UL (ref 4.31–10.16)
WBC #/AREA URNS AUTO: NORMAL /HPF

## 2023-02-06 ENCOUNTER — CONSULT (OUTPATIENT)
Dept: DERMATOLOGY | Facility: CLINIC | Age: 63
End: 2023-02-06

## 2023-02-06 VITALS — WEIGHT: 315 LBS | BODY MASS INDEX: 45.1 KG/M2 | HEIGHT: 70 IN

## 2023-02-06 DIAGNOSIS — L73.9 FOLLICULITIS: ICD-10-CM

## 2023-02-06 DIAGNOSIS — L70.9 ACNE, UNSPECIFIED ACNE TYPE: ICD-10-CM

## 2023-02-06 DIAGNOSIS — L21.9 SEBORRHEIC DERMATITIS: Primary | ICD-10-CM

## 2023-02-06 RX ORDER — DOXYCYCLINE HYCLATE 100 MG/1
CAPSULE ORAL
Qty: 180 CAPSULE | Refills: 0 | Status: SHIPPED | OUTPATIENT
Start: 2023-02-06 | End: 2023-05-06

## 2023-02-06 RX ORDER — KETOCONAZOLE 20 MG/ML
SHAMPOO TOPICAL
Qty: 120 ML | Refills: 6 | Status: SHIPPED | OUTPATIENT
Start: 2023-02-06

## 2023-02-06 NOTE — PATIENT INSTRUCTIONS
SEBORRHEIC DERMATITIS       Assessment and Plan:  Based on a thorough discussion of this condition and the management approach to it (including a comprehensive discussion of the known risks, side effects and potential benefits of treatment), the patient (family) agrees to implement the following specific plan:           SCALP  Start OTC anti-dandruff shampoo (Head & Shoulders, Selsun Blue, Neutrogena T-gel or T-Sal) 2x/week to damp scalp, let sit 10 minutes then rinse (may use normal shampoo/conditioner thereafter as desired) and can alternate with ketoconazole shampoo 2-3 times per week, lather for 5 minutes prior to rinsing off  For facial involvement, ketoconazole cream can be used daily for maintenance therapy, and should be used a few times a week for suppression when skin is under good control  Apply the Ketoconazole steroid solution 1-2 times daily to dry scalp and leave it on  Use as needed for redness, scale and itching  After 2 weeks of use, stop and reassess, can restart if needed  FACE  For flares only, apply the steroid cream (Hydrocortisone 2 5% cream) twice a day for up to 5-7 days at a time as needed  Discussed AE of skin thinning with chronic use  Use this only when you have flares of your rash  The chronic nature of this condition and association with normal skin yeast were reviewed  The goal of therapy is to control symptoms, but this is not typically something we cure and intermittent flares can be expected  If severity merited by exam findings, topical steroids can be used for no longer than 1 week on face and 2 weeks on scalp (due to risks of atrophy, acne, etc) for significant flares for acute control, but azole therapy as above is the long term maintenance therapy of choice        FOLLICULITIS VS ACNE      Assessment and Plan:  Based on a thorough discussion of this condition and the management approach to it (including a comprehensive discussion of the known risks, side effects and potential benefits of treatment), the patient (family) agrees to implement the following specific plan: Alternate washing with Hbiclens and Panoxyl for 3 months  Do not apply to face or groin  Take Doxycycling 100 mg orally twice daily with food  Remain upright for one hour after taking  Follow up in 3 months      What is folliculitis? Folliculitis is the name given to a group of skin conditions in which there are inflamed hair follicles  The result is a tender red spot, often with a surface pustule  Folliculitis may be superficial or deep  It can affect anywhere there are hairs, including chest, back, buttocks, arms and legs  Acne and its variants are also types of folliculitis  What causes folliculitis? Folliculitis can be due to infection, occlusion (blockage), irritation and various skin diseases  Folliculitis due to infection  To determine if folliculitis is due to an infection, swabs should be taken from the pustules for cytology and culture in the laboratory  Bacteria  Bacterial folliculitis is commonly due to Staphylococcus aureus  If the infection involves the deep part of the follicle, it results in a painful boil  Recommended treatment includes careful hygiene, antiseptic cleanser or cream, antibiotic ointment, and/or oral antibiotics  Spa pool folliculitis is due to infection with Pseudomonas aeruginosa, which thrives in inadequately chlorinated warm water  Gram negative folliculitis is a pustular facial eruption also due to infection with Pseudomonas aeruginosa or other similar organisms  When it appears, it usually follows tetracycline treatment of acne, but is quite rare  Yeasts  The most common yeast to cause a folliculitis is Pityrosporum ovale, also known as Malassezia  Malassezia folliculitis (Pityrosporum folliculitis) is an itchy acne-like condition usually affecting the upper trunk of a young adult   Treatment includes avoiding moisturisers, stopping any antibiotics and topical antifungal or oral antifungal medication for several weeks  Candida albicans can also provoke a folliculitis in skin folds (intertrigo) or in the beard area  It is treated with topical or oral antifungal agents  Fungi  Ringworm of the scalp (tinea capitis) usually results in scaling and hair loss, but sometimes results in folliculitis  In [place], cat ringworm (Microsporum canis) is the commonest organism causing scalp fungal infection  Other fungi such as Trichophyton tonsurans are increasingly reported  Treatment is with oral antifungal agents for several months  Viral infections  Folliculitis may caused by herpes simplex virus  This tends to be tender, and resolves without treatment in around 10 days  Severe recurrent attacks may be treated with acyclovir and other antiviral agents  Herpes zoster (the cause of shingles) may also present as folliculitis with painful pustules and crusted spots within a dermatome (an area of skin supplied by a single nerve)  It is treated with hihg-dose acyclovir  Molluscum contagiosum, common in young children, may also cause follicular umbilicated papules, usually clustered in and around a body fold  Molluscum may provoke dermatitis  Parasitic infection  Folliculitis on the face or scalp of older or immunosuppressed adults may be due to colonisation by hair follicle mites (demodex)  This is known as demodicosis  The human infestation, scabies, often provokes folliculitis, as well as non-follicular papules, vesicles and pustules  Folliculitis due to irritation from regrowing hairs  Folliculitis may arise as hairs regrow after shaving, waxing, electrolysis or plucking  Swabs taken from the pustules are sterile ie there is no growth of bacteria or other organisms  In the beard area irritant folliculitis is known as pseudofolliculitis barbae  Irritant folliculitis is also common on the lower legs of women (shaving rash)   It is frequently very itchy  Treatment is by stopping hair removal, and not beginning again for about three months after the folliculitis has settled  To prevent reoccurring irritant folliculitis, use a gentle hair removal method, such as a lady's electric razor  Avoid soap and apply plenty of shaving gel, if using a blade shaver  Folliculitis due to contact reactions  Occlusion  Paraffin-based ointments, moisturisers, and adhesive plasters may all result in a sterile folliculitis  If a moisturiser is needed, choose an oil-free product, as it is less likely to cause occlusion  Chemicals  Coal tar, cutting oils and other chemicals may cause an irritant folliculitis  Avoid contact with the causative product  Topical steroids  Overuse of topical steroids may produce a folliculitis  Perioral dermatitis is a facial folliculitis provoked by moisturisers and topical steroids  Perioral dermatitis is treated with tetracycline antibiotics for six weeks or so  Folliculitis due to immunosuppression  Eosinophilic folliculitis is a specific type of folliculitis that may arise in some immune suppressed individuals such as those infected by human immunodeficiency virus (HIV) or those who have cancer  Folliculitis due to drugs  Folliculitis may be due to drugs, particularly corticosteroids (steroid acne), androgens (male hormones), ACTH, lithium, isoniazid (INH), phenytoin and B-complex vitamins  Protein kinase inhibitors (epidermal growth factor receptor inhibitors) and targeted therapy for metastatic melanoma (vemurafenib, dabrafenib) nearly always result in folliculitis  Folliculitis due to inflammatory skin diseases  Certain uncommon inflammatory skin diseases may cause permanent hair loss and scarring because of deep seated sterile folliculitis   These include:  Lichen planus  Discoid lupus erythematosus  Folliculitis decalvans  Folliculitis keloidalis     Treatment depends on the underlying condition and its severity  A skin biopsy is often necessary to establish the diagnosis  Acne variants   Acne and acne-like or acneform disorders are also forms of folliculitis  These include:  Acne vulgaris  Nodulocystic acne  Rosacea  Scalp folliculitis  Chloracne    The follicular occlusion syndrome refers to:  Hidradenitis suppurativa (acne inversa)  Acne conglobata (a severe form of nodulocystic acne)  Dissecting cellulitis (perifolliculitis capitis abscedens et suffodiens)  Pilonidal sinus  Treatment of the acne variants may include topical therapy as well as long courses of tetracycline antibiotics, isotretinoin (vitamin-A derivative) and in women, antiandrogenic therapy  Buttock folliculitis  Folliculitis affecting the buttocks is quite common and is often nonspecific, ie no specific cause is found  Buttock folliculitis is equally common in males and females  Acute buttock folliculitis is usually bacterial in origin (like boils), resulting in red painful papules and pustules  It clears with antibiotics  Chronic buttock folliculitis does not often cause significant symptoms but it can be very persistent  Although antiseptics, topical acne treatments, peeling agents such as alphahydroxy acids, long courses of oral antibiotics and isotretinoin can help buttock folliculitis, they are not always effective  Hair removal might be worth trying if the affected area is hairy   As regrowth of hair can make it worse, permanent hair reduction by laser or intense pulsed light (IPL) is best

## 2023-02-06 NOTE — PROGRESS NOTES
[facility] Note     Patient Name: Willie Jc  Encounter Date: 02/06/2023     Have you been cared for by a [place] [id number] Dermatologist in the last 3 years and, if so, which description applies to you? NO  I am considered a "new" patient and must complete all patient intake questions  I am MALE/not capable of bearing children  REVIEW OF SYSTEMS:  Have you recently had or currently have any of the following? · Recent fever or chills? No  · Any non-healing wound? No   PAST MEDICAL HISTORY:  Have you personally ever had or currently have any of the following? If "YES," then please provide more detail  · Skin cancer (such as Melanoma, Basal Cell Carcinoma, Squamous Cell Carcinoma? No  · Tuberculosis, HIV/AIDS, Hepatitis B or C: No  · Systemic Immunosuppression such as Diabetes, Biologic or Immunotherapy, Chemotherapy, Organ Transplantation, Bone Marrow Transplantation No  · Radiation Treatment No   FAMILY HISTORY:  Any "first degree relatives" (parent, brother, sister, or child) with the following? • Skin Cancer, Pancreatic or Other Cancer? No   PATIENT EXPERIENCE:    • Do you want the Dermatologist to perform a COMPLETE skin exam today including a clinical examination under the "bra and underwear" areas? NO  • If necessary, do we have your permission to call and leave a detailed message on your Preferred Phone number that includes your specific medical information?   Yes      Allergies   Allergen Reactions   • Gabapentin      Annotation - [id number]: dizziness   • Pregabalin      Annotation - [id number]: vision changes and mood changes   • Tramadol Nausea Only      Current Outpatient Medications:   •  amLODIPine (NORVASC) 10 mg tablet, take 1 tablet by mouth once daily, Disp: 90 tablet, Rfl: 3  •  atorvastatin (LIPITOR) 20 mg tablet, Take 1 tablet (20 mg total) by mouth daily, Disp: 90 tablet, Rfl: 1  •  chlorthalidone 25 mg tablet, take 1 tablet by mouth once daily, Disp: 90 tablet, Rfl: 3  •  diclofenac sodium (VOLTAREN) 1 %, Apply 2 g topically 4 (four) times a day (Patient not taking: Reported on 10/24/2022), Disp: 1 Tube, Rfl: 3  •  Diclofenac Sodium (VOLTAREN) 1 %, Apply 2 g topically 4 (four) times a day, Disp: 150 g, Rfl: 0  •  doxazosin (CARDURA) 2 mg tablet, take 2 tablets by mouth daily at bedtime (Patient not taking: Reported on 10/24/2022), Disp: 180 tablet, Rfl: 3  •  ketoconazole (NIZORAL) 2 % cream, 1 application daily Apply to affected area  (Patient not taking: Reported on 10/24/2022), Disp: , Rfl: 0  •  losartan (COZAAR) 100 MG tablet, take 1 tablet by mouth once daily, Disp: 90 tablet, Rfl: 3  •  meloxicam (MOBIC) 7 5 mg tablet, Take 1 tablet (7 5 mg total) by mouth daily, Disp: 60 tablet, Rfl: 0  •  metoprolol tartrate (LOPRESSOR) 100 mg tablet, take 1 tablet by mouth twice a day, Disp: 180 tablet, Rfl: 3  •  morphine (MS CONTIN) 15 mg 12 hr tablet, take 1 tablet every 8 hours if needed pain, Disp: , Rfl: 0  •  oxyCODONE (ROXICODONE) 30 MG immediate release tablet, take 1 tablet by mouth every 5 hours if needed for pain   Isaac Heydi MAX 4/DAY, Disp: , Rfl: 0  •  spironolactone (ALDACTONE) 25 mg tablet, take 1 tablet by mouth once daily, Disp: 30 tablet, Rfl: 3  •  tadalafil (CIALIS) 5 MG tablet, Take 5 mg by mouth daily as needed for erectile dysfunction (unsure dosage) Indications: Erectile Dysfunction  , Disp: , Rfl:           • Whom besides the patient is providing clinical information about today's encounter?   o NO ADDITIONAL HISTORIAN (patient alone provided history)    Physical Exam and Assessment/Plan by Diagnosis:      SEBORRHEIC DERMATITIS     Physical Exam:  · Anatomic Location Affected &  Morphological Description:  Greasy adherent scale/scaling plaques on the scalp, NLFs and b/l eyebrows  · Pertinent Positives:  · Pertinent Negatives: Additional History of Present Condition:  Pt notes increased scaling on face and scalp for months  Has tried moisturizers      Assessment and Plan:  · Based on a thorough discussion of this condition and the management approach to it (including a comprehensive discussion of the known risks, side effects and potential benefits of treatment), the patient (family) agrees to implement the following specific plan:           SCALP  ? Start OTC anti-dandruff shampoo (Head & Shoulders, Selsun Blue, Neutrogena T-gel or T-Sal) 2x/week to damp scalp, let sit 10 minutes then rinse (may use normal shampoo/conditioner thereafter as desired) and can alternate with ketoconazole shampoo 2-3 times per week, lather for 5 minutes prior to rinsing off  FACE  ? For flares only, apply the steroid cream (Hydrocortisone 2 5% cream) twice a day for up to 5-7 days at a time as needed  Discussed AE of skin thinning with chronic use  Use this only when you have flares of your rash  The chronic nature of this condition and association with normal skin yeast were reviewed  The goal of therapy is to control symptoms, but this is not typically something we cure and intermittent flares can be expected  If severity merited by exam findings, topical steroids can be used for no longer than 1 week on face and 2 weeks on scalp (due to risks of atrophy, acne, etc) for significant flares for acute control, but azole therapy as above is the long term maintenance therapy of choice  FOLLICULITIS/ACNE WITH DISSECTING CELLULITIS COMPONENT    Physical Exam:  • Anatomic Location Affected:  Back of head, legs, under amrs  • Morphological Description:  Numerous erythematous, papules, pustules, nodules, scars, comedones, and cysts  • Pertinent Positives:  • Pertinent Negatives: Additional History of Present Condition:  Present for months  Patient reports flares come and go  Reports itchiness at times   He thinks he used Accutane in the past     Assessment and Plan:  Based on a thorough discussion of this condition and the management approach to it (including a comprehensive discussion of the known risks, side effects and potential benefits of treatment), the patient (family) agrees to implement the following specific plan:  ? Alternate washing with Hbiclens (NECK DOWN ONLY) and Panoxyl for 3 months  Do not apply to face or groin  ? Take Doxycycline 100 mg orally twice daily with food  Remain upright for one hour after taking    The risks of doxycycline were discussed at length including nausea/vomiting/diarrhea, abdominal pain, photosensitivity, esophagitis, and bacterial resistance  The patient was instructed to take the medication with a full meal and a glass of water, and not to lie down for 1 hour after taking  ? Follow up in 3 months; consider escalation to isotretinoin prn      What is folliculitis? Folliculitis is the name given to a group of skin conditions in which there are inflamed hair follicles  The result is a tender red spot, often with a surface pustule  Folliculitis may be superficial or deep  It can affect anywhere there are hairs, including chest, back, buttocks, arms and legs  Acne and its variants are also types of folliculitis  What causes folliculitis? Folliculitis can be due to infection, occlusion (blockage), irritation and various skin diseases  Folliculitis due to infection  To determine if folliculitis is due to an infection, swabs should be taken from the pustules for cytology and culture in the laboratory  Bacteria  Bacterial folliculitis is commonly due to Staphylococcus aureus  If the infection involves the deep part of the follicle, it results in a painful boil  Recommended treatment includes careful hygiene, antiseptic cleanser or cream, antibiotic ointment, and/or oral antibiotics  Spa pool folliculitis is due to infection with Pseudomonas aeruginosa, which thrives in inadequately chlorinated warm water   Gram negative folliculitis is a pustular facial eruption also due to infection with Pseudomonas aeruginosa or other similar organisms  When it appears, it usually follows tetracycline treatment of acne, but is quite rare  Yeasts  The most common yeast to cause a folliculitis is Pityrosporum ovale, also known as Malassezia  Malassezia folliculitis (Pityrosporum folliculitis) is an itchy acne-like condition usually affecting the upper trunk of a young adult  Treatment includes avoiding moisturisers, stopping any antibiotics and topical antifungal or oral antifungal medication for several weeks  Candida albicans can also provoke a folliculitis in skin folds (intertrigo) or in the beard area  It is treated with topical or oral antifungal agents  Fungi  Ringworm of the scalp (tinea capitis) usually results in scaling and hair loss, but sometimes results in folliculitis  In [place], cat ringworm (Microsporum canis) is the commonest organism causing scalp fungal infection  Other fungi such as Trichophyton tonsurans are increasingly reported  Treatment is with oral antifungal agents for several months  Viral infections  Folliculitis may caused by herpes simplex virus  This tends to be tender, and resolves without treatment in around 10 days  Severe recurrent attacks may be treated with acyclovir and other antiviral agents  Herpes zoster (the cause of shingles) may also present as folliculitis with painful pustules and crusted spots within a dermatome (an area of skin supplied by a single nerve)  It is treated with hihg-dose acyclovir  Molluscum contagiosum, common in young children, may also cause follicular umbilicated papules, usually clustered in and around a body fold  Molluscum may provoke dermatitis  Parasitic infection  Folliculitis on the face or scalp of older or immunosuppressed adults may be due to colonisation by hair follicle mites (demodex)  This is known as demodicosis  The human infestation, scabies, often provokes folliculitis, as well as non-follicular papules, vesicles and pustules      Folliculitis due to irritation from regrowing hairs  Folliculitis may arise as hairs regrow after shaving, waxing, electrolysis or plucking  Swabs taken from the pustules are sterile ie there is no growth of bacteria or other organisms  In the beard area irritant folliculitis is known as pseudofolliculitis barbae  Irritant folliculitis is also common on the lower legs of women (shaving rash)  It is frequently very itchy  Treatment is by stopping hair removal, and not beginning again for about three months after the folliculitis has settled  To prevent reoccurring irritant folliculitis, use a gentle hair removal method, such as a lady's electric razor  Avoid soap and apply plenty of shaving gel, if using a blade shaver  Folliculitis due to contact reactions  Occlusion  Paraffin-based ointments, moisturisers, and adhesive plasters may all result in a sterile folliculitis  If a moisturiser is needed, choose an oil-free product, as it is less likely to cause occlusion  Chemicals  Coal tar, cutting oils and other chemicals may cause an irritant folliculitis  Avoid contact with the causative product  Topical steroids  Overuse of topical steroids may produce a folliculitis  Perioral dermatitis is a facial folliculitis provoked by moisturisers and topical steroids  Perioral dermatitis is treated with tetracycline antibiotics for six weeks or so  Folliculitis due to immunosuppression  Eosinophilic folliculitis is a specific type of folliculitis that may arise in some immune suppressed individuals such as those infected by human immunodeficiency virus (HIV) or those who have cancer  Folliculitis due to drugs  Folliculitis may be due to drugs, particularly corticosteroids (steroid acne), androgens (male hormones), ACTH, lithium, isoniazid (INH), phenytoin and B-complex vitamins   Protein kinase inhibitors (epidermal growth factor receptor inhibitors) and targeted therapy for metastatic melanoma (vemurafenib, dabrafenib) nearly always result in folliculitis  Folliculitis due to inflammatory skin diseases  Certain uncommon inflammatory skin diseases may cause permanent hair loss and scarring because of deep seated sterile folliculitis  These include:  • Lichen planus  • Discoid lupus erythematosus  • Folliculitis decalvans  • Folliculitis keloidalis     Treatment depends on the underlying condition and its severity  A skin biopsy is often necessary to establish the diagnosis  Acne variants   Acne and acne-like or acneform disorders are also forms of folliculitis  These include:  • Acne vulgaris  • Nodulocystic acne  • Rosacea  • Scalp folliculitis  • Chloracne    The follicular occlusion syndrome refers to:  • Hidradenitis suppurativa (acne inversa)  • Acne conglobata (a severe form of nodulocystic acne)  • Dissecting cellulitis (perifolliculitis capitis abscedens et suffodiens)  • Pilonidal sinus  Treatment of the acne variants may include topical therapy as well as long courses of tetracycline antibiotics, isotretinoin (vitamin-A derivative) and in women, antiandrogenic therapy  Buttock folliculitis  Folliculitis affecting the buttocks is quite common and is often nonspecific, ie no specific cause is found  Buttock folliculitis is equally common in males and females  • Acute buttock folliculitis is usually bacterial in origin (like boils), resulting in red painful papules and pustules  It clears with antibiotics  • Chronic buttock folliculitis does not often cause significant symptoms but it can be very persistent  Although antiseptics, topical acne treatments, peeling agents such as alphahydroxy acids, long courses of oral antibiotics and isotretinoin can help buttock folliculitis, they are not always effective  Hair removal might be worth trying if the affected area is hairy   As regrowth of hair can make it worse, permanent hair reduction by laser or intense pulsed light (IPL) is best         Scribe Attestation    I,: Jorge Rajan am acting as a scribe while in the presence of the attending physician :       I,:  Rashawn Concepcion MD personally performed the services described in this documentation    as scribed in my presence :

## 2023-02-20 ENCOUNTER — OFFICE VISIT (OUTPATIENT)
Dept: NEPHROLOGY | Facility: CLINIC | Age: 63
End: 2023-02-20

## 2023-02-20 VITALS
OXYGEN SATURATION: 95 % | SYSTOLIC BLOOD PRESSURE: 120 MMHG | DIASTOLIC BLOOD PRESSURE: 78 MMHG | HEIGHT: 70 IN | WEIGHT: 315 LBS | BODY MASS INDEX: 45.1 KG/M2 | HEART RATE: 71 BPM

## 2023-02-20 DIAGNOSIS — E66.01 MORBID OBESITY (HCC): ICD-10-CM

## 2023-02-20 DIAGNOSIS — I10 BENIGN ESSENTIAL HTN: ICD-10-CM

## 2023-02-20 DIAGNOSIS — N40.1 BENIGN PROSTATIC HYPERPLASIA WITH LOWER URINARY TRACT SYMPTOMS, SYMPTOM DETAILS UNSPECIFIED: ICD-10-CM

## 2023-02-20 DIAGNOSIS — I10 ESSENTIAL HYPERTENSION: ICD-10-CM

## 2023-02-20 DIAGNOSIS — N28.1 RENAL CYST: ICD-10-CM

## 2023-02-20 DIAGNOSIS — N18.32 TYPE 2 DIABETES MELLITUS WITH STAGE 3B CHRONIC KIDNEY DISEASE, WITHOUT LONG-TERM CURRENT USE OF INSULIN (HCC): ICD-10-CM

## 2023-02-20 DIAGNOSIS — R35.1 NOCTURIA: Primary | ICD-10-CM

## 2023-02-20 DIAGNOSIS — E11.22 TYPE 2 DIABETES MELLITUS WITH STAGE 3B CHRONIC KIDNEY DISEASE, WITHOUT LONG-TERM CURRENT USE OF INSULIN (HCC): ICD-10-CM

## 2023-02-20 RX ORDER — TAMSULOSIN HYDROCHLORIDE 0.4 MG/1
0.4 CAPSULE ORAL
Qty: 90 CAPSULE | Refills: 3 | Status: SHIPPED | OUTPATIENT
Start: 2023-02-20

## 2023-02-20 RX ORDER — METOPROLOL TARTRATE 100 MG/1
100 TABLET ORAL 2 TIMES DAILY
Qty: 180 TABLET | Refills: 3 | Status: SHIPPED | OUTPATIENT
Start: 2023-02-20

## 2023-02-20 RX ORDER — AMLODIPINE BESYLATE 10 MG/1
10 TABLET ORAL DAILY
Qty: 90 TABLET | Refills: 3 | Status: SHIPPED | OUTPATIENT
Start: 2023-02-20

## 2023-02-20 RX ORDER — LOSARTAN POTASSIUM 100 MG/1
100 TABLET ORAL DAILY
Qty: 90 TABLET | Refills: 3 | Status: SHIPPED | OUTPATIENT
Start: 2023-02-20

## 2023-02-20 RX ORDER — CHLORTHALIDONE 25 MG/1
25 TABLET ORAL DAILY
Qty: 90 TABLET | Refills: 3 | Status: SHIPPED | OUTPATIENT
Start: 2023-02-20

## 2023-02-20 RX ORDER — SPIRONOLACTONE 25 MG/1
25 TABLET ORAL DAILY
Qty: 90 TABLET | Refills: 3 | Status: SHIPPED | OUTPATIENT
Start: 2023-02-20

## 2023-02-20 NOTE — LETTER
February 20, 2023     MARJORIE Lopez  [address]  [place] [address]    Patient: Ilsa Halsted  YOB: 1960   Date of Visit: 2/20/2023       Dear Dr Kiesha Luke: Thank you for referring Marielena Mcfadden to me for evaluation  Below are my notes for this consultation  If you have questions, please do not hesitate to call me  I look forward to following your patient along with you  Sincerely,        Ambrosio Myrick MD        CC: No Recipients  Ambrosio Myrick MD  2/20/2023  4:08 PM  Sign when Signing Visit  NEPHROLOGY OFFICE VISIT   Ilsa Halsted  60 y o  male MRN: [id number]  2/20/2023    Reason for Visit: CKD and HTN    ASSESSMENT and PLAN:    I had the pleasure of seeing Mr Shad Ham today in the renal clinic for the continued management of CKD  58 yo male retired , formerly from [employer], [address] (15 years), DM (years), back surgery 10 years ago Who presents for follow up evaluation of hypertension   patient has had sporadic follow-up but has been slightly more consistent with follow-up since end of last year  Pau Jones last saw the patient in August 2018 and then patient followed up with our advanced practitioner  Ruben Mueller returns today for follow-up visit      Patient has stopped using NSAIDs since 2019      1) HTN -      -prior renin and aldosterone level with a renin level suppressed, aldosterone level is not significantly elevated  - prior metanephrine unrevealing  - prior CT scan with unremarkable adrenals  - renal Doppler study in November 2019 with no significant occlusive disease   - patient is on spironolactone, chlorthalidone, amlodipine, losartan, metoprolol,    - during visit in march clonidine was tapered off, chlorthalidone was increased chlorthalidone to 25 mg daily  -  Creatinine stable in May 2021 at 1 3 mg/dL   Blood pressures are appropriate   Patient is still gaining weight   -February 2022 appointment-no lab work since October   Creatinine in October was rising and patient has not gone for follow-up lab work  -September 2022-hold doxazosin due to hypotension  - February 2023-no changes to antihypertensives  Add tamsulosin  Renal ultrasound with slightly increased cyst size but otherwise benign appearing      Overall, the patient's has bland urine  Minimal proteinuria 0 1  Creatinine remained stable 1 7  This may be new baseline  Not have signs of nephrotic or nephritic issues currently  We will continue ARB and spironolactone  Did have prediabetes disease and awaiting repeat A1c with PCP  For now we will continue ARB and spironolactone  We will add tamsulosin  Patient states the nocturia developed/worsened after holding doxazosin therefore there may be a component of BPH element  I will refer the patient to urologist for evaluation and start tamsulosin today      Plan:     - repeat renal ultrasound   end of 2022 to follow cyst-ordered  -Lab work in 4 months  - Start tamsulosin  - Patient advised to avoid use within 4 hours of tadalafil with tamsulosin  - monitor blood pressures and if blood pressures less than 110 we will need to lower amlodipine  - May need to consider Mireille  if A1c is rising-defer to primary care team   From renal standpoint given nonproteinuric disease, there is not an urgency to start Mireille  and will await repeat A1c testing by PCP  I reviewed this with the patient today       2) poor dentition     -patient smokes cigars daily  -patient needs to see dentist and has been advised of this prior     3) proteinuria -  Stable U PCR 0 1     - recheck at next visit  - if elevated, will check SPEP, UPEP, FLC     4) CKD III with baseline creatinine 1 4 to 1 7 mg/dL     -renal ultrasound in September 2018 was septated cyst bilaterally with mildly increased on the right kidney   And a calcified cystic foci in the left kidney  -patient needs repeat ultrasound   Ordered prior  Will assist pt in scheduling  - creat 2 2 in august  Held losartan for 2 days and restarted  Inc fluid intake  - 8/17 - chlorthalidone reduced to 12 5 mg daily as creat improving but not back to baseline  -  05/2021-creatinine improving to baseline 1 3 mg/dL  -September 2022-creatinine 1 6 mg/dL  Holding doxazosin  - February 2023-creatinine remains relatively stable 1 7 mg/dL     5) grief counseling-     -patient's mood is improving     6) Vit D deficiency     - pt was started on 50,000 units vit D weekly on 1/10/2020  - Vit D improving 25 5 on 8/2020 -->   Continuevit D supplement  -check PTH next appointment     7) osteoarthritis of knees - sees Ortho team  PT and cortisone inj started 1/2020     -patient had recent injection this morning 09/2022  - Patient was prescribed meloxicam but did not take given kidney disease  I advised patient that this is appropriate to not take meloxicam      8) DM     - A1c worsening-defer to PCP for follow-up A1c testing     9)  Weight gain -patient advised to see bariatric program   Patient agreeable      -patient saw the bariatric team July 2022     10)  renal cysts-monitor with repeat ultrasound January 2024    [id number] in setting of BPH  Worsened after holding doxazosin  Refer to urology  Start tamsulosin      It was a pleasure evaluating Mr Whitney Coulter; thank you for allowing our team to participate in the care of your patient and please do not hesitate to contact our team if any questions arise in the interim            Morbid obesity ([address] )  Advised lower portion  Attempting exercise with knee pain is difficult    Type 2 diabetes mellitus with stage 3b chronic kidney disease, without long-term current use of insulin ([facility])    Lab Results   Component Value Date    HGBA1C 6 1 (H) 09/09/2022   Managed by PCP      HPI:    No fevers, chills, nausea, vomiting  Has nocturia 7-8 times a night      PATIENT INSTRUCTIONS:    Patient Instructions   1) Avoid NSAIDS - (Example - motrin, advil, ibuprofen, aleve, exederin, etc)  2) Always follow a low salt diet  3) please avoid mexolicam  Please talk to your orthopedic physician to see if other meds are an option   4) start tamsulosin 0 4 mg cap one cap nightly; keep 4 hours apart from cialis  5) please see the urologist  6) if you do not feel well with tamsulosin please call and hold it  Any side effects such as skin rash, swelling of mouth, tongue, please stop tamsulosin and go to ER  7) please check your blood pressures once a day after starting tamsulosin and if your blood pressure is less than 110 (top number) please call as we will need to lower amlodipine  8) labwork in 4 months  9) appointment in 4-5 months        OBJECTIVE:  Current Weight: Weight - Scale: (!) 156 kg (344 lb)  Vitals:    02/20/23 1525   BP: 120/78   BP Location: Left arm   Patient Position: Sitting   Cuff Size: Adult   Pulse: 71   SpO2: 95%   Weight: (!) 156 kg (344 lb)   Height: 5' 10" (1 778 m)    Body mass index is 49 36 kg/m²  REVIEW OF SYSTEMS:    Review of Systems   Constitutional: Negative  Negative for appetite change and fatigue  HENT: Negative  Eyes: Negative  Respiratory: Negative  Negative for shortness of breath  Cardiovascular: Negative  Negative for leg swelling  Gastrointestinal: Negative  Endocrine: Negative  Genitourinary: Negative  Negative for difficulty urinating  Frequent urination overnight   Musculoskeletal: Negative  Allergic/Immunologic: Negative  Neurological: Negative  Hematological: Negative  Psychiatric/Behavioral: Negative  All other systems reviewed and are negative  PHYSICAL EXAM:      Physical Exam  Vitals and nursing note reviewed  Constitutional:       General: He is not in acute distress  Appearance: He is well-developed  He is not diaphoretic  HENT:      Head: Normocephalic and atraumatic  Eyes:      General: No scleral icterus  Right eye: No discharge  Left eye: No discharge  Conjunctiva/sclera: Conjunctivae normal    Neck:      Vascular: No JVD  Cardiovascular:      Rate and Rhythm: Normal rate and regular rhythm  Heart sounds: Normal heart sounds  No murmur heard  No friction rub  No gallop  Pulmonary:      Effort: Pulmonary effort is normal  No respiratory distress  Breath sounds: Normal breath sounds  No wheezing or rales  Chest:      Chest wall: No tenderness  Abdominal:      General: Bowel sounds are normal  There is no distension  Palpations: Abdomen is soft  Tenderness: There is no abdominal tenderness  There is no rebound  Musculoskeletal:         General: No tenderness or deformity  Normal range of motion  Cervical back: Normal range of motion and neck supple  Skin:     General: Skin is warm and dry  Coloration: Skin is not pale  Findings: No erythema or rash  Neurological:      Mental Status: He is alert and oriented to person, place, and time  Cranial Nerves: No cranial nerve deficit  Coordination: Coordination normal       Deep Tendon Reflexes: Reflexes are normal and symmetric  Psychiatric:         Behavior: Behavior normal          Thought Content: Thought content normal          Judgment: Judgment normal          Medications:    Current Outpatient Medications:   •  amLODIPine (NORVASC) 10 mg tablet, Take 1 tablet (10 mg total) by mouth daily, Disp: 90 tablet, Rfl: 3  •  atorvastatin (LIPITOR) 20 mg tablet, Take 1 tablet (20 mg total) by mouth daily, Disp: 90 tablet, Rfl: 1  •  chlorthalidone 25 mg tablet, Take 1 tablet (25 mg total) by mouth daily, Disp: 90 tablet, Rfl: 3  •  Diclofenac Sodium (VOLTAREN) 1 %, Apply 2 g topically 4 (four) times a day, Disp: 150 g, Rfl: 0  •  doxycycline hyclate (VIBRAMYCIN) 100 mg capsule, Take twice daily with food   Stay upright for one hour after taking and be cautious in sun due to burn risk, Disp: 180 capsule, Rfl: 0  •  hydrocortisone 2 5 % cream, Apply topically to face BID for no longer than 7 days for flares, Disp: 30 g, Rfl: 0  •  ketoconazole (NIZORAL) 2 % shampoo, Apply topically to scalp, eyebrows, and face  Leave on for 5 minutes and then rinse, Disp: 120 mL, Rfl: 6  •  losartan (COZAAR) 100 MG tablet, Take 1 tablet (100 mg total) by mouth daily, Disp: 90 tablet, Rfl: 3  •  metoprolol tartrate (LOPRESSOR) 100 mg tablet, Take 1 tablet (100 mg total) by mouth 2 (two) times a day, Disp: 180 tablet, Rfl: 3  •  morphine (MS CONTIN) 15 mg 12 hr tablet, take 1 tablet every 8 hours if needed pain, Disp: , Rfl: 0  •  oxyCODONE (ROXICODONE) 30 MG immediate release tablet, take 1 tablet by mouth every 5 hours if needed for pain   Mi Frank MAX 4/DAY, Disp: , Rfl: 0  •  spironolactone (ALDACTONE) 25 mg tablet, Take 1 tablet (25 mg total) by mouth daily, Disp: 90 tablet, Rfl: 3  •  tadalafil (CIALIS) 5 MG tablet, Take 5 mg by mouth daily as needed for erectile dysfunction (unsure dosage) Indications: Erectile Dysfunction  , Disp: , Rfl:   •  tamsulosin (FLOMAX) 0 4 mg, Take 1 capsule (0 4 mg total) by mouth daily with dinner, Disp: 90 capsule, Rfl: 3  •  diclofenac sodium (VOLTAREN) 1 %, Apply 2 g topically 4 (four) times a day (Patient not taking: Reported on 10/24/2022), Disp: 1 Tube, Rfl: 3  •  ketoconazole (NIZORAL) 2 % cream, 1 application daily Apply to affected area  (Patient not taking: Reported on 10/24/2022), Disp: , Rfl: 0    Laboratory Results:        Invalid input(s): ALBUMIN    Results for orders placed or performed in visit on 82/13/62   Basic metabolic panel   Result Value Ref Range    Sodium 136 135 - 147 mmol/L    Potassium 4 6 3 5 - 5 3 mmol/L    Chloride 105 96 - 108 mmol/L    CO2 24 21 - 32 mmol/L    ANION GAP 7 4 - 13 mmol/L    BUN 34 (H) 5 - 25 mg/dL    Creatinine 1 76 (H) 0 60 - 1 30 mg/dL    Glucose, Fasting 129 (H) 65 - 99 mg/dL    Calcium 9 8 8 3 - 10 1 mg/dL    eGFR 40 ml/min/1 73sq m   CBC   Result Value Ref Range    WBC 10 49 (H) 4 31 - 10 16 Thousand/uL    RBC 4 99 3 88 - 5 62 Million/uL    Hemoglobin 14 2 12 0 - 17 0 g/dL    Hematocrit 44 4 36 5 - 49 3 %    MCV 89 82 - 98 fL    MCH 28 5 26 8 - 34 3 pg    MCHC 32 0 31 4 - 37 4 g/dL    RDW 13 2 11 6 - 15 1 %    Platelets 569 199 - 273 Thousands/uL    MPV 10 2 8 9 - 12 7 fL   Magnesium   Result Value Ref Range    Magnesium 1 8 1 6 - 2 6 mg/dL   Phosphorus   Result Value Ref Range    Phosphorus 3 3 2 3 - 4 1 mg/dL   Protein / creatinine ratio, urine   Result Value Ref Range    Creatinine, Ur 58 3 mg/dL    Protein Urine Random 6 mg/dL    Prot/Creat Ratio, Ur 0 10 0 00 - 0 10   Urinalysis with microscopic   Result Value Ref Range    Color, UA Colorless     Clarity, UA Clear     Specific [place], UA 1 012 1 003 - 1 030    pH, UA 6 0 4 5, 5 0, 5 5, 6 0, 6 5, 7 0, 7 5, 8 0    Leukocytes, UA Negative Negative    Nitrite, UA Negative Negative    Protein, UA Negative Negative mg/dl    Glucose, UA Negative Negative mg/dl    Ketones, UA Negative Negative mg/dl    Urobilinogen, UA <2 0 <2 0 mg/dl mg/dl    Bilirubin, UA Negative Negative    Occult Blood, UA Negative Negative    RBC, UA None Seen None Seen, 1-2 /hpf    WBC, UA 1-2 None Seen, 1-2 /hpf    Epithelial Cells Occasional None Seen, Occasional /hpf    Bacteria, UA None Seen None Seen, Occasional /hpf

## 2023-02-20 NOTE — PROGRESS NOTES
NEPHROLOGY OFFICE VISIT   Ilsa Halsted  60 y o  male MRN: [id number]  2/20/2023    Reason for Visit: CKD and HTN    ASSESSMENT and PLAN:    I had the pleasure of seeing Mr Shad Ham today in the renal clinic for the continued management of CKD  58 yo male retired , formerly from [facility] [facility] [employer], [address] (15 years), DM (years), back surgery 10 years ago Who presents for follow up evaluation of hypertension   patient has had sporadic follow-up but has been slightly more consistent with follow-up since end of last year  Pau Jones last saw the patient in August 2018 and then patient followed up with our advanced practitioner  Ruben Mueller returns today for follow-up visit      Patient has stopped using NSAIDs since 2019      1) HTN -      -prior renin and aldosterone level with a renin level suppressed, aldosterone level is not significantly elevated  - prior metanephrine unrevealing  - prior CT scan with unremarkable adrenals  - renal Doppler study in November 2019 with no significant occlusive disease   - patient is on spironolactone, chlorthalidone, amlodipine, losartan, metoprolol,    - during visit in march clonidine was tapered off, chlorthalidone was increased chlorthalidone to 25 mg daily  -  Creatinine stable in May 2021 at 1 3 mg/dL   Blood pressures are appropriate   Patient is still gaining weight   -February 2022 appointment-no lab work since October   Creatinine in October was rising and patient has not gone for follow-up lab work  -September 2022-hold doxazosin due to hypotension  - February 2023-no changes to antihypertensives  Add tamsulosin  Renal ultrasound with slightly increased cyst size but otherwise benign appearing      Overall, the patient's has bland urine  Minimal proteinuria 0 1  Creatinine remained stable 1 7  This may be new baseline  Not have signs of nephrotic or nephritic issues currently  We will continue ARB and spironolactone    Did have prediabetes disease and awaiting repeat A1c with PCP  For now we will continue ARB and spironolactone  We will add tamsulosin  Patient states the nocturia developed/worsened after holding doxazosin therefore there may be a component of BPH element  I will refer the patient to urologist for evaluation and start tamsulosin today      Plan:     - repeat renal ultrasound   end of 2022 to follow cyst-ordered  -Lab work in 4 months  - Start tamsulosin  - Patient advised to avoid use within 4 hours of tadalafil with tamsulosin  - monitor blood pressures and if blood pressures less than 110 we will need to lower amlodipine  - May need to consider Janece Petra if A1c is rising-defer to primary care team   From renal standpoint given nonproteinuric disease, there is not an urgency to start Janece Petra and will await repeat A1c testing by PCP  I reviewed this with the patient today     2) poor dentition     -patient smokes cigars daily  -patient needs to see dentist and has been advised of this prior     3) proteinuria -  Stable U PCR 0 1     - recheck at next visit  - if elevated, will check SPEP, UPEP, FLC     4) CKD III with baseline creatinine 1 4 to 1 7 mg/dL     -renal ultrasound in September 2018 was septated cyst bilaterally with mildly increased on the right kidney   And a calcified cystic foci in the left kidney  -patient needs repeat ultrasound   Ordered prior  Will assist pt in scheduling  - creat 2 2 in august  Held losartan for 2 days and restarted  Inc fluid intake  - 8/17 - chlorthalidone reduced to 12 5 mg daily as creat improving but not back to baseline  -  05/2021-creatinine improving to baseline 1 3 mg/dL  -September 2022-creatinine 1 6 mg/dL  Holding doxazosin    - February 2023-creatinine remains relatively stable 1 7 mg/dL     5) grief counseling-     -patient's mood is improving     6) Vit D deficiency     - pt was started on 50,000 units vit D weekly on 1/10/2020  - Vit D improving 25 5 on 8/2020 -->   Continuevit D supplement  -check PTH next appointment     7) osteoarthritis of knees - sees Ortho team  PT and cortisone inj started 1/2020     -patient had recent injection this morning 09/2022  - Patient was prescribed meloxicam but did not take given kidney disease  I advised patient that this is appropriate to not take meloxicam      8) DM     - A1c worsening-defer to PCP for follow-up A1c testing     9)  Weight gain -patient advised to see bariatric program   Patient agreeable      -patient saw the bariatric team July 2022     10)  renal cysts-monitor with repeat ultrasound January 2024    [id number] in setting of BPH  Worsened after holding doxazosin  Refer to urology  Start tamsulosin      It was a pleasure evaluating Mr Serina Joy; thank you for allowing our team to participate in the care of your patient and please do not hesitate to contact our team if any questions arise in the interim            Morbid obesity ([address] )  Advised lower portion  Attempting exercise with knee pain is difficult    Type 2 diabetes mellitus with stage 3b chronic kidney disease, without long-term current use of insulin (HCC)    Lab Results   Component Value Date    HGBA1C 6 1 (H) 09/09/2022   Managed by PCP      HPI:    No fevers, chills, nausea, vomiting  Has nocturia 7-8 times a night  PATIENT INSTRUCTIONS:    Patient Instructions   1) Avoid NSAIDS - (Example - motrin, advil, ibuprofen, aleve, exederin, etc)  2) Always follow a low salt diet  3) please avoid mexolicam  Please talk to your orthopedic physician to see if other meds are an option   4) start tamsulosin 0 4 mg cap one cap nightly; keep 4 hours apart from cialis  5) please see the urologist  6) if you do not feel well with tamsulosin please call and hold it   Any side effects such as skin rash, swelling of mouth, tongue, please stop tamsulosin and go to ER  7) please check your blood pressures once a day after starting tamsulosin and if your blood pressure is less than 110 (top number) please call as we will need to lower amlodipine  8) labwork in 4 months  9) appointment in 4-5 months        OBJECTIVE:  Current Weight: Weight - Scale: (!) 156 kg (344 lb)  Vitals:    02/20/23 1525   BP: 120/78   BP Location: Left arm   Patient Position: Sitting   Cuff Size: Adult   Pulse: 71   SpO2: 95%   Weight: (!) 156 kg (344 lb)   Height: 5' 10" (1 778 m)    Body mass index is 49 36 kg/m²  REVIEW OF SYSTEMS:    Review of Systems   Constitutional: Negative  Negative for appetite change and fatigue  HENT: Negative  Eyes: Negative  Respiratory: Negative  Negative for shortness of breath  Cardiovascular: Negative  Negative for leg swelling  Gastrointestinal: Negative  Endocrine: Negative  Genitourinary: Negative  Negative for difficulty urinating  Frequent urination overnight   Musculoskeletal: Negative  Allergic/Immunologic: Negative  Neurological: Negative  Hematological: Negative  Psychiatric/Behavioral: Negative  All other systems reviewed and are negative  PHYSICAL EXAM:      Physical Exam  Vitals and nursing note reviewed  Constitutional:       General: He is not in acute distress  Appearance: He is well-developed  He is not diaphoretic  HENT:      Head: Normocephalic and atraumatic  Eyes:      General: No scleral icterus  Right eye: No discharge  Left eye: No discharge  Conjunctiva/sclera: Conjunctivae normal    Neck:      Vascular: No JVD  Cardiovascular:      Rate and Rhythm: Normal rate and regular rhythm  Heart sounds: Normal heart sounds  No murmur heard  No friction rub  No gallop  Pulmonary:      Effort: Pulmonary effort is normal  No respiratory distress  Breath sounds: Normal breath sounds  No wheezing or rales  Chest:      Chest wall: No tenderness  Abdominal:      General: Bowel sounds are normal  There is no distension  Palpations: Abdomen is soft  Tenderness: There is no abdominal tenderness  There is no rebound  Musculoskeletal:         General: No tenderness or deformity  Normal range of motion  Cervical back: Normal range of motion and neck supple  Skin:     General: Skin is warm and dry  Coloration: Skin is not pale  Findings: No erythema or rash  Neurological:      Mental Status: He is alert and oriented to person, place, and time  Cranial Nerves: No cranial nerve deficit  Coordination: Coordination normal       Deep Tendon Reflexes: Reflexes are normal and symmetric  Psychiatric:         Behavior: Behavior normal          Thought Content: Thought content normal          Judgment: Judgment normal          Medications:    Current Outpatient Medications:   •  amLODIPine (NORVASC) 10 mg tablet, Take 1 tablet (10 mg total) by mouth daily, Disp: 90 tablet, Rfl: 3  •  atorvastatin (LIPITOR) 20 mg tablet, Take 1 tablet (20 mg total) by mouth daily, Disp: 90 tablet, Rfl: 1  •  chlorthalidone 25 mg tablet, Take 1 tablet (25 mg total) by mouth daily, Disp: 90 tablet, Rfl: 3  •  Diclofenac Sodium (VOLTAREN) 1 %, Apply 2 g topically 4 (four) times a day, Disp: 150 g, Rfl: 0  •  doxycycline hyclate (VIBRAMYCIN) 100 mg capsule, Take twice daily with food  Stay upright for one hour after taking and be cautious in sun due to burn risk, Disp: 180 capsule, Rfl: 0  •  hydrocortisone 2 5 % cream, Apply topically to face BID for no longer than 7 days for flares, Disp: 30 g, Rfl: 0  •  ketoconazole (NIZORAL) 2 % shampoo, Apply topically to scalp, eyebrows, and face   Leave on for 5 minutes and then rinse, Disp: 120 mL, Rfl: 6  •  losartan (COZAAR) 100 MG tablet, Take 1 tablet (100 mg total) by mouth daily, Disp: 90 tablet, Rfl: 3  •  metoprolol tartrate (LOPRESSOR) 100 mg tablet, Take 1 tablet (100 mg total) by mouth 2 (two) times a day, Disp: 180 tablet, Rfl: 3  •  morphine (MS CONTIN) 15 mg 12 hr tablet, take 1 tablet every 8 hours if needed pain, Disp: , Rfl: 0  •  oxyCODONE (ROXICODONE) 30 MG immediate release tablet, take 1 tablet by mouth every 5 hours if needed for pain   Oscar Heckler MAX 4/DAY, Disp: , Rfl: 0  •  spironolactone (ALDACTONE) 25 mg tablet, Take 1 tablet (25 mg total) by mouth daily, Disp: 90 tablet, Rfl: 3  •  tadalafil (CIALIS) 5 MG tablet, Take 5 mg by mouth daily as needed for erectile dysfunction (unsure dosage) Indications: Erectile Dysfunction  , Disp: , Rfl:   •  tamsulosin (FLOMAX) 0 4 mg, Take 1 capsule (0 4 mg total) by mouth daily with dinner, Disp: 90 capsule, Rfl: 3  •  diclofenac sodium (VOLTAREN) 1 %, Apply 2 g topically 4 (four) times a day (Patient not taking: Reported on 10/24/2022), Disp: 1 Tube, Rfl: 3  •  ketoconazole (NIZORAL) 2 % cream, 1 application daily Apply to affected area  (Patient not taking: Reported on 10/24/2022), Disp: , Rfl: 0    Laboratory Results:        Invalid input(s): ALBUMIN    Results for orders placed or performed in visit on 61/57/96   Basic metabolic panel   Result Value Ref Range    Sodium 136 135 - 147 mmol/L    Potassium 4 6 3 5 - 5 3 mmol/L    Chloride 105 96 - 108 mmol/L    CO2 24 21 - 32 mmol/L    ANION GAP 7 4 - 13 mmol/L    BUN 34 (H) 5 - 25 mg/dL    Creatinine 1 76 (H) 0 60 - 1 30 mg/dL    Glucose, Fasting 129 (H) 65 - 99 mg/dL    Calcium 9 8 8 3 - 10 1 mg/dL    eGFR 40 ml/min/1 73sq m   CBC   Result Value Ref Range    WBC 10 49 (H) 4 31 - 10 16 Thousand/uL    RBC 4 99 3 88 - 5 62 Million/uL    Hemoglobin 14 2 12 0 - 17 0 g/dL    Hematocrit 44 4 36 5 - 49 3 %    MCV 89 82 - 98 fL    MCH 28 5 26 8 - 34 3 pg    MCHC 32 0 31 4 - 37 4 g/dL    RDW 13 2 11 6 - 15 1 %    Platelets 529 300 - 073 Thousands/uL    MPV 10 2 8 9 - 12 7 fL   Magnesium   Result Value Ref Range    Magnesium 1 8 1 6 - 2 6 mg/dL   Phosphorus   Result Value Ref Range    Phosphorus 3 3 2 3 - 4 1 mg/dL   Protein / creatinine ratio, urine   Result Value Ref Range    Creatinine, Ur 58 3 mg/dL    Protein Urine Random 6 mg/dL    Prot/Creat Ratio, Ur 0 10 0 00 - 0 10 Urinalysis with microscopic   Result Value Ref Range    Color, UA Colorless     Clarity, UA Clear     Specific [place], UA 1 012 1 003 - 1 030    pH, UA 6 0 4 5, 5 0, 5 5, 6 0, 6 5, 7 0, 7 5, 8 0    Leukocytes, UA Negative Negative    Nitrite, UA Negative Negative    Protein, UA Negative Negative mg/dl    Glucose, UA Negative Negative mg/dl    Ketones, UA Negative Negative mg/dl    Urobilinogen, UA <2 0 <2 0 mg/dl mg/dl    Bilirubin, UA Negative Negative    Occult Blood, UA Negative Negative    RBC, UA None Seen None Seen, 1-2 /hpf    WBC, UA 1-2 None Seen, 1-2 /hpf    Epithelial Cells Occasional None Seen, Occasional /hpf    Bacteria, UA None Seen None Seen, Occasional /hpf

## 2023-02-20 NOTE — PATIENT INSTRUCTIONS
1) Avoid NSAIDS - (Example - motrin, advil, ibuprofen, aleve, exederin, etc)  2) Always follow a low salt diet  3) please avoid mexolicam  Please talk to your orthopedic physician to see if other meds are an option   4) start tamsulosin 0 4 mg cap one cap nightly; keep 4 hours apart from cialis  5) please see the urologist  6) if you do not feel well with tamsulosin please call and hold it   Any side effects such as skin rash, swelling of mouth, tongue, please stop tamsulosin and go to ER  7) please check your blood pressures once a day after starting tamsulosin and if your blood pressure is less than 110 (top number) please call as we will need to lower amlodipine  8) labwork in 4 months  9) appointment in 4-5 months

## 2023-02-23 ENCOUNTER — OFFICE VISIT (OUTPATIENT)
Dept: PHYSICAL THERAPY | Facility: CLINIC | Age: 63
End: 2023-02-23

## 2023-02-23 DIAGNOSIS — M54.16 LUMBAR RADICULOPATHY: Primary | ICD-10-CM

## 2023-02-23 NOTE — PROGRESS NOTES
PT Evaluation     Today's date: 2023  Patient name: Arin Dominique  : 1960  MRN: [id number]  Referring provider: Inder Motley*  Dx:   Encounter Diagnosis     ICD-10-CM    1  Lumbar radiculopathy  M54 16                      Assessment  Assessment details: Pt is 58yo male presenting to therapy with chronic low back pain with radicular symptoms  Pt has severely decreased lumbar rom, LE flexibility, and core/LE strength bilaterally  These deficits are contributing to his pain and difficulty to withstand any prolonged positions like standing, walking, or sitting  Pt would benefit from PT services to improve pain and ability to move to reduce strain and create independence  Impairments: abnormal or restricted ROM, activity intolerance, impaired physical strength, lacks appropriate home exercise program and pain with function  Functional limitations: prolonged sitting, standing, walking  Symptom irritability: moderateBarriers to therapy: Chronic pain  Understanding of Dx/Px/POC: good   Prognosis: good    Goals  1  Pt will improve lumbar rom to moderate restrictions to allow increased ease with mobility  2  Pt will improve LE strength to 4/5 to be able to withstand walking for 5 minutes  3  Pt will be able to sit for 30 minutes without increased pain  4  Pt will be independent with HEP upon discharge  Plan  Patient would benefit from: skilled physical therapy  Planned modality interventions: low level laser therapy  Planned therapy interventions: functional ROM exercises, therapeutic activities, therapeutic exercise, therapeutic training, stretching, strengthening, home exercise program, neuromuscular re-education, manual therapy and patient education  Frequency: 2x week  Duration in weeks: 6  Treatment plan discussed with: patient        Subjective Evaluation    History of Present Illness  Mechanism of injury: Pt reports lumbar fusion in , the back hasn't been the same   Pt reports epidural injection has helped him in the past but this time insurance wont cover the injection until he tries PT  Pt also has kidney disease for pain meds are limited for back and knees  Pt reports the pain travels down his left leg into his foot, that pain is constant in his left leg  Pain increases with prolonged walking, laying, sitting  Quality of life: fair    Pain  Current pain ratin  At best pain rating: 3  At worst pain ratin  Location: Low back into Lt leg  Quality: dull ache, knife-like and burning  Aggravating factors: sitting, standing, walking and stair climbing  Progression: worsening    Treatments  Previous treatment: physical therapy, injection treatment, medication and chiropractic  Patient Goals  Patient goal: get injections        Objective     Active Range of Motion     Lumbar   Flexion:  Restriction level: maximal  Extension:  Restriction level: maximal  Left lateral flexion:  Restriction level: moderate  Right lateral flexion:  Restriction level: moderate  Left rotation:  Restriction level: moderate  Right rotation:  Restriction level: moderate    Additional Active Range of Motion Details  Pt reports pain with all rom  Mechanical Assessment    Cervical      Thoracic      Lumbar    Sitting flexion: repeated movements  Pain intensity: worse  Pain level: increased  Standing extension: repeated movements  Pain location: centralized  Pain intensity: better  Pain level: decreased    Strength/Myotome Testing     Left Hip   Planes of Motion   Flexion: 3+  Extension: 3+  Abduction: 3+    Right Hip   Planes of Motion   Flexion: 3+  Extension: 3+  Abduction: 3+    Left Knee   Flexion: 4  Extension: 4    Right Knee   Flexion: 4  Extension: 4    Tests     Lumbar     Left   Positive passive SLR  Right   Positive passive SLR       Left Pelvic Girdle/Sacrum   Positive: active SLR test      Right Pelvic Girdle/Sacrum   Positive: active SLR test      Additional Tests Details  Poor flexibility, increasing back pain  Poor core strength and hip strength that increases back pain      Flowsheet Rows    Flowsheet Row Most Recent Value   PT/OT G-Codes    Current Score 38   Projected Score 50             Precautions: chronic pain      Manuals 2/23                                                                Neuro Re-Ed             Standing Hip 3 ways             Posture t band             Standing back extension HEP            Seated back extension HEP                                                   Ther Ex             Bike             Hamstring stretch             Hip Flexor stretch             SKTC w/ towel             Supine marchs [employer]                          Ther Activity                                       Gait Training                                       Modalities

## 2023-02-23 NOTE — LETTER
2023    [address] [facility] [id number], [place] [address] Jia Singh    Patient: Douglas Nixon  YOB: 1960   Date of Visit: 2023     Encounter Diagnosis     ICD-10-CM    1  Lumbar radiculopathy  M54 16           Dear Dr Rafael Garcia:    Thank you for your recent referral of Douglas Nixon    Please review the attached evaluation summary from Nasir's recent visit  Please verify that you agree with the plan of care by signing the attached order  If you have any questions or concerns, please do not hesitate to call  I sincerely appreciate the opportunity to share in the care of one of your patients and hope to have another opportunity to work with you in the near future  Sincerely,    Gianni Nina, PT      Referring Provider:      I certify that I have read the below Plan of Care and certify the need for these services furnished under this plan of treatment while under my care  Raven Valera MD  [address] [place] [address] [id number], [id number] [facility] [address]  Via Fax: [phone]          PT Evaluation     Today's date: 2023  Patient name: Douglas Nixon  : 1960  MRN: [id number]  Referring provider: Suzan Mcdonough*  Dx:   Encounter Diagnosis     ICD-10-CM    1  Lumbar radiculopathy  M54 16                      Assessment  Assessment details: Pt is 60yo male presenting to therapy with chronic low back pain with radicular symptoms  Pt has severely decreased lumbar rom, LE flexibility, and core/LE strength bilaterally  These deficits are contributing to his pain and difficulty to withstand any prolonged positions like standing, walking, or sitting  Pt would benefit from PT services to improve pain and ability to move to reduce strain and create independence    Impairments: abnormal or restricted ROM, activity intolerance, impaired physical strength, lacks appropriate home exercise program and pain with function  Functional limitations: prolonged sitting, standing, walking  Symptom irritability: moderateBarriers to therapy: Chronic pain  Understanding of Dx/Px/POC: good   Prognosis: good    Goals  1  Pt will improve lumbar rom to moderate restrictions to allow increased ease with mobility  2  Pt will improve LE strength to 4/5 to be able to withstand walking for 5 minutes  3  Pt will be able to sit for 30 minutes without increased pain  4  Pt will be independent with HEP upon discharge  Plan  Patient would benefit from: skilled physical therapy  Planned modality interventions: low level laser therapy  Planned therapy interventions: functional ROM exercises, therapeutic activities, therapeutic exercise, therapeutic training, stretching, strengthening, home exercise program, neuromuscular re-education, manual therapy and patient education  Frequency: 2x week  Duration in weeks: 6  Treatment plan discussed with: patient        Subjective Evaluation    History of Present Illness  Mechanism of injury: Pt reports lumbar fusion in , the back hasn't been the same  Pt reports epidural injection has helped him in the past but this time insurance wont cover the injection until he tries PT  Pt also has kidney disease for pain meds are limited for back and knees  Pt reports the pain travels down his left leg into his foot, that pain is constant in his left leg  Pain increases with prolonged walking, laying, sitting     Quality of life: fair    Pain  Current pain ratin  At best pain rating: 3  At worst pain ratin  Location: Low back into Lt leg  Quality: dull ache, knife-like and burning  Aggravating factors: sitting, standing, walking and stair climbing  Progression: worsening    Treatments  Previous treatment: physical therapy, injection treatment, medication and chiropractic  Patient Goals  Patient goal: get injections        Objective     Active Range of Motion     Lumbar Flexion:  Restriction level: maximal  Extension:  Restriction level: maximal  Left lateral flexion:  Restriction level: moderate  Right lateral flexion:  Restriction level: moderate  Left rotation:  Restriction level: moderate  Right rotation:  Restriction level: moderate    Additional Active Range of Motion Details  Pt reports pain with all rom  Mechanical Assessment    Cervical      Thoracic      Lumbar    Sitting flexion: repeated movements  Pain intensity: worse  Pain level: increased  Standing extension: repeated movements  Pain location: centralized  Pain intensity: better  Pain level: decreased    Strength/Myotome Testing     Left Hip   Planes of Motion   Flexion: 3+  Extension: 3+  Abduction: 3+    Right Hip   Planes of Motion   Flexion: 3+  Extension: 3+  Abduction: 3+    Left Knee   Flexion: 4  Extension: 4    Right Knee   Flexion: 4  Extension: 4    Tests     Lumbar     Left   Positive passive SLR  Right   Positive passive SLR       Left Pelvic Girdle/Sacrum   Positive: active SLR test      Right Pelvic Girdle/Sacrum   Positive: active SLR test      Additional Tests Details  Poor flexibility, increasing back pain  Poor core strength and hip strength that increases back pain      Flowsheet Rows    Flowsheet Row Most Recent Value   PT/OT G-Codes    Current Score 38   Projected Score 50            Precautions: chronic pain      Manuals 2/23                                                                Neuro Re-Ed             Standing Hip 3 ways             Posture t band             Standing back extension HEP            Seated back extension HEP                                                   Ther Ex             Bike             Hamstring stretch             Hip Flexor stretch             SKTC w/ towel             Supine [facility]             [facility] [facility]                          Ther Activity                                       Gait Training Modalities

## 2023-02-28 ENCOUNTER — OFFICE VISIT (OUTPATIENT)
Dept: PHYSICAL THERAPY | Facility: CLINIC | Age: 63
End: 2023-02-28

## 2023-02-28 DIAGNOSIS — M54.16 LUMBAR RADICULOPATHY: Primary | ICD-10-CM

## 2023-02-28 NOTE — PROGRESS NOTES
Daily Note     Today's date: 2023  Patient name: Triny Myrick  : 1960  MRN: [id number]  Referring provider: Forrest Diaz*  Dx:   Encounter Diagnosis     ICD-10-CM    1  Lumbar radiculopathy  M54 16                      Subjective: Pt reports the back extension help  He reports minimal change in pain due to weather  Objective: See treatment diary below      Assessment: Pt was very fatigued with current program  Pt felt more hamstring and back tightness following so ended with some lumbar flexion stretching and hamstring stretching  Continue to progress as able  Plan: Continue per plan of care        Precautions: chronic pain      Manuals                                                                Neuro Re-Ed             Standing Hip 3 ways  10xea           Posture t band             Standing back extension HEP            Seated back extension HEP 20x                                                  Ther Ex             Bike             Hamstring stretch  3x30''           Hip Flexor stretch  3x30''           SKTC w/ towel  15x5''           Supine marchs  2x10ea           Clamshells  2x10ea           Ball Sq+ Bridge  2x10 ea           Lumbar flexion stretch with PB  10x5''           Ther Activity                                       Gait Training                                       Modalities

## 2023-03-01 ENCOUNTER — OFFICE VISIT (OUTPATIENT)
Dept: OBGYN CLINIC | Facility: CLINIC | Age: 63
End: 2023-03-01

## 2023-03-01 VITALS — HEIGHT: 70 IN | BODY MASS INDEX: 45.1 KG/M2 | WEIGHT: 315 LBS

## 2023-03-01 DIAGNOSIS — M25.561 BILATERAL CHRONIC KNEE PAIN: ICD-10-CM

## 2023-03-01 DIAGNOSIS — M25.562 BILATERAL CHRONIC KNEE PAIN: ICD-10-CM

## 2023-03-01 DIAGNOSIS — G89.29 BILATERAL CHRONIC KNEE PAIN: ICD-10-CM

## 2023-03-01 DIAGNOSIS — M17.11 PRIMARY OSTEOARTHRITIS OF RIGHT KNEE: ICD-10-CM

## 2023-03-01 DIAGNOSIS — M17.12 PRIMARY OSTEOARTHRITIS OF LEFT KNEE: Primary | ICD-10-CM

## 2023-03-01 NOTE — PROGRESS NOTES
Assessment:  1  Primary osteoarthritis of left knee  Ambulatory Referral to Physical Therapy    CT knee left arthrogram      2  Primary osteoarthritis of right knee  Ambulatory Referral to Physical Therapy    CT knee right arthrogram      3  BMI 45 0-49 9, adult [facility])  Ambulatory Referral to Weight Management      4  Bilateral chronic knee pain  Ambulatory Referral to Physical Therapy    CT knee left arthrogram    CT knee right arthrogram        Patient Active Problem List   Diagnosis   • Hypokalemia   • Allergic rhinitis   • Chronic back pain   • Dental cavity   • Anxiety disorder   • Erectile dysfunction   • Hyperlipidemia   • Essential hypertension   • Localized osteoarthritis of left knee   • Lumbar radiculopathy   • Microalbuminuria   • Morbid obesity ([facility] [address] )   • IMELDA on CPAP   • Patellofemoral arthritis of left knee   • Rosacea   • Vitamin D deficiency   • Pain in finger of both hands   • Trigger finger, left middle finger   • Persistent proteinuria   • Screening for prostate cancer   • Screening for colon cancer   • It band syndrome, right   • Bilateral primary osteoarthritis of knee   • Stage 3b chronic kidney disease (HCC)   • Localized osteoarthritis of right knee   • Need for hepatitis C screening test   • Anemia   • Abnormal CBC   • History of cigar smoking   • Type 2 diabetes mellitus with stage 3b chronic kidney disease, without long-term current use of insulin (HCC)   • Financial difficulties   • Continuous opioid dependence ([facility] [address] )           Plan      60 y o  male with bilateral knee primary osteoarthritis    · Since patient has failed injection therapy, we discussed surgical intervention  Patient needs to lose weight to be a candidate for surgery   A referral to weight management was provided  · Patient is already attending physical therapy for his low back, a referral to also address his knees and recent falls was provided  · Patient is following up with his spine surgeon in the near future to address lumbar radiculopathy, I recommend addressing recent falls as well  · Bilateral CT arthrogram knees ordered due to patients reported pain compared to current imaging            Subjective:     Patient ID:    Chief Complaint:Nasir Horn Memory  60 y o  male      HPI    Patient presents to the office for follow up evaluation bilateral knees  Patient has known osteoarthritis  We have been treating his symptoms with injection therapy, he last finished a 3 shot visco series in November 2022 which he states did not help  Patient states his bilateral knee pain has been increasing recently, it is so severe its impacting his quality of life  He also notes he has fallen 3 times in the last month due to his knee's giving out  Anterior knee pain is constant, significant, worsened with weight bearing  He denies any numbness or paresthesias          The following portions of the patient's history were reviewed and updated as appropriate: allergies, current medications, past family history, past social history, past surgical history and problem list         Social History     Socioeconomic History   • Marital status: Legally      Spouse name: Not on file   • Number of children: Not on file   • Years of education: Not on file   • Highest education level: Not on file   Occupational History   • 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[place], injured back   Tobacco Use   • Smoking status: Every Day     Types: Cigars   • Smokeless tobacco: Never   • Tobacco comments:     1 cigar daily,  50 pack years, 1/2 pack at age 12, 2 ppd x 20 yrs, quit 2004   Vaping Use   • Vaping Use: Never used   Substance and Sexual Activity   • Alcohol use: Not Currently     Comment: occasional   • Drug use: No     Comment: History of drug use, meena, cocaine, heroind, no IVDA-as per [employer]   • Sexual activity: Yes     Partners: Female     Birth control/protection: Condom Male   Other Topics Concern   • Not on file   Social History Narrative Patient lives with his ex wife and son  Social Determinants of Health     Financial Resource Strain: Medium Risk   • Difficulty of Paying Living Expenses: Somewhat hard   Food Insecurity: Not on file   Transportation Needs: No Transportation Needs   • Lack of Transportation (Medical): No   • Lack of Transportation (Non-Medical):  No   Physical Activity: Not on file   Stress: Not on file   Social Connections: Not on file   Intimate Partner Violence: Not on file   Housing Stability: Not on file     Past Medical History:   Diagnosis Date   • Acne    • Anxiety    • Arthritis    • Back pain    • Chronic pain    • Depression 06/07/2021   • Diabetes mellitus ([place] [address] )     6/19/15 A1C: 5 5%   • Eczema    • Grief reaction 09/10/2019   • Hyperlipidemia    • Hypertension    • Knee pain    • Morbid obesity with BMI of 50 0-59 9, adult ([place] [address] )    • Psychiatric disorder    • Skin tag    • Type 2 diabetes mellitus without complication ([place] [address] ) 00/46/5560   • Urinary retention      Past Surgical History:   Procedure Laterality Date   • BACK SURGERY     • LUMBAR FUSION  2002   • LUMBAR FUSION  2004    L4-5-6, S1 has pump for narcotics     Allergies   Allergen Reactions   • Gabapentin      Annotation - [id number]: dizziness   • Pregabalin      Annotation - [id number]: vision changes and mood changes   • Tramadol Nausea Only     Current Outpatient Medications on File Prior to Visit   Medication Sig Dispense Refill   • amLODIPine (NORVASC) 10 mg tablet Take 1 tablet (10 mg total) by mouth daily 90 tablet 3   • atorvastatin (LIPITOR) 20 mg tablet Take 1 tablet (20 mg total) by mouth daily 90 tablet 1   • chlorthalidone 25 mg tablet Take 1 tablet (25 mg total) by mouth daily 90 tablet 3   • diclofenac sodium (VOLTAREN) 1 % Apply 2 g topically 4 (four) times a day (Patient not taking: Reported on 10/24/2022) 1 Tube 3   • Diclofenac Sodium (VOLTAREN) 1 % Apply 2 g topically 4 (four) times a day 150 g 0   • doxycycline hyclate (VIBRAMYCIN) 100 mg capsule Take twice daily with food  Stay upright for one hour after taking and be cautious in sun due to burn risk 180 capsule 0   • hydrocortisone 2 5 % cream Apply topically to face BID for no longer than 7 days for flares 30 g 0   • ketoconazole (NIZORAL) 2 % cream 1 application daily Apply to affected area  (Patient not taking: Reported on 10/24/2022)  0   • ketoconazole (NIZORAL) 2 % shampoo Apply topically to scalp, eyebrows, and face  Leave on for 5 minutes and then rinse 120 mL 6   • losartan (COZAAR) 100 MG tablet Take 1 tablet (100 mg total) by mouth daily 90 tablet 3   • metoprolol tartrate (LOPRESSOR) 100 mg tablet Take 1 tablet (100 mg total) by mouth 2 (two) times a day 180 tablet 3   • morphine (MS CONTIN) 15 mg 12 hr tablet take 1 tablet every 8 hours if needed pain  0   • oxyCODONE (ROXICODONE) 30 MG immediate release tablet take 1 tablet by mouth every 5 hours if needed for pain   Derenda Slate MAX 4/DAY  0   • spironolactone (ALDACTONE) 25 mg tablet Take 1 tablet (25 mg total) by mouth daily 90 tablet 3   • tadalafil (CIALIS) 5 MG tablet Take 5 mg by mouth daily as needed for erectile dysfunction (unsure dosage) Indications: Erectile Dysfunction  • tamsulosin (FLOMAX) 0 4 mg Take 1 capsule (0 4 mg total) by mouth daily with dinner 90 capsule 3     No current facility-administered medications on file prior to visit  Objective:    Review of Systems   Constitutional: Negative for chills and fever  HENT: Negative for ear pain and sore throat  Eyes: Negative for pain and visual disturbance  Respiratory: Negative for cough and shortness of breath  Cardiovascular: Negative for chest pain and palpitations  Gastrointestinal: Negative for abdominal pain and vomiting  Genitourinary: Negative for dysuria and hematuria  Musculoskeletal: Negative for arthralgias and back pain  Skin: Negative for color change and rash  Neurological: Negative for seizures and syncope     All other systems reviewed and are negative  Ortho Exam   Unchanged since previous evaluation    Physical Exam  Vitals and nursing note reviewed  Constitutional:       Appearance: Normal appearance  HENT:      Head: Normocephalic  Eyes:      Extraocular Movements: Extraocular movements intact  Cardiovascular:      Rate and Rhythm: Normal rate  Pulses: Normal pulses  Pulmonary:      Effort: Pulmonary effort is normal    Musculoskeletal:         General: Normal range of motion  Cervical back: Normal range of motion and neck supple  Skin:     General: Skin is warm and dry  Neurological:      General: No focal deficit present  Mental Status: He is alert  Psychiatric:         Behavior: Behavior normal          Procedures          Scribe Attestation    I,:   am acting as a scribe while in the presence of the attending physician :       I,:   personally performed the services described in this documentation    as scribed in my presence :             Portions of the record may have been created with voice recognition software   Occasional wrong word or "sound a like" substitutions may have occurred due to the inherent limitations of voice recognition software   Read the chart carefully and recognize, using context, where substitutions have occurred

## 2023-03-02 ENCOUNTER — APPOINTMENT (OUTPATIENT)
Dept: PHYSICAL THERAPY | Facility: CLINIC | Age: 63
End: 2023-03-02

## 2023-03-07 ENCOUNTER — OFFICE VISIT (OUTPATIENT)
Dept: PHYSICAL THERAPY | Facility: CLINIC | Age: 63
End: 2023-03-07

## 2023-03-07 DIAGNOSIS — M54.16 LUMBAR RADICULOPATHY: Primary | ICD-10-CM

## 2023-03-07 NOTE — PROGRESS NOTES
Daily Note     Today's date: 3/7/2023  Patient name: Willie Jc  : 1960  MRN: [id number]  Referring provider: Miguelina Looney*  Dx:   Encounter Diagnosis     ICD-10-CM    1  Lumbar radiculopathy  M54 16                      Subjective: Pt reports starting a new antibiotic for his acne that is giving him some side effects of side headedness and upset stomach  Pt reports his back didn't feel too bad after last session  Objective: See treatment diary below      Assessment: Pt tolerated some increased reps for exercises today  Pt did report some soreness following strengthening exercises so ended with lumbar flexion stretch with PB  Will continue to progress as able  Plan: Continue per plan of care        Precautions: chronic pain      Manuals 2/23 2/28 3/7                                                              Neuro Re-Ed             Standing Hip 3 ways  10xea 2x10ea          Posture t band             Standing back extension HEP            Seated back extension HEP 20x                                                  Ther Ex             Bike             Hamstring stretch  3x30'' 3x30''          Hip Flexor stretch  3x30'' 3x30''          SKTC w/ towel  15x5'' 15x5''          Supine marchs  2x10ea 2x10 ea          Clamshells  2x10ea 2x10 ea          Ball Sq+ Bridge  2x10 ea 20x5''          Lumbar flexion stretch with PB  10x5'' 10x5''          Ther Activity                                       Gait Training                                       Modalities

## 2023-03-09 ENCOUNTER — EVALUATION (OUTPATIENT)
Dept: PHYSICAL THERAPY | Facility: CLINIC | Age: 63
End: 2023-03-09

## 2023-03-09 DIAGNOSIS — M54.16 LUMBAR RADICULOPATHY: ICD-10-CM

## 2023-03-09 DIAGNOSIS — G89.29 BILATERAL CHRONIC KNEE PAIN: Primary | ICD-10-CM

## 2023-03-09 DIAGNOSIS — M25.562 BILATERAL CHRONIC KNEE PAIN: Primary | ICD-10-CM

## 2023-03-09 DIAGNOSIS — M25.561 BILATERAL CHRONIC KNEE PAIN: Primary | ICD-10-CM

## 2023-03-09 DIAGNOSIS — M17.11 PRIMARY OSTEOARTHRITIS OF RIGHT KNEE: ICD-10-CM

## 2023-03-09 DIAGNOSIS — M17.12 PRIMARY OSTEOARTHRITIS OF LEFT KNEE: ICD-10-CM

## 2023-03-09 NOTE — PROGRESS NOTES
PT Evaluation     Today's date: 3/9/2023  Patient name: Walt Pedraza  : 1960  MRN: [id number]  Referring provider: Alec Falcon*  Dx:   Encounter Diagnosis     ICD-10-CM    1  Bilateral chronic knee pain  M25 561 Ambulatory Referral to Physical Therapy    M25 562     G89 29       2  Primary osteoarthritis of left knee  M17 12 Ambulatory Referral to Physical Therapy      3  Primary osteoarthritis of right knee  M17 11 Ambulatory Referral to Physical Therapy      4  Lumbar radiculopathy  M54 16                      Assessment  Assessment details: Pt is 60yo male presenting with chronic bilateral knee pain  Pt has pain with active rom and decreased strength  Due to the decreased strength, pt has much difficulty with walking, steps, carrying his groceries, and completing other ADL's  Pt would benefit from PT services to improve arom and strength on LE to decrease pain with function  Impairments: abnormal or restricted ROM, activity intolerance, impaired physical strength, lacks appropriate home exercise program and pain with function  Functional limitations: standing; walking; steps; ADL's  Symptom irritability: highBarriers to therapy: Chronic pain; failure to improve with injections  Understanding of Dx/Px/POC: good   Prognosis: good    Goals  1  Pt will improve B/L arom of knees to [facility] and painfree  2  Pt will improve LE strength to 4/5 to allow ease with walking  3  Pt will be able to complete steps without giving out of either knee  4  Pt will be independent with HEP upon discharge      Plan  Patient would benefit from: skilled physical therapy  Planned therapy interventions: functional ROM exercises, therapeutic activities, therapeutic exercise, therapeutic training, stretching, strengthening, home exercise program, neuromuscular re-education, manual therapy and patient education  Frequency: 2x week  Duration in weeks: 6  Treatment plan discussed with: patient        Subjective Evaluation    History of Present Illness  Mechanism of injury: Pt reports b/l knee pain that started 5 years ago  Left knee started first and then right knee started to bother him, stairs seemed to bother and make his symptoms worse  Left knee is worse than the right still  Pt reports his 3 steps to get into the house without a rail is really hard  Pt has had gel and cortisone injections with only getting some relief for a few days  Pt does report his knees giving out when going up the steps with grocery bags or extra weight    Quality of life: poor    Pain  Current pain ratin  At best pain ratin  At worst pain rating: 10  Location: whole knees  Quality: dull ache and sharp  Aggravating factors: standing, stair climbing and walking  Progression: worsening    Patient Goals  Patient goals for therapy: decreased pain and independence with ADLs/IADLs          Objective     Active Range of Motion   Left Knee   Normal active range of motion    Right Knee   Normal active range of motion    Additional Active Range of Motion Details  Soft tissue restriction    Strength/Myotome Testing     Left Knee   Flexion: 3+  Extension: 3+    Right Knee   Flexion: 3+  Extension: 3+             Precautions: chronic pain      Manuals 2/23 2/28 3/7 3/9                                                             Neuro Re-Ed             Standing Hip 3 ways  10xea 2x10ea          Posture t band             Standing back extension HEP            Seated back extension HEP 20x                                                  Ther Ex                          Hamstring stretch  3x30'' 3x30'' 3x30''         Hip Flexor stretch  3x30'' 3x30'' 3x30''         SKTC w/ towel  15x5'' 15x5'' 15x5''         Supine marchs  2x10ea 2x10 ea 2x10 ea         Clamshells  2x10ea 2x10 ea          Ball Sq+ Bridge  2x10 ea 20x5'' 20x5''         SAQ    20x5''         LAQ    20x2''         Lumbar flexion stretch with PB  10x5'' 10x5'' 10x5''         Ther Activity Gait Training                                       Modalities

## 2023-03-16 ENCOUNTER — OFFICE VISIT (OUTPATIENT)
Dept: PHYSICAL THERAPY | Facility: CLINIC | Age: 63
End: 2023-03-16

## 2023-03-16 DIAGNOSIS — M17.11 PRIMARY OSTEOARTHRITIS OF RIGHT KNEE: ICD-10-CM

## 2023-03-16 DIAGNOSIS — M25.562 BILATERAL CHRONIC KNEE PAIN: ICD-10-CM

## 2023-03-16 DIAGNOSIS — G89.29 BILATERAL CHRONIC KNEE PAIN: ICD-10-CM

## 2023-03-16 DIAGNOSIS — M17.12 PRIMARY OSTEOARTHRITIS OF LEFT KNEE: Primary | ICD-10-CM

## 2023-03-16 DIAGNOSIS — M25.561 BILATERAL CHRONIC KNEE PAIN: ICD-10-CM

## 2023-03-16 NOTE — PROGRESS NOTES
Daily Note     Today's date: 3/16/2023  Patient name: Aba العلي  : 1960  MRN: [id number]  Referring provider: Jovan Tanner*  Dx:   Encounter Diagnosis     ICD-10-CM    1  Primary osteoarthritis of left knee  M17 12       2  Primary osteoarthritis of right knee  M17 11       3  Bilateral chronic knee pain  M25 561     M25 562     G89 29                      Subjective: Pt reports he was " a lot of sore after last session"      Objective: See treatment diary below      Assessment: Pt tolerating session fairly well, pt very fatigued with current program  Will continue to progress as able  Plan: Continue per plan of care        Precautions: chronic pain      Manuals 2/23 2/28 3/7 3/9 3/16                                                            Neuro Re-Ed             Standing Hip 3 ways  10xea 2x10ea  2x10 ea        Posture t band             Standing back extension HEP            Seated back extension HEP 20x                                                  Ther Ex                          Hamstring stretch  3x30'' 3x30'' 3x30'' 3x30''        Hip Flexor stretch  3x30'' 3x30'' 3x30'' 3x30''        SKTC w/ towel  15x5'' 15x5'' 15x5'' 15x5''        Supine marchs  2x10ea 2x10 ea 2x10 ea 2x10 ea        Clamshells  2x10ea 2x10 ea          Ball Sq+ Bridge  2x10 ea 20x5'' 20x5'' 20x5''        SAQ    20x5'' 20x5''        LAQ    20x2'' 20x2''        Lumbar flexion stretch with PB  10x5'' 10x5'' 10x5''         Ther Activity                                       Gait Training                                       Modalities

## 2023-03-21 ENCOUNTER — APPOINTMENT (OUTPATIENT)
Dept: PHYSICAL THERAPY | Facility: CLINIC | Age: 63
End: 2023-03-21

## 2023-03-21 ENCOUNTER — RA CDI HCC (OUTPATIENT)
Dept: OTHER | Facility: HOSPITAL | Age: 63
End: 2023-03-21

## 2023-03-21 NOTE — PROGRESS NOTES
[facility]  coding opportunities       Chart reviewed, no opportunity found:   [address]        Patients Insurance     Medicare Insurance: [employer]

## 2023-03-23 ENCOUNTER — TELEPHONE (OUTPATIENT)
Dept: OTHER | Facility: HOSPITAL | Age: 63
End: 2023-03-23

## 2023-03-23 ENCOUNTER — TELEPHONE (OUTPATIENT)
Dept: NEPHROLOGY | Facility: CLINIC | Age: 63
End: 2023-03-23

## 2023-03-23 ENCOUNTER — OFFICE VISIT (OUTPATIENT)
Dept: PHYSICAL THERAPY | Facility: CLINIC | Age: 63
End: 2023-03-23

## 2023-03-23 DIAGNOSIS — M17.12 PRIMARY OSTEOARTHRITIS OF LEFT KNEE: Primary | ICD-10-CM

## 2023-03-23 NOTE — TELEPHONE ENCOUNTER
Can you please try to reach the patient now  I tried to call but no answer  He should keep holding tamsulosin  He stopped it on the 20th, is he still having the symptoms? If he feels back to normal then keep holding tamsulosin  Otherwise he needs to go to urgent care or see his PCP today For evaluation of the symptoms he mentioned    Please let me know if you are able to reach the patient and if the patient is going to urgent care?   And please let me know if you are not able to reach the patient    Thank you

## 2023-03-23 NOTE — TELEPHONE ENCOUNTER
Attempted to call the patient after receiving message about the patient's potential side effect of tamsulosin  I left a voicemail as I could not reach the patient  I asked the patient to keep holding tamsulosin and go see PCP right away    I have also asked the office to reach the patient to advise PCP or urgent care evaluation given the other symptoms that the patient is having

## 2023-03-23 NOTE — PROGRESS NOTES
Daily Note     Today's date: 3/23/2023  Patient name: Angie Patton  : 1960  MRN: [id number]  Referring provider: Lakeshia Traylor*  Dx:   Encounter Diagnosis     ICD-10-CM    1  Primary osteoarthritis of left knee  M17 12                      Subjective: Pt reports he's doing okay today        Objective: See treatment diary below      Assessment: Pt is tolerating exercises better with less fatigue throughout session  Progress next session and continue to progress as able  Plan: Continue per plan of care        Precautions: chronic pain      Manuals 2/23 2/28 3/7 3/9 3/16 3/23                                                           Neuro Re-Ed             Standing Hip 3 ways  10xea 2x10ea  2x10 ea 2x15ea       Posture t band             Standing back extension HEP            Seated back extension HEP 20x                                                  Ther Ex                          Hamstring stretch  3x30'' 3x30'' 3x30'' 3x30'' 3x30''       Hip Flexor stretch  3x30'' 3x30'' 3x30'' 3x30'' 3x30''       SKTC w/ towel  15x5'' 15x5'' 15x5'' 15x5'' 15x5''       Supine marchs  2x10ea 2x10 ea 2x10 ea 2x10 ea 2x10 ea       Clamshells  2x10ea 2x10 ea   2x10ea       Ball Sq+ Bridge  2x10 ea 20x5'' 20x5'' 20x5'' 20x5''       SAQ    20x5'' 20x5'' 20x5''       LAQ    20x2'' 20x2'' 20x5''       Lumbar flexion stretch with PB  10x5'' 10x5'' 10x5''         Ther Activity                                       Gait Training                                       Modalities

## 2023-03-23 NOTE — TELEPHONE ENCOUNTER
Left detailed message for pt, asked that he calls us back as soon as he gets the message to let us know if symptoms have improved since stopping med

## 2023-03-23 NOTE — TELEPHONE ENCOUNTER
Patient called stating he believes he is allergic to tamsulosin  Several days after beginning medication he began experiencing upset stomach, diarrhea, swollen and teary eyes, and stuffy nose  Also states he can hardly eat and believes his BP has been low, but has no readings  Patient states he stopped taking this medication 3/20

## 2023-03-24 NOTE — TELEPHONE ENCOUNTER
Hello    Any success in reaching the patient    Can you please try again today if he has not called back

## 2023-03-24 NOTE — TELEPHONE ENCOUNTER
Received message  Pt was able to be reached by our office  MA has spoken with the patient  Patient is feeling back to baseline and much better since holding tamsulosin    Tamsulosin is added to medication allergy list

## 2023-04-04 ENCOUNTER — APPOINTMENT (OUTPATIENT)
Dept: PHYSICAL THERAPY | Facility: CLINIC | Age: 63
End: 2023-04-04

## 2023-04-05 ENCOUNTER — APPOINTMENT (OUTPATIENT)
Dept: PHYSICAL THERAPY | Facility: CLINIC | Age: 63
End: 2023-04-05

## 2023-04-06 ENCOUNTER — TELEPHONE (OUTPATIENT)
Dept: DERMATOLOGY | Facility: CLINIC | Age: 63
End: 2023-04-06

## 2023-04-06 NOTE — TELEPHONE ENCOUNTER
Called pt to advise that his appt with Dr Masood Cuba on 5/23 needs to be rescheduled due to provider being out of the office  Left a message to call back to reschedule  Openings on 5/8 in [facility] or can do virtual appt

## 2023-04-10 ENCOUNTER — APPOINTMENT (OUTPATIENT)
Dept: PHYSICAL THERAPY | Facility: CLINIC | Age: 63
End: 2023-04-10

## 2023-04-11 ENCOUNTER — APPOINTMENT (OUTPATIENT)
Dept: PHYSICAL THERAPY | Facility: CLINIC | Age: 63
End: 2023-04-11

## 2023-04-12 ENCOUNTER — APPOINTMENT (OUTPATIENT)
Dept: PHYSICAL THERAPY | Facility: CLINIC | Age: 63
End: 2023-04-12

## 2023-04-13 ENCOUNTER — APPOINTMENT (OUTPATIENT)
Dept: PHYSICAL THERAPY | Facility: CLINIC | Age: 63
End: 2023-04-13

## 2023-04-17 ENCOUNTER — APPOINTMENT (OUTPATIENT)
Dept: PHYSICAL THERAPY | Facility: CLINIC | Age: 63
End: 2023-04-17

## 2023-04-18 ENCOUNTER — APPOINTMENT (OUTPATIENT)
Dept: PHYSICAL THERAPY | Facility: CLINIC | Age: 63
End: 2023-04-18

## 2023-04-19 ENCOUNTER — APPOINTMENT (OUTPATIENT)
Dept: PHYSICAL THERAPY | Facility: CLINIC | Age: 63
End: 2023-04-19

## 2023-04-20 ENCOUNTER — APPOINTMENT (OUTPATIENT)
Dept: PHYSICAL THERAPY | Facility: CLINIC | Age: 63
End: 2023-04-20

## 2023-04-24 ENCOUNTER — APPOINTMENT (OUTPATIENT)
Dept: PHYSICAL THERAPY | Facility: CLINIC | Age: 63
End: 2023-04-24

## 2023-04-25 ENCOUNTER — APPOINTMENT (OUTPATIENT)
Dept: PHYSICAL THERAPY | Facility: CLINIC | Age: 63
End: 2023-04-25

## 2023-04-25 ENCOUNTER — OFFICE VISIT (OUTPATIENT)
Dept: PHYSICAL THERAPY | Facility: CLINIC | Age: 63
End: 2023-04-25

## 2023-04-25 DIAGNOSIS — G89.29 BILATERAL CHRONIC KNEE PAIN: ICD-10-CM

## 2023-04-25 DIAGNOSIS — M54.16 LUMBAR RADICULOPATHY: ICD-10-CM

## 2023-04-25 DIAGNOSIS — M17.12 PRIMARY OSTEOARTHRITIS OF LEFT KNEE: Primary | ICD-10-CM

## 2023-04-25 DIAGNOSIS — M25.562 BILATERAL CHRONIC KNEE PAIN: ICD-10-CM

## 2023-04-25 DIAGNOSIS — M25.561 BILATERAL CHRONIC KNEE PAIN: ICD-10-CM

## 2023-04-25 DIAGNOSIS — M17.11 PRIMARY OSTEOARTHRITIS OF RIGHT KNEE: ICD-10-CM

## 2023-04-25 NOTE — PROGRESS NOTES
"Daily Note     Today's date: 2023  Patient name: Ludmila Dimas  : 1960  MRN: [id number]  Referring provider: Jefferson Richard*  Dx:   Encounter Diagnosis     ICD-10-CM    1  Primary osteoarthritis of left knee  M17 12       2  Primary osteoarthritis of right knee  M17 11       3  Bilateral chronic knee pain  M25 561     M25 562     G89 29       4  Lumbar radiculopathy  M54 16                      Subjective: Pt reports doing well with his interventions and being a bit sore because he worked out at home the last few days  After today's session he notes that he should start going to the gym to continue to improve  Objective: See treatment diary below      Assessment: Pt is doing well today and requires to continue pushing his endurance and progressing his strength  He is understanding of this  Plan: Continue per plan of care        Precautions: chronic pain      Manuals    3/9 3/16 3/23 4/11 4/13 4/18 4/20                                                       Neuro Re-Ed             Standing Hip 3 ways 2x10    2x10 ea 2x15ea 2x10 2x10 2x10 # NV   Posture t band             Step up 6\" 15x B             LP 50# 40x             LTR       10x5\"  30x5\"      SLR  2 5# 2x10B         2 5# 2x10B  2x10   Mini squat 30x        20x 30x   Ther Ex             Cone taps 20x B         20x B    Hamstring stretch    3x30'' 3x30'' 3x30'' 3x30'' 3x30'' 3x30'' 3x30''   Hip Flexor stretch    3x30'' 3x30'' 3x30''       SKTC w/ towel    15x5'' 15x5'' 15x5'' 15x5'' 15x5''     Supine marchs    2x10 ea 2x10 ea 2x10 ea 2x10 ea 2x10 ea Stand 20x Stand 20x   Clamshells      2x10ea  2x10 2x10    Ball Sq+ Bridge 20x   20x5'' 20x5'' 20x5'' 20x5'' 20x5\"  20x 20x   SAQ    20x5'' 20x5'' 20x5'' 20x5''      LAQ    20x2'' 20x2'' 20x5'' 20x5'' 20x5'' 2 5# 2x10 5# 2x10    bike 10'        8' end  5' end 10'    Lumbar flexion stretch with PB    10x5''    10x5\" 10x5\"    Ther Activity                                       Gait " Training                                       Modalities

## 2023-04-26 ENCOUNTER — APPOINTMENT (OUTPATIENT)
Dept: PHYSICAL THERAPY | Facility: CLINIC | Age: 63
End: 2023-04-26

## 2023-04-27 ENCOUNTER — APPOINTMENT (OUTPATIENT)
Dept: PHYSICAL THERAPY | Facility: CLINIC | Age: 63
End: 2023-04-27

## 2023-05-03 ENCOUNTER — EVALUATION (OUTPATIENT)
Dept: PHYSICAL THERAPY | Facility: CLINIC | Age: 63
End: 2023-05-03

## 2023-05-03 DIAGNOSIS — M25.561 BILATERAL CHRONIC KNEE PAIN: ICD-10-CM

## 2023-05-03 DIAGNOSIS — G89.29 BILATERAL CHRONIC KNEE PAIN: ICD-10-CM

## 2023-05-03 DIAGNOSIS — M54.16 LUMBAR RADICULOPATHY: ICD-10-CM

## 2023-05-03 DIAGNOSIS — M25.562 BILATERAL CHRONIC KNEE PAIN: ICD-10-CM

## 2023-05-03 DIAGNOSIS — M17.12 PRIMARY OSTEOARTHRITIS OF LEFT KNEE: Primary | ICD-10-CM

## 2023-05-03 DIAGNOSIS — M17.11 PRIMARY OSTEOARTHRITIS OF RIGHT KNEE: ICD-10-CM

## 2023-05-03 NOTE — PROGRESS NOTES
PT Re-Evaluation     Today's date: 5/3/2023  Patient name: Nahid Gale  : 1960  MRN: [id number]  Referring provider: Jaiden Alcala  Dx:   Encounter Diagnosis     ICD-10-CM    1  Primary osteoarthritis of left knee  M17 12       2  Primary osteoarthritis of right knee  M17 11       3  Bilateral chronic knee pain  M25 561     M25 562     G89 29       4  Lumbar radiculopathy  M54 16                      Assessment  Assessment details: Nahid Gale  has demonstrated good improvement in ROM of his lumbar spine, functional squatting, and a reduction in pain when the weather is not affecting him  Currently, he has made steady progress towards his goals, but continues to remain limited with strength of hips and overall endurance of activities around the house  At this time, skilled physical therapy is warranted to address the remaining impairments and aide in return to full function and gym as these are part of his goals  Limiting factors to therapy chronicity and he demonstrates good motivation  Impairments: abnormal gait, abnormal or restricted ROM, activity intolerance, impaired balance, impaired physical strength, lacks appropriate home exercise program and pain with function  Functional limitations: prolonged sitting, standing, walking  Symptom irritability: moderateUnderstanding of Dx/Px/POC: good   Prognosis: good    Goals  1  Pt will improve lumbar rom to moderate restrictions to allow increased ease with mobility  MET  2  Pt will improve LE strength to 4/5 to be able to withstand walking for 5 minutes  3  Pt will be able to sit for 30 minutes without increased pain  4  Pt will be independent with HEP upon discharge      Plan  Patient would benefit from: skilled physical therapy  Referral necessary: No  Planned modality interventions: low level laser therapy, cryotherapy and TENS  Planned therapy interventions: functional ROM exercises, therapeutic activities, therapeutic exercise, therapeutic training, stretching, strengthening, home exercise program, neuromuscular re-education, manual therapy and patient education  Frequency: 2x week  Duration in weeks: 6  Treatment plan discussed with: patient        Subjective Evaluation    History of Present Illness  Mechanism of injury: 5/3: Pt reports today that the weather the last week has been bothering his knees and low back bringing him back to his previous levels of pain  Before this week he was ambulating more and completing more interventions around the house  Due to this progress he would like to start going to the gym more frequently to continue to progress from where he is now  The knees were feeling about 50% better and back about 70%  : Pt reports that therapy has been irritating his low back and knees due to not using those muscles for a long period of time  He had a cortisone injection in the low back yesterday and felt immediate relief in his knees and low back  He states that he knows in the winter and wants to become more active now that is starting to be nicer out      :  Pt reports lumbar fusion in , the back hasn't been the same  Pt reports epidural injection has helped him in the past but this time insurance wont cover the injection until he tries PT  Pt also has kidney disease for pain meds are limited for back and knees  Pt reports the pain travels down his left leg into his foot, that pain is constant in his left leg  Pain increases with prolonged walking, laying, sitting     Quality of life: fair    Pain  Current pain ratin  At best pain ratin  At worst pain ratin  Location: Low back into Lt leg  Quality: dull ache, knife-like and burning  Aggravating factors: sitting, standing, walking and stair climbing  Progression: improved    Treatments  Previous treatment: physical therapy, injection treatment, medication and chiropractic  Patient Goals  Patient goal: get injections        Objective     Active "Range of Motion     Lumbar   Flexion:  Restriction level: minimal  Extension:  Restriction level: minimal  Left lateral flexion:  Restriction level: minimal  Right lateral flexion:  Restriction level: moderate    Additional Active Range of Motion Details  No pain noted today    Strength/Myotome Testing     Left Hip   Planes of Motion   Flexion: 3+  Extension: 3+  Abduction: 3+  Adduction: 3+  External rotation: 4-  Internal rotation: 4-    Right Hip   Planes of Motion   Flexion: 3+  Extension: 3+  Abduction: 3+  Adduction: 3+  External rotation: 4-  Internal rotation: 4-    Left Knee   Flexion: 4  Extension: 4    Right Knee   Flexion: 4  Extension: 4    Additional Strength Details  Strength similar to last visit    Tests     Additional Tests Details  Flexibility still limited but improved since last visit for hamstring mobility   Squat 40 degrees knee flexion, difficulty rising from position             Precautions: chronic pain      Manuals 4/25 5/3  3/9 3/16 3/23 4/11 4/13 4/18 4/20                                                       Neuro Re-Ed             Standing Hip 3 ways 2x10 2 5# 2x10   2x10 ea 2x15ea 2x10 2x10 2x10 # NV   Posture t band             Step up 6\" 15x B             LP 50# 40x  50# 40x            LTR       10x5\"  30x5\"      SLR  2 5# 2x10B         2 5# 2x10B  2x10   Mini squat 30x        20x 30x   Ther Ex             Cone taps 20x B 20x B        20x B    Hamstring stretch    3x30'' 3x30'' 3x30'' 3x30'' 3x30'' 3x30'' 3x30''   Hip Flexor stretch    3x30'' 3x30'' 3x30''       SKTC w/ towel    15x5'' 15x5'' 15x5'' 15x5'' 15x5''     Supine marchs    2x10 ea 2x10 ea 2x10 ea 2x10 ea 2x10 ea Stand 20x Stand 20x   Clamshells      2x10ea  2x10 2x10    Ball Sq+ Bridge 20x   20x5'' 20x5'' 20x5'' 20x5'' 20x5\"  20x 20x   SAQ    20x5'' 20x5'' 20x5'' 20x5''      LAQ    20x2'' 20x2'' 20x5'' 20x5'' 20x5'' 2 5# 2x10 5# 2x10    UBE   5'           bike 8'  8' and 5'       8' end  5' end 10'    Lumbar flexion " "stretch with PB    10x5''    10x5\" 10x5\"    Ther Activity                                       Gait Training                                       Modalities                                                "

## 2023-05-08 ENCOUNTER — APPOINTMENT (OUTPATIENT)
Dept: PHYSICAL THERAPY | Facility: CLINIC | Age: 63
End: 2023-05-08
Payer: COMMERCIAL

## 2023-05-10 ENCOUNTER — APPOINTMENT (OUTPATIENT)
Dept: PHYSICAL THERAPY | Facility: CLINIC | Age: 63
End: 2023-05-10
Payer: COMMERCIAL

## 2023-05-17 ENCOUNTER — APPOINTMENT (OUTPATIENT)
Dept: PHYSICAL THERAPY | Facility: CLINIC | Age: 63
End: 2023-05-17
Payer: COMMERCIAL

## 2023-05-31 DIAGNOSIS — L21.9 SEBORRHEIC DERMATITIS: ICD-10-CM

## 2023-05-31 DIAGNOSIS — I10 ESSENTIAL HYPERTENSION: ICD-10-CM

## 2023-05-31 RX ORDER — CHLORTHALIDONE 25 MG/1
25 TABLET ORAL DAILY
Qty: 90 TABLET | Refills: 3 | Status: SHIPPED | OUTPATIENT
Start: 2023-05-31

## 2023-05-31 RX ORDER — KETOCONAZOLE 20 MG/ML
SHAMPOO TOPICAL
Qty: 120 ML | Refills: 0 | Status: SHIPPED | OUTPATIENT
Start: 2023-05-31

## 2023-05-31 RX ORDER — AMLODIPINE BESYLATE 10 MG/1
10 TABLET ORAL DAILY
Qty: 90 TABLET | Refills: 3 | Status: SHIPPED | OUTPATIENT
Start: 2023-05-31

## 2023-06-13 ENCOUNTER — TELEPHONE (OUTPATIENT)
Dept: NEPHROLOGY | Facility: CLINIC | Age: 63
End: 2023-06-13

## 2023-06-19 ENCOUNTER — APPOINTMENT (OUTPATIENT)
Dept: LAB | Facility: CLINIC | Age: 63
End: 2023-06-19
Payer: COMMERCIAL

## 2023-06-19 DIAGNOSIS — I10 BENIGN ESSENTIAL HTN: ICD-10-CM

## 2023-06-19 DIAGNOSIS — I10 ESSENTIAL HYPERTENSION: ICD-10-CM

## 2023-06-19 LAB
ANION GAP SERPL CALCULATED.3IONS-SCNC: 2 MMOL/L (ref 4–13)
BACTERIA UR QL AUTO: ABNORMAL /HPF
BILIRUB UR QL STRIP: NEGATIVE
BUN SERPL-MCNC: 19 MG/DL (ref 5–25)
CALCIUM SERPL-MCNC: 9.8 MG/DL (ref 8.3–10.1)
CHLORIDE SERPL-SCNC: 108 MMOL/L (ref 96–108)
CHOLEST SERPL-MCNC: 189 MG/DL
CLARITY UR: CLEAR
CO2 SERPL-SCNC: 24 MMOL/L (ref 21–32)
COLOR UR: ABNORMAL
CREAT SERPL-MCNC: 1.74 MG/DL (ref 0.6–1.3)
CREAT UR-MCNC: 84.4 MG/DL
ERYTHROCYTE [DISTWIDTH] IN BLOOD BY AUTOMATED COUNT: 13.9 % (ref 11.6–15.1)
GFR SERPL CREATININE-BSD FRML MDRD: 40 ML/MIN/1.73SQ M
GLUCOSE P FAST SERPL-MCNC: 163 MG/DL (ref 65–99)
GLUCOSE UR STRIP-MCNC: NEGATIVE MG/DL
HCT VFR BLD AUTO: 43.6 % (ref 36.5–49.3)
HDLC SERPL-MCNC: 35 MG/DL
HGB BLD-MCNC: 15.1 G/DL (ref 12–17)
HGB UR QL STRIP.AUTO: NEGATIVE
HYALINE CASTS #/AREA URNS LPF: ABNORMAL /LPF
KETONES UR STRIP-MCNC: NEGATIVE MG/DL
LDLC SERPL CALC-MCNC: 110 MG/DL (ref 0–100)
LEUKOCYTE ESTERASE UR QL STRIP: NEGATIVE
MAGNESIUM SERPL-MCNC: 1.9 MG/DL (ref 1.6–2.6)
MCH RBC QN AUTO: 30.1 PG (ref 26.8–34.3)
MCHC RBC AUTO-ENTMCNC: 34.6 G/DL (ref 31.4–37.4)
MCV RBC AUTO: 87 FL (ref 82–98)
NITRITE UR QL STRIP: NEGATIVE
NON-SQ EPI CELLS URNS QL MICRO: ABNORMAL /HPF
PH UR STRIP.AUTO: 7 [PH]
PHOSPHATE SERPL-MCNC: 2.6 MG/DL (ref 2.3–4.1)
PLATELET # BLD AUTO: 264 THOUSANDS/UL (ref 149–390)
PMV BLD AUTO: 10.4 FL (ref 8.9–12.7)
POTASSIUM SERPL-SCNC: 4.8 MMOL/L (ref 3.5–5.3)
PROT UR STRIP-MCNC: NEGATIVE MG/DL
PROT UR-MCNC: 12 MG/DL
PROT/CREAT UR: 0.14 MG/G{CREAT} (ref 0–0.1)
RBC # BLD AUTO: 5.02 MILLION/UL (ref 3.88–5.62)
RBC #/AREA URNS AUTO: ABNORMAL /HPF
SODIUM SERPL-SCNC: 134 MMOL/L (ref 135–147)
SP GR UR STRIP.AUTO: 1.01 (ref 1–1.03)
TRIGL SERPL-MCNC: 218 MG/DL
UROBILINOGEN UR STRIP-ACNC: <2 MG/DL
WBC # BLD AUTO: 9.37 THOUSAND/UL (ref 4.31–10.16)
WBC #/AREA URNS AUTO: ABNORMAL /HPF

## 2023-06-19 PROCEDURE — 84100 ASSAY OF PHOSPHORUS: CPT

## 2023-06-19 PROCEDURE — 83735 ASSAY OF MAGNESIUM: CPT

## 2023-06-19 PROCEDURE — 85027 COMPLETE CBC AUTOMATED: CPT

## 2023-06-19 PROCEDURE — 84156 ASSAY OF PROTEIN URINE: CPT

## 2023-06-19 PROCEDURE — 81001 URINALYSIS AUTO W/SCOPE: CPT

## 2023-06-19 PROCEDURE — 80048 BASIC METABOLIC PNL TOTAL CA: CPT

## 2023-06-19 PROCEDURE — 36415 COLL VENOUS BLD VENIPUNCTURE: CPT

## 2023-06-19 PROCEDURE — 82570 ASSAY OF URINE CREATININE: CPT

## 2023-06-20 LAB
EST. AVERAGE GLUCOSE BLD GHB EST-MCNC: 131 MG/DL
HBA1C MFR BLD: 6.2 %
TSH SERPL DL<=0.05 MIU/L-ACNC: 1.61 UIU/ML (ref 0.45–4.5)

## 2023-06-23 ENCOUNTER — OFFICE VISIT (OUTPATIENT)
Dept: NEPHROLOGY | Facility: CLINIC | Age: 63
End: 2023-06-23
Payer: COMMERCIAL

## 2023-06-23 VITALS
WEIGHT: 315 LBS | HEART RATE: 72 BPM | DIASTOLIC BLOOD PRESSURE: 60 MMHG | HEIGHT: 70 IN | BODY MASS INDEX: 45.1 KG/M2 | SYSTOLIC BLOOD PRESSURE: 118 MMHG | OXYGEN SATURATION: 96 %

## 2023-06-23 DIAGNOSIS — N28.1 RENAL CYST: ICD-10-CM

## 2023-06-23 DIAGNOSIS — N18.31 STAGE 3A CHRONIC KIDNEY DISEASE (HCC): Primary | ICD-10-CM

## 2023-06-23 DIAGNOSIS — I10 BENIGN ESSENTIAL HTN: ICD-10-CM

## 2023-06-23 DIAGNOSIS — E87.1 HYPONATREMIA: ICD-10-CM

## 2023-06-23 DIAGNOSIS — I10 ESSENTIAL HYPERTENSION: ICD-10-CM

## 2023-06-23 PROCEDURE — 99214 OFFICE O/P EST MOD 30 MIN: CPT | Performed by: PHYSICIAN ASSISTANT

## 2023-06-23 RX ORDER — SPIRONOLACTONE 25 MG/1
25 TABLET ORAL DAILY
Qty: 90 TABLET | Refills: 3 | Status: SHIPPED | OUTPATIENT
Start: 2023-06-23

## 2023-06-23 RX ORDER — METOPROLOL TARTRATE 100 MG/1
100 TABLET ORAL 2 TIMES DAILY
Qty: 180 TABLET | Refills: 3 | Status: SHIPPED | OUTPATIENT
Start: 2023-06-23

## 2023-06-23 NOTE — PROGRESS NOTES
"OFFICE FOLLOW UP - Nephrology   Jasvir Arriaga  60 y o  male MRN: [id number]       ASSESSMENT/PLAN:  1  Hypertension: Blood pressure very well controlled recently  Continue current medications and low-sodium diet  Discussed that if he loses weight we may be able to wean down some medication  Had prior secondary work-up as noted in Dr Jayme Ramirez last note  2  CKD stage III: Baseline creatinine 1 4-1 7  Recent labs with creatinine stable at 1 74 and UPC ratio 0 14 g  Discussed importance of controlling diabetes and hypertension to help prevent progression of CKD  3  Hyponatremia: Sodium 134  He is on chlorthalidone  Blood pressure very well controlled so we will continue chlorthalidone for now and if sodium would drop we may need to stop this  4  DM II: Last A1c 6 2  Now off diabetes medication and has been controlling with diet  5  Renal cyst: Due for renal ultrasound in January 2024  6  Nocturia: Referred to urology at last visit but has not yet made appt  Was started on tamsulosin but concern for allergic reaction so this was discontinued  7  Obesity: Recently made healthy diet changes which helped his diabetes  Ability to exercise is limited by knee pain and back pain  Discussed alternative methods of exercise such as water aerobics/water walking    Follow up in 6 months with Dr Michelle Olivia with repeat labs prior  HPI: Jasvir Arriaga  is a 60 y o  male who is here for CKD followup  Main issue recently is knee pain  He has been undergoing injections but they have not helped  Was told he was not a good surgical candidate  Also has difficulty falling asleep at night but once he is asleep he sleeps well  His blood pressure has been very well controlled recently  He made recent dietary changes \"cut out the junk\" and now if off DM medication and diabetes is diet controlled  No issues with SOB or edema  ROS:   A complete 10 point review of systems was done   Pertinent positives and negatives as " noted in the HPI, otherwise the review of systems is negative  Allergies: Tamsulosin, Gabapentin, Pregabalin, and Tramadol    Medications:   Current Outpatient Medications:   •  amLODIPine (NORVASC) 10 mg tablet, Take 1 tablet (10 mg total) by mouth daily, Disp: 90 tablet, Rfl: 3  •  chlorthalidone 25 mg tablet, Take 1 tablet (25 mg total) by mouth daily, Disp: 90 tablet, Rfl: 3  •  Diclofenac Sodium (VOLTAREN) 1 %, Apply 2 g topically 4 (four) times a day, Disp: 150 g, Rfl: 0  •  hydrocortisone 2 5 % cream, Apply topically to face BID for no longer than 7 days for flares, Disp: 30 g, Rfl: 0  •  ketoconazole (NIZORAL) 2 % shampoo, Apply topically to scalp, eyebrows, and face  Leave on for 5 minutes and then rinse, Disp: 120 mL, Rfl: 0  •  losartan (COZAAR) 100 MG tablet, Take 1 tablet (100 mg total) by mouth daily, Disp: 90 tablet, Rfl: 3  •  metoprolol tartrate (LOPRESSOR) 100 mg tablet, Take 1 tablet (100 mg total) by mouth 2 (two) times a day, Disp: 180 tablet, Rfl: 3  •  morphine (MS CONTIN) 15 mg 12 hr tablet, take 1 tablet every 8 hours if needed pain, Disp: , Rfl: 0  •  oxyCODONE (ROXICODONE) 30 MG immediate release tablet, take 1 tablet by mouth every 5 hours if needed for pain   [facility] 4/DAY, Disp: , Rfl: 0  •  spironolactone (ALDACTONE) 25 mg tablet, Take 1 tablet (25 mg total) by mouth daily, Disp: 90 tablet, Rfl: 3  •  tadalafil (CIALIS) 5 MG tablet, Take 5 mg by mouth daily as needed for erectile dysfunction (unsure dosage) Indications: Erectile Dysfunction  , Disp: , Rfl:   •  atorvastatin (LIPITOR) 20 mg tablet, Take 1 tablet (20 mg total) by mouth daily, Disp: 90 tablet, Rfl: 1    Past Medical History:   Diagnosis Date   • Acne    • Anxiety    • Arthritis    • Back pain    • Chronic pain    • Depression 06/07/2021   • [facility]     6/19/15 A1C: 5 5%   • Eczema    • Grief reaction 09/10/2019   • Hyperlipidemia    • Hypertension    • Knee pain    • Morbid obesity with BMI of 50 0-59 9, "adult [facility])    • Psychiatric disorder    • Skin tag    • Type 2 diabetes mellitus without complication ([facility] ) 41/75/5692   • Urinary retention      Past Surgical History:   Procedure Laterality Date   • BACK SURGERY     • LUMBAR FUSION  2002   • LUMBAR FUSION  2004    L4-5-6, S1 has pump for narcotics     Family History   Problem Relation Age of Onset   • Other Mother         colitis   • Diabetes Mother       reports that he has been smoking cigars  He has never used smokeless tobacco  He reports that he does not currently use alcohol  He reports that he does not use drugs  Physical Exam:   Vitals:    06/23/23 1056   BP: 118/60   BP Location: Left arm   Patient Position: Sitting   Cuff Size: Adult   Pulse: 72   SpO2: 96%   Weight: (!) 155 kg (341 lb)   Height: 5' 10\" (1 778 m)     Body mass index is 48 93 kg/m²      General: no acute distress   Eyes: conjunctivae pink, anicteric sclerae  ENT: mucous membranes moist  Neck: supple, no JVD  Chest: clear to auscultation bilaterally with no wheezes, rale or rhochi  CVS: regular rate and rhythm   Abdomen: soft, non-tender, non-distended  Extremities: no lower extremity edema   Skin: no rash  Neuro: awake and alert       Lab Results:  Results for orders placed or performed in visit on 94/41/07   Basic metabolic panel   Result Value Ref Range    Sodium 134 (L) 135 - 147 mmol/L    Potassium 4 8 3 5 - 5 3 mmol/L    Chloride 108 96 - 108 mmol/L    CO2 24 21 - 32 mmol/L    ANION GAP 2 (L) 4 - 13 mmol/L    BUN 19 5 - 25 mg/dL    Creatinine 1 74 (H) 0 60 - 1 30 mg/dL    Glucose, Fasting 163 (H) 65 - 99 mg/dL    Calcium 9 8 8 3 - 10 1 mg/dL    eGFR 40 ml/min/1 73sq m   CBC   Result Value Ref Range    WBC 9 37 4 31 - 10 16 Thousand/uL    RBC 5 02 3 88 - 5 62 Million/uL    Hemoglobin 15 1 12 0 - 17 0 g/dL    Hematocrit 43 6 36 5 - 49 3 %    MCV 87 82 - 98 fL    MCH 30 1 26 8 - 34 3 pg    MCHC 34 6 31 4 - 37 4 g/dL    RDW 13 9 11 6 - 15 1 %    Platelets 674 925 - 301 Thousands/uL " "   MPV 10 4 8 9 - 12 7 fL   Magnesium   Result Value Ref Range    Magnesium 1 9 1 6 - 2 6 mg/dL   Protein / creatinine ratio, urine   Result Value Ref Range    Creatinine, Ur 84 4 mg/dL    Protein Urine Random 12 mg/dL    Prot/Creat Ratio, Ur 0 14 (H) 0 00 - 0 10   Phosphorus   Result Value Ref Range    Phosphorus 2 6 2 3 - 4 1 mg/dL   Urinalysis with microscopic   Result Value Ref Range    Color, UA Light Yellow     Clarity, UA Clear     Specific Christmas, UA 1 014 1 003 - 1 030    pH, UA 7 0 4 5, 5 0, 5 5, 6 0, 6 5, 7 0, 7 5, 8 0    Leukocytes, UA Negative Negative    Nitrite, UA Negative Negative    Protein, UA Negative Negative mg/dl    Glucose, UA Negative Negative mg/dl    Ketones, UA Negative Negative mg/dl    Urobilinogen, UA <2 0 <2 0 mg/dl mg/dl    Bilirubin, UA Negative Negative    Occult Blood, UA Negative Negative    RBC, UA 1-2 None Seen, 1-2 /hpf    WBC, UA None Seen None Seen, 1-2 /hpf    Epithelial Cells None Seen None Seen, Occasional /hpf    Bacteria, UA None Seen None Seen, Occasional /hpf    Hyaline Casts, UA 0-3 (A) None Seen /lpf       Results from last 7 days   Lab Units 06/19/23  1413   WBC Thousand/uL 9 37   HEMOGLOBIN g/dL 15 1   HEMATOCRIT % 43 6   PLATELETS Thousands/uL 264   SODIUM mmol/L 134*   POTASSIUM mmol/L 4 8   CHLORIDE mmol/L 108   CO2 mmol/L 24   BUN mg/dL 19   CREATININE mg/dL 1 74*   CALCIUM mg/dL 9 8   MAGNESIUM mg/dL 1 9   PHOSPHORUS mg/dL 2 6         Portions of the record may have been created with voice recognition software  Occasional wrong word or \"sound a like\" substitutions may have occurred due to the inherent limitations of voice recognition software  Read the chart carefully and recognize, using context, where substitutions have occurred  If you have any questions, please contact the dictating provider    "

## 2023-06-28 ENCOUNTER — APPOINTMENT (EMERGENCY)
Dept: RADIOLOGY | Facility: HOSPITAL | Age: 63
DRG: 176 | End: 2023-06-28
Payer: COMMERCIAL

## 2023-06-28 ENCOUNTER — HOSPITAL ENCOUNTER (INPATIENT)
Facility: HOSPITAL | Age: 63
LOS: 2 days | Discharge: HOME | DRG: 176 | End: 2023-06-30
Attending: EMERGENCY MEDICINE | Admitting: INTERNAL MEDICINE
Payer: COMMERCIAL

## 2023-06-28 ENCOUNTER — APPOINTMENT (EMERGENCY)
Dept: CT IMAGING | Facility: HOSPITAL | Age: 63
DRG: 176 | End: 2023-06-28
Payer: COMMERCIAL

## 2023-06-28 DIAGNOSIS — R07.9 CHEST PAIN: ICD-10-CM

## 2023-06-28 DIAGNOSIS — R06.09 DYSPNEA ON EXERTION: ICD-10-CM

## 2023-06-28 DIAGNOSIS — R06.00 DYSPNEA: Primary | ICD-10-CM

## 2023-06-28 DIAGNOSIS — I26.99 PULMONARY EMBOLI (HCC): ICD-10-CM

## 2023-06-28 DIAGNOSIS — R09.02 HYPOXIA: ICD-10-CM

## 2023-06-28 LAB
2HR DELTA HS TROPONIN: -2 NG/L
ALBUMIN SERPL BCP-MCNC: 4.4 G/DL (ref 3.5–5)
ALP SERPL-CCNC: 75 U/L (ref 34–104)
ALT SERPL W P-5'-P-CCNC: 13 U/L (ref 7–52)
ANION GAP SERPL CALCULATED.3IONS-SCNC: 10 MMOL/L
APTT PPP: 24 SECONDS (ref 23–37)
AST SERPL W P-5'-P-CCNC: 13 U/L (ref 13–39)
ATRIAL RATE: 85 BPM
BASOPHILS # BLD AUTO: 0.05 THOUSANDS/ÂΜL (ref 0–0.1)
BASOPHILS NFR BLD AUTO: 1 % (ref 0–1)
BILIRUB SERPL-MCNC: 0.53 MG/DL (ref 0.2–1)
BNP SERPL-MCNC: 33 PG/ML (ref 0–100)
BUN SERPL-MCNC: 20 MG/DL (ref 5–25)
CALCIUM SERPL-MCNC: 9.4 MG/DL (ref 8.4–10.2)
CARDIAC TROPONIN I PNL SERPL HS: 3 NG/L
CARDIAC TROPONIN I PNL SERPL HS: 5 NG/L
CHLORIDE SERPL-SCNC: 101 MMOL/L (ref 96–108)
CO2 SERPL-SCNC: 24 MMOL/L (ref 21–32)
CREAT SERPL-MCNC: 1.47 MG/DL (ref 0.6–1.3)
D DIMER PPP FEU-MCNC: 1.27 UG/ML FEU
EOSINOPHIL # BLD AUTO: 0.71 THOUSAND/ÂΜL (ref 0–0.61)
EOSINOPHIL NFR BLD AUTO: 7 % (ref 0–6)
ERYTHROCYTE [DISTWIDTH] IN BLOOD BY AUTOMATED COUNT: 13.8 % (ref 11.6–15.1)
GFR SERPL CREATININE-BSD FRML MDRD: 50 ML/MIN/1.73SQ M
GLUCOSE SERPL-MCNC: 153 MG/DL (ref 65–140)
HCT VFR BLD AUTO: 45.2 % (ref 36.5–49.3)
HGB BLD-MCNC: 15.2 G/DL (ref 12–17)
IMM GRANULOCYTES # BLD AUTO: 0.25 THOUSAND/UL (ref 0–0.2)
IMM GRANULOCYTES NFR BLD AUTO: 2 % (ref 0–2)
INR PPP: 0.97 (ref 0.84–1.19)
LIPASE SERPL-CCNC: 21 U/L (ref 11–82)
LYMPHOCYTES # BLD AUTO: 1.67 THOUSANDS/ÂΜL (ref 0.6–4.47)
LYMPHOCYTES NFR BLD AUTO: 16 % (ref 14–44)
MAGNESIUM SERPL-MCNC: 1.5 MG/DL (ref 1.9–2.7)
MCH RBC QN AUTO: 28.7 PG (ref 26.8–34.3)
MCHC RBC AUTO-ENTMCNC: 33.6 G/DL (ref 31.4–37.4)
MCV RBC AUTO: 85 FL (ref 82–98)
MONOCYTES # BLD AUTO: 0.58 THOUSAND/ÂΜL (ref 0.17–1.22)
MONOCYTES NFR BLD AUTO: 6 % (ref 4–12)
NEUTROPHILS # BLD AUTO: 6.99 THOUSANDS/ÂΜL (ref 1.85–7.62)
NEUTS SEG NFR BLD AUTO: 68 % (ref 43–75)
NRBC BLD AUTO-RTO: 0 /100 WBCS
P AXIS: 63 DEGREES
PLATELET # BLD AUTO: 259 THOUSANDS/UL (ref 149–390)
PMV BLD AUTO: 10.1 FL (ref 8.9–12.7)
POTASSIUM SERPL-SCNC: 4.2 MMOL/L (ref 3.5–5.3)
PR INTERVAL: 176 MS
PROT SERPL-MCNC: 8 G/DL (ref 6.4–8.4)
PROTHROMBIN TIME: 13.1 SECONDS (ref 11.6–14.5)
QRS AXIS: 6 DEGREES
QRSD INTERVAL: 90 MS
QT INTERVAL: 364 MS
QTC INTERVAL: 433 MS
RBC # BLD AUTO: 5.29 MILLION/UL (ref 3.88–5.62)
SODIUM SERPL-SCNC: 135 MMOL/L (ref 135–147)
T WAVE AXIS: 114 DEGREES
VENTRICULAR RATE: 85 BPM
WBC # BLD AUTO: 10.25 THOUSAND/UL (ref 4.31–10.16)

## 2023-06-28 PROCEDURE — 71275 CT ANGIOGRAPHY CHEST: CPT

## 2023-06-28 PROCEDURE — 96375 TX/PRO/DX INJ NEW DRUG ADDON: CPT

## 2023-06-28 PROCEDURE — 85610 PROTHROMBIN TIME: CPT

## 2023-06-28 PROCEDURE — 71046 X-RAY EXAM CHEST 2 VIEWS: CPT

## 2023-06-28 PROCEDURE — 96368 THER/DIAG CONCURRENT INF: CPT

## 2023-06-28 PROCEDURE — 99285 EMERGENCY DEPT VISIT HI MDM: CPT

## 2023-06-28 PROCEDURE — 93005 ELECTROCARDIOGRAM TRACING: CPT

## 2023-06-28 PROCEDURE — 83735 ASSAY OF MAGNESIUM: CPT

## 2023-06-28 PROCEDURE — G1004 CDSM NDSC: HCPCS

## 2023-06-28 PROCEDURE — 83880 ASSAY OF NATRIURETIC PEPTIDE: CPT

## 2023-06-28 PROCEDURE — 96361 HYDRATE IV INFUSION ADD-ON: CPT

## 2023-06-28 PROCEDURE — 99223 1ST HOSP IP/OBS HIGH 75: CPT | Performed by: HOSPITALIST

## 2023-06-28 PROCEDURE — 80053 COMPREHEN METABOLIC PANEL: CPT

## 2023-06-28 PROCEDURE — 94640 AIRWAY INHALATION TREATMENT: CPT

## 2023-06-28 PROCEDURE — 85730 THROMBOPLASTIN TIME PARTIAL: CPT

## 2023-06-28 PROCEDURE — 83690 ASSAY OF LIPASE: CPT

## 2023-06-28 PROCEDURE — 96365 THER/PROPH/DIAG IV INF INIT: CPT

## 2023-06-28 PROCEDURE — 96366 THER/PROPH/DIAG IV INF ADDON: CPT

## 2023-06-28 PROCEDURE — 99285 EMERGENCY DEPT VISIT HI MDM: CPT | Performed by: EMERGENCY MEDICINE

## 2023-06-28 PROCEDURE — 85379 FIBRIN DEGRADATION QUANT: CPT

## 2023-06-28 PROCEDURE — 36415 COLL VENOUS BLD VENIPUNCTURE: CPT

## 2023-06-28 PROCEDURE — 85025 COMPLETE CBC W/AUTO DIFF WBC: CPT

## 2023-06-28 PROCEDURE — 84484 ASSAY OF TROPONIN QUANT: CPT

## 2023-06-28 RX ORDER — MAGNESIUM HYDROXIDE/ALUMINUM HYDROXICE/SIMETHICONE 120; 1200; 1200 MG/30ML; MG/30ML; MG/30ML
30 SUSPENSION ORAL ONCE
Status: COMPLETED | OUTPATIENT
Start: 2023-06-28 | End: 2023-06-28

## 2023-06-28 RX ORDER — FAMOTIDINE 10 MG/ML
20 INJECTION, SOLUTION INTRAVENOUS ONCE
Status: COMPLETED | OUTPATIENT
Start: 2023-06-28 | End: 2023-06-28

## 2023-06-28 RX ORDER — OXYCODONE HYDROCHLORIDE 10 MG/1
30 TABLET ORAL EVERY 6 HOURS PRN
Status: DISCONTINUED | OUTPATIENT
Start: 2023-06-28 | End: 2023-06-30 | Stop reason: HOSPADM

## 2023-06-28 RX ORDER — ATORVASTATIN CALCIUM 40 MG/1
20 TABLET, FILM COATED ORAL DAILY
Status: DISCONTINUED | OUTPATIENT
Start: 2023-06-29 | End: 2023-06-30 | Stop reason: HOSPADM

## 2023-06-28 RX ORDER — HEPARIN SODIUM 10000 [USP'U]/100ML
3-30 INJECTION, SOLUTION INTRAVENOUS
Status: DISCONTINUED | OUTPATIENT
Start: 2023-06-28 | End: 2023-06-30

## 2023-06-28 RX ORDER — INSULIN LISPRO 100 [IU]/ML
5-25 INJECTION, SOLUTION INTRAVENOUS; SUBCUTANEOUS
Status: DISCONTINUED | OUTPATIENT
Start: 2023-06-29 | End: 2023-06-30 | Stop reason: HOSPADM

## 2023-06-28 RX ORDER — LEVALBUTEROL INHALATION SOLUTION 1.25 MG/3ML
1.25 SOLUTION RESPIRATORY (INHALATION)
Status: DISCONTINUED | OUTPATIENT
Start: 2023-06-28 | End: 2023-06-28

## 2023-06-28 RX ORDER — MAGNESIUM SULFATE HEPTAHYDRATE 40 MG/ML
2 INJECTION, SOLUTION INTRAVENOUS ONCE
Status: COMPLETED | OUTPATIENT
Start: 2023-06-28 | End: 2023-06-28

## 2023-06-28 RX ORDER — SPIRONOLACTONE 25 MG/1
25 TABLET ORAL DAILY
Status: DISCONTINUED | OUTPATIENT
Start: 2023-06-29 | End: 2023-06-30 | Stop reason: HOSPADM

## 2023-06-28 RX ORDER — HEPARIN SODIUM 1000 [USP'U]/ML
10000 INJECTION, SOLUTION INTRAVENOUS; SUBCUTANEOUS ONCE
Status: COMPLETED | OUTPATIENT
Start: 2023-06-28 | End: 2023-06-28

## 2023-06-28 RX ORDER — SUCRALFATE 1 G/1
1 TABLET ORAL ONCE
Status: COMPLETED | OUTPATIENT
Start: 2023-06-28 | End: 2023-06-28

## 2023-06-28 RX ORDER — IPRATROPIUM BROMIDE AND ALBUTEROL SULFATE 2.5; .5 MG/3ML; MG/3ML
SOLUTION RESPIRATORY (INHALATION)
Status: COMPLETED
Start: 2023-06-28 | End: 2023-06-28

## 2023-06-28 RX ORDER — LOSARTAN POTASSIUM 50 MG/1
100 TABLET ORAL DAILY
Status: DISCONTINUED | OUTPATIENT
Start: 2023-06-29 | End: 2023-06-30 | Stop reason: HOSPADM

## 2023-06-28 RX ORDER — IPRATROPIUM BROMIDE AND ALBUTEROL SULFATE 2.5; .5 MG/3ML; MG/3ML
3 SOLUTION RESPIRATORY (INHALATION)
Status: DISCONTINUED | OUTPATIENT
Start: 2023-06-28 | End: 2023-06-28

## 2023-06-28 RX ORDER — HEPARIN SODIUM 1000 [USP'U]/ML
10000 INJECTION, SOLUTION INTRAVENOUS; SUBCUTANEOUS EVERY 6 HOURS PRN
Status: DISCONTINUED | OUTPATIENT
Start: 2023-06-28 | End: 2023-06-30

## 2023-06-28 RX ORDER — LEVALBUTEROL INHALATION SOLUTION 1.25 MG/3ML
1.25 SOLUTION RESPIRATORY (INHALATION)
Status: DISCONTINUED | OUTPATIENT
Start: 2023-06-28 | End: 2023-06-30 | Stop reason: HOSPADM

## 2023-06-28 RX ORDER — METOPROLOL TARTRATE 50 MG/1
100 TABLET, FILM COATED ORAL 2 TIMES DAILY
Status: DISCONTINUED | OUTPATIENT
Start: 2023-06-29 | End: 2023-06-30 | Stop reason: HOSPADM

## 2023-06-28 RX ORDER — MORPHINE SULFATE 15 MG/1
15 TABLET, FILM COATED, EXTENDED RELEASE ORAL EVERY 8 HOURS PRN
Status: DISCONTINUED | OUTPATIENT
Start: 2023-06-28 | End: 2023-06-30 | Stop reason: HOSPADM

## 2023-06-28 RX ORDER — IPRATROPIUM BROMIDE AND ALBUTEROL SULFATE 2.5; .5 MG/3ML; MG/3ML
3 SOLUTION RESPIRATORY (INHALATION) EVERY 6 HOURS PRN
Status: DISCONTINUED | OUTPATIENT
Start: 2023-06-28 | End: 2023-06-30 | Stop reason: HOSPADM

## 2023-06-28 RX ORDER — AMLODIPINE BESYLATE 5 MG/1
10 TABLET ORAL DAILY
Status: DISCONTINUED | OUTPATIENT
Start: 2023-06-29 | End: 2023-06-30 | Stop reason: HOSPADM

## 2023-06-28 RX ORDER — HEPARIN SODIUM 1000 [USP'U]/ML
5000 INJECTION, SOLUTION INTRAVENOUS; SUBCUTANEOUS EVERY 6 HOURS PRN
Status: DISCONTINUED | OUTPATIENT
Start: 2023-06-28 | End: 2023-06-30

## 2023-06-28 RX ORDER — CHLORTHALIDONE 25 MG/1
25 TABLET ORAL DAILY
Status: DISCONTINUED | OUTPATIENT
Start: 2023-06-29 | End: 2023-06-30 | Stop reason: HOSPADM

## 2023-06-28 RX ADMIN — LEVALBUTEROL HYDROCHLORIDE 1.25 MG: 1.25 SOLUTION RESPIRATORY (INHALATION) at 18:26

## 2023-06-28 RX ADMIN — SODIUM CHLORIDE 500 ML: 0.9 INJECTION, SOLUTION INTRAVENOUS at 20:11

## 2023-06-28 RX ADMIN — IPRATROPIUM BROMIDE 0.5 MG: 0.5 SOLUTION RESPIRATORY (INHALATION) at 18:26

## 2023-06-28 RX ADMIN — IOHEXOL 85 ML: 350 INJECTION, SOLUTION INTRAVENOUS at 19:02

## 2023-06-28 RX ADMIN — SUCRALFATE 1 G: 1 TABLET ORAL at 17:33

## 2023-06-28 RX ADMIN — HEPARIN SODIUM 10000 UNITS: 1000 INJECTION INTRAVENOUS; SUBCUTANEOUS at 23:06

## 2023-06-28 RX ADMIN — HEPARIN SODIUM 18 UNITS/KG/HR: 10000 INJECTION, SOLUTION INTRAVENOUS at 23:07

## 2023-06-28 RX ADMIN — MAGNESIUM SULFATE HEPTAHYDRATE 2 G: 40 INJECTION, SOLUTION INTRAVENOUS at 18:30

## 2023-06-28 RX ADMIN — ALUMINUM HYDROXIDE, MAGNESIUM HYDROXIDE, AND DIMETHICONE 30 ML: 200; 20; 200 SUSPENSION ORAL at 17:33

## 2023-06-28 RX ADMIN — SODIUM CHLORIDE, SODIUM LACTATE, POTASSIUM CHLORIDE, AND CALCIUM CHLORIDE 500 ML: .6; .31; .03; .02 INJECTION, SOLUTION INTRAVENOUS at 18:27

## 2023-06-28 RX ADMIN — IPRATROPIUM BROMIDE AND ALBUTEROL SULFATE 3 ML: .5; 3 SOLUTION RESPIRATORY (INHALATION) at 17:46

## 2023-06-28 RX ADMIN — FAMOTIDINE 20 MG: 10 INJECTION INTRAVENOUS at 17:34

## 2023-06-28 NOTE — ED PROVIDER NOTES
"History  Chief Complaint   Patient presents with   • Shortness of Breath     Pt reports difficulty breathing when smoke was present past week or so, had sore throat right after, SOB  Never resolved, now is worsening, SOB at rest, +dyspnea with exertion, + chest discomfort/heaviness, +diarrhea/+nausea  Pt has hx back surgery w/ current morphine pump in use  • Chest Pain     Pt reports chest discomfort started 3 days ago     78-year-old male with past medical history of depression, well-controlled diabetes, hypertension on multiple medications, chronic back pain with an indwelling morphine pump, presents for evaluation of progressively worsening shortness of breath x2 to 3 days with generalized central chest pressure with intermittent sharp chest pains throughout this time  Patient also reports he has had multiple episodes of diarrhea over the past 2 days with mild nausea but no vomiting  Patient states he initially thought that symptoms were related to a \"cold\" because he recently had self resolving sore throat, shortness of breath and cough 2 weeks ago when there was significant \"smoke in the area  \"  He reports he has had significant difficulty even walking 1 to 2 feet without significant shortness of breath  No fever/chills, abdominal pain, increased lower extremity swelling/pain or new back pain or any other symptoms  No other concerns  Prior to Admission Medications   Prescriptions Last Dose Informant Patient Reported? Taking?    Diclofenac Sodium (VOLTAREN) 1 %   No No   Sig: Apply 2 g topically 4 (four) times a day   amLODIPine (NORVASC) 10 mg tablet   No No   Sig: Take 1 tablet (10 mg total) by mouth daily   atorvastatin (LIPITOR) 20 mg tablet  Self No No   Sig: Take 1 tablet (20 mg total) by mouth daily   chlorthalidone 25 mg tablet   No No   Sig: Take 1 tablet (25 mg total) by mouth daily   hydrocortisone 2 5 % cream   No No   Sig: Apply topically to face BID for no longer than 7 days for " flares   ketoconazole (NIZORAL) 2 % shampoo   No No   Sig: Apply topically to scalp, eyebrows, and face  Leave on for 5 minutes and then rinse   losartan (COZAAR) 100 MG tablet   No No   Sig: Take 1 tablet (100 mg total) by mouth daily   metoprolol tartrate (LOPRESSOR) 100 mg tablet   No No   Sig: Take 1 tablet (100 mg total) by mouth 2 (two) times a day   morphine (MS CONTIN) 15 mg 12 hr tablet  Self Yes No   Sig: take 1 tablet every 8 hours if needed pain   oxyCODONE (ROXICODONE) 30 MG immediate release tablet  Self Yes No   Sig: take 1 tablet by mouth every 5 hours if needed for pain   Caesar [place] MAX 4/DAY   spironolactone (ALDACTONE) 25 mg tablet   No No   Sig: Take 1 tablet (25 mg total) by mouth daily   tadalafil (CIALIS) 5 MG tablet  Self Yes No   Sig: Take 5 mg by mouth daily as needed for erectile dysfunction (unsure dosage) Indications: Erectile Dysfunction  Facility-Administered Medications: None       Past Medical History:   Diagnosis Date   • Acne    • Anxiety    • Arthritis    • Back pain    • Chronic pain    • Depression 06/07/2021   • Diabetes mellitus ([facility] )     6/19/15 A1C: 5 5%   • Eczema    • Grief reaction 09/10/2019   • Hyperlipidemia    • Hypertension    • Knee pain    • Morbid obesity with BMI of 50 0-59 9, [facility])    • Psychiatric disorder    • Skin tag    • Type 2 diabetes mellitus without complication ([facility] 75 ) 41/78/8232   • Urinary retention        Past Surgical History:   Procedure Laterality Date   • BACK SURGERY     • LUMBAR FUSION  2002   • LUMBAR FUSION  2004    L4-5-6, S1 has pump for narcotics       Family History   Problem Relation Age of Onset   • Other Mother         colitis   • Diabetes Mother      I have reviewed and agree with the history as documented      E-Cigarette/Vaping   • E-Cigarette Use Never User      E-Cigarette/Vaping Substances   • Nicotine No    • THC No    • CBD No    • Flavoring No    • Other No      Social History     Tobacco Use   • Smoking status: Every Day Types: Cigars   • Smokeless tobacco: Never   • Tobacco comments:     1 cigar daily,  50 pack years, 1/2 pack at age 12, 2 ppd x 20 yrs, quit 2004   Vaping Use   • Vaping Use: Never used   Substance Use Topics   • Alcohol use: Not Currently     Comment: occasional   • Drug use: No     Comment: History of drug use, meena, cocaine, heroind, no IVDA-as per [employer]        Review of Systems   Constitutional: Negative for chills and fever  HENT: Negative for ear pain and sore throat  Eyes: Negative for pain and visual disturbance  Respiratory: Positive for shortness of breath  Negative for cough  Cardiovascular: Positive for chest pain  Negative for palpitations  Gastrointestinal: Positive for diarrhea  Negative for abdominal pain, nausea and vomiting  Genitourinary: Negative for dysuria and hematuria  Musculoskeletal: Negative for arthralgias and back pain  Skin: Negative for color change and rash  Neurological: Negative for seizures and syncope  All other systems reviewed and are negative  Physical Exam  ED Triage Vitals [06/28/23 1639]   Temperature Pulse Respirations Blood Pressure SpO2   97 6 °F (36 4 °C) 91 22 139/76 97 %      Temp Source Heart Rate Source Patient Position - Orthostatic VS BP Location FiO2 (%)   Oral Monitor Lying Right arm --      Pain Score       4             Orthostatic Vital Signs  Vitals:    06/28/23 1639 06/28/23 1830 06/28/23 2052   BP: 139/76 114/55 114/58   Pulse: 91 82 85   Patient Position - Orthostatic VS: Lying Lying Lying       Physical Exam  Vitals and nursing note reviewed  Constitutional:       General: He is in acute distress  Appearance: He is well-developed  He is obese  He is not ill-appearing, toxic-appearing or diaphoretic  HENT:      Head: Normocephalic and atraumatic  Eyes:      Extraocular Movements: Extraocular movements intact        Conjunctiva/sclera: Conjunctivae normal    Cardiovascular:      Rate and Rhythm: Normal rate and regular rhythm  Heart sounds: No murmur heard  Pulmonary:      Effort: Respiratory distress present  Breath sounds: Examination of the right-upper field reveals decreased breath sounds and wheezing  Examination of the left-upper field reveals decreased breath sounds and wheezing  Examination of the right-middle field reveals decreased breath sounds and wheezing  Examination of the left-middle field reveals decreased breath sounds and wheezing  Examination of the right-lower field reveals decreased breath sounds and wheezing  Examination of the left-lower field reveals decreased breath sounds and wheezing  Decreased breath sounds and wheezing present  No rhonchi or rales  Chest:      Chest wall: No tenderness, crepitus or edema  Abdominal:      Palpations: Abdomen is soft  Tenderness: There is no abdominal tenderness  Musculoskeletal:         General: No swelling  Normal range of motion  Cervical back: Normal range of motion and neck supple  Right lower leg: No tenderness  No edema  Left lower leg: No tenderness  No edema  Skin:     General: Skin is warm and dry  Capillary Refill: Capillary refill takes less than 2 seconds  Neurological:      Mental Status: He is alert and oriented to person, place, and time     Psychiatric:         Mood and Affect: Mood normal          Behavior: Behavior normal          ED Medications  Medications   levalbuterol (XOPENEX) inhalation solution 1 25 mg (1 25 mg Nebulization Given 6/28/23 1826)   ipratropium (ATROVENT) 0 02 % inhalation solution 0 5 mg (0 5 mg Nebulization Given 6/28/23 1826)   heparin (porcine) injection 10,000 Units (has no administration in time range)   heparin (porcine) 25,000 units in 0 45% NaCl 250 mL infusion (premix) (has no administration in time range)   heparin (porcine) injection 10,000 Units (has no administration in time range)   heparin (porcine) injection 5,000 Units (has no administration in time range) Famotidine (PF) (PEPCID) injection 20 mg (20 mg Intravenous Given 6/28/23 1734)   aluminum-magnesium hydroxide-simethicone (MYLANTA) oral suspension 30 mL (30 mL Oral Given 6/28/23 1733)   sucralfate (CARAFATE) tablet 1 g (1 g Oral Given 6/28/23 1733)   ipratropium-albuterol (DUO-NEB) 0 5-2 5 mg/3 mL inhalation solution **ADS Override Pull** (3 mL Nebulization Given 6/28/23 1746)   lactated ringers bolus 500 mL (0 mL Intravenous Stopped 6/28/23 2007)   magnesium sulfate 2 g/50 mL IVPB (premix) 2 g (0 g Intravenous Stopped 6/28/23 2007)   sodium chloride 0 9 % bolus 500 mL (500 mL Intravenous New Bag 6/28/23 2011)   iohexol (OMNIPAQUE) 350 MG/ML injection (SINGLE-DOSE) 85 mL (85 mL Intravenous Given 6/28/23 1902)       Diagnostic Studies  Results Reviewed     Procedure Component Value Units Date/Time    APTT [[id number]]     Lab Status: No result Specimen: Blood     HS Troponin I 2hr [[id number]]  (Normal) Collected: 06/28/23 2011    Lab Status: Final result Specimen: Blood from Arm, Right Updated: 06/28/23 2040     hs TnI 2hr 3 ng/L      Delta 2hr hsTnI -2 ng/L     HS Troponin 0hr (reflex protocol) [[id number]]  (Normal) Collected: 06/28/23 1713    Lab Status: Final result Specimen: Blood from Arm, Right Updated: 06/28/23 1749     hs TnI 0hr 5 ng/L     B-Type Natriuretic Peptide(BNP) [[id number]]  (Normal) Collected: 06/28/23 1713    Lab Status: Final result Specimen: Blood from Arm, Right Updated: 06/28/23 1748     BNP 33 pg/mL     Comprehensive metabolic panel [[id number]]  (Abnormal) Collected: 06/28/23 1713    Lab Status: Final result Specimen: Blood from Arm, Right Updated: 06/28/23 1741     Sodium 135 mmol/L      Potassium 4 2 mmol/L      Chloride 101 mmol/L      CO2 24 mmol/L      ANION GAP 10 mmol/L      BUN 20 mg/dL      Creatinine 1 47 mg/dL      Glucose 153 mg/dL      Calcium 9 4 mg/dL      AST 13 U/L      ALT 13 U/L      Alkaline Phosphatase 75 U/L      Total Protein 8 0 g/dL      Albumin 4 4 g/dL Total Bilirubin 0 53 mg/dL      eGFR 50 ml/min/1 73sq m     Narrative:      [place] guidelines for Chronic Kidney Disease (CKD):   •  Stage 1 with normal or high GFR (GFR > 90 mL/min/1 73 square meters)  •  Stage 2 Mild CKD (GFR = 60-89 mL/min/1 73 square meters)  •  Stage 3A Moderate CKD (GFR = 45-59 mL/min/1 73 square meters)  •  Stage 3B Moderate CKD (GFR = 30-44 mL/min/1 73 square meters)  •  Stage 4 Severe CKD (GFR = 15-29 mL/min/1 73 square meters)  •  Stage 5 End Stage CKD (GFR <15 mL/min/1 73 square meters)  Note: GFR calculation is accurate only with a steady state creatinine    Lipase [[id number]]  (Normal) Collected: 06/28/23 1713    Lab Status: Final result Specimen: Blood from Arm, Right Updated: 06/28/23 1741     Lipase 21 u/L     Magnesium [[id number]]  (Abnormal) Collected: 06/28/23 1713    Lab Status: Final result Specimen: Blood from Arm, Right Updated: 06/28/23 1741     Magnesium 1 5 mg/dL     D-Dimer [[id number]]  (Abnormal) Collected: 06/28/23 1713    Lab Status: Final result Specimen: Blood from Arm, Right Updated: 06/28/23 1740     D-Dimer, Quant 1 27 ug/ml FEU     Narrative: In the evaluation for possible pulmonary embolism, in the appropriate (Well's Score of 4 or less) patient, the age adjusted d-dimer cutoff for this patient can be calculated as:    Age x 0 01 (in ug/mL) for Age-adjusted D-dimer exclusion threshold for a patient over 50 years      Protime-INR [[id number]]  (Normal) Collected: 06/28/23 1713    Lab Status: Final result Specimen: Blood from Arm, Right Updated: 06/28/23 1737     Protime 13 1 seconds      INR 0 97    APTT [[id number]]  (Normal) Collected: 06/28/23 1713    Lab Status: Final result Specimen: Blood from Arm, Right Updated: 06/28/23 1737     PTT 24 seconds     CBC and differential [[id number]]  (Abnormal) Collected: 06/28/23 1713    Lab Status: Final result Specimen: Blood from Arm, Right Updated: 06/28/23 1730     WBC 10 25 Thousand/uL RBC 5 29 Million/uL      Hemoglobin 15 2 g/dL      Hematocrit 45 2 %      MCV 85 fL      MCH 28 7 pg      MCHC 33 6 g/dL      RDW 13 8 %      MPV 10 1 fL      Platelets 645 Thousands/uL      nRBC 0 /100 WBCs      Neutrophils Relative 68 %      Immat GRANS % 2 %      Lymphocytes Relative 16 %      Monocytes Relative 6 %      Eosinophils Relative 7 %      Basophils Relative 1 %      Neutrophils Absolute 6 99 Thousands/µL      Immature Grans Absolute 0 25 Thousand/uL      Lymphocytes Absolute 1 67 Thousands/µL      Monocytes Absolute 0 58 Thousand/µL      Eosinophils Absolute 0 71 Thousand/µL      Basophils Absolute 0 05 Thousands/µL                  CTA ED chest PE Study   Final Result by Raúl Palacios MD (06/28 2109)      1  Areas of decreased attenuation within multiple branches of the pulmonary artery on the right side  It is uncertain whether these are artifacts or pulmonary emboli, and repeat CT with pulmonary embolism protocol on the dual-energy scanner is advised at    this time  2  There is 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 measuring up to 4 4 cm  Recommendation is for follow-up low radiation dose chest CT in one year           I personally discussed this study with FRANCOIS DOMINGUEZ on 6/28/2023 8:56 PM             Workstation performed: [id number]         XR chest 2 views    (Results Pending)   NM inpatient order    (Results Pending)         Procedures  ECG 12 Lead Documentation Only    Date/Time: 6/28/2023 4:41 PM    Performed by: Johny Bragg MD  Authorized by: Johny Bragg MD    Indications / Diagnosis:  Sob  ECG reviewed by me, the ED Provider: yes    Patient location:  ED  Previous ECG:     Previous ECG:  Compared to current    Comparison ECG info:  3/17/16    Similarity:  No change    Comparison to cardiac monitor: Yes    Interpretation:     Interpretation: non-specific    Rate:     ECG rate:  85    ECG rate assessment: normal    Rhythm:     Rhythm: sinus rhythm    Ectopy: Ectopy: aberrant    QRS:     QRS axis:  Normal    QRS intervals:  Normal  Conduction:     Conduction: normal    ST segments:     ST segments:  Normal  T waves:     T waves: non-specific and flattening            ED Course  ED Course as of 06/28/23 2240   Wed Jun 28, 2023   1808 D-Dimer, Quant(!): 1 27 2005 Stable, NAD  Able to walk to the bathroom without significant respiratory distress  PERC Rule for PE    Flowsheet Row Most Recent Value   PERC Rule for PE    Age >=50 1 Filed at: 06/28/2023 2039   HR >=100 0 Filed at: 06/28/2023 2039   O2 Sat on room air < 95% 1 Filed at: 06/28/2023 2039   History of PE or DVT 0 Filed at: 06/28/2023 2039   Recent trauma or surgery 0 Filed at: 06/28/2023 2039   Hemoptysis 0 Filed at: 06/28/2023 2039   Exogenous estrogen 0 Filed at: 06/28/2023 2039   Unilateral leg swelling 0 Filed at: 06/28/2023 2039   PERC Rule for PE Results 2 Filed at: 06/28/2023 2039                  Kelsey Hawkins' Criteria for PE    Flowsheet Row Most Recent Value   Wells' Criteria for PE    Clinical signs and symptoms of DVT 0 Filed at: 06/28/2023 2039   PE is primary diagnosis or equally likely 3 Filed at: 06/28/2023 2039   HR >100 0 Filed at: 06/28/2023 2039   Immobilization at least 3 days or Surgery in the previous 4 weeks 0 Filed at: 06/28/2023 2039   Previous, objectively diagnosed PE or DVT 0 Filed at: 06/28/2023 2039   Hemoptysis 0 Filed at: 06/28/2023 2039   Malignancy with treatment within 6 months or palliative 0 Filed at: 06/28/2023 2039   Kelsey Hawkins' Criteria Total 3 Filed at: 06/28/2023 2039            Medical Decision Making  Patient remained stable throughout ED course    Given sudden onset and progressively worsening dyspnea which patient reported was severe enough that he was unable to exert himself for longer than 1 to 2 feet before severe shortness of breath along with associated chest pressure/sharp pain, there were concerns for possible pneumonia versus PE, ACS, new onset CHF, "cardiomyopathy, pericarditis/myocarditis, pneumothorax  Patient was not found to have any notable or recorded known history of remarry pulmonary or cardiac pathology  A full work-up was performed which revealed an elevated D-dimer at 1 27 prompting a CTA chest PE study which revealed questionable \"Areas of decreased attenuation within multiple branches of the pulmonary artery on the right side  It is uncertain whether these are artifacts or pulmonary emboli, and repeat CT with pulmonary embolism protocol on the dual-energy scanner is advised at this time  \"  Patient's shortness of breath seemed to improve minimally after x2 DuoNebs and patient was able to ambulate slowly to the bathroom twice throughout ED course however he continued to exhibit severe dyspnea with position changing sitting up  At initial evaluation, patient's SPO2 was witnessed to drop from 97% to 93% on room air with sitting up forward  Given questionable, possible segmental/subsegmental PE on the right side, high ACS heparin was initiated  Admitted to [facility] for further management with plans for VQ scan  There is no evidence of acute cardiac ischemia on troponin or EKG  Otherwise CBC, CMP, lipase was nonacute  Pt understands and agreed with plan  All questions answered  No other concerns  Amount and/or Complexity of Data Reviewed  Labs: ordered  Decision-making details documented in ED Course  Radiology: ordered  Risk  OTC drugs  Prescription drug management  Decision regarding hospitalization              Disposition  Final diagnoses:   Dyspnea   Hypoxia   Chest pain   Pulmonary emboli [facility])     Time reflects when diagnosis was documented in both MDM as applicable and the Disposition within this note     Time User Action Codes Description Comment    6/28/2023  9:19 PM Berle Fraction Add [R06 00] Dyspnea     6/28/2023  9:19 PM Berle Fraction Add [R09 02] Hypoxia     6/28/2023  9:19 PM Michael Whitaker Add [R07 9] Chest pain     " 6/28/2023  9:47 PM Shonda Briggs Add [I26 99] Pulmonary emboli [facility])       ED Disposition     ED Disposition   Admit    Condition   Stable    Date/Time   Wed Jun 28, 2023  9:47 PM    Comment   Case was discussed with  and the patient's admission status was agreed to be Admission Status: inpatient status to the service of Dr Dick Escalante   Follow-up Information    None         Patient's Medications   Discharge Prescriptions    No medications on file     No discharge procedures on file  PDMP Review       Value Time User    PDMP Reviewed  Yes 1/8/2020  3:06 PM Aylin Rivera MD           ED Provider  Attending physically available and evaluated Gerhardt Moritz    ALEXUS managed the patient along with the ED Attending      Electronically Signed by         Nicole Shields MD  06/28/23 7573

## 2023-06-29 ENCOUNTER — APPOINTMENT (INPATIENT)
Dept: VASCULAR ULTRASOUND | Facility: HOSPITAL | Age: 63
DRG: 176 | End: 2023-06-29
Payer: COMMERCIAL

## 2023-06-29 ENCOUNTER — APPOINTMENT (INPATIENT)
Dept: NON INVASIVE DIAGNOSTICS | Facility: HOSPITAL | Age: 63
DRG: 176 | End: 2023-06-29
Payer: COMMERCIAL

## 2023-06-29 LAB
ALBUMIN SERPL BCP-MCNC: 3.9 G/DL (ref 3.5–5)
ALP SERPL-CCNC: 62 U/L (ref 34–104)
ALT SERPL W P-5'-P-CCNC: 12 U/L (ref 7–52)
ANION GAP SERPL CALCULATED.3IONS-SCNC: 7 MMOL/L
AORTIC ROOT: 3.9 CM
APICAL FOUR CHAMBER EJECTION FRACTION: 66 %
APTT PPP: 65 SECONDS (ref 23–37)
APTT PPP: 90 SECONDS (ref 23–37)
ASCENDING AORTA: 4.2 CM
AST SERPL W P-5'-P-CCNC: 11 U/L (ref 13–39)
BILIRUB SERPL-MCNC: 0.39 MG/DL (ref 0.2–1)
BUN SERPL-MCNC: 17 MG/DL (ref 5–25)
CALCIUM SERPL-MCNC: 8.9 MG/DL (ref 8.4–10.2)
CHLORIDE SERPL-SCNC: 104 MMOL/L (ref 96–108)
CO2 SERPL-SCNC: 25 MMOL/L (ref 21–32)
CREAT SERPL-MCNC: 1.49 MG/DL (ref 0.6–1.3)
E WAVE DECELERATION TIME: 256 MS
ERYTHROCYTE [DISTWIDTH] IN BLOOD BY AUTOMATED COUNT: 13.9 % (ref 11.6–15.1)
FRACTIONAL SHORTENING: 39 (ref 28–44)
GFR SERPL CREATININE-BSD FRML MDRD: 49 ML/MIN/1.73SQ M
GLUCOSE SERPL-MCNC: 118 MG/DL (ref 65–140)
GLUCOSE SERPL-MCNC: 144 MG/DL (ref 65–140)
GLUCOSE SERPL-MCNC: 150 MG/DL (ref 65–140)
GLUCOSE SERPL-MCNC: 159 MG/DL (ref 65–140)
GLUCOSE SERPL-MCNC: 216 MG/DL (ref 65–140)
HCT VFR BLD AUTO: 40 % (ref 36.5–49.3)
HGB BLD-MCNC: 13.3 G/DL (ref 12–17)
INTERVENTRICULAR SEPTUM IN DIASTOLE (PARASTERNAL SHORT AXIS VIEW): 1.1 CM
INTERVENTRICULAR SEPTUM: 1.1 CM (ref 0.6–1.1)
LAAS-AP2: 21.5 CM2
LAAS-AP4: 21.5 CM2
LEFT ATRIUM AREA SYSTOLE SINGLE PLANE A4C: 21.1 CM2
LEFT ATRIUM SIZE: 4.5 CM
LEFT ATRIUM VOLUME (MOD BIPLANE): 65 ML
LEFT INTERNAL DIMENSION IN SYSTOLE: 3.5 CM (ref 2.1–4)
LEFT VENTRICULAR INTERNAL DIMENSION IN DIASTOLE: 5.7 CM (ref 3.5–6)
LEFT VENTRICULAR POSTERIOR WALL IN END DIASTOLE: 1.1 CM
LEFT VENTRICULAR STROKE VOLUME: 114 ML
LVSV (TEICH): 114 ML
MAGNESIUM SERPL-MCNC: 1.9 MG/DL (ref 1.9–2.7)
MCH RBC QN AUTO: 28.5 PG (ref 26.8–34.3)
MCHC RBC AUTO-ENTMCNC: 33.3 G/DL (ref 31.4–37.4)
MCV RBC AUTO: 86 FL (ref 82–98)
MV E'TISSUE VEL-SEP: 7 CM/S
MV PEAK A VEL: 1.01 M/S
MV PEAK E VEL: 58 CM/S
MV STENOSIS PRESSURE HALF TIME: 74 MS
MV VALVE AREA P 1/2 METHOD: 2.97
PLATELET # BLD AUTO: 239 THOUSANDS/UL (ref 149–390)
PMV BLD AUTO: 9.7 FL (ref 8.9–12.7)
POTASSIUM SERPL-SCNC: 4.1 MMOL/L (ref 3.5–5.3)
PROT SERPL-MCNC: 6.9 G/DL (ref 6.4–8.4)
RBC # BLD AUTO: 4.66 MILLION/UL (ref 3.88–5.62)
RIGHT ATRIUM AREA SYSTOLE A4C: 18.9 CM2
RIGHT VENTRICLE ID DIMENSION: 3.9 CM
SL CV LEFT ATRIUM LENGTH A2C: 5.7 CM
SL CV LV EF: 60
SL CV PED ECHO LEFT VENTRICLE DIASTOLIC VOLUME (MOD BIPLANE) 2D: 163 ML
SL CV PED ECHO LEFT VENTRICLE SYSTOLIC VOLUME (MOD BIPLANE) 2D: 49 ML
SODIUM SERPL-SCNC: 136 MMOL/L (ref 135–147)
TRICUSPID ANNULAR PLANE SYSTOLIC EXCURSION: 2.3 CM
WBC # BLD AUTO: 10.53 THOUSAND/UL (ref 4.31–10.16)

## 2023-06-29 PROCEDURE — 85730 THROMBOPLASTIN TIME PARTIAL: CPT | Performed by: HOSPITALIST

## 2023-06-29 PROCEDURE — 94640 AIRWAY INHALATION TREATMENT: CPT

## 2023-06-29 PROCEDURE — 80053 COMPREHEN METABOLIC PANEL: CPT

## 2023-06-29 PROCEDURE — 82948 REAGENT STRIP/BLOOD GLUCOSE: CPT

## 2023-06-29 PROCEDURE — 93306 TTE W/DOPPLER COMPLETE: CPT

## 2023-06-29 PROCEDURE — 94760 N-INVAS EAR/PLS OXIMETRY 1: CPT

## 2023-06-29 PROCEDURE — 85027 COMPLETE CBC AUTOMATED: CPT

## 2023-06-29 PROCEDURE — 83735 ASSAY OF MAGNESIUM: CPT

## 2023-06-29 PROCEDURE — 85730 THROMBOPLASTIN TIME PARTIAL: CPT | Performed by: INTERNAL MEDICINE

## 2023-06-29 PROCEDURE — 93306 TTE W/DOPPLER COMPLETE: CPT | Performed by: INTERNAL MEDICINE

## 2023-06-29 PROCEDURE — 93970 EXTREMITY STUDY: CPT

## 2023-06-29 RX ORDER — POLYETHYLENE GLYCOL 3350 17 G/17G
17 POWDER, FOR SOLUTION ORAL DAILY
Status: DISCONTINUED | OUTPATIENT
Start: 2023-06-29 | End: 2023-06-30 | Stop reason: HOSPADM

## 2023-06-29 RX ADMIN — IPRATROPIUM BROMIDE 0.5 MG: 0.5 SOLUTION RESPIRATORY (INHALATION) at 13:18

## 2023-06-29 RX ADMIN — LEVALBUTEROL HYDROCHLORIDE 1.25 MG: 1.25 SOLUTION RESPIRATORY (INHALATION) at 21:17

## 2023-06-29 RX ADMIN — IPRATROPIUM BROMIDE 0.5 MG: 0.5 SOLUTION RESPIRATORY (INHALATION) at 21:17

## 2023-06-29 RX ADMIN — LEVALBUTEROL HYDROCHLORIDE 1.25 MG: 1.25 SOLUTION RESPIRATORY (INHALATION) at 09:11

## 2023-06-29 RX ADMIN — LEVALBUTEROL HYDROCHLORIDE 1.25 MG: 1.25 SOLUTION RESPIRATORY (INHALATION) at 13:18

## 2023-06-29 RX ADMIN — POLYETHYLENE GLYCOL 3350 17 G: 17 POWDER, FOR SOLUTION ORAL at 16:55

## 2023-06-29 RX ADMIN — INSULIN LISPRO 10 UNITS: 100 INJECTION, SOLUTION INTRAVENOUS; SUBCUTANEOUS at 12:23

## 2023-06-29 RX ADMIN — METOPROLOL TARTRATE 100 MG: 50 TABLET, FILM COATED ORAL at 17:01

## 2023-06-29 RX ADMIN — HEPARIN SODIUM 18 UNITS/KG/HR: 10000 INJECTION, SOLUTION INTRAVENOUS at 23:46

## 2023-06-29 RX ADMIN — AMLODIPINE BESYLATE 10 MG: 5 TABLET ORAL at 08:45

## 2023-06-29 RX ADMIN — SPIRONOLACTONE 25 MG: 25 TABLET ORAL at 08:46

## 2023-06-29 RX ADMIN — CHLORTHALIDONE 25 MG: 25 TABLET ORAL at 17:01

## 2023-06-29 RX ADMIN — IPRATROPIUM BROMIDE 0.5 MG: 0.5 SOLUTION RESPIRATORY (INHALATION) at 09:11

## 2023-06-29 RX ADMIN — HEPARIN SODIUM 18 UNITS/KG/HR: 10000 INJECTION, SOLUTION INTRAVENOUS at 12:12

## 2023-06-29 RX ADMIN — ATORVASTATIN CALCIUM 20 MG: 40 TABLET, FILM COATED ORAL at 08:46

## 2023-06-29 RX ADMIN — METOPROLOL TARTRATE 100 MG: 50 TABLET, FILM COATED ORAL at 08:46

## 2023-06-29 RX ADMIN — LOSARTAN POTASSIUM 100 MG: 50 TABLET, FILM COATED ORAL at 08:45

## 2023-06-29 RX ADMIN — INSULIN LISPRO 5 UNITS: 100 INJECTION, SOLUTION INTRAVENOUS; SUBCUTANEOUS at 09:00

## 2023-06-29 NOTE — ASSESSMENT & PLAN NOTE
Lab Results   Component Value Date    BNP 33 06/28/2023     · Reporting SOB with ambulation for the past 4-5 days   · Reports URI symptoms at time of onset which have since resolved; denies cough currently  · Lungs clear to auscultation     · Hx of morbid obesity and cigar smoking   · No heart failure history on file  · BNP WDL   · D-Dimer 1 27   · Remainder of laboratory workup unremarkable  · Saturating >90% on ambient air at rest   · Received Xopenex and Atrovent in ED with symptomatic improvement  · EKG showed NSR at 87 BPM with no ischemic changes  · CXR official read pending; no acute pathology noted on wet read  · CT PE study equivocal; segmental/sub-segmental PE vs artifact   · Therapeutic heparin started in ED  · 6/29 pt notes symptoms are significantly improved    Plan  · Duonebs PRN   · Monitor O2 sat  · F/u lower extremity US   · Continue theraputic AC with heparin gtt pending further clinical data   · F/u echo to assess for R heart strain and baseline LV function    · PT eval and treat

## 2023-06-29 NOTE — ASSESSMENT & PLAN NOTE
-Takes Oxy 30 IR q6 prn and MS Contin 15 q8 prn at home for chronic back pain  -Continue home regimen, add miralax daily to prevent constipation

## 2023-06-29 NOTE — ASSESSMENT & PLAN NOTE
· Pt states that he no longer uses CPAP at home and denies any symptoms of IMELDA    · EKG without arrhythmia on presentation as above  · F/u ECHO

## 2023-06-29 NOTE — ASSESSMENT & PLAN NOTE
Lab Results   Component Value Date    EGFR 49 06/29/2023    EGFR 50 06/28/2023    EGFR 40 06/19/2023    CREATININE 1 49 (H) 06/29/2023    CREATININE 1 47 (H) 06/28/2023    CREATININE 1 74 (H) 06/19/2023     · In setting of T2DM and hypertension  · Received contrast for CT PE study in ED  · Renal function currently at apparent baseline   · BP control as above; continue home regimen

## 2023-06-29 NOTE — ASSESSMENT & PLAN NOTE
· Without evidence of acute exacerbation  · Continue home regimen with morphine and oxycodone PRN   · Continued topical Voltaren for admission
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
## 2023-06-29 NOTE — PLAN OF CARE
Problem: RESPIRATORY - ADULT  Goal: Achieves optimal ventilation and oxygenation  Description: INTERVENTIONS:  - Assess for changes in respiratory status  - Assess for changes in mentation and behavior  - Position to facilitate oxygenation and minimize respiratory effort  - Oxygen administered by appropriate delivery if ordered  - Initiate smoking cessation education as indicated  - Encourage broncho-pulmonary hygiene including cough, deep breathe, Incentive Spirometry  - Assess the need for suctioning and aspirate as needed  - Assess and instruct to report SOB or any respiratory difficulty  - Respiratory Therapy support as indicated  Outcome: Progressing     Problem: METABOLIC, FLUID AND ELECTROLYTES - ADULT  Goal: Glucose maintained within target range  Description: INTERVENTIONS:  - Monitor Blood Glucose as ordered  - Assess for signs and symptoms of hyperglycemia and hypoglycemia  - Administer ordered medications to maintain glucose within target range  - Assess nutritional intake and initiate nutrition service referral as needed  Outcome: Progressing     Problem: PAIN - ADULT  Goal: Verbalizes/displays adequate comfort level or baseline comfort level  Description: Interventions:  - Encourage patient to monitor pain and request assistance  - Assess pain using appropriate pain scale  - Administer analgesics based on type and severity of pain and evaluate response  - Implement non-pharmacological measures as appropriate and evaluate response  - Consider cultural and social influences on pain and pain management  - Notify physician/advanced practitioner if interventions unsuccessful or patient reports new pain  Outcome: Progressing

## 2023-06-29 NOTE — PLAN OF CARE
Problem: RESPIRATORY - ADULT  Goal: Achieves optimal ventilation and oxygenation  Description: INTERVENTIONS:  - Assess for changes in respiratory status  - Assess for changes in mentation and behavior  - Position to facilitate oxygenation and minimize respiratory effort  - Oxygen administered by appropriate delivery if ordered  - Initiate smoking cessation education as indicated  - Encourage broncho-pulmonary hygiene including cough, deep breathe, Incentive Spirometry  - Assess the need for suctioning and aspirate as needed  - Assess and instruct to report SOB or any respiratory difficulty  - Respiratory Therapy support as indicated  Outcome: Progressing     Problem: METABOLIC, FLUID AND ELECTROLYTES - ADULT  Goal: Glucose maintained within target range  Description: INTERVENTIONS:  - Monitor Blood Glucose as ordered  - Assess for signs and symptoms of hyperglycemia and hypoglycemia  - Administer ordered medications to maintain glucose within target range  - Assess nutritional intake and initiate nutrition service referral as needed  Outcome: Progressing     Problem: PAIN - ADULT  Goal: Verbalizes/displays adequate comfort level or baseline comfort level  Description: Interventions  - Encourage patient to monitor pain and request assistance  - Assess pain using appropriate pain scale  - Administer analgesics based on type and severity of pain and evaluate response  - Implement non-pharmacological measures as appropriate and evaluate response  - Consider cultural and social influences on pain and pain management  - Notify physician/advanced practitioner if interventions unsuccessful or patient reports new pain  Outcome: Progressing

## 2023-06-29 NOTE — ASSESSMENT & PLAN NOTE
BP Readings from Last 3 Encounters:   06/29/23 136/90   06/23/23 118/60   02/20/23 120/78     Plan:  • Continue chlorthalidone, metoprolol, losartan, spironolactone and amlodipine  • Monitor blood pressure per unit routine

## 2023-06-29 NOTE — ASSESSMENT & PLAN NOTE
Lab Results   Component Value Date    BNP 33 06/28/2023     · Reporting SOB with ambulation for 4-5 days prior to admission  · Reports URI symptoms at time of onset which have since resolved; denies cough currently  · Lungs clear to auscultation     · Hx of morbid obesity and cigar smoking   · No heart failure history on file  · BNP WDL   · D-Dimer 1 27   · Remainder of laboratory workup unremarkable  · Saturating >90% on ambient air at rest   · Received Xopenex and Atrovent in ED with symptomatic improvement  · EKG showed NSR at 87 BPM with no ischemic changes  · CXR official read pending; no acute pathology noted on wet read  · CT PE study 6/28  equivocal; segmental/sub-segmental PE vs artifact   · Repeat CT PE 6/30: Segmental and subsegmental pulmonary emboli, confirmed on repeat examination    · VAS showed no clot in either lower extremity  · No evidence or R heart strain on echo  · Therapeutic heparin started in ED    Plan  · Stable for discharge   · Start eliquis for discharge, 7 day loading dose of 10mg BID followed by 5 mg BID  · Recommend outpatient stress test

## 2023-06-29 NOTE — ASSESSMENT & PLAN NOTE
Lab Results   Component Value Date    HGBA1C 6 2 (H) 06/19/2023     Recent Labs     06/29/23  1536 06/29/23  2041 06/30/23  0715 06/30/23  1059   POCGLU 144* 118 138 184*       Blood Sugar Average: Last 72 hrs:  (P) 501 [id number]   Plan:  • Continue chlorthalidone  • Diet Consistent CHO Level 2 (5 CHO servings/75g CHO per meal)  • Insulin regimen  o SSI while inpatient   o Glucose checks and Insulin correction ACHS  • Goal -180 while admitted, adjusting insulin regimen as appropriate  • Monitor for hypoglycemia and treat per protocol

## 2023-06-29 NOTE — ASSESSMENT & PLAN NOTE
"Lab Results   Component Value Date    HGBA1C 6 2 (H) 06/19/2023     No results for input(s): \"POCGLU\" in the last 72 hours      Blood Sugar Average: Last 72 hrs:     Plan:  • Continue chlorthalidone  • Diet Consistent CHO Level 2 (5 CHO servings/75g CHO per meal)  • Insulin regimen  o SSI while inpatient   o Glucose checks and Insulin correction ACHS  • Goal -180 while admitted, adjusting insulin regimen as appropriate  • Monitor for hypoglycemia and treat per protocol    "

## 2023-06-29 NOTE — ASSESSMENT & PLAN NOTE
Lab Results   Component Value Date    EGFR 50 06/28/2023    EGFR 40 06/19/2023    EGFR 40 02/02/2023    CREATININE 1 47 (H) 06/28/2023    CREATININE 1 74 (H) 06/19/2023    CREATININE 1 76 (H) 02/02/2023     · In setting of T2DM and hypertension  · Received contrast for CT PE study in ED  · Renal function currently at apparent baseline   · BP control as above; continue home regimen

## 2023-06-29 NOTE — ASSESSMENT & PLAN NOTE
BP Readings from Last 3 Encounters:   06/28/23 145/65   06/23/23 118/60   02/20/23 120/78     Plan:  • Continue chlorthalidone, metoprolol, losartan, spironolactone and amlodipine  • Medications held: N/A   • Monitor blood pressure per unit routine

## 2023-06-29 NOTE — ASSESSMENT & PLAN NOTE
Lab Results   Component Value Date    HGBA1C 6 2 (H) 06/19/2023     Recent Labs     06/29/23  0801 06/29/23  1137   POCGLU 159* 216*       Blood Sugar Average: Last 72 hrs:  (P) 187 5   Plan:  • Continue chlorthalidone  • Diet Consistent CHO Level 2 (5 CHO servings/75g CHO per meal)  • Insulin regimen  o SSI while inpatient   o Glucose checks and Insulin correction ACHS  • Goal -180 while admitted, adjusting insulin regimen as appropriate  • Monitor for hypoglycemia and treat per protocol

## 2023-06-29 NOTE — H&P
"[facility]  H&P  Name: Kermit Howe  60 y o  male I MRN: [id number]  Unit/Bed#: S -01 I Date of Admission: 6/28/2023   Date of Service: 6/28/2023 I Hospital Day: 0      Assessment/Plan   Dyspnea on exertion  Assessment & Plan  Lab Results   Component Value Date    BNP 33 06/28/2023     · Reporting SOB with ambulation for the past 4-5 days   · Reports URI symptoms at time of onset which have since resolved; denies cough currently  · Lungs clear to auscultation     · Hx of morbid obesity and cigar smoking   · No heart failure history on file  · BNP WDL   · D-Dimer 1 27   · Remainder of laboratory workup unremarkable  · Saturating >90% on ambient air at rest   · Received Xopenex and Atrovent in ED with symptomatic improvement  · EKG showed NSR at 87 BPM with no ischemic changes  · CXR official read pending; no acute pathology noted on wet read  · CT PE study equivocal; segmental/sub-segmental PE vs artifact   · Therapeutic heparin started in ED    Plan  · Duonebs PRN   · Monitor O2 sat  · Follow up V/Q scan  · F/u lower extremity US   · Continue theraputic AC with heparin gtt pending further clinical data   · F/u echo to assess for R heart strain and baseline LV function    · PT eval and treat    Type 2 diabetes mellitus with stage 3b chronic kidney disease, without long-term current use of insulin ([facility])  Assessment & Plan  Lab Results   Component Value Date    HGBA1C 6 2 (H) 06/19/2023     No results for input(s): \"POCGLU\" in the last 72 hours      Blood Sugar Average: Last 72 hrs:     Plan:  • Continue chlorthalidone  • Diet Consistent CHO Level 2 (5 CHO servings/75g CHO per meal)  • Insulin regimen  o SSI while inpatient   o Glucose checks and Insulin correction ACHS  • Goal -180 while admitted, adjusting insulin regimen as appropriate  • Monitor for hypoglycemia and treat per protocol      Stage 3b chronic kidney disease [facility])  Assessment & Plan  Lab Results   Component " Value Date    EGFR 50 06/28/2023    EGFR 40 06/19/2023    EGFR 40 02/02/2023    CREATININE 1 47 (H) 06/28/2023    CREATININE 1 74 (H) 06/19/2023    CREATININE 1 76 (H) 02/02/2023     · In setting of T2DM and hypertension  · Received contrast for CT PE study in ED  · Renal function currently at apparent baseline   · BP control as above; continue home regimen      Rosacea  Assessment & Plan  · Continue topical steroid as needed for symtoms    IMELDA on CPAP  Assessment & Plan  · Continue CPAP while inpatient  · EKG without arrhythmia on presentation as above  · F/u ECHO    Essential hypertension  Assessment & Plan  BP Readings from Last 3 Encounters:   06/28/23 145/65   06/23/23 118/60   02/20/23 120/78     Plan:  • Continue chlorthalidone, metoprolol, losartan, spironolactone and amlodipine  • Medications held: N/A   • Monitor blood pressure per unit routine         Hyperlipidemia  Assessment & Plan  06/19/2023  Lab Results   Component Value Date    CHOLESTEROL 189 06/19/2023    TRIG 218 (H) 06/19/2023    HDL 35 (L) 06/19/2023    LDLCALC 110 (H) 06/19/2023       · Continue home atorvastatin 20 mg daily  · Counseled lifestyle and diet modification    Chronic back pain  Assessment & Plan  · Without evidence of acute exacerbation  · Continue home regimen with morphine and oxycodone PRN   · Continued topical Voltaren for admission       VTE Pharmacologic Prophylaxis: VTE Score: 4 Moderate Risk (Score 3-4) - Pharmacological DVT Prophylaxis Ordered: heparin drip  Code Status: Level 1 - Full Code   Discussion with family: Updated  (wife) at bedside  Anticipated Length of Stay: Patient will be admitted on an inpatient basis with an anticipated length of stay of greater than 2 midnights secondary to PATEL and r/o PE      Chief Complaint: SOB on exertion    History of Present Illness:  Josh Crawford  is a 60 y o  male with a PMH of morbid obesity, CKD,  hyperlipidemia, IMELDA, type 2 diabetes, hypertension and chronic lower back pain/radiculopathy who presents with 4-5-day history of dyspnea with ambulation  Patient reports that symptoms started with suspected URI and cough 5 days prior to presentation  Also reporting orthopnea when lying flat  Symptoms other than dyspnea have resolved spontaneously  In the ED patient exhibiting respiratory distress  O2 saturations remained greater than 90% on ambient air throughout ED course  BNP normal   CXR unremarkable to wet read  Troponins negative  EKG showed normal sinus rhythm  Symptoms somewhat improved with Xopenex and Atrovent in ED  D-dimer elevated to 1 27 on presentation  CT PE study equivocal; segmental/subsegmental PE versus artifact  Therapeutic heparin drip started in the ED in setting of suspected PE  Admitted to  IM service for further work-up and management  Transthoracic echo, VQ scan, and lower extremity VAS studies pending  Continued anticoagulation and supportive management for admission  Review of Systems:  Review of Systems   Constitutional: Positive for fatigue  Negative for chills and fever  HENT: Negative for ear pain and sore throat  Eyes: Negative for pain and visual disturbance  Respiratory: Positive for shortness of breath  Negative for cough  Cardiovascular: Negative for chest pain and palpitations  Gastrointestinal: Negative for abdominal pain and vomiting  Genitourinary: Negative for dysuria and hematuria  Musculoskeletal: Positive for back pain  Negative for arthralgias  Skin: Negative for color change and rash  Neurological: Negative for dizziness, seizures, syncope, weakness, numbness and headaches  Hematological: Negative  Psychiatric/Behavioral: Negative  All other systems reviewed and are negative        Past Medical and Surgical History:   Past Medical History:   Diagnosis Date   • Acne    • Anxiety    • Arthritis    • Back pain    • Chronic pain    • Depression 06/07/2021   • Diabetes mellitus ([facility] )     6/19/15 A1C: 5 5%   • Eczema    • Grief reaction 09/10/2019   • Hyperlipidemia    • Hypertension    • Knee pain    • Morbid obesity with BMI of 50 0-59 9, adult [facility])    • Psychiatric disorder    • Skin tag    • Type 2 diabetes mellitus without complication ([facility] ) 34/70/3429   • Urinary retention        Past Surgical History:   Procedure Laterality Date   • BACK SURGERY     • LUMBAR FUSION  2002   • LUMBAR FUSION  2004    L4-5-6, S1 has pump for narcotics       Meds/Allergies:  Prior to Admission medications    Medication Sig Start Date End Date Taking? Authorizing Provider   amLODIPine (NORVASC) 10 mg tablet Take 1 tablet (10 mg total) by mouth daily 5/31/23  Yes Art Lazar MD   atorvastatin (LIPITOR) 20 mg tablet Take 1 tablet (20 mg total) by mouth daily 9/12/22  Yes Chandler Mention Mashintonio, CRNP   chlorthalidone 25 mg tablet Take 1 tablet (25 mg total) by mouth daily 5/31/23  Yes Art Lazar MD   hydrocortisone 2 5 % cream Apply topically to face BID for no longer than 7 days for flares 2/6/23  Yes Doc Chu MD   ketoconazole (NIZORAL) 2 % shampoo Apply topically to scalp, eyebrows, and face  Leave on for 5 minutes and then rinse 5/31/23  Yes Doc Chu MD   losartan (COZAAR) 100 MG tablet Take 1 tablet (100 mg total) by mouth daily 2/20/23  Yes Art Lazar MD   metoprolol tartrate (LOPRESSOR) 100 mg tablet Take 1 tablet (100 mg total) by mouth 2 (two) times a day 6/23/23  Yes Maximo Higgins PA-C   morphine (MS CONTIN) 15 mg 12 hr tablet take 1 tablet every 8 hours if needed pain 12/1/18  Yes Historical Provider, MD   oxyCODONE (ROXICODONE) 30 MG immediate release tablet take 1 tablet by mouth every 5 hours if needed for pain   Larraine Means MAX 4/DAY 10/25/18  Yes Historical Provider, MD   spironolactone (ALDACTONE) 25 mg tablet Take 1 tablet (25 mg total) by mouth daily 6/23/23  Yes Maximo Higgins PA-C   Diclofenac Sodium (VOLTAREN) 1 % Apply 2 g topically 4 (four) times a day  Patient not taking: Reported on 6/28/2023 10/24/22   Margarita Jimenez PA-C   tadalafil (CIALIS) 5 MG tablet Take 5 mg by mouth daily as needed for erectile dysfunction (unsure dosage) Indications: Erectile Dysfunction  Historical Provider, MD     I have reviewed home medications with patient personally  Allergies: Allergies   Allergen Reactions   • Tamsulosin Abdominal Pain   • Gabapentin      Annotation - [id number]: dizziness   • Pregabalin      Annotation - [id number]: vision changes and mood changes   • Tramadol Nausea Only       Social History:  Marital Status: Legally    Patient Pre-hospital Living Situation: Home  Patient Pre-hospital Level of Mobility: walks  Patient Pre-hospital Diet Restrictions: Diabetic diet  Substance Use History: Cigar smoking  Social History     Substance and Sexual Activity   Alcohol Use Not Currently    Comment: occasional     Social History     Tobacco Use   Smoking Status Every Day   • Types: Cigars   Smokeless Tobacco Never   Tobacco Comments    1 cigar daily,  50 pack years, 1/2 pack at age 12, 2 ppd x 20 yrs, quit 2004     Social History     Substance and Sexual Activity   Drug Use No    Comment: History of drug use, meena, cocaine, heroind, no IVDA-as per [employer]       Family History:  Family History   Problem Relation Age of Onset   • Other Mother         colitis   • Diabetes Mother        Physical Exam:     Vitals:   Blood Pressure: 145/65 (06/28/23 2304)  Pulse: 104 (06/28/23 2304)  Temperature: 98 2 °F (36 8 °C) (06/28/23 2304)  Temp Source: Oral (06/28/23 2304)  Respirations: 21 (06/28/23 2304)  SpO2: 95 % (06/28/23 2304)    Physical Exam  Vitals and nursing note reviewed  Constitutional:       General: He is not in acute distress  Appearance: He is well-developed  He is obese  He is not toxic-appearing  HENT:      Head: Normocephalic and atraumatic        Mouth/Throat:      Mouth: Mucous membranes are moist       Pharynx: Oropharynx is clear  Eyes:      Conjunctiva/sclera: Conjunctivae normal    Cardiovascular:      Rate and Rhythm: Normal rate and regular rhythm  Heart sounds: No murmur heard  Pulmonary:      Effort: Pulmonary effort is normal  No respiratory distress  Breath sounds: Normal breath sounds  No wheezing or rhonchi  Abdominal:      General: Abdomen is flat  Palpations: Abdomen is soft  Tenderness: There is no abdominal tenderness  Musculoskeletal:         General: No swelling  Cervical back: Neck supple  Right lower leg: No edema  Left lower leg: No edema  Skin:     General: Skin is warm and dry  Capillary Refill: Capillary refill takes less than 2 seconds  Neurological:      General: No focal deficit present  Mental Status: He is alert and oriented to person, place, and time  Psychiatric:         Mood and Affect: Mood normal          Behavior: Behavior normal           Additional Data:     Lab Results:  Results from last 7 days   Lab Units 06/28/23  1713   WBC Thousand/uL 10 25*   HEMOGLOBIN g/dL 15 2   HEMATOCRIT % 45 2   PLATELETS Thousands/uL 259   NEUTROS PCT % 68   LYMPHS PCT % 16   MONOS PCT % 6   EOS PCT % 7*     Results from last 7 days   Lab Units 06/28/23  1713   SODIUM mmol/L 135   POTASSIUM mmol/L 4 2   CHLORIDE mmol/L 101   CO2 mmol/L 24   BUN mg/dL 20   CREATININE mg/dL 1 47*   ANION GAP mmol/L 10   CALCIUM mg/dL 9 4   ALBUMIN g/dL 4 4   TOTAL BILIRUBIN mg/dL 0 53   ALK PHOS U/L 75   ALT U/L 13   AST U/L 13   GLUCOSE RANDOM mg/dL 153*     Results from last 7 days   Lab Units 06/28/23  1713   INR  0 97                   Lines/Drains:  Invasive Devices     Peripheral Intravenous Line  Duration           Peripheral IV 06/28/23 Right Antecubital <1 day                    Imaging: Reviewed radiology reports from this admission including: chest xray and chest CT scan  CTA ED chest PE Study   Final Result by Mary Lou Argueta MD (06/28 2109)      1   Areas of decreased attenuation within multiple branches of the pulmonary artery on the right side  It is uncertain whether these are artifacts or pulmonary emboli, and repeat CT with pulmonary embolism protocol on the dual-energy scanner is advised at    this time  2  There is 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

## 2023-06-29 NOTE — ASSESSMENT & PLAN NOTE
Lab Results   Component Value Date    BNP 33 06/28/2023     · Reporting SOB with ambulation for the past 4-5 days   · Reports URI symptoms at time of onset which have since resolved; denies cough currently  · Lungs clear to auscultation     · Hx of morbid obesity and cigar smoking   · No heart failure history on file  · BNP WDL   · D-Dimer 1 27   · Remainder of laboratory workup unremarkable  · Saturating >90% on ambient air at rest   · Received Xopenex and Atrovent in ED with symptomatic improvement  · EKG showed NSR at 87 BPM with no ischemic changes  · CXR official read pending; no acute pathology noted on wet read  · CT PE study equivocal; segmental/sub-segmental PE vs artifact   · Therapeutic heparin started in ED    Plan  · Duonebs PRN   · Monitor O2 sat  · Follow up V/Q scan  · F/u lower extremity US   · Continue theraputic AC with heparin gtt pending further clinical data   · F/u echo to assess for R heart strain and baseline LV function    · PT eval and treat

## 2023-06-29 NOTE — PROGRESS NOTES
[facility]  Progress Note  Name: Zo Hansen I  MRN: [id number]  Unit/Bed#: S -01 I Date of Admission: 6/28/2023   Date of Service: 6/29/2023 I Hospital Day: 1    Assessment/Plan   Dyspnea on exertion  Assessment & Plan  Lab Results   Component Value Date    BNP 33 06/28/2023     · Reporting SOB with ambulation for the past 4-5 days   · Reports URI symptoms at time of onset which have since resolved; denies cough currently  · Lungs clear to auscultation     · Hx of morbid obesity and cigar smoking   · No heart failure history on file  · BNP WDL   · D-Dimer 1 27   · Remainder of laboratory workup unremarkable  · Saturating >90% on ambient air at rest   · Received Xopenex and Atrovent in ED with symptomatic improvement  · EKG showed NSR at 87 BPM with no ischemic changes  · CXR official read pending; no acute pathology noted on wet read  · CT PE study equivocal; segmental/sub-segmental PE vs artifact   · Therapeutic heparin started in ED  · 6/29 pt notes symptoms are significantly improved    Plan  · Duonebs PRN   · Monitor O2 sat  · F/u lower extremity US   · Continue theraputic AC with heparin gtt pending further clinical data   · F/u echo to assess for R heart strain and baseline LV function    · PT eval and treat    Continuous opioid dependence (HCC)  Assessment & Plan  -Takes Oxy 30 IR q6 prn and MS Contin 15 q8 prn at home for chronic back pain  -Continue home regimen, add miralax daily to prevent constipation    Type 2 diabetes mellitus with stage 3b chronic kidney disease, without long-term current use of insulin [facility])  Assessment & Plan  Lab Results   Component Value Date    HGBA1C 6 2 (H) 06/19/2023     Recent Labs     06/29/23  0801 06/29/23  1137   POCGLU 159* 216*       Blood Sugar Average: Last 72 hrs:  (P) 187 5   Plan:  • Continue chlorthalidone  • Diet Consistent CHO Level 2 (5 CHO servings/75g CHO per meal)  • Insulin regimen  o SSI while inpatient   o Glucose checks and Insulin correction ACHS  • Goal -180 while admitted, adjusting insulin regimen as appropriate  • Monitor for hypoglycemia and treat per protocol      Stage 3b chronic kidney disease [facility])  Assessment & Plan  Lab Results   Component Value Date    EGFR 49 06/29/2023    EGFR 50 06/28/2023    EGFR 40 06/19/2023    CREATININE 1 49 (H) 06/29/2023    CREATININE 1 47 (H) 06/28/2023    CREATININE 1 74 (H) 06/19/2023     · In setting of T2DM and hypertension  · Received contrast for CT PE study in ED  · Renal function currently at apparent baseline   · BP control as above; continue home regimen      Rosacea  Assessment & Plan  · Continue topical steroid as needed for symtoms    IMELDA on CPAP  Assessment & Plan  · Pt states that he no longer uses CPAP at home and denies any symptoms of IMELDA  · EKG without arrhythmia on presentation as above  · F/u ECHO    Essential hypertension  Assessment & Plan  BP Readings from Last 3 Encounters:   06/29/23 136/90   06/23/23 118/60   02/20/23 120/78     Plan:  • Continue chlorthalidone, metoprolol, losartan, spironolactone and amlodipine  • Monitor blood pressure per unit routine         Hyperlipidemia  Assessment & Plan  06/19/2023  Lab Results   Component Value Date    CHOLESTEROL 189 06/19/2023    TRIG 218 (H) 06/19/2023    HDL 35 (L) 06/19/2023    LDLCALC 110 (H) 06/19/2023       · Continue home atorvastatin 20 mg daily  · Counseled lifestyle and diet modification    Chronic back pain  Assessment & Plan  · Without evidence of acute exacerbation  · Continue home regimen with morphine and oxycodone PRN   · Continued topical Voltaren for admission           VTE Pharmacologic Prophylaxis: VTE Score: 4 Moderate Risk (Score 3-4) - Pharmacological DVT Prophylaxis Ordered: heparin drip  Patient Centered Rounds: I performed bedside rounds with nursing staff today    Discussions with Specialists or Other Care Team Provider: Dr Tiffanie Reyes    Education and Discussions with Family / Patient: Patient declined call to   Current Length of Stay: 1 day(s)  Current Patient Status: Inpatient   Discharge Plan: Anticipate discharge in 24-48 hrs to home  Code Status: Level 1 - Full Code    Subjective:   Pt seen at bedside  He states that he feels much better and is no longer short of breath  He notes that his chest pain is mostly gone as well, although he notes some occasional L sided chest pain, non-pleuritic  Objective:     Vitals:   Temp (24hrs), Av 8 °F (36 6 °C), Min:97 5 °F (36 4 °C), Max:98 2 °F (36 8 °C)    Temp:  [97 5 °F (36 4 °C)-98 2 °F (36 8 °C)] 97 5 °F (36 4 °C)  HR:  [] 80  Resp:  [19-22] 19  BP: (114-145)/(55-90) 136/90  SpO2:  [92 %-99 %] 92 %  Body mass index is 49 03 kg/m²  Input and Output Summary (last 24 hours): Intake/Output Summary (Last 24 hours) at 2023 1352  Last data filed at 2023 0911  Gross per 24 hour   Intake 800 ml   Output 900 ml   Net -100 ml       Physical Exam:   Physical Exam  Constitutional:       General: He is not in acute distress  Appearance: He is obese  HENT:      Head: Normocephalic and atraumatic  Mouth/Throat:      Pharynx: Oropharynx is clear  Eyes:      Extraocular Movements: Extraocular movements intact  Conjunctiva/sclera: Conjunctivae normal    Cardiovascular:      Rate and Rhythm: Normal rate and regular rhythm  Pulses: Normal pulses  Heart sounds: Normal heart sounds  Pulmonary:      Effort: Pulmonary effort is normal       Breath sounds: Normal breath sounds  Abdominal:      General: There is distension  Palpations: Abdomen is soft  Tenderness: There is no abdominal tenderness  Musculoskeletal:      Right lower leg: No edema  Left lower leg: No edema  Skin:     General: Skin is warm and dry  Neurological:      Mental Status: He is alert and oriented to person, place, and time            Additional Data:     Labs:  Results from last 7 days   Lab Units 06/29/23  0607 06/28/23  1713   WBC Thousand/uL 10 53* 10 25*   HEMOGLOBIN g/dL 13 3 15 2   HEMATOCRIT % 40 0 45 2   PLATELETS Thousands/uL 239 259   NEUTROS PCT %  --  68   LYMPHS PCT %  --  16   MONOS PCT %  --  6   EOS PCT %  --  7*     Results from last 7 days   Lab Units 06/29/23  0607   SODIUM mmol/L 136   POTASSIUM mmol/L 4 1   CHLORIDE mmol/L 104   CO2 mmol/L 25   BUN mg/dL 17   CREATININE mg/dL 1 49*   ANION GAP mmol/L 7   CALCIUM mg/dL 8 9   ALBUMIN g/dL 3 9   TOTAL BILIRUBIN mg/dL 0 39   ALK PHOS U/L 62   ALT U/L 12   AST U/L 11*   GLUCOSE RANDOM mg/dL 150*     Results from last 7 days   Lab Units 06/28/23  1713   INR  0 97     Results from last 7 days   Lab Units 06/29/23  1137 06/29/23  0801   POC GLUCOSE mg/dl 216* 159*               Lines/Drains:  Invasive Devices     Peripheral Intravenous Line  Duration           Peripheral IV 06/28/23 Right Antecubital <1 day                  Telemetry:  Telemetry Orders (From admission, onward)             24 Hour Telemetry Monitoring  Continuous x 24 Hours (Telem)        Question:  Reason for 24 Hour Telemetry  Answer:  Pulmonary Embolism                 Telemetry Reviewed: Normal Sinus Rhythm  Indication for Continued Telemetry Use: PE             Imaging: Reviewed radiology reports from this admission including: chest xray and chest CT scan    Recent Cultures (last 7 days):         Last 24 Hours Medication List:   Current Facility-Administered Medications   Medication Dose Route Frequency Provider Last Rate   • amLODIPine  10 mg Oral Daily Shonda Duval MD     • atorvastatin  20 mg Oral Daily Shonda Duval MD     • chlorthalidone  25 mg Oral Daily Shonda Duval MD     • Diclofenac Sodium  2 g Topical 4x Daily Shonda Duval MD     • heparin (porcine)  3-30 Units/kg/hr (Order-Specific) Intravenous Titrated Hung Walton MD 18 Units/kg/hr (06/29/23 1212)   • heparin (porcine)  10,000 Units Intravenous Q6H PRN Hung Walton MD     • heparin (porcine) 5,000 Units Intravenous Q6H PRN Amando Bragg MD     • hydrocortisone   Topical BID PRN Janie Pedraza MD     • insulin lispro  5-25 Units Subcutaneous TID [facility] Janie Pedraza MD     • ipratropium  0 5 mg Nebulization TID Amando Bragg MD     • ipratropium-albuterol  3 mL Nebulization Q6H PRN Janie Pedraza MD     • levalbuterol  1 25 mg Nebulization TID Amando Bragg MD     • losartan  100 mg Oral Daily Janie Pedraza MD     • metoprolol tartrate  100 mg Oral BID Janie Pedraza MD     • morphine  15 mg Oral Q8H PRN Janie Pedraza MD     • oxyCODONE  30 mg Oral Q6H PRN Janie Pedraza MD     • polyethylene glycol  17 g Oral Daily Nasima Ochoa DO     • spironolactone  25 mg Oral Daily Janie Pedraza MD          Today, Patient Was Seen By: Nasima Ochoa DO    **Please Note: This note may have been constructed using a voice recognition system  **

## 2023-06-29 NOTE — ASSESSMENT & PLAN NOTE
06/19/2023  Lab Results   Component Value Date    CHOLESTEROL 189 06/19/2023    TRIG 218 (H) 06/19/2023    HDL 35 (L) 06/19/2023    LDLCALC 110 (H) 06/19/2023       · Continue home atorvastatin 20 mg daily  · Counseled lifestyle and diet modification

## 2023-06-29 NOTE — PHYSICAL THERAPY NOTE
Physical Therapy Screen Note     06/29/23 1600   Note Type   Note type Screen   Additional Comments referral received for PT eval and tx  pt reports ambulating w/o difficulty and has no concerns regarding return home from mobility perspective  inpatiennt PT is not indicatd due to pt's independent mobility status  discontinue PT  notified Anneliese Solorio and Mani Six CM of screening for pt inpatient PT services       Aurea Dickson, PT

## 2023-06-30 ENCOUNTER — APPOINTMENT (INPATIENT)
Dept: CT IMAGING | Facility: HOSPITAL | Age: 63
DRG: 176 | End: 2023-06-30
Payer: COMMERCIAL

## 2023-06-30 VITALS
SYSTOLIC BLOOD PRESSURE: 125 MMHG | RESPIRATION RATE: 16 BRPM | HEIGHT: 70 IN | DIASTOLIC BLOOD PRESSURE: 69 MMHG | HEART RATE: 76 BPM | BODY MASS INDEX: 45.1 KG/M2 | TEMPERATURE: 97.7 F | WEIGHT: 315 LBS | OXYGEN SATURATION: 98 %

## 2023-06-30 LAB
ANION GAP SERPL CALCULATED.3IONS-SCNC: 4 MMOL/L
APTT PPP: 77 SECONDS (ref 23–37)
BASOPHILS # BLD AUTO: 0.07 THOUSANDS/ÂΜL (ref 0–0.1)
BASOPHILS NFR BLD AUTO: 1 % (ref 0–1)
BUN SERPL-MCNC: 19 MG/DL (ref 5–25)
CALCIUM SERPL-MCNC: 9 MG/DL (ref 8.4–10.2)
CHLORIDE SERPL-SCNC: 104 MMOL/L (ref 96–108)
CO2 SERPL-SCNC: 28 MMOL/L (ref 21–32)
CREAT SERPL-MCNC: 1.52 MG/DL (ref 0.6–1.3)
EOSINOPHIL # BLD AUTO: 0.63 THOUSAND/ÂΜL (ref 0–0.61)
EOSINOPHIL NFR BLD AUTO: 7 % (ref 0–6)
ERYTHROCYTE [DISTWIDTH] IN BLOOD BY AUTOMATED COUNT: 14.2 % (ref 11.6–15.1)
GFR SERPL CREATININE-BSD FRML MDRD: 48 ML/MIN/1.73SQ M
GLUCOSE SERPL-MCNC: 131 MG/DL (ref 65–140)
GLUCOSE SERPL-MCNC: 138 MG/DL (ref 65–140)
GLUCOSE SERPL-MCNC: 184 MG/DL (ref 65–140)
HCT VFR BLD AUTO: 40 % (ref 36.5–49.3)
HGB BLD-MCNC: 13.1 G/DL (ref 12–17)
IMM GRANULOCYTES # BLD AUTO: 0.19 THOUSAND/UL (ref 0–0.2)
IMM GRANULOCYTES NFR BLD AUTO: 2 % (ref 0–2)
LYMPHOCYTES # BLD AUTO: 1.99 THOUSANDS/ÂΜL (ref 0.6–4.47)
LYMPHOCYTES NFR BLD AUTO: 22 % (ref 14–44)
MAGNESIUM SERPL-MCNC: 1.8 MG/DL (ref 1.9–2.7)
MCH RBC QN AUTO: 29.1 PG (ref 26.8–34.3)
MCHC RBC AUTO-ENTMCNC: 32.8 G/DL (ref 31.4–37.4)
MCV RBC AUTO: 89 FL (ref 82–98)
MONOCYTES # BLD AUTO: 0.56 THOUSAND/ÂΜL (ref 0.17–1.22)
MONOCYTES NFR BLD AUTO: 6 % (ref 4–12)
NEUTROPHILS # BLD AUTO: 5.44 THOUSANDS/ÂΜL (ref 1.85–7.62)
NEUTS SEG NFR BLD AUTO: 62 % (ref 43–75)
NRBC BLD AUTO-RTO: 0 /100 WBCS
PLATELET # BLD AUTO: 219 THOUSANDS/UL (ref 149–390)
PMV BLD AUTO: 9.6 FL (ref 8.9–12.7)
POTASSIUM SERPL-SCNC: 4.6 MMOL/L (ref 3.5–5.3)
RBC # BLD AUTO: 4.5 MILLION/UL (ref 3.88–5.62)
SODIUM SERPL-SCNC: 136 MMOL/L (ref 135–147)
WBC # BLD AUTO: 8.88 THOUSAND/UL (ref 4.31–10.16)

## 2023-06-30 PROCEDURE — 94760 N-INVAS EAR/PLS OXIMETRY 1: CPT

## 2023-06-30 PROCEDURE — 82948 REAGENT STRIP/BLOOD GLUCOSE: CPT

## 2023-06-30 PROCEDURE — G1004 CDSM NDSC: HCPCS

## 2023-06-30 PROCEDURE — 80048 BASIC METABOLIC PNL TOTAL CA: CPT

## 2023-06-30 PROCEDURE — 99239 HOSP IP/OBS DSCHRG MGMT >30: CPT | Performed by: HOSPITALIST

## 2023-06-30 PROCEDURE — 94640 AIRWAY INHALATION TREATMENT: CPT

## 2023-06-30 PROCEDURE — 85730 THROMBOPLASTIN TIME PARTIAL: CPT | Performed by: HOSPITALIST

## 2023-06-30 PROCEDURE — 85025 COMPLETE CBC W/AUTO DIFF WBC: CPT

## 2023-06-30 PROCEDURE — 71275 CT ANGIOGRAPHY CHEST: CPT

## 2023-06-30 PROCEDURE — 83735 ASSAY OF MAGNESIUM: CPT

## 2023-06-30 PROCEDURE — 93970 EXTREMITY STUDY: CPT | Performed by: SURGERY

## 2023-06-30 RX ORDER — LANOLIN ALCOHOL/MO/W.PET/CERES
400 CREAM (GRAM) TOPICAL 2 TIMES DAILY
Status: DISCONTINUED | OUTPATIENT
Start: 2023-06-30 | End: 2023-06-30 | Stop reason: HOSPADM

## 2023-06-30 RX ORDER — IPRATROPIUM BROMIDE AND ALBUTEROL SULFATE 2.5; .5 MG/3ML; MG/3ML
3 SOLUTION RESPIRATORY (INHALATION) 3 TIMES DAILY PRN
Qty: 45 ML | Refills: 0 | Status: SHIPPED | OUTPATIENT
Start: 2023-06-30 | End: 2023-07-05

## 2023-06-30 RX ADMIN — IOHEXOL 65 ML: 350 INJECTION, SOLUTION INTRAVENOUS at 10:57

## 2023-06-30 RX ADMIN — LEVALBUTEROL HYDROCHLORIDE 1.25 MG: 1.25 SOLUTION RESPIRATORY (INHALATION) at 07:21

## 2023-06-30 RX ADMIN — METOPROLOL TARTRATE 100 MG: 50 TABLET, FILM COATED ORAL at 08:55

## 2023-06-30 RX ADMIN — IPRATROPIUM BROMIDE 0.5 MG: 0.5 SOLUTION RESPIRATORY (INHALATION) at 13:24

## 2023-06-30 RX ADMIN — LOSARTAN POTASSIUM 100 MG: 50 TABLET, FILM COATED ORAL at 08:55

## 2023-06-30 RX ADMIN — HEPARIN SODIUM 18 UNITS/KG/HR: 10000 INJECTION, SOLUTION INTRAVENOUS at 10:50

## 2023-06-30 RX ADMIN — IPRATROPIUM BROMIDE 0.5 MG: 0.5 SOLUTION RESPIRATORY (INHALATION) at 07:20

## 2023-06-30 RX ADMIN — ATORVASTATIN CALCIUM 20 MG: 40 TABLET, FILM COATED ORAL at 08:56

## 2023-06-30 RX ADMIN — SPIRONOLACTONE 25 MG: 25 TABLET ORAL at 08:56

## 2023-06-30 RX ADMIN — INSULIN LISPRO 5 UNITS: 100 INJECTION, SOLUTION INTRAVENOUS; SUBCUTANEOUS at 11:47

## 2023-06-30 RX ADMIN — APIXABAN 10 MG: 5 TABLET, FILM COATED ORAL at 14:58

## 2023-06-30 RX ADMIN — Medication 400 MG: at 08:56

## 2023-06-30 RX ADMIN — AMLODIPINE BESYLATE 10 MG: 5 TABLET ORAL at 08:55

## 2023-06-30 RX ADMIN — LEVALBUTEROL HYDROCHLORIDE 1.25 MG: 1.25 SOLUTION RESPIRATORY (INHALATION) at 13:24

## 2023-06-30 NOTE — CASE MANAGEMENT
Case Management Progress Note    Patient name Regla Villanueva  Location S /S -01 MRN [id number]  : 1960 Date 2023       LOS (days): 2  Geometric Mean LOS (GMLOS) (days): 2 50  Days to GMLOS:0 8        OBJECTIVE:        Current admission status: Inpatient  Preferred Pharmacy:   [facility] - [address] [place] [id number]  Phone: [phone] Fax: [phone]    Primary Care Provider: MARJORIE Shelton    Primary Insurance: [facility] RE[facility]  Secondary Insurance: [address] [place] [address]    PROGRESS NOTE:    Per SLIM rounds, patient to be started on Eliquis  Call made to pharmacy to check cost for same; pharmacist reports it comes to $4 30 and is ready to fill today  TT sent to SLIM MD to relay above  Met with patient at bedside and reviewed cost of Eliquis  He confirmed agreement with same; also requested MDs order breathing treatments for him at discharge - confirmed he has nebulizer already at home  TT sent to MDs to relay request  No other d/c needs identified at this time  IMM reviewed with patient, patient agrees with discharge determination

## 2023-06-30 NOTE — DISCHARGE INSTR - AVS FIRST PAGE
Dear Poornima Crespo ,     It was our pleasure to care for you here at [facility]  It is our hope that we were always able to exceed the expected standards for your care during your stay  You were hospitalized due to shortness of breath due to pulmonary embolism  You were cared for on the 3rd floor by Dana Charles DO under the service of Ruthie Roth MD with the [facility] who covers for your primary care physician (PCP), MARJORIE Villafana, while you were hospitalized  If you have any questions or concerns related to this hospitalization, you may contact us at [phone]  For follow up as well as any medication refills, we recommend that you follow up with your primary care physician  A registered nurse will reach out to you by phone within a few days after your discharge to answer any additional questions that you may have after going home  However, at this time we provide for you here, the most important instructions / recommendations at discharge:     Notable Medication Adjustments -   Start taking Eliquis 10mg 2x/day for 7 days, followed by 5mg 2x/day  You have 5 days of duo-neb treatments to be used 3x/day as needed for wheezing and shortness of breath  You may discuss renewal of these with your PCP at your upcoming appointment  Testing Required after Discharge -   Recommend cardiac stress test  ** Please contact your PCP to request testing orders for any of the testing recommended here **  Important follow up information -   Please go to your previously scheduled PCP appointment to follow up on this hospitalization, duration of anticoagulation therapy with Eliquis, and to follow up on ordering your recommended cardiac stress test    Other Instructions -   Please call your doctor or return to the hospital if symptoms worsen     Please review this entire after visit summary as additional general instructions including medication list, appointments, activity, diet, any pertinent wound care, and other additional recommendations from your care team that may be provided for you        Sincerely,     Virgen Almonte, DO

## 2023-06-30 NOTE — UTILIZATION REVIEW
NOTIFICATION OF INPATIENT ADMISSION   AUTHORIZATION REQUEST   SERVICING FACILITY:   [address] [facility], [address] [place] [address]  Tax ID: [id number]  NPI: [id number]   ATTENDING PROVIDER:  Attending Name and NPI#: Felton Wharton Md [[id number]]  Address: [address] [place] [address] [place]  [place], [address] [place] [address]  Phone: [phone]     ADMISSION INFORMATION:  Place of Service: Inpatient [facility]  [address]  Place of Service Code: 21  Inpatient Admission Date/Time: 6/28/23  9:47 PM  Discharge Date/Time: No discharge date for patient encounter  Admitting Diagnosis Code/Description:  Shortness of breath [R06 02]  Chest pain [R07 9]  Dyspnea [R06 00]  Hypoxia [R09 02]  Pulmonary emboli ([address] ) [I26 99]     UTILIZATION REVIEW CONTACT:  Carla Neil Utilization   Network Utilization Review Department  Phone: [phone]  Fax: [phone]  Email: [email or web address]  Contact for approvals/pending authorizations, clinical reviews, and discharge  PHYSICIAN ADVISORY SERVICES:  Medical Necessity Denial & Icph-sl-[facility] Review  Phone: [phone]  Fax: [phone]  Email: [email or web address]

## 2023-06-30 NOTE — PLAN OF CARE
Problem: RESPIRATORY - ADULT  Goal: Achieves optimal ventilation and oxygenation  Description: INTERVENTIONS:  - Assess for changes in respiratory status  - Assess for changes in mentation and behavior  - Position to facilitate oxygenation and minimize respiratory effort  - Oxygen administered by appropriate delivery if ordered  - Initiate smoking cessation education as indicated  - Encourage broncho-pulmonary hygiene including cough, deep breathe, Incentive Spirometry  - Assess the need for suctioning and aspirate as needed  - Assess and instruct to report SOB or any respiratory difficulty  - Respiratory Therapy support as indicated  Outcome: Progressing     Problem: METABOLIC, FLUID AND ELECTROLYTES - ADULT  Goal: Glucose maintained within target range  Description: INTERVENTIONS:  - Monitor Blood Glucose as ordered  - Assess for signs and symptoms of hyperglycemia and hypoglycemia  - Administer ordered medications to maintain glucose within target range  - Assess nutritional intake and initiate nutrition service referral as needed  Outcome: Progressing     Problem: PAIN - ADULT  Goal: Verbalizes/displays adequate comfort level or baseline comfort level  Description: Interventions:  - Encourage patient to monitor pain and request assistance  - Assess pain using appropriate pain scale  - Administer analgesics based on type and severity of pain and evaluate response  - Implement non-pharmacological measures as appropriate and evaluate response  - Consider cultural and social influences on pain and pain management  - Notify physician/advanced practitioner if interventions unsuccessful or patient reports new pain  Outcome: Progressing Cooperative

## 2023-06-30 NOTE — DISCHARGE SUMMARY
[facility]  Discharge- Emry Roddy  1960, 60 y o  male MRN: [id number]  Unit/Bed#: S -01 Encounter: [id number]  Primary Care Provider: MARJORIE Boyce   Date and time admitted to hospital: 6/28/2023  4:33 PM    Dyspnea on exertion  Assessment & Plan  Lab Results   Component Value Date    BNP 33 06/28/2023     · Reporting SOB with ambulation for 4-5 days prior to admission  · Reports URI symptoms at time of onset which have since resolved; denies cough currently  · Lungs clear to auscultation     · Hx of morbid obesity and cigar smoking   · No heart failure history on file  · BNP WDL   · D-Dimer 1 27   · Remainder of laboratory workup unremarkable  · Saturating >90% on ambient air at rest   · Received Xopenex and Atrovent in ED with symptomatic improvement  · EKG showed NSR at 87 BPM with no ischemic changes  · CXR official read pending; no acute pathology noted on wet read  · CT PE study 6/28  equivocal; segmental/sub-segmental PE vs artifact   · Repeat CT PE 6/30: Segmental and subsegmental pulmonary emboli, confirmed on repeat examination    · VAS showed no clot in either lower extremity  · No evidence or R heart strain on echo  · Therapeutic heparin started in ED    Plan  · Stable for discharge   · Start eliquis for discharge, 7 day loading dose of 10mg BID followed by 5 mg BID  · Recommend outpatient stress test      Continuous opioid dependence ([address] )  Assessment & Plan  -Takes Oxy 30 IR q6 prn and MS Contin 15 q8 prn at home for chronic back pain  -Continue home regimen, add miralax daily to prevent constipation    Type 2 diabetes mellitus with stage 3b chronic kidney disease, without long-term current use of insulin [facility])  Assessment & Plan  Lab Results   Component Value Date    HGBA1C 6 2 (H) 06/19/2023     Recent Labs     06/29/23  1536 06/29/23  2041 06/30/23  0715 06/30/23  1059   POCGLU 144* 118 138 184*       Blood Sugar Average: Last 72 hrs:  (P) 886 [id number]   Plan:  • Continue chlorthalidone  • Diet Consistent CHO Level 2 (5 CHO servings/75g CHO per meal)  • Insulin regimen  o SSI while inpatient   o Glucose checks and Insulin correction ACHS  • Goal -180 while admitted, adjusting insulin regimen as appropriate  • Monitor for hypoglycemia and treat per protocol      Stage 3b chronic kidney disease [facility])  Assessment & Plan  Lab Results   Component Value Date    EGFR 49 06/29/2023    EGFR 50 06/28/2023    EGFR 40 06/19/2023    CREATININE 1 49 (H) 06/29/2023    CREATININE 1 47 (H) 06/28/2023    CREATININE 1 74 (H) 06/19/2023     · In setting of T2DM and hypertension  · Received contrast for CT PE study in ED  · Renal function currently at apparent baseline   · BP control as above; continue home regimen      Rosacea  Assessment & Plan  · Continue topical steroid as needed for symtoms    IMELDA on CPAP  Assessment & Plan  · Pt states that he no longer uses CPAP at home and denies any symptoms of IMELDA    · EKG without arrhythmia on presentation as above  · F/u ECHO    Essential hypertension  Assessment & Plan  BP Readings from Last 3 Encounters:   06/29/23 136/90   06/23/23 118/60   02/20/23 120/78     Plan:  • Continue chlorthalidone, metoprolol, losartan, spironolactone and amlodipine  • Monitor blood pressure per unit routine         Hyperlipidemia  Assessment & Plan  06/19/2023  Lab Results   Component Value Date    CHOLESTEROL 189 06/19/2023    TRIG 218 (H) 06/19/2023    HDL 35 (L) 06/19/2023    LDLCALC 110 (H) 06/19/2023       · Continue home atorvastatin 20 mg daily  · Counseled lifestyle and diet modification    Chronic back pain  Assessment & Plan  · Without evidence of acute exacerbation  · Continue home regimen with morphine and oxycodone PRN   · Continued topical Voltaren for admission      Medical Problems     Resolved Problems  Date Reviewed: 6/30/2023   None       Discharging Resident: Virgen Almonte DO  Discharging Attending: Adrian Kit Carson, "MD  PCP: Zayra Meade[address]  Admission Date:   Admission Orders (From admission, onward)     Ordered        06/28/23 2147  INPATIENT ADMISSION  Once                      Discharge Date: 06/30/23    Consultations During Hospital Stay:  · none    Procedures Performed:   · none    Significant Findings / Test Results:   · CT PE: Segmental and subsegmental pulmonary emboli, confirmed on repeat examination  Incidental Findings:   · 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  Recommendation is for follow-up low radiation dose chest CT in one year  · I reviewed the above mentioned incidental findings with the patient and/or family and they expressed understanding  Test Results Pending at Discharge (will require follow up):  · none     Outpatient Tests Requested:  · none    Complications:  none    Reason for Admission: Shortness of breath/PE    Hospital Course:   Kermit Howe  is a 60 y o  male patient who originally presented to the hospital on 6/28/2023 due to 4-5 days of SOB  Workup on arrival notable for WBC 10 25, CTA-PE equivicol for R sided pulmonary emboli vs artifact  He was started on a heparin drip  When seen the morning of 6/29 he noted significant improvement of symptoms  Echo showed no evidence of right heart strain  VAS lower extremities 6/29 showed no evidence of DVT  Repeat CTA-PE on 6/30 showed \"Segmental and subsegmental pulmonary emboli, confirmed on repeat examination  \" Symptoms continued to improve and on 6/30 he was cleared to discharge, initiating eliquis 7 days loading dose of 10mg bid followed by 5mg bid, as well as the recommendation that he speak to his PCP regarding an outpatient stress test        Please see above list of diagnoses and related plan for additional information       Condition at Discharge: stable    Discharge Day Visit / Exam:   Subjective:  Pt seen at bedside while eating breakfast  States that he feels well with no acute " "complaints, no SOB or CP  Vitals: Blood Pressure: 125/69 (06/30/23 0717)  Pulse: 76 (06/30/23 0717)  Temperature: 97 7 °F (36 5 °C) (06/30/23 0717)  Temp Source: Oral (06/29/23 2230)  Respirations: 16 (06/29/23 2230)  Height: 5' 10\" (177 8 cm) (06/29/23 1120)  Weight - Scale: (!) 155 kg (341 lb 11 4 oz) (06/29/23 1120)  SpO2: 98 % (06/30/23 0721)  Exam:   Physical Exam  Constitutional:       General: He is not in acute distress  Appearance: He is obese  HENT:      Head: Normocephalic and atraumatic  Mouth/Throat:      Mouth: Mucous membranes are moist    Eyes:      Extraocular Movements: Extraocular movements intact  Conjunctiva/sclera: Conjunctivae normal    Cardiovascular:      Rate and Rhythm: Normal rate and regular rhythm  Pulses: Normal pulses  Heart sounds: Normal heart sounds  Pulmonary:      Effort: Pulmonary effort is normal       Breath sounds: Normal breath sounds  Abdominal:      General: There is distension  Palpations: Abdomen is soft  Tenderness: There is no abdominal tenderness  Musculoskeletal:      Right lower leg: No edema  Left lower leg: No edema  Skin:     General: Skin is warm and dry  Neurological:      Mental Status: He is alert and oriented to person, place, and time  Discussion with Family: Patient declined call to   Discharge instructions/Information to patient and family:   See after visit summary for information provided to patient and family  Provisions for Follow-Up Care:  See after visit summary for information related to follow-up care and any pertinent home health orders  Disposition:   Home    Planned Readmission: no    Discharge Medications:  See after visit summary for reconciled discharge medications provided to patient and/or family        **Please Note: This note may have been constructed using a voice recognition system**    "

## 2023-06-30 NOTE — INCIDENTAL FINDINGS
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

## 2023-07-03 ENCOUNTER — OFFICE VISIT (OUTPATIENT)
Dept: FAMILY MEDICINE CLINIC | Facility: CLINIC | Age: 63
End: 2023-07-03
Payer: COMMERCIAL

## 2023-07-03 ENCOUNTER — RA CDI HCC (OUTPATIENT)
Dept: OTHER | Facility: HOSPITAL | Age: 63
End: 2023-07-03

## 2023-07-03 ENCOUNTER — TRANSITIONAL CARE MANAGEMENT (OUTPATIENT)
Dept: FAMILY MEDICINE CLINIC | Facility: CLINIC | Age: 63
End: 2023-07-03

## 2023-07-03 ENCOUNTER — TELEPHONE (OUTPATIENT)
Dept: FAMILY MEDICINE CLINIC | Facility: CLINIC | Age: 63
End: 2023-07-03

## 2023-07-03 VITALS
OXYGEN SATURATION: 97 % | RESPIRATION RATE: 18 BRPM | HEIGHT: 70 IN | DIASTOLIC BLOOD PRESSURE: 78 MMHG | SYSTOLIC BLOOD PRESSURE: 128 MMHG | BODY MASS INDEX: 45.1 KG/M2 | WEIGHT: 315 LBS | HEART RATE: 84 BPM

## 2023-07-03 DIAGNOSIS — E11.22 TYPE 2 DIABETES MELLITUS WITH STAGE 3A CHRONIC KIDNEY DISEASE, WITHOUT LONG-TERM CURRENT USE OF INSULIN (HCC): ICD-10-CM

## 2023-07-03 DIAGNOSIS — F33.9 RECURRENT MAJOR DEPRESSIVE DISORDER, REMISSION STATUS UNSPECIFIED (HCC): ICD-10-CM

## 2023-07-03 DIAGNOSIS — I26.99 PULMONARY EMBOLISM, UNSPECIFIED CHRONICITY, UNSPECIFIED PULMONARY EMBOLISM TYPE, UNSPECIFIED WHETHER ACUTE COR PULMONALE PRESENT (HCC): Primary | ICD-10-CM

## 2023-07-03 DIAGNOSIS — E78.5 HYPERLIPIDEMIA, UNSPECIFIED HYPERLIPIDEMIA TYPE: ICD-10-CM

## 2023-07-03 DIAGNOSIS — I77.810 THORACIC AORTIC ECTASIA (HCC): ICD-10-CM

## 2023-07-03 DIAGNOSIS — E66.01 MORBID OBESITY (HCC): ICD-10-CM

## 2023-07-03 DIAGNOSIS — I10 ESSENTIAL HYPERTENSION: ICD-10-CM

## 2023-07-03 DIAGNOSIS — R06.09 DYSPNEA ON EXERTION: ICD-10-CM

## 2023-07-03 DIAGNOSIS — N18.31 TYPE 2 DIABETES MELLITUS WITH STAGE 3A CHRONIC KIDNEY DISEASE, WITHOUT LONG-TERM CURRENT USE OF INSULIN (HCC): ICD-10-CM

## 2023-07-03 LAB
ATRIAL RATE: 85 BPM
P AXIS: 63 DEGREES
PR INTERVAL: 176 MS
QRS AXIS: 6 DEGREES
QRSD INTERVAL: 90 MS
QT INTERVAL: 364 MS
QTC INTERVAL: 433 MS
T WAVE AXIS: 114 DEGREES
VENTRICULAR RATE: 85 BPM

## 2023-07-03 PROCEDURE — 99496 TRANSJ CARE MGMT HIGH F2F 7D: CPT | Performed by: NURSE PRACTITIONER

## 2023-07-03 PROCEDURE — 93010 ELECTROCARDIOGRAM REPORT: CPT | Performed by: INTERNAL MEDICINE

## 2023-07-03 PROCEDURE — 1111F DSCHRG MED/CURRENT MED MERGE: CPT | Performed by: NURSE PRACTITIONER

## 2023-07-03 RX ORDER — ATORVASTATIN CALCIUM 20 MG/1
20 TABLET, FILM COATED ORAL DAILY
Qty: 90 TABLET | Refills: 1 | Status: SHIPPED | OUTPATIENT
Start: 2023-07-03

## 2023-07-03 NOTE — PROGRESS NOTES
[address] [facility] coding opportunities     E11.40, F33.9     Chart Reviewed number of suggestions sent to Provider: 2     Patients Insurance     Medicare Insurance: [facility]

## 2023-07-03 NOTE — UTILIZATION REVIEW
NOTIFICATION OF ADMISSION DISCHARGE   This is a Notification of Discharge from [address] [place] [address]. Please be advised that this patient has been discharge from our facility. Below you will find the admission and discharge date and time including the patient’s disposition. UTILIZATION REVIEW CONTACT:  Romayne Rayas, MA  Utilization   Network Utilization Review Department  Phone: [phone] x carefully listen to the prompts. All voicemails are confidential.  Email: [email or web address]     ADMISSION INFORMATION  PRESENTATION DATE: 6/28/2023  4:33 PM  OBERVATION ADMISSION DATE:   INPATIENT ADMISSION DATE: 6/28/23  9:47 PM   DISCHARGE DATE: 6/30/2023  3:43 PM   DISPOSITION:Home/Self Care    IMPORTANT INFORMATION:  Send all requests for admission clinical reviews, approved or denied determinations and any other requests to dedicated fax number below belonging to the campus where the patient is receiving treatment.  List of dedicated fax numbers:  [place] DENIALS (Administrative/Medical Necessity) [phone] [address] [facility] (Maternity/NICU/Pediatrics) [phone]   [facility] [phone]   [facility] [phone] [address] [facility] [address] [phone]   [address] [place] [address] [phone]   [facility] [phone]   [address] [place] [address] [address] [place] [address] [phone]   [address] [place] [address] [phone]   [facility] [phone] [address] [facility] [address] [phone]

## 2023-07-03 NOTE — PROGRESS NOTES
Name: Snehal Lawton. : 1960      MRN: [id number]  Encounter Provider: MARJORIE Sal  Encounter Date: 7/3/2023   Encounter department: [address] [place] [address]     1. Pulmonary embolism, unspecified chronicity, unspecified pulmonary embolism type, unspecified whether acute cor pulmonale present [facility])  -     Ambulatory Referral to Pulmonology; Future  -     Ambulatory Referral to Cardiology; Future    Patient is here for a hospital follow-up for PE. Patient went to the ED on 23 with SOB with exertion and chest pain. D-Dimer was elevated. CT to check for a PE was done and it was equivocal.   Heparin was started in ED. Patient was admitted. Patient's symptoms improved. Patient had a CT scan done again on 23 and a PE was noted. Heparin was d/c and eliquis was started. Patient was discharged home from the hospital on 23. Patient reports that he feels better. Denies anymore chest pain or SOB. Patient reports that he is taking the eliquis as prescribed. Continue eliquis as prescribed. Patient referred to pulmonary for follow-up. 2. Dyspnea on exertion  -     Ambulatory Referral to Cardiology; Future    Patient denies any SOB since he was in the hospital.  Denies any chest pain. Outpatient stress test was recommended. Patient referred to cardiology for further evaluation and cardiac testing. 3. Thoracic aortic ectasia (HCC)  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 noted on CT scan. Recommendation is for follow-up low radiation dose chest CT in one year.        4. Type 2 diabetes mellitus with stage 3a chronic kidney disease, without long-term current use of insulin (HCC)  -     Albumin / creatinine urine ratio  -     Ambulatory Referral to Cardiology; Future  -     Comprehensive metabolic panel; Future; Expected date: 10/03/2023    Hemoglobin A1c is 6.2.    Patient instructed to eat a healthy low fat/low carb diet. 5. Hyperlipidemia, unspecified hyperlipidemia type  -     atorvastatin (LIPITOR) 20 mg tablet; Take 1 tablet (20 mg total) by mouth daily  -     Ambulatory Referral to Cardiology; Future  -     Lipid Panel with Direct LDL reflex; Future; Expected date: 10/03/2023  -     Comprehensive metabolic panel; Future; Expected date: 10/03/2023    Patient reports that he ran out of his atorvastatin a few months ago. Total cholesterol 189, , Triglycerides 218. Patient instructed to take his atorvastatin as prescribed. Repeat lipid panel in 3 months. Patient instructed to eat a healthy low fat/low carb diet. 6. Essential hypertension  -     Albumin / creatinine urine ratio  -     Ambulatory Referral to Cardiology; Future  -     Comprehensive metabolic panel; Future; Expected date: 10/03/2023    Continue to follow-up with nephrology. 7. Recurrent major depressive disorder, remission status unspecified ([address])  Patient reports that he manages his depression on his own. Denies any suicidal thoughts or homicidal thoughts. 8. Morbid obesity ([address])  -     Ambulatory Referral to Cardiology; Future  Patient instructed to eat a healthy low fat/low carb diet. Importance of following up with specialists discussed. Lab work ordered. Patient instructed to follow-up in 3 months or sooner prn. BMI Counseling: Body mass index is 48.5 kg/m². The BMI is above normal. Nutrition recommendations include encouraging healthy choices of fruits and vegetables, decreasing fast food intake, consuming healthier snacks, reducing intake of saturated and trans fat and reducing intake of cholesterol. Rationale for BMI follow-up plan is due to patient being overweight or obese. Depression Screening and Follow-up Plan: Patient was screened for depression during today's encounter. They screened negative with a PHQ-2 score of 2. Subjective      Patient is here for a TCM. Patient went to the ED on 6/28/23 with SOB with exertion and chest pain. Patient had an EKG and lab work done. D-Dimer was elevated. CT to check for a PE was done and it was equivocal. Heparin was started in ED. Patient was admitted. Patient's symptoms improved. Patient had a CT scan done again on 6/30/23 and a PE was noted. Heparin was d/c and eliquis was started. Patient was discharged home from the hospital on 6/30/23. Patient reports that he is taking the eliquis as prescribed. Patient reports that he is feeling better. Patient reports that the SOB went away. Denies any chest pain. Patient is here for a follow-up for DM 2. Patient reports that he watches his diet. Denies any Has, nausea, or vomiting. Patient is here for a follow-up for obesity and hyperlipidemia. Patient reports that he ran out of his atorvastatin a few months. Patient reports that he watches his diet. Patient follows up with nephrology for HTN and chronic kidney disease. Patient is here for a follow-up for depression. Patient reports that he manages the depression on his own. Denies any suicidal thoughts or homicidal thoughts. TCM Call     Date and time call was made  7/3/2023  8:49 AM    Hospital care reviewed  Records reviewed    Patient was hospitialized at  [facility]    Date of Admission  06/28/23    Date of discharge  06/30/23    Diagnosis  Dyspnea on exertion    Disposition  Home    Were the patients medications reviewed and updated  Yes    Current Symptoms  None      TCM Call     Post hospital issues  None    Should patient be enrolled in anticoag monitoring? No    Scheduled for follow up?   Yes    Did you obtain your prescribed medications  Yes    Do you need help managing your prescriptions or medications  No    Is transportation to your appointment needed  No    I have advised the patient to call PCP with any new or worsening symptoms    Elza Kaye MA    Living Arrangements  Alone Support System  Family    The type of support provided  None    Do you have social support  Yes, as much as I need    Are you recieving any outpatient services  No    Are you recieving home care services  No    Are you using any community resources  No    Current waiver services  No    Have you fallen in the last 12 months  No    Interperter language line needed  No    Counseling  Patient          Review of Systems   Constitutional: Negative for chills and fever. HENT: Negative for congestion, ear pain, sinus pressure and sore throat. Respiratory: Negative for cough, chest tightness and wheezing. Cardiovascular: Negative for chest pain. Gastrointestinal: Negative for abdominal pain, diarrhea, nausea and vomiting. Genitourinary: Negative for dysuria and hematuria. Skin: Negative for rash. Neurological: Negative for dizziness, seizures, syncope, light-headedness and headaches. Psychiatric/Behavioral: Negative for suicidal ideas. As noted in HPI. Current Outpatient Medications on File Prior to Visit   Medication Sig   • amLODIPine (NORVASC) 10 mg tablet Take 1 tablet (10 mg total) by mouth daily   • apixaban (Eliquis) 5 mg Take 2 tablets (10 mg total) by mouth 2 (two) times a day for 7 days   • [START ON 7/7/2023] apixaban (Eliquis) 5 mg Take 1 tablet (5 mg total) by mouth 2 (two) times a day Do not start before July 7, 2023. • chlorthalidone 25 mg tablet Take 1 tablet (25 mg total) by mouth daily   • hydrocortisone 2.5 % cream Apply topically to face BID for no longer than 7 days for flares   • ipratropium-albuterol (DUO-NEB) 0.5-2.5 mg/3 mL nebulizer solution Take 3 mL by nebulization 3 (three) times a day as needed for wheezing or shortness of breath for up to 5 days   • ketoconazole (NIZORAL) 2 % shampoo Apply topically to scalp, eyebrows, and face.  Leave on for 5 minutes and then rinse   • losartan (COZAAR) 100 MG tablet Take 1 tablet (100 mg total) by mouth daily   • metoprolol tartrate (LOPRESSOR) 100 mg tablet Take 1 tablet (100 mg total) by mouth 2 (two) times a day   • morphine (MS CONTIN) 15 mg 12 hr tablet take 1 tablet every 8 hours if needed pain   • oxyCODONE (ROXICODONE) 30 MG immediate release tablet take 1 tablet by mouth every 5 hours if needed for pain . Rodrigo Piety MAX 4/DAY   • spironolactone (ALDACTONE) 25 mg tablet Take 1 tablet (25 mg total) by mouth daily   • tadalafil (CIALIS) 5 MG tablet Take 5 mg by mouth daily as needed for erectile dysfunction (unsure dosage) Indications: Erectile Dysfunction. • [DISCONTINUED] atorvastatin (LIPITOR) 20 mg tablet Take 1 tablet (20 mg total) by mouth daily   • [DISCONTINUED] Diclofenac Sodium (VOLTAREN) 1 % Apply 2 g topically 4 (four) times a day (Patient not taking: Reported on 6/28/2023)       Objective     /78   Pulse 84   Resp 18   Ht 5' 10" (1.778 m)   Wt (!) 153 kg (338 lb)   SpO2 97%   BMI 48.50 kg/m²     Physical Exam  Vitals reviewed. Constitutional:       General: He is not in acute distress. Appearance: He is obese. He is not ill-appearing or diaphoretic. HENT:      Right Ear: External ear normal.      Left Ear: External ear normal.      Nose: Nose normal.      Mouth/Throat:      Mouth: Mucous membranes are moist.      Pharynx: Oropharynx is clear. No oropharyngeal exudate or posterior oropharyngeal erythema. Eyes:      Conjunctiva/sclera: Conjunctivae normal.      Pupils: Pupils are equal, round, and reactive to light. Cardiovascular:      Rate and Rhythm: Normal rate and regular rhythm. Pulses: Normal pulses. Heart sounds: Normal heart sounds. Pulmonary:      Effort: Pulmonary effort is normal. No respiratory distress. Breath sounds: Normal breath sounds. No wheezing. Musculoskeletal:      Comments: Gait wnl. Feet:      Right foot:      Skin integrity: Dry skin present. No ulcer, skin breakdown, erythema, warmth or callus.       Left foot:      Skin integrity: Dry skin present. No ulcer, skin breakdown, erythema, warmth or callus. Neurological:      Mental Status: He is alert and oriented to person, place, and time.    Psychiatric:         Mood and Affect: Mood normal.     MARJORIE Tobin

## 2023-07-05 ENCOUNTER — TELEPHONE (OUTPATIENT)
Dept: FAMILY MEDICINE CLINIC | Facility: CLINIC | Age: 63
End: 2023-07-05

## 2023-07-05 DIAGNOSIS — N18.32 TYPE 2 DIABETES MELLITUS WITH STAGE 3B CHRONIC KIDNEY DISEASE, WITHOUT LONG-TERM CURRENT USE OF INSULIN (HCC): Primary | ICD-10-CM

## 2023-07-05 DIAGNOSIS — E11.22 TYPE 2 DIABETES MELLITUS WITH STAGE 3B CHRONIC KIDNEY DISEASE, WITHOUT LONG-TERM CURRENT USE OF INSULIN (HCC): Primary | ICD-10-CM

## 2023-07-05 NOTE — TELEPHONE ENCOUNTER
Patient called to request a referral for a podiatrist.  He stated he discussed seeing a podiatrist due to his diabetes during his last visit. He is scheduled with Dr. Fide Hernandez ([phone]) on 7/17. They are requesting the referral to be faxed to [phone].

## 2023-07-06 ENCOUNTER — TELEPHONE (OUTPATIENT)
Dept: PULMONOLOGY | Facility: CLINIC | Age: 63
End: 2023-07-06

## 2023-07-06 NOTE — TELEPHONE ENCOUNTER
Pt calling in to schedule consult for pulmonary embolism. Consulted with Dr. Kwan Leon who stated pt should be seen within a month or so. Scheduled pt for 8/16 at the [facility] office as that was the soonest available .

## 2023-07-13 ENCOUNTER — OFFICE VISIT (OUTPATIENT)
Dept: DERMATOLOGY | Facility: CLINIC | Age: 63
End: 2023-07-13
Payer: COMMERCIAL

## 2023-07-13 VITALS — TEMPERATURE: 97.6 F | HEIGHT: 69 IN | BODY MASS INDEX: 46.65 KG/M2 | WEIGHT: 315 LBS

## 2023-07-13 DIAGNOSIS — L70.9 ACNE, UNSPECIFIED ACNE TYPE: ICD-10-CM

## 2023-07-13 DIAGNOSIS — L21.9 SEBORRHEIC DERMATITIS: Primary | ICD-10-CM

## 2023-07-13 DIAGNOSIS — L73.9 FOLLICULITIS: ICD-10-CM

## 2023-07-13 PROCEDURE — 99214 OFFICE O/P EST MOD 30 MIN: CPT | Performed by: DERMATOLOGY

## 2023-07-13 RX ORDER — KETOCONAZOLE 20 MG/ML
SHAMPOO TOPICAL
Qty: 120 ML | Refills: 6 | Status: SHIPPED | OUTPATIENT
Start: 2023-07-13

## 2023-07-13 RX ORDER — DOXYCYCLINE HYCLATE 100 MG/1
CAPSULE ORAL
Qty: 60 CAPSULE | Refills: 6 | Status: SHIPPED | OUTPATIENT
Start: 2023-07-13 | End: 2024-02-02

## 2023-07-13 NOTE — PATIENT INSTRUCTIONS
1. SEBORRHEIC DERMATITIS        Assessment and Plan:  Based on a thorough discussion of this condition and the management approach to it (including a comprehensive discussion of the known risks, side effects and potential benefits of treatment), the patient (family) agrees to implement the following specific plan:           SCALP  Start OTC anti-dandruff shampoo (Head & Shoulders, Selsun Blue, Neutrogena T-gel or T-Sal) 2x/week to damp scalp, let sit 10 minutes then rinse (may use normal shampoo/conditioner thereafter as desired) and can alternate with ketoconazole shampoo 2-3 times per week, lather for 5 minutes prior to rinsing off. For facial involvement, ketoconazole cream can be used daily for maintenance therapy, and should be used a few times a week for suppression when skin is under good control. FACE  For flares only, apply the steroid cream (Hydrocortisone 2.5% cream) twice a day for up to 5-7 days at a time as needed. Discussed AE of skin thinning with chronic use. Use this only when you have flares of your rash. The chronic nature of this condition and association with normal skin yeast were reviewed. The goal of therapy is to control symptoms, but this is not typically something we cure and intermittent flares can be expected. If severity merited by exam findings, topical steroids can be used for no longer than 1 week on face and 2 weeks on scalp (due to risks of atrophy, acne, etc) for significant flares for acute control, but azole therapy as above is the long term maintenance therapy of choice.     2. FOLLICULITIS    Assessment and Plan:  Based on a thorough discussion of this condition and the management approach to it (including a comprehensive discussion of the known risks, side effects and potential benefits of treatment), the patient (family) agrees to implement the following specific plan:  Recommend using Panoxyl daily, leave on for 1-2 minutes, then rinse off completely      Continue doxycycline 100 mg twice daily with food and water. Stay upright for at least 30 minutes after taking the piill      What is folliculitis? Folliculitis is the name given to a group of skin conditions in which there are inflamed hair follicles. The result is a tender red spot, often with a surface pustule. Folliculitis may be superficial or deep. It can affect anywhere there are hairs, including chest, back, buttocks, arms and legs. Acne and its variants are also types of folliculitis. What causes folliculitis? Folliculitis can be due to infection, occlusion (blockage), irritation and various skin diseases. Folliculitis due to infection  To determine if folliculitis is due to an infection, swabs should be taken from the pustules for cytology and culture in the laboratory. Bacteria  Bacterial folliculitis is commonly due to Staphylococcus aureus. If the infection involves the deep part of the follicle, it results in a painful boil. Recommended treatment includes careful hygiene, antiseptic cleanser or cream, antibiotic ointment, and/or oral antibiotics. Spa pool folliculitis is due to infection with Pseudomonas aeruginosa, which thrives in inadequately chlorinated warm water. Gram negative folliculitis is a pustular facial eruption also due to infection with Pseudomonas aeruginosa or other similar organisms. When it appears, it usually follows tetracycline treatment of acne, but is quite rare. Yeasts  The most common yeast to cause a folliculitis is Pityrosporum ovale, also known as Malassezia. Malassezia folliculitis (Pityrosporum folliculitis) is an itchy acne-like condition usually affecting the upper trunk of a young adult. Treatment includes avoiding moisturisers, stopping any antibiotics and topical antifungal or oral antifungal medication for several weeks. Candida albicans can also provoke a folliculitis in skin folds (intertrigo) or in the beard area.  It is treated with topical or oral antifungal agents. Fungi  Ringworm of the scalp (tinea capitis) usually results in scaling and hair loss, but sometimes results in folliculitis. In [place], cat ringworm (Microsporum canis) is the commonest organism causing scalp fungal infection. Other fungi such as Trichophyton tonsurans are increasingly reported. Treatment is with oral antifungal agents for several months. Viral infections  Folliculitis may caused by herpes simplex virus. This tends to be tender, and resolves without treatment in around 10 days. Severe recurrent attacks may be treated with acyclovir and other antiviral agents. Herpes zoster (the cause of shingles) may also present as folliculitis with painful pustules and crusted spots within a dermatome (an area of skin supplied by a single nerve). It is treated with hihg-dose acyclovir. Molluscum contagiosum, common in young children, may also cause follicular umbilicated papules, usually clustered in and around a body fold. Molluscum may provoke dermatitis. Parasitic infection  Folliculitis on the face or scalp of older or immunosuppressed adults may be due to colonisation by hair follicle mites (demodex). This is known as demodicosis. The human infestation, scabies, often provokes folliculitis, as well as non-follicular papules, vesicles and pustules. Folliculitis due to irritation from regrowing hairs  Folliculitis may arise as hairs regrow after shaving, waxing, electrolysis or plucking. Swabs taken from the pustules are sterile ie there is no growth of bacteria or other organisms. In the beard area irritant folliculitis is known as pseudofolliculitis barbae. Irritant folliculitis is also common on the lower legs of women (shaving rash). It is frequently very itchy. Treatment is by stopping hair removal, and not beginning again for about three months after the folliculitis has settled.  To prevent reoccurring irritant folliculitis, use a gentle hair removal method, such as a lady's electric razor. Avoid soap and apply plenty of shaving gel, if using a blade shaver. Folliculitis due to contact reactions  Occlusion  Paraffin-based ointments, moisturisers, and adhesive plasters may all result in a sterile folliculitis. If a moisturiser is needed, choose an oil-free product, as it is less likely to cause occlusion. Chemicals  Coal tar, cutting oils and other chemicals may cause an irritant folliculitis. Avoid contact with the causative product. Topical steroids  Overuse of topical steroids may produce a folliculitis. Perioral dermatitis is a facial folliculitis provoked by moisturisers and topical steroids. Perioral dermatitis is treated with tetracycline antibiotics for six weeks or so. Folliculitis due to immunosuppression  Eosinophilic folliculitis is a specific type of folliculitis that may arise in some immune suppressed individuals such as those infected by human immunodeficiency virus (HIV) or those who have cancer. Folliculitis due to drugs  Folliculitis may be due to drugs, particularly corticosteroids (steroid acne), androgens (male hormones), ACTH, lithium, isoniazid (INH), phenytoin and B-complex vitamins. Protein kinase inhibitors (epidermal growth factor receptor inhibitors) and targeted therapy for metastatic melanoma (vemurafenib, dabrafenib) nearly always result in folliculitis. Folliculitis due to inflammatory skin diseases  Certain uncommon inflammatory skin diseases may cause permanent hair loss and scarring because of deep seated sterile folliculitis. These include:  Lichen planus  Discoid lupus erythematosus  Folliculitis decalvans  Folliculitis keloidalis     Treatment depends on the underlying condition and its severity. A skin biopsy is often necessary to establish the diagnosis. Acne variants   Acne and acne-like or acneform disorders are also forms of folliculitis.  These include:  Acne vulgaris  Nodulocystic acne  Rosacea  Scalp folliculitis  Chloracne    The follicular occlusion syndrome refers to:  Hidradenitis suppurativa (acne inversa)  Acne conglobata (a severe form of nodulocystic acne)  Dissecting cellulitis (perifolliculitis capitis abscedens et suffodiens)  Pilonidal sinus. Treatment of the acne variants may include topical therapy as well as long courses of tetracycline antibiotics, isotretinoin (vitamin-A derivative) and in women, antiandrogenic therapy. Buttock folliculitis  Folliculitis affecting the buttocks is quite common and is often nonspecific, ie no specific cause is found. Buttock folliculitis is equally common in males and females. Acute buttock folliculitis is usually bacterial in origin (like boils), resulting in red painful papules and pustules. It clears with antibiotics. Chronic buttock folliculitis does not often cause significant symptoms but it can be very persistent. Although antiseptics, topical acne treatments, peeling agents such as alphahydroxy acids, long courses of oral antibiotics and isotretinoin can help buttock folliculitis, they are not always effective. Hair removal might be worth trying if the affected area is hairy.  As regrowth of hair can make it worse, permanent hair reduction by laser or intense pulsed light (IPL) is best.

## 2023-07-13 NOTE — PROGRESS NOTES
[facility] Note     Patient Name: Clara Mckeon. Encounter Date: 7/13/2023     Have you been cared for by a [facility] in the last 3 years and, if so, which description applies to you? Yes. I have been here within the last 3 years, and my medical history has NOT changed since that time. I am MALE/not capable of bearing children. REVIEW OF SYSTEMS:  Have you recently had or currently have any of the following? · No changes in my recent health. PAST MEDICAL HISTORY:  Have you personally ever had or currently have any of the following? If "YES," then please provide more detail. · No changes in my medical history. FAMILY HISTORY:  Any "first degree relatives" (parent, brother, sister, or child) with the following? • No changes in my family's known health. PATIENT EXPERIENCE:    • Do you want the Dermatologist to perform a COMPLETE skin exam today including a clinical examination under the "bra and underwear" areas? NO  • If necessary, do we have your permission to call and leave a detailed message on your Preferred Phone number that includes your specific medical information?   Yes      Allergies   Allergen Reactions   • Tamsulosin Abdominal Pain   • Gabapentin      Annotation - [id number]: dizziness   • Pregabalin      Annotation - [id number]: vision changes and mood changes   • Tramadol Nausea Only      Current Outpatient Medications:   •  amLODIPine (NORVASC) 10 mg tablet, Take 1 tablet (10 mg total) by mouth daily, Disp: 90 tablet, Rfl: 3  •  apixaban (Eliquis) 5 mg, Take 1 tablet (5 mg total) by mouth 2 (two) times a day Do not start before July 7, 2023., Disp: 60 tablet, Rfl: 0  •  atorvastatin (LIPITOR) 20 mg tablet, Take 1 tablet (20 mg total) by mouth daily, Disp: 90 tablet, Rfl: 1  •  chlorthalidone 25 mg tablet, Take 1 tablet (25 mg total) by mouth daily, Disp: 90 tablet, Rfl: 3  •  hydrocortisone 2.5 % cream, Apply topically to face BID for no longer than 7 days for flares, Disp: 30 g, Rfl: 0  •  ketoconazole (NIZORAL) 2 % shampoo, Apply topically to scalp, eyebrows, and face. Leave on for 5 minutes and then rinse, Disp: 120 mL, Rfl: 0  •  losartan (COZAAR) 100 MG tablet, Take 1 tablet (100 mg total) by mouth daily, Disp: 90 tablet, Rfl: 3  •  metoprolol tartrate (LOPRESSOR) 100 mg tablet, Take 1 tablet (100 mg total) by mouth 2 (two) times a day, Disp: 180 tablet, Rfl: 3  •  morphine (MS CONTIN) 15 mg 12 hr tablet, take 1 tablet every 8 hours if needed pain, Disp: , Rfl: 0  •  oxyCODONE (ROXICODONE) 30 MG immediate release tablet, take 1 tablet by mouth every 5 hours if needed for pain . [facility] MAX 4/DAY, Disp: , Rfl: 0  •  spironolactone (ALDACTONE) 25 mg tablet, Take 1 tablet (25 mg total) by mouth daily, Disp: 90 tablet, Rfl: 3  •  tadalafil (CIALIS) 5 MG tablet, Take 5 mg by mouth daily as needed for erectile dysfunction (unsure dosage) Indications: Erectile Dysfunction. , Disp: , Rfl:   •  apixaban (Eliquis) 5 mg, Take 2 tablets (10 mg total) by mouth 2 (two) times a day for 7 days, Disp: 28 tablet, Rfl: 0          • Whom besides the patient is providing clinical information about today's encounter?   o NO ADDITIONAL HISTORIAN (patient alone provided history)    Physical Exam and Assessment/Plan by Diagnosis:    1. SEBORRHEIC DERMATITIS     Physical Exam:  · Anatomic Location Affected &  Morphological Description:  Greasy adherent scale/scaling plaques on the scalp, NLFs and b/l eyebrows. Additional History of Present Condition:  Pt notes decreased scaling. Has tried Ketoconazole shampoo and HC 2.5 % cream for flares. Assessment and Plan:  · Based on a thorough discussion of this condition and the management approach to it (including a comprehensive discussion of the known risks, side effects and potential benefits of treatment), the patient (family) agrees to implement the following specific plan:           SCALP  ?  Start OTC anti-dandruff shampoo (Head & Shoulders, Selsun Blue, Neutrogena T-gel or T-Sal) 2x/week to damp scalp, let sit 10 minutes then rinse (may use normal shampoo/conditioner thereafter as desired) and can alternate with ketoconazole shampoo 2-3 times per week, lather for 5 minutes prior to rinsing off. For facial involvement, ketoconazole cream can be used daily for maintenance therapy, and should be used a few times a week for suppression when skin is under good control. FACE  ? For flares only, apply the steroid cream (Hydrocortisone 2.5% cream) twice a day for up to 5-7 days at a time as needed. Discussed AE of skin thinning with chronic use. Use this only when you have flares of your rash. The chronic nature of this condition and association with normal skin yeast were reviewed. The goal of therapy is to control symptoms, but this is not typically something we cure and intermittent flares can be expected. If severity merited by exam findings, topical steroids can be used for no longer than 1 week on face and 2 weeks on scalp (due to risks of atrophy, acne, etc) for significant flares for acute control, but azole therapy as above is the long term maintenance therapy of choice. 2. FOLLICULITIS/ACNE     Physical Exam:  • Anatomic Location Affected:  Posterior scalp, back, abdomen  • Morphological Description:  A few pustules and papules      Additional History of Present Condition:  Much improved on the doxycyline. He denies side effects and is happy with improvement. Recently admitted for pulmonary emboli and now on Eliquis. Assessment and Plan: Do not think severity merits Accutane, but will re-address at next visit. Goal would be to taper off doxycycline in 6 months at follow up, or escalate to isotretinoin.     Based on a thorough discussion of this condition and the management approach to it (including a comprehensive discussion of the known risks, side effects and potential benefits of treatment), the patient (family) agrees to implement the following specific plan:  • Recommend using Panoxyl daily, leave on for 1-2 minutes, then rinse off completely      Continue doxycycline 100 mg twice daily with food and water. The risks of doxycycline were discussed at length including nausea/vomiting/diarrhea, abdominal pain, photosensitivity, esophagitis, and bacterial resistance. The patient was instructed to take the medication with a full meal and a glass of water, and not to lie down for 1 hour after taking. The use of an otc probiotic while on the medication was encouraged to help maintain a healthy gut sandra. We will attempt to limit use to 6 months of therapy and consider escalation to oral isotretinoin as needed if unable to achieve or maintain clearance after 6 months of consistent use. The patient was advised to stop the medication and contact me for concerns prior to their 3 month follow up visit. What is folliculitis? Folliculitis is the name given to a group of skin conditions in which there are inflamed hair follicles. The result is a tender red spot, often with a surface pustule. Folliculitis may be superficial or deep. It can affect anywhere there are hairs, including chest, back, buttocks, arms and legs. Acne and its variants are also types of folliculitis. What causes folliculitis? Folliculitis can be due to infection, occlusion (blockage), irritation and various skin diseases. Folliculitis due to infection  To determine if folliculitis is due to an infection, swabs should be taken from the pustules for cytology and culture in the laboratory. Bacteria  Bacterial folliculitis is commonly due to Staphylococcus aureus. If the infection involves the deep part of the follicle, it results in a painful boil. Recommended treatment includes careful hygiene, antiseptic cleanser or cream, antibiotic ointment, and/or oral antibiotics.     Spa pool folliculitis is due to infection with Pseudomonas aeruginosa, which thrives in inadequately chlorinated warm water. Gram negative folliculitis is a pustular facial eruption also due to infection with Pseudomonas aeruginosa or other similar organisms. When it appears, it usually follows tetracycline treatment of acne, but is quite rare. Yeasts  The most common yeast to cause a folliculitis is Pityrosporum ovale, also known as Malassezia. Malassezia folliculitis (Pityrosporum folliculitis) is an itchy acne-like condition usually affecting the upper trunk of a young adult. Treatment includes avoiding moisturisers, stopping any antibiotics and topical antifungal or oral antifungal medication for several weeks. Candida albicans can also provoke a folliculitis in skin folds (intertrigo) or in the beard area. It is treated with topical or oral antifungal agents. Fungi  Ringworm of the scalp (tinea capitis) usually results in scaling and hair loss, but sometimes results in folliculitis. In [facility], cat ringworm (Microsporum canis) is the commonest organism causing scalp fungal infection. Other fungi such as Trichophyton tonsurans are increasingly reported. Treatment is with oral antifungal agents for several months. Viral infections  Folliculitis may caused by herpes simplex virus. This tends to be tender, and resolves without treatment in around 10 days. Severe recurrent attacks may be treated with acyclovir and other antiviral agents. Herpes zoster (the cause of shingles) may also present as folliculitis with painful pustules and crusted spots within a dermatome (an area of skin supplied by a single nerve). It is treated with hihg-dose acyclovir. Molluscum contagiosum, common in young children, may also cause follicular umbilicated papules, usually clustered in and around a body fold. Molluscum may provoke dermatitis.     Parasitic infection  Folliculitis on the face or scalp of older or immunosuppressed adults may be due to colonisation by hair follicle mites (demodex). This is known as demodicosis. The human infestation, scabies, often provokes folliculitis, as well as non-follicular papules, vesicles and pustules. Folliculitis due to irritation from regrowing hairs  Folliculitis may arise as hairs regrow after shaving, waxing, electrolysis or plucking. Swabs taken from the pustules are sterile ie there is no growth of bacteria or other organisms. In the beard area irritant folliculitis is known as pseudofolliculitis barbae. Irritant folliculitis is also common on the lower legs of women (shaving rash). It is frequently very itchy. Treatment is by stopping hair removal, and not beginning again for about three months after the folliculitis has settled. To prevent reoccurring irritant folliculitis, use a gentle hair removal method, such as a lady's electric razor. Avoid soap and apply plenty of shaving gel, if using a blade shaver. Folliculitis due to contact reactions  Occlusion  Paraffin-based ointments, moisturisers, and adhesive plasters may all result in a sterile folliculitis. If a moisturiser is needed, choose an oil-free product, as it is less likely to cause occlusion. Chemicals  Coal tar, cutting oils and other chemicals may cause an irritant folliculitis. Avoid contact with the causative product. Topical steroids  Overuse of topical steroids may produce a folliculitis. Perioral dermatitis is a facial folliculitis provoked by moisturisers and topical steroids. Perioral dermatitis is treated with tetracycline antibiotics for six weeks or so. Folliculitis due to immunosuppression  Eosinophilic folliculitis is a specific type of folliculitis that may arise in some immune suppressed individuals such as those infected by human immunodeficiency virus (HIV) or those who have cancer.     Folliculitis due to drugs  Folliculitis may be due to drugs, particularly corticosteroids (steroid acne), androgens (male hormones), ACTH, lithium, isoniazid (INH), phenytoin and B-complex vitamins. Protein kinase inhibitors (epidermal growth factor receptor inhibitors) and targeted therapy for metastatic melanoma (vemurafenib, dabrafenib) nearly always result in folliculitis. Folliculitis due to inflammatory skin diseases  Certain uncommon inflammatory skin diseases may cause permanent hair loss and scarring because of deep seated sterile folliculitis. These include:  • Lichen planus  • Discoid lupus erythematosus  • Folliculitis decalvans  • Folliculitis keloidalis     Treatment depends on the underlying condition and its severity. A skin biopsy is often necessary to establish the diagnosis. Acne variants   Acne and acne-like or acneform disorders are also forms of folliculitis. These include:  • Acne vulgaris  • Nodulocystic acne  • Rosacea  • Scalp folliculitis  • Chloracne    The follicular occlusion syndrome refers to:  • Hidradenitis suppurativa (acne inversa)  • Acne conglobata (a severe form of nodulocystic acne)  • Dissecting cellulitis (perifolliculitis capitis abscedens et suffodiens)  • Pilonidal sinus. Treatment of the acne variants may include topical therapy as well as long courses of tetracycline antibiotics, isotretinoin (vitamin-A derivative) and in women, antiandrogenic therapy. Buttock folliculitis  Folliculitis affecting the buttocks is quite common and is often nonspecific, ie no specific cause is found. Buttock folliculitis is equally common in males and females. • Acute buttock folliculitis is usually bacterial in origin (like boils), resulting in red painful papules and pustules. It clears with antibiotics. • Chronic buttock folliculitis does not often cause significant symptoms but it can be very persistent. Although antiseptics, topical acne treatments, peeling agents such as alphahydroxy acids, long courses of oral antibiotics and isotretinoin can help buttock folliculitis, they are not always effective.  Hair removal might be worth trying if the affected area is hairy.  As regrowth of hair can make it worse, permanent hair reduction by laser or intense pulsed light (IPL) is best.    Scribe Attestation    I,:  Jarrell Fraser MA am acting as a scribe while in the presence of the attending physician.:       I,:  Baldo Correa MD personally performed the services described in this documentation    as scribed in my presence.:

## 2023-07-17 ENCOUNTER — CONSULT (OUTPATIENT)
Dept: MULTI SPECIALTY CLINIC | Facility: CLINIC | Age: 63
End: 2023-07-17

## 2023-07-17 VITALS
WEIGHT: 315 LBS | DIASTOLIC BLOOD PRESSURE: 75 MMHG | SYSTOLIC BLOOD PRESSURE: 117 MMHG | HEART RATE: 71 BPM | BODY MASS INDEX: 50.65 KG/M2 | TEMPERATURE: 98.6 F | OXYGEN SATURATION: 98 %

## 2023-07-17 DIAGNOSIS — B35.3 TINEA PEDIS OF BOTH FEET: Primary | ICD-10-CM

## 2023-07-17 DIAGNOSIS — E11.22 TYPE 2 DIABETES MELLITUS WITH STAGE 3B CHRONIC KIDNEY DISEASE, WITHOUT LONG-TERM CURRENT USE OF INSULIN (HCC): ICD-10-CM

## 2023-07-17 DIAGNOSIS — N18.32 TYPE 2 DIABETES MELLITUS WITH STAGE 3B CHRONIC KIDNEY DISEASE, WITHOUT LONG-TERM CURRENT USE OF INSULIN (HCC): ICD-10-CM

## 2023-07-17 PROCEDURE — 99213 OFFICE O/P EST LOW 20 MIN: CPT | Performed by: PODIATRIST

## 2023-07-17 PROCEDURE — 3078F DIAST BP <80 MM HG: CPT | Performed by: PODIATRIST

## 2023-07-17 PROCEDURE — 11721 DEBRIDE NAIL 6 OR MORE: CPT | Performed by: PODIATRIST

## 2023-07-17 PROCEDURE — 3074F SYST BP LT 130 MM HG: CPT | Performed by: PODIATRIST

## 2023-07-17 RX ORDER — KETOCONAZOLE 20 MG/G
CREAM TOPICAL DAILY
Qty: 15 G | Refills: 0 | Status: SHIPPED | OUTPATIENT
Start: 2023-07-17

## 2023-07-17 NOTE — PROGRESS NOTES
[address]. 60 y.o. male MRN: [id number]  Encounter: [id number]      Assessment/Plan        Diagnoses and all orders for this visit:    Tinea pedis of both feet  -     ketoconazole (NIZORAL) 2 % cream; Apply topically daily    Type 2 diabetes mellitus with stage 3b chronic kidney disease, without long-term current use of insulin ([address])    Other orders  -     Nail debridement       Plan:  -Patient was seen and examined. All questions and concerns addressed.   -A diabetic foot exam was performed. Encouraged tight glycemic control, proper diet, and adequate exercise.  -I have personally reviewed patients recent labs. Patient's most recent HbA1c are as followed: 6.2mg/dL (6/19/23) and 6.1mg/dL (9/9/22)  -Patient's mycotic nails were sharply debrided x 10 using a large nail nipper. The patient tolerated this well and without incident. See procedure note below.   -Patient was prescribed Ketoconazole cream to a pharmacy of his choosing for tinea pedis of both feet in moccasin distribution. Patient was instructed on how to apply the medication to the affected area. -Encouraged continue use of supportive shoe gear with wide toebox. -Encouraged daily at home foot checks. -RTC in 3 months. Nail debridement     Date/Time 7/17/2023 2:58 PM     Performed by  Antelmo Doan DPM   Authorized by Antelmo Doan DPM     Universal Protocol   Consent: Verbal consent obtained. Written consent not obtained. Risks and benefits: risks, benefits and alternatives were discussed  Consent given by: patient  Time out: Immediately prior to procedure a "time out" was called to verify the correct patient, procedure, equipment, support staff and site/side marked as required.   Timeout called at: 7/17/2023 2:45 PM.  Patient understanding: patient states understanding of the procedure being performed  Patient consent: the patient's understanding of the procedure matches consent given  Procedure consent: procedure consent matches procedure scheduled  Relevant documents: relevant documents present and verified  Patient identity confirmed: verbally with patient        Local anesthesia used: no     Anesthesia   Local anesthesia used: no     Sedation   Patient sedated: no        Specimen: no    Culture: no   Procedure Details   Patient tolerance: patient tolerated the procedure well with no immediate complications         Patient's shoes and socks removed. Right Foot/Ankle   Right Foot Inspection  Skin Exam: skin intact. No callus, no maceration, no ulcer and no callus. Toe Exam: ROM and strength within normal limits. No swelling and  no right toe deformity    Sensory   Vibration: intact  Proprioception: intact  Monofilament testing: diminished    Vascular  Capillary refills: < 3 seconds  The right DP pulse is 2+. The right PT pulse is 2+. Left Foot/Ankle  Left Foot Inspection  Skin Exam: skin intact. No maceration, no ulcer and no callus. Toe Exam: ROM and strength within normal limits. No swelling and no left toe deformity. Sensory   Vibration: intact  Proprioception: intact  Monofilament testing: diminished    Vascular  Capillary refills: < 3 seconds  The left DP pulse is 2+. The left PT pulse is 2+. Assign Risk Category  No deformity present  Loss of protective sensation      Dr. Ofe Velazquez was present during this entire procedure. History of Present Illness     Mr. Joseph Salamanca a 59 y/o male presents as a new patient with type II diabetes. He reports that he has seen a podiatrist before but not recently. Patient expresses that he has significantly improved his diet and therefore has been taken off Metformin by his PCP due to his improved A1c. He states that he often feels numbness and tingling in his feet and therefore has difficulty trimming his toenails. The patient reports. Patient reports he also has a "rash" on both his feet that is sometimes itchy.      Patient denies any additional pedal complaints at this time. Patient denies N/V/F/CP/SOB. Review of Systems   Constitutional: Negative. HENT: Negative. Eyes: Negative. Respiratory: Negative. Cardiovascular: Negative. Gastrointestinal: Negative. Musculoskeletal: negative  Skin: thickened and long toenails   Neurological: Negative.        Historical Information   Past Medical History:   Diagnosis Date   • Acne    • Anxiety    • Arthritis    • Back pain    • Chronic pain    • Depression 06/07/2021   • Diabetes mellitus ([address])     6/19/15 A1C: 5.5%   • Eczema    • Grief reaction 09/10/2019   • Hyperlipidemia    • Hypertension    • Knee pain    • Morbid obesity with BMI of 50.0-59.9, adult [facility])    • Psychiatric disorder    • Skin tag    • Type 2 diabetes mellitus without complication ([address]) 83/80/3941   • Urinary retention      Past Surgical History:   Procedure Laterality Date   • BACK SURGERY     • LUMBAR FUSION  2002   • LUMBAR FUSION  2004    L4-5-6, S1 has pump for narcotics     Social History   Social History     Substance and Sexual Activity   Alcohol Use Not Currently    Comment: occasional     Social History     Substance and Sexual Activity   Drug Use No    Comment: History of drug use, meena, cocaine, heroind, no IVDA-as per [employer]     Social History     Tobacco Use   Smoking Status Every Day   • Types: Cigars   Smokeless Tobacco Never   Tobacco Comments    1 cigar daily,  50 pack years, 1/2 pack at age 12, 2 ppd x 21 yrs, quit 2004     Family History:   Family History   Problem Relation Age of Onset   • Other Mother         colitis   • Diabetes Mother        Meds/Allergies   (Not in a hospital admission)    Allergies   Allergen Reactions   • Tamsulosin Abdominal Pain   • Gabapentin      Annotation - [id number]: dizziness   • Pregabalin      Annotation - [id number]: vision changes and mood changes   • Tramadol Nausea Only       Objective     Current Vitals:   Blood Pressure: 117/75 (07/17/23 1425)  Pulse: 71 (07/17/23 1425)  Temperature: 98.6 °F (37 °C) (07/17/23 1425)  Temp Source: Temporal (07/17/23 1425)  Weight - Scale: (!) 156 kg (343 lb) (07/17/23 1425)  SpO2: 98 % (07/17/23 1425)        /75 (BP Location: Right arm, Patient Position: Sitting, Cuff Size: Standard)   Pulse 71   Temp 98.6 °F (37 °C) (Temporal)   Wt (!) 156 kg (343 lb)   SpO2 98%   BMI 50.65 kg/m²     Lower Extremity Exam:    Vascular:   Right foot DP/PT +2  Left foot DP/PT +2  No edema noted B/L   Absent pedal hair growth noted     Musculoskeletal:   There is 5/5 strength throughout the bilateral lower extremity. Equinus noted B/L       Neurological:   Gross sensation intact B/L  Proprioception intact B/L  Protective sensation diminished B/L    Dermatology:   Tinea pedis noted B/L feet in a moccasin distribution. No evidence of macerated tissue between toe spaces. Nail Exam: Onychomycosis of the toenails.   Open ulcerations: No  Calluses: No

## 2023-07-27 ENCOUNTER — VBI (OUTPATIENT)
Dept: ADMINISTRATIVE | Facility: OTHER | Age: 63
End: 2023-07-27

## 2023-07-31 DIAGNOSIS — I10 ESSENTIAL HYPERTENSION: ICD-10-CM

## 2023-07-31 RX ORDER — CHLORTHALIDONE 25 MG/1
25 TABLET ORAL DAILY
Qty: 90 TABLET | Refills: 3 | Status: SHIPPED | OUTPATIENT
Start: 2023-07-31

## 2023-08-03 DIAGNOSIS — I26.99 PULMONARY EMBOLI (HCC): ICD-10-CM

## 2023-08-14 DIAGNOSIS — I10 ESSENTIAL HYPERTENSION: ICD-10-CM

## 2023-08-14 RX ORDER — LOSARTAN POTASSIUM 100 MG/1
100 TABLET ORAL DAILY
Qty: 90 TABLET | Refills: 3 | Status: SHIPPED | OUTPATIENT
Start: 2023-08-14

## 2023-08-16 ENCOUNTER — OFFICE VISIT (OUTPATIENT)
Dept: FAMILY MEDICINE CLINIC | Facility: CLINIC | Age: 63
End: 2023-08-16

## 2023-08-16 ENCOUNTER — TELEPHONE (OUTPATIENT)
Dept: FAMILY MEDICINE CLINIC | Facility: CLINIC | Age: 63
End: 2023-08-16

## 2023-08-16 ENCOUNTER — CONSULT (OUTPATIENT)
Age: 63
End: 2023-08-16
Payer: COMMERCIAL

## 2023-08-16 VITALS
HEIGHT: 69 IN | SYSTOLIC BLOOD PRESSURE: 124 MMHG | DIASTOLIC BLOOD PRESSURE: 80 MMHG | OXYGEN SATURATION: 95 % | BODY MASS INDEX: 46.65 KG/M2 | TEMPERATURE: 98.3 F | WEIGHT: 315 LBS | HEART RATE: 74 BPM

## 2023-08-16 VITALS
SYSTOLIC BLOOD PRESSURE: 128 MMHG | WEIGHT: 315 LBS | BODY MASS INDEX: 46.65 KG/M2 | HEIGHT: 69 IN | HEART RATE: 70 BPM | OXYGEN SATURATION: 96 % | RESPIRATION RATE: 16 BRPM | DIASTOLIC BLOOD PRESSURE: 76 MMHG

## 2023-08-16 DIAGNOSIS — E66.01 MORBID OBESITY (HCC): ICD-10-CM

## 2023-08-16 DIAGNOSIS — I26.99 PULMONARY EMBOLISM, UNSPECIFIED CHRONICITY, UNSPECIFIED PULMONARY EMBOLISM TYPE, UNSPECIFIED WHETHER ACUTE COR PULMONALE PRESENT (HCC): Primary | ICD-10-CM

## 2023-08-16 DIAGNOSIS — I10 ESSENTIAL HYPERTENSION: ICD-10-CM

## 2023-08-16 DIAGNOSIS — I77.810 THORACIC AORTIC ECTASIA (HCC): ICD-10-CM

## 2023-08-16 DIAGNOSIS — E78.5 HYPERLIPIDEMIA, UNSPECIFIED HYPERLIPIDEMIA TYPE: ICD-10-CM

## 2023-08-16 DIAGNOSIS — F33.9 RECURRENT MAJOR DEPRESSIVE DISORDER, REMISSION STATUS UNSPECIFIED (HCC): ICD-10-CM

## 2023-08-16 DIAGNOSIS — N18.31 TYPE 2 DIABETES MELLITUS WITH STAGE 3A CHRONIC KIDNEY DISEASE, WITHOUT LONG-TERM CURRENT USE OF INSULIN (HCC): ICD-10-CM

## 2023-08-16 DIAGNOSIS — E11.22 TYPE 2 DIABETES MELLITUS WITH STAGE 3A CHRONIC KIDNEY DISEASE, WITHOUT LONG-TERM CURRENT USE OF INSULIN (HCC): ICD-10-CM

## 2023-08-16 DIAGNOSIS — R29.818 SUSPECTED SLEEP APNEA: ICD-10-CM

## 2023-08-16 LAB
LEFT EYE DIABETIC RETINOPATHY: NORMAL
LEFT EYE IMAGE QUALITY: NORMAL
LEFT EYE MACULAR EDEMA: NORMAL
LEFT EYE OTHER RETINOPATHY: NORMAL
RIGHT EYE DIABETIC RETINOPATHY: NORMAL
RIGHT EYE IMAGE QUALITY: NORMAL
RIGHT EYE MACULAR EDEMA: NORMAL
RIGHT EYE OTHER RETINOPATHY: NORMAL
SEVERITY (EYE EXAM): NORMAL

## 2023-08-16 PROCEDURE — 99204 OFFICE O/P NEW MOD 45 MIN: CPT | Performed by: INTERNAL MEDICINE

## 2023-08-16 NOTE — TELEPHONE ENCOUNTER
----- Message from [address] [place] [address] sent at 8/16/2023 10:55 AM EDT -----  Please let the patient know that his IRIS exam was normal.

## 2023-08-16 NOTE — PROGRESS NOTES
Pulmonary Outpatient Note   Destiney Adams. 60 y.o. male MRN: [id number]  8/18/2023      Referring Physician: Jennie Luz, *    Reason for Consultation:    Chief Complaint   Patient presents with   • Pulmonary Embolism   • Sleep Apnea     Assessment/Plan:    1. Pulmonary embolism, unspecified chronicity, unspecified pulmonary embolism type, unspecified whether acute cor pulmonale present [facility])  Assessment & Plan:  Bilateral segmental and subsegmental PE diagnosed via 6/30/2023 CTA chest.  Low risk PE, no signs of right heart strain. Contributing factors likely inactivity due to chronic knee OA, and obesity     Continue Eliquis for at least 6 months at 5mg twice a day    After 6 months - would prefer to keep him on 2.5mg BID for prophylaxis due to relative inactivity caused by chronic knee pain. If after 6 months, he goes down to prophylaxis dosing - and he needs procedures such as injections/surgery, Eliquis can be held before the procedure since the risk would be lower. PE likely provoked by inactivity/obesity. No procoagulable workup needed right now due to no blood clot history before this. If having a 2nd VTE in the future, will definitely need procoagulable work up then. Orders:  -     Ambulatory Referral to Pulmonology    2. Suspected sleep apnea  Assessment & Plan:  Used to be on CPAP but stopped after weight loss. Now with weight gain again. Has increased neck circumference, BMI. Used to have a history of obstructive sleep apnea. I recommend a home sleep study, and started on CPAP if diagnosed. He is amenable to trying CPAP again with nasal mask. Follow-up 1 to 3 months after CPAP initiation    Orders:  -     Home Study; Future    3. Morbid obesity ([address])  -     Ambulatory Referral to Weight Management;  Future        Health Maintenance  Immunization History   Administered Date(s) Administered   • Influenza Quadrivalent, 6-35 Months IM 11/16/2015, 10/19/2016   • Influenza, seasonal, injectable 01/01/2013   • Pneumococcal Polysaccharide PPV23 11/16/2015          Return in about 4 months (around 12/16/2023). History of Present Illness   HPI:  Carlos Clement is a 60 y.o. male who has a past medical history of diabetes mellitus, chronic knee osteoarthritis, chronic kidney disease stage 3, who is presenting for the evaluation of pulmonary emboli, chronic shortness of breath    Patient was admitted in late June 2023 for worsening shortness of breath, worse with exertion. He had a CTA on 6/29 that showed segmental/subsegmental PE. He was started on anticoagulation and transition to Eliquis. Lower extremity duplex did not demonstrate DVTs. No history of blood clots before this. No recent surgery/injuries to legs. He was relative inactive, spending a lot of time in bed. He is doing well with Eliquis and has not missed any doses. He is very concerned about his sedentary lifestyle, worsened by chronic knee pain. He is having a lot of trouble with weight loss and worries that he is not able to achieve enough weight loss for total knee replacements. He gets frequent injections in his knees. He has been on a diet for a year but hasn't lost a lot of weight. He is unable to do any exercise due to knee pain. Knees affect his sleep. No gasping during sleep, snoring, witnessed apneas. He had CPAP previously but lost weight (he used nasal pillows before). Gained weight back due to knee pain. He is was a heavy cigar and cigarette smoker. He quit smoking in 2003.     Historical Information   Past Medical History:   Diagnosis Date   • Acne    • Anxiety    • Arthritis    • Back pain    • Chronic pain    • Depression 06/07/2021   • Diabetes mellitus ([address])     6/19/15 A1C: 5.5%   • Eczema    • Grief reaction 09/10/2019   • Hyperlipidemia    • Hypertension    • Knee pain    • Morbid obesity with BMI of 50.0-59.9, adult [facility])    • Psychiatric disorder    • Skin tag    • Type 2 diabetes mellitus without complication ([address] [facility]) 67/84/6525   • Urinary retention      Past Surgical History:   Procedure Laterality Date   • BACK SURGERY     • LUMBAR FUSION  2002   • LUMBAR FUSION  2004    L4-5-6, S1 has pump for narcotics     Family History   Problem Relation Age of Onset   • Other Mother         colitis   • Diabetes Mother        Meds/Allergies     Current Outpatient Medications:   •  amLODIPine (NORVASC) 10 mg tablet, Take 1 tablet (10 mg total) by mouth daily, Disp: 90 tablet, Rfl: 3  •  apixaban (Eliquis) 5 mg, Take 1 tablet (5 mg total) by mouth 2 (two) times a day, Disp: 60 tablet, Rfl: 2  •  atorvastatin (LIPITOR) 20 mg tablet, Take 1 tablet (20 mg total) by mouth daily, Disp: 90 tablet, Rfl: 1  •  chlorthalidone 25 mg tablet, Take 1 tablet (25 mg total) by mouth daily, Disp: 90 tablet, Rfl: 3  •  doxycycline hyclate (VIBRAMYCIN) 100 mg capsule, Take one tab PO BID with food and water. Do not lie down for one hour after taking. Avoid the sun or apply SPF50+ broad spectrum sunscreen every 2 hours while outdoors. , Disp: 60 capsule, Rfl: 6  •  hydrocortisone 2.5 % cream, Apply topically to face BID for no longer than 7 days for flares, Disp: 30 g, Rfl: 0  •  ketoconazole (NIZORAL) 2 % cream, Apply topically daily, Disp: 15 g, Rfl: 0  •  ketoconazole (NIZORAL) 2 % shampoo, Apply topically to scalp, eyebrows, and face. Leave on for 5 minutes and then rinse, Disp: 120 mL, Rfl: 6  •  losartan (COZAAR) 100 MG tablet, Take 1 tablet (100 mg total) by mouth daily, Disp: 90 tablet, Rfl: 3  •  metoprolol tartrate (LOPRESSOR) 100 mg tablet, Take 1 tablet (100 mg total) by mouth 2 (two) times a day, Disp: 180 tablet, Rfl: 3  •  morphine (MS CONTIN) 15 mg 12 hr tablet, take 1 tablet every 8 hours if needed pain, Disp: , Rfl: 0  •  oxyCODONE (ROXICODONE) 30 MG immediate release tablet, take 1 tablet by mouth every 5 hours if needed for pain . Izzy Lento MAX 4/DAY, Disp: , Rfl: 0  •  spironolactone (ALDACTONE) 25 mg tablet, Take 1 tablet (25 mg total) by mouth daily, Disp: 90 tablet, Rfl: 3  •  tadalafil (CIALIS) 5 MG tablet, Take 5 mg by mouth daily as needed for erectile dysfunction (unsure dosage) Indications: Erectile Dysfunction. , Disp: , Rfl:   Allergies   Allergen Reactions   • Tamsulosin Abdominal Pain   • Gabapentin      Annotation - [id number]: dizziness   • Pregabalin      Annotation - [id number]: vision changes and mood changes   • Tramadol Nausea Only       Vitals: Blood pressure 124/80, pulse 74, temperature 98.3 °F (36.8 °C), temperature source Tympanic, height 5' 9" (1.753 m), weight (!) 154 kg (340 lb), SpO2 95 %. Body mass index is 50.21 kg/m². Oxygen Therapy  SpO2: 95 %  Oxygen Therapy: None (Room air)    Physical Exam  Vitals and nursing note reviewed. Constitutional:       General: He is not in acute distress. Appearance: He is well-developed. He is obese. He is not ill-appearing, toxic-appearing or diaphoretic. HENT:      Head: Normocephalic and atraumatic. Eyes:      Extraocular Movements: Extraocular movements intact. Conjunctiva/sclera: Conjunctivae normal.   Cardiovascular:      Rate and Rhythm: Normal rate and regular rhythm. Heart sounds: No murmur heard. Pulmonary:      Effort: Pulmonary effort is normal. No respiratory distress. Breath sounds: Normal breath sounds. No stridor. No wheezing or rhonchi. Abdominal:      Tenderness: There is no guarding. Musculoskeletal:         General: No swelling. Cervical back: Normal range of motion and neck supple. Skin:     General: Skin is warm and dry. Neurological:      General: No focal deficit present. Mental Status: He is alert and oriented to person, place, and time. Mental status is at baseline. Psychiatric:         Mood and Affect: Mood normal.         Labs: I have personally reviewed pertinent lab results.     ABG: No results found for: "PHART", "AEC3XPR", "PO2ART", "EVG0QVD", "R3BIKCOB", "BEART", "SOURCE",   BNP:   Lab Results   Component Value Date    BNP 33 06/28/2023   ,   CBC:  Lab Results   Component Value Date    WBC 8.88 06/30/2023    HGB 13.1 06/30/2023    HCT 40.0 06/30/2023    MCV 89 06/30/2023     06/30/2023    EOSPCT 7 (H) 06/30/2023    EOSABS 0.63 (H) 06/30/2023    NEUTOPHILPCT 62 06/30/2023    LYMPHOPCT 22 06/30/2023   ,   CMP:   Lab Results   Component Value Date    SODIUM 136 06/30/2023    K 4.6 06/30/2023     06/30/2023    CO2 28 06/30/2023    ANIONGAP 4 06/19/2015    BUN 19 06/30/2023    CREATININE 1.52 (H) 06/30/2023    GLUCOSE 148 (H) 10/14/2016    CALCIUM 9.0 06/30/2023    AST 11 (L) 06/29/2023    ALT 12 06/29/2023    ALKPHOS 62 06/29/2023    PROT 8.3 (H) 06/19/2015    BILITOT 0.5 06/19/2015    EGFR 48 06/30/2023   ,   PT/INR:   Lab Results   Component Value Date    INR 0.97 06/28/2023   ,   Troponin: No results found for: "TROPONINI"      Imaging and other studies: I have personally reviewed pertinent reports. and I have personally reviewed pertinent films in PACS  6/30/23 CTA Chest  Segmental and subsegmental pulmonary emboli, confirmed on repeat examination. No evidence for right heart strain. Measuring RV/LV ratio is within normal limits at less than 0.9. 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. Recommendation is for follow-up low radiation dose chest CT in one year. 6/30/23 Lower extremity dopplers     RIGHT LOWER LIMB:  No evidence of acute or chronic deep vein thrombosis. No evidence of superficial thrombophlebitis noted. Doppler evaluation shows a normal response to augmentation maneuvers. .  Popliteal, posterior tibial and anterior tibial arterial Doppler waveforms are  triphasic. LEFT LOWER LIMB:  No evidence of acute or chronic deep vein thrombosis. No evidence of superficial thrombophlebitis noted. Doppler evaluation shows a normal response to augmentation maneuvers.   Popliteal, posterior tibial and anterior tibial arterial Doppler waveforms are  triphasic. Pulmonary function testing:   Pulmonary Functions Testing Results:      Transthoracic Echo:  6/29/23  •  Left Ventricle: Left ventricular cavity size is normal. Wall thickness is normal. The left ventricular ejection fraction is 60%. Systolic function is normal. Wall motion is normal. Diastolic function is mildly abnormal, consistent with grade I (abnormal) relaxation. •  Left Atrium: The atrium is dilated. •  Aorta: The aortic root is normal in size. The ascending aorta is mildly dilated. The ascending aorta is 4.2 cm. Right ventricular cavity size is normal. Systolic function is normal. Wall thickness is normal.        Apple Orozco MD  Pulmonary, Critical Care and Sleep Medicine  [facility]     Portions of the record may have been created with voice recognition software. Occasional wrong word or "sound a like" substitutions may have occurred due to the inherent limitations of voice recognition software. Please read the chart carefully and recognize, using context, where substitutions have occurred.

## 2023-08-16 NOTE — ASSESSMENT & PLAN NOTE
Bilateral segmental and subsegmental PE diagnosed via 6/30/2023 CTA chest.  Low risk PE, no signs of right heart strain. Contributing factors likely inactivity due to chronic knee OA, and obesity     Continue Eliquis for at least 6 months at 5mg twice a day    After 6 months - would prefer to keep him on 2.5mg BID for prophylaxis due to relative inactivity caused by chronic knee pain. If after 6 months, he goes down to prophylaxis dosing - and he needs procedures such as injections/surgery, Eliquis can be held before the procedure since the risk would be lower. PE likely provoked by inactivity/obesity. No procoagulable workup needed right now due to no blood clot history before this. If having a 2nd VTE in the future, will definitely need procoagulable work up then.

## 2023-08-18 DIAGNOSIS — R29.818 SUSPECTED SLEEP APNEA: Primary | ICD-10-CM

## 2023-08-18 NOTE — ASSESSMENT & PLAN NOTE
Used to be on CPAP but stopped after weight loss. Now with weight gain again. Has increased neck circumference, BMI. Used to have a history of obstructive sleep apnea. I recommend a home sleep study, and started on CPAP if diagnosed. He is amenable to trying CPAP again with nasal mask.     Follow-up 1 to 3 months after CPAP initiation

## 2023-08-29 ENCOUNTER — OFFICE VISIT (OUTPATIENT)
Dept: CARDIOLOGY CLINIC | Facility: CLINIC | Age: 63
End: 2023-08-29
Payer: COMMERCIAL

## 2023-08-29 VITALS
DIASTOLIC BLOOD PRESSURE: 68 MMHG | HEIGHT: 69 IN | SYSTOLIC BLOOD PRESSURE: 126 MMHG | HEART RATE: 69 BPM | WEIGHT: 315 LBS | BODY MASS INDEX: 46.65 KG/M2 | OXYGEN SATURATION: 94 %

## 2023-08-29 DIAGNOSIS — N18.31 TYPE 2 DIABETES MELLITUS WITH STAGE 3A CHRONIC KIDNEY DISEASE, WITHOUT LONG-TERM CURRENT USE OF INSULIN (HCC): ICD-10-CM

## 2023-08-29 DIAGNOSIS — I77.810 THORACIC AORTIC ECTASIA (HCC): Primary | ICD-10-CM

## 2023-08-29 DIAGNOSIS — E11.22 TYPE 2 DIABETES MELLITUS WITH STAGE 3A CHRONIC KIDNEY DISEASE, WITHOUT LONG-TERM CURRENT USE OF INSULIN (HCC): ICD-10-CM

## 2023-08-29 DIAGNOSIS — I10 ESSENTIAL HYPERTENSION: ICD-10-CM

## 2023-08-29 DIAGNOSIS — I26.99 PULMONARY EMBOLISM, UNSPECIFIED CHRONICITY, UNSPECIFIED PULMONARY EMBOLISM TYPE, UNSPECIFIED WHETHER ACUTE COR PULMONALE PRESENT (HCC): ICD-10-CM

## 2023-08-29 DIAGNOSIS — R06.09 DYSPNEA ON EXERTION: ICD-10-CM

## 2023-08-29 DIAGNOSIS — E78.5 HYPERLIPIDEMIA, UNSPECIFIED HYPERLIPIDEMIA TYPE: ICD-10-CM

## 2023-08-29 DIAGNOSIS — E66.01 MORBID OBESITY (HCC): ICD-10-CM

## 2023-08-29 PROCEDURE — 99204 OFFICE O/P NEW MOD 45 MIN: CPT | Performed by: INTERNAL MEDICINE

## 2023-08-29 PROCEDURE — 93000 ELECTROCARDIOGRAM COMPLETE: CPT | Performed by: INTERNAL MEDICINE

## 2023-08-29 NOTE — PROGRESS NOTES
[facility]    CHIEF COMPLAINT: No chief complaint on file. HPI:  Alfredo Landers is a 60 y.o. male with a past medical history of morbid obesity, chronic back pain, opioid dependence, hypertension hyperlipidemia, type II diabetes mellitus, chronic kidney disease stage IIIb, obstructive sleep apnea, pulmonary embolism (6/2023), thoracic aorta ectasia. Briefly, he presented to [facility] on 6/28/2023 with complaints of shortness of breath for 4-5 days prior to presentation. CT PE study on presentation was equivocal.  He was started on heparin infusion. Echocardiogram demonstrated normal biventricular size and systolic function. Lower extremity venous duplex was negative for DVT. He underwent repeat CTA PE study on 6/30 which demonstrated segmental and subsegmental pulmonary emboli. He was transitioned to apixaban. Since his hospitalization he does feel that his shortness of breath has continued to improve. He does not do much physical activity at baseline. He does use a self-propelled and riding mower at times to cut his lawn which takes about 2 hours. He can walk with a self-propelled mower for about 20 minutes at a time. Lipids were previously well controlled on atorvastatin which was recently resumed. He otherwise feels well and denies any new visual changes, lightheadedness, syncope, chest pain, chest pressure, chest heaviness, palpitations, orthopnea, PND, dark or bright red blood in the stool, lower extremity swelling, leg claudication. Social history: Former smoker. Quit 20 years ago. Total 20 years - 2 ppd for at least 10 years.      The following portions of the patient's history were reviewed and updated as appropriate: allergies, current medications, past family history, past medical history, past social history, past surgical history, and problem list.    SINCE LAST OV I REVIEWED WITH THE PATIENT THE INTERIM LABS, TEST RESULTS, CONSULTANT(S) NOTES AND PERFORMED AN INTERIM REVIEW OF HISTORY    Past Medical History:   Diagnosis Date   • Acne    • Anxiety    • Arthritis    • Back pain    • Chronic pain    • Depression 2021   • Diabetes mellitus ([address])     6/19/15 A1C: 5.5%   • Eczema    • Grief reaction 09/10/2019   • Hyperlipidemia    • Hypertension    • Knee pain    • Morbid obesity with BMI of 50.0-59.9, adult ([address])    • Psychiatric disorder    • Skin tag    • Type 2 diabetes mellitus without complication ([address]) 3902   • Urinary retention        Past Surgical History:   Procedure Laterality Date   • BACK SURGERY     • LUMBAR FUSION     • LUMBAR FUSION      L4-5-6, S1 has pump for narcotics       Social History     Socioeconomic History   • Marital status: Legally      Spouse name: Not on file   • Number of children: Not on file   • Years of education: Not on file   • Highest education level: Not on file   Occupational History   • 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[employer], injured back   Tobacco Use   • Smoking status: Every Day     Types: Cigars, Cigarettes     Start date:      Last attempt to quit:      Years since quittin.6   • Smokeless tobacco: Never   • Tobacco comments:     1 cigar daily,  50 pack years, 1/2 pack at age 12, 2 ppd x 20 yrs, quit    Vaping Use   • Vaping Use: Never used   Substance and Sexual Activity   • Alcohol use: Not Currently     Comment: occasional   • Drug use: No     Comment: History of drug use, meena, cocaine, heroind, no IVDA-as per [employer]   • Sexual activity: Yes     Partners: Female     Birth control/protection: Condom Male   Other Topics Concern   • Not on file   Social History Narrative    Patient lives with his ex wife and son.       Social Determinants of Health     Financial Resource Strain: Medium Risk (2022)    Overall Financial Resource Strain (CARDIA)    • Difficulty of Paying Living Expenses: Somewhat hard   Food Insecurity: Not on file   Transportation Needs: No Transportation Needs (9/11/2022)    PRAPARE - Transportation    • Lack of Transportation (Medical): No    • Lack of Transportation (Non-Medical): No   Physical Activity: Not on file   Stress: Not on file   Social Connections: Not on file   Intimate Partner Violence: Not on file   Housing Stability: Not on file       Family History   Problem Relation Age of Onset   • Other Mother         colitis   • Diabetes Mother        Allergies   Allergen Reactions   • Tamsulosin Abdominal Pain   • Gabapentin      Annotation - [id number]: dizziness   • Pregabalin      Annotation - [id number]: vision changes and mood changes   • Tramadol Nausea Only       Current Outpatient Medications   Medication Sig Dispense Refill   • amLODIPine (NORVASC) 10 mg tablet Take 1 tablet (10 mg total) by mouth daily 90 tablet 3   • apixaban (Eliquis) 5 mg Take 1 tablet (5 mg total) by mouth 2 (two) times a day 60 tablet 2   • atorvastatin (LIPITOR) 20 mg tablet Take 1 tablet (20 mg total) by mouth daily 90 tablet 1   • chlorthalidone 25 mg tablet Take 1 tablet (25 mg total) by mouth daily 90 tablet 3   • doxycycline hyclate (VIBRAMYCIN) 100 mg capsule Take one tab PO BID with food and water. Do not lie down for one hour after taking. Avoid the sun or apply SPF50+ broad spectrum sunscreen every 2 hours while outdoors. 60 capsule 6   • hydrocortisone 2.5 % cream Apply topically to face BID for no longer than 7 days for flares 30 g 0   • ketoconazole (NIZORAL) 2 % cream Apply topically daily 15 g 0   • ketoconazole (NIZORAL) 2 % shampoo Apply topically to scalp, eyebrows, and face.  Leave on for 5 minutes and then rinse 120 mL 6   • losartan (COZAAR) 100 MG tablet Take 1 tablet (100 mg total) by mouth daily 90 tablet 3   • metoprolol tartrate (LOPRESSOR) 100 mg tablet Take 1 tablet (100 mg total) by mouth 2 (two) times a day 180 tablet 3   • morphine (MS CONTIN) 15 mg 12 hr tablet take 1 tablet every 8 hours if needed pain  0   • oxyCODONE (ROXICODONE) 30 MG immediate release tablet take 1 tablet by mouth every 5 hours if needed for pain . Caffie Taj MAX 4/DAY  0   • spironolactone (ALDACTONE) 25 mg tablet Take 1 tablet (25 mg total) by mouth daily 90 tablet 3   • tadalafil (CIALIS) 5 MG tablet Take 5 mg by mouth daily as needed for erectile dysfunction (unsure dosage) Indications: Erectile Dysfunction. No current facility-administered medications for this visit. /68   Pulse 69   Ht 5' 9" (1.753 m)   Wt (!) 157 kg (346 lb)   SpO2 94%   BMI 51.10 kg/m²     Review of Systems   All other systems reviewed and are negative. Physical Exam  Vitals reviewed. Constitutional:       General: He is not in acute distress. Appearance: He is well-developed. He is obese. He is not toxic-appearing. HENT:      Head: Normocephalic and atraumatic. Eyes:      General: No scleral icterus. Extraocular Movements: Extraocular movements intact. Conjunctiva/sclera: Conjunctivae normal.   Neck:      Vascular: No carotid bruit. Cardiovascular:      Rate and Rhythm: Normal rate and regular rhythm. Pulses: Normal pulses. Heart sounds: Normal heart sounds. No murmur heard. Pulmonary:      Effort: Pulmonary effort is normal. No respiratory distress. Breath sounds: Normal breath sounds. No wheezing or rales. Abdominal:      General: Bowel sounds are normal. There is no distension. Palpations: Abdomen is soft. Tenderness: There is no abdominal tenderness. There is no guarding. Musculoskeletal:      Right lower leg: No edema. Left lower leg: No edema. Skin:     General: Skin is warm and dry. Capillary Refill: Capillary refill takes less than 2 seconds. Coloration: Skin is not jaundiced. Neurological:      Mental Status: He is alert.    Psychiatric:         Mood and Affect: Mood normal.         Behavior: Behavior normal.          Lab Results   Component Value Date     09/28/2017     09/28/2017    K 4.6 06/30/2023     06/30/2023    CO2 28 06/30/2023    BUN 19 06/30/2023    CREATININE 1.52 (H) 06/30/2023    GLUCOSE 148 (H) 10/14/2016    CALCIUM 9.0 06/30/2023    ALT 12 06/29/2023    AST 11 (L) 06/29/2023    INR 0.97 06/28/2023       Lab Results   Component Value Date    CHOL 109 10/14/2016    HDL 35 (L) 06/19/2023    LDLCALC 110 (H) 06/19/2023    TRIG 218 (H) 06/19/2023       Lab Results   Component Value Date    WBC 8.88 06/30/2023    HGB 13.1 06/30/2023    HCT 40.0 06/30/2023     06/30/2023       Lab Results   Component Value Date     06/19/2023    HGBA1C 6.2 (H) 06/19/2023       Cardiac studies:   ECG- 8/29/2023: Normal sinus rhythm, T wave abnormality consider anterolateral ischemia  ECG-6/20/2023: Normal sinus rhythm, T wave abnormality, consider lateral ischemia and    ASSESSMENT AND PLAN:  Diagnoses and all orders for this visit:    Pulmonary embolism, unspecified chronicity, unspecified pulmonary embolism type, unspecified whether acute cor pulmonale present ([address]): Admitted on 6/28/2023 for shortness of breath. CTA PE study demonstrated segmental and subsegmental pulmonary emboli. He was started on heparin and transitioned to apixaban. He has followed up with pulmonology in the meantime. PE was thought to be provoked in the setting of inactivity and obesity. Plans are for him to continue apixaban for 6 months and switch to apixaban 2.5 mg twice daily for prophylaxis. Hypercoagulable work-up was deferred at this time. Type 2 diabetes mellitus with stage 3a chronic kidney disease, without long-term current use of insulin ([address]): Hemoglobin A1c from 6/19/2020 through 6.2%. Essential hypertension: Blood pressure today 126/68 mmHg. His antihypertensive regimen consists of amlodipine 10 mg daily, chlorthalidone 25 mg daily, losartan 100 mg daily, metoprolol tartrate 100 mg twice daily.     Hyperlipidemia, unspecified hyperlipidemia type: Lipid panel from June 2023 with total cholesterol 189, triglycerides 218, HDL 35, . Lipids were well controlled prior to this while on statin therapy. His atorvastatin was just recently restarted. He has a high 10-year ASCVD risk and coronary calcifications on CT imaging. Currently he has no anginal complaints. His dyspnea appears to be improving with treatment of his pulmonary emboli. Morbid obesity (HCC)  Dyspnea on exertion: This is likely due to his morbid obesity, deconditioning, and recent pulmonary emboli. His symptoms continue to improve while on anticoagulation. He is able to walk approximately 20 minutes with a self-propelled mower without limiting dyspnea. +Former smoker    Thoracic aortic ectasia: Some discrepancy in measurements from his initial CTA measuring 4.4 cm and 4.5 cm a couple days later. His echocardiogram measurement was 4.2 cm. Plan for repeat CT imaging upon follow-up, 1 year from prior.       Betsy Coreas MD

## 2023-08-29 NOTE — PATIENT INSTRUCTIONS
You were seen today in the Cardiology office. No medication changes were made today. We will monitor your aorta with serial imaging. We will repeat another CT scan upon next follow up. Thank you for choosing [address] [place] [address].

## 2023-09-05 DIAGNOSIS — I26.99 PULMONARY EMBOLI (HCC): ICD-10-CM

## 2023-10-04 DIAGNOSIS — E78.5 HYPERLIPIDEMIA, UNSPECIFIED HYPERLIPIDEMIA TYPE: ICD-10-CM

## 2023-10-04 RX ORDER — ATORVASTATIN CALCIUM 20 MG/1
20 TABLET, FILM COATED ORAL DAILY
Qty: 90 TABLET | Refills: 1 | Status: SHIPPED | OUTPATIENT
Start: 2023-10-04

## 2023-10-04 NOTE — TELEPHONE ENCOUNTER
Reason for call:   [x] Refill   [] Prior Auth  [] Other:     Office:   [x] PCP/Provider - Ricki  [] Speciality/Provider -     Medication: Atorvastatin     Dose/Frequency: 20mg 1 daily    Quantity: 90    Pharmacy: [facility]    Does the patient have enough for 3 days? [] Yes   [x] No - Send as HP to POD    Patient states pharmacy has no refills.

## 2023-10-06 ENCOUNTER — RA CDI HCC (OUTPATIENT)
Dept: OTHER | Facility: HOSPITAL | Age: 63
End: 2023-10-06

## 2023-10-06 NOTE — PROGRESS NOTES
[address] coding opportunities       Chart reviewed, no opportunity found: [address] Review     Patients Insurance     Medicare Insurance: [employer]

## 2023-10-13 ENCOUNTER — RA CDI HCC (OUTPATIENT)
Dept: OTHER | Facility: HOSPITAL | Age: 63
End: 2023-10-13

## 2023-10-13 NOTE — PROGRESS NOTES
[address] [facility] coding opportunities     E11.40     Chart Reviewed number of suggestions sent to Provider: 1     GR    Patients Insurance     Medicare Insurance: [facility] There are no Wet Read(s) to document. There is 1 Wet Read(s) to document.

## 2023-10-16 ENCOUNTER — OFFICE VISIT (OUTPATIENT)
Dept: FAMILY MEDICINE CLINIC | Facility: CLINIC | Age: 63
End: 2023-10-16
Payer: COMMERCIAL

## 2023-10-16 VITALS
SYSTOLIC BLOOD PRESSURE: 122 MMHG | RESPIRATION RATE: 18 BRPM | BODY MASS INDEX: 46.65 KG/M2 | HEART RATE: 74 BPM | HEIGHT: 69 IN | DIASTOLIC BLOOD PRESSURE: 72 MMHG | OXYGEN SATURATION: 98 % | WEIGHT: 315 LBS

## 2023-10-16 DIAGNOSIS — I10 ESSENTIAL HYPERTENSION: ICD-10-CM

## 2023-10-16 DIAGNOSIS — I77.810 THORACIC AORTIC ECTASIA (HCC): ICD-10-CM

## 2023-10-16 DIAGNOSIS — R53.83 FATIGUE, UNSPECIFIED TYPE: ICD-10-CM

## 2023-10-16 DIAGNOSIS — E66.01 MORBID OBESITY (HCC): ICD-10-CM

## 2023-10-16 DIAGNOSIS — N64.4 NIPPLE TENDERNESS: ICD-10-CM

## 2023-10-16 DIAGNOSIS — Z23 FLU VACCINE NEED: ICD-10-CM

## 2023-10-16 DIAGNOSIS — E11.22 TYPE 2 DIABETES MELLITUS WITH STAGE 3A CHRONIC KIDNEY DISEASE, WITHOUT LONG-TERM CURRENT USE OF INSULIN (HCC): ICD-10-CM

## 2023-10-16 DIAGNOSIS — N18.31 TYPE 2 DIABETES MELLITUS WITH STAGE 3A CHRONIC KIDNEY DISEASE, WITHOUT LONG-TERM CURRENT USE OF INSULIN (HCC): ICD-10-CM

## 2023-10-16 DIAGNOSIS — I26.99 PULMONARY EMBOLISM, UNSPECIFIED CHRONICITY, UNSPECIFIED PULMONARY EMBOLISM TYPE, UNSPECIFIED WHETHER ACUTE COR PULMONALE PRESENT (HCC): ICD-10-CM

## 2023-10-16 DIAGNOSIS — F41.9 ANXIETY DISORDER, UNSPECIFIED TYPE: ICD-10-CM
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
## 2023-10-16 DIAGNOSIS — Z00.00 MEDICARE ANNUAL WELLNESS VISIT, SUBSEQUENT: Primary | ICD-10-CM

## 2023-10-16 DIAGNOSIS — Z12.5 SCREENING FOR PROSTATE CANCER: ICD-10-CM

## 2023-10-16 DIAGNOSIS — F33.9 RECURRENT MAJOR DEPRESSIVE DISORDER, REMISSION STATUS UNSPECIFIED (HCC): ICD-10-CM

## 2023-10-16 DIAGNOSIS — E78.5 HYPERLIPIDEMIA, UNSPECIFIED HYPERLIPIDEMIA TYPE: ICD-10-CM

## 2023-10-16 PROBLEM — Z11.59 NEED FOR HEPATITIS C SCREENING TEST: Status: RESOLVED | Noted: 2019-10-28 | Resolved: 2023-10-16

## 2023-10-16 PROCEDURE — G0439 PPPS, SUBSEQ VISIT: HCPCS | Performed by: NURSE PRACTITIONER

## 2023-10-16 PROCEDURE — 90686 IIV4 VACC NO PRSV 0.5 ML IM: CPT | Performed by: NURSE PRACTITIONER

## 2023-10-16 PROCEDURE — G0008 ADMIN INFLUENZA VIRUS VAC: HCPCS | Performed by: NURSE PRACTITIONER

## 2023-10-16 PROCEDURE — 99214 OFFICE O/P EST MOD 30 MIN: CPT | Performed by: NURSE PRACTITIONER

## 2023-10-16 NOTE — PATIENT INSTRUCTIONS
Medicare Preventive Visit Patient Instructions  Thank you for completing your Welcome to Medicare Visit or Medicare Annual Wellness Visit today. Your next wellness visit will be due in one year (10/16/2024). The screening/preventive services that you may require over the next 5-10 years are detailed below. Some tests may not apply to you based off risk factors and/or age. Screening tests ordered at today's visit but not completed yet may show as past due. Also, please note that scanned in results may not display below. Preventive Screenings:  Service Recommendations Previous Testing/Comments   Colorectal Cancer Screening  Colonoscopy    Fecal Occult Blood Test (FOBT)/Fecal Immunochemical Test (FIT)  Fecal DNA/Cologuard Test  Flexible Sigmoidoscopy Age: 43-73 years old   Colonoscopy: every 10 years (May be performed more frequently if at higher risk)  OR  FOBT/FIT: every 1 year  OR  Cologuard: every 3 years  OR  Sigmoidoscopy: every 5 years  Screening may be recommended earlier than age 39 if at higher risk for colorectal cancer. Also, an individualized decision between you and your healthcare provider will decide whether screening between the ages of 77-80 would be appropriate.  Colonoscopy: Not on file  FOBT/FIT: Not on file  Cologuard: 06/01/2021  Sigmoidoscopy: Not on file    Screening Current     Prostate Cancer Screening Individualized decision between patient and health care provider in men between ages of 53-66   Medicare will cover every 12 months beginning on the day after your 50th birthday PSA: 1.1 ng/mL           Hepatitis C Screening Once for adults born between 1945 and 1965  More frequently in patients at high risk for Hepatitis C Hep C Antibody: 01/08/2020    Screening Current   Diabetes Screening 1-2 times per year if you're at risk for diabetes or have pre-diabetes Fasting glucose: 163 mg/dL (6/19/2023)  A1C: 6.2 % (6/19/2023)  Screening Not Indicated  History Diabetes   Cholesterol Screening Once every 5 years if you don't have a lipid disorder. May order more often based on risk factors. Lipid panel: 06/19/2023  Screening Not Indicated  History Lipid Disorder      Other Preventive Screenings Covered by Medicare:  Abdominal Aortic Aneurysm (AAA) Screening: covered once if your at risk. You're considered to be at risk if you have a family history of AAA or a male between the age of 70-76 who smoking at least 100 cigarettes in your lifetime. Lung Cancer Screening: covers low dose CT scan once per year if you meet all of the following conditions: (1) Age 48-67; (2) No signs or symptoms of lung cancer; (3) Current smoker or have quit smoking within the last 15 years; (4) You have a tobacco smoking history of at least 20 pack years (packs per day x number of years you smoked); (5) You get a written order from a healthcare provider. Glaucoma Screening: covered annually if you're considered high risk: (1) You have diabetes OR (2) Family history of glaucoma OR (3)  aged 48 and older OR (3)  American aged 72 and older  Osteoporosis Screening: covered every 2 years if you meet one of the following conditions: (1) Have a vertebral abnormality; (2) On glucocorticoid therapy for more than 3 months; (3) Have primary hyperparathyroidism; (4) On osteoporosis medications and need to assess response to drug therapy. HIV Screening: covered annually if you're between the age of 14-79. Also covered annually if you are younger than 13 and older than 72 with risk factors for HIV infection. For pregnant patients, it is covered up to 3 times per pregnancy.     Immunizations:  Immunization Recommendations   Influenza Vaccine Annual influenza vaccination during flu season is recommended for all persons aged >= 6 months who do not have contraindications   Pneumococcal Vaccine   * Pneumococcal conjugate vaccine = PCV13 (Prevnar 13), PCV15 (Vaxneuvance), PCV20 (Prevnar 20)  * Pneumococcal polysaccharide vaccine = PPSV23 (Pneumovax) Adults 89-90 yo with certain risk factors or if 69+ yo  If never received any pneumonia vaccine: recommend Prevnar 20 (PCV20)  Give PCV20 if previously received 1 dose of PCV13 or PPSV23   Hepatitis B Vaccine 3 dose series if at intermediate or high risk (ex: diabetes, end stage renal disease, liver disease)   Respiratory syncytial virus (RSV) Vaccine - COVERED BY MEDICARE PART D  * RSVPreF3 (Arexvy) CDC recommends that adults 61years of age and older may receive a single dose of RSV vaccine using shared clinical decision-making (SCDM)   Tetanus (Td) Vaccine - COST NOT COVERED BY MEDICARE PART B Following completion of primary series, a booster dose should be given every 10 years to maintain immunity against tetanus. Td may also be given as tetanus wound prophylaxis. Tdap Vaccine - COST NOT COVERED BY MEDICARE PART B Recommended at least once for all adults. For pregnant patients, recommended with each pregnancy. Shingles Vaccine (Shingrix) - COST NOT COVERED BY MEDICARE PART B  2 shot series recommended in those 23 years and older who have or will have weakened immune systems or those 50 years and older     Health Maintenance Due:      Topic Date Due   • HIV Screening  Never done   • Colorectal Cancer Screening  06/01/2024   • Hepatitis C Screening  Completed     Immunizations Due:      Topic Date Due   • COVID-19 Vaccine (1) Never done   • Pneumococcal Vaccine: Pediatrics (0 to 5 Years) and At-Risk Patients (6 to 59 Years) (2 - PCV) 11/16/2016   • Influenza Vaccine (1) 09/01/2023     Advance Directives   What are advance directives? Advance directives are legal documents that state your wishes and plans for medical care. These plans are made ahead of time in case you lose your ability to make decisions for yourself. Advance directives can apply to any medical decision, such as the treatments you want, and if you want to donate organs. What are the types of advance directives?   There are many types of advance directives, and each state has rules about how to use them. You may choose a combination of any of the following:  Living will: This is a written record of the treatment you want. You can also choose which treatments you do not want, which to limit, and which to stop at a certain time. This includes surgery, medicine, IV fluid, and tube feedings. Durable power of  for healthcare [facility]): This is a written record that states who you want to make healthcare choices for you when you are unable to make them for yourself. This person, called a proxy, is usually a family member or a friend. You may choose more than 1 proxy. Do not resuscitate (DNR) order:  A DNR order is used in case your heart stops beating or you stop breathing. It is a request not to have certain forms of treatment, such as CPR. A DNR order may be included in other types of advance directives. Medical directive: This covers the care that you want if you are in a coma, near death, or unable to make decisions for yourself. You can list the treatments you want for each condition. Treatment may include pain medicine, surgery, blood transfusions, dialysis, IV or tube feedings, and a ventilator (breathing machine). Values history: This document has questions about your views, beliefs, and how you feel and think about life. This information can help others choose the care that you would choose. Why are advance directives important? An advance directive helps you control your care. Although spoken wishes may be used, it is better to have your wishes written down. Spoken wishes can be misunderstood, or not followed. Treatments may be given even if you do not want them. An advance directive may make it easier for your family to make difficult choices about your care. Cigarette Smoking and Your Health   Risks to your health if you smoke:  Nicotine and other chemicals found in tobacco damage every cell in your body. Even if you are a light smoker, you have an increased risk for cancer, heart disease, and lung disease. If you are pregnant or have diabetes, smoking increases your risk for complications. Benefits to your health if you stop smoking: You decrease respiratory symptoms such as coughing, wheezing, and shortness of breath. You reduce your risk for cancers of the lung, mouth, throat, kidney, bladder, pancreas, stomach, and cervix. If you already have cancer, you increase the benefits of chemotherapy. You also reduce your risk for cancer returning or a second cancer from developing. You reduce your risk for heart disease, blood clots, heart attack, and stroke. You reduce your risk for lung infections, and diseases such as pneumonia, asthma, chronic bronchitis, and emphysema. Your circulation improves. More oxygen can be delivered to your body. If you have diabetes, you lower your risk for complications, such as kidney, artery, and eye diseases. You also lower your risk for nerve damage. Nerve damage can lead to amputations, poor vision, and blindness. You improve your body's ability to heal and to fight infections. For more information and support to stop smoking:   [employer]  Phone: 6- 763 - [phone]  Web Address: www."[employer]. [place]  Weight Management   Why it is important to manage your weight:  Being overweight increases your risk of health conditions such as heart disease, high blood pressure, type 2 diabetes, and certain types of cancer. It can also increase your risk for osteoarthritis, sleep apnea, and other respiratory problems. Aim for a slow, steady weight loss. Even a small amount of weight loss can lower your risk of health problems. How to lose weight safely:  A safe and healthy way to lose weight is to eat fewer calories and get regular exercise. You can lose up about 1 pound a week by decreasing the number of calories you eat by 500 calories each day.    Healthy meal plan for weight management:  A healthy meal plan includes a variety of foods, contains fewer calories, and helps you stay healthy. A healthy meal plan includes the following:  Eat whole-grain foods more often. A healthy meal plan should contain fiber. Fiber is the part of grains, fruits, and vegetables that is not broken down by your body. Whole-grain foods are healthy and provide extra fiber in your diet. Some examples of whole-grain foods are whole-wheat breads and pastas, oatmeal, brown rice, and bulgur. Eat a variety of vegetables every day. Include dark, leafy greens such as spinach, kale, christy greens, and mustard greens. Eat yellow and orange vegetables such as carrots, sweet potatoes, and winter squash. Eat a variety of fruits every day. Choose fresh or canned fruit (canned in its own juice or light syrup) instead of juice. Fruit juice has very little or no fiber. Eat low-fat dairy foods. Drink fat-free (skim) milk or 1% milk. Eat fat-free yogurt and low-fat cottage cheese. Try low-fat cheeses such as mozzarella and other reduced-fat cheeses. Choose meat and other protein foods that are low in fat. Choose beans or other legumes such as split peas or lentils. Choose fish, skinless poultry (chicken or turkey), or lean cuts of red meat (beef or pork). Before you cook meat or poultry, cut off any visible fat. Use less fat and oil. Try baking foods instead of frying them. Add less fat, such as margarine, sour cream, regular salad dressing and mayonnaise to foods. Eat fewer high-fat foods. Some examples of high-fat foods include french fries, doughnuts, ice cream, and cakes. Eat fewer sweets. Limit foods and drinks that are high in sugar. This includes candy, cookies, regular soda, and sweetened drinks. Exercise:  Exercise at least 30 minutes per day on most days of the week. Some examples of exercise include walking, biking, dancing, and swimming.  You can also fit in more physical activity by taking the stairs instead of the elevator or parking farther away from stores. Ask your healthcare provider about the best exercise plan for you. Narcotic (Opioid) Safety    Use narcotics safely:  Take prescribed narcotics exactly as directed  Do not give narcotics to others or take narcotics that belong to someone else  Do not mix narcotics without medicines or alcohol  Do not drive or operate heavy machinery after you take the narcotic  Monitor for side effects and notify your healthcare provider if you experienced side effects such as nausea, sleepiness, itching, or trouble thinking clearly. Manage constipation:    Constipation is the most common side effect of narcotic medicine. Constipation is when you have hard, dry bowel movements, or you go longer than usual between bowel movements. Tell your healthcare provider about all changes in your bowel movements while you are taking narcotics. He or she may recommend laxative medicine to help you have a bowel movement. He or she may also change the kind of narcotic you are taking, or change when you take it. The following are more ways you can prevent or relieve constipation:    Drink liquids as directed. You may need to drink extra liquids to help soften and move your bowels. Ask how much liquid to drink each day and which liquids are best for you. Eat high-fiber foods. This may help decrease constipation by adding bulk to your bowel movements. High-fiber foods include fruits, vegetables, whole-grain breads and cereals, and beans. Your healthcare provider or dietitian can help you create a high-fiber meal plan. Your provider may also recommend a fiber supplement if you cannot get enough fiber from food. Exercise regularly. Regular physical activity can help stimulate your intestines. Walking is a good exercise to prevent or relieve constipation. Ask which exercises are best for you. Schedule a time each day to have a bowel movement.   This may help train your body to have regular bowel movements. Bend forward while you are on the toilet to help move the bowel movement out. Sit on the toilet for at least 10 minutes, even if you do not have a bowel movement. Store narcotics safely:   Store narcotics where others cannot easily get them. Keep them in a locked cabinet or secure area. Do not  keep them in a purse or other bag you carry with you. A person may be looking for something else and find the narcotics. Make sure narcotics are stored out of the reach of children. A child can easily overdose on narcotics. Narcotics may look like candy to a small child. The best way to dispose of narcotics: The laws vary by country and area. In the [facility], the best way is to return the narcotics through a take-back program. This program is offered by the [employer]). The following are options for using the program:  Take the narcotics to a [employer] collection site. The site is often a law enforcement center. Call your local law enforcement center for scheduled take-back days in your area. You will be given information on where to go if the collection site is in a different location. Take the narcotics to an approved pharmacy or hospital.  A pharmacy or hospital may be set up as a collection site. You will need to ask if it is a [employer] collection site if you were not directed there. A pharmacy or doctor's office may not be able to take back narcotics unless it is a [employer] site. Use a mail-back system. This means you are given containers to put the narcotics into. You will then mail them in the containers. Use a take-back drop box. This is a place to leave the narcotics at any time. People and animals will not be able to get into the box. Your local law enforcement agency can tell you where to find a drop box in your area. Other ways to manage pain:   Ask your healthcare provider about non-narcotic medicines to control pain.   Nonprescription medicines include NSAIDs (such as ibuprofen) and acetaminophen. Prescription medicines include muscle relaxers, antidepressants, and steroids. Pain may be managed without any medicines. Some ways to relieve pain include massage, aromatherapy, or meditation. Physical or occupational therapy may also help. For more information:   Drug Enforcement Administration  [facility] , [address] [place] [address]  Phone: 6- 434 - [phone]  Web Address: [facility] [email or web address]    [address] [place] [address]  Phone: 2- 388 - [phone]  Web Address: http://[employer]/     © Copyright [employer] 2018 Information is for End User's use only and may not be sold, redistributed or otherwise used for commercial purposes.  All illustrations and images included in CareNotes® are the copyrighted property of [employer] or [address] [place] [address]

## 2023-10-16 NOTE — PROGRESS NOTES
Assessment and Plan:     Problem List Items Addressed This Visit          Endocrine    Type 2 diabetes mellitus with stage 3a chronic kidney disease, without long-term current use of insulin ([address])    Relevant Orders    Diabetic foot exam    CBC and differential    Comprehensive metabolic panel    TSH, 3rd generation with Free T4 reflex    HEMOGLOBIN A1C W/ EAG ESTIMATION    Hemoglobin A1c ordered. Patient instructed to eat a healthy low fat/diabetic diet. Cardiovascular and Mediastinum    Essential hypertension    Relevant Orders    CBC and differential    Comprehensive metabolic panel    TSH, 3rd generation with Free T4 reflex    /72. Continue to follow-up with nephrology. Pulmonary embolism (HCC)    Relevant Orders    CBC and differential    Comprehensive metabolic panel    Patient follows up with pulmonary. Patient takes eliquis as prescribed. Thoracic aortic ectasia (HCC)  Continue to follow-up with cardiology. Other    Anxiety disorder  Patient reports that he manages his anxiety well on his own. Hyperlipidemia    Relevant Orders    CBC and differential    Comprehensive metabolic panel    TSH, 3rd generation with Free T4 reflex    Lipid Panel with Direct LDL reflex    Lipid panel ordered. Patient instructed to eat a healthy low fat/diabetic diet. Morbid obesity ([address])    Relevant Orders    CBC and differential    Patient instructed to eat a healthy low fat/diabetic diet. Screening for prostate cancer    Relevant Orders    PSA, Total Screen    Recurrent major depressive disorder, remission status unspecified ([address])  Denies any suicidal thoughts. Patient reports that he manages his depression well on his own. Medicare annual wellness visit, subsequent - Primary  Patient instructed to eat a healthy low fat/diabetic diet.        Nipple tenderness    Relevant Orders    CBC and differential    Comprehensive metabolic panel    TSH, 3rd generation with Free T4 reflex    Patient reports occasional nipple tenderness for the past month. Denies any drainage or masses. Exam wnl. Lab work ordered. Discussed ordering a mammogram. Patient would like to get the lab work done first.   Will order mammogram after lab results are received. Fatigue    Relevant Orders    CBC and differential    Comprehensive metabolic panel    TSH, 3rd generation with Free T4 reflex    HEMOGLOBIN A1C W/ EAG ESTIMATION    Lipid Panel with Direct LDL reflex    Lab work ordered. Will follow-up results with the patient. Flu vaccine need    Relevant Orders    influenza vaccine, quadrivalent, 0.5 mL, preservative-free, for adult and pediatric patients 6 mos+ (AFLURIA, FLUARIX, FLULAVAL, FLUZONE) (Completed)      Fall  Fall precautions discussed. Lab work ordered. Will follow-up results with the patient. Patient instructed to follow-up in 3 months or sooner prn. Tobacco Cessation Counseling: Tobacco cessation counseling was provided. The patient is sincerely urged to quit consumption of tobacco. He is not ready to quit tobacco.     Depression Screening Follow-up Plan: Patient's depression screening was positive with a PHQ-2 score of 2. Their PHQ-9 score was 9. Patient declines further evaluation by mental health professional and/or medications. They have no active suicidal ideations. Brief counseling provided and recommend additional follow-up/re-evaluation at next office visit. Preventive health issues were discussed with patient, and age appropriate screening tests were ordered as noted in patient's After Visit Summary. Personalized health advice and appropriate referrals for health education or preventive services given if needed, as noted in patient's After Visit Summary. History of Present Illness:     Patient presents for a Medicare Wellness Visit    Patient is here for a follow-up for chronic medical conditions. Patient reports increased fatigue recently. Patient is here for a follow-up for DM 2 and obesity. Patient reports that he watches his diet. Patient follows up with nephrology for HTN. Patient follows with cardiology for thoracic aortic ectasia. Patient follows up with pulmonary for pulmonary embolism. Patient takes eliquis BID as prescribed. Patient follows up with pain management for chronic back pain. Patient is here for a follow-up for anxiety and depression. Patient reports that he is managing it well on his own. Denies any suicidal thoughts or homicidal thoughts. Patient reports occasional bilateral nipple tenderness for the past few weeks. Denies any drainage. Denies any masses. Patient Care Team:  MARJORIE Fiore as PCP - General  [facility], MD Cindy Crockett MD (Nephrology)  Sailaja Tomas PA-C (Nephrology)  Denise Claros DPM as Resident (Podiatry)     Review of Systems:     Review of Systems   Constitutional:  Positive for fatigue. Negative for chills and fever. HENT:  Negative for congestion, ear pain, sinus pressure and sore throat. Respiratory:  Negative for cough, chest tightness, shortness of breath and wheezing. Cardiovascular:  Negative for chest pain, palpitations and leg swelling. Gastrointestinal:  Negative for abdominal pain, blood in stool, diarrhea, nausea and vomiting. Genitourinary:  Negative for dysuria, frequency and hematuria. Musculoskeletal:         As noted in HPI. Skin:  Negative for rash. Neurological:  Negative for dizziness, seizures, syncope, light-headedness and headaches. Psychiatric/Behavioral:  Negative for suicidal ideas. As noted in HPI.          Problem List:     Patient Active Problem List   Diagnosis    Hypokalemia    Allergic rhinitis    Chronic back pain    Dental cavity    Anxiety disorder    Erectile dysfunction    Hyperlipidemia    Essential hypertension    Localized osteoarthritis of left knee    Lumbar radiculopathy    Microalbuminuria    Morbid obesity (HCC)    IMELDA on CPAP    Patellofemoral arthritis of left knee    Rosacea    Vitamin D deficiency    Pain in finger of both hands    Trigger finger, left middle finger    Persistent proteinuria    Screening for prostate cancer    Screening for colon cancer    It band syndrome, right    Bilateral primary osteoarthritis of knee    Stage 3b chronic kidney disease (HCC)    Localized osteoarthritis of right knee    Anemia    Abnormal CBC    History of cigar smoking    Type 2 diabetes mellitus with stage 3b chronic kidney disease, without long-term current use of insulin (HCC)    Dyspnea on exertion    Financial difficulties    Continuous opioid dependence (HCC)    Pulmonary embolism (HCC)    Recurrent major depressive disorder, remission status unspecified ([address])    Thoracic aortic ectasia (HCC)    Suspected sleep apnea    Medicare annual wellness visit, subsequent    Nipple tenderness    Fatigue    Flu vaccine need    Fall      Past Medical and Surgical History:     Past Medical History:   Diagnosis Date    Acne     Anxiety     Arthritis     Back pain     Chronic pain     Depression 06/07/2021    Diabetes mellitus ([address])     6/19/15 A1C: 5.5%    Eczema     Grief reaction 09/10/2019    Hyperlipidemia     Hypertension     Knee pain     Morbid obesity with BMI of 50.0-59.9, adult [facility])     Psychiatric disorder     Skin tag     Type 2 diabetes mellitus without complication ([address]) 40/53/0668    Urinary retention      Past Surgical History:   Procedure Laterality Date    BACK SURGERY      LUMBAR FUSION  2002    LUMBAR FUSION  2004    L4-5-6, S1 has pump for narcotics      Family History:     Family History   Problem Relation Age of Onset    Other Mother         colitis    Diabetes Mother       Social History:     Social History     Socioeconomic History    Marital status: Legally      Spouse name: None    Number of children: None    Years of education: None    Highest education level: None   Occupational History    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[employer], injured back   Tobacco Use    Smoking status: Every Day     Types: Cigars, Cigarettes     Start date:      Last attempt to quit:      Years since quittin.8    Smokeless tobacco: Never    Tobacco comments:     1 cigar daily,  50 pack years, 1/2 pack at age 12, 3 ppd x 20 yrs, quit    Vaping Use    Vaping Use: Never used   Substance and Sexual Activity    Alcohol use: Not Currently     Comment: occasional    Drug use: No     Comment: History of drug use, meena, cocaine, heroind, no IVDA-as per [employer]    Sexual activity: Yes     Partners: Female     Birth control/protection: Condom Male   Other Topics Concern    None   Social History Narrative    Patient lives with his ex wife and son. Social Determinants of Health     Financial Resource Strain: Low Risk  (10/15/2023)    Overall Financial Resource Strain (CARDIA)     Difficulty of Paying Living Expenses: Not very hard   Food Insecurity: Not on file   Transportation Needs: No Transportation Needs (10/15/2023)    PRAPARE - Transportation     Lack of Transportation (Medical): No     Lack of Transportation (Non-Medical):  No   Physical Activity: Not on file   Stress: Not on file   Social Connections: Not on file   Intimate Partner Violence: Not on file   Housing Stability: Not on file      Medications and Allergies:     Current Outpatient Medications   Medication Sig Dispense Refill    amLODIPine (NORVASC) 10 mg tablet Take 1 tablet (10 mg total) by mouth daily 90 tablet 3    apixaban (Eliquis) 5 mg Take 1 tablet (5 mg total) by mouth 2 (two) times a day 60 tablet 2    atorvastatin (LIPITOR) 20 mg tablet Take 1 tablet (20 mg total) by mouth daily 90 tablet 1    chlorthalidone 25 mg tablet Take 1 tablet (25 mg total) by mouth daily 90 tablet 3    ketoconazole (NIZORAL) 2 % cream Apply topically daily 15 g 0    ketoconazole (NIZORAL) 2 % shampoo Apply topically to scalp, eyebrows, and face. Leave on for 5 minutes and then rinse 120 mL 6    losartan (COZAAR) 100 MG tablet Take 1 tablet (100 mg total) by mouth daily 90 tablet 3    metoprolol tartrate (LOPRESSOR) 100 mg tablet Take 1 tablet (100 mg total) by mouth 2 (two) times a day 180 tablet 3    morphine (MS CONTIN) 15 mg 12 hr tablet take 1 tablet every 8 hours if needed pain  0    oxyCODONE (ROXICODONE) 30 MG immediate release tablet take 1 tablet by mouth every 5 hours if needed for pain . Darylene Pipe MAX 4/DAY  0    spironolactone (ALDACTONE) 25 mg tablet Take 1 tablet (25 mg total) by mouth daily 90 tablet 3    tadalafil (CIALIS) 5 MG tablet Take 5 mg by mouth daily as needed for erectile dysfunction (unsure dosage) Indications: Erectile Dysfunction. hydrocortisone 2.5 % cream Apply topically to face BID for no longer than 7 days for flares 30 g 0     No current facility-administered medications for this visit. Allergies   Allergen Reactions    Tamsulosin Abdominal Pain    Gabapentin      Annotation - [id number]: dizziness    Pregabalin      Annotation - [id number]: vision changes and mood changes    Tramadol Nausea Only      Immunizations:     Immunization History   Administered Date(s) Administered    Influenza Quadrivalent, 6-35 Months IM 11/16/2015, 10/19/2016    Influenza, injectable, quadrivalent, preservative free 0.5 mL 10/16/2023    Influenza, seasonal, injectable 01/01/2013    Pneumococcal Polysaccharide PPV23 11/16/2015      Health Maintenance:         Topic Date Due    HIV Screening  Never done    Colorectal Cancer Screening  06/01/2024    Hepatitis C Screening  Completed         Topic Date Due    COVID-19 Vaccine (1) Never done    Pneumococcal Vaccine: Pediatrics (0 to 5 Years) and At-Risk Patients (6 to 59 Years) (2 - PCV) 11/16/2016      Medicare Screening Tests and Risk Assessments:     Rober Flores is here for his Subsequent Wellness visit.      Health Risk Assessment:   Patient rates overall health as good. Patient feels that their physical health rating is slightly worse. Patient is satisfied with their life. Eyesight was rated as same. Hearing was rated as same. Patient feels that their emotional and mental health rating is slightly better. Patients states they are never, rarely angry. Patient states they are always unusually tired/fatigued. Pain experienced in the last 7 days has been a lot. Patient's pain rating has been 9/10. Patient states that he has experienced no weight loss or gain in last 6 months. Patient follows up with pain management for chronic back pain. Depression Screening:   PHQ-9 Score: 9      Fall Risk Screening: In the past year, patient has experienced: history of falling in past year    Number of falls: 2 or more  Injured during fall?: No    Feels unsteady when standing or walking?: Yes    Worried about falling?: Yes      Home Safety:  Patient has trouble with stairs inside or outside of their home. Patient has working smoke alarms and has working carbon monoxide detector. Home safety hazards include: none. Patient reports that he feels unsteady with walking up stairs due to chronic knee pain. Patient reports that he is going to follow-up with ortho again. Nutrition:   Current diet is No Added Salt and Diabetic. Medications:   Patient is not currently taking any over-the-counter supplements. Patient is able to manage medications. Activities of Daily Living (ADLs)/Instrumental Activities of Daily Living (IADLs):   Walk and transfer into and out of bed and chair?: Yes  Dress and groom yourself?: Yes    Bathe or shower yourself?: Yes    Feed yourself?  Yes  Do your laundry/housekeeping?: Yes  Manage your money, pay your bills and track your expenses?: Yes  Make your own meals?: Yes    Do your own shopping?: Yes    Previous Hospitalizations:   Any hospitalizations or ED visits within the last 12 months?: Yes    How many hospitalizations have you had in the last year?: 1-2    Hospitalization Comments: Patient was in the hospital for PE. Advance Care Planning:   Living will: No    Durable POA for healthcare: No    Advanced directive: No    Advanced directive counseling given: Yes      Cognitive Screening:   Provider or family/friend/caregiver concerned regarding cognition?: No    PREVENTIVE SCREENINGS      Cardiovascular Screening:    General: History Lipid Disorder and Screening Current      Diabetes Screening:     General: History Diabetes      Colorectal Cancer Screening:     General: Screening Current      Prostate Cancer Screening:    General: Risks and Benefits Discussed    Due for: PSA      Osteoporosis Screening:    General: Risks and Benefits Discussed and Patient Declines      Abdominal Aortic Aneurysm (AAA) Screening:    Risk factors include: tobacco use        General: Screening Not Indicated      Lung Cancer Screening:     General: Risks and Benefits Discussed and Patient Declines      Hepatitis C Screening:    General: Screening Current    Screening, Brief Intervention, and Referral to Treatment (SBIRT)    Screening  Typical number of drinks in a day: 0  Typical number of drinks in a week: 0  Interpretation: Low risk drinking behavior. AUDIT-C Screenin) How often did you have a drink containing alcohol in the past year? never  2) How many drinks did you have on a typical day when you were drinking in the past year? 0  3) How often did you have 6 or more drinks on one occasion in the past year? never    AUDIT-C Score: 0  Interpretation: Score 0-3 (male): Negative screen for alcohol misuse    Single Item Drug Screening:  How often have you used an illegal drug (including marijuana) or a prescription medication for non-medical reasons in the past year? never    Single Item Drug Screen Score: 0  Interpretation: Negative screen for possible drug use disorder    Brief Intervention  Alcohol & drug use screenings were reviewed.  No concerns regarding substance use disorder identified. Review of Current Opioid Use  Opioid Risk Tool (ORT) Score: 8  Opioid Risk Tool (ORT) Interpretation: Score 8+: High risk for opioid misuse    [place]MP or [place]  reviewed. No red flags were identified    Patient follows up with pain management. .    Other Counseling Topics:   Car/seat belt/driving safety, skin self-exam, sunscreen and calcium and vitamin D intake. No results found. Physical Exam:     /72   Pulse 74   Resp 18   Ht 5' 9" (1.753 m)   Wt (!) 154 kg (339 lb)   SpO2 98%   BMI 50.06 kg/m²     Physical Exam  Vitals reviewed. Constitutional:       General: He is not in acute distress. Appearance: He is obese. He is not ill-appearing or diaphoretic. HENT:      Right Ear: External ear normal.      Left Ear: External ear normal.      Nose: Nose normal.      Mouth/Throat:      Mouth: Mucous membranes are moist.      Pharynx: Oropharynx is clear. No oropharyngeal exudate or posterior oropharyngeal erythema. Eyes:      Conjunctiva/sclera: Conjunctivae normal.      Pupils: Pupils are equal, round, and reactive to light. Cardiovascular:      Rate and Rhythm: Normal rate and regular rhythm. Pulses: Normal pulses. no weak pulses          Dorsalis pedis pulses are 2+ on the right side and 2+ on the left side. Heart sounds: Normal heart sounds. Pulmonary:      Effort: Pulmonary effort is normal. No respiratory distress. Breath sounds: Normal breath sounds. No wheezing. Chest:   Breasts:     Right: No bleeding, inverted nipple, mass, nipple discharge or skin change. Left: No bleeding, inverted nipple, mass, nipple discharge or skin change. Musculoskeletal:      Comments: Gait wnl. Feet:      Right foot:      Skin integrity: Dry skin present. No ulcer, skin breakdown, erythema, warmth or callus. Left foot:      Skin integrity: Dry skin present. No ulcer, skin breakdown, erythema, warmth or callus. Skin:     Findings: No rash. Neurological:      Mental Status: He is alert and oriented to person, place, and time. Psychiatric:         Mood and Affect: Mood normal.      Diabetic Foot Exam    Patient's shoes and socks removed. Right Foot/Ankle   Right Foot Inspection  Skin Exam: skin normal, skin intact and dry skin. No warmth, no callus, no erythema, no maceration, no abnormal color, no pre-ulcer, no ulcer and no callus. Toe Exam: ROM and strength within normal limits. No swelling, no tenderness, erythema and  no right toe deformity    Sensory   Monofilament testing: intact    Vascular  Capillary refills: < 3 seconds  The right DP pulse is 2+. Left Foot/Ankle  Left Foot Inspection  Skin Exam: skin normal, skin intact and dry skin. No warmth, no erythema, no maceration, normal color, no pre-ulcer, no ulcer and no callus. Toe Exam: ROM and strength within normal limits. No swelling, no tenderness, no erythema and no left toe deformity. Sensory   Monofilament testing: intact    Vascular  Capillary refills: < 3 seconds  The left DP pulse is 2+.      Assign Risk Category  No deformity present  No loss of protective sensation  No weak pulses  Risk: 0      MARJORIE Thornton

## 2023-10-25 ENCOUNTER — TELEPHONE (OUTPATIENT)
Dept: INTERNAL MEDICINE CLINIC | Facility: CLINIC | Age: 63
End: 2023-10-25

## 2023-10-27 ENCOUNTER — APPOINTMENT (OUTPATIENT)
Dept: LAB | Facility: HOSPITAL | Age: 63
End: 2023-10-27
Payer: COMMERCIAL

## 2023-10-27 DIAGNOSIS — N64.4 NIPPLE TENDERNESS: ICD-10-CM

## 2023-10-27 DIAGNOSIS — Z12.5 SCREENING FOR PROSTATE CANCER: ICD-10-CM

## 2023-10-27 DIAGNOSIS — E11.22 TYPE 2 DIABETES MELLITUS WITH STAGE 3A CHRONIC KIDNEY DISEASE, WITHOUT LONG-TERM CURRENT USE OF INSULIN (HCC): ICD-10-CM

## 2023-10-27 DIAGNOSIS — R53.83 FATIGUE, UNSPECIFIED TYPE: ICD-10-CM

## 2023-10-27 DIAGNOSIS — N18.31 TYPE 2 DIABETES MELLITUS WITH STAGE 3A CHRONIC KIDNEY DISEASE, WITHOUT LONG-TERM CURRENT USE OF INSULIN (HCC): ICD-10-CM

## 2023-10-27 DIAGNOSIS — E66.01 MORBID OBESITY (HCC): ICD-10-CM

## 2023-10-27 DIAGNOSIS — I10 ESSENTIAL HYPERTENSION: ICD-10-CM

## 2023-10-27 DIAGNOSIS — E78.5 HYPERLIPIDEMIA, UNSPECIFIED HYPERLIPIDEMIA TYPE: ICD-10-CM

## 2023-10-27 DIAGNOSIS — I26.99 PULMONARY EMBOLISM, UNSPECIFIED CHRONICITY, UNSPECIFIED PULMONARY EMBOLISM TYPE, UNSPECIFIED WHETHER ACUTE COR PULMONALE PRESENT (HCC): ICD-10-CM

## 2023-10-27 LAB
ALBUMIN SERPL BCP-MCNC: 4.3 G/DL (ref 3.5–5)
ALP SERPL-CCNC: 89 U/L (ref 34–104)
ALT SERPL W P-5'-P-CCNC: 10 U/L (ref 7–52)
ANION GAP SERPL CALCULATED.3IONS-SCNC: 10 MMOL/L
AST SERPL W P-5'-P-CCNC: 10 U/L (ref 13–39)
BASOPHILS # BLD AUTO: 0.05 THOUSANDS/ÂΜL (ref 0–0.1)
BASOPHILS NFR BLD AUTO: 1 % (ref 0–1)
BILIRUB SERPL-MCNC: 0.52 MG/DL (ref 0.2–1)
BUN SERPL-MCNC: 31 MG/DL (ref 5–25)
CALCIUM SERPL-MCNC: 9.6 MG/DL (ref 8.4–10.2)
CHLORIDE SERPL-SCNC: 101 MMOL/L (ref 96–108)
CHOLEST SERPL-MCNC: 104 MG/DL
CO2 SERPL-SCNC: 23 MMOL/L (ref 21–32)
CREAT SERPL-MCNC: 1.83 MG/DL (ref 0.6–1.3)
EOSINOPHIL # BLD AUTO: 0.74 THOUSAND/ÂΜL (ref 0–0.61)
EOSINOPHIL NFR BLD AUTO: 7 % (ref 0–6)
ERYTHROCYTE [DISTWIDTH] IN BLOOD BY AUTOMATED COUNT: 13.3 % (ref 11.6–15.1)
EST. AVERAGE GLUCOSE BLD GHB EST-MCNC: 166 MG/DL
GFR SERPL CREATININE-BSD FRML MDRD: 38 ML/MIN/1.73SQ M
GLUCOSE P FAST SERPL-MCNC: 190 MG/DL (ref 65–99)
HBA1C MFR BLD: 7.4 %
HCT VFR BLD AUTO: 40.6 % (ref 36.5–49.3)
HDLC SERPL-MCNC: 30 MG/DL
HGB BLD-MCNC: 13.5 G/DL (ref 12–17)
IMM GRANULOCYTES # BLD AUTO: 0.11 THOUSAND/UL (ref 0–0.2)
IMM GRANULOCYTES NFR BLD AUTO: 1 % (ref 0–2)
LDLC SERPL CALC-MCNC: 49 MG/DL (ref 0–100)
LYMPHOCYTES # BLD AUTO: 1.72 THOUSANDS/ÂΜL (ref 0.6–4.47)
LYMPHOCYTES NFR BLD AUTO: 17 % (ref 14–44)
MCH RBC QN AUTO: 27.9 PG (ref 26.8–34.3)
MCHC RBC AUTO-ENTMCNC: 33.3 G/DL (ref 31.4–37.4)
MCV RBC AUTO: 84 FL (ref 82–98)
MONOCYTES # BLD AUTO: 0.66 THOUSAND/ÂΜL (ref 0.17–1.22)
MONOCYTES NFR BLD AUTO: 6 % (ref 4–12)
NEUTROPHILS # BLD AUTO: 7.01 THOUSANDS/ÂΜL (ref 1.85–7.62)
NEUTS SEG NFR BLD AUTO: 68 % (ref 43–75)
NRBC BLD AUTO-RTO: 0 /100 WBCS
PLATELET # BLD AUTO: 231 THOUSANDS/UL (ref 149–390)
PMV BLD AUTO: 10 FL (ref 8.9–12.7)
POTASSIUM SERPL-SCNC: 5 MMOL/L (ref 3.5–5.3)
PROT SERPL-MCNC: 8 G/DL (ref 6.4–8.4)
PSA SERPL-MCNC: 0.63 NG/ML (ref 0–4)
RBC # BLD AUTO: 4.84 MILLION/UL (ref 3.88–5.62)
SODIUM SERPL-SCNC: 134 MMOL/L (ref 135–147)
TRIGL SERPL-MCNC: 127 MG/DL
TSH SERPL DL<=0.05 MIU/L-ACNC: 1.35 UIU/ML (ref 0.45–4.5)
WBC # BLD AUTO: 10.29 THOUSAND/UL (ref 4.31–10.16)

## 2023-10-27 PROCEDURE — 85025 COMPLETE CBC W/AUTO DIFF WBC: CPT

## 2023-10-27 PROCEDURE — G0103 PSA SCREENING: HCPCS

## 2023-10-27 PROCEDURE — 83036 HEMOGLOBIN GLYCOSYLATED A1C: CPT

## 2023-10-27 PROCEDURE — 36415 COLL VENOUS BLD VENIPUNCTURE: CPT

## 2023-10-27 PROCEDURE — 84443 ASSAY THYROID STIM HORMONE: CPT

## 2023-10-27 PROCEDURE — 80053 COMPREHEN METABOLIC PANEL: CPT

## 2023-10-27 PROCEDURE — 80061 LIPID PANEL: CPT

## 2023-10-27 PROCEDURE — 3051F HG A1C>EQUAL 7.0%<8.0%: CPT | Performed by: NURSE PRACTITIONER

## 2023-10-31 ENCOUNTER — TELEPHONE (OUTPATIENT)
Dept: FAMILY MEDICINE CLINIC | Facility: CLINIC | Age: 63
End: 2023-10-31

## 2023-10-31 ENCOUNTER — TELEPHONE (OUTPATIENT)
Dept: NEPHROLOGY | Facility: CLINIC | Age: 63
End: 2023-10-31

## 2023-10-31 NOTE — TELEPHONE ENCOUNTER
----- Message from Rick Crowley [place] sent at 10/30/2023  3:51 PM EDT -----  Please schedule patient for a sooner follow-up visit to review his lab results. (Long visit).

## 2023-10-31 NOTE — TELEPHONE ENCOUNTER
Left message for patient to return my call.      ----- Message from Neisha Gastelum MD sent at 10/31/2023 10:31 AM EDT -----  Hello    Can you please call the patient. Please let him know that his creatinine is at the upper limit of his baseline 1.8 mg/dL. His potassium is upper limit of normal at 5. I know recently he was diagnosed with a PE. Please ask him to report back to his recent blood pressures and if he is not recording, please ask him to record blood pressures once a day for 1 week and send in a log. If he is feeling well overall, then no acute changes but please have him complete a BMP in 1 to 2 weeks and follow a low potassium diet    Please send him high and low potassium food packets    Thank you  ----- Message -----  From: MARJORIE Pulido  Sent: 10/30/2023   3:50 PM EDT  To: Neisha Gastelum MD    Please review patient's most recent CMP.

## 2023-11-17 NOTE — PROGRESS NOTES
1  Localized osteoarthritis of left knee  Large joint arthrocentesis   2  Localized osteoarthritis of right knee  Large joint arthrocentesis     Patient is here for his 1st injection of gelsyn into the bilateral knee  Patient reports left knee pain that was relieved by CSI, right knee pain still worse no significant relief from most recent CSI  Physical exam of the knee shows no effusion no ecchymosis    All other organ systems normal      Large joint arthrocentesis: R knee  Date/Time: 10/23/2019 3:28 PM  Consent given by: patient  Site marked: site marked  Timeout: Immediately prior to procedure a time out was called to verify the correct patient, procedure, equipment, support staff and site/side marked as required   Supporting Documentation  Indications: pain   Procedure Details  Location: knee - R knee  Preparation: Patient was prepped and draped in the usual sterile fashion  Needle size: 22 G  Ultrasound guidance: no  Approach: anterolateral  Medications administered: 2 mL sodium hyaluronate 16 8 MG/2ML    Patient tolerance: patient tolerated the procedure well with no immediate complications  Dressing:  Sterile dressing applied    Large joint arthrocentesis: L knee  Date/Time: 10/23/2019 3:28 PM  Consent given by: patient  Site marked: site marked  Timeout: Immediately prior to procedure a time out was called to verify the correct patient, procedure, equipment, support staff and site/side marked as required   Supporting Documentation  Indications: pain   Procedure Details  Location: knee - L knee  Preparation: Patient was prepped and draped in the usual sterile fashion  Needle size: 22 G  Ultrasound guidance: no  Approach: anterolateral  Medications administered: 2 mL sodium hyaluronate 16 8 MG/2ML    Patient tolerance: patient tolerated the procedure well with no immediate complications  Dressing:  Sterile dressing applied          Patient tolerated procedure follow up 1 week Per chart review, patient's smoking and smokeless tobacco status are listed as never.

## 2023-12-09 ENCOUNTER — APPOINTMENT (OUTPATIENT)
Dept: LAB | Facility: CLINIC | Age: 63
End: 2023-12-09
Payer: COMMERCIAL

## 2023-12-09 DIAGNOSIS — N18.31 STAGE 3A CHRONIC KIDNEY DISEASE (HCC): ICD-10-CM

## 2023-12-09 LAB
25(OH)D3 SERPL-MCNC: 11.7 NG/ML (ref 30–100)
ANION GAP SERPL CALCULATED.3IONS-SCNC: 7 MMOL/L
BASOPHILS # BLD AUTO: 0.06 THOUSANDS/ÂΜL (ref 0–0.1)
BASOPHILS NFR BLD AUTO: 1 % (ref 0–1)
BUN SERPL-MCNC: 27 MG/DL (ref 5–25)
CALCIUM SERPL-MCNC: 9.9 MG/DL (ref 8.4–10.2)
CHLORIDE SERPL-SCNC: 100 MMOL/L (ref 96–108)
CO2 SERPL-SCNC: 27 MMOL/L (ref 21–32)
CREAT SERPL-MCNC: 1.77 MG/DL (ref 0.6–1.3)
CREAT UR-MCNC: 61.9 MG/DL
CREAT UR-MCNC: 64.8 MG/DL
EOSINOPHIL # BLD AUTO: 0.76 THOUSAND/ÂΜL (ref 0–0.61)
EOSINOPHIL NFR BLD AUTO: 8 % (ref 0–6)
ERYTHROCYTE [DISTWIDTH] IN BLOOD BY AUTOMATED COUNT: 13.2 % (ref 11.6–15.1)
GFR SERPL CREATININE-BSD FRML MDRD: 39 ML/MIN/1.73SQ M
GLUCOSE P FAST SERPL-MCNC: 173 MG/DL (ref 65–99)
HCT VFR BLD AUTO: 41.7 % (ref 36.5–49.3)
HGB BLD-MCNC: 13.9 G/DL (ref 12–17)
IMM GRANULOCYTES # BLD AUTO: 0.11 THOUSAND/UL (ref 0–0.2)
IMM GRANULOCYTES NFR BLD AUTO: 1 % (ref 0–2)
LYMPHOCYTES # BLD AUTO: 1.9 THOUSANDS/ÂΜL (ref 0.6–4.47)
LYMPHOCYTES NFR BLD AUTO: 21 % (ref 14–44)
MCH RBC QN AUTO: 28.3 PG (ref 26.8–34.3)
MCHC RBC AUTO-ENTMCNC: 33.3 G/DL (ref 31.4–37.4)
MCV RBC AUTO: 85 FL (ref 82–98)
MICROALBUMIN UR-MCNC: 8 MG/L
MICROALBUMIN/CREAT 24H UR: 13 MG/G CREATININE (ref 0–30)
MONOCYTES # BLD AUTO: 0.63 THOUSAND/ÂΜL (ref 0.17–1.22)
MONOCYTES NFR BLD AUTO: 7 % (ref 4–12)
NEUTROPHILS # BLD AUTO: 5.67 THOUSANDS/ÂΜL (ref 1.85–7.62)
NEUTS SEG NFR BLD AUTO: 62 % (ref 43–75)
NRBC BLD AUTO-RTO: 0 /100 WBCS
PHOSPHATE SERPL-MCNC: 3.5 MG/DL (ref 2.3–4.1)
PLATELET # BLD AUTO: 259 THOUSANDS/UL (ref 149–390)
PMV BLD AUTO: 10 FL (ref 8.9–12.7)
POTASSIUM SERPL-SCNC: 4.7 MMOL/L (ref 3.5–5.3)
PROT UR-MCNC: 8 MG/DL
PROT/CREAT UR: 0.12 MG/G{CREAT} (ref 0–0.1)
PTH-INTACT SERPL-MCNC: 76.2 PG/ML (ref 12–88)
RBC # BLD AUTO: 4.91 MILLION/UL (ref 3.88–5.62)
SODIUM SERPL-SCNC: 134 MMOL/L (ref 135–147)
WBC # BLD AUTO: 9.13 THOUSAND/UL (ref 4.31–10.16)

## 2023-12-09 PROCEDURE — 82570 ASSAY OF URINE CREATININE: CPT

## 2023-12-09 PROCEDURE — 36415 COLL VENOUS BLD VENIPUNCTURE: CPT

## 2023-12-09 PROCEDURE — 83970 ASSAY OF PARATHORMONE: CPT

## 2023-12-09 PROCEDURE — 84156 ASSAY OF PROTEIN URINE: CPT

## 2023-12-09 PROCEDURE — 85025 COMPLETE CBC W/AUTO DIFF WBC: CPT

## 2023-12-09 PROCEDURE — 80048 BASIC METABOLIC PNL TOTAL CA: CPT

## 2023-12-09 PROCEDURE — 82306 VITAMIN D 25 HYDROXY: CPT

## 2023-12-09 PROCEDURE — 84100 ASSAY OF PHOSPHORUS: CPT

## 2023-12-11 ENCOUNTER — TELEPHONE (OUTPATIENT)
Dept: FAMILY MEDICINE CLINIC | Facility: CLINIC | Age: 63
End: 2023-12-11

## 2023-12-11 NOTE — TELEPHONE ENCOUNTER
----- Message from [address] [place] [address] sent at 12/11/2023 11:58 AM EST -----  Please let the patient know that his urine microalbumin is normal.

## 2023-12-13 ENCOUNTER — OFFICE VISIT (OUTPATIENT)
Dept: NEPHROLOGY | Facility: CLINIC | Age: 63
End: 2023-12-13
Payer: COMMERCIAL

## 2023-12-13 VITALS
DIASTOLIC BLOOD PRESSURE: 62 MMHG | SYSTOLIC BLOOD PRESSURE: 122 MMHG | BODY MASS INDEX: 46.65 KG/M2 | HEIGHT: 69 IN | WEIGHT: 315 LBS

## 2023-12-13 DIAGNOSIS — N18.31 STAGE 3A CHRONIC KIDNEY DISEASE (HCC): ICD-10-CM

## 2023-12-13 DIAGNOSIS — R35.1 BENIGN PROSTATIC HYPERPLASIA WITH NOCTURIA: ICD-10-CM

## 2023-12-13 DIAGNOSIS — I10 BENIGN ESSENTIAL HTN: ICD-10-CM

## 2023-12-13 DIAGNOSIS — N40.1 BENIGN PROSTATIC HYPERPLASIA WITH NOCTURIA: ICD-10-CM

## 2023-12-13 DIAGNOSIS — N28.1 RENAL CYST: ICD-10-CM

## 2023-12-13 DIAGNOSIS — E87.1 HYPONATREMIA: ICD-10-CM

## 2023-12-13 DIAGNOSIS — E66.01 CLASS 3 SEVERE OBESITY WITH SERIOUS COMORBIDITY AND BODY MASS INDEX (BMI) OF 50.0 TO 59.9 IN ADULT, UNSPECIFIED OBESITY TYPE (HCC): Primary | ICD-10-CM

## 2023-12-13 DIAGNOSIS — I10 ESSENTIAL HYPERTENSION: ICD-10-CM

## 2023-12-13 PROCEDURE — 99214 OFFICE O/P EST MOD 30 MIN: CPT | Performed by: INTERNAL MEDICINE

## 2023-12-13 RX ORDER — TAMSULOSIN HYDROCHLORIDE 0.4 MG/1
0.4 CAPSULE ORAL
Qty: 90 CAPSULE | Refills: 3 | Status: SHIPPED | OUTPATIENT
Start: 2023-12-13

## 2023-12-13 NOTE — PROGRESS NOTES
NEPHROLOGY OFFICE VISIT   Lizzie Sifuentes. 58 y.o. male MRN: [id number]  12/13/2023    Reason for Visit: CKD III    ASSESSMENT and PLAN:    I had the pleasure of seeing Mr Charles Nelson today in the renal clinic for the continued management of CKD III. 57 yo male retired , formerly from [facility], HTN (15 years), DM (years), back surgery 10 years ago, diastolic CHF, pulmonary embolism diagnosed in June 2023, who presents for follow up evaluation of hypertension. patient has had sporadic follow-up but has been slightly more consistent with follow-up since end of last year. I last saw the patient in August 2018 and then patient followed up with our advanced practitioner. Patient returns today for follow-up visit      Patient has stopped using NSAIDs since 2019     Since last being seen, patient was seen by her practitioner in June 2023. Blood pressures were controlled. Creatinine was at baseline. Patient was admitted to the hospital worsening shortness of breath in June 2023. There was concern for possible URI. Patient was found to have pulmonary embolism on repeat CT scan and was started on Eliquis    Saw pulmonary team August 2023 and was advised to continue 6 months full dose Eliquis. It was felt to be provoked in the setting of inactivity. Was also advised to have sleep study. Donna Cedillo 1) HTN -      -prior renin and aldosterone level with a renin level suppressed, aldosterone level is not significantly elevated  - prior metanephrine unrevealing  - prior CT scan with unremarkable adrenals  - renal Doppler study in November 2019 with no significant occlusive disease.  - patient is on spironolactone, chlorthalidone, amlodipine, losartan, metoprolol,    - during visit in march clonidine was tapered off, chlorthalidone was increased chlorthalidone to 25 mg daily  -  Creatinine stable in May 2021 at 1.3 mg/dL. Blood pressures are appropriate.   Patient is still gaining weight.  -February 2022 appointment-no lab work since October. Creatinine in October was rising and patient has not gone for follow-up lab work. -September 2022-hold doxazosin due to hypotension  - February 2023-no changes to antihypertensives. Add tamsulosin. Renal ultrasound with slightly increased cyst size but otherwise benign appearing.  - December 2023-creatinine stable 1.7 mg/dL. Electrolytes are stable and appropriate with low vitamin D level. Parathyroid level is appropriate. Protein creatinine ratio is stable at 21. Overall, patient presents for follow-up appointment. Remains on chlorthalidone 25 mg daily, losartan 100 mg daily, metoprolol 100 mg twice daily, spironolactone 25 mg daily and blood pressures remain well-controlled. Plan:    - Repeat renal ultrasound before next appointment given last ultrasound in January 2023  - start vitamin D supplement - 2,000 units daily  - restart tamsulosin - pt had abd pain prior and stopped it but had antibiotics at that time and had abd pain until stopping antibiotics therefore not a tamsulosin reaction. Pt knows to stop if any side effects  - may be candidate for ozempic - I have reached out to PCP and referred to bariatrics  - labwork in 3-4 months  - appointment in 4 months     2) poor dentition     -patient smokes cigars daily  -patient needs to see dentist and has been advised of this prior and is awaiting appt january     3) proteinuria -  Stable U PCR 0.1     - recheck at next visit  - if elevated, will check SPEP, UPEP, FLC     4) CKD III with baseline creatinine 1.4 to 1.7 mg/dL     -renal ultrasound in September 2018 was septated cyst bilaterally with mildly increased on the right kidney. And a calcified cystic foci in the left kidney  -patient needs repeat ultrasound. Ordered prior. Will assist pt in scheduling  - creat 2.2 in august. Held losartan for 2 days and restarted. Inc fluid intake.    - 8/17 - chlorthalidone reduced to 12.5 mg daily as creat improving but not back to baseline  -  05/2021-creatinine improving to baseline 1.3 mg/dL. -September 2022-creatinine 1.6 mg/dL. Holding doxazosin. - February 2023-creatinine remains relatively stable 1.7 mg/dL  - 12/2023 - creat 1.7 mg/dL, stable. Electrolytes stable. 5) grief counseling-     -patient's mood is improving     6) Vit D deficiency     - vit D 11.7 12/2023  - PTH - 76.2     7) osteoarthritis of knees - sees Ortho team. PT and cortisone inj started 1/2020     - not candidate per ortho for knee surgery due to elevated BMI  - I have referred pt to [facility] bariatric for evaluation for elevated BMI     8) DM     - A1c worsening-last was 7.4. %     9)  Weight gain -patient advised to see bariatric program.  Patient agreeable.     -patient [facility] the [facility] team July 2022[facility]      10)  renal cysts-monitor with repeat ultrasound January 2024     [id number] in setting of BPH. Worsened after holding doxazosin. pt did not tolerate tamsulosin? But now believe its more due to side effect of abx. So restart tamsulosin 12/13/2023.     12 - hyponatremia    - stable  - on chlorthalidone     It was a pleasure evaluating Mr Oscar Estrada; thank you for allowing our team to participate in the care of your patient and please do not hesitate to contact our team if any questions arise in the interim. No problem-specific Assessment & Plan notes found for this encounter. HPI:    Pt denies complaints with exception of knee pain and continued nocturia    PATIENT INSTRUCTIONS:    Patient Instructions   1) Avoid NSAIDS - (Example - motrin, advil, ibuprofen, aleve, exederin, etc)  2) Always follow a low salt diet  3) If you have any issues with trouble with nausea, vomiting, urinating, blood in the urine, food tasting like metal, confusion, weakness, fatigue that is worsening, or any other questions, please call right away.   4) Please continue to follow regularly with your family physician and any other specialist that you are advised to see and continue to follow their recommendations  5) please start 2,000 IU (international units) of vitamin D supplement once daily - can obtain over the counter  6) talk to your PCP about your rising blood sugars  7) please see bariatric team   8) no changes to your medications for BP meds  9) labwork in 3-4 months  10) please see the urologist  11) please start tamsulosin and if you have side effects please stop and call      OBJECTIVE:  Current Weight: Weight - Scale: (!) 155 kg (342 lb)  Vitals:    12/13/23 1306 12/13/23 1333   BP:  122/62   Weight: (!) 155 kg (342 lb)    Height: 5' 9" (1.753 m)     Body mass index is 50.5 kg/m². REVIEW OF SYSTEMS:    Review of Systems   Constitutional: Negative. Negative for appetite change and fatigue. HENT: Negative. Eyes: Negative. Respiratory: Negative. Negative for shortness of breath. Cardiovascular: Negative. Negative for leg swelling. Gastrointestinal: Negative. Endocrine: Negative. Genitourinary: Negative. Negative for difficulty urinating. Musculoskeletal:         Knee pain   Allergic/Immunologic: Negative. Neurological: Negative. Hematological: Negative. Psychiatric/Behavioral: Negative. All other systems reviewed and are negative. PHYSICAL EXAM:      Physical Exam  Vitals and nursing note reviewed. Constitutional:       General: He is not in acute distress. Appearance: He is well-developed. He is not diaphoretic. HENT:      Head: Normocephalic and atraumatic. Eyes:      General: No scleral icterus. Right eye: No discharge. Left eye: No discharge. Conjunctiva/sclera: Conjunctivae normal.   Cardiovascular:      Rate and Rhythm: Normal rate and regular rhythm. Heart sounds: Normal heart sounds. No murmur heard. No friction rub. No gallop. Pulmonary:      Effort: Pulmonary effort is normal. No respiratory distress.       Breath sounds: Normal breath sounds. No wheezing or rales. Chest:      Chest wall: No tenderness. Abdominal:      General: Bowel sounds are normal. There is no distension. Palpations: Abdomen is soft. Tenderness: There is no abdominal tenderness. There is no rebound. Comments: Large pannus   Musculoskeletal:         General: No tenderness or deformity. Normal range of motion. Cervical back: Normal range of motion and neck supple. Skin:     General: Skin is warm and dry. Coloration: Skin is not pale. Findings: No erythema or rash. Neurological:      Mental Status: He is alert and oriented to person, place, and time. Cranial Nerves: No cranial nerve deficit. Coordination: Coordination normal.   Psychiatric:         Behavior: Behavior normal.         Thought Content: Thought content normal.         Judgment: Judgment normal.         Medications:    Current Outpatient Medications:     amLODIPine (NORVASC) 10 mg tablet, Take 1 tablet (10 mg total) by mouth daily, Disp: 90 tablet, Rfl: 3    apixaban (Eliquis) 5 mg, Take 1 tablet (5 mg total) by mouth 2 (two) times a day, Disp: 60 tablet, Rfl: 2    atorvastatin (LIPITOR) 20 mg tablet, Take 1 tablet (20 mg total) by mouth daily, Disp: 90 tablet, Rfl: 1    chlorthalidone 25 mg tablet, Take 1 tablet (25 mg total) by mouth daily, Disp: 90 tablet, Rfl: 3    hydrocortisone 2.5 % cream, Apply topically to face BID for no longer than 7 days for flares, Disp: 30 g, Rfl: 0    ketoconazole (NIZORAL) 2 % cream, Apply topically daily, Disp: 15 g, Rfl: 0    ketoconazole (NIZORAL) 2 % shampoo, Apply topically to scalp, eyebrows, and face.  Leave on for 5 minutes and then rinse, Disp: 120 mL, Rfl: 6    losartan (COZAAR) 100 MG tablet, Take 1 tablet (100 mg total) by mouth daily, Disp: 90 tablet, Rfl: 3    metoprolol tartrate (LOPRESSOR) 100 mg tablet, Take 1 tablet (100 mg total) by mouth 2 (two) times a day, Disp: 180 tablet, Rfl: 3    morphine (MS CONTIN) 15 mg 12 hr tablet, take 1 tablet every 8 hours if needed pain, Disp: , Rfl: 0    oxyCODONE (ROXICODONE) 30 MG immediate release tablet, take 1 tablet by mouth every 5 hours if needed for pain . Brena Heather MAX 4/DAY, Disp: , Rfl: 0    spironolactone (ALDACTONE) 25 mg tablet, Take 1 tablet (25 mg total) by mouth daily, Disp: 90 tablet, Rfl: 3    tadalafil (CIALIS) 5 MG tablet, Take 5 mg by mouth daily as needed for erectile dysfunction (unsure dosage) Indications: Erectile Dysfunction. , Disp: , Rfl:     tamsulosin (FLOMAX) 0.4 mg, Take 1 capsule (0.4 mg total) by mouth daily with dinner, Disp: 90 capsule, Rfl: 3    Laboratory Results:  Results from last 7 days   Lab Units 12/09/23  0927   WBC Thousand/uL 9.13   HEMOGLOBIN g/dL 13.9   HEMATOCRIT % 41.7   PLATELETS Thousands/uL 259   POTASSIUM mmol/L 4.7   CHLORIDE mmol/L 100   CO2 mmol/L 27   BUN mg/dL 27*   CREATININE mg/dL 1.77*   CALCIUM mg/dL 9.9   PHOSPHORUS mg/dL 3.5       Results for orders placed or performed in visit on 08/86/97   Basic metabolic panel   Result Value Ref Range    Sodium 134 (L) 135 - 147 mmol/L    Potassium 4.7 3.5 - 5.3 mmol/L    Chloride 100 96 - 108 mmol/L    CO2 27 21 - 32 mmol/L    ANION GAP 7 mmol/L    BUN 27 (H) 5 - 25 mg/dL    Creatinine 1.77 (H) 0.60 - 1.30 mg/dL    Glucose, Fasting 173 (H) 65 - 99 mg/dL    Calcium 9.9 8.4 - 10.2 mg/dL    eGFR 39 ml/min/1.73sq m   CBC and differential   Result Value Ref Range    WBC 9.13 4.31 - 10.16 Thousand/uL    RBC 4.91 3.88 - 5.62 Million/uL    Hemoglobin 13.9 12.0 - 17.0 g/dL    Hematocrit 41.7 36.5 - 49.3 %    MCV 85 82 - 98 fL    MCH 28.3 26.8 - 34.3 pg    MCHC 33.3 31.4 - 37.4 g/dL    RDW 13.2 11.6 - 15.1 %    MPV 10.0 8.9 - 12.7 fL    Platelets 654 064 - 219 Thousands/uL    nRBC 0 /100 WBCs    Neutrophils Relative 62 43 - 75 %    Immat GRANS % 1 0 - 2 %    Lymphocytes Relative 21 14 - 44 %    Monocytes Relative 7 4 - 12 %    Eosinophils Relative 8 (H) 0 - 6 %    Basophils Relative 1 0 - 1 %    Neutrophils Absolute 5.67 1.85 - 7.62 Thousands/µL    Immature Grans Absolute 0.11 0.00 - 0.20 Thousand/uL    Lymphocytes Absolute 1.90 0.60 - 4.47 Thousands/µL    Monocytes Absolute 0.63 0.17 - 1.22 Thousand/µL    Eosinophils Absolute 0.76 (H) 0.00 - 0.61 Thousand/µL    Basophils Absolute 0.06 0.00 - 0.10 Thousands/µL   Phosphorus   Result Value Ref Range    Phosphorus 3.5 2.3 - 4.1 mg/dL   Protein / creatinine ratio, urine   Result Value Ref Range    Creatinine, Ur 64.8 Reference range not established. mg/dL    Protein Urine Random 8 Reference range not established. mg/dL    Prot/Creat Ratio, Ur 0.12 (H) 0.00 - 0.10   PTH, intact   Result Value Ref Range    PTH 76.2 12.0 - 88.0 pg/mL   Vitamin D 25 hydroxy   Result Value Ref Range    Vit D, 25-Hydroxy 11.7 (L) 30.0 - 100.0 ng/mL

## 2023-12-13 NOTE — LETTER
December 13, 2023     MARJORIE Garcia  [address] [address]    Patient: Bell Amos. YOB: 1960   Date of Visit: 12/13/2023       Dear Dr. Autumn Cowan: Thank you for referring Ofelia Lea to me for evaluation. Below are my notes for this consultation. If you have questions, please do not hesitate to call me. I look forward to following your patient along with you. Sincerely,        Lanny Espinoza MD        CC: No Recipients    Lanny Espinoza MD  12/13/2023  1:37 PM  Sign when Signing Visit  NEPHROLOGY OFFICE VISIT   Bell Amos. 58 y.o. male MRN: [id number]  12/13/2023    Reason for Visit: CKD III    ASSESSMENT and PLAN:    I had the pleasure of seeing Mr Yung Sy today in the renal clinic for the continued management of CKD III. 57 yo male retired , formerly from [employer], HTN (15 years), DM (years), back surgery 10 years ago, diastolic CHF, pulmonary embolism diagnosed in June 2023, who presents for follow up evaluation of hypertension. patient has had sporadic follow-up but has been slightly more consistent with follow-up since end of last year. I last saw the patient in August 2018 and then patient followed up with our advanced practitioner. Patient returns today for follow-up visit      Patient has stopped using NSAIDs since 2019     Since last being seen, patient was seen by her practitioner in June 2023. Blood pressures were controlled. Creatinine was at baseline. Patient was admitted to the hospital worsening shortness of breath in June 2023. There was concern for possible URI. Patient was found to have pulmonary embolism on repeat CT scan and was started on Eliquis    Saw pulmonary team August 2023 and was advised to continue 6 months full dose Eliquis. It was felt to be provoked in the setting of inactivity. Was also advised to have sleep study. Phillip Romero      1) HTN -      -prior renin and aldosterone level with a renin level suppressed, aldosterone level is not significantly elevated  - prior metanephrine unrevealing  - prior CT scan with unremarkable adrenals  - renal Doppler study in November 2019 with no significant occlusive disease.  - patient is on spironolactone, chlorthalidone, amlodipine, losartan, metoprolol,    - during visit in march clonidine was tapered off, chlorthalidone was increased chlorthalidone to 25 mg daily  -  Creatinine stable in May 2021 at 1.3 mg/dL. Blood pressures are appropriate. Patient is still gaining weight.  -February 2022 appointment-no lab work since October. Creatinine in October was rising and patient has not gone for follow-up lab work. -September 2022-hold doxazosin due to hypotension  - February 2023-no changes to antihypertensives. Add tamsulosin. Renal ultrasound with slightly increased cyst size but otherwise benign appearing.  - December 2023-creatinine stable 1.7 mg/dL. Electrolytes are stable and appropriate with low vitamin D level. Parathyroid level is appropriate. Protein creatinine ratio is stable at 21. Overall, patient presents for follow-up appointment. Remains on chlorthalidone 25 mg daily, losartan 100 mg daily, metoprolol 100 mg twice daily, spironolactone 25 mg daily and blood pressures remain well-controlled. Plan:    - Repeat renal ultrasound before next appointment given last ultrasound in January 2023  - start vitamin D supplement - 2,000 units daily  - restart tamsulosin - pt had abd pain prior and stopped it but had antibiotics at that time and had abd pain until stopping antibiotics therefore not a tamsulosin reaction.  Pt knows to stop if any side effects  - may be candidate for ozempic - I have reached out to PCP and referred to bariatrics  - labwork in 3-4 months  - appointment in 4 months     2) poor dentition     -patient smokes cigars daily  -patient needs to see dentist and has been advised of this prior and is awaiting appt january     3) proteinuria -  Stable U PCR 0.1     - recheck at next visit  - if elevated, will check SPEP, UPEP, FLC     4) CKD III with baseline creatinine 1.4 to 1.7 mg/dL     -renal ultrasound in September 2018 was septated cyst bilaterally with mildly increased on the right kidney. And a calcified cystic foci in the left kidney  -patient needs repeat ultrasound. Ordered prior. Will assist pt in scheduling  - creat 2.2 in august. Held losartan for 2 days and restarted. Inc fluid intake. - 8/17 - chlorthalidone reduced to 12.5 mg daily as creat improving but not back to baseline  -  05/2021-creatinine improving to baseline 1.3 mg/dL. -September 2022-creatinine 1.6 mg/dL. Holding doxazosin. - February 2023-creatinine remains relatively stable 1.7 mg/dL  - 12/2023 - creat 1.7 mg/dL, stable. Electrolytes stable. 5) grief counseling-     -patient's mood is improving     6) Vit D deficiency     - vit D 11.7 12/2023  - PTH - 76.2     7) osteoarthritis of knees - sees Ortho team. PT and cortisone inj started 1/2020     - not candidate per ortho for knee surgery due to elevated BMI  - I have referred pt to [facility] b[facility] for evaluation for elevated BMI     8) DM     - A1c worsening-last was 7.4. %     9)  Weight gain -patient advised to see bariatric program.  Patient agreeable.     -patient saw the bariatric team July 2022-->refer to [facility]      10)  renal cysts-monitor with repeat ultrasound January 2024     [id number] in setting of BPH. Worsened after holding doxazosin. pt did not tolerate tamsulosin? But now believe its more due to side effect of abx. So restart tamsulosin 12/13/2023.     12 - hyponatremia    - stable  - on chlorthalidone     It was a pleasure evaluating Mr Suha Gilliam; thank you for allowing our team to participate in the care of your patient and please do not hesitate to contact our team if any questions arise in the interim.         No problem-specific Assessment & Plan notes found for this encounter. HPI:    Pt denies complaints with exception of knee pain and continued nocturia    PATIENT INSTRUCTIONS:    Patient Instructions   1) Avoid NSAIDS - (Example - motrin, advil, ibuprofen, aleve, exederin, etc)  2) Always follow a low salt diet  3) If you have any issues with trouble with nausea, vomiting, urinating, blood in the urine, food tasting like metal, confusion, weakness, fatigue that is worsening, or any other questions, please call right away. 4) Please continue to follow regularly with your family physician and any other specialist that you are advised to see and continue to follow their recommendations  5) please start 2,000 IU (international units) of vitamin D supplement once daily - can obtain over the counter  6) talk to your PCP about your rising blood sugars  7) please see bariatric team   8) no changes to your medications for BP meds  9) labwork in 3-4 months  10) please see the urologist  11) please start tamsulosin and if you have side effects please stop and call      OBJECTIVE:  Current Weight: Weight - Scale: (!) 155 kg (342 lb)  Vitals:    12/13/23 1306 12/13/23 1333   BP:  122/62   Weight: (!) 155 kg (342 lb)    Height: 5' 9" (1.753 m)     Body mass index is 50.5 kg/m². REVIEW OF SYSTEMS:    Review of Systems   Constitutional: Negative. Negative for appetite change and fatigue. HENT: Negative. Eyes: Negative. Respiratory: Negative. Negative for shortness of breath. Cardiovascular: Negative. Negative for leg swelling. Gastrointestinal: Negative. Endocrine: Negative. Genitourinary: Negative. Negative for difficulty urinating. Musculoskeletal:         Knee pain   Allergic/Immunologic: Negative. Neurological: Negative. Hematological: Negative. Psychiatric/Behavioral: Negative. All other systems reviewed and are negative. PHYSICAL EXAM:      Physical Exam  Vitals and nursing note reviewed.    Constitutional: General: He is not in acute distress. Appearance: He is well-developed. He is not diaphoretic. HENT:      Head: Normocephalic and atraumatic. Eyes:      General: No scleral icterus. Right eye: No discharge. Left eye: No discharge. Conjunctiva/sclera: Conjunctivae normal.   Cardiovascular:      Rate and Rhythm: Normal rate and regular rhythm. Heart sounds: Normal heart sounds. No murmur heard. No friction rub. No gallop. Pulmonary:      Effort: Pulmonary effort is normal. No respiratory distress. Breath sounds: Normal breath sounds. No wheezing or rales. Chest:      Chest wall: No tenderness. Abdominal:      General: Bowel sounds are normal. There is no distension. Palpations: Abdomen is soft. Tenderness: There is no abdominal tenderness. There is no rebound. Comments: Large pannus   Musculoskeletal:         General: No tenderness or deformity. Normal range of motion. Cervical back: Normal range of motion and neck supple. Skin:     General: Skin is warm and dry. Coloration: Skin is not pale. Findings: No erythema or rash. Neurological:      Mental Status: He is alert and oriented to person, place, and time. Cranial Nerves: No cranial nerve deficit. Coordination: Coordination normal.   Psychiatric:         Behavior: Behavior normal.         Thought Content:  Thought content normal.         Judgment: Judgment normal.         Medications:    Current Outpatient Medications:   •  amLODIPine (NORVASC) 10 mg tablet, Take 1 tablet (10 mg total) by mouth daily, Disp: 90 tablet, Rfl: 3  •  apixaban (Eliquis) 5 mg, Take 1 tablet (5 mg total) by mouth 2 (two) times a day, Disp: 60 tablet, Rfl: 2  •  atorvastatin (LIPITOR) 20 mg tablet, Take 1 tablet (20 mg total) by mouth daily, Disp: 90 tablet, Rfl: 1  •  chlorthalidone 25 mg tablet, Take 1 tablet (25 mg total) by mouth daily, Disp: 90 tablet, Rfl: 3  •  hydrocortisone 2.5 % cream, Apply topically to face BID for no longer than 7 days for flares, Disp: 30 g, Rfl: 0  •  ketoconazole (NIZORAL) 2 % cream, Apply topically daily, Disp: 15 g, Rfl: 0  •  ketoconazole (NIZORAL) 2 % shampoo, Apply topically to scalp, eyebrows, and face. Leave on for 5 minutes and then rinse, Disp: 120 mL, Rfl: 6  •  losartan (COZAAR) 100 MG tablet, Take 1 tablet (100 mg total) by mouth daily, Disp: 90 tablet, Rfl: 3  •  metoprolol tartrate (LOPRESSOR) 100 mg tablet, Take 1 tablet (100 mg total) by mouth 2 (two) times a day, Disp: 180 tablet, Rfl: 3  •  morphine (MS CONTIN) 15 mg 12 hr tablet, take 1 tablet every 8 hours if needed pain, Disp: , Rfl: 0  •  oxyCODONE (ROXICODONE) 30 MG immediate release tablet, take 1 tablet by mouth every 5 hours if needed for pain . Darylene Pipe MAX 4/DAY, Disp: , Rfl: 0  •  spironolactone (ALDACTONE) 25 mg tablet, Take 1 tablet (25 mg total) by mouth daily, Disp: 90 tablet, Rfl: 3  •  tadalafil (CIALIS) 5 MG tablet, Take 5 mg by mouth daily as needed for erectile dysfunction (unsure dosage) Indications: Erectile Dysfunction. , Disp: , Rfl:   •  tamsulosin (FLOMAX) 0.4 mg, Take 1 capsule (0.4 mg total) by mouth daily with dinner, Disp: 90 capsule, Rfl: 3    Laboratory Results:  Results from last 7 days   Lab Units 12/09/23  0927   WBC Thousand/uL 9.13   HEMOGLOBIN g/dL 13.9   HEMATOCRIT % 41.7   PLATELETS Thousands/uL 259   POTASSIUM mmol/L 4.7   CHLORIDE mmol/L 100   CO2 mmol/L 27   BUN mg/dL 27*   CREATININE mg/dL 1.77*   CALCIUM mg/dL 9.9   PHOSPHORUS mg/dL 3.5       Results for orders placed or performed in visit on 19/52/18   Basic metabolic panel   Result Value Ref Range    Sodium 134 (L) 135 - 147 mmol/L    Potassium 4.7 3.5 - 5.3 mmol/L    Chloride 100 96 - 108 mmol/L    CO2 27 21 - 32 mmol/L    ANION GAP 7 mmol/L    BUN 27 (H) 5 - 25 mg/dL    Creatinine 1.77 (H) 0.60 - 1.30 mg/dL    Glucose, Fasting 173 (H) 65 - 99 mg/dL    Calcium 9.9 8.4 - 10.2 mg/dL    eGFR 39 ml/min/1.73sq m   CBC and differential   Result Value Ref Range    WBC 9.13 4.31 - 10.16 Thousand/uL    RBC 4.91 3.88 - 5.62 Million/uL    Hemoglobin 13.9 12.0 - 17.0 g/dL    Hematocrit 41.7 36.5 - 49.3 %    MCV 85 82 - 98 fL    MCH 28.3 26.8 - 34.3 pg    MCHC 33.3 31.4 - 37.4 g/dL    RDW 13.2 11.6 - 15.1 %    MPV 10.0 8.9 - 12.7 fL    Platelets 941 967 - 114 Thousands/uL    nRBC 0 /100 WBCs    Neutrophils Relative 62 43 - 75 %    Immat GRANS % 1 0 - 2 %    Lymphocytes Relative 21 14 - 44 %    Monocytes Relative 7 4 - 12 %    Eosinophils Relative 8 (H) 0 - 6 %    Basophils Relative 1 0 - 1 %    Neutrophils Absolute 5.67 1.85 - 7.62 Thousands/µL    Immature Grans Absolute 0.11 0.00 - 0.20 Thousand/uL    Lymphocytes Absolute 1.90 0.60 - 4.47 Thousands/µL    Monocytes Absolute 0.63 0.17 - 1.22 Thousand/µL    Eosinophils Absolute 0.76 (H) 0.00 - 0.61 Thousand/µL    Basophils Absolute 0.06 0.00 - 0.10 Thousands/µL   Phosphorus   Result Value Ref Range    Phosphorus 3.5 2.3 - 4.1 mg/dL   Protein / creatinine ratio, urine   Result Value Ref Range    Creatinine, Ur 64.8 Reference range not established. mg/dL    Protein Urine Random 8 Reference range not established. mg/dL    Prot/Creat Ratio, Ur 0.12 (H) 0.00 - 0.10   PTH, intact   Result Value Ref Range    PTH 76.2 12.0 - 88.0 pg/mL   Vitamin D 25 hydroxy   Result Value Ref Range    Vit D, 25-Hydroxy 11.7 (L) 30.0 - 100.0 ng/mL

## 2023-12-13 NOTE — PATIENT INSTRUCTIONS
1) Avoid NSAIDS - (Example - motrin, advil, ibuprofen, aleve, exederin, etc)  2) Always follow a low salt diet  3) If you have any issues with trouble with nausea, vomiting, urinating, blood in the urine, food tasting like metal, confusion, weakness, fatigue that is worsening, or any other questions, please call right away.   4) Please continue to follow regularly with your family physician and any other specialist that you are advised to see and continue to follow their recommendations  5) please start 2,000 IU (international units) of vitamin D supplement once daily - can obtain over the counter  6) talk to your PCP about your rising blood sugars  7) please see bariatric team   8) no changes to your medications for BP meds  9) labwork in 3-4 months  10) please see the urologist  11) please start tamsulosin and if you have side effects please stop and call

## 2023-12-15 PROBLEM — Z00.00 MEDICARE ANNUAL WELLNESS VISIT, SUBSEQUENT: Status: RESOLVED | Noted: 2023-10-16 | Resolved: 2023-12-15

## 2024-01-08 NOTE — ED ATTENDING ATTESTATION
6/28/2023  IAbrahan MD, saw and evaluated the patient. I have discussed the patient with the resident/non-physician practitioner and agree with the resident's/non-physician practitioner's findings, Plan of Care, and MDM as documented in the resident's/non-physician practitioner's note, except where noted. All available labs and Radiology studies were reviewed. I was present for key portions of any procedure(s) performed by the resident/non-physician practitioner and I was immediately available to provide assistance. At this point I agree with the current assessment done in the Emergency Department.   I have conducted an independent evaluation of this patient a history and physical is as follows:see h and p saravanan- agree with er resident tx plan / [place]    ED Course  ED Course as of 07/07/23 1435   Wed Jun 28, 2023   1730 Cxr p   1733 Cxr pa/lat- compared to previous 3/17/16- no sign changes-- no change in mediastinum/cardiac silhouette- no free/sq air - no infiltrate- ptx- pulm edema- pleural effusions   1735 Er md note- 12 lead ecg reviewed by md --  nsr rate of 85-  lateral t wave flattening-  no signs of acute ischemia/ injury / r heart strain    1737 Er md medical decision making note- pt is low risk for pe by wells score- score of 0- pt fails  perc by age- will check d dimer and use peg ed study d dimer cutoff of 1000 for low risk pt's          Critical Care Time  Procedures Can he do another day this week?

## 2024-01-10 DIAGNOSIS — I26.99 PULMONARY EMBOLI (HCC): ICD-10-CM

## 2024-01-10 RX ORDER — APIXABAN 5 MG/1
5 TABLET, FILM COATED ORAL 2 TIMES DAILY
Qty: 60 TABLET | Refills: 5 | Status: SHIPPED | OUTPATIENT
Start: 2024-01-10

## 2024-01-12 ENCOUNTER — RA CDI HCC (OUTPATIENT)
Dept: OTHER | Facility: HOSPITAL | Age: 64
End: 2024-01-12

## 2024-01-20 ENCOUNTER — RA CDI HCC (OUTPATIENT)
Dept: OTHER | Facility: HOSPITAL | Age: 64
End: 2024-01-20

## 2024-01-20 NOTE — PROGRESS NOTES
HCC coding opportunities     I77.810, I50.30 E11.40     Chart Reviewed number of suggestions sent to Provider: 3     GR  Suggestions on BPA    Patients Insurance     Medicare Insurance: [employer] Advantage

## 2024-02-21 PROBLEM — Z12.5 SCREENING FOR PROSTATE CANCER: Status: RESOLVED | Noted: 2018-11-28 | Resolved: 2024-02-21

## 2024-02-21 PROBLEM — Z12.11 SCREENING FOR COLON CANCER: Status: RESOLVED | Noted: 2019-04-09 | Resolved: 2024-02-21

## 2024-03-03 DIAGNOSIS — B35.3 TINEA PEDIS OF BOTH FEET: ICD-10-CM

## 2024-03-25 NOTE — PROGRESS NOTES
Sleep Medicine Outpatient Follow Up Note   Nasir Story Jr. 63 y.o. male MRN: [id number]  3/27/2024      Reason for Consultation:    Chief Complaint   Patient presents with    Pulmonary Embolism     Assessment/Plan:    1. Other chronic pulmonary embolism, unspecified whether acute cor pulmonale present (HCC)  Assessment & Plan:  Bilateral segmental and subsegmental PE diagnosed via 6/30/2023 CTA chest.  Low risk PE, no signs of right heart strain.  Contributing factors likely inactivity due to chronic knee OA, and obesity      He has completed 6 months of Eliquis 5mg BID and elects to continue this regimen in the absence of side effects and unmodified risk factors.      PE likely provoked by inactivity/obesity.  No procoagulable workup needed right now due to no blood clot history before this.  If having a 2nd VTE in the future, will need procoagulable work up then.    Since he is past 6 months from his PE, the risk of holding Eliquis for procedures is decreased.  If he needs a procedure in the future holding Eliquis for at least 48 hours prior is recommended.  Eliquis should be resumed as soon as deemed safe by the proceduralist.         2. Otitis media of left ear in disease classified elsewhere  Assessment & Plan:  Left ear pressure with fluid level seen on otoscope, c/f otitis media.  Will send for 7 days of amoxicillin.  Unclear etiology, no water retention in the ear.  Patient waiting for ENT appt already referred by PCP.    Orders:  -     amoxicillin (AMOXIL) 500 mg capsule; Take 1 capsule (500 mg total) by mouth every 8 (eight) hours for 7 days    3. Suspected sleep apnea  Assessment & Plan:  Used to be on CPAP but stopped after weight loss.  Now with weight gain again.  Has increased neck circumference, BMI.      Insurance not covering home studies so he needs an in-lab study which he wants to delay at this time.  Will revisit next time.      Health Maintenance  Immunization History   Administered Date(s)  Administered    Influenza Quadrivalent, 6-35 Months IM 11/16/2015, 10/19/2016    Influenza, injectable, quadrivalent, preservative free 0.5 mL 10/16/2023    Influenza, seasonal, injectable 01/01/2013    Pneumococcal Polysaccharide PPV23 11/16/2015        Return in about 1 year (around 3/26/2025).    History of Present Illness   HPI:  Nasir Story Jr. is a 63 y.o. male who has a past medical history of diabetes mellitus, chronic knee osteoarthritis, chronic kidney disease stage 3, who is presenting for the follow up of pulmonary emboli    3/26/24 - FU with me - more out of breath when he exerts himself.  Compliant with Eliquis.  No signs of bleeding.  When he stands up he feels some mild dizziness and lightheadedness.  Does not take his blood pressure at home.  Left sided ear pressure and fluid, no pain.  Decreased hearing.  Waiting to see ENT.  He has chronic knee pain and requires walking with a cane.  However Hetu has been working out has soreness in his legs afterwards    8/16/23 - Consult with me - Patient was admitted in late June 2023 for worsening shortness of breath, worse with exertion.  He had a CTA on 6/29 that showed segmental/subsegmental PE.  He was started on anticoagulation and transition to Eliquis.  Lower extremity duplex did not demonstrate DVTs.     No history of blood clots before this.  No recent surgery/injuries to legs.   He was relative inactive, spending a lot of time in bed.       He is doing well with Eliquis and has not missed any doses.     He is very concerned about his sedentary lifestyle, worsened by chronic knee pain.  He is having a lot of trouble with weight loss and worries that he is not able to achieve enough weight loss for total knee replacements.  He gets frequent injections in his knees.  He has been on a diet for a year but hasn't lost a lot of weight.  He is unable to do any exercise due to knee pain.     Knees affect his sleep.  No gasping during sleep, snoring,  witnessed apneas.  He had CPAP previously but lost weight (he used nasal pillows before).  Gained weight back due to knee pain.        He is was a heavy cigar and cigarette smoker.  He quit smoking in 2003.    Meds/Allergies     Current Outpatient Medications:     amLODIPine (NORVASC) 10 mg tablet, Take 1 tablet (10 mg total) by mouth daily, Disp: 90 tablet, Rfl: 3    amoxicillin (AMOXIL) 500 mg capsule, Take 1 capsule (500 mg total) by mouth every 8 (eight) hours for 7 days, Disp: 21 capsule, Rfl: 0    apixaban (Eliquis) 5 mg, TAKE ONE TABLET BY MOUTH TWICE A DAY, Disp: 60 tablet, Rfl: 5    atorvastatin (LIPITOR) 20 mg tablet, Take 1 tablet (20 mg total) by mouth daily, Disp: 90 tablet, Rfl: 1    chlorthalidone 25 mg tablet, Take 1 tablet (25 mg total) by mouth daily, Disp: 90 tablet, Rfl: 3    DULoxetine HCl 60 MG CSDR, 60 mg, Disp: , Rfl:     hydrocortisone 2.5 % cream, Apply topically to face BID for no longer than 7 days for flares, Disp: 30 g, Rfl: 0    losartan (COZAAR) 100 MG tablet, Take 1 tablet (100 mg total) by mouth daily, Disp: 90 tablet, Rfl: 3    metoprolol tartrate (LOPRESSOR) 100 mg tablet, Take 1 tablet (100 mg total) by mouth 2 (two) times a day, Disp: 180 tablet, Rfl: 3    morphine (MS CONTIN) 15 mg 12 hr tablet, take 1 tablet every 8 hours if needed pain, Disp: , Rfl: 0    oxyCODONE (ROXICODONE) 30 MG immediate release tablet, take 1 tablet by mouth every 5 hours if needed for pain ..MAX 4/DAY, Disp: , Rfl: 0    spironolactone (ALDACTONE) 25 mg tablet, Take 1 tablet (25 mg total) by mouth daily, Disp: 90 tablet, Rfl: 3    tadalafil (CIALIS) 5 MG tablet, Take 5 mg by mouth daily as needed for erectile dysfunction (unsure dosage) Indications: Erectile Dysfunction., Disp: , Rfl:     tamsulosin (FLOMAX) 0.4 mg, Take 1 capsule (0.4 mg total) by mouth daily with dinner, Disp: 90 capsule, Rfl: 3  Allergies   Allergen Reactions    Gabapentin      Annotation - [id number]: dizziness    Pregabalin       "Annotation - [id number]: vision changes and mood changes    Tramadol Nausea Only       Vitals: Blood pressure 118/70, pulse 76, temperature 98.1 °F (36.7 °C), temperature source Tympanic, SpO2 97%. There is no height or weight on file to calculate BMI. Oxygen Therapy  SpO2: 97 %  Oxygen Therapy: None (Room air)    Physical Exam  Vitals and nursing note reviewed.   Constitutional:       General: He is not in acute distress.     Appearance: He is well-developed. He is obese. He is not ill-appearing, toxic-appearing or diaphoretic.      Comments: Walks with a cane   HENT:      Head: Normocephalic and atraumatic.      Right Ear: Ear canal and external ear normal.      Left Ear: Ear canal and external ear normal.      Ears:      Comments: Left-sided tympanic membrane appears to have a fluid level  Cerumen in both ears, right more than left  Eyes:      Extraocular Movements: Extraocular movements intact.      Conjunctiva/sclera: Conjunctivae normal.   Cardiovascular:      Rate and Rhythm: Normal rate and regular rhythm.   Pulmonary:      Effort: Pulmonary effort is normal. No respiratory distress.      Breath sounds: Normal breath sounds. No stridor. No wheezing.   Abdominal:      Tenderness: There is no guarding.   Musculoskeletal:         General: No swelling.      Cervical back: Normal range of motion and neck supple. No rigidity.   Skin:     General: Skin is warm and dry.   Neurological:      General: No focal deficit present.      Mental Status: He is alert and oriented to person, place, and time. Mental status is at baseline.   Psychiatric:         Mood and Affect: Mood normal.             Labs:   I have personally reviewed pertinent lab results.    ABG: No results found for: \"PHART\", \"QDF1VKB\", \"PO2ART\", \"QGT5RAH\", \"Z4MVOYSV\", \"BEART\", \"SOURCE\",   CBC:  Lab Results   Component Value Date    WBC 9.13 12/09/2023    HGB 13.9 12/09/2023    HCT 41.7 12/09/2023    MCV 85 12/09/2023     12/09/2023    EOSPCT 8 (H) " "12/09/2023    EOSABS 0.76 (H) 12/09/2023    NEUTOPHILPCT 62 12/09/2023    LYMPHOPCT 21 12/09/2023   ,   CMP:   Lab Results   Component Value Date    SODIUM 134 (L) 12/09/2023    K 4.7 12/09/2023     12/09/2023    CO2 27 12/09/2023    ANIONGAP 4 06/19/2015    BUN 27 (H) 12/09/2023    CREATININE 1.77 (H) 12/09/2023    GLUCOSE 148 (H) 10/14/2016    CALCIUM 9.9 12/09/2023    AST 10 (L) 10/27/2023    ALT 10 10/27/2023    ALKPHOS 89 10/27/2023    PROT 8.3 (H) 06/19/2015    BILITOT 0.5 06/19/2015    EGFR 39 12/09/2023   ,   Ferrtin: No components found for: \"FERRTIN\",  Magensium: No results found for: \"MAGNESIUM\",    Imaging and other studies: I have personally reviewed pertinent reports.   and I have personally reviewed pertinent films in PACS  6/30/23 CTA Chest  Segmental and subsegmental pulmonary emboli, confirmed on repeat examination.  No evidence for right heart strain. Measuring RV/LV ratio is within normal limits at less than 0.9.  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.  Recommendation is for follow-up low radiation dose chest CT in one year.     6/30/23 Lower extremity dopplers     RIGHT LOWER LIMB:  No evidence of acute or chronic deep vein thrombosis.  No evidence of superficial thrombophlebitis noted.  Doppler evaluation shows a normal response to augmentation maneuvers..  Popliteal, posterior tibial and anterior tibial arterial Doppler waveforms are  triphasic.     LEFT LOWER LIMB:  No evidence of acute or chronic deep vein thrombosis.  No evidence of superficial thrombophlebitis noted.  Doppler evaluation shows a normal response to augmentation maneuvers.  Popliteal, posterior tibial and anterior tibial arterial Doppler waveforms are  triphasic.           Pulmonary function testing:   Pulmonary Functions Testing Results:        Transthoracic Echo:  6/29/23    Left Ventricle: Left ventricular cavity size is normal. Wall thickness is normal. The left ventricular ejection " "fraction is 60%. Systolic function is normal. Wall motion is normal. Diastolic function is mildly abnormal, consistent with grade I (abnormal) relaxation.    Left Atrium: The atrium is dilated.    Aorta: The aortic root is normal in size. The ascending aorta is mildly dilated. The ascending aorta is 4.2 cm.  Right ventricular cavity size is normal. Systolic function is normal. Wall thickness is normal.        Terrence Pugh MD  Pulmonary, Critical Care and Sleep Medicine  [facility]     Portions of the record may have been created with voice recognition software. Occasional wrong word or \"sound a like\" substitutions may have occurred due to the inherent limitations of voice recognition software. Please read the chart carefully and recognize, using context, where substitutions have occurred.     "

## 2024-03-26 ENCOUNTER — OFFICE VISIT (OUTPATIENT)
Dept: PULMONOLOGY | Facility: CLINIC | Age: 64
End: 2024-03-26
Payer: COMMERCIAL

## 2024-03-26 VITALS
HEART RATE: 76 BPM | TEMPERATURE: 98.1 F | OXYGEN SATURATION: 97 % | SYSTOLIC BLOOD PRESSURE: 118 MMHG | DIASTOLIC BLOOD PRESSURE: 70 MMHG

## 2024-03-26 DIAGNOSIS — I27.82 OTHER CHRONIC PULMONARY EMBOLISM, UNSPECIFIED WHETHER ACUTE COR PULMONALE PRESENT (HCC): Primary | ICD-10-CM

## 2024-03-26 DIAGNOSIS — H67.2 OTITIS MEDIA OF LEFT EAR IN DISEASE CLASSIFIED ELSEWHERE: ICD-10-CM

## 2024-03-26 DIAGNOSIS — R29.818 SUSPECTED SLEEP APNEA: ICD-10-CM

## 2024-03-26 PROBLEM — H66.90 OTITIS MEDIA: Status: ACTIVE | Noted: 2024-03-26

## 2024-03-26 PROCEDURE — 99214 OFFICE O/P EST MOD 30 MIN: CPT | Performed by: INTERNAL MEDICINE

## 2024-03-26 RX ORDER — AMOXICILLIN 500 MG/1
500 CAPSULE ORAL EVERY 8 HOURS SCHEDULED
Qty: 21 CAPSULE | Refills: 0 | Status: SHIPPED | OUTPATIENT
Start: 2024-03-26 | End: 2024-04-02

## 2024-03-27 NOTE — ASSESSMENT & PLAN NOTE
Used to be on CPAP but stopped after weight loss.  Now with weight gain again.  Has increased neck circumference, BMI.      Insurance not covering home studies so he needs an in-lab study which he wants to delay at this time.  Will revisit next time.

## 2024-03-27 NOTE — ASSESSMENT & PLAN NOTE
Bilateral segmental and subsegmental PE diagnosed via 6/30/2023 CTA chest.  Low risk PE, no signs of right heart strain.  Contributing factors likely inactivity due to chronic knee OA, and obesity      He has completed 6 months of Eliquis 5mg BID and elects to continue this regimen in the absence of side effects and unmodified risk factors.      PE likely provoked by inactivity/obesity.  No procoagulable workup needed right now due to no blood clot history before this.  If having a 2nd VTE in the future, will need procoagulable work up then.    Since he is past 6 months from his PE, the risk of holding Eliquis for procedures is decreased.  If he needs a procedure in the future holding Eliquis for at least 48 hours prior is recommended.  Eliquis should be resumed as soon as deemed safe by the proceduralist.

## 2024-03-27 NOTE — ASSESSMENT & PLAN NOTE
Left ear pressure with fluid level seen on otoscope, c/f otitis media.  Will send for 7 days of amoxicillin.  Unclear etiology, no water retention in the ear.  Patient waiting for ENT appt already referred by PCP.

## 2024-04-18 DIAGNOSIS — E78.5 HYPERLIPIDEMIA, UNSPECIFIED HYPERLIPIDEMIA TYPE: ICD-10-CM

## 2024-04-19 RX ORDER — ATORVASTATIN CALCIUM 20 MG/1
20 TABLET, FILM COATED ORAL DAILY
Qty: 90 TABLET | Refills: 1 | Status: SHIPPED | OUTPATIENT
Start: 2024-04-19

## 2024-04-24 ENCOUNTER — HOSPITAL ENCOUNTER (OUTPATIENT)
Dept: RADIOLOGY | Facility: HOSPITAL | Age: 64
Discharge: HOME | End: 2024-04-24
Payer: COMMERCIAL

## 2024-04-24 DIAGNOSIS — N28.1 RENAL CYST: ICD-10-CM

## 2024-04-24 DIAGNOSIS — N18.31 STAGE 3A CHRONIC KIDNEY DISEASE (HCC): ICD-10-CM

## 2024-04-24 PROCEDURE — 76775 US EXAM ABDO BACK WALL LIM: CPT

## 2024-05-02 ENCOUNTER — RA CDI HCC (OUTPATIENT)
Dept: OTHER | Facility: HOSPITAL | Age: 64
End: 2024-05-02

## 2024-05-02 NOTE — PROGRESS NOTES
HCC coding opportunities     E11.40, I50.30     Chart Reviewed number of suggestions sent to Provider: 2     Patients Insurance     Medicare Insurance: [employer]age

## 2024-05-03 ENCOUNTER — TELEPHONE (OUTPATIENT)
Age: 64
End: 2024-05-03

## 2024-05-03 NOTE — TELEPHONE ENCOUNTER
FYI: patient stated that labs from PCP were not in Epic. Patient was at [facility] lab because he said he was told that the correct labs were in the system. Patient will call office on Monday to clear things up.

## 2024-05-10 NOTE — TELEPHONE ENCOUNTER
Pt called back. He said labs in his chart are from Dr. Nunez. He would like Martine to order that labs he will need for his appt with her on 5/22. Please, let him know when entered. He uses  lab.

## 2024-05-13 ENCOUNTER — CONSULT (OUTPATIENT)
Dept: BARIATRICS | Facility: CLINIC | Age: 64
End: 2024-05-13
Payer: COMMERCIAL

## 2024-05-13 VITALS
BODY MASS INDEX: 46.65 KG/M2 | SYSTOLIC BLOOD PRESSURE: 150 MMHG | DIASTOLIC BLOOD PRESSURE: 80 MMHG | HEART RATE: 90 BPM | RESPIRATION RATE: 16 BRPM | TEMPERATURE: 97.9 F | HEIGHT: 69 IN | WEIGHT: 315 LBS

## 2024-05-13 DIAGNOSIS — E11.22 TYPE 2 DIABETES MELLITUS WITH STAGE 3B CHRONIC KIDNEY DISEASE, WITHOUT LONG-TERM CURRENT USE OF INSULIN (HCC): ICD-10-CM

## 2024-05-13 DIAGNOSIS — N18.32 TYPE 2 DIABETES MELLITUS WITH STAGE 3B CHRONIC KIDNEY DISEASE, WITHOUT LONG-TERM CURRENT USE OF INSULIN (HCC): ICD-10-CM

## 2024-05-13 DIAGNOSIS — E78.5 HYPERLIPIDEMIA, UNSPECIFIED HYPERLIPIDEMIA TYPE: ICD-10-CM

## 2024-05-13 DIAGNOSIS — I50.32 CHRONIC DIASTOLIC HEART FAILURE (HCC): ICD-10-CM

## 2024-05-13 DIAGNOSIS — E66.01 MORBID OBESITY WITH BMI OF 45.0-49.9, ADULT (HCC): Primary | ICD-10-CM

## 2024-05-13 PROCEDURE — 99203 OFFICE O/P NEW LOW 30 MIN: CPT | Performed by: INTERNAL MEDICINE

## 2024-05-13 NOTE — PATIENT INSTRUCTIONS
General Recommendations:  Nutrition:  Eat breakfast daily.  Do not skip meals.     Food log (ie.) www.SOPATec.com, sparkpeople.com, loseit.com, calorieking.com, etc.    Practice mindful eating.  Be sure to set aside time to eat, eat slowly, and savor your food.    Hydration:    At least 64oz of water daily.  No sugar sweetened beverages.  No juice (eat the fruit instead).    Exercise:  Studies have shown that the ideal exercise goal is somewhere between 150 to 300 minutes of moderate intensity exercise a week.  Start with exercising 10 minutes every other day and gradually increase physical activity with a goal of at least 150 minutes of moderate intensity exercise a week, divided over at least 3 days a week.  An example of this would be exercising 30 minutes a day, 5 days a week.  Resistance training can increase muscle mass and increase our resting metabolic rate.   FULL BODY resistance training is recommended 2-3 times a week.  Do not do this on consecutive days to allow for muscle recovery.    Aim for a bare minimum 5000 steps, even on days you do not exercise.    Monitoring:   Weigh yourself daily.  If this causes undue stress, then just weigh yourself once a week.  Weigh yourself the same time of the day with the same amount of clothing on.  Preferably this should be done after waking up, before you eat, and with no clothing or minimal clothing on.    Specific Goals:  Calorie goal:  [id number] calories a day (Provided with meal plan to follow).    Have labs done.  Start ozempic.   INJECT Ozempic Weekly SUBCUTANEOUSLY.  - Start Ozempic 0.25 mg subcutaneously once a week for 4 weeks than increase to 0.5 mg subcutaneously once a week     -IF NAUSEA/VOMITING DEVELOP STOP MEDICATION FOR A FEW DAYS AND DECREASE TO PREVIOUSLY TOLERATED DOSE. STAY HYDRATED.    -IF YOU DEVELOP SEVERE ABDOMINAL PAIN WHICH MAY RADIATE TO THE BACK, SOMETIMES ASSOCIATED WITH FEVER, AND VOMITING, STOP MEDICATION AND SEEK URGENT CARE  AS THIS MAY BE A SIGN OF PANCREATITIS.     -MONITOR YOUR RESTING HEART RATE AND CONTACT THE OFFICE IF IT GOES ABOVE 100.    3.  Nurse visit one month after startin Ozempic.     4.  Repeat labs in 3 months.    5. Follow-up in 4 months.    6. Meet with bariatric surgeon.

## 2024-05-13 NOTE — PROGRESS NOTES
Assessment/Plan:  Anand was seen today for consult.    Diagnoses and all orders for this visit:    Morbid obesity with BMI of 45.0-49.9, adult (HCC)  -     Ambulatory Referral to Bariatric Surgery  -     Basic metabolic panel; Future  -     Hemoglobin A1C; Future    Type 2 diabetes mellitus with stage 3b chronic kidney disease, without long-term current use of insulin (HCC)  -     semaglutide, 0.25 or 0.5 mg/dose, (Ozempic, 0.25 or 0.5 MG/DOSE,) 2 mg/3 mL injection pen; Take 0.25 mg subcutaneously once a week for 4 weeks than increase to 0.5 mg subcutaneously once a week.  -     Basic metabolic panel; Future  -     Hemoglobin A1C; Future    Hyperlipidemia, unspecified hyperlipidemia type    Chronic diastolic heart failure (HCC)       - Discussed options of HealthyCORE-Intensive Lifestyle Intervention Program, Very Low Calorie Diet-VLCD, Conservative Program, Farooq-En-Y Gastric Bypass, and Vertical Sleeve Gastrectomy and the role of weight loss medications.  - Explained the importance of making lifestyle changes first before starting anti-obesity medications.  - Patient should demonstrate lifestyle changes first before anti-obesity medication initiated.   - Patient is interested in pursuing Conservative Program, Farooq-En-Y Gastric Bypass, and Vertical Sleeve Gastrectomy  - Initial weight loss goal of 5-10% weight loss for improved health as studies have shown this is where we see the greatest impact on improving health and decreasing risk of obesity related conditions.  - Weight loss can improve patient's co-morbid conditions and/or prevent weight-related complications.  - IMELDA on CPAP.  - Labs reviewed: As below.      General Recommendations:  Nutrition:  Eat breakfast daily.  Do not skip meals.     Food log (ie.) www.Timehop.com, sparkpeople.com, loseit.com, MobileDataforce.com, etc.    Practice mindful eating.  Be sure to set aside time to eat, eat slowly, and savor your food.    Hydration:    At least 64oz of water  daily.  No sugar sweetened beverages.  No juice (eat the fruit instead).    Exercise:  Studies have shown that the ideal exercise goal is somewhere between 150 to 300 minutes of moderate intensity exercise a week.  Start with exercising 10 minutes every other day and gradually increase physical activity with a goal of at least 150 minutes of moderate intensity exercise a week, divided over at least 3 days a week.  An example of this would be exercising 30 minutes a day, 5 days a week.  Resistance training can increase muscle mass and increase our resting metabolic rate.   FULL BODY resistance training is recommended 2-3 times a week.  Do not do this on consecutive days to allow for muscle recovery.    Aim for a bare minimum 5000 steps, even on days you do not exercise.    Monitoring:   Weigh yourself daily.  If this causes undue stress, then just weigh yourself once a week.  Weigh yourself the same time of the day with the same amount of clothing on.  Preferably this should be done after waking up, before you eat, and with no clothing or minimal clothing on.    Specific Goals:  Calorie goal:  3375-1292 calories a day (Provided with meal plan to follow).    Have labs done.  Start ozempic.   INJECT Ozempic Weekly SUBCUTANEOUSLY.  - Start Ozempic 0.25 mg subcutaneously once a week for 4 weeks than increase to 0.5 mg subcutaneously once a week     -IF NAUSEA/VOMITING DEVELOP STOP MEDICATION FOR A FEW DAYS AND DECREASE TO PREVIOUSLY TOLERATED DOSE. STAY HYDRATED.    -IF YOU DEVELOP SEVERE ABDOMINAL PAIN WHICH MAY RADIATE TO THE BACK, SOMETIMES ASSOCIATED WITH FEVER, AND VOMITING, STOP MEDICATION AND SEEK URGENT CARE AS THIS MAY BE A SIGN OF PANCREATITIS.     -MONITOR YOUR RESTING HEART RATE AND CONTACT THE OFFICE IF IT GOES ABOVE 100.    3.  Nurse visit one month after startin Ozempic.     4.  Repeat labs in 3 months.    5. Follow-up in 4 months.    6. Meet with bariatric surgeon.    Total time spent reviewing chart,  interviewing patient, examining patient, discussing plan, answering all questions, and documentin min.       ______________________________________________________________________    Subjective:   Chief Complaint   Patient presents with    Consult     MWM- Consult, GW 180lb- Patient has sleep apnea       HPI: Nasir Story Jr.  is a 63 y.o. male with history of PE on AC, IMELDA , [id number], hypertension, bilateral knee osteoarthritis, chronic back pain and excess weight, here to pursue weight loss management.  Previous notes and records have been reviewed.    HPI  Wt Readings from Last 20 Encounters:   24 (!) 152 kg (335 lb 3.2 oz)   23 (!) 155 kg (342 lb)   10/16/23 (!) 154 kg (339 lb)   23 (!) 157 kg (346 lb)   23 (!) 154 kg (340 lb)   23 (!) 154 kg (339 lb)   23 (!) 156 kg (343 lb)   23 (!) 152 kg (336 lb)   23 (!) 153 kg (338 lb)   23 (!) 155 kg (341 lb 11.4 oz)   23 (!) 155 kg (341 lb)   23 (!) 156 kg (344 lb)   23 (!) 156 kg (344 lb)   23 (!) 157 kg (346 lb 9.6 oz)   11/10/22 (!) 147 kg (325 lb)   22 (!) 147 kg (325 lb)   10/27/22 (!) 147 kg (325 lb)   10/24/22 (!) 147 kg (325 lb)   22 (!) 147 kg (325 lb)   22 (!) 147 kg (324 lb)     Excess Weight:  Current weight: 335  What has been tried: Diet and Exercise  Contributing factors: Poor Food Choices and Insufficient Physical Activity  Associated symptoms and effects: fatigue  Hydration:  Water 6+ bottles,  1 cup of coffee with splenda in the morning with FF HH.  Alcohol:  No  Smoking:  No. Rare cigar  Exercise: No. Limited by knee and back pain.  Weight Monitoring:  No  Sleep:  6-8 hours  STOP-BANG Score: h/o IMELDA  Occupation:  Retired .    Past Medical History:   Diagnosis Date    Acne     Anxiety     Arthritis     Back pain     Chronic pain     Depression 2021    Diabetes mellitus (HCC)     6/19/15 A1C: 5.5%    Eczema     Grief  "reaction 09/10/2019    Hyperlipidemia     Hypertension     Knee pain     Morbid obesity with BMI of 50.0-59.9, adult (HCC)     Psychiatric disorder     Skin tag     Type 2 diabetes mellitus without complication (HCC) 07/16/2014    Urinary retention      Patient denies personal and family history of  pancreatitis, thyroid cancer, MEN-2 tumors.  Denies any hx of glaucoma, seizures, kidney stones, gallstones.  Denies Hx of CAD, PAD, palpitations, arrhythmia.   Denies uncontrolled anxiety or depression, suicidal behavior or thinking , insomnia or sleep disturbance.   Past Surgical History:   Procedure Laterality Date    BACK SURGERY      LUMBAR FUSION  2002    LUMBAR FUSION  2004    L4-5-6, S1 has pump for narcotics     The following portions of the patient's history were reviewed and updated as appropriate: allergies, current medications, past family history, past medical history, past social history, past surgical history, and problem list.    Review Of Systems:  Review of Systems   Constitutional:  Negative for activity change, appetite change, chills, fatigue and fever.   HENT:  Negative for trouble swallowing.    Respiratory:  Negative for cough and shortness of breath.    Cardiovascular:  Negative for chest pain, palpitations and leg swelling.   Gastrointestinal:  Negative for abdominal pain, constipation, diarrhea, nausea and vomiting.   Endocrine: Negative for cold intolerance and heat intolerance.   Genitourinary:  Negative for difficulty urinating and dysuria.   Musculoskeletal:  Negative for arthralgias, back pain, gait problem and myalgias.   Skin:  Negative for pallor and rash.   Neurological:  Negative for headaches.   Psychiatric/Behavioral:  Negative for dysphoric mood and suicidal ideas. The patient is not nervous/anxious.        Objective:  /80   Pulse 90   Temp 97.9 °F (36.6 °C)   Resp 16   Ht 5' 9\" (1.753 m) Comment: with sneakers on  Wt (!) 152 kg (335 lb 3.2 oz) Comment: With sneakers on "  BMI 49.50 kg/m²   Physical Exam  Vitals and nursing note reviewed.   Constitutional:       General: He is not in acute distress.     Appearance: Normal appearance. He is not ill-appearing or diaphoretic.   Eyes:      General: No scleral icterus.  Cardiovascular:      Rate and Rhythm: Normal rate and regular rhythm.      Pulses: Normal pulses.      Heart sounds: Normal heart sounds. No murmur heard.  Pulmonary:      Effort: Pulmonary effort is normal. No respiratory distress.      Breath sounds: Normal breath sounds. No stridor. No wheezing or rhonchi.   Musculoskeletal:      Cervical back: Neck supple.      Right lower leg: No edema.      Left lower leg: No edema.   Lymphadenopathy:      Cervical: No cervical adenopathy.   Skin:     Capillary Refill: Capillary refill takes less than 2 seconds.      Findings: No lesion or rash.   Neurological:      Mental Status: He is alert and oriented to person, place, and time.      Gait: Gait normal.   Psychiatric:         Mood and Affect: Mood normal.         Behavior: Behavior normal.       Labs and Imaging  Recent labs and imaging have been personally reviewed.  Lab Results   Component Value Date    WBC 9.13 12/09/2023    HGB 13.9 12/09/2023    HCT 41.7 12/09/2023    MCV 85 12/09/2023     12/09/2023     Lab Results   Component Value Date     09/28/2017     09/28/2017    SODIUM 134 (L) 12/09/2023    K 4.7 12/09/2023     12/09/2023    CO2 27 12/09/2023    ANIONGAP 4 06/19/2015    AGAP 7 12/09/2023    BUN 27 (H) 12/09/2023    CREATININE 1.77 (H) 12/09/2023    GLUC 131 06/30/2023    GLUF 173 (H) 12/09/2023    CALCIUM 9.9 12/09/2023    AST 10 (L) 10/27/2023    ALT 10 10/27/2023    ALKPHOS 89 10/27/2023    PROT 8.3 (H) 06/19/2015    TP 8.0 10/27/2023    BILITOT 0.5 06/19/2015    TBILI 0.52 10/27/2023    EGFR 39 12/09/2023     Lab Results   Component Value Date    HGBA1C 7.4 (H) 10/27/2023     Lab Results   Component Value Date    KKI4YBQWXRPG 1.352  10/27/2023     Lab Results   Component Value Date    CHOLESTEROL 104 10/27/2023     Lab Results   Component Value Date    HDL 30 (L) 10/27/2023     Lab Results   Component Value Date    TRIG 127 10/27/2023     Lab Results   Component Value Date    LDLCALC 49 10/27/2023

## 2024-05-15 DIAGNOSIS — I10 ESSENTIAL HYPERTENSION: ICD-10-CM

## 2024-05-15 DIAGNOSIS — E78.5 HYPERLIPIDEMIA, UNSPECIFIED HYPERLIPIDEMIA TYPE: ICD-10-CM

## 2024-05-15 DIAGNOSIS — E11.22 TYPE 2 DIABETES MELLITUS WITH STAGE 3B CHRONIC KIDNEY DISEASE, WITHOUT LONG-TERM CURRENT USE OF INSULIN (HCC): Primary | ICD-10-CM

## 2024-05-15 DIAGNOSIS — N18.32 TYPE 2 DIABETES MELLITUS WITH STAGE 3B CHRONIC KIDNEY DISEASE, WITHOUT LONG-TERM CURRENT USE OF INSULIN (HCC): Primary | ICD-10-CM

## 2024-05-16 ENCOUNTER — TELEPHONE (OUTPATIENT)
Dept: BARIATRICS | Facility: CLINIC | Age: 64
End: 2024-05-16

## 2024-05-21 ENCOUNTER — HOSPITAL ENCOUNTER (OUTPATIENT)
Facility: HOSPITAL | Age: 64
Discharge: HOME | End: 2024-05-21
Payer: COMMERCIAL

## 2024-05-21 DIAGNOSIS — R29.818 SUSPECTED SLEEP APNEA: ICD-10-CM

## 2024-05-21 PROCEDURE — G0399 HOME SLEEP TEST/TYPE 3 PORTA: HCPCS

## 2024-05-21 NOTE — TELEPHONE ENCOUNTER
PA for Ozempic Submitted    Submitted via    []CMM-KEY   []SurescriEntasso-Case ID #   []Faxed to plan   []Other website   [x]Phone call Case ID #-  [id number]    REP: ALYSSA BARRIOS  NAME: [employer]  PHONE: [phone]   FAX: [phone]      Office notes sent, clinical questions answered. Awaiting determination    Turnaround time for your insurance to make a decision on your Prior Authorization can take 7-21 business days.

## 2024-05-21 NOTE — PROGRESS NOTES
Home Sleep Study Documentation    HOME STUDY DEVICE: Noxturnal no                                           Lucille G3 yes device # 6      Pre-Sleep Home Study:    Set-up and instructions performed by: Laurie    Technician performed demonstration for Patient: yes    Return demonstration performed by Patient: yes    Written instructions provided to Patient: yes    Patient signed consent form: yes        Post-Sleep Home Study:    Additional comments by Patient: None    Home Sleep Study Failed:no:    Failure reason: N/A    Reported or Detected: N/A    Scored by: GALLITO Olivares

## 2024-05-22 ENCOUNTER — OFFICE VISIT (OUTPATIENT)
Dept: FAMILY MEDICINE CLINIC | Facility: CLINIC | Age: 64
End: 2024-05-22
Payer: COMMERCIAL

## 2024-05-22 ENCOUNTER — TELEPHONE (OUTPATIENT)
Dept: FAMILY MEDICINE CLINIC | Facility: CLINIC | Age: 64
End: 2024-05-22

## 2024-05-22 VITALS
BODY MASS INDEX: 46.65 KG/M2 | HEIGHT: 69 IN | SYSTOLIC BLOOD PRESSURE: 120 MMHG | WEIGHT: 315 LBS | DIASTOLIC BLOOD PRESSURE: 78 MMHG | OXYGEN SATURATION: 95 % | HEART RATE: 66 BPM | RESPIRATION RATE: 16 BRPM

## 2024-05-22 DIAGNOSIS — N18.32 TYPE 2 DIABETES MELLITUS WITH STAGE 3B CHRONIC KIDNEY DISEASE, WITHOUT LONG-TERM CURRENT USE OF INSULIN (HCC): ICD-10-CM

## 2024-05-22 DIAGNOSIS — I10 ESSENTIAL HYPERTENSION: Primary | ICD-10-CM

## 2024-05-22 DIAGNOSIS — I26.99 PULMONARY EMBOLISM, UNSPECIFIED CHRONICITY, UNSPECIFIED PULMONARY EMBOLISM TYPE, UNSPECIFIED WHETHER ACUTE COR PULMONALE PRESENT (HCC): ICD-10-CM

## 2024-05-22 DIAGNOSIS — Z12.11 SCREENING FOR COLON CANCER: ICD-10-CM

## 2024-05-22 DIAGNOSIS — E11.22 TYPE 2 DIABETES MELLITUS WITH STAGE 3B CHRONIC KIDNEY DISEASE, WITHOUT LONG-TERM CURRENT USE OF INSULIN (HCC): ICD-10-CM

## 2024-05-22 DIAGNOSIS — F32.A MILD DEPRESSION: ICD-10-CM

## 2024-05-22 DIAGNOSIS — F41.9 ANXIETY DISORDER, UNSPECIFIED TYPE: ICD-10-CM

## 2024-05-22 DIAGNOSIS — I77.810 THORACIC AORTIC ECTASIA (HCC): ICD-10-CM

## 2024-05-22 PROBLEM — H66.90 OTITIS MEDIA: Status: RESOLVED | Noted: 2024-03-26 | Resolved: 2024-05-22

## 2024-05-22 PROCEDURE — G2211 COMPLEX E/M VISIT ADD ON: HCPCS | Performed by: NURSE PRACTITIONER

## 2024-05-22 PROCEDURE — 99214 OFFICE O/P EST MOD 30 MIN: CPT | Performed by: NURSE PRACTITIONER

## 2024-05-22 NOTE — PROGRESS NOTES
Ambulatory Visit  Name: Nasri Story Jr.      : 1960      MRN: [id number]  Encounter Provider: MARJORIE Chavez  Encounter Date: 2024   Encounter department: [facility]    Assessment & Plan   1. Essential hypertension  Stable. Continue to follow-up with nephrology.     2. Pulmonary embolism, unspecified chronicity, unspecified pulmonary embolism type, unspecified whether acute cor pulmonale present (HCC)  Patient takes eliquis as prescribed.   Continue to follow-up with pulmonary.     3. Type 2 diabetes mellitus with stage 3b chronic kidney disease, without long-term current use of insulin (HCC)  Patient takes ozempic.   Continue to follow-up with weight management.     4. Anxiety disorder, unspecified type  Patient reports that he is doing well.   Patient does not want medication or referral for therapy at this time.     5. Mild depression  Denies any suicidal thoughts or homicidal thoughts.   Patient reports that he is doing well.   Patient does not want medication or referral for therapy at this time.     6. Thoracic aortic ectasia (HCC)  Patient instructed to continue to follow-up with cardiology.     7. Screening for colon cancer  -     Ambulatory Referral to Gastroenterology; Future    Patient instructed to get his lab work done. Will follow-up results with the patient.   Patient reports that he is going to establish care with a PCP closer to his home.   Patient instructed to establish care with a PCP in his area in 1 month or sooner prn.        History of Present Illness     Patient is here for a follow-up for chronic medical conditions.   Patient follows up with nephrology for HTN and chronic kidney disease.   Patient follows up with weight management for obesity and Type 2 DM. Patient takes Ozempic.   Patient is here for a follow-up for anxiety and depression. Patient reports that he is doing well.   Denies any suicidal thoughts or homicidal thoughts.   Patient  follows up with pulmonary for PE. Patient takes eliquis as prescribed.   Patient takes atorvastatin for hyperlipidemia.   Patient follows up with cardiology for thoracic aortic ectasia.         Review of Systems   Constitutional:  Negative for chills and fever.   HENT:  Negative for congestion, ear pain, sinus pressure and sore throat.    Respiratory:  Negative for cough, chest tightness, shortness of breath and wheezing.    Cardiovascular:  Negative for chest pain, palpitations and leg swelling.   Gastrointestinal:  Negative for abdominal pain, diarrhea, nausea and vomiting.   Skin:  Negative for rash.   Neurological:  Negative for seizures, syncope and headaches.   Psychiatric/Behavioral:  Negative for suicidal ideas.         As noted in HPI.      Medical History Reviewed by provider this encounter:  Tobacco  Allergies  Meds  Med Hx  Surg Hx  Fam Hx  Soc Hx      Current Outpatient Medications on File Prior to Visit   Medication Sig Dispense Refill    amLODIPine (NORVASC) 10 mg tablet Take 1 tablet (10 mg total) by mouth daily 90 tablet 3    apixaban (Eliquis) 5 mg TAKE ONE TABLET BY MOUTH TWICE A DAY 60 tablet 5    atorvastatin (LIPITOR) 20 mg tablet TAKE ONE TABLET BY MOUTH EVERY DAY 90 tablet 1    chlorthalidone 25 mg tablet Take 1 tablet (25 mg total) by mouth daily 90 tablet 3    DULoxetine HCl 60 MG CSDR 60 mg      hydrocortisone 2.5 % cream Apply topically to face BID for no longer than 7 days for flares 30 g 0    losartan (COZAAR) 100 MG tablet Take 1 tablet (100 mg total) by mouth daily 90 tablet 3    metoprolol tartrate (LOPRESSOR) 100 mg tablet Take 1 tablet (100 mg total) by mouth 2 (two) times a day 180 tablet 3    morphine (MS CONTIN) 15 mg 12 hr tablet take 1 tablet every 8 hours if needed pain  0    oxyCODONE (ROXICODONE) 30 MG immediate release tablet take 1 tablet by mouth every 5 hours if needed for pain ..MAX 4/DAY  0    semaglutide, 0.25 or 0.5 mg/dose, (Ozempic, 0.25 or 0.5 MG/DOSE,) 2  "mg/3 mL injection pen Take 0.25 mg subcutaneously once a week for 4 weeks than increase to 0.5 mg subcutaneously once a week. 3 mL 0    spironolactone (ALDACTONE) 25 mg tablet Take 1 tablet (25 mg total) by mouth daily 90 tablet 3    tamsulosin (FLOMAX) 0.4 mg Take 1 capsule (0.4 mg total) by mouth daily with dinner 90 capsule 3    tadalafil (CIALIS) 5 MG tablet Take 5 mg by mouth daily as needed for erectile dysfunction (unsure dosage) Indications: Erectile Dysfunction. (Patient not taking: Reported on 5/13/2024)       No current facility-administered medications on file prior to visit.      Objective     /78 (BP Location: Left arm, Patient Position: Sitting, Cuff Size: Large)   Pulse 66   Resp 16   Ht 5' 9\" (1.753 m)   Wt (!) 150 kg (330 lb)   SpO2 95%   BMI 48.73 kg/m²     Physical Exam  Vitals reviewed.   Constitutional:       General: He is not in acute distress.     Appearance: He is obese. He is not ill-appearing or diaphoretic.   HENT:      Right Ear: External ear normal.      Left Ear: External ear normal.      Nose: Nose normal.      Mouth/Throat:      Mouth: Mucous membranes are moist.      Pharynx: Oropharynx is clear. No oropharyngeal exudate or posterior oropharyngeal erythema.   Eyes:      Conjunctiva/sclera: Conjunctivae normal.      Pupils: Pupils are equal, round, and reactive to light.   Cardiovascular:      Rate and Rhythm: Normal rate and regular rhythm.      Pulses: Normal pulses.      Heart sounds: Normal heart sounds.      Comments: No edema noted.   Pulmonary:      Effort: Pulmonary effort is normal. No respiratory distress.      Breath sounds: Normal breath sounds. No wheezing.   Skin:     Findings: No rash.   Neurological:      Mental Status: He is alert and oriented to person, place, and time.   Psychiatric:         Mood and Affect: Mood normal.       Administrative Statements     "

## 2024-05-24 PROBLEM — G47.33 OSA (OBSTRUCTIVE SLEEP APNEA): Status: ACTIVE | Noted: 2024-05-24

## 2024-05-25 DIAGNOSIS — G47.33 OSA (OBSTRUCTIVE SLEEP APNEA): Primary | ICD-10-CM

## 2024-05-25 PROCEDURE — 95806 SLEEP STUDY UNATT&RESP EFFT: CPT | Performed by: INTERNAL MEDICINE

## 2024-05-28 ENCOUNTER — LAB (OUTPATIENT)
Dept: LAB | Facility: CLINIC | Age: 64
End: 2024-05-28
Payer: COMMERCIAL

## 2024-05-28 ENCOUNTER — TELEPHONE (OUTPATIENT)
Dept: NEPHROLOGY | Facility: CLINIC | Age: 64
End: 2024-05-28

## 2024-05-28 DIAGNOSIS — I10 BENIGN ESSENTIAL HTN: ICD-10-CM

## 2024-05-28 DIAGNOSIS — E87.1 HYPONATREMIA: ICD-10-CM

## 2024-05-28 DIAGNOSIS — N18.31 STAGE 3A CHRONIC KIDNEY DISEASE (HCC): ICD-10-CM

## 2024-05-28 DIAGNOSIS — E11.22 TYPE 2 DIABETES MELLITUS WITH STAGE 3B CHRONIC KIDNEY DISEASE, WITHOUT LONG-TERM CURRENT USE OF INSULIN (HCC): ICD-10-CM

## 2024-05-28 DIAGNOSIS — N18.32 TYPE 2 DIABETES MELLITUS WITH STAGE 3B CHRONIC KIDNEY DISEASE, WITHOUT LONG-TERM CURRENT USE OF INSULIN (HCC): ICD-10-CM

## 2024-05-28 DIAGNOSIS — E78.5 HYPERLIPIDEMIA, UNSPECIFIED HYPERLIPIDEMIA TYPE: ICD-10-CM

## 2024-05-28 DIAGNOSIS — N28.1 RENAL CYST: ICD-10-CM

## 2024-05-28 DIAGNOSIS — I10 ESSENTIAL HYPERTENSION: ICD-10-CM

## 2024-05-28 LAB
ALBUMIN SERPL BCP-MCNC: 4.4 G/DL (ref 3.5–5)
ALP SERPL-CCNC: 72 U/L (ref 34–104)
ALT SERPL W P-5'-P-CCNC: 12 U/L (ref 7–52)
ANION GAP SERPL CALCULATED.3IONS-SCNC: 6 MMOL/L (ref 4–13)
AST SERPL W P-5'-P-CCNC: 10 U/L (ref 13–39)
BACTERIA UR QL AUTO: NORMAL /HPF
BILIRUB DIRECT SERPL-MCNC: 0.15 MG/DL (ref 0–0.2)
BILIRUB SERPL-MCNC: 0.55 MG/DL (ref 0.2–1)
BILIRUB UR QL STRIP: NEGATIVE
BUN SERPL-MCNC: 43 MG/DL (ref 5–25)
CALCIUM SERPL-MCNC: 9.8 MG/DL (ref 8.4–10.2)
CHLORIDE SERPL-SCNC: 105 MMOL/L (ref 96–108)
CHOLEST SERPL-MCNC: 106 MG/DL
CLARITY UR: CLEAR
CO2 SERPL-SCNC: 23 MMOL/L (ref 21–32)
COLOR UR: NORMAL
CREAT SERPL-MCNC: 1.69 MG/DL (ref 0.6–1.3)
CREAT UR-MCNC: 62.7 MG/DL
ERYTHROCYTE [DISTWIDTH] IN BLOOD BY AUTOMATED COUNT: 13.6 % (ref 11.6–15.1)
EST. AVERAGE GLUCOSE BLD GHB EST-MCNC: 160 MG/DL
GFR SERPL CREATININE-BSD FRML MDRD: 41 ML/MIN/1.73SQ M
GLUCOSE P FAST SERPL-MCNC: 141 MG/DL (ref 65–99)
GLUCOSE UR STRIP-MCNC: NEGATIVE MG/DL
HBA1C MFR BLD: 7.2 %
HCT VFR BLD AUTO: 42.2 % (ref 36.5–49.3)
HDLC SERPL-MCNC: 35 MG/DL
HGB BLD-MCNC: 14 G/DL (ref 12–17)
HGB UR QL STRIP.AUTO: NEGATIVE
KETONES UR STRIP-MCNC: NEGATIVE MG/DL
LDLC SERPL CALC-MCNC: 58 MG/DL (ref 0–100)
LEUKOCYTE ESTERASE UR QL STRIP: NEGATIVE
MAGNESIUM SERPL-MCNC: 2 MG/DL (ref 1.9–2.7)
MCH RBC QN AUTO: 28.9 PG (ref 26.8–34.3)
MCHC RBC AUTO-ENTMCNC: 33.2 G/DL (ref 31.4–37.4)
MCV RBC AUTO: 87 FL (ref 82–98)
NITRITE UR QL STRIP: NEGATIVE
NON-SQ EPI CELLS URNS QL MICRO: NORMAL /HPF
PH UR STRIP.AUTO: 6 [PH]
PHOSPHATE SERPL-MCNC: 3.8 MG/DL (ref 2.3–4.1)
PLATELET # BLD AUTO: 293 THOUSANDS/UL (ref 149–390)
PMV BLD AUTO: 10.1 FL (ref 8.9–12.7)
POTASSIUM SERPL-SCNC: 5.8 MMOL/L (ref 3.5–5.3)
PROT SERPL-MCNC: 7.7 G/DL (ref 6.4–8.4)
PROT UR STRIP-MCNC: NEGATIVE MG/DL
PROT UR-MCNC: 5 MG/DL
PROT/CREAT UR: 0.08 MG/G{CREAT} (ref 0–0.1)
RBC # BLD AUTO: 4.84 MILLION/UL (ref 3.88–5.62)
RBC #/AREA URNS AUTO: NORMAL /HPF
SODIUM SERPL-SCNC: 134 MMOL/L (ref 135–147)
SP GR UR STRIP.AUTO: 1.01 (ref 1–1.03)
TRIGL SERPL-MCNC: 66 MG/DL
TSH SERPL DL<=0.05 MIU/L-ACNC: 1.56 UIU/ML (ref 0.45–4.5)
UROBILINOGEN UR STRIP-ACNC: <2 MG/DL
WBC # BLD AUTO: 12.92 THOUSAND/UL (ref 4.31–10.16)
WBC #/AREA URNS AUTO: NORMAL /HPF

## 2024-05-28 PROCEDURE — 80076 HEPATIC FUNCTION PANEL: CPT

## 2024-05-28 PROCEDURE — 84100 ASSAY OF PHOSPHORUS: CPT

## 2024-05-28 PROCEDURE — 81001 URINALYSIS AUTO W/SCOPE: CPT

## 2024-05-28 PROCEDURE — 82570 ASSAY OF URINE CREATININE: CPT

## 2024-05-28 PROCEDURE — 80061 LIPID PANEL: CPT

## 2024-05-28 PROCEDURE — 83036 HEMOGLOBIN GLYCOSYLATED A1C: CPT

## 2024-05-28 PROCEDURE — 84443 ASSAY THYROID STIM HORMONE: CPT

## 2024-05-28 PROCEDURE — 85027 COMPLETE CBC AUTOMATED: CPT

## 2024-05-28 PROCEDURE — 84156 ASSAY OF PROTEIN URINE: CPT

## 2024-05-28 PROCEDURE — 83735 ASSAY OF MAGNESIUM: CPT

## 2024-05-28 PROCEDURE — 36415 COLL VENOUS BLD VENIPUNCTURE: CPT

## 2024-05-28 PROCEDURE — 80048 BASIC METABOLIC PNL TOTAL CA: CPT

## 2024-05-28 NOTE — TELEPHONE ENCOUNTER
I tried to call the patient now but no answer.  Test request sent.  This was to review abnormal lab work.

## 2024-05-28 NOTE — RESULT ENCOUNTER NOTE
Hello    Patient normally is followed up by Ms Fadumo Ridley.     I tried to call the patient now but no answer.  Can you please let the patient know that the creatinine is stable 1.6.  Potassium was higher at 5.8  -Please asked patient to follow low potassium diet and please send him high potassium foods to avoid list and low potassium foods that are okay to take a list  - On the voicemail I asked him to hold spironolactone until we have a chance to talk with him  -Can you please ask him to lower spironolactone to half a tablet a day.  12.5 mg a day and please adjust med list accordingly  - Please asked him to repeat a BMP this Thursday  - Patient is overdue to see us.  Can you please schedule with next available with me or advanced practitioner    Thank you    np

## 2024-05-29 ENCOUNTER — TELEPHONE (OUTPATIENT)
Age: 64
End: 2024-05-29

## 2024-05-29 ENCOUNTER — DOCUMENTATION (OUTPATIENT)
Dept: NEPHROLOGY | Facility: CLINIC | Age: 64
End: 2024-05-29

## 2024-05-29 ENCOUNTER — TELEPHONE (OUTPATIENT)
Dept: PULMONOLOGY | Facility: CLINIC | Age: 64
End: 2024-05-29

## 2024-05-29 ENCOUNTER — TELEPHONE (OUTPATIENT)
Dept: NEPHROLOGY | Facility: CLINIC | Age: 64
End: 2024-05-29

## 2024-05-29 DIAGNOSIS — N18.31 STAGE 3A CHRONIC KIDNEY DISEASE (HCC): Primary | ICD-10-CM

## 2024-05-29 NOTE — TELEPHONE ENCOUNTER
Patient was updated on his lab results. He wanted to know if he is supposed to hold the spironolactone, or is he to decrease it to the 12.5? Also he will go for his Bmp tomorrow, but the lab needs to be put into the system.

## 2024-05-29 NOTE — TELEPHONE ENCOUNTER
----- Message from Ja Nunez MD sent at 5/28/2024  4:57 PM EDT -----  Hello    Patient normally is followed up by Ms Fadumo Ridley.     I tried to call the patient now but no answer.  Can you please let the patient know that the creatinine is stable 1.6.  Potassium was higher at 5.8  -Please asked patient to follow low potassium diet and please send him high potassium foods to avoid list and low potassium foods that are okay to take a list  - On the voicemail I asked him to hold spironolactone until we have a chance to talk with him  -Can you please ask him to lower spironolactone to half a tablet a day.  12.5 mg a day and please adjust med list accordingly  - Please asked him to repeat a BMP this Thursday  - Patient is overdue to see us.  Can you please schedule with next available with me or advanced practitioner    Thank you    np

## 2024-05-29 NOTE — TELEPHONE ENCOUNTER
Spoke to pt, informed him with results and recommendations. Pt agreeable to go for blood work tomorrow, potassium diet mailed and also went over the list with pt over the phone. As for scheduling an appointment will call pt back tomorrow as he was driving at the time we spoke. Med list up to date.

## 2024-05-29 NOTE — TELEPHONE ENCOUNTER
Pt called in due to a missed call. I scheduled pt for a follow up in September and he is also on a wait list. I also transferred pt for results. Pt is aware and confirmed.

## 2024-05-30 ENCOUNTER — TELEPHONE (OUTPATIENT)
Dept: NEPHROLOGY | Facility: CLINIC | Age: 64
End: 2024-05-30

## 2024-05-30 ENCOUNTER — LAB (OUTPATIENT)
Dept: LAB | Facility: CLINIC | Age: 64
End: 2024-05-30
Payer: COMMERCIAL

## 2024-05-30 DIAGNOSIS — E11.22 TYPE 2 DIABETES MELLITUS WITH STAGE 3B CHRONIC KIDNEY DISEASE, WITHOUT LONG-TERM CURRENT USE OF INSULIN (HCC): Primary | ICD-10-CM

## 2024-05-30 DIAGNOSIS — N18.32 TYPE 2 DIABETES MELLITUS WITH STAGE 3B CHRONIC KIDNEY DISEASE, WITHOUT LONG-TERM CURRENT USE OF INSULIN (HCC): Primary | ICD-10-CM

## 2024-05-30 DIAGNOSIS — N18.31 STAGE 3A CHRONIC KIDNEY DISEASE (HCC): ICD-10-CM

## 2024-05-30 DIAGNOSIS — N18.31 STAGE 3A CHRONIC KIDNEY DISEASE (HCC): Primary | ICD-10-CM

## 2024-05-30 LAB
ANION GAP SERPL CALCULATED.3IONS-SCNC: 6 MMOL/L (ref 4–13)
BUN SERPL-MCNC: 41 MG/DL (ref 5–25)
CALCIUM SERPL-MCNC: 9.4 MG/DL (ref 8.4–10.2)
CHLORIDE SERPL-SCNC: 105 MMOL/L (ref 96–108)
CO2 SERPL-SCNC: 23 MMOL/L (ref 21–32)
CREAT SERPL-MCNC: 2.05 MG/DL (ref 0.6–1.3)
GFR SERPL CREATININE-BSD FRML MDRD: 33 ML/MIN/1.73SQ M
GLUCOSE P FAST SERPL-MCNC: 123 MG/DL (ref 65–99)
POTASSIUM SERPL-SCNC: 5.5 MMOL/L (ref 3.5–5.3)
SODIUM SERPL-SCNC: 134 MMOL/L (ref 135–147)

## 2024-05-30 PROCEDURE — 80048 BASIC METABOLIC PNL TOTAL CA: CPT

## 2024-05-30 PROCEDURE — 36415 COLL VENOUS BLD VENIPUNCTURE: CPT

## 2024-05-30 RX ORDER — BLOOD-GLUCOSE METER
KIT MISCELLANEOUS
Qty: 100 EACH | Refills: 0 | Status: SHIPPED | OUTPATIENT
Start: 2024-05-30 | End: 2024-06-03 | Stop reason: SDUPTHER

## 2024-05-30 RX ORDER — LANCETS 28 GAUGE
EACH MISCELLANEOUS
Qty: 100 EACH | Refills: 0 | Status: SHIPPED | OUTPATIENT
Start: 2024-05-30

## 2024-05-30 NOTE — TELEPHONE ENCOUNTER
----- Message from Ja Nunez MD sent at 5/30/2024 12:40 PM EDT -----  Hello    Patient normally is followed up by Ms Fadumo Ridley.     MA team - I tried to call the patient just now.  No answer.  Can you please call the patient to review lab work.  Creatinine is still rising 2.05 but potassium is slightly better at 5.5.  It looks like he was started on Ozempic 2 weeks ago.  Ozempic can sometimes raise the creatinine.  - Please advise him to continue low potassium diet  - Continue spironolactone at half dose that we adjusted last week  - Please ask him to hold Ozempic temporarily for now and repeat the BMP again in 1 week.  This is to see if Ozempic was contributing to NORRIS.    Dr Hutton -we are temporarily holding Ozempic due to NORRIS and rechecking lab work.    Thank you    np

## 2024-05-30 NOTE — RESULT ENCOUNTER NOTE
Hello    Patient normally is followed up by Ms Fadumo Ridley.     MA team - I tried to call the patient just now.  No answer.  Can you please call the patient to review lab work.  Creatinine is still rising 2.05 but potassium is slightly better at 5.5.  It looks like he was started on Ozempic 2 weeks ago.  Ozempic can sometimes raise the creatinine.  - Please advise him to continue low potassium diet  - Continue spironolactone at half dose that we adjusted last week  - Please ask him to hold Ozempic temporarily for now and repeat the BMP again in 1 week.  This is to see if Ozempic was contributing to NORRIS.    Dr Hutton -we are temporarily holding Ozempic due to NORRIS and rechecking lab work.    Thank you    np

## 2024-05-30 NOTE — TELEPHONE ENCOUNTER
Left a detailed message for pt. Lab order in the system, asked that he calls office to confirm that message was received and with any questions.

## 2024-06-01 RX ORDER — KETOCONAZOLE 20 MG/G
CREAM TOPICAL DAILY
Qty: 15 G | Refills: 0 | OUTPATIENT
Start: 2024-06-01

## 2024-06-03 DIAGNOSIS — N18.32 TYPE 2 DIABETES MELLITUS WITH STAGE 3B CHRONIC KIDNEY DISEASE, WITHOUT LONG-TERM CURRENT USE OF INSULIN (HCC): ICD-10-CM

## 2024-06-03 DIAGNOSIS — E11.22 TYPE 2 DIABETES MELLITUS WITH STAGE 3B CHRONIC KIDNEY DISEASE, WITHOUT LONG-TERM CURRENT USE OF INSULIN (HCC): ICD-10-CM

## 2024-06-03 RX ORDER — BLOOD-GLUCOSE METER
1 KIT MISCELLANEOUS DAILY
Qty: 100 EACH | Refills: 0 | Status: SHIPPED | OUTPATIENT
Start: 2024-06-03

## 2024-06-05 ENCOUNTER — OFFICE VISIT (OUTPATIENT)
Dept: BARIATRICS | Facility: CLINIC | Age: 64
End: 2024-06-05
Payer: COMMERCIAL

## 2024-06-05 VITALS
HEART RATE: 68 BPM | HEIGHT: 69 IN | OXYGEN SATURATION: 97 % | TEMPERATURE: 98.6 F | WEIGHT: 315 LBS | DIASTOLIC BLOOD PRESSURE: 78 MMHG | SYSTOLIC BLOOD PRESSURE: 120 MMHG | BODY MASS INDEX: 46.65 KG/M2

## 2024-06-05 DIAGNOSIS — E66.01 MORBID OBESITY WITH BMI OF 45.0-49.9, ADULT (HCC): Primary | ICD-10-CM

## 2024-06-05 DIAGNOSIS — N18.32 TYPE 2 DIABETES MELLITUS WITH STAGE 3B CHRONIC KIDNEY DISEASE, WITHOUT LONG-TERM CURRENT USE OF INSULIN (HCC): ICD-10-CM

## 2024-06-05 DIAGNOSIS — I10 ESSENTIAL HYPERTENSION: ICD-10-CM

## 2024-06-05 DIAGNOSIS — I26.99 PULMONARY EMBOLISM, UNSPECIFIED CHRONICITY, UNSPECIFIED PULMONARY EMBOLISM TYPE, UNSPECIFIED WHETHER ACUTE COR PULMONALE PRESENT (HCC): ICD-10-CM

## 2024-06-05 DIAGNOSIS — E11.22 TYPE 2 DIABETES MELLITUS WITH STAGE 3B CHRONIC KIDNEY DISEASE, WITHOUT LONG-TERM CURRENT USE OF INSULIN (HCC): ICD-10-CM

## 2024-06-05 DIAGNOSIS — G47.33 OSA (OBSTRUCTIVE SLEEP APNEA): ICD-10-CM

## 2024-06-05 DIAGNOSIS — N18.32 STAGE 3B CHRONIC KIDNEY DISEASE (HCC): ICD-10-CM

## 2024-06-05 PROCEDURE — 99214 OFFICE O/P EST MOD 30 MIN: CPT | Performed by: SURGERY

## 2024-06-05 NOTE — PROGRESS NOTES
"- Height/Weight:  69.0\" / 329.0 (declined to remove shoes)  - BMI:  48.6  - Medical conditions related to obesity: IMELDA (CPAP ordered 5/25); diabetic; HTN (4 meds); hyperlipidemia  - Does the patient smoke/vape (nicotine, marijuana, hookah, etc): no  - last cigar \"at least 2 months ago\"  - StopBANG: diagnosed IMELDA (CPAP ordered 5/25)    - Does the patient currently take a weight loss medication: yes   "

## 2024-06-05 NOTE — PROGRESS NOTES
BARIATRIC INITIAL CONSULT - BARIATRIC SURGERY    Nasir Story Jr. 63 y.o. male MRN: [id number]  Unit/Bed#:  Encounter: [id number]      HPI:  Nasir Story Jr. is a very pleasant 63 y.o. male who presents with a longstanding history of morbid obesity and inability to sustain a meaningful weight loss.    Here today to discuss bariatric options. He is a former  from [place] who currently resides in [place].   Body mass index is 48.58 kg/m².    ++Suffers from HTN, OA knees, HLD, CKD, chronic back pain, IMELDA on CPAP, DM2  S/p lumbar sx  **PE 2023 on eliquis    Visit type: consultation     Symptoms: excess weight, weight increase, inability to loss weight, fatigue, and joint pain    Associated Symptoms: depressed mood    Associated Conditions: glucose intolerance, hyperlipidemia, sleep apnea, abdominal obesity, and hypergycemia  Disease Complications: diabetes, hypertension, sleep apnea, osteoarthritis, and thromboembolism  Weight Loss Interest: high  Previous Diet Trials: low carb    Exercise Frequency:infrequency  Types of Exercise: walking    Historical Information   Past Medical History:   Diagnosis Date    Acne     Anxiety     Arthritis     Back pain     Chronic pain     Depression 06/07/2021    Diabetes mellitus ([facility])     6/19/15 A1C: 5.5%    Eczema     Grief reaction 09/10/2019    Hyperlipidemia     Hypertension     Knee pain     Morbid obesity with BMI of 50.0-59.9, adult (HCC)     IMELDA on CPAP     Psychiatric disorder     Skin tag     Type 2 diabetes mellitus without complication ([facility]) 07/16/2014    Urinary retention      Past Surgical History:   Procedure Laterality Date    BACK SURGERY      LUMBAR FUSION  2002    LUMBAR FUSION  2004    L4-5-6, S1 has pump for narcotics     Social History   Social History     Substance and Sexual Activity   Alcohol Use Not Currently    Comment: occasional     Social History     Substance and Sexual Activity   Drug Use No    Comment: History of drug use,  "meena, cocaine, heroind, no IVDA-as per [employer]     Social History     Tobacco Use   Smoking Status Former    Types: Cigars    Start date:     Quit date: 4/15/2024    Years since quittin.1    Passive exposure: Current   Smokeless Tobacco Never   Tobacco Comments    Quit cigarette smoking in .     Patient reports that he last smoked a cigar close to 2 months ago.      Family History: non-contributory    Meds/Allergies   all medications and allergies reviewed  Allergies   Allergen Reactions    Gabapentin      Annotation - [id number]: dizziness    Pregabalin      Annotation - [id number]: vision changes and mood changes    Tramadol Nausea Only       Objective     Current Vitals:   /78 (BP Location: Left arm, Patient Position: Sitting, Cuff Size: Large)   Pulse 68   Temp 98.6 °F (37 °C) (Tympanic)   Ht 5' 9\" (1.753 m)   Wt (!) 149 kg (329 lb)   SpO2 97%   BMI 48.58 kg/m²       Physical Exam  Vitals reviewed.   Constitutional:       Appearance: He is well-developed.   HENT:      Head: Normocephalic.   Eyes:      Extraocular Movements: Extraocular movements intact.   Cardiovascular:      Rate and Rhythm: Normal rate.   Pulmonary:      Effort: Pulmonary effort is normal.   Abdominal:      General: There is no distension.   Musculoskeletal:         General: Normal range of motion.      Cervical back: Normal range of motion.   Neurological:      Mental Status: He is alert and oriented to person, place, and time.   Psychiatric:         Mood and Affect: Mood normal.         Behavior: Behavior normal.         Thought Content: Thought content normal.         Judgment: Judgment normal.         Lab Results: I have personally reviewed pertinent lab results.  , BMP, TSH, lipid panel, hemoglobin A1C, hepatic function panel  EKG, Pathology, and Other Studies: I have personally reviewed pertinent reports.   and of the cardiac echo in       Assessment/PLAN:    63 y.o. yo male with a long standing h/o of " obesity and inability to sustain any meaningful weight loss on his own despite several attempts.    He is interested in the Laparoscopic didi-en-y gastric bypass.    ++Suffers from HTN, OA knees, HLD, CKD, chronic back pain, IMELDA on CPAP, DM2  S/p lumbar sx  **PE 2023 on eliquis    I have explained our Enhanced Recovery After Bariatric Surgery (ERABS) protocol and benefits including preoperative, intraoperative and postoperative elements.     As a part of his pre op process, he will undergo evaluation appointments with out dietician, licensed care , and . After the evaluation, he may be referred to a cardiologist     He needs an EGD to evaluate the anatomy of his GI tract.    I have spent a total time of 35 minutes on 06/05/24 in caring for this patient including Risks and benefits of tx options, Instructions for management, Patient and family education, Importance of tx compliance, Risk factor reductions, Impressions, Counseling / Coordination of care, Documenting in the medical record, Reviewing / ordering tests, medicine, procedures  , and Obtaining or reviewing history  .    I have discussed and educated the patient with regards to the components of our multidisciplinary program and the importance of compliance and follow up in the post operative period.    He was given the opportunity to ask questions and I have answered all of them.    The patient was also instructed with regards to the importance of behavior modification, nutritional counseling, support meeting attendance and lifestyle changes that are important to ensure success.    Although there is a great statistical chance of improvement or even resolution of most of his associated comorbidities, the results vary from patient to patient and they largely depend on his commitment and compliance.       ALFREDO Estrada MD, FACS, [facility]  6/5/2024  2:58 PM

## 2024-06-11 ENCOUNTER — TELEPHONE (OUTPATIENT)
Age: 64
End: 2024-06-11

## 2024-06-11 ENCOUNTER — TELEPHONE (OUTPATIENT)
Dept: NEPHROLOGY | Facility: CLINIC | Age: 64
End: 2024-06-11

## 2024-06-11 ENCOUNTER — CLINICAL SUPPORT (OUTPATIENT)
Dept: BARIATRICS | Facility: CLINIC | Age: 64
End: 2024-06-11

## 2024-06-11 DIAGNOSIS — E66.01 MORBID (SEVERE) OBESITY DUE TO EXCESS CALORIES (HCC): Primary | ICD-10-CM

## 2024-06-11 DIAGNOSIS — E66.01 MORBID OBESITY WITH BMI OF 45.0-49.9, ADULT (HCC): Primary | ICD-10-CM

## 2024-06-11 PROCEDURE — RECHECK

## 2024-06-11 NOTE — TELEPHONE ENCOUNTER
Pt called, he needs a new bariatrics referral to be placed into his chart. Bariatrics told him his old one has  and they need a new one from his pcp.  Please call when complete so he can let them know. Okay to leave a VM for him.

## 2024-06-11 NOTE — PROGRESS NOTES
Spoke to patient on the phone:    Patient is interested in the bariatric surgical process. Patient qualifies for surgery with current comorbidities and BMI. Discussed the entire surgical workup process and all requirements of [facility] program and patient's insurance. Answered all questions at the time of the phone call. All SW/RD questions redirected to the next appointment, the 2-hour evaluation.    Patient has been informed to contact insurance to verify there is Bariatric surgery  coverage written on the policy prior next appointment. Also to discuss of any out of pocket expense if insurance does not cover at 100%      Patient verbalized understanding of the surgical process at [facility] and had no further questions at this time.

## 2024-06-12 DIAGNOSIS — E66.01 MORBID OBESITY WITH BMI OF 45.0-49.9, ADULT (HCC): Primary | ICD-10-CM

## 2024-06-21 ENCOUNTER — PATIENT MESSAGE (OUTPATIENT)
Dept: SLEEP CENTER | Facility: CLINIC | Age: 64
End: 2024-06-21

## 2024-06-21 DIAGNOSIS — N18.32 TYPE 2 DIABETES MELLITUS WITH STAGE 3B CHRONIC KIDNEY DISEASE, WITHOUT LONG-TERM CURRENT USE OF INSULIN (HCC): ICD-10-CM

## 2024-06-21 DIAGNOSIS — E11.22 TYPE 2 DIABETES MELLITUS WITH STAGE 3B CHRONIC KIDNEY DISEASE, WITHOUT LONG-TERM CURRENT USE OF INSULIN (HCC): ICD-10-CM

## 2024-06-21 PROBLEM — Z12.11 SCREENING FOR COLON CANCER: Status: RESOLVED | Noted: 2024-05-22 | Resolved: 2024-06-21

## 2024-06-24 ENCOUNTER — CLINICAL SUPPORT (OUTPATIENT)
Dept: BARIATRICS | Facility: CLINIC | Age: 64
End: 2024-06-24

## 2024-06-24 VITALS
TEMPERATURE: 96.1 F | RESPIRATION RATE: 16 BRPM | DIASTOLIC BLOOD PRESSURE: 70 MMHG | WEIGHT: 315 LBS | BODY MASS INDEX: 46.65 KG/M2 | HEART RATE: 77 BPM | SYSTOLIC BLOOD PRESSURE: 112 MMHG | HEIGHT: 69 IN

## 2024-06-24 DIAGNOSIS — R63.5 ABNORMAL WEIGHT GAIN: Primary | ICD-10-CM

## 2024-06-24 DIAGNOSIS — Z12.11 SCREENING FOR COLON CANCER: ICD-10-CM

## 2024-06-24 PROCEDURE — RECHECK

## 2024-06-24 NOTE — PROGRESS NOTES
Patient last visit weight:329lb  Patient current visit weight:323.8lb    If you are taking phentermine or other oral weight loss medications, are you experiencing any of the following symptoms:  Headache:   Blurred Vision:   Chest Pain:   Palpitations:  Insomnia:   SPECIFY ORAL MEDICATION AND DOSAGE:     If you are taking an injectable medication,  are you experiencing any of the following symptoms:  Bloating: NO  Nausea:NO  Vomiting: NO  Constipation: NO  Diarrhea:NO  SPECIFY INJECTABLE MEDICATION AND CURRENT DOSAGE: OZEMPIC      Vitals:    Is BP less than 100/60?NO  Is BP greater than 140/90?NO  Is HR greater than 100?NO  **If yes to any of the above, have patient relax and repeat in 5-10 minutes**    Repeat values:    Is BP less than 100/60?  Is BP greater than 140/90?  Is HR greater than 100?  **If values remain outside of ranges above, please consult provider for next steps**

## 2024-06-27 LAB

## 2024-07-01 ENCOUNTER — TELEPHONE (OUTPATIENT)
Dept: GASTROENTEROLOGY | Facility: CLINIC | Age: 64
End: 2024-07-01

## 2024-07-01 LAB
DME PARACHUTE DELIVERY DATE EXPECTED: NORMAL
DME PARACHUTE DELIVERY DATE REQUESTED: NORMAL
DME PARACHUTE ITEM DESCRIPTION: NORMAL
DME PARACHUTE ORDER STATUS: NORMAL
DME PARACHUTE SUPPLIER NAME: NORMAL
DME PARACHUTE SUPPLIER PHONE: NORMAL

## 2024-07-01 NOTE — TELEPHONE ENCOUNTER
Called and left a message for patient stating that we needed to cancel his appt for 7/5 due to a change in the provider's schedule and to call back asap to reschedule appt   The patient is a 71y Female complaining of multiple medical complaints.

## 2024-07-08 NOTE — TELEPHONE ENCOUNTER
As pt did not return my calls, message left on his voicemail about taking Cardura 3-4 hours apart from Aman, per Dr Amando Lan 
Hello    Can we see if the patient is taking Cialis regularly? This can interact with the doxazosin  Can you please let me know      Thank you    np
Hello    Thank you    As long as he is asymptomatic it is ok (symptoms would be severe dizziness, lightheadedness with cialis and cardura together), and he should be advised to take cardura 3-4 hours apart from cialis    Thank you    silvina
Pt called back  He states he is taking Cialis, possibly once a week, sometimes longer than a week 
verbal cues/1 person assist

## 2024-07-09 ENCOUNTER — CLINICAL SUPPORT (OUTPATIENT)
Dept: BARIATRICS | Facility: CLINIC | Age: 64
End: 2024-07-09

## 2024-07-09 VITALS — HEIGHT: 69 IN | WEIGHT: 315 LBS | BODY MASS INDEX: 46.65 KG/M2

## 2024-07-09 VITALS — WEIGHT: 315 LBS | BODY MASS INDEX: 47.42 KG/M2

## 2024-07-09 DIAGNOSIS — E66.01 MORBID OBESITY WITH BMI OF 45.0-49.9, ADULT (HCC): Primary | ICD-10-CM

## 2024-07-09 DIAGNOSIS — E66.01 MORBID OBESITY WITH BMI OF 45.0-49.9, ADULT (HCC): ICD-10-CM

## 2024-07-09 DIAGNOSIS — G47.33 OSA (OBSTRUCTIVE SLEEP APNEA): ICD-10-CM

## 2024-07-09 DIAGNOSIS — Z01.818 PRE-OPERATIVE CLEARANCE: ICD-10-CM

## 2024-07-09 DIAGNOSIS — F17.291 OTHER TOBACCO PRODUCT NICOTINE DEPENDENCE IN REMISSION: ICD-10-CM

## 2024-07-09 DIAGNOSIS — N18.32 STAGE 3B CHRONIC KIDNEY DISEASE (HCC): ICD-10-CM

## 2024-07-09 DIAGNOSIS — E11.22 TYPE 2 DIABETES MELLITUS WITH STAGE 3B CHRONIC KIDNEY DISEASE, WITHOUT LONG-TERM CURRENT USE OF INSULIN (HCC): ICD-10-CM

## 2024-07-09 DIAGNOSIS — I50.32 CHRONIC DIASTOLIC HEART FAILURE (HCC): ICD-10-CM

## 2024-07-09 DIAGNOSIS — N18.32 TYPE 2 DIABETES MELLITUS WITH STAGE 3B CHRONIC KIDNEY DISEASE, WITHOUT LONG-TERM CURRENT USE OF INSULIN (HCC): ICD-10-CM

## 2024-07-09 DIAGNOSIS — F33.40 MAJOR DEPRESSIVE DISORDER, RECURRENT, IN REMISSION, UNSPECIFIED (HCC): ICD-10-CM

## 2024-07-09 DIAGNOSIS — D64.9 ANEMIA, UNSPECIFIED TYPE: ICD-10-CM

## 2024-07-09 DIAGNOSIS — E78.5 HYPERLIPIDEMIA, UNSPECIFIED HYPERLIPIDEMIA TYPE: ICD-10-CM

## 2024-07-09 DIAGNOSIS — I10 ESSENTIAL HYPERTENSION: ICD-10-CM

## 2024-07-09 PROCEDURE — RECHECK

## 2024-07-09 PROCEDURE — RECHECK: Performed by: DIETITIAN, REGISTERED

## 2024-07-09 NOTE — PROGRESS NOTES
Bariatric Behavioral Health Evaluation      OZEMPIC  started in 2024     Presenting Problem:   .Nasir Story Jr.  is a 63 y.o.   male    :  1960     Patient presented with overall concerns of obesity.  Stated that weight has impacted quality of life and has current future concerns with lack of mobility, movement, chronic pain, and overall health.  Has attempted various weight loss plans in the past including restriction, portion control, healthier options, protein based plans.     Patient is Interested in exploring bariatric surgery.as an option for  weight loss goals.        Is the patient seeking Bariatric Surgery?   YES  Considered bariatric surgery a few years ago - but did not end with surgery.   Peers are post surgery with success.    PCP is supportive.  Has been referred by PCP previously.  Has done his own research on bariatric surgery.   Wants to improve his quality of life.     Current Barriers to Change: none noted     Realizes Post- Op Requirements? Yes, and will learn more meeting with the dietitians and social workers through the process     Pre-morbid level of function and history of present illness: bad knees, HTN, CKD, IMELDA (CPAP ordered and to be fitted), 2DM  Back pain (history of back surgery)      Stressors and Supports:  Grandchildren (19 and 4)  - considers them a source of support.   Family is located in [place].      Living situation:  Living in [place] with roommate.   Cooking for himself.  May eat with roommate.       Eating habits:  breakfast, may skip lunch and then dinner.   Protein options and vegetables.  History of [place] household with 15 courses.    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      Physical Activity: limited:  tries to walk (15 minutes ) as tolerated     Family History (medical, traditions, culture, rules/routines around food):     Mother  over weight. 2DM  siblings - overweight.     Mental Health, Trauma and Substance use  Assessment    Psychiatric/Psychological Treatment Diagnosis:   history of Depression (grief) and Anxiety  - around loss of son.  Does not feel Dx are current    History of Eating Disorder:   none noted:    did mention sweets were a trigger. (Pastry, cake, cookies ) - but now on Ozempic and cravings are reduced.         Outpatient Counselor Yes -  history of individual Grief therapy  after son's death.      Psychiatrist: No     Have you had any Mental Health Higher level of care (ED, PHP or Inpatient ) Treatment? No    Drug and/or Alcohol use and treatment history: history of drug use:  history of NA (for 20 years) --  stated no drug use in 30 yrs.     Have you had any Substance Use Treatment? Yes  -  inpt treatment and group and individual treatment therapy.  30 yrs ago.       Tobacco/Vaping History: last cigar was April 2024.  Was one cigar a day.  Patient agrees to remain nicotine free post surgery.        Risk Assessment    Identified support system intact.     Risk of harm to self or others:  none noted during evaluation  No HI/SI      Presence of Audio/Visual Hallucinations: Not reported during evaluation     Access to weapons: Not reported during evaluation     Observation: this interview only (SW and RD will follow Nasir Story Jr. through the bariatric program)     Based on the previous information, the client presents the following risk of harm to self or others:  Low risk        Physical/Mental Health Status:              Appearance: appropriate           Sociability: average           Affect: appropriate           Mood: calm           Thought Process: coherent           Speech: normal           Content: no impairment           Orientation: person  Yes , place  Yes , time  Yes , normal attention span  Yes , normal memory  Yes   and normal judgement  Yes            Insight: emotional  good       BARIATRIC SURGERY EDUCATION CHECKLIST    Patient has received the following education related to the bariatric  surgery process and understands:    Patients may be required to complete a psychiatric evaluation and receive clearance for surgery from mental health provider.    Patients who undergo weight loss surgery are at higher risk of increased mental health concerns and suicide attempts.    Patients may be required to complete a full substance abuse evaluation and then complete all treatment recommendations prior to surgery.    If diagnosis of abuse/dependence results, patient may be required to remain sober for one (1) year before having bariatric surgery.    Patients on psychiatric medications should check with their provider to discuss psychiatric medications and the changes in absorption.  Patient should discuss all time release medications with provider and take all medications as prescribed.    The recommendation is that there is no use of any tobacco products, Hookah or vapes for the bariatric post-operation patient.    Bariatric surgery patients should not consume alcohol as a post-operative patient as it may increase risk of numerous health conditions including but not limited to alcohol abuse and ulcers.    There is a possibility of weight regain if patient does not follow all program guidelines and recommendations.    Bariatric surgery patients should exercise thirty (30) to sixty (60) minutes per day to maintain post-surgical weight loss.    Research indicates that bariatric patients are more successful when they see a therapist for up to two (2) years post-op.    Patients will follow all medical and dietary recommendations provided.    Patient will keep all scheduled appointments and follow up with their physician for a minimum of five (5) years.    Patient will take all vitamins as recommended.  Post-operative vitamins are life-long.    There is a goal month set.  All requirements should be met by this time. Don't wait to get started!    There is a deadline month set.  All requirements must be finished by  this time and if not, the patient will be halted in the surgery process. The patient can be referred to the medical weight management program or can come back to the surgical program once the unfinished tasks from the previous program are completed.      Female patients of childbearing years are informed that pregnancy is not recommended until 12 - 18 months post-op.      Recommendations: Recommended for surgery  yes    Social Service Note:  Patient presented for behavioral health evaluation for the bariatric program.   Negative for Mental Health.    History of grief therapy.   No reported his of Psychiatry.   History of Drug and/or Alcohol abuse or treatment.  History of NA.  Last use over 30 yrs.   Recent tobacco use (quit April 2024)  Patient educated regarding the impact of nicotine and alcohol on the post surgery bariatric patient. Patient has a negative family history of tobacco and alcohol addiction.     Patient has not reported contraindications for bariatric surgery.  Patient will begin making changes with relationship with food through behavior modifications and mindful techniques.  Tracking food intake for one week every three months can assist with weight maintenance and self accountability for post surgery success.   and Dietitian follow up visits are available for pre and post surgery support.  Bariatric support group is available monthly.   Patient verbalized the ability to start making changes and create a healthy relationship around nutrition.     Patient meets criteria for surgery at this time and is referred to the bariatric surgeon.        Ruperto Diaz LCSW

## 2024-07-09 NOTE — PROGRESS NOTES
"Bariatric Nutrition Assessment Note    Type of surgery    Preop- interested in gastric  bypass   Surgery Date: expected 2024  No program requirement   Surgeon: Dr. Noé Glover    Nutrition Assessment   Nasir Story Jr.  63 y.o.  male     Wt with BMI of 25: 168.8  Pre-Op Excess Wt: 166.4  Ht 5' 9\" (1.753 m)   Wt (!) 146 kg (321 lb 1.6 oz)   BMI 47.42 kg/m²         [facility] Equation:    IBP=8757  Weight Maintenance 2699  Estimated calories for weight loss 8711-3928 ( 1-2# per wk wt loss - sedentary )  Estimated protein needs - grams per day (0.6- .8 grams / kg actual body weight  )  CKD stage 3 b   Estimated fluid needs 77-90 oz(30-35 ml/kg IBW )      Weight History   Onset of Obesity: Adult  Family history of obesity: Yes   Wt Loss Attempts: Counseling with  MD  Previous consult with surgeon - but son  and he did not get the surgery   Fasting, healthy eating, gym/ exercise until knee pain , meal replacements   Patient has tried the above for 6 months or more with insufficient weight loss or weight regain, which is why patient has requested to be evaluated for weight loss surgery today  Maximum Wt Lost: 20 to 30 pounds with going to the gym and eating healthy     NAFLD Fibrosis Score is: .43    NAFLD Score Correlated Fibrosis Severity   <-1.455 F0-F2   -1.455-0.676 Indeterminate Score   >0.676 F3-F4   **Fibrosis Severity Scale: F0 = no fibrosis; F1= mild fibrosis; F2 = moderate fibrosis; F3 = severe fibrosis; F4 = cirrhosis    NAFLD Score Component Values:  Component Value Date   Age: 63 y.o.     BMI: 47.82 kg/m²    IFG or DM: Yes    AST: 10 U/L 2024   ALT: 12 U/L 2024   Platelet: 293 Thousands/uL 2024   Albumin: 4.4 g/dL 2024      Pt has been taking Ozempic which has decreased his appetite and provided early satiety.  He reports eating much smaller portions than usual     Review of History and Medications   Past Medical History:   Diagnosis Date    Acne     Anxiety  "    Arthritis     Back pain     Chronic pain     Depression 2021    Diabetes mellitus ([facility])     6/19/15 A1C: 5.5%    Eczema     Grief reaction 09/10/2019    Hyperlipidemia     Hypertension     Knee pain     Morbid obesity with BMI of 50.0-59.9, adult ([facility])     IMELDA on CPAP     Psychiatric disorder     Skin tag     Type 2 diabetes mellitus without complication ([facility]) 2014    Urinary retention      Past Surgical History:   Procedure Laterality Date    BACK SURGERY      LUMBAR FUSION  2002    LUMBAR FUSION  2004    L4-5-6, S1 has pump for narcotics     Social History     Socioeconomic History    Marital status: Legally      Spouse name: Not on file    Number of children: Not on file    Years of education: Not on file    Highest education level: Not on file   Occupational History    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      Patient reports that he last smoked a cigar close to 2 months ago.    Vaping Use    Vaping status: Never Used   Substance and Sexual Activity    Alcohol use: Not Currently     Comment: occasional    Drug use: No     Comment: History of drug use, meena, cocaine, heroind, no IVDA-as per [employer]    Sexual activity: Yes     Partners: Female     Birth control/protection: Condom Male   Other Topics Concern    Not on file   Social History Narrative    Patient lives with his ex wife.      Social Determinants of Health     Financial Resource Strain: Low Risk  (10/15/2023)    Overall Financial Resource Strain (CARDIA)     Difficulty of Paying Living Expenses: Not very hard   Food Insecurity: Not on file   Transportation Needs: No Transportation Needs (10/15/2023)    PRAPARE - Transportation     Lack of Transportation  (Medical): No     Lack of Transportation (Non-Medical): No   Physical Activity: Not on file   Stress: Not on file   Social Connections: Not on file   Intimate Partner Violence: Not on file   Housing Stability: Not on file       Current Outpatient Medications:     amLODIPine (NORVASC) 10 mg tablet, Take 1 tablet (10 mg total) by mouth daily, Disp: 90 tablet, Rfl: 3    apixaban (Eliquis) 5 mg, TAKE ONE TABLET BY MOUTH TWICE A DAY, Disp: 60 tablet, Rfl: 5    atorvastatin (LIPITOR) 20 mg tablet, TAKE ONE TABLET BY MOUTH EVERY DAY, Disp: 90 tablet, Rfl: 1    chlorthalidone 25 mg tablet, Take 1 tablet (25 mg total) by mouth daily, Disp: 90 tablet, Rfl: 3    DULoxetine HCl 60 MG CSDR, 60 mg (Patient not taking: Reported on 6/5/2024), Disp: , Rfl:     glucose blood (FREESTYLE LITE) test strip, Use 1 each in the morning, Disp: 100 each, Rfl: 0    hydrocortisone 2.5 % cream, Apply topically to face BID for no longer than 7 days for flares (Patient not taking: Reported on 6/5/2024), Disp: 30 g, Rfl: 0    Lancets (freestyle) lancets, Use as instructed, Disp: 100 each, Rfl: 0    losartan (COZAAR) 100 MG tablet, Take 1 tablet (100 mg total) by mouth daily, Disp: 90 tablet, Rfl: 3    metoprolol tartrate (LOPRESSOR) 100 mg tablet, Take 1 tablet (100 mg total) by mouth 2 (two) times a day, Disp: 180 tablet, Rfl: 3    morphine (MS CONTIN) 15 mg 12 hr tablet, as needed, Disp: , Rfl: 0    oxyCODONE (ROXICODONE) 30 MG immediate release tablet, as needed, Disp: , Rfl: 0    semaglutide, 0.25 or 0.5 mg/dose, (Ozempic, 0.25 or 0.5 MG/DOSE,) 2 mg/3 mL injection pen, Inject 0.75 mL (0.5 mg total) under the skin every 7 days, Disp: 3 mL, Rfl: 0    spironolactone (ALDACTONE) 25 mg tablet, Take 1 tablet (25 mg total) by mouth daily (Patient taking differently: Take 12.5 mg by mouth daily), Disp: 90 tablet, Rfl: 3    tadalafil (CIALIS) 5 MG tablet, Take 5 mg by mouth as needed for erectile dysfunction (unsure dosage), Disp: , Rfl:     tamsulosin  (FLOMAX) 0.4 mg, Take 1 capsule (0.4 mg total) by mouth daily with dinner (Patient not taking: Reported on 6/5/2024), Disp: 90 capsule, Rfl: 3    Food Intake and Lifestyle Assessment   Food Intake Assessment completed via usual diet recall  Breakfast: 10 am half of bagel with coffee or 2 boiled eggs and coffee - four times per week   Snack: 0   Lunch: 0  Snack: 0  Dinner: 5 to 7 pm   Chicken, fish, beef, pork , salads and vegetables and starch   Snack: 0  Beverage intake: water  and coffee/tea  Protein supplement: not currently , has in the past   Estimated protein intake per day: ~60-70 GRAMS   Estimated fluid intake per day: 10 bottles per day of water   Meals eaten away from home: rarely   Typical meal pattern: 2 meals per day and 0 snacks per day  Eating Behaviors: Large portion sizes  Food allergies or intolerances:   Allergies   Allergen Reactions    Gabapentin      Annotation - [id number]: dizziness    Pregabalin      Annotation - [id number]: vision changes and mood changes    Tramadol Nausea Only     Cultural or Restorationist considerations: Maltese     Physical Assessment  Physical Activity  Types of exercise: Walking  Mows his lawn, gardens housework   Current physical limitations: knee pain     Psychosocial Assessment   Support systems: family and   Socioeconomic factors: lives with x wife     Nutrition Diagnosis  Diagnosis: Overweight / Obesity (NC-3.3)  Related to: Physical inactivity and Excessive energy intake  As Evidenced by: BMI >25 and Unintentional weight gain     Nutrition Prescription: Recommend the following diet  Per nephrology - pt is on low potassium diet   Protein 90 grams per day ( 0.6 grams /kg actual body weight )  Low sodium   3513-5916 calories per day / 90 grams protein       Interventions and Teaching   Discussed pre-op and post-op nutrition guidelines.       Patient educated and handouts provided.  Surgical changes to stomach / GI  Capacity of post-surgery stomach  Diet  "progression  Adequate hydration  Sugar and fat restriction to decrease \"dumping syndrome\"  Fat restriction to decrease steatorrhea  Expected weight loss  Weight loss plateaus/ possibility of weight regain  Exercise  Suggestions for pre-op diet  Nutrition considerations after surgery  Protein supplements  Meal planning and preparation  Appropriate carbohydrate, protein, and fat intake, and food/fluid choices to maximize safe weight loss, nutrient intake, and tolerance   Dietary and lifestyle changes  Possible problems with poor eating habits  Intuitive eating  Techniques for self monitoring and keeping daily food journal  Potential for food intolerance after surgery, and ways to deal with them including: lactose intolerance, nausea, reflux, vomiting, diarrhea, food intolerance, appetite changes, gas  Vitamin / Mineral supplementation of Multivitamin with minerals and Vitamin D    Pt with CKD stage 3 b- will need renal input prior to surgery - consult placed   Has previous vitamin D deficiency but took loading dose   Nephrology is monitoring.     Education provided to: patient    Barriers to learning: No barriers identified    Readiness to change: preparation    Prior research on procedure: discussed with provider and friends or family    Comprehension: needs reinforcement and verbalizes understanding     Expected Compliance: good  Recommendations  Pt is an appropriate candidate for surgery. Yes  Pt should make the following changes and then be reassessed for weight loss surgery:   Pt needs to be nicotine free prior to surgery.  Lab slip provided.   Evaluation / Monitoring  Dietitian to Monitor: Eating pattern as discussed Tolerance of nutrition prescription Body weight Lab values Physical activity Bowel pattern    Goals  Complete lession plans 1-6, Eat 3 meals per day, and Eliminate mindless snacking  Limit potassium and sodium intake ( provided with handouts)  Food log. 1538-4751 calories per day - monitor sodium " intake.   Drink protein shake for lunch       Time Spent:   1 Hour

## 2024-07-18 LAB

## 2024-07-19 ENCOUNTER — CONSULT (OUTPATIENT)
Dept: GASTROENTEROLOGY | Facility: CLINIC | Age: 64
End: 2024-07-19
Payer: COMMERCIAL

## 2024-07-19 ENCOUNTER — TELEPHONE (OUTPATIENT)
Age: 64
End: 2024-07-19

## 2024-07-19 ENCOUNTER — APPOINTMENT (OUTPATIENT)
Dept: LAB | Facility: CLINIC | Age: 64
End: 2024-07-19
Payer: COMMERCIAL

## 2024-07-19 VITALS
HEIGHT: 69 IN | TEMPERATURE: 97.5 F | WEIGHT: 315 LBS | DIASTOLIC BLOOD PRESSURE: 70 MMHG | SYSTOLIC BLOOD PRESSURE: 126 MMHG | BODY MASS INDEX: 46.65 KG/M2

## 2024-07-19 DIAGNOSIS — D64.9 ANEMIA, UNSPECIFIED TYPE: ICD-10-CM

## 2024-07-19 DIAGNOSIS — N18.31 STAGE 3A CHRONIC KIDNEY DISEASE (HCC): ICD-10-CM

## 2024-07-19 DIAGNOSIS — E11.22 TYPE 2 DIABETES MELLITUS WITH STAGE 3B CHRONIC KIDNEY DISEASE, WITHOUT LONG-TERM CURRENT USE OF INSULIN (HCC): ICD-10-CM

## 2024-07-19 DIAGNOSIS — I10 ESSENTIAL HYPERTENSION: ICD-10-CM

## 2024-07-19 DIAGNOSIS — Z12.11 SCREENING FOR COLON CANCER: ICD-10-CM

## 2024-07-19 DIAGNOSIS — N18.32 STAGE 3B CHRONIC KIDNEY DISEASE (HCC): ICD-10-CM

## 2024-07-19 DIAGNOSIS — E66.01 MORBID OBESITY WITH BMI OF 45.0-49.9, ADULT (HCC): ICD-10-CM

## 2024-07-19 DIAGNOSIS — N17.9 AKI (ACUTE KIDNEY INJURY) (HCC): ICD-10-CM

## 2024-07-19 DIAGNOSIS — E78.5 HYPERLIPIDEMIA, UNSPECIFIED HYPERLIPIDEMIA TYPE: ICD-10-CM

## 2024-07-19 DIAGNOSIS — G47.33 OSA (OBSTRUCTIVE SLEEP APNEA): ICD-10-CM

## 2024-07-19 DIAGNOSIS — E87.5 HYPERKALEMIA: ICD-10-CM

## 2024-07-19 DIAGNOSIS — E11.22 TYPE 2 DIABETES MELLITUS WITH STAGE 3B CHRONIC KIDNEY DISEASE, WITHOUT LONG-TERM CURRENT USE OF INSULIN (HCC): Primary | ICD-10-CM

## 2024-07-19 DIAGNOSIS — I50.32 CHRONIC DIASTOLIC HEART FAILURE (HCC): ICD-10-CM

## 2024-07-19 DIAGNOSIS — N18.32 TYPE 2 DIABETES MELLITUS WITH STAGE 3B CHRONIC KIDNEY DISEASE, WITHOUT LONG-TERM CURRENT USE OF INSULIN (HCC): Primary | ICD-10-CM

## 2024-07-19 DIAGNOSIS — I26.99 OTHER PULMONARY EMBOLISM WITHOUT ACUTE COR PULMONALE, UNSPECIFIED CHRONICITY (HCC): ICD-10-CM

## 2024-07-19 DIAGNOSIS — F33.40 MAJOR DEPRESSIVE DISORDER, RECURRENT, IN REMISSION, UNSPECIFIED (HCC): ICD-10-CM

## 2024-07-19 DIAGNOSIS — Z01.818 PRE-OPERATIVE CLEARANCE: ICD-10-CM

## 2024-07-19 DIAGNOSIS — F17.291 OTHER TOBACCO PRODUCT NICOTINE DEPENDENCE IN REMISSION: ICD-10-CM

## 2024-07-19 DIAGNOSIS — N18.32 TYPE 2 DIABETES MELLITUS WITH STAGE 3B CHRONIC KIDNEY DISEASE, WITHOUT LONG-TERM CURRENT USE OF INSULIN (HCC): ICD-10-CM

## 2024-07-19 DIAGNOSIS — K59.09 CHRONIC CONSTIPATION: Primary | ICD-10-CM

## 2024-07-19 LAB
ANION GAP SERPL CALCULATED.3IONS-SCNC: 9 MMOL/L (ref 4–13)
BUN SERPL-MCNC: 35 MG/DL (ref 5–25)
CALCIUM SERPL-MCNC: 9.4 MG/DL (ref 8.4–10.2)
CHLORIDE SERPL-SCNC: 105 MMOL/L (ref 96–108)
CO2 SERPL-SCNC: 23 MMOL/L (ref 21–32)
CREAT SERPL-MCNC: 2.13 MG/DL (ref 0.6–1.3)
EST. AVERAGE GLUCOSE BLD GHB EST-MCNC: 128 MG/DL
GFR SERPL CREATININE-BSD FRML MDRD: 31 ML/MIN/1.73SQ M
GLUCOSE SERPL-MCNC: 65 MG/DL (ref 65–140)
HBA1C MFR BLD: 6.1 %
POTASSIUM SERPL-SCNC: 4.4 MMOL/L (ref 3.5–5.3)
SODIUM SERPL-SCNC: 137 MMOL/L (ref 135–147)

## 2024-07-19 PROCEDURE — 36415 COLL VENOUS BLD VENIPUNCTURE: CPT

## 2024-07-19 PROCEDURE — 83036 HEMOGLOBIN GLYCOSYLATED A1C: CPT

## 2024-07-19 PROCEDURE — 99204 OFFICE O/P NEW MOD 45 MIN: CPT | Performed by: INTERNAL MEDICINE

## 2024-07-19 PROCEDURE — 80048 BASIC METABOLIC PNL TOTAL CA: CPT

## 2024-07-19 PROCEDURE — 80323 ALKALOIDS NOS: CPT

## 2024-07-19 RX ORDER — POLYETHYLENE GLYCOL 3350 17 G/17G
17 POWDER, FOR SOLUTION ORAL DAILY
Qty: 510 G | Refills: 5 | Status: SHIPPED | OUTPATIENT
Start: 2024-07-19 | End: 2025-01-15

## 2024-07-19 NOTE — TELEPHONE ENCOUNTER
Pt took his last dosage of Ozempic injection today. He would like the next dosage increase 1 mg, and please send to [facility] in [place]. Thank you

## 2024-07-19 NOTE — PROGRESS NOTES
[facility] - Outpatient Consultation  Nasir Story Jr. 63 y.o. male MRN: [id number]  Encounter: [id number]          ASSESSMENT AND PLAN:      1. Chronic constipation  2. Screening for colon cancer  3. IMELDA (obstructive sleep apnea)  4. Other pulmonary embolism without acute cor pulmonale, unspecified chronicity (HCC)  5. Hyperkalemia  6. NORRIS (acute kidney injury) (HCC)      He is due for colon cancer screening. He is agreeable to get colonoscopy done. However, he had worsening creatinine and potassium levels recently. I discussed with patient that we need to make sure his renal function is stable and that his potassium levels have normalized before scheduling an elective procedure. He would also need to stop Ozempic eight days prior to the procedure and that we will reach out to his pulmonary medicine Dr Pugh to get recommendations about stopping Eliquis prior to colonoscopy. We will hold off on ordering the procedure and prep until we get creatine and potassium levels back. Patient agreeable with plan. In the meanwhile, I will start treatment of constipation with MiraLAX daily. The goal of treatment is to have 2 to 3 moments per day with complete evacuation. He can increase the dose to three times a day if needed To achieve the goal of therapy. Increase hydration with water intake 64 oz daily.     RTC 6 months.    Arturo Cormier MD  Gastroenterology  [facility]  Date: July 19, 2024    ______________________________________________________________________    HPI:  63 year-old with obstructive sleep apnea on CPAP, CKD, diabetes on Ozempic, pulmonary embolism on Eliquis presents for evaluation.    Patient reports hard to pass bowel moments for years. He takes laxative as needed over-the-counter, which helps sometimes. No blood in stools.  No nausea, vomiting, abdominal pain or heartburn.  he's had intentional weight loss or the past year.  No family history of colon  cancer.  Never had colonoscopy in the past.    Labs done in May 2024 creatinine and elevated potassium liver tests, TSH, hemoglobin and platelets were normal.           REVIEW OF SYSTEMS:    CONSTITUTIONAL: Denies any fever, chills, rigors, and weight loss.  HEENT: No earache or tinnitus. Denies hearing loss or visual disturbances.  CARDIOVASCULAR: No chest pain or palpitations.   RESPIRATORY: Denies any cough, hemoptysis, shortness of breath or dyspnea on exertion.  GASTROINTESTINAL: As noted in the History of Present Illness.   GENITOURINARY: No problems with urination. Denies any hematuria or dysuria.  NEUROLOGIC: No dizziness or vertigo, denies headaches.   MUSCULOSKELETAL: Denies any muscle or joint pain.   SKIN: Denies skin rashes or itching.   ENDOCRINE: Denies excessive thirst. Denies intolerance to heat or cold.  PSYCHOSOCIAL: Denies depression or anxiety. Denies any recent memory loss.       Historical Information   Past Medical History:   Diagnosis Date    Acne     Anxiety     Arthritis     Back pain     Chronic pain     Depression 06/07/2021    Diabetes mellitus ([facility])     6/19/15 A1C: 5.5%    Eczema     Grief reaction 09/10/2019    Hyperlipidemia     Hypertension     Knee pain     Morbid obesity with BMI of 50.0-59.9, adult (HCC)     IMELDA on CPAP     Psychiatric disorder     Skin tag     Type 2 diabetes mellitus without complication ([facility]) 07/16/2014    Urinary retention      Past Surgical History:   Procedure Laterality Date    BACK SURGERY      LUMBAR FUSION  2002    LUMBAR FUSION  2004    L4-5-6, S1 has pump for narcotics     Social History   Social History     Substance and Sexual Activity   Alcohol Use Not Currently    Comment: occasional     Social History     Substance and Sexual Activity   Drug Use No    Comment: History of drug use, meena, cocaine, heroind, no IVDA-as per [employer]     Social History     Tobacco Use   Smoking Status Former    Types: Cigars    Start date: 2018    Quit date:  "4/15/2024    Years since quittin.2    Passive exposure: Current   Smokeless Tobacco Never   Tobacco Comments    Quit cigarette smoking in .     Patient reports that he last smoked a cigar close to 2 months ago.      Family History   Problem Relation Age of Onset    Other Mother         colitis    Diabetes Mother        Meds/Allergies       Current Outpatient Medications:     amLODIPine (NORVASC) 10 mg tablet    apixaban (Eliquis) 5 mg    atorvastatin (LIPITOR) 20 mg tablet    chlorthalidone 25 mg tablet    DULoxetine HCl 60 MG CSDR    glucose blood (FREESTYLE LITE) test strip    Lancets (freestyle) lancets    losartan (COZAAR) 100 MG tablet    metoprolol tartrate (LOPRESSOR) 100 mg tablet    morphine (MS CONTIN) 15 mg 12 hr tablet    oxyCODONE (ROXICODONE) 30 MG immediate release tablet    polyethylene glycol (MIRALAX) 17 g packet    spironolactone (ALDACTONE) 25 mg tablet    tadalafil (CIALIS) 5 MG tablet    hydrocortisone 2.5 % cream    semaglutide, 1 mg/dose, (Ozempic, 1 MG/DOSE,) 4 mg/3 mL injection pen    tamsulosin (FLOMAX) 0.4 mg    Allergies   Allergen Reactions    Gabapentin      Annotation - [id number]: dizziness    Pregabalin      Annotation - [id number]: vision changes and mood changes    Tramadol Nausea Only           Objective     Blood pressure 126/70, temperature 97.5 °F (36.4 °C), height 5' 9\" (1.753 m), weight (!) 147 kg (324 lb 12.8 oz). Body mass index is 47.96 kg/m².        PHYSICAL EXAM:      General Appearance:   Alert, cooperative, no distress   HEENT:   Normocephalic, atraumatic, anicteric.     Neck:  Supple, symmetrical, trachea midline   Lungs:   Clear to auscultation bilaterally; no rales, rhonchi or wheezing; respirations unlabored    Heart::   Regular rate and rhythm; no murmur, rub, or gallop.   Abdomen:   Soft, non-tender, non-distended; normal bowel sounds; no masses, no organomegaly    Genitalia:   Deferred    Rectal:   Deferred    Extremities:  No cyanosis, clubbing or edema  "   Pulses:  2+ and symmetric    Skin:  No jaundice, rashes, or lesions    Lymph nodes:  No palpable cervical lymphadenopathy        Lab Results:   No visits with results within 1 Day(s) from this visit.   Latest known visit with results is:   Orders Only on 06/26/2024   Component Date Value    Supplier Name 06/26/2024 [employer]     Supplier Phone Number 06/26/2024 [phone]     Order Status 06/26/2024 Delivery Successful     Delivery Request Date 06/26/2024 06/26/2024     Date Delivered  06/26/2024 07/18/2024     Supplier Name 06/26/2024 07/18/2024     Item Description 06/26/2024 CPAP Machine, Resmed     Item Description 06/26/2024 PAP Mask, Fit to Comfort, Resmed, Fit Upon Setup, 1 per 3 months     Item Description 06/26/2024 PAP Headgear, 1 per 6 months     Item Description 06/26/2024 PAP Humidifier, Heated     Item Description 06/26/2024 PAP Mask Interface Cushion, Fit to Comfort     Item Description 06/26/2024 Disposable PAP Filter, 2 per 1 month     Item Description 06/26/2024 Non-Disposable PAP Filter, 1 per 6 months     Item Description 06/26/2024 PAP Machine Tubing, Heated, 1 per 3 months     Item Description 06/26/2024 Humidifier Water Chamber, 1 per 6 months          Radiology Results:   No results found.   Answers submitted by the patient for this visit:  Abdominal Pain Questionnaire (Submitted on 7/12/2024)  Chief Complaint: Abdominal pain  arthralgias: Yes  constipation: Yes

## 2024-07-22 ENCOUNTER — DOCUMENTATION (OUTPATIENT)
Dept: NEPHROLOGY | Facility: CLINIC | Age: 64
End: 2024-07-22

## 2024-07-22 ENCOUNTER — TELEPHONE (OUTPATIENT)
Dept: NEPHROLOGY | Facility: CLINIC | Age: 64
End: 2024-07-22

## 2024-07-22 DIAGNOSIS — N18.31 STAGE 3A CHRONIC KIDNEY DISEASE (HCC): Primary | ICD-10-CM

## 2024-07-22 NOTE — TELEPHONE ENCOUNTER
Patient returning call and states yes, he is still on Ozempic. Reviewed medication list with patient and he discontinued Flomax due to nausea and Cialis due to being on norvasc. Please advise, thank you.     Office: Please update medication list, thank you.

## 2024-07-22 NOTE — TELEPHONE ENCOUNTER
----- Message from Ja Nunez MD sent at 7/21/2024  8:56 PM EDT -----  Hello  MA team-please let the patient know that the creatinine is slightly higher at 2.1.  Please find out if he is still taking Ozempic.  Please also confirm the rest of his medication list.  His potassium is now normal.  Please asked him to repeat a BMP, UA, UPCR, magnesium, phosphorus in 1 month.  No changes for now.   Can we add - Arturo Cormier MD as cc'd to the labs we order for patient    Thank you  ----- Message -----  From: Arturo Cormier MD  Sent: 7/21/2024   8:14 PM EDT  To: Ja Nunez MD    Yes that is fine. Could you please cc me on the next labs so I can follow along and try to get him scheduled for colonoscopy after the labs back in 1 month.  ----- Message -----  From: Ja Nunez MD  Sent: 7/21/2024   5:04 PM EDT  To: MD Clari Oconnor    Thank you for reaching out.  I do think his renal function is staying somewhat stable.  I think it is a reflection of Ozempic.  I was going to have him repeat in 1 month.  Are you okay with this?    Thank you  ----- Message -----  From: Arturo Cormier MD  Sent: 7/20/2024   8:16 AM EDT  To: MD Raudel Anaya.  Patient's creatinine seems to continue to increase although this time the increase is not as steep as before.  Potassium levels have normalized.  I saw him in the office yesterday for colon cancer screening.  Given the elective nature for the procedure, I hope to get this procedure soon after kidney function is stabilized.  I just wanted to make you aware and get your recommendations.  ----- Message -----  From: Lab, Background User  Sent: 7/19/2024   8:18 PM EDT  To: Arturo Cormier MD

## 2024-07-22 NOTE — TELEPHONE ENCOUNTER
Detailed message left for pt with results and recommendations, asked that he calls back to confirm that he's still on Ozempic and to go over rest of meds. Lab orders in the sytem and mailed to pt.

## 2024-07-25 LAB
COTININE SERPL-MCNC: 1.1 NG/ML
NICOTINE SERPL-MCNC: <1 NG/ML

## 2024-08-02 DIAGNOSIS — I10 ESSENTIAL HYPERTENSION: ICD-10-CM

## 2024-08-04 RX ORDER — AMLODIPINE BESYLATE 10 MG/1
10 TABLET ORAL DAILY
Qty: 90 TABLET | Refills: 1 | Status: SHIPPED | OUTPATIENT
Start: 2024-08-04

## 2024-08-05 DIAGNOSIS — I26.99 PULMONARY EMBOLI (HCC): ICD-10-CM

## 2024-08-05 DIAGNOSIS — I10 ESSENTIAL HYPERTENSION: ICD-10-CM

## 2024-08-05 RX ORDER — METOPROLOL TARTRATE 100 MG/1
100 TABLET ORAL 2 TIMES DAILY
Qty: 200 TABLET | Refills: 1 | Status: SHIPPED | OUTPATIENT
Start: 2024-08-05

## 2024-08-05 NOTE — TELEPHONE ENCOUNTER
Patient called in asking if Dr. Pugh can refill his Eliquis medication to the [facility] pharmacy on file. Patient states that the CRNP that normally refills this medication left the practice and he has not found a new doctor yet. Please advise.

## 2024-08-13 ENCOUNTER — OFFICE VISIT (OUTPATIENT)
Dept: BARIATRICS | Facility: CLINIC | Age: 64
End: 2024-08-13
Payer: COMMERCIAL

## 2024-08-13 ENCOUNTER — PREP FOR PROCEDURE (OUTPATIENT)
Dept: BARIATRICS | Facility: CLINIC | Age: 64
End: 2024-08-13

## 2024-08-13 ENCOUNTER — TELEPHONE (OUTPATIENT)
Dept: BARIATRICS | Facility: CLINIC | Age: 64
End: 2024-08-13

## 2024-08-13 ENCOUNTER — CLINICAL SUPPORT (OUTPATIENT)
Dept: BARIATRICS | Facility: CLINIC | Age: 64
End: 2024-08-13

## 2024-08-13 VITALS
SYSTOLIC BLOOD PRESSURE: 100 MMHG | BODY MASS INDEX: 46.65 KG/M2 | HEART RATE: 78 BPM | DIASTOLIC BLOOD PRESSURE: 62 MMHG | HEIGHT: 69 IN | OXYGEN SATURATION: 97 % | WEIGHT: 315 LBS

## 2024-08-13 VITALS — BODY MASS INDEX: 47.92 KG/M2 | WEIGHT: 315 LBS

## 2024-08-13 DIAGNOSIS — I10 ESSENTIAL HYPERTENSION: ICD-10-CM

## 2024-08-13 DIAGNOSIS — F11.20 CONTINUOUS OPIOID DEPENDENCE (HCC): ICD-10-CM

## 2024-08-13 DIAGNOSIS — G47.33 OSA (OBSTRUCTIVE SLEEP APNEA): ICD-10-CM

## 2024-08-13 DIAGNOSIS — E66.01 MORBID OBESITY WITH BMI OF 45.0-49.9, ADULT (HCC): Primary | ICD-10-CM

## 2024-08-13 DIAGNOSIS — E78.5 HYPERLIPIDEMIA, UNSPECIFIED HYPERLIPIDEMIA TYPE: ICD-10-CM

## 2024-08-13 DIAGNOSIS — M17.0 BILATERAL PRIMARY OSTEOARTHRITIS OF KNEE: ICD-10-CM

## 2024-08-13 DIAGNOSIS — N18.32 STAGE 3B CHRONIC KIDNEY DISEASE (HCC): ICD-10-CM

## 2024-08-13 DIAGNOSIS — N18.32 TYPE 2 DIABETES MELLITUS WITH STAGE 3B CHRONIC KIDNEY DISEASE, WITHOUT LONG-TERM CURRENT USE OF INSULIN (HCC): ICD-10-CM

## 2024-08-13 DIAGNOSIS — Z98.84 BARIATRIC SURGERY STATUS: Primary | ICD-10-CM

## 2024-08-13 DIAGNOSIS — F32.A MILD DEPRESSION: ICD-10-CM

## 2024-08-13 DIAGNOSIS — I26.99 PULMONARY EMBOLISM, UNSPECIFIED CHRONICITY, UNSPECIFIED PULMONARY EMBOLISM TYPE, UNSPECIFIED WHETHER ACUTE COR PULMONALE PRESENT (HCC): ICD-10-CM

## 2024-08-13 DIAGNOSIS — I50.32 CHRONIC DIASTOLIC HEART FAILURE (HCC): ICD-10-CM

## 2024-08-13 DIAGNOSIS — E11.22 TYPE 2 DIABETES MELLITUS WITH STAGE 3B CHRONIC KIDNEY DISEASE, WITHOUT LONG-TERM CURRENT USE OF INSULIN (HCC): ICD-10-CM

## 2024-08-13 PROCEDURE — 99214 OFFICE O/P EST MOD 30 MIN: CPT | Performed by: SURGERY

## 2024-08-13 PROCEDURE — RECHECK: Performed by: DIETITIAN, REGISTERED

## 2024-08-13 NOTE — PROGRESS NOTES
"-Height/Weight:  69.0\" / 324.5 (declined to remove shoes)  - BMI:  47.9  - Medical conditions related to obesity: IMELDA (CPAP started 7/18); diabetic; HTN (4 meds); hyperlipidemia  - Does the patient smoke/vape (nicotine, marijuana, hookah, etc): no  - last cigar \"at least 2 months ago\"  - StopBANG: diagnosed IMELDA  - Does the patient currently take a weight loss medication: yes -Ozempic (ligia)  "

## 2024-08-13 NOTE — TELEPHONE ENCOUNTER
Patient called because when he was leaving the office he was given a paper referral for Hematology and a paper with the instructions for EGD. He thinks he may have left them at the office as he can not find them. He is asking if we can mail him a copy

## 2024-08-13 NOTE — PROGRESS NOTES
Bariatric Nutrition Assessment Note    Type of surgery    Preop- interested in gastric  bypass   Surgery Date: expected 2024  No program requirement   Surgeon: Dr. Noé Glover    Nutrition Assessment   Nasir Story Jr.  63 y.o.  male     Wt with BMI of 25: 168.8  Pre-Op Excess Wt: 166.4  Wt (!) 147 kg (324 lb 8 oz)   BMI 47.92 kg/m²         Karlee Crawford Equation:    VHR=6055  Weight Maintenance 2699  Estimated calories for weight loss 6206-4256 ( 1-2# per wk wt loss - sedentary )  Estimated protein needs - grams per day (0.6- .8 grams / kg actual body weight  )  CKD stage 3 b   Estimated fluid needs 77-90 oz(30-35 ml/kg IBW )      Weight History   Onset of Obesity: Adult  Family history of obesity: Yes   Wt Loss Attempts: Counseling with  MD  Previous consult with surgeon - but son  and he did not get the surgery   Fasting, healthy eating, gym/ exercise until knee pain , meal replacements   Patient has tried the above for 6 months or more with insufficient weight loss or weight regain, which is why patient has requested to be evaluated for weight loss surgery today  Maximum Wt Lost: 20 to 30 pounds with going to the gym and eating healthy     NAFLD Fibrosis Score is: .443    NAFLD Score Correlated Fibrosis Severity   <-1.455 F0-F2   -1.455-0.676 Indeterminate Score   >0.676 F3-F4   **Fibrosis Severity Scale: F0 = no fibrosis; F1= mild fibrosis; F2 = moderate fibrosis; F3 = severe fibrosis; F4 = cirrhosis    NAFLD Score Component Values:  Component Value Date   Age: 63 y.o.     BMI: 47.96 kg/m²    IFG or DM: Yes    AST: 10 U/L 2024   ALT: 12 U/L 2024   Platelet: 293 Thousands/uL 2024   Albumin: 4.4 g/dL 2024      Pt has been taking Ozempic which has decreased his appetite and provided early satiety.  He reports eating much smaller portions than usual     Review of History and Medications   Past Medical History:   Diagnosis Date    Acne     Anxiety     Arthritis     Back  pain     Chronic pain     Depression 2021    Diabetes mellitus ([facility])     6/19/15 A1C: 5.5%    Eczema     Grief reaction 09/10/2019    Hyperlipidemia     Hypertension     Knee pain     Morbid obesity with BMI of 50.0-59.9, adult ([facility])     IMELDA on CPAP     Psychiatric disorder     Skin tag     Type 2 diabetes mellitus without complication ([facility]) 2014    Urinary retention      Past Surgical History:   Procedure Laterality Date    BACK SURGERY      LUMBAR FUSION  2002    LUMBAR FUSION  2004    L4-5-6, S1 has pump for narcotics     Social History     Socioeconomic History    Marital status: Legally      Spouse name: Not on file    Number of children: Not on file    Years of education: Not on file    Highest education level: Not on file   Occupational History    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      Patient reports that he last smoked a cigar close to 2 months ago.    Vaping Use    Vaping status: Never Used   Substance and Sexual Activity    Alcohol use: Not Currently     Comment: occasional    Drug use: No     Comment: History of drug use, meena, cocaine, heroind, no IVDA-as per [employer]    Sexual activity: Yes     Partners: Female     Birth control/protection: Condom Male   Other Topics Concern    Not on file   Social History Narrative    Patient lives with his ex wife.      Social Determinants of Health     Financial Resource Strain: Low Risk  (10/15/2023)    Overall Financial Resource Strain (CARDIA)     Difficulty of Paying Living Expenses: Not very hard   Food Insecurity: Not on file   Transportation Needs: No Transportation Needs (10/15/2023)    PRAPARE - Transportation     Lack of Transportation (Medical): No     Lack  of Transportation (Non-Medical): No   Physical Activity: Not on file   Stress: Not on file   Social Connections: Not on file   Intimate Partner Violence: Not on file   Housing Stability: Not on file       Current Outpatient Medications:     amLODIPine (NORVASC) 10 mg tablet, TAKE ONE TABLET BY MOUTH EVERY DAY, Disp: 90 tablet, Rfl: 1    apixaban (Eliquis) 5 mg, Take 1 tablet (5 mg total) by mouth 2 (two) times a day, Disp: 60 tablet, Rfl: 5    atorvastatin (LIPITOR) 20 mg tablet, TAKE ONE TABLET BY MOUTH EVERY DAY, Disp: 90 tablet, Rfl: 1    chlorthalidone 25 mg tablet, Take 1 tablet (25 mg total) by mouth daily, Disp: 90 tablet, Rfl: 3    DULoxetine HCl 60 MG CSDR, 60 mg, Disp: , Rfl:     glucose blood (FREESTYLE LITE) test strip, Use 1 each in the morning, Disp: 100 each, Rfl: 0    hydrocortisone 2.5 % cream, Apply topically to face BID for no longer than 7 days for flares (Patient not taking: Reported on 6/5/2024), Disp: 30 g, Rfl: 0    Lancets (freestyle) lancets, Use as instructed, Disp: 100 each, Rfl: 0    losartan (COZAAR) 100 MG tablet, Take 1 tablet (100 mg total) by mouth daily, Disp: 90 tablet, Rfl: 3    metoprolol tartrate (LOPRESSOR) 100 mg tablet, TAKE ONE TABLET BY MOUTH TWICE A DAY, Disp: 200 tablet, Rfl: 1    morphine (MS CONTIN) 15 mg 12 hr tablet, as needed, Disp: , Rfl: 0    oxyCODONE (ROXICODONE) 30 MG immediate release tablet, as needed, Disp: , Rfl: 0    polyethylene glycol (MIRALAX) 17 g packet, Take 17 g by mouth daily, Disp: 510 g, Rfl: 5    semaglutide, 1 mg/dose, (Ozempic, 1 MG/DOSE,) 4 mg/3 mL injection pen, Inject 0.75 mL (1 mg total) under the skin every 7 days, Disp: 3 mL, Rfl: 2    spironolactone (ALDACTONE) 25 mg tablet, Take 1 tablet (25 mg total) by mouth daily (Patient taking differently: Take 12.5 mg by mouth daily), Disp: 90 tablet, Rfl: 3    tadalafil (CIALIS) 5 MG tablet, Take 5 mg by mouth as needed for erectile dysfunction (unsure dosage) (Patient not taking: Reported on  7/22/2024), Disp: , Rfl:     tamsulosin (FLOMAX) 0.4 mg, Take 1 capsule (0.4 mg total) by mouth daily with dinner (Patient not taking: Reported on 6/5/2024), Disp: 90 capsule, Rfl: 3    Food Intake and Lifestyle Assessment   Food Intake Assessment completed via usual diet recall  Breakfast: 10 am half of bagel with coffee or 2 boiled eggs and coffee - four times per week   Snack: 0   Lunch: 0  Snack: 0  Dinner: 5 to 7 pm   Chicken, fish, beef, pork , salads and vegetables and starch   Snack: 0  Beverage intake: water  and coffee/tea  Protein supplement: not currently , has in the past   Estimated protein intake per day: ~60-70 GRAMS   Estimated fluid intake per day: 10 bottles per day of water   Meals eaten away from home: rarely   Typical meal pattern: 2 meals per day and 0 snacks per day  Eating Behaviors: Large portion sizes  Food allergies or intolerances:   Allergies   Allergen Reactions    Gabapentin      Annotation - [id number]: dizziness    Pregabalin      Annotation - [id number]: vision changes and mood changes    Tramadol Nausea Only     Cultural or Nondenominational considerations: American     Physical Assessment  Physical Activity  Types of exercise: Walking  Mows his lawn, gardens housework   Current physical limitations: knee pain     Psychosocial Assessment   Support systems: family and   Socioeconomic factors: lives with x wife     Nutrition Diagnosis  Diagnosis: Overweight / Obesity (NC-3.3)  Related to: Physical inactivity and Excessive energy intake  As Evidenced by: BMI >25 and Unintentional weight gain     Nutrition Prescription: Recommend the following diet  Per nephrology - pt is on low potassium diet   Protein 90 grams per day ( 0.6 grams /kg actual body weight )  Low sodium   2959-1276 calories per day / 90 grams protein       Interventions and Teaching   Discussed pre-op and post-op nutrition guidelines.       Patient educated and handouts provided.  Surgical changes to stomach / GI  Capacity of  "post-surgery stomach  Diet progression  Adequate hydration  Sugar and fat restriction to decrease \"dumping syndrome\"  Fat restriction to decrease steatorrhea  Expected weight loss  Weight loss plateaus/ possibility of weight regain  Exercise  Suggestions for pre-op diet  Nutrition considerations after surgery  Protein supplements  Meal planning and preparation  Appropriate carbohydrate, protein, and fat intake, and food/fluid choices to maximize safe weight loss, nutrient intake, and tolerance   Dietary and lifestyle changes  Possible problems with poor eating habits  Intuitive eating  Techniques for self monitoring and keeping daily food journal  Potential for food intolerance after surgery, and ways to deal with them including: lactose intolerance, nausea, reflux, vomiting, diarrhea, food intolerance, appetite changes, gas  Vitamin / Mineral supplementation of Multivitamin with minerals and Vitamin D    Pt with CKD stage 3 b- will need renal input prior to surgery - consult placed   Has previous vitamin D deficiency but took loading dose   Nephrology is monitoring.     Education provided to: patient    Barriers to learning: No barriers identified    Readiness to change: preparation    Prior research on procedure: discussed with provider and friends or family    Comprehension: needs reinforcement and verbalizes understanding     Expected Compliance: good  Recommendations  Pt is an appropriate candidate for surgery. Yes  Pt should make the following changes and then be reassessed for weight loss surgery:   Pt needs to be nicotine free prior to surgery.  Pt with negative nicotine test 2024    Workflow: (Incomplete in Bold):  Psych and/or D+A Clearance: n/a  Blood Work: done  PCP letter: done  Surgeon Appt: Needs to meet Dr Belen HAWTHORNE: needs surgeon appointment   Cardiac Risk Assessment with EC  Sleep Studies: cpap  Nephrology 2024  MWM  10/4/2024- on Ozempic   Nicotine test: negative 2024- " will need second test 3 1/2 weeks prior to surgery date   Pre-Operative Program: n/a  NAFLD Score Calculated: yes  Hepatology consult: n/a    Evaluation / Monitoring  Dietitian to Monitor: Eating pattern as discussed Tolerance of nutrition prescription Body weight Lab values Physical activity Bowel pattern  Pt is following low sodium and low potassium diet.  He is drinking a protein shake for lunch. He has been food logging on bariatastic     Goals  Complete lession plans 1-6, Eat 3 meals per day, and Eliminate mindless snacking  Continue to Food log. 2157-5618 calories per day - monitor sodium intake.   Practice not drinking before and during meals.       Time Spent:   30 minutes

## 2024-08-13 NOTE — PROGRESS NOTES
BARIATRIC CONSULT - BARIATRIC SURGERY    Nasir Story Jr. 63 y.o. male MRN: [id number]  Unit/Bed#:  Encounter: [id number]      HPI:  Nasir Story Jr. is a 63 y.o. male who presents with a longstanding history of morbid obesity and inability to sustain a meaningful weight loss.    Here today to discuss bariatric options.    Visit type: initial visit    Symptoms: excess weight and inability to loss weight    Associated Symptoms: depressed mood and anxiety    Associated Conditions: sleep apnea, abdominal obesity, and hypergycemia  Disease Complications: diabetes, hypertension, sleep apnea, osteoarthritis, and thromboembolism  Weight Loss Interest: high  Previous Diet Trials: low calorie     Exercise Frequency:infrequency  Types of Exercise: walking    Review of Systems   All other systems reviewed and are negative.      Historical Information   Past Medical History:   Diagnosis Date    Acne     Anxiety     Arthritis     Back pain     Chronic pain     Depression 2021    Diabetes mellitus ([facility])     6/19/15 A1C: 5.5%    Eczema     Grief reaction 09/10/2019    Hyperlipidemia     Hypertension     Knee pain     Morbid obesity with BMI of 50.0-59.9, adult ([facility])     IMELDA on CPAP     Psychiatric disorder     Skin tag     Type 2 diabetes mellitus without complication ([facility]) 2014    Urinary retention      Past Surgical History:   Procedure Laterality Date    BACK SURGERY      LUMBAR FUSION  2002    LUMBAR FUSION  2004    L4-5-6, S1 has pump for narcotics     Social History   Social History     Substance and Sexual Activity   Alcohol Use Not Currently    Comment: occasional     Social History     Substance and Sexual Activity   Drug Use No    Comment: History of drug use, meena, cocaine, heroind, no IVDA-as per [employer]     Social History     Tobacco Use   Smoking Status Former    Types: Cigars    Start date:     Quit date: 4/15/2024    Years since quittin.3    Passive exposure: Current   Smokeless Tobacco  "Never   Tobacco Comments    Quit cigarette smoking in 2004.     Patient reports that he last smoked a cigar close to 2 months ago.      Family History: non-contributory    Meds/Allergies   all medications and allergies reviewed  Allergies   Allergen Reactions    Gabapentin      Annotation - [id number]: dizziness    Pregabalin      Annotation - [id number]: vision changes and mood changes    Tramadol Nausea Only       Objective     Current Vitals:   Blood Pressure: 100/62 (08/13/24 1440)  Pulse: 78 (08/13/24 1440)  Height: 5' 9\" (175.3 cm) (08/13/24 1440)  Weight - Scale: (!) 147 kg (324 lb 8 oz) (08/13/24 1440)  SpO2: 97 % (08/13/24 1440)      Invasive Devices       None                   Physical Exam  Constitutional:       General: He is not in acute distress.     Appearance: He is well-developed.   Neurological:      Mental Status: He is alert and oriented to person, place, and time.   Psychiatric:         Behavior: Behavior normal.         Thought Content: Thought content normal.         Judgment: Judgment normal.         Lab Results: I have personally reviewed pertinent lab results.    Imaging: I have personally reviewed pertinent reports.    EKG, Pathology, and Other Studies: I have personally reviewed pertinent reports.        Assessment/PLAN:    63 y.o. male with a long standing h/o of obesity and inability to sustain any meaningful weight loss on his own despite several attempts.    He is interested in the Laparoscopic sleeve gastrectomy.  He initially wanted the bypass years ago when he initially saw Dr. Estrada but now he wants the sleeve instead.  I have discussed both options with him.    About a year and a half ago he had a pulmonary embolism and he is currently on Eliquis.  I am referring him to hematology oncology for risk stratification and prophylactic perioperative recommendations.    As a part of his pre op evaluation, he will be referred to a cardiologist for risk stratification.  Patient has IMELDA " and wears a CPAP machine.      He needs an EGD to evaluate the anatomy of his GI tract.    I have spent over 30 minutes with him face to face in the office today discussing his options and details of the surgery. We have seen an animation of the surgery on the computer that illustrates how the operation is done and how the anatomy will be altered with the procedure. Over 50% of this was coordinating care.    I have used the [facility] bariatric surgical risk/benefit calculator and we have discussed the results as part of the preop education.  I have discussed and educated the patient with regards to the components of our multidisciplinary program and the importance of compliance and follow up in the post operative period.    He was given the opportunity to ask questions and I have answered all of them.    The patient was also instructed with regards to the importance of behavior modification, nutritional counseling, support meeting attendance and lifestyle changes that are important to ensure success.    Although there is a great statistical chance of improvement or even resolution of most of his associated comorbidities, the results vary from patient to patient and they largely depend on his commitment and compliance.     I have reviewed instructions for stopping or tapering anti-obesity medications prior to surgery.      I have encouraged him to lose 16 lbs prior to the operation.      Noé Glover MD  8/13/2024  3:21 PM

## 2024-08-14 ENCOUNTER — OFFICE VISIT (OUTPATIENT)
Dept: UROLOGY | Facility: AMBULATORY SURGERY CENTER | Age: 64
End: 2024-08-14
Payer: COMMERCIAL

## 2024-08-14 VITALS
HEART RATE: 80 BPM | BODY MASS INDEX: 47.26 KG/M2 | WEIGHT: 315 LBS | SYSTOLIC BLOOD PRESSURE: 98 MMHG | OXYGEN SATURATION: 98 % | DIASTOLIC BLOOD PRESSURE: 68 MMHG

## 2024-08-14 DIAGNOSIS — N52.9 ERECTILE DYSFUNCTION, UNSPECIFIED ERECTILE DYSFUNCTION TYPE: ICD-10-CM

## 2024-08-14 DIAGNOSIS — N40.1 BPH WITH OBSTRUCTION/LOWER URINARY TRACT SYMPTOMS: ICD-10-CM

## 2024-08-14 DIAGNOSIS — N13.8 BPH WITH OBSTRUCTION/LOWER URINARY TRACT SYMPTOMS: ICD-10-CM

## 2024-08-14 DIAGNOSIS — N32.81 OVERACTIVE BLADDER: Primary | ICD-10-CM

## 2024-08-14 DIAGNOSIS — Z12.5 SCREENING FOR PROSTATE CANCER: ICD-10-CM

## 2024-08-14 LAB — POST-VOID RESIDUAL VOLUME, ML POC: 90 ML

## 2024-08-14 PROCEDURE — 99204 OFFICE O/P NEW MOD 45 MIN: CPT

## 2024-08-14 PROCEDURE — 51798 US URINE CAPACITY MEASURE: CPT

## 2024-08-14 RX ORDER — ALFUZOSIN HYDROCHLORIDE 10 MG/1
10 TABLET, EXTENDED RELEASE ORAL DAILY
Qty: 30 TABLET | Refills: 3 | Status: SHIPPED | OUTPATIENT
Start: 2024-08-14 | End: 2024-12-12

## 2024-08-14 NOTE — ASSESSMENT & PLAN NOTE
AUA symptom score 18  PVR performed in the office today found to be 80 mL  Patient previously trialed Flomax 0.4 mg and noticed an improvement in his urinary stream and overall voiding pattern, but discontinued the use of the medication due to nausea and abdominal discomfort.  We discussed pharmacotherapy including alpha blockers versus 5 alpha reductase inhibitors and daily low-dose Cialis.  The patient will trial alfuzosin 10 mg once daily with dinner.  Side effects of dizziness, lightheadedness, and feeling fatigued/tired were discussed.  The patient will return to the office in 3 to 4 months with PVR

## 2024-08-14 NOTE — ASSESSMENT & PLAN NOTE
Previously trialed Cialis 5 mg as needed for his erectile dysfunction  The patient noted that he was taking the medication intermittently and not daily, but was not noticing any benefit on the days he was taking it.  We discussed that the patient could trial Cialis 10 mg as needed 1 hour prior to sexual intercourse.  The patient states that he gets the medication online as it is cheaper this way, and no prescription will be placed at today's visit.

## 2024-08-14 NOTE — ASSESSMENT & PLAN NOTE
Patient denies a family history of prostate cancer  Patient's most recent PSA was performed 10/27/2023 and found to be 0.63.  Refer to PSA trend below.  KENNETH offered today but patient deferred.  Patient noted that he gets his PSA routinely ordered by his PCP.  We discussed that the patient's PSA should be tested annually, but I will defer to PCP to continue to order PSA values.    Lab Results   Component Value Date    PSA 0.63 10/27/2023    PSA 1.1 09/09/2022    PSA 1.1 05/13/2021

## 2024-08-14 NOTE — PROGRESS NOTES
8/14/2024      Assessment and Plan    63 y.o. male new patient to [facility] for urology    BPH with obstruction/lower urinary tract symptoms  AUA symptom score 18  PVR performed in the office today found to be 80 mL  Patient previously trialed Flomax 0.4 mg and noticed an improvement in his urinary stream and overall voiding pattern, but discontinued the use of the medication due to nausea and abdominal discomfort.  We discussed pharmacotherapy including alpha blockers versus 5 alpha reductase inhibitors and daily low-dose Cialis.  The patient will trial alfuzosin 10 mg once daily with dinner.  Side effects of dizziness, lightheadedness, and feeling fatigued/tired were discussed.  The patient will return to the office in 3 to 4 months with PVR    Erectile dysfunction  Previously trialed Cialis 5 mg as needed for his erectile dysfunction  The patient noted that he was taking the medication intermittently and not daily, but was not noticing any benefit on the days he was taking it.  We discussed that the patient could trial Cialis 10 mg as needed 1 hour prior to sexual intercourse.  The patient states that he gets the medication online as it is cheaper this way, and no prescription will be placed at today's visit.    Screening for prostate cancer  Patient denies a family history of prostate cancer  Patient's most recent PSA was performed 10/27/2023 and found to be 0.63.  Refer to PSA trend below.  KENNETH offered today but patient deferred.  Patient noted that he gets his PSA routinely ordered by his PCP.  We discussed that the patient's PSA should be tested annually, but I will defer to PCP to continue to order PSA values.    Lab Results   Component Value Date    PSA 0.63 10/27/2023    PSA 1.1 09/09/2022    PSA 1.1 05/13/2021            History of Present Illness  Nasir Story Jr. is a 63 y.o. male here for evaluation of BPH with obstruction/lower urinary tract symptoms.  Patient denies a positive family history  of prostate cancer.  Patient reports today that over the last 6 months he has started to experience a weakened urinary stream that stops and starts, worsening urinary urgency, postvoid dribbling, and the inability to obtain and maintain a strong erection.  Patient's most recent PSA was performed 10/27/2023 and found to be 0.63.  The patient notes that he was previously trialed on tamsulosin 0.4 mg for his lower urinary tract symptoms, but discontinued the use of the medication due to side effects of nausea and abdominal discomfort.  The patient does state that while on the medication he did have an improvement in his urinary stream.  Additionally, the patient states that he buys Cialis 5 mg online as it is cheaper this way for him, but he has done in the past and noticed an improvement in his erections then but no longer is experiencing benefit from the Cialis 5 mg.  A PVR was performed in the office today and found to be 80 mL.  Otherwise, the patient denies dysuria, hematuria, flank pain, or feelings of incomplete bladder emptying.        Review of Systems   Constitutional:  Negative for chills and fever.   HENT:  Negative for ear pain and sore throat.    Eyes:  Negative for pain and visual disturbance.   Respiratory:  Negative for cough and shortness of breath.    Cardiovascular:  Negative for chest pain and palpitations.   Gastrointestinal:  Negative for abdominal pain and vomiting.   Genitourinary:  Positive for difficulty urinating (Weakened urinary stream), frequency and urgency. Negative for decreased urine volume, dysuria, flank pain and hematuria.   Musculoskeletal:  Negative for arthralgias and back pain.   Skin:  Negative for color change and rash.   Neurological:  Negative for seizures and syncope.   All other systems reviewed and are negative.          AUA SYMPTOM SCORE      Flowsheet Row Most Recent Value   AUA SYMPTOM SCORE    How often have you had a sensation of not emptying your bladder completely  after you finished urinating? 1 (P)     How often have you had to urinate again less than two hours after you finished urinating? 3 (P)     How often have you found you stopped and started again several times when you urinate? 3 (P)     How often have you found it difficult to postpone urination? 3 (P)     How often have you had a weak urinary stream? 2 (P)     How often have you had to push or strain to begin urination? 2 (P)     How many times did you most typically get up to urinate from the time you went to bed at night until the time you got up in the morning? 4 (P)     Quality of Life: If you were to spend the rest of your life with your urinary condition just the way it is now, how would you feel about that? 5 (P)     AUA SYMPTOM SCORE 18 (P)               Vitals  Vitals:    08/14/24 0741   BP: 98/68   BP Location: Left arm   Patient Position: Sitting   Cuff Size: Large   Pulse: 80   SpO2: 98%   Weight: (!) 145 kg (320 lb)       Physical Exam  Vitals reviewed.   Constitutional:       General: He is not in acute distress.     Appearance: Normal appearance. He is not ill-appearing.   HENT:      Head: Normocephalic and atraumatic.      Nose: Nose normal.   Eyes:      General: No scleral icterus.  Pulmonary:      Effort: No respiratory distress.   Abdominal:      General: Abdomen is flat. There is no distension.      Palpations: Abdomen is soft.      Tenderness: There is no abdominal tenderness.   Musculoskeletal:         General: Normal range of motion.      Cervical back: Normal range of motion.   Skin:     General: Skin is warm.      Coloration: Skin is not jaundiced.   Neurological:      Mental Status: He is alert and oriented to person, place, and time.      Gait: Gait normal.   Psychiatric:         Mood and Affect: Mood normal.         Behavior: Behavior normal.           Past History  Past Medical History:   Diagnosis Date    Acne     Anxiety     Arthritis     Back pain     Chronic pain     Depression  2021    Diabetes mellitus ([facility])     6/19/15 A1C: 5.5%    Eczema     Grief reaction 09/10/2019    Hyperlipidemia     Hypertension     Knee pain     Morbid obesity with BMI of 50.0-59.9, adult ([facility])     IMELDA on CPAP     Psychiatric disorder     Skin tag     Type 2 diabetes mellitus without complication ([facility]) 2014    Urinary retention      Social History     Socioeconomic History    Marital status: Legally      Spouse name: None    Number of children: None    Years of education: None    Highest education level: None   Occupational History    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      Patient reports that he last smoked a cigar close to 2 months ago.    Vaping Use    Vaping status: Never Used   Substance and Sexual Activity    Alcohol use: Not Currently     Comment: occasional    Drug use: No     Comment: History of drug use, meena, cocaine, heroind, no IVDA-as per [employer]    Sexual activity: Yes     Partners: Female     Birth control/protection: Condom Male   Other Topics Concern    None   Social History Narrative    Patient lives with his ex wife.      Social Determinants of Health     Financial Resource Strain: Low Risk  (10/15/2023)    Overall Financial Resource Strain (CARDIA)     Difficulty of Paying Living Expenses: Not very hard   Food Insecurity: Not on file   Transportation Needs: No Transportation Needs (10/15/2023)    PRAPARE - Transportation     Lack of Transportation (Medical): No     Lack of Transportation (Non-Medical): No   Physical Activity: Not on file   Stress: Not on file   Social Connections: Not on file   Intimate Partner Violence: Not on file   Housing Stability: Not on file     Social History      Tobacco Use   Smoking Status Former    Types: Cigars    Start date:     Quit date: 4/15/2024    Years since quittin.3    Passive exposure: Current   Smokeless Tobacco Never   Tobacco Comments    Quit cigarette smoking in .     Patient reports that he last smoked a cigar close to 2 months ago.      Family History   Problem Relation Age of Onset    Other Mother         colitis    Diabetes Mother        The following portions of the patient's history were reviewed and updated as appropriate: allergies, current medications, past medical history, past social history, past surgical history and problem list.    Results  Recent Results (from the past 1 hour(s))   POCT Measure PVR    Collection Time: 24  7:46 AM   Result Value Ref Range    POST-VOID RESIDUAL VOLUME, ML POC 90 mL   ]  Lab Results   Component Value Date    PSA 0.63 10/27/2023    PSA 1.1 2022    PSA 1.1 2021    PSA 1.0 2019     Lab Results   Component Value Date    GLUCOSE 148 (H) 10/14/2016    CALCIUM 9.4 2024     2017     2017    K 4.4 2024    CO2 23 2024     2024    BUN 35 (H) 2024    CREATININE 2.13 (H) 2024     Lab Results   Component Value Date    WBC 12.92 (H) 2024    HGB 14.0 2024    HCT 42.2 2024    MCV 87 2024     2024

## 2024-08-16 ENCOUNTER — TELEPHONE (OUTPATIENT)
Age: 64
End: 2024-08-16

## 2024-08-16 NOTE — TELEPHONE ENCOUNTER
Patient called in stating that he thinks he is taking too much blood pressure medication because his blood pressure is running low.    He would like a mildred back asap.

## 2024-08-18 DIAGNOSIS — N28.1 RENAL CYST: ICD-10-CM

## 2024-08-18 DIAGNOSIS — I10 BENIGN ESSENTIAL HTN: ICD-10-CM

## 2024-08-19 RX ORDER — SPIRONOLACTONE 25 MG/1
12.5 TABLET ORAL DAILY
Qty: 90 TABLET | Refills: 3 | Status: SHIPPED | OUTPATIENT
Start: 2024-08-19

## 2024-08-19 NOTE — TELEPHONE ENCOUNTER
Left message asking patient to call back with home BP readings (as  he is c/o of low Bps) and if he is having any symptoms associated with his low readings.

## 2024-08-19 NOTE — TELEPHONE ENCOUNTER
"Pt called back and gave me a set of BP readings from the past couple of days     - 100/68   - 100/64  -98/62   -153/114 ( didn't exactly remember the diastolic number)   -120/72     Pt states when his blood pressure feels low he feels tired, lightheaded and feels like he's \"out of it\". Pt also mentioned he feels like he is taking too many medications for his BP.   "

## 2024-08-19 NOTE — TELEPHONE ENCOUNTER
Spoke with patient over the phone.  I asked him to stop amlodipine completely.  Check blood pressures once a day for 1 week and including heart rate.  To call right away if his blood pressure is less than 100 systolic as he may need to go to the ER.  He is agreeable to plan.

## 2024-08-20 ENCOUNTER — OFFICE VISIT (OUTPATIENT)
Age: 64
End: 2024-08-20
Payer: COMMERCIAL

## 2024-08-20 VITALS
OXYGEN SATURATION: 96 % | HEIGHT: 69 IN | WEIGHT: 315 LBS | TEMPERATURE: 97.2 F | HEART RATE: 84 BPM | DIASTOLIC BLOOD PRESSURE: 58 MMHG | BODY MASS INDEX: 46.65 KG/M2 | SYSTOLIC BLOOD PRESSURE: 98 MMHG

## 2024-08-20 DIAGNOSIS — G47.33 OSA (OBSTRUCTIVE SLEEP APNEA): Primary | ICD-10-CM

## 2024-08-20 DIAGNOSIS — I26.99 PULMONARY EMBOLISM, UNSPECIFIED CHRONICITY, UNSPECIFIED PULMONARY EMBOLISM TYPE, UNSPECIFIED WHETHER ACUTE COR PULMONALE PRESENT (HCC): ICD-10-CM

## 2024-08-20 DIAGNOSIS — E66.01 MORBID OBESITY (HCC): ICD-10-CM

## 2024-08-20 PROCEDURE — 99214 OFFICE O/P EST MOD 30 MIN: CPT | Performed by: PHYSICIAN ASSISTANT

## 2024-08-20 NOTE — PROGRESS NOTES
Answers submitted by the patient for this visit:  Pulmonology Questionnaire (Submitted on 8/13/2024)  Chief Complaint: Primary symptoms  Do you have difficulty breathing?: Yes  Chronicity: new  When did you first notice your symptoms?: more than 1 year ago  How often do your symptoms occur?: daily  Since you first noticed this problem, how has it changed?: rapidly improving  Do you have shortness of breath that occurs with effort or exertion?: No  Do you have ear congestion?: Yes  Do you have heartburn?: No  Do you have fatigue?: No  Do you have nasal congestion?: No  Do you have shortness of breath when lying flat?: No  Do you have shortness of breath when you wake up?: No  Do you have sweats?: No  Have you experienced weight loss?: Yes  Which of the following makes your symptoms worse?: strenuous activity  Which of the following makes your symptoms better?: rest  Assessment:    1. IMELDA (obstructive sleep apnea)        2. Pulmonary embolism, unspecified chronicity, unspecified pulmonary embolism type, unspecified whether acute cor pulmonale present (HCC)        3. Morbid obesity (HCC)            Plan:   Patient presenting for follow-up  Home study 5/24 revealed severe sleep apnea with SAMIR 65.7. Now on Auto CPAP (5-20 cm H2O)  They are using the CPAP and benefitting from it.  Better quality of sleep at night and more energy throughout the day.  Reviewed compliance report with the patient demonstrating residual AHI is acceptable at 1.4 and they are compliant with use, avg 6.5 hrs a night  Will continue CPAP at current pressure settings  Discussed regular cleaning and changing of the supplies  Patient is aware of consequences of untreated sleep apnea including increased risk for cardiac disease and stroke and therefore the need for compliance  Plan is to remain on Eliquis indefinitely  Following w [facility]  F/u in 6 months or sooner if needed    I advised him to call his PCP ASAP about his BP. Has cuff at  home.    Subjective:     Patient ID: Nasir Story Jr. is a 63 y.o. male.    Chief Complaint:  Anand is a 63-year-old male presenting for follow-up.  Had a sleep study done since his last visit as discussed above.  Patient states that the CPAP is helping him a lot he is used 1 in the past as well.  He is not having any breathing difficulties.  Offers no new concerns at this time.    primary symptoms  Pertinent negatives include no chest pain, fever, headaches, myalgias or sore throat.     The following portions of the patient's history were reviewed in this encounter and updated as appropriate: [unfilled]  Review of Systems   Constitutional:  Negative for appetite change and fever.   HENT:  Positive for rhinorrhea. Negative for ear pain, postnasal drip, sneezing, sore throat and trouble swallowing.    Cardiovascular:  Negative for chest pain.   Musculoskeletal:  Negative for myalgias.   Neurological:  Negative for headaches.   All other systems reviewed and are negative.        Objective:    Physical Exam  Vitals reviewed.   Constitutional:       General: He is not in acute distress.     Appearance: He is not toxic-appearing.   HENT:      Head: Normocephalic and atraumatic.   Eyes:      General: No scleral icterus.  Cardiovascular:      Rate and Rhythm: Normal rate and regular rhythm.   Pulmonary:      Effort: Pulmonary effort is normal.      Breath sounds: Normal breath sounds.   Musculoskeletal:         General: No signs of injury.   Skin:     General: Skin is warm and dry.   Neurological:      General: No focal deficit present.      Mental Status: He is alert. Mental status is at baseline.   Psychiatric:         Mood and Affect: Mood normal.         Behavior: Behavior normal.         Lab Review:   Office Visit on 08/14/2024   Component Date Value    POST-VOID RESIDUAL VOLUM* 08/14/2024 90    Appointment on 07/19/2024   Component Date Value    Sodium 07/19/2024 137     Potassium 07/19/2024 4.4     Chloride  07/19/2024 105     CO2 07/19/2024 23     ANION GAP 07/19/2024 9     BUN 07/19/2024 35 (H)     Creatinine 07/19/2024 2.13 (H)     Glucose 07/19/2024 65     Calcium 07/19/2024 9.4     eGFR 07/19/2024 31     Hemoglobin A1C 07/19/2024 6.1 (H)     EAG 07/19/2024 128     Nicotine 07/19/2024 <1.0     Cotinine 07/19/2024 1.1    Orders Only on 06/26/2024   Component Date Value    Supplier Name 06/26/2024 [employer] [facility] [employer]     Supplier Phone Number 06/26/2024 [phone]     Order Status 06/26/2024 Delivery Successful     Delivery Request Date 06/26/2024 06/26/2024     Date Delivered  06/26/2024 07/18/2024     Supplier Name 06/26/2024 07/18/2024     Item Description 06/26/2024 CPAP Machine, Resmed     Item Description 06/26/2024 PAP Mask, Fit to Comfort, Resmed, Fit Upon Setup, 1 per 3 months     Item Description 06/26/2024 PAP Headgear, 1 per 6 months     Item Description 06/26/2024 PAP Humidifier, Heated     Item Description 06/26/2024 PAP Mask Interface Cushion, Fit to Comfort     Item Description 06/26/2024 Disposable PAP Filter, 2 per 1 month     Item Description 06/26/2024 Non-Disposable PAP Filter, 1 per 6 months     Item Description 06/26/2024 PAP Machine Tubing, Heated, 1 per 3 months     Item Description 06/26/2024 Humidifier Water Chamber, 1 per 6 months

## 2024-08-22 ENCOUNTER — OFFICE VISIT (OUTPATIENT)
Dept: CARDIOLOGY CLINIC | Facility: CLINIC | Age: 64
End: 2024-08-22
Payer: COMMERCIAL

## 2024-08-22 VITALS
DIASTOLIC BLOOD PRESSURE: 68 MMHG | BODY MASS INDEX: 46.65 KG/M2 | HEART RATE: 88 BPM | SYSTOLIC BLOOD PRESSURE: 114 MMHG | HEIGHT: 69 IN | OXYGEN SATURATION: 96 % | WEIGHT: 315 LBS

## 2024-08-22 DIAGNOSIS — I77.810 THORACIC AORTIC ECTASIA (HCC): Primary | ICD-10-CM

## 2024-08-22 DIAGNOSIS — E78.5 HYPERLIPIDEMIA, UNSPECIFIED HYPERLIPIDEMIA TYPE: ICD-10-CM

## 2024-08-22 DIAGNOSIS — I10 ESSENTIAL HYPERTENSION: ICD-10-CM

## 2024-08-22 DIAGNOSIS — Z86.711 HISTORY OF PULMONARY EMBOLUS (PE): ICD-10-CM

## 2024-08-22 PROCEDURE — 99214 OFFICE O/P EST MOD 30 MIN: CPT | Performed by: INTERNAL MEDICINE

## 2024-08-22 PROCEDURE — 93000 ELECTROCARDIOGRAM COMPLETE: CPT | Performed by: INTERNAL MEDICINE

## 2024-08-22 NOTE — PROGRESS NOTES
[facility]    CHIEF COMPLAINT:   Chief Complaint   Patient presents with    Follow-up     9 mon       HPI:  Nasir Story Jr. is a 63 y.o. male with a past medical history of former smoker, morbid obesity, chronic back pain, opioid dependence, hypertension hyperlipidemia, type II diabetes mellitus, chronic kidney disease stage IIIb, obstructive sleep apnea, pulmonary embolism (6/2023), thoracic aorta ectasia.    OV-8/29/2023: Briefly, he presented to [facility] on 6/28/2023 with complaints of shortness of breath for 4-5 days prior to presentation.  CT PE study on presentation was equivocal.  He was started on heparin infusion.  Echocardiogram demonstrated normal biventricular size and systolic function.  Lower extremity venous duplex was negative for DVT.  He underwent repeat CTA PE study on 6/30 which demonstrated segmental and subsegmental pulmonary emboli.  He was transitioned to apixaban.    Since his hospitalization he does feel that his shortness of breath has continued to improve.  He does not do much physical activity at baseline.  He does use a self-propelled and riding mower at times to cut his lawn which takes about 2 hours.  He can walk with a self-propelled mower for about 20 minutes at a time.  Lipids were previously well controlled on atorvastatin which was recently resumed.    Interval history: He presents today for follow-up.  He is having bariatric surgery in October/November.  He is feeling great and denies any exertional chest pain or shortness of breath.  He walks frequently and cuts the lawn without any exertional symptoms.  He started CPAP in July and feels that his breathing and sleep are better.  He is now off of amlodipine given improvements in his blood pressure.    The following portions of the patient's history were reviewed and updated as appropriate: allergies, current medications, past family history, past medical history, past social history, past surgical  history, and problem list.    SINCE LAST OV I REVIEWED WITH THE PATIENT THE INTERIM LABS, TEST RESULTS, CONSULTANT(S) NOTES AND PERFORMED AN INTERIM REVIEW OF HISTORY    Past Medical History:   Diagnosis Date    Acne     Anxiety     Arthritis     Back pain     Chronic pain     Depression 2021    Diabetes mellitus ([facility])     6/19/15 A1C: 5.5%    Eczema     Grief reaction 09/10/2019    Hyperlipidemia     Hypertension     Knee pain     Morbid obesity with BMI of 50.0-59.9, adult ([facility])     IMELDA on CPAP     Psychiatric disorder     Skin tag     Type 2 diabetes mellitus without complication ([facility]) 2014    Urinary retention        Past Surgical History:   Procedure Laterality Date    BACK SURGERY      LUMBAR FUSION      LUMBAR FUSION      L4-5-6, S1 has pump for narcotics       Social History     Socioeconomic History    Marital status: Legally      Spouse name: Not on file    Number of children: Not on file    Years of education: Not on file    Highest education level: Not on file   Occupational History    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      Patient reports that he last smoked a cigar close to 2 months ago.    Vaping Use    Vaping status: Never Used   Substance and Sexual Activity    Alcohol use: Not Currently     Comment: occasional    Drug use: No     Comment: History of drug use, meena, cocaine, heroind, no IVDA-as per [employer]    Sexual activity: Yes     Partners: Female     Birth control/protection: Condom Male   Other Topics Concern    Not on file   Social History Narrative    Patient lives with his ex wife.      Social Determinants of Health     Financial Resource Strain: Low Risk  (10/15/2023)    Overall  Financial Resource Strain (CARDIA)     Difficulty of Paying Living Expenses: Not very hard   Food Insecurity: Not on file   Transportation Needs: No Transportation Needs (10/15/2023)    PRAPARE - Transportation     Lack of Transportation (Medical): No     Lack of Transportation (Non-Medical): No   Physical Activity: Not on file   Stress: Not on file   Social Connections: Not on file   Intimate Partner Violence: Not on file   Housing Stability: Not on file       Family History   Problem Relation Age of Onset    Other Mother         colitis    Diabetes Mother        Allergies   Allergen Reactions    Gabapentin      Annotation - [id number]: dizziness    Pregabalin      Annotation - [id number]: vision changes and mood changes    Tramadol Nausea Only       Current Outpatient Medications   Medication Sig Dispense Refill    alfuzosin (UROXATRAL) 10 mg 24 hr tablet Take 1 tablet (10 mg total) by mouth daily 30 tablet 3    apixaban (Eliquis) 5 mg Take 1 tablet (5 mg total) by mouth 2 (two) times a day 60 tablet 5    atorvastatin (LIPITOR) 20 mg tablet TAKE ONE TABLET BY MOUTH EVERY DAY 90 tablet 1    chlorthalidone 25 mg tablet Take 1 tablet (25 mg total) by mouth daily 90 tablet 3    losartan (COZAAR) 100 MG tablet Take 1 tablet (100 mg total) by mouth daily 90 tablet 3    metoprolol tartrate (LOPRESSOR) 100 mg tablet TAKE ONE TABLET BY MOUTH TWICE A  tablet 1    morphine (MS CONTIN) 15 mg 12 hr tablet as needed  0    oxyCODONE (ROXICODONE) 30 MG immediate release tablet as needed  0    polyethylene glycol (MIRALAX) 17 g packet Take 17 g by mouth daily 510 g 5    semaglutide, 1 mg/dose, (Ozempic, 1 MG/DOSE,) 4 mg/3 mL injection pen Inject 0.75 mL (1 mg total) under the skin every 7 days 3 mL 2    spironolactone (ALDACTONE) 25 mg tablet Take 0.5 tablets (12.5 mg total) by mouth daily 90 tablet 3    DULoxetine HCl 60 MG CSDR 60 mg (Patient not taking: Reported on 8/20/2024)      glucose blood (FREESTYLE LITE) test strip  "Use 1 each in the morning 100 each 0    hydrocortisone 2.5 % cream Apply topically to face BID for no longer than 7 days for flares (Patient not taking: Reported on 6/5/2024) 30 g 0    Lancets (freestyle) lancets Use as instructed 100 each 0    tadalafil (CIALIS) 5 MG tablet Take 5 mg by mouth as needed for erectile dysfunction (unsure dosage) (Patient not taking: Reported on 7/22/2024)       No current facility-administered medications for this visit.       /68 (BP Location: Left arm, Patient Position: Sitting, Cuff Size: Large)   Pulse 88   Ht 5' 9\" (1.753 m)   Wt (!) 147 kg (324 lb)   SpO2 96%   BMI 47.85 kg/m²     Review of Systems   All other systems reviewed and are negative.      Physical Exam  Vitals reviewed.   Constitutional:       General: He is not in acute distress.     Appearance: Normal appearance. He is well-developed. He is obese. He is not toxic-appearing.   HENT:      Head: Normocephalic and atraumatic.   Eyes:      General: No scleral icterus.  Cardiovascular:      Rate and Rhythm: Normal rate and regular rhythm.      Pulses: Normal pulses.      Heart sounds: Normal heart sounds. No murmur heard.  Pulmonary:      Effort: Pulmonary effort is normal. No respiratory distress.      Breath sounds: Normal breath sounds. No wheezing or rales.   Abdominal:      General: Bowel sounds are normal. There is no distension.      Palpations: Abdomen is soft.      Tenderness: There is no abdominal tenderness. There is no guarding.   Musculoskeletal:      Right lower leg: No edema.      Left lower leg: No edema.   Skin:     General: Skin is warm and dry.      Capillary Refill: Capillary refill takes less than 2 seconds.      Coloration: Skin is not jaundiced.   Neurological:      Mental Status: He is alert.   Psychiatric:         Mood and Affect: Mood normal.         Behavior: Behavior normal.          Lab Results   Component Value Date     09/28/2017     09/28/2017    K 4.4 07/19/2024    "  07/19/2024    CO2 23 07/19/2024    BUN 35 (H) 07/19/2024    CREATININE 2.13 (H) 07/19/2024    GLUCOSE 148 (H) 10/14/2016    CALCIUM 9.4 07/19/2024    ALT 12 05/28/2024    AST 10 (L) 05/28/2024    INR 0.97 06/28/2023       Lab Results   Component Value Date    CHOL 109 10/14/2016    HDL 35 (L) 05/28/2024    LDLCALC 58 05/28/2024    TRIG 66 05/28/2024       Lab Results   Component Value Date    WBC 12.92 (H) 05/28/2024    HGB 14.0 05/28/2024    HCT 42.2 05/28/2024     05/28/2024       Lab Results   Component Value Date     07/19/2024    HGBA1C 6.1 (H) 07/19/2024       Cardiac studies:   Results for orders placed or performed in visit on 08/22/24   POCT ECG    Impression    Normal sinus rhythm, shallow TWI lateral leads  No significant change from prior ECG dated 8/29/23     ECG- 8/29/2023: Normal sinus rhythm, T wave abnormality consider anterolateral ischemia  ECG-6/20/2023: Normal sinus rhythm, T wave abnormality, consider lateral ischemia and    ASSESSMENT AND PLAN:  Anand was seen today for follow-up.    Diagnoses and all orders for this visit:    Thoracic aortic ectasia (HCC):Some discrepancy in measurements from his initial CTA measuring 4.4 cm and 4.5 cm a couple days later.  His echocardiogram measurement was 4.2 cm.    -Due for repeat imaging but given CKD we will start check transthoracic echo rather than CTA  - Blood pressure is at goal  -     Echo follow up/limited w/ contrast if indicated; Future    History of pulmonary embolus (PE):Admitted on 6/28/2023 for shortness of breath.  CTA PE study demonstrated segmental and subsegmental pulmonary emboli.  He was started on heparin and transitioned to apixaban.  He has followed up with pulmonology in the meantime.  PE was thought to be provoked in the setting of inactivity and obesity.  -He remains on apixaban 5 mg twice daily    Essential hypertension: His blood pressure is well-controlled and at goal.  Recommend continuation of  chlorthalidone 25 mg, losartan 100 mg, metoprolol tartrate 100 mg twice daily.  He is now off of amlodipine due to improving blood pressure.  He also takes Aldactone 25 mg daily.    Hyperlipidemia, unspecified hyperlipidemia type: Prior CT imaging does show coronary artery calcifications.  Significant improvement with resumption of atorvastatin.  Lipid panel from May demonstrates total cholesterol 106, triglycerides 66, HDL 35, LDL 58.        Crow Means MD

## 2024-08-23 ENCOUNTER — TELEPHONE (OUTPATIENT)
Age: 64
End: 2024-08-23

## 2024-08-23 DIAGNOSIS — N18.31 STAGE 3A CHRONIC KIDNEY DISEASE (HCC): Primary | ICD-10-CM

## 2024-08-23 NOTE — TELEPHONE ENCOUNTER
Patient called in to give his blood pressure readings. Was unable to connect with C[facility]. Call then had a poor connection and patient hung up. Can you contact him to get his blood pressure readings? Thank you

## 2024-08-23 NOTE — TELEPHONE ENCOUNTER
Called patient back- his BP readings for the week:    Weekly Blood Pressure Results   Date  8/20 8/20 8/21 8/22 8/23     Time AM  PM  AM   AM  AM       Blood Pressure 98/58  104/60 108/53 114/68 120/67     Pulse 84  83 73 88 81     Position of patient  Sitting  Sitting Sitting   Sitting Sitting        Additional Information                   Weekly Blood Pressure Results   Date           Time                 Blood Pressure           Pulse           Position of patient                Additional Information

## 2024-08-26 ENCOUNTER — DOCUMENTATION (OUTPATIENT)
Dept: NEPHROLOGY | Facility: CLINIC | Age: 64
End: 2024-08-26

## 2024-08-26 NOTE — TELEPHONE ENCOUNTER
Hello    Welcome back    Can you please let the patient know that his blood pressures are improving since we held one of his antihypertensives last week on 8/19.  If he is feeling better at these blood pressures of 120 systolic which was noted on 8/23, then no changes for now and I am seeing him next week.  If he is still not feeling well overall please let me know what complaints he has, please also ask him to lower losartan to half a tablet a day.  He is currently on 100 mg tablet 1 tablet once a day    Please ask him to continue his blood pressure log once daily and bring to appointment upcoming    Please remind him to repeat lab work before his appointment-CMP, CBC, UA, UPCR, magnesium, phosphorus    Thank you

## 2024-08-26 NOTE — TELEPHONE ENCOUNTER
"Patient returning call, relayed the above message and he states he is feeling better. He does feel light headed and \"weird\" when his systolic goes below 105 and diastolic below 70. He will cut losartan in half and get labs done prior to appt.   "

## 2024-08-27 ENCOUNTER — APPOINTMENT (OUTPATIENT)
Dept: LAB | Facility: CLINIC | Age: 64
End: 2024-08-27
Payer: COMMERCIAL

## 2024-08-27 DIAGNOSIS — N18.31 STAGE 3A CHRONIC KIDNEY DISEASE (HCC): ICD-10-CM

## 2024-08-27 LAB
ALBUMIN SERPL BCG-MCNC: 4 G/DL (ref 3.5–5)
ALP SERPL-CCNC: 75 U/L (ref 34–104)
ALT SERPL W P-5'-P-CCNC: 12 U/L (ref 7–52)
ANION GAP SERPL CALCULATED.3IONS-SCNC: 12 MMOL/L (ref 4–13)
AST SERPL W P-5'-P-CCNC: 13 U/L (ref 13–39)
BILIRUB SERPL-MCNC: 0.57 MG/DL (ref 0.2–1)
BUN SERPL-MCNC: 31 MG/DL (ref 5–25)
CALCIUM SERPL-MCNC: 9.4 MG/DL (ref 8.4–10.2)
CHLORIDE SERPL-SCNC: 103 MMOL/L (ref 96–108)
CO2 SERPL-SCNC: 22 MMOL/L (ref 21–32)
CREAT SERPL-MCNC: 2.02 MG/DL (ref 0.6–1.3)
ERYTHROCYTE [DISTWIDTH] IN BLOOD BY AUTOMATED COUNT: 13.8 % (ref 11.6–15.1)
GFR SERPL CREATININE-BSD FRML MDRD: 33 ML/MIN/1.73SQ M
GLUCOSE P FAST SERPL-MCNC: 96 MG/DL (ref 65–99)
HCT VFR BLD AUTO: 36.7 % (ref 36.5–49.3)
HGB BLD-MCNC: 12.2 G/DL (ref 12–17)
MAGNESIUM SERPL-MCNC: 1.6 MG/DL (ref 1.9–2.7)
MCH RBC QN AUTO: 29.6 PG (ref 26.8–34.3)
MCHC RBC AUTO-ENTMCNC: 33.2 G/DL (ref 31.4–37.4)
MCV RBC AUTO: 89 FL (ref 82–98)
PHOSPHATE SERPL-MCNC: 3.7 MG/DL (ref 2.3–4.1)
PLATELET # BLD AUTO: 228 THOUSANDS/UL (ref 149–390)
PMV BLD AUTO: 11.2 FL (ref 8.9–12.7)
POTASSIUM SERPL-SCNC: 4.7 MMOL/L (ref 3.5–5.3)
PROT SERPL-MCNC: 7.3 G/DL (ref 6.4–8.4)
RBC # BLD AUTO: 4.12 MILLION/UL (ref 3.88–5.62)
SODIUM SERPL-SCNC: 137 MMOL/L (ref 135–147)
WBC # BLD AUTO: 9.38 THOUSAND/UL (ref 4.31–10.16)

## 2024-08-27 PROCEDURE — 83735 ASSAY OF MAGNESIUM: CPT

## 2024-08-27 PROCEDURE — 36415 COLL VENOUS BLD VENIPUNCTURE: CPT

## 2024-08-27 PROCEDURE — 80053 COMPREHEN METABOLIC PANEL: CPT

## 2024-08-27 PROCEDURE — 84100 ASSAY OF PHOSPHORUS: CPT

## 2024-08-27 PROCEDURE — 85027 COMPLETE CBC AUTOMATED: CPT

## 2024-08-28 DIAGNOSIS — I10 ESSENTIAL HYPERTENSION: ICD-10-CM

## 2024-08-28 RX ORDER — CHLORTHALIDONE 25 MG/1
25 TABLET ORAL DAILY
Qty: 90 TABLET | Refills: 0 | Status: SHIPPED | OUTPATIENT
Start: 2024-08-28

## 2024-08-29 ENCOUNTER — APPOINTMENT (OUTPATIENT)
Dept: LAB | Facility: CLINIC | Age: 64
End: 2024-08-29
Payer: COMMERCIAL

## 2024-08-29 LAB
BACTERIA UR QL AUTO: NORMAL /HPF
BILIRUB UR QL STRIP: NEGATIVE
CLARITY UR: CLEAR
COLOR UR: NORMAL
CREAT UR-MCNC: 65.2 MG/DL
GLUCOSE UR STRIP-MCNC: NEGATIVE MG/DL
HGB UR QL STRIP.AUTO: NEGATIVE
KETONES UR STRIP-MCNC: NEGATIVE MG/DL
LEUKOCYTE ESTERASE UR QL STRIP: NEGATIVE
NITRITE UR QL STRIP: NEGATIVE
NON-SQ EPI CELLS URNS QL MICRO: NORMAL /HPF
PH UR STRIP.AUTO: 6 [PH]
PROT UR STRIP-MCNC: NEGATIVE MG/DL
PROT UR-MCNC: 5.4 MG/DL
PROT/CREAT UR: 0.1 MG/G{CREAT} (ref 0–0.1)
RBC #/AREA URNS AUTO: NORMAL /HPF
SP GR UR STRIP.AUTO: 1.01 (ref 1–1.03)
UROBILINOGEN UR STRIP-ACNC: <2 MG/DL
WBC #/AREA URNS AUTO: NORMAL /HPF

## 2024-08-29 PROCEDURE — 84156 ASSAY OF PROTEIN URINE: CPT

## 2024-08-29 PROCEDURE — 82570 ASSAY OF URINE CREATININE: CPT

## 2024-08-29 PROCEDURE — 81001 URINALYSIS AUTO W/SCOPE: CPT

## 2024-09-03 ENCOUNTER — TELEPHONE (OUTPATIENT)
Dept: GASTROENTEROLOGY | Facility: CLINIC | Age: 64
End: 2024-09-03

## 2024-09-03 NOTE — TELEPHONE ENCOUNTER
Patient is aware of approval for 2 day hold for Eliquis.  He is planning on keeping appointment for 09/18 for Colonoscopy.

## 2024-09-03 NOTE — TELEPHONE ENCOUNTER
Our mutual patient is scheduled for procedure: COLONOSCOPY    On: TBT     With: Dr. ZAMORA    He/She is taking the following blood thinner:   ELIQUIS       Can this be stopped ___2___ days prior to the procedure?      Physician Approving clearance: ________________________

## 2024-09-04 ENCOUNTER — PREP FOR PROCEDURE (OUTPATIENT)
Dept: GASTROENTEROLOGY | Facility: CLINIC | Age: 64
End: 2024-09-04

## 2024-09-04 DIAGNOSIS — Z12.11 SCREENING FOR COLON CANCER: Primary | ICD-10-CM

## 2024-09-04 RX ORDER — BISACODYL 5 MG/1
20 TABLET, DELAYED RELEASE ORAL ONCE
Qty: 4 TABLET | Refills: 0 | Status: SHIPPED | OUTPATIENT
Start: 2024-09-04 | End: 2024-09-05 | Stop reason: ALTCHOICE

## 2024-09-04 NOTE — TELEPHONE ENCOUNTER
Scheduled date of colonoscopy (as of today):09.25.24  Physician performing colonoscopy:DR ZAMORA  Location of colonoscopy:  Bowel prep reviewed with patient:ROSSANA  Instructions reviewed with patient by:SENT VIA Ether[employer]  Clearances: ELIQUIS

## 2024-09-05 ENCOUNTER — OFFICE VISIT (OUTPATIENT)
Dept: FAMILY MEDICINE CLINIC | Facility: CLINIC | Age: 64
End: 2024-09-05
Payer: COMMERCIAL

## 2024-09-05 VITALS
TEMPERATURE: 97.6 F | HEART RATE: 74 BPM | HEIGHT: 69 IN | DIASTOLIC BLOOD PRESSURE: 74 MMHG | SYSTOLIC BLOOD PRESSURE: 120 MMHG | OXYGEN SATURATION: 99 % | RESPIRATION RATE: 15 BRPM | WEIGHT: 315 LBS | BODY MASS INDEX: 46.65 KG/M2

## 2024-09-05 DIAGNOSIS — N18.32 TYPE 2 DIABETES MELLITUS WITH STAGE 3B CHRONIC KIDNEY DISEASE, WITHOUT LONG-TERM CURRENT USE OF INSULIN (HCC): ICD-10-CM

## 2024-09-05 DIAGNOSIS — I26.99 OTHER ACUTE PULMONARY EMBOLISM WITHOUT ACUTE COR PULMONALE (HCC): ICD-10-CM

## 2024-09-05 DIAGNOSIS — N18.32 STAGE 3B CHRONIC KIDNEY DISEASE (HCC): ICD-10-CM

## 2024-09-05 DIAGNOSIS — F11.20 CONTINUOUS OPIOID DEPENDENCE (HCC): ICD-10-CM

## 2024-09-05 DIAGNOSIS — M54.41 CHRONIC BILATERAL LOW BACK PAIN WITH BILATERAL SCIATICA: ICD-10-CM

## 2024-09-05 DIAGNOSIS — E83.42 HYPOMAGNESEMIA: Primary | ICD-10-CM

## 2024-09-05 DIAGNOSIS — E78.2 MIXED HYPERLIPIDEMIA: ICD-10-CM

## 2024-09-05 DIAGNOSIS — M54.42 CHRONIC BILATERAL LOW BACK PAIN WITH BILATERAL SCIATICA: ICD-10-CM

## 2024-09-05 DIAGNOSIS — E66.01 MORBID OBESITY WITH BMI OF 45.0-49.9, ADULT (HCC): ICD-10-CM

## 2024-09-05 DIAGNOSIS — N40.1 BPH WITH OBSTRUCTION/LOWER URINARY TRACT SYMPTOMS: ICD-10-CM

## 2024-09-05 DIAGNOSIS — G89.29 CHRONIC BILATERAL LOW BACK PAIN WITH BILATERAL SCIATICA: ICD-10-CM

## 2024-09-05 DIAGNOSIS — E55.9 VITAMIN D DEFICIENCY: ICD-10-CM

## 2024-09-05 DIAGNOSIS — I77.810 THORACIC AORTIC ECTASIA (HCC): ICD-10-CM

## 2024-09-05 DIAGNOSIS — E11.22 TYPE 2 DIABETES MELLITUS WITH STAGE 3B CHRONIC KIDNEY DISEASE, WITHOUT LONG-TERM CURRENT USE OF INSULIN (HCC): ICD-10-CM

## 2024-09-05 DIAGNOSIS — G47.33 OSA (OBSTRUCTIVE SLEEP APNEA): ICD-10-CM

## 2024-09-05 DIAGNOSIS — N13.8 BPH WITH OBSTRUCTION/LOWER URINARY TRACT SYMPTOMS: ICD-10-CM

## 2024-09-05 DIAGNOSIS — Z12.5 SCREENING FOR PROSTATE CANCER: ICD-10-CM

## 2024-09-05 DIAGNOSIS — I10 ESSENTIAL HYPERTENSION: ICD-10-CM

## 2024-09-05 PROBLEM — F33.9 RECURRENT MAJOR DEPRESSIVE DISORDER, REMISSION STATUS UNSPECIFIED (HCC): Status: RESOLVED | Noted: 2023-07-03 | Resolved: 2024-09-05

## 2024-09-05 PROBLEM — M65.332 TRIGGER FINGER, LEFT MIDDLE FINGER: Status: RESOLVED | Noted: 2018-07-23 | Resolved: 2024-09-05

## 2024-09-05 PROBLEM — Z87.891 HISTORY OF CIGAR SMOKING: Status: RESOLVED | Noted: 2021-07-20 | Resolved: 2024-09-05

## 2024-09-05 PROBLEM — M79.645 PAIN IN FINGER OF BOTH HANDS: Status: RESOLVED | Noted: 2018-07-23 | Resolved: 2024-09-05
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
## 2024-09-05 PROBLEM — D64.9 ANEMIA: Status: RESOLVED | Noted: 2019-10-28 | Resolved: 2024-09-05

## 2024-09-05 PROBLEM — Z23 FLU VACCINE NEED: Status: RESOLVED | Noted: 2023-10-16 | Resolved: 2024-09-05

## 2024-09-05 PROBLEM — R29.818 SUSPECTED SLEEP APNEA: Status: RESOLVED | Noted: 2023-08-18 | Resolved: 2024-09-05

## 2024-09-05 PROBLEM — R79.89 ABNORMAL CBC: Status: RESOLVED | Noted: 2020-01-20 | Resolved: 2024-09-05

## 2024-09-05 PROBLEM — K02.9 DENTAL CAVITY: Status: RESOLVED | Noted: 2017-08-10 | Resolved: 2024-09-05

## 2024-09-05 PROBLEM — Z59.9 FINANCIAL DIFFICULTIES: Status: RESOLVED | Noted: 2022-09-12 | Resolved: 2024-09-05

## 2024-09-05 PROBLEM — M79.644 PAIN IN FINGER OF BOTH HANDS: Status: RESOLVED | Noted: 2018-07-23 | Resolved: 2024-09-05

## 2024-09-05 PROBLEM — N64.4 NIPPLE TENDERNESS: Status: RESOLVED | Noted: 2023-10-16 | Resolved: 2024-09-05

## 2024-09-05 PROBLEM — F32.A MILD DEPRESSION: Status: RESOLVED | Noted: 2024-05-22 | Resolved: 2024-09-05

## 2024-09-05 PROCEDURE — 99214 OFFICE O/P EST MOD 30 MIN: CPT | Performed by: FAMILY MEDICINE

## 2024-09-05 PROCEDURE — G2211 COMPLEX E/M VISIT ADD ON: HCPCS | Performed by: FAMILY MEDICINE

## 2024-09-05 RX ORDER — LOSARTAN POTASSIUM 100 MG/1
50 TABLET ORAL DAILY
Start: 2024-09-05

## 2024-09-05 RX ORDER — LANOLIN ALCOHOL/MO/W.PET/CERES
400 CREAM (GRAM) TOPICAL 2 TIMES DAILY
Start: 2024-09-05

## 2024-09-05 NOTE — PATIENT INSTRUCTIONS
Find magnesium oxide over-the-counter and take 40 mg twice a day    Talk to your pain doctor about going back on duloxetine to try to decrease some of the morphine    See you back in 6 months or sooner if needed  One or 2 weeks prior have fasting blood work and urine done so we can go over them together when you come in

## 2024-09-05 NOTE — PROGRESS NOTES
ASSESSMENT/PLAN:    Low magnesium  Patient was unaware so we discussed how we will go even though bariatric surgery  To begin magnesium 400 twice daily  Check level before next visit    BPH with obstruction  Also follows with urology  Latest postvoid reviewed decidual is 90 cc  Nausea with Flomax  On a flu visit and doing well    Chronic back pain/opioid addiction dependency  Follows with pain management  Discussed considering duloxetine in the future to help decrease the amount of morphine he is currently taking    Essential hypertension  Excellent blood pressure 120/74  Controlled with chlorthalidone losartan metoprolol and Ozempic and spironolactone  Also follows with nephrology    Next hyperlipidemia  Continue atorvastatin  Last recent cholesterol 106 with a LDL of 58    Morbid obesity with a BMI between 45 and 50  Following with bariatrics in anticipation of gastric bypass  Has a EGD and colonoscopy set up  Is currently through bariatrics on Ozempic and will continue    Obstructive sleep apnea  On CPAP and doing well  Will continue the same    This year had multiple bilateral pulmonary embolism  Will be on apixaban indefinitely  Etiology never found    Stage IIIb chronic kidney disease  GFR low at 33  Being managed through nephrology  Keeps himself well-hydrated  Need to watch use of Ozempic in light of chronic kidney failure    Thoracic ectasia  CTA of the chest June 2023  Cardiology anticipating ultrasound because of his chronic kidney disease and to avoid using any other contrast    Type 2 diabetes with chronic kidney disease  Last A1c excellent 6.1 from 7.2  Continue Ozempic through nephrology and bariatrics      Recheck patient in 6 months since he is seeing someone a specialist  Will do screening blood work as well as PSA prior  Recheck sooner if needed          Depression Screening and Follow-up Plan: Patient was screened for depression during today's encounter. They screened negative with a PHQ-9 score  of 0.    Tobacco Cessation Counseling: The patient is sincerely urged to quit consumption of tobacco. He is not ready to quit tobacco.            Health Maintenance   Topic Date Due    Hepatitis A Vaccine (1 of 2 - Risk 2-dose series) Never done    Zoster Vaccine (1 of 2) Never done    Pneumococcal Vaccine: Pediatrics (0 to 5 Years) and At-Risk Patients (6 to 64 Years) (2 of 2 - PCV) 11/16/2016    RSV Vaccine Age 60+ Years (1 - 1-dose 60+ series) Never done    Colorectal Cancer Screening  06/01/2024    DM Eye Exam  08/16/2024    COVID-19 Vaccine (1 - 2023-24 season) Never done    Influenza Vaccine (1) 09/01/2024    Medicare Annual Wellness Visit (AWV)  10/16/2024    Diabetic Foot Exam  10/16/2024    HIV Screening  09/05/2026 (Originally 9/15/1975)    Kidney Health Evaluation: Albumin/Creatinine Ratio  12/09/2024    HEMOGLOBIN A1C  01/19/2025    Kidney Health Evaluation: GFR  08/27/2025    Depression Screening  09/05/2025    Hepatitis C Screening  Completed    RSV Vaccine age 0-20 Months  Aged Out    HIB Vaccine  Aged Out    IPV Vaccine  Aged Out    Meningococcal ACWY Vaccine  Aged Out    HPV Vaccine  Aged Out         Problem List as of 9/5/2024 Reviewed: 9/5/2024 11:49 AM by Harini Nogueira DO      Allergic rhinitis    Last Assessment & Plan 6/12/2018 Office Visit Written 6/12/2018  2:17 PM by MARJORIE Chavez xyzal prn.          Bilateral primary osteoarthritis of knee    BPH with obstruction/lower urinary tract symptoms    Last Assessment & Plan 8/14/2024 Office Visit Written 8/14/2024  8:19 AM by Collins Power PA-C     AUA symptom score 18  PVR performed in the office today found to be 80 mL  Patient previously trialed Flomax 0.4 mg and noticed an improvement in his urinary stream and overall voiding pattern, but discontinued the use of the medication due to nausea and abdominal discomfort.  We discussed pharmacotherapy including alpha blockers versus 5 alpha reductase inhibitors and daily  low-dose Cialis.  The patient will trial alfuzosin 10 mg once daily with dinner.  Side effects of dizziness, lightheadedness, and feeling fatigued/tired were discussed.  The patient will return to the office in 3 to 4 months with PVR         Chronic bilateral low back pain with bilateral sciatica    Last Assessment & Plan 6/28/2023 Hospital Encounter Written 6/29/2023  1:42 PM by Patrick Resendiz DO     Without evidence of acute exacerbation  Continue home regimen with morphine and oxycodone PRN   Continued topical Voltaren for admission         Chronic diastolic heart failure (HCC)    Continuous opioid dependence (HCC)    Last Assessment & Plan 6/28/2023 Hospital Encounter Edited 6/29/2023  1:41 PM by Patrick Resendiz DO     -Takes Oxy 30 IR q6 prn and MS Contin 15 q8 prn at home for chronic back pain  -Continue home regimen, add miralax daily to prevent constipation         Dyspnea on exertion    Last Assessment & Plan 6/28/2023 Hospital Encounter Edited 6/30/2023  1:00 PM by Patrick Resendiz DO     Lab Results   Component Value Date    BNP 33 06/28/2023     Reporting SOB with ambulation for 4-5 days prior to admission  Reports URI symptoms at time of onset which have since resolved; denies cough currently  Lungs clear to auscultation     Hx of morbid obesity and cigar smoking   No heart failure history on file  BNP WDL   D-Dimer 1.27   Remainder of laboratory workup unremarkable  Saturating >90% on ambient air at rest   Received Xopenex and Atrovent in ED with symptomatic improvement  EKG showed NSR at 87 BPM with no ischemic changes  CXR official read pending; no acute pathology noted on wet read  CT PE study 6/28  equivocal; segmental/sub-segmental PE vs artifact   Repeat CT PE 6/30: Segmental and subsegmental pulmonary emboli, confirmed on repeat examination.  VAS showed no clot in either lower extremity  No evidence or R heart strain on echo  Therapeutic heparin started in ED    Plan  Stable for discharge   Start eliquis  for discharge, 7 day loading dose of 10mg BID followed by 5 mg BID  Recommend outpatient stress test           Erectile dysfunction    Last Assessment & Plan 8/14/2024 Office Visit Written 8/14/2024  8:21 AM by Collins Power PA-C     Previously trialed Cialis 5 mg as needed for his erectile dysfunction  The patient noted that he was taking the medication intermittently and not daily, but was not noticing any benefit on the days he was taking it.  We discussed that the patient could trial Cialis 10 mg as needed 1 hour prior to sexual intercourse.  The patient states that he gets the medication online as it is cheaper this way, and no prescription will be placed at today's visit.         Essential hypertension    Last Assessment & Plan 6/28/2023 Hospital Encounter Written 6/29/2023  1:42 PM by Patrick Resendiz DO     BP Readings from Last 3 Encounters:   06/29/23 136/90   06/23/23 118/60   02/20/23 120/78     Plan:  Continue chlorthalidone, metoprolol, losartan, spironolactone and amlodipine  Monitor blood pressure per unit routine              Fatigue    Hypokalemia    Last Assessment & Plan 6/12/2018 Office Visit Written 6/12/2018  2:18 PM by MARJORIE Chavez     Continue to follow-up with nephrology.          Hypomagnesemia    It band syndrome, right    Localized osteoarthritis of left knee    Localized osteoarthritis of right knee    Lumbar radiculopathy    Last Assessment & Plan 6/12/2018 Office Visit Written 6/12/2018  2:18 PM by MARJORIE Chavez     Patient encouraged to continue to follow-up with pain management.          Microalbuminuria    Mixed hyperlipidemia    Last Assessment & Plan 6/28/2023 Hospital Encounter Written 6/29/2023  1:42 PM by Patrick Resendiz DO     06/19/2023  Lab Results   Component Value Date    CHOLESTEROL 189 06/19/2023    TRIG 218 (H) 06/19/2023    HDL 35 (L) 06/19/2023    LDLCALC 110 (H) 06/19/2023       Continue home atorvastatin 20 mg daily  Counseled lifestyle and diet  modification         Morbid obesity with BMI of 45.0-49.9, adult (HCC)    Last Assessment & Plan 2/20/2023 Office Visit Written 2/20/2023  3:48 PM by Ja Nunez MD     Advised lower portion. Attempting exercise with knee pain is difficult         IMELDA (obstructive sleep apnea)    Other pulmonary embolism without acute cor pulmonale (HCC)    Last Assessment & Plan 3/26/2024 Office Visit Edited 3/26/2024  9:14 PM by Terrence Pugh MD     Bilateral segmental and subsegmental PE diagnosed via 6/30/2023 CTA chest.  Low risk PE, no signs of right heart strain.  Contributing factors likely inactivity due to chronic knee OA, and obesity      He has completed 6 months of Eliquis 5mg BID and elects to continue this regimen in the absence of side effects and unmodified risk factors.      PE likely provoked by inactivity/obesity.  No procoagulable workup needed right now due to no blood clot history before this.  If having a 2nd VTE in the future, will need procoagulable work up then.    Since he is past 6 months from his PE, the risk of holding Eliquis for procedures is decreased.  If he needs a procedure in the future holding Eliquis for at least 48 hours prior is recommended.  Eliquis should be resumed as soon as deemed safe by the proceduralist.            Patellofemoral arthritis of left knee    Persistent proteinuria    Rosacea    Last Assessment & Plan 6/28/2023 Hospital Encounter Written 6/29/2023  1:42 PM by Patrick Resendiz DO     Continue topical steroid as needed for symtoms         Stage 3b chronic kidney disease (HCC)    Last Assessment & Plan 6/28/2023 Hospital Encounter Written 6/29/2023  1:42 PM by Patrick Resendiz DO     Lab Results   Component Value Date    EGFR 49 06/29/2023    EGFR 50 06/28/2023    EGFR 40 06/19/2023    CREATININE 1.49 (H) 06/29/2023    CREATININE 1.47 (H) 06/28/2023    CREATININE 1.74 (H) 06/19/2023     In setting of T2DM and hypertension  Received contrast for CT PE study in ED  Renal  function currently at apparent baseline   BP control as above; continue home regimen           Thoracic aortic ectasia (HCC)    Type 2 diabetes mellitus with stage 3b chronic kidney disease, without long-term current use of insulin (HCC)    Last Assessment & Plan 6/28/2023 Hospital Encounter Edited 6/30/2023  1:02 PM by Patrick Resendiz DO     Lab Results   Component Value Date    HGBA1C 6.2 (H) 06/19/2023     Recent Labs     06/29/23  1536 06/29/23  2041 06/30/23  0715 06/30/23  1059   POCGLU 144* 118 138 184*       Blood Sugar Average: Last 72 hrs:  (P) [id number]   Plan:  Continue chlorthalidone  Diet Consistent CHO Level 2 (5 CHO servings/75g CHO per meal)  Insulin regimen  SSI while inpatient   Glucose checks and Insulin correction ACHS  Goal -180 while admitted, adjusting insulin regimen as appropriate  Monitor for hypoglycemia and treat per protocol           Vitamin D deficiency         Subjective:   Chief Complaint   Patient presents with    [facility] [place] [facility]     Patient here to establish himself as a patient to live closer to home    Information for the last 3 months reviewed in his chart including notes from pain management, cardiology, pulmonary, urology, bariatrics, GI, and labs        patient ID: Nasir Story Jr. is a 63 y.o. male.    Patient's past medical history, surgical history, family history, social history, and Tobacco history reviewed with patient.     MED LIST WAS REVIEWED AND UPDATED    ROS  As per HPI  Rest of 12 point review of systems negative     Objective:      VITALS:  Wt Readings from Last 3 Encounters:   09/05/24 (!) 147 kg (323 lb)   08/22/24 (!) 147 kg (324 lb)   08/20/24 (!) 148 kg (325 lb 3.2 oz)     BP Readings from Last 3 Encounters:   09/05/24 120/74   08/22/24 114/68   08/20/24 98/58     Pulse Readings from Last 3 Encounters:   09/05/24 74   08/22/24 88   08/20/24 84     Body mass index is 47.7 kg/m².    Laboratory Results:   All pertinent labs and studies were  reviewed with patient during this office visit with highlights of the results contained in this note in the ASSESSMENT AND PLAN section       Physical Exam  General  Patient in no acute distress, well appearing, well nourished and appears stated age    Mental status  Good judgment and insight, oriented to time person and place, recent and remote memory is intact, mood and affect are normal, cooperative, and patient is reasonable.        I have spent a total time of 40 minutes in caring for this patient on the day of the visit/encounter including Diagnostic results, Prognosis, Risks and benefits of tx options, Instructions for management, Impressions, Counseling / Coordination of care, Documenting in the medical record, Reviewing / ordering tests, medicine, procedures  , and Obtaining or reviewing history  .

## 2024-09-06 ENCOUNTER — OFFICE VISIT (OUTPATIENT)
Dept: NEPHROLOGY | Facility: CLINIC | Age: 64
End: 2024-09-06
Payer: COMMERCIAL

## 2024-09-06 VITALS
WEIGHT: 315 LBS | BODY MASS INDEX: 46.65 KG/M2 | DIASTOLIC BLOOD PRESSURE: 72 MMHG | HEIGHT: 69 IN | SYSTOLIC BLOOD PRESSURE: 114 MMHG

## 2024-09-06 DIAGNOSIS — E78.5 HYPERLIPIDEMIA, UNSPECIFIED HYPERLIPIDEMIA TYPE: ICD-10-CM

## 2024-09-06 DIAGNOSIS — E11.22 TYPE 2 DIABETES MELLITUS WITH STAGE 3B CHRONIC KIDNEY DISEASE, WITHOUT LONG-TERM CURRENT USE OF INSULIN (HCC): ICD-10-CM

## 2024-09-06 DIAGNOSIS — I10 ESSENTIAL HYPERTENSION: ICD-10-CM

## 2024-09-06 DIAGNOSIS — D64.9 ANEMIA, UNSPECIFIED TYPE: ICD-10-CM

## 2024-09-06 DIAGNOSIS — N18.32 STAGE 3B CHRONIC KIDNEY DISEASE (HCC): ICD-10-CM

## 2024-09-06 DIAGNOSIS — G47.33 OSA (OBSTRUCTIVE SLEEP APNEA): ICD-10-CM

## 2024-09-06 DIAGNOSIS — N18.32 TYPE 2 DIABETES MELLITUS WITH STAGE 3B CHRONIC KIDNEY DISEASE, WITHOUT LONG-TERM CURRENT USE OF INSULIN (HCC): ICD-10-CM

## 2024-09-06 DIAGNOSIS — Z01.818 PRE-OPERATIVE CLEARANCE: ICD-10-CM

## 2024-09-06 DIAGNOSIS — E66.01 MORBID OBESITY WITH BMI OF 45.0-49.9, ADULT (HCC): ICD-10-CM

## 2024-09-06 DIAGNOSIS — I50.32 CHRONIC DIASTOLIC HEART FAILURE (HCC): ICD-10-CM

## 2024-09-06 PROCEDURE — 99214 OFFICE O/P EST MOD 30 MIN: CPT | Performed by: INTERNAL MEDICINE

## 2024-09-06 RX ORDER — CHLORTHALIDONE 25 MG/1
12.5 TABLET ORAL DAILY
Start: 2024-09-06

## 2024-09-06 NOTE — LETTER
September 6, 2024     Harini Nogueira DO  [address]  [address]  [facility] [address]    Patient: Nasir Story Jr.   YOB: 1960   Date of Visit: 9/6/2024       Dear Dr. Nogueira:    Thank you for referring Nasir Story to me for evaluation. Below are my notes for this consultation.    If you have questions, please do not hesitate to call me. I look forward to following your patient along with you.         Sincerely,        Ja Nunez MD        CC: MD Arturo Marks MD Nikunjkumar T Patel, MD  9/6/2024  8:02 AM  Sign when Signing Visit  NEPHROLOGY OFFICE VISIT   Nasir Story Jr. 63 y.o. male MRN: [id number]  9/6/2024    Reason for Visit: CKD III    ASSESSMENT and PLAN:    I had the pleasure of seeing Mr Story today in the renal clinic for the continued management of CKD III.     62 yo male retired , formerly from [place], HTN (15 years), DM (years), back surgery 10 years ago, diastolic CHF, pulmonary embolism diagnosed in June 2023, who presents for follow up evaluation of hypertension.  patient has had sporadic follow-up but has been slightly more consistent with follow-up since end of last year.  I last saw the patient in August 2018 and then patient followed up with our advanced practitioner.  Patient returns today for follow-up visit      Patient has stopped using NSAIDs since 2019      Since last being seen, patient was seen by her practitioner in June 2023.  Blood pressures were controlled.  Creatinine was at baseline.     Patient was admitted to the hospital worsening shortness of breath in June 2023.  There was concern for possible URI.  Patient was found to have pulmonary embolism on repeat CT scan and was started on Eliquis     Saw pulmonary team August 2023 and was advised to continue 6 months full dose Eliquis.  It was felt to be provoked in the setting of inactivity.  Was also advised to have sleep study..     1) HTN - - patient is  on losartan, metoprolol, chlorthalidone, alpha Zosyn     -prior renin and aldosterone level with a renin level suppressed, aldosterone level is not significantly elevated  - prior metanephrine unrevealing  - prior CT scan with unremarkable adrenals  - renal Doppler study in November 2019 with no significant occlusive disease.  - during visit in march clonidine was tapered off, chlorthalidone was increased chlorthalidone to 25 mg daily  -  Creatinine stable in May 2021 at 1.3 mg/dL.  Blood pressures are appropriate.  Patient is still gaining weight.  -February 2022 appointment-no lab work since October.  Creatinine in October was rising and patient has not gone for follow-up lab work.  -September 2022-hold doxazosin due to hypotension  - February 2023-no changes to antihypertensives.  Add tamsulosin.  Renal ultrasound with slightly increased cyst size but otherwise benign appearing.  - December 2023-creatinine stable 1.7 mg/dL.  Electrolytes are stable and appropriate with low vitamin D level.  Parathyroid level is appropriate.  Protein creatinine ratio is stable at 21.  - May 2024-spironolactone reduced to 12 and half milligrams per day due to hyperkalemia  - August 2024-patient was starting to have low blood pressures to as low as 98 systolic.  We held amlodipine completely at that time.  - August 26, 2024-losartan reduced to 50 mg daily from 100 mg daily due to continued marginal blood pressures.     Overall, patient presents for follow-up appointment September 6, 2024.  Since starting Ozempic, patient's creatinine did increase slightly but it is now stabilized at 2 mg/dL.  Also contributing to rising creatinine may have been marginal blood pressures.  Sodium, potassium, bicarbonate, phosphorus are appropriate.  Magnesium was borderline low at 1.6 and was started on magnesium supplement by PCP on September 5, 2024.  Hemoglobin is at goal.  Urinalysis is bland with UPCR minimal 0.1 g/g. SBP at home is still   but mainly above 100. Without BP meds this AM, SBP is 120 systolic.  Overall, since starting alpha Zosyn, weight loss, Ozempic creatinine has increased slightly but this is likely multifactorial in the setting of marginal blood pressures at times, Ozempic.  Creatinine has stabilized and this may be new baseline.  For now patient is feeling much better, his knee pain has improved since weight loss, he will continue Ozempic and weight loss plans for potential surgery.  We will continue to down titrate his medications.  We will hold spironolactone completely and lower chlorthalidone and check lab work in 1 to 2 weeks.  From a renal standpoint I reviewed the risks versus benefits of anesthesia and surgery and he understands the risks including acute kidney injury.  Blood pressures have become significantly well-controlled and marginal at times since starting alpha Zosyn and weight loss     Plan:    - Hold losartan and chlorthalidone on the day of any procedure  - Stop spironolactone  - Lower chlorthalidone to 12.5 mg daily  - Check PTH and vitamin D with next set of full lab work  - Check CMP and magnesium in 1 to 2 weeks  - Blood pressure log in 1 week  - I have updated the patient's bariatric surgeon and GI physician regarding renal recommendations    I have personally discussed the risks and benefits of the surgery from a renal standpoint with the patient in depth, and advised the patient about the risk of NORRIS and the course of NORRIS if it occurs with the small probability of the need for renal replacement therapy in the worse case scenario. Patient voiced understanding.    From a renal standpoint the patient is renally optimized for surgery with the following recommendations:  Fluids to administer: Normal Saline 250 cc total over 2 hours   Medication Recommendations:  Minimize any IV contrast use (If IV contrast is used, please check BMP POD # 1)  Hold NSAIDs for at least 10 days prior to surgery  Hold ACEi/ARB  starting on the day of the surgery  Hold chlorthalidone  starting on the day of surgery/procedure  Hemodynamic Recommendations:  Ideally, target MAPs greater than 65 mmHg in the perioperative period, and minimize operative time with MAPs < 65 mmHg.   Avoid intraop hemodynamic instability to decrease risk for NORRIS occurrence.  Other Recommendations:  Please consult the Nephrology team when the patient is admitted      2) proteinuria -  Stable U PCR 0.1     - if elevated, will check SPEP, UPEP, FLC     3) CKD III with baseline creatinine 1.4 to 1.7 mg/dL and has now progressed to approximately 2 mg/dL-etiology likely multifactorial in the setting of hypertensive nephropathy, elevated BMI and potential nodular secondary sclerosis     -renal ultrasound in September 2018 was septated cyst bilaterally with mildly increased on the right kidney.  And a calcified cystic foci in the left kidney  - Repeat renal ultrasound April 2024 bilateral renal cysts without suspicious features.  - creat 2.2 in august. Held losartan for 2 days and restarted. Inc fluid intake.   - 8/17 - chlorthalidone reduced to 12.5 mg daily as creat improving but not back to baseline  -  05/2021-creatinine improving to baseline 1.3 mg/dL.  -September 2022-creatinine 1.6 mg/dL.  Holding doxazosin.  - February 2023-creatinine remains relatively stable 1.7 mg/dL  - 12/2023 - creat 1.7 mg/dL, stable. Electrolytes stable.  - May 2024-creatinine was rising above baseline to 2 mg/dL possibly in the setting of Ozempic and trial of holding Ozempic was recommended and subsequently restarted  - September 6, 2024-lab work from August 29 shows stable creatinine likely at new baseline.  Last hemoglobin A1c is improved 6.1%     4) grief counseling-     -patient's mood is improving     5) Vit D deficiency     - vit D 11.7 12/2023  - PTH - 76.2 December 2023  - Recheck PTH and vitamin D levels     6) osteoarthritis of knees - sees Ortho team. PT and cortisone inj started  1/2020     - not candidate per ortho for knee surgery due to elevated BMI  - I have referred pt to [facility] bariatric for evaluation for elevated BMI     7) DM     - A1c improving as of August 2024     8)  Weight gain -    -Patient reestablished with bariatric team since last visit  - Was advised for Ozempic  - Also saw bariatric surgeon and bariatric program.  Patient is interested in surgical intervention.  - weight peak 346 8/2023 and weight is now 325 lbs. Pt states knee pain has improved.      9)  renal cysts-monitor with repeat ultrasound January 2024     10-nocturia-possibly in setting of BPH.  Worsened after holding doxazosin.  pt did not tolerate tamsulosin? But now believe its more due to side effect of abx. So restart tamsulosin 12/13/2023.      11 - hyponatremia     - stable  - on chlorthalidone and will need to be monitored closely    12-bilateral subsegmental PE June 2023-follows with pulmonary team.  Completed 6 months of Eliquis.  Saw pulmonary team March 2024.  Will continue Eliquis but okay to hold for procedures per review of pulmonary team notes    13-sleep apnea-saw pulmonary team.  Is now using CPAP in July 2024    14- poor dentition     -patient smokes cigars daily  -patient needs to see dentist and has been advised of this prior and is awaiting appt january    15-age-appropriate cancer screening-was advised to complete colonoscopy at GI visit July 2024    41-BTW-lkwmoeu with urology team did not tolerate Flomax in the past.  On August 14 appointment was trialed on alpha Zosyn.    24-bzrtmjgmcrqtqy-pt magnesium supplement starting September 5, 2024.       It was a pleasure evaluating your patient in the office today. Thank you for allowing our team to participate in the care of Mr Nasir Story Jr.. Please do not hesitate to contact our team if further issues/questions shall arise in the interim.     No problem-specific Assessment & Plan notes found for this encounter.    HPI:    Using CPAP  "since July. Feeling much better, sleeping better at night, less fatigued. Still having low BP at home since lowering antiHTN.  Denies fevers, chills, nausea, vomiting.  Overall feeling much better since losing weight.    PATIENT INSTRUCTIONS:    Patient Instructions   1) Avoid NSAIDS - (Example - motrin, advil, ibuprofen, aleve, exederin, etc)  2) Always follow a low salt diet  3) If you have any issues with trouble with nausea, vomiting, urinating, blood in the urine, food tasting like metal, confusion, weakness, fatigue that is worsening, or any other questions, please call right away.  4) Please continue to follow regularly with your family physician and any other specialist that you are advised to see and continue to follow their recommendations  5) If you have any emergencies, please go to the nearest urgent care or emergency room and please inform your family physician and our office once you are able to.  6) STOP spironolactone   7) lower chlorthalidone to half tablet once daily (12.5 mg once daily)  8) send in BP log in one week after med changes  9) if your blood pressures stay less than 110 then stop chlorthalidone completely and call  10) labwork in one week  11) labwork again in 2 months  12) appointment in 2-3 months  13) on the day of any of your upcoming procedure - hold losartan and chlorthalidone and you can restart it 2 days after  14) any questions please call      OBJECTIVE:  Current Weight: Weight - Scale: (!) 147 kg (325 lb)  Vitals:    09/06/24 0734   BP: 114/72   BP Location: Left arm   Patient Position: Sitting   Cuff Size: Large   Weight: (!) 147 kg (325 lb)   Height: 5' 9\" (1.753 m)    Body mass index is 47.99 kg/m².      REVIEW OF SYSTEMS:    Review of Systems   Constitutional: Negative.  Negative for appetite change and fatigue.        Intermittent episodes of weakness and dizziness have improved since lowering of antihypertensives.  Still at times feels weak when his blood pressures are " low.   HENT: Negative.     Eyes: Negative.    Respiratory: Negative.  Negative for shortness of breath.    Cardiovascular: Negative.  Negative for leg swelling.   Gastrointestinal: Negative.    Endocrine: Negative.    Genitourinary: Negative.  Negative for difficulty urinating.   Musculoskeletal: Negative.    Allergic/Immunologic: Negative.    Neurological: Negative.    Hematological: Negative.    Psychiatric/Behavioral: Negative.     All other systems reviewed and are negative.      PHYSICAL EXAM:      Physical Exam  Vitals and nursing note reviewed.   Constitutional:       General: He is not in acute distress.     Appearance: He is well-developed. He is not diaphoretic.   HENT:      Head: Normocephalic and atraumatic.   Eyes:      General: No scleral icterus.        Right eye: No discharge.         Left eye: No discharge.      Conjunctiva/sclera: Conjunctivae normal.   Cardiovascular:      Rate and Rhythm: Normal rate and regular rhythm.      Heart sounds: Normal heart sounds. No murmur heard.     No friction rub. No gallop.   Pulmonary:      Effort: Pulmonary effort is normal. No respiratory distress.      Breath sounds: Normal breath sounds. No wheezing or rales.   Chest:      Chest wall: No tenderness.   Abdominal:      General: Bowel sounds are normal. There is no distension.      Palpations: Abdomen is soft.      Tenderness: There is no abdominal tenderness. There is no rebound.   Musculoskeletal:         General: No tenderness or deformity. Normal range of motion.      Cervical back: Normal range of motion and neck supple.      Right lower leg: No edema.      Left lower leg: No edema.   Skin:     General: Skin is warm and dry.      Coloration: Skin is not pale.      Findings: No erythema or rash.   Neurological:      Mental Status: He is alert and oriented to person, place, and time.      Cranial Nerves: No cranial nerve deficit.      Coordination: Coordination normal.   Psychiatric:         Behavior:  "Behavior normal.         Thought Content: Thought content normal.         Judgment: Judgment normal.         Medications:    Current Outpatient Medications:   •  alfuzosin (UROXATRAL) 10 mg 24 hr tablet, Take 1 tablet (10 mg total) by mouth daily, Disp: 30 tablet, Rfl: 3  •  apixaban (Eliquis) 5 mg, Take 1 tablet (5 mg total) by mouth 2 (two) times a day, Disp: 60 tablet, Rfl: 5  •  atorvastatin (LIPITOR) 20 mg tablet, TAKE ONE TABLET BY MOUTH EVERY DAY, Disp: 90 tablet, Rfl: 1  •  chlorthalidone 25 mg tablet, Take 0.5 tablets (12.5 mg total) by mouth daily, Disp: , Rfl:   •  glucose blood (FREESTYLE LITE) test strip, Use 1 each in the morning, Disp: 100 each, Rfl: 0  •  Lancets (freestyle) lancets, Use as instructed, Disp: 100 each, Rfl: 0  •  losartan (COZAAR) 100 MG tablet, Take 0.5 tablets (50 mg total) by mouth daily As of 8/26/24, Disp: , Rfl:   •  magnesium Oxide (MAG-OX) 400 mg TABS, Take 1 tablet (400 mg total) by mouth 2 (two) times a day, Disp: , Rfl:   •  metoprolol tartrate (LOPRESSOR) 100 mg tablet, TAKE ONE TABLET BY MOUTH TWICE A DAY, Disp: 200 tablet, Rfl: 1  •  morphine (MS CONTIN) 15 mg 12 hr tablet, as needed, Disp: , Rfl: 0  •  oxyCODONE (ROXICODONE) 30 MG immediate release tablet, as needed, Disp: , Rfl: 0  •  polyethylene glycol (MIRALAX) 17 g packet, Take 17 g by mouth daily, Disp: 510 g, Rfl: 5  •  semaglutide, 1 mg/dose, (Ozempic, 1 MG/DOSE,) 4 mg/3 mL injection pen, Inject 0.75 mL (1 mg total) under the skin every 7 days, Disp: 3 mL, Rfl: 2  •  polyethylene glycol (GOLYTELY) 4000 mL solution, Take 4,000 mL by mouth once for 1 dose Colonoscopy prep, Disp: 4000 mL, Rfl: 0    Laboratory Results:        Invalid input(s): \"ALBUMIN\"    Results for orders placed or performed in visit on 08/27/24   Magnesium   Result Value Ref Range    Magnesium 1.6 (L) 1.9 - 2.7 mg/dL   Phosphorus   Result Value Ref Range    Phosphorus 3.7 2.3 - 4.1 mg/dL   Protein / creatinine ratio, urine   Result Value Ref " Range    Creatinine, Ur 65.2 Reference range not established. mg/dL    Protein Urine Random 5.4 Reference range not established. mg/dL    Prot/Creat Ratio, Ur 0.1 0.0 - 0.1   Urinalysis with microscopic   Result Value Ref Range    Color, UA Light Yellow     Clarity, UA Clear     Specific Gravity, UA 1.011 1.003 - 1.030    pH, UA 6.0 4.5, 5.0, 5.5, 6.0, 6.5, 7.0, 7.5, 8.0    Leukocytes, UA Negative Negative    Nitrite, UA Negative Negative    Protein, UA Negative Negative mg/dl    Glucose, UA Negative Negative mg/dl    Ketones, UA Negative Negative mg/dl    Urobilinogen, UA <2.0 <2.0 mg/dl mg/dl    Bilirubin, UA Negative Negative    Occult Blood, UA Negative Negative    RBC, UA None Seen None Seen, 1-2 /hpf    WBC, UA None Seen None Seen, 1-2 /hpf    Epithelial Cells Occasional None Seen, Occasional /hpf    Bacteria, UA None Seen None Seen, Occasional /hpf   Comprehensive metabolic panel   Result Value Ref Range    Sodium 137 135 - 147 mmol/L    Potassium 4.7 3.5 - 5.3 mmol/L    Chloride 103 96 - 108 mmol/L    CO2 22 21 - 32 mmol/L    ANION GAP 12 4 - 13 mmol/L    BUN 31 (H) 5 - 25 mg/dL    Creatinine 2.02 (H) 0.60 - 1.30 mg/dL    Glucose, Fasting 96 65 - 99 mg/dL    Calcium 9.4 8.4 - 10.2 mg/dL    AST 13 13 - 39 U/L    ALT 12 7 - 52 U/L    Alkaline Phosphatase 75 34 - 104 U/L    Total Protein 7.3 6.4 - 8.4 g/dL    Albumin 4.0 3.5 - 5.0 g/dL    Total Bilirubin 0.57 0.20 - 1.00 mg/dL    eGFR 33 ml/min/1.73sq m   CBC and Platelet   Result Value Ref Range    WBC 9.38 4.31 - 10.16 Thousand/uL    RBC 4.12 3.88 - 5.62 Million/uL    Hemoglobin 12.2 12.0 - 17.0 g/dL    Hematocrit 36.7 36.5 - 49.3 %    MCV 89 82 - 98 fL    MCH 29.6 26.8 - 34.3 pg    MCHC 33.2 31.4 - 37.4 g/dL    RDW 13.8 11.6 - 15.1 %    Platelets 228 149 - 390 Thousands/uL    MPV 11.2 8.9 - 12.7 fL

## 2024-09-06 NOTE — PATIENT INSTRUCTIONS
1) Avoid NSAIDS - (Example - motrin, advil, ibuprofen, aleve, exederin, etc)  2) Always follow a low salt diet  3) If you have any issues with trouble with nausea, vomiting, urinating, blood in the urine, food tasting like metal, confusion, weakness, fatigue that is worsening, or any other questions, please call right away.  4) Please continue to follow regularly with your family physician and any other specialist that you are advised to see and continue to follow their recommendations  5) If you have any emergencies, please go to the nearest urgent care or emergency room and please inform your family physician and our office once you are able to.  6) STOP spironolactone   7) lower chlorthalidone to half tablet once daily (12.5 mg once daily)  8) send in BP log in one week after med changes  9) if your blood pressures stay less than 110 then stop chlorthalidone completely and call  10) labwork in one week  11) labwork again in 2 months  12) appointment in 2-3 months  13) on the day of any of your upcoming procedure - hold losartan and chlorthalidone and you can restart it 2 days after  14) any questions please call

## 2024-09-06 NOTE — PROGRESS NOTES
NEPHROLOGY OFFICE VISIT   Nasir Story Jr. 63 y.o. male MRN: [id number]  9/6/2024    Reason for Visit: CKD III    ASSESSMENT and PLAN:    I had the pleasure of seeing Mr Story today in the renal clinic for the continued management of CKD III.     64 yo male retired , formerly from [place], HTN (15 years), DM (years), back surgery 10 years ago, diastolic CHF, pulmonary embolism diagnosed in June 2023, who presents for follow up evaluation of hypertension.  patient has had sporadic follow-up but has been slightly more consistent with follow-up since end of last year.  I last saw the patient in August 2018 and then patient followed up with our advanced practitioner.  Patient returns today for follow-up visit      Patient has stopped using NSAIDs since 2019      Since last being seen, patient was seen by her practitioner in June 2023.  Blood pressures were controlled.  Creatinine was at baseline.     Patient was admitted to the hospital worsening shortness of breath in June 2023.  There was concern for possible URI.  Patient was found to have pulmonary embolism on repeat CT scan and was started on Eliquis     Saw pulmonary team August 2023 and was advised to continue 6 months full dose Eliquis.  It was felt to be provoked in the setting of inactivity.  Was also advised to have sleep study..     1) HTN - - patient is on losartan, metoprolol, chlorthalidone, alfuzosin     -prior renin and aldosterone level with a renin level suppressed, aldosterone level is not significantly elevated  - prior metanephrine unrevealing  - prior CT scan with unremarkable adrenals  - renal Doppler study in November 2019 with no significant occlusive disease.  - during visit in march clonidine was tapered off, chlorthalidone was increased chlorthalidone to 25 mg daily  -  Creatinine stable in May 2021 at 1.3 mg/dL.  Blood pressures are appropriate.  Patient is still gaining weight.  -February 2022 appointment-no lab work since  October.  Creatinine in October was rising and patient has not gone for follow-up lab work.  -September 2022-hold doxazosin due to hypotension  - February 2023-no changes to antihypertensives.  Add tamsulosin.  Renal ultrasound with slightly increased cyst size but otherwise benign appearing.  - December 2023-creatinine stable 1.7 mg/dL.  Electrolytes are stable and appropriate with low vitamin D level.  Parathyroid level is appropriate.  Protein creatinine ratio is stable at 21.  - May 2024-spironolactone reduced to 12 and half milligrams per day due to hyperkalemia  - August 2024-patient was starting to have low blood pressures to as low as 98 systolic.  We held amlodipine completely at that time.  - August 26, 2024-losartan reduced to 50 mg daily from 100 mg daily due to continued marginal blood pressures.     Overall, patient presents for follow-up appointment September 6, 2024.  Since starting Ozempic, patient's creatinine did increase slightly but it is now stabilized at 2 mg/dL.  Also contributing to rising creatinine may have been marginal blood pressures.  Sodium, potassium, bicarbonate, phosphorus are appropriate.  Magnesium was borderline low at 1.6 and was started on magnesium supplement by PCP on September 5, 2024.  Hemoglobin is at goal.  Urinalysis is bland with UPCR minimal 0.1 g/g. SBP at home is still  but mainly above 100. Without BP meds this AM, SBP is 120 systolic.  Overall, since starting alpha Zosyn, weight loss, Ozempic creatinine has increased slightly but this is likely multifactorial in the setting of marginal blood pressures at times, Ozempic.  Creatinine has stabilized and this may be new baseline.  For now patient is feeling much better, his knee pain has improved since weight loss, he will continue Ozempic and weight loss plans for potential surgery.  We will continue to down titrate his medications.  We will hold spironolactone completely and lower chlorthalidone and check lab  work in 1 to 2 weeks.  From a renal standpoint I reviewed the risks versus benefits of anesthesia and surgery and he understands the risks including acute kidney injury.  Blood pressures have become significantly well-controlled and marginal at times since starting alfuzosin and weight loss     Plan:    - Hold losartan and chlorthalidone on the day of any procedure  - Stop spironolactone  - Lower chlorthalidone to 12.5 mg daily  - Check PTH and vitamin D with next set of full lab work  - Check CMP and magnesium in 1 to 2 weeks  - Blood pressure log in 1 week  - I have updated the patient's bariatric surgeon and GI physician regarding renal recommendations    I have personally discussed the risks and benefits of the surgery from a renal standpoint with the patient in depth, and advised the patient about the risk of NORRIS and the course of NORRIS if it occurs with the small probability of the need for renal replacement therapy in the worse case scenario. Patient voiced understanding.    From a renal standpoint the patient is renally optimized for surgery with the following recommendations:  Fluids to administer: Normal Saline 250 cc total over 2 hours   Medication Recommendations:  Minimize any IV contrast use (If IV contrast is used, please check BMP POD # 1)  Hold NSAIDs for at least 10 days prior to surgery  Hold ACEi/ARB starting on the day of the surgery  Hold chlorthalidone  starting on the day of surgery/procedure  Hemodynamic Recommendations:  Ideally, target MAPs greater than 65 mmHg in the perioperative period, and minimize operative time with MAPs < 65 mmHg.   Avoid intraop hemodynamic instability to decrease risk for NORRIS occurrence.  Other Recommendations:  Please consult the Nephrology team when the patient is admitted      2) proteinuria -  Stable U PCR 0.1     - if elevated, will check SPEP, UPEP, FLC     3) CKD III with baseline creatinine 1.4 to 1.7 mg/dL and has now progressed to approximately 2  mg/dL-etiology likely multifactorial in the setting of hypertensive nephropathy, elevated BMI and potential nodular secondary sclerosis     -renal ultrasound in September 2018 was septated cyst bilaterally with mildly increased on the right kidney.  And a calcified cystic foci in the left kidney  - Repeat renal ultrasound April 2024 bilateral renal cysts without suspicious features.  - creat 2.2 in august. Held losartan for 2 days and restarted. Inc fluid intake.   - 8/17 - chlorthalidone reduced to 12.5 mg daily as creat improving but not back to baseline  -  05/2021-creatinine improving to baseline 1.3 mg/dL.  -September 2022-creatinine 1.6 mg/dL.  Holding doxazosin.  - February 2023-creatinine remains relatively stable 1.7 mg/dL  - 12/2023 - creat 1.7 mg/dL, stable. Electrolytes stable.  - May 2024-creatinine was rising above baseline to 2 mg/dL possibly in the setting of Ozempic and trial of holding Ozempic was recommended and subsequently restarted  - September 6, 2024-lab work from August 29 shows stable creatinine likely at new baseline.  Last hemoglobin A1c is improved 6.1%     4) grief counseling-     -patient's mood is improving     5) Vit D deficiency     - vit D 11.7 12/2023  - PTH - 76.2 December 2023  - Recheck PTH and vitamin D levels     6) osteoarthritis of knees - sees Ortho team. PT and cortisone inj started 1/2020     - not candidate per ortho for knee surgery due to elevated BMI  - I have referred pt to [facility] bariat[facility] for evaluation for elevated BMI     7) DM     - A1c improving as of August 2024     8)  Weight gain -    -Patient reestablished with bariatric team since last visit  - Was advised for Ozempic  - Also saw bariatric surgeon and bariatric program.  Patient is interested in surgical intervention.  - weight peak 346 8/2023 and weight is now 325 lbs. Pt states knee pain has improved.      9)  renal cysts-monitor with repeat ultrasound January 2024     10-nocturia-possibly in setting  of BPH.  Worsened after holding doxazosin.  pt did not tolerate tamsulosin? But now believe its more due to side effect of abx. So restart tamsulosin 12/13/2023.      11 - hyponatremia     - stable  - on chlorthalidone and will need to be monitored closely    12-bilateral subsegmental PE June 2023-follows with pulmonary team.  Completed 6 months of Eliquis.  Saw pulmonary team March 2024.  Will continue Eliquis but okay to hold for procedures per review of pulmonary team notes    13-sleep apnea-saw pulmonary team.  Is now using CPAP in July 2024    14- poor dentition     -patient smokes cigars daily  -patient needs to see dentist and has been advised of this prior and is awaiting appt january    14-age-appropriate cancer screening-was advised to complete colonoscopy at GI visit July 2024    43-PCP-hmwcxnd with urology team did not tolerate Flomax in the past.  On August 14 appointment was trialed on alpha Zosyn.    63-aubnspkrilugkq-gd magnesium supplement starting September 5, 2024.       It was a pleasure evaluating your patient in the office today. Thank you for allowing our team to participate in the care of Mr Nasir Story Jr.. Please do not hesitate to contact our team if further issues/questions shall arise in the interim.     No problem-specific Assessment & Plan notes found for this encounter.    HPI:    Using CPAP since July. Feeling much better, sleeping better at night, less fatigued. Still having low BP at home since lowering antiHTN.  Denies fevers, chills, nausea, vomiting.  Overall feeling much better since losing weight.    PATIENT INSTRUCTIONS:    Patient Instructions   1) Avoid NSAIDS - (Example - motrin, advil, ibuprofen, aleve, exederin, etc)  2) Always follow a low salt diet  3) If you have any issues with trouble with nausea, vomiting, urinating, blood in the urine, food tasting like metal, confusion, weakness, fatigue that is worsening, or any other questions, please call right away.  4)  "Please continue to follow regularly with your family physician and any other specialist that you are advised to see and continue to follow their recommendations  5) If you have any emergencies, please go to the nearest urgent care or emergency room and please inform your family physician and our office once you are able to.  6) STOP spironolactone   7) lower chlorthalidone to half tablet once daily (12.5 mg once daily)  8) send in BP log in one week after med changes  9) if your blood pressures stay less than 110 then stop chlorthalidone completely and call  10) labwork in one week  11) labwork again in 2 months  12) appointment in 2-3 months  13) on the day of any of your upcoming procedure - hold losartan and chlorthalidone and you can restart it 2 days after  14) any questions please call      OBJECTIVE:  Current Weight: Weight - Scale: (!) 147 kg (325 lb)  Vitals:    09/06/24 0734   BP: 114/72   BP Location: Left arm   Patient Position: Sitting   Cuff Size: Large   Weight: (!) 147 kg (325 lb)   Height: 5' 9\" (1.753 m)    Body mass index is 47.99 kg/m².      REVIEW OF SYSTEMS:    Review of Systems   Constitutional: Negative.  Negative for appetite change and fatigue.        Intermittent episodes of weakness and dizziness have improved since lowering of antihypertensives.  Still at times feels weak when his blood pressures are low.   HENT: Negative.     Eyes: Negative.    Respiratory: Negative.  Negative for shortness of breath.    Cardiovascular: Negative.  Negative for leg swelling.   Gastrointestinal: Negative.    Endocrine: Negative.    Genitourinary: Negative.  Negative for difficulty urinating.   Musculoskeletal: Negative.    Allergic/Immunologic: Negative.    Neurological: Negative.    Hematological: Negative.    Psychiatric/Behavioral: Negative.     All other systems reviewed and are negative.      PHYSICAL EXAM:      Physical Exam  Vitals and nursing note reviewed.   Constitutional:       General: He is " not in acute distress.     Appearance: He is well-developed. He is not diaphoretic.   HENT:      Head: Normocephalic and atraumatic.   Eyes:      General: No scleral icterus.        Right eye: No discharge.         Left eye: No discharge.      Conjunctiva/sclera: Conjunctivae normal.   Cardiovascular:      Rate and Rhythm: Normal rate and regular rhythm.      Heart sounds: Normal heart sounds. No murmur heard.     No friction rub. No gallop.   Pulmonary:      Effort: Pulmonary effort is normal. No respiratory distress.      Breath sounds: Normal breath sounds. No wheezing or rales.   Chest:      Chest wall: No tenderness.   Abdominal:      General: Bowel sounds are normal. There is no distension.      Palpations: Abdomen is soft.      Tenderness: There is no abdominal tenderness. There is no rebound.   Musculoskeletal:         General: No tenderness or deformity. Normal range of motion.      Cervical back: Normal range of motion and neck supple.      Right lower leg: No edema.      Left lower leg: No edema.   Skin:     General: Skin is warm and dry.      Coloration: Skin is not pale.      Findings: No erythema or rash.   Neurological:      Mental Status: He is alert and oriented to person, place, and time.      Cranial Nerves: No cranial nerve deficit.      Coordination: Coordination normal.   Psychiatric:         Behavior: Behavior normal.         Thought Content: Thought content normal.         Judgment: Judgment normal.         Medications:    Current Outpatient Medications:     alfuzosin (UROXATRAL) 10 mg 24 hr tablet, Take 1 tablet (10 mg total) by mouth daily, Disp: 30 tablet, Rfl: 3    apixaban (Eliquis) 5 mg, Take 1 tablet (5 mg total) by mouth 2 (two) times a day, Disp: 60 tablet, Rfl: 5    atorvastatin (LIPITOR) 20 mg tablet, TAKE ONE TABLET BY MOUTH EVERY DAY, Disp: 90 tablet, Rfl: 1    chlorthalidone 25 mg tablet, Take 0.5 tablets (12.5 mg total) by mouth daily, Disp: , Rfl:     glucose blood (FREESTYLE  "LITE) test strip, Use 1 each in the morning, Disp: 100 each, Rfl: 0    Lancets (freestyle) lancets, Use as instructed, Disp: 100 each, Rfl: 0    losartan (COZAAR) 100 MG tablet, Take 0.5 tablets (50 mg total) by mouth daily As of 8/26/24, Disp: , Rfl:     magnesium Oxide (MAG-OX) 400 mg TABS, Take 1 tablet (400 mg total) by mouth 2 (two) times a day, Disp: , Rfl:     metoprolol tartrate (LOPRESSOR) 100 mg tablet, TAKE ONE TABLET BY MOUTH TWICE A DAY, Disp: 200 tablet, Rfl: 1    morphine (MS CONTIN) 15 mg 12 hr tablet, as needed, Disp: , Rfl: 0    oxyCODONE (ROXICODONE) 30 MG immediate release tablet, as needed, Disp: , Rfl: 0    polyethylene glycol (MIRALAX) 17 g packet, Take 17 g by mouth daily, Disp: 510 g, Rfl: 5    semaglutide, 1 mg/dose, (Ozempic, 1 MG/DOSE,) 4 mg/3 mL injection pen, Inject 0.75 mL (1 mg total) under the skin every 7 days, Disp: 3 mL, Rfl: 2    polyethylene glycol (GOLYTELY) 4000 mL solution, Take 4,000 mL by mouth once for 1 dose Colonoscopy prep, Disp: 4000 mL, Rfl: 0    Laboratory Results:        Invalid input(s): \"ALBUMIN\"    Results for orders placed or performed in visit on 08/27/24   Magnesium   Result Value Ref Range    Magnesium 1.6 (L) 1.9 - 2.7 mg/dL   Phosphorus   Result Value Ref Range    Phosphorus 3.7 2.3 - 4.1 mg/dL   Protein / creatinine ratio, urine   Result Value Ref Range    Creatinine, Ur 65.2 Reference range not established. mg/dL    Protein Urine Random 5.4 Reference range not established. mg/dL    Prot/Creat Ratio, Ur 0.1 0.0 - 0.1   Urinalysis with microscopic   Result Value Ref Range    Color, UA Light Yellow     Clarity, UA Clear     Specific Gravity, UA 1.011 1.003 - 1.030    pH, UA 6.0 4.5, 5.0, 5.5, 6.0, 6.5, 7.0, 7.5, 8.0    Leukocytes, UA Negative Negative    Nitrite, UA Negative Negative    Protein, UA Negative Negative mg/dl    Glucose, UA Negative Negative mg/dl    Ketones, UA Negative Negative mg/dl    Urobilinogen, UA <2.0 <2.0 mg/dl mg/dl    Bilirubin, UA " Negative Negative    Occult Blood, UA Negative Negative    RBC, UA None Seen None Seen, 1-2 /hpf    WBC, UA None Seen None Seen, 1-2 /hpf    Epithelial Cells Occasional None Seen, Occasional /hpf    Bacteria, UA None Seen None Seen, Occasional /hpf   Comprehensive metabolic panel   Result Value Ref Range    Sodium 137 135 - 147 mmol/L    Potassium 4.7 3.5 - 5.3 mmol/L    Chloride 103 96 - 108 mmol/L    CO2 22 21 - 32 mmol/L    ANION GAP 12 4 - 13 mmol/L    BUN 31 (H) 5 - 25 mg/dL    Creatinine 2.02 (H) 0.60 - 1.30 mg/dL    Glucose, Fasting 96 65 - 99 mg/dL    Calcium 9.4 8.4 - 10.2 mg/dL    AST 13 13 - 39 U/L    ALT 12 7 - 52 U/L    Alkaline Phosphatase 75 34 - 104 U/L    Total Protein 7.3 6.4 - 8.4 g/dL    Albumin 4.0 3.5 - 5.0 g/dL    Total Bilirubin 0.57 0.20 - 1.00 mg/dL    eGFR 33 ml/min/1.73sq m   CBC and Platelet   Result Value Ref Range    WBC 9.38 4.31 - 10.16 Thousand/uL    RBC 4.12 3.88 - 5.62 Million/uL    Hemoglobin 12.2 12.0 - 17.0 g/dL    Hematocrit 36.7 36.5 - 49.3 %    MCV 89 82 - 98 fL    MCH 29.6 26.8 - 34.3 pg    MCHC 33.2 31.4 - 37.4 g/dL    RDW 13.8 11.6 - 15.1 %    Platelets 228 149 - 390 Thousands/uL    MPV 11.2 8.9 - 12.7 fL

## 2024-09-10 ENCOUNTER — TELEPHONE (OUTPATIENT)
Dept: BARIATRICS | Facility: CLINIC | Age: 64
End: 2024-09-10

## 2024-09-10 NOTE — TELEPHONE ENCOUNTER
Spoke to pt to ensure they knew their EGD scheduled for 9/18. Ozempic not to take day of procedure and to contact prescribing provider about stopping Eliquis. Patient understood

## 2024-09-11 NOTE — PROGRESS NOTES
Behavioral health follow up    On Ozempic    Preop- interested in gastric  bypass   Surgery Date: expected Nov 2024  No program requirement   Surgeon: Dr. Noé Glover / sleeve    Starting weight:  329 #  Weight 322.4 #  EGD scheduled for 9/18/2024    Hydrating with water.  All he drinks is water.  Feels the Ozempic is working.  Breakfast, shake for lunch and dinner.  (Roast beef )   Tracking his food with [employer].    Tries to plan ahead.  May have chicken or zucchini soup tonight.  Recent blood pressure concerns that limited his movement.  Now on new medication and resolved.  Tries to walk. Using CPAP

## 2024-09-12 ENCOUNTER — CLINICAL SUPPORT (OUTPATIENT)
Dept: BARIATRICS | Facility: CLINIC | Age: 64
End: 2024-09-12

## 2024-09-12 VITALS — WEIGHT: 315 LBS | BODY MASS INDEX: 47.61 KG/M2

## 2024-09-12 DIAGNOSIS — E66.01 MORBID OBESITY WITH BMI OF 45.0-49.9, ADULT (HCC): Primary | ICD-10-CM

## 2024-09-12 PROCEDURE — RECHECK

## 2024-09-13 ENCOUNTER — HOSPITAL ENCOUNTER (OUTPATIENT)
Dept: NON INVASIVE DIAGNOSTICS | Facility: CLINIC | Age: 64
Discharge: HOME | End: 2024-09-13
Payer: COMMERCIAL

## 2024-09-13 VITALS
HEIGHT: 69 IN | HEART RATE: 73 BPM | SYSTOLIC BLOOD PRESSURE: 114 MMHG | DIASTOLIC BLOOD PRESSURE: 72 MMHG | BODY MASS INDEX: 46.65 KG/M2 | WEIGHT: 315 LBS

## 2024-09-13 DIAGNOSIS — I77.810 THORACIC AORTIC ECTASIA (HCC): ICD-10-CM

## 2024-09-13 LAB
AORTIC ROOT: 4.4 CM
AORTIC VALVE MEAN VELOCITY: 9.4 M/S
APICAL FOUR CHAMBER EJECTION FRACTION: 68 %
ASCENDING AORTA: 4.7 CM
AV AREA BY CONTINUOUS VTI: 3.9 CM2
AV AREA PEAK VELOCITY: 4.1 CM2
AV LVOT MEAN GRADIENT: 4 MMHG
AV LVOT PEAK GRADIENT: 6 MMHG
AV MEAN GRADIENT: 4 MMHG
AV PEAK GRADIENT: 8 MMHG
AV VALVE AREA: 3.93 CM2
AV VELOCITY RATIO: 0.9
BSA FOR ECHO PROCEDURE: 2.53 M2
DOP CALC AO PEAK VEL: 1.39 M/S
DOP CALC AO VTI: 29.86 CM
DOP CALC LVOT AREA: 4.52 CM2
DOP CALC LVOT CARDIAC INDEX: 3.43 L/MIN/M2
DOP CALC LVOT CARDIAC OUTPUT: 8.67 L/MIN
DOP CALC LVOT DIAMETER: 2.4 CM
DOP CALC LVOT PEAK VEL VTI: 25.97 CM
DOP CALC LVOT PEAK VEL: 1.25 M/S
DOP CALC LVOT STROKE INDEX: 48.2 ML/M2
DOP CALC LVOT STROKE VOLUME: 117.43
E WAVE DECELERATION TIME: 208 MS
E/A RATIO: 0.86
MV E'TISSUE VEL-LAT: 6 CM/S
MV E'TISSUE VEL-SEP: 7 CM/S
MV PEAK A VEL: 0.77 M/S
MV PEAK E VEL: 66 CM/S
RIGHT VENTRICLE ID DIMENSION: 4.7 CM
SINOTUBULAR JUNCTION: 3.9 CM
SL CV LV EF: 60
SL CV SINUS OF VALSALVA 2D: 4.2 CM
STJ: 3.9 CM

## 2024-09-13 PROCEDURE — 93321 DOPPLER ECHO F-UP/LMTD STD: CPT

## 2024-09-13 PROCEDURE — 93308 TTE F-UP OR LMTD: CPT | Performed by: INTERNAL MEDICINE

## 2024-09-13 PROCEDURE — 93325 DOPPLER ECHO COLOR FLOW MAPG: CPT

## 2024-09-13 PROCEDURE — 93325 DOPPLER ECHO COLOR FLOW MAPG: CPT | Performed by: INTERNAL MEDICINE

## 2024-09-13 PROCEDURE — 93308 TTE F-UP OR LMTD: CPT

## 2024-09-13 PROCEDURE — 93321 DOPPLER ECHO F-UP/LMTD STD: CPT | Performed by: INTERNAL MEDICINE

## 2024-09-17 LAB
LEFT EYE DIABETIC RETINOPATHY: NORMAL
RIGHT EYE DIABETIC RETINOPATHY: NORMAL

## 2024-09-17 RX ORDER — SODIUM CHLORIDE 9 MG/ML
125 INJECTION, SOLUTION INTRAVENOUS CONTINUOUS
Status: CANCELLED | OUTPATIENT
Start: 2024-09-17

## 2024-09-18 ENCOUNTER — ANESTHESIA (OUTPATIENT)
Dept: GASTROENTEROLOGY | Facility: HOSPITAL | Age: 64
End: 2024-09-18
Payer: COMMERCIAL

## 2024-09-18 ENCOUNTER — ANESTHESIA EVENT (OUTPATIENT)
Dept: GASTROENTEROLOGY | Facility: HOSPITAL | Age: 64
End: 2024-09-18
Payer: COMMERCIAL

## 2024-09-18 ENCOUNTER — HOSPITAL ENCOUNTER (OUTPATIENT)
Dept: GASTROENTEROLOGY | Facility: HOSPITAL | Age: 64
Setting detail: OUTPATIENT SURGERY
Discharge: HOME | End: 2024-09-18
Attending: SURGERY
Payer: COMMERCIAL

## 2024-09-18 VITALS
DIASTOLIC BLOOD PRESSURE: 69 MMHG | BODY MASS INDEX: 46.65 KG/M2 | OXYGEN SATURATION: 99 % | SYSTOLIC BLOOD PRESSURE: 142 MMHG | TEMPERATURE: 97.2 F | WEIGHT: 315 LBS | HEIGHT: 69 IN | HEART RATE: 80 BPM | RESPIRATION RATE: 19 BRPM

## 2024-09-18 DIAGNOSIS — Z98.84 BARIATRIC SURGERY STATUS: ICD-10-CM

## 2024-09-18 LAB — GLUCOSE SERPL-MCNC: 132 MG/DL (ref 65–140)

## 2024-09-18 PROCEDURE — 82948 REAGENT STRIP/BLOOD GLUCOSE: CPT

## 2024-09-18 PROCEDURE — 43239 EGD BIOPSY SINGLE/MULTIPLE: CPT | Performed by: SURGERY

## 2024-09-18 PROCEDURE — 88305 TISSUE EXAM BY PATHOLOGIST: CPT | Performed by: STUDENT IN AN ORGANIZED HEALTH CARE EDUCATION/TRAINING PROGRAM

## 2024-09-18 RX ORDER — KETAMINE HYDROCHLORIDE 50 MG/ML
INJECTION, SOLUTION INTRAMUSCULAR; INTRAVENOUS AS NEEDED
Status: DISCONTINUED | OUTPATIENT
Start: 2024-09-18 | End: 2024-09-18

## 2024-09-18 RX ORDER — MIDAZOLAM HYDROCHLORIDE 2 MG/2ML
INJECTION, SOLUTION INTRAMUSCULAR; INTRAVENOUS AS NEEDED
Status: DISCONTINUED | OUTPATIENT
Start: 2024-09-18 | End: 2024-09-18

## 2024-09-18 RX ORDER — SODIUM CHLORIDE 9 MG/ML
125 INJECTION, SOLUTION INTRAVENOUS CONTINUOUS
Status: DISCONTINUED | OUTPATIENT
Start: 2024-09-18 | End: 2024-09-22 | Stop reason: HOSPADM

## 2024-09-18 RX ORDER — LIDOCAINE HYDROCHLORIDE 20 MG/ML
SOLUTION OROPHARYNGEAL AS NEEDED
Status: DISCONTINUED | OUTPATIENT
Start: 2024-09-18 | End: 2024-09-18

## 2024-09-18 RX ADMIN — TOPICAL ANESTHETIC 1 SPRAY: 200 SPRAY DENTAL; PERIODONTAL at 07:50

## 2024-09-18 RX ADMIN — LIDOCAINE HYDROCHLORIDE 10 ML: 20 SOLUTION ORAL at 07:12

## 2024-09-18 RX ADMIN — SODIUM CHLORIDE 125 ML/HR: 0.9 INJECTION, SOLUTION INTRAVENOUS at 07:42

## 2024-09-18 RX ADMIN — MIDAZOLAM 2 MG: 1 INJECTION INTRAMUSCULAR; INTRAVENOUS at 07:52

## 2024-09-18 RX ADMIN — KETAMINE HYDROCHLORIDE 50 MG: 50 INJECTION INTRAMUSCULAR; INTRAVENOUS at 07:53

## 2024-09-18 NOTE — ANESTHESIA POSTPROCEDURE EVALUATION
Post-Op Assessment Note    CV Status:  Stable    Pain management: adequate       Mental Status:  Alert     Post Op Vitals Reviewed: Yes    No anethesia notable event occurred.    Staff: CRNA               BP      Temp      Pulse     Resp      SpO2

## 2024-09-18 NOTE — ANESTHESIA PREPROCEDURE EVALUATION
Procedure:  EGD    Relevant Problems   CARDIO   (+) Dyspnea on exertion   (+) Essential hypertension   (+) Mixed hyperlipidemia   (+) Thoracic aortic ectasia (HCC)      ENDO   (+) Type 2 diabetes mellitus with stage 3b chronic kidney disease, without long-term current use of insulin (HCC)      /RENAL   (+) BPH with obstruction/lower urinary tract symptoms   (+) Stage 3b chronic kidney disease (HCC)      MUSCULOSKELETAL   (+) Chronic bilateral low back pain with bilateral sciatica   (+) Localized osteoarthritis of left knee   (+) Localized osteoarthritis of right knee   (+) Patellofemoral arthritis of left knee      NEURO/PSYCH   (+) Chronic bilateral low back pain with bilateral sciatica   (+) Continuous opioid dependence (HCC)      PULMONARY   (+) Dyspnea on exertion   (+) IMELDA (obstructive sleep apnea)      Other   (+) Morbid obesity with BMI of 45.0-49.9, adult ([facility])        Physical Exam    Airway    Mallampati score: III  TM Distance: >3 FB  Neck ROM: full     Dental   Comment: Poor dentition and most missing or decayed     Cardiovascular  Rhythm: regular, Rate: normal, Cardiovascular exam normal    Pulmonary   Rhonchi, Decreased breath sounds    Other Findings        Anesthesia Plan  ASA Score- 3     Anesthesia Type- general with ASA Monitors.         Additional Monitors:     Airway Plan:            Plan Factors-    Chart reviewed.   Existing labs reviewed. Patient summary reviewed.    Patient is not a current smoker.      Obstructive sleep apnea risk education given perioperatively.        Induction- intravenous.    Postoperative Plan-         Informed Consent- Anesthetic plan and risks discussed with patient.  I personally reviewed this patient with the CRNA. Discussed and agreed on the Anesthesia Plan with the CRNA..

## 2024-09-18 NOTE — H&P
"This is a 64 y.o. male with a history of morbid obesity and Body mass index is 47.55 kg/m².  Here for an EGD to evaluate the anatomy of the GI tract.    Physical Exam    /71   Pulse 73   Temp 98 °F (36.7 °C) (Temporal)   Resp 18   Ht 5' 9\" (1.753 m)   Wt (!) 146 kg (322 lb)   SpO2 97%   BMI 47.55 kg/m²    AAOx3  RRR  CTA B  Abdomen obese. Benign.      A/P:    This is a 64 y.o. male with a history of morbid obesity and Body mass index is 47.55 kg/m²..    Will proceed with the EGD and biopsies.      Noé Glover MD  09/18/24  7:44 AM  "

## 2024-09-20 PROCEDURE — 88305 TISSUE EXAM BY PATHOLOGIST: CPT | Performed by: STUDENT IN AN ORGANIZED HEALTH CARE EDUCATION/TRAINING PROGRAM

## 2024-09-22 DIAGNOSIS — I10 ESSENTIAL HYPERTENSION: ICD-10-CM

## 2024-09-23 ENCOUNTER — TELEPHONE (OUTPATIENT)
Age: 64
End: 2024-09-23

## 2024-09-23 RX ORDER — LOSARTAN POTASSIUM 100 MG/1
100 TABLET ORAL DAILY
Qty: 90 TABLET | Refills: 1 | Status: SHIPPED | OUTPATIENT
Start: 2024-09-23

## 2024-09-23 NOTE — TELEPHONE ENCOUNTER
Scheduled date of colonoscopy (as of today): 10/23/24    Physician performing colonoscopy: Dr. Cormier     Location of colonoscopy: [place]    [facility] 2 day hold approved from original procedure date of 9/25/24.

## 2024-10-01 DIAGNOSIS — N18.32 TYPE 2 DIABETES MELLITUS WITH STAGE 3B CHRONIC KIDNEY DISEASE, WITHOUT LONG-TERM CURRENT USE OF INSULIN (HCC): ICD-10-CM

## 2024-10-01 DIAGNOSIS — E11.22 TYPE 2 DIABETES MELLITUS WITH STAGE 3B CHRONIC KIDNEY DISEASE, WITHOUT LONG-TERM CURRENT USE OF INSULIN (HCC): ICD-10-CM

## 2024-10-02 RX ORDER — SEMAGLUTIDE 1.34 MG/ML
INJECTION, SOLUTION SUBCUTANEOUS
Qty: 3 ML | Refills: 0 | Status: SHIPPED | OUTPATIENT
Start: 2024-10-02 | End: 2024-10-04

## 2024-10-04 ENCOUNTER — OFFICE VISIT (OUTPATIENT)
Dept: BARIATRICS | Facility: CLINIC | Age: 64
End: 2024-10-04
Payer: COMMERCIAL

## 2024-10-04 VITALS
WEIGHT: 315 LBS | RESPIRATION RATE: 16 BRPM | HEIGHT: 69 IN | SYSTOLIC BLOOD PRESSURE: 118 MMHG | HEART RATE: 79 BPM | DIASTOLIC BLOOD PRESSURE: 70 MMHG | BODY MASS INDEX: 46.65 KG/M2 | TEMPERATURE: 96.8 F

## 2024-10-04 DIAGNOSIS — N18.32 TYPE 2 DIABETES MELLITUS WITH STAGE 3B CHRONIC KIDNEY DISEASE, WITHOUT LONG-TERM CURRENT USE OF INSULIN (HCC): ICD-10-CM

## 2024-10-04 DIAGNOSIS — E66.01 MORBID OBESITY WITH BMI OF 45.0-49.9, ADULT (HCC): Primary | ICD-10-CM

## 2024-10-04 DIAGNOSIS — I10 ESSENTIAL HYPERTENSION: ICD-10-CM

## 2024-10-04 DIAGNOSIS — E11.22 TYPE 2 DIABETES MELLITUS WITH STAGE 3B CHRONIC KIDNEY DISEASE, WITHOUT LONG-TERM CURRENT USE OF INSULIN (HCC): ICD-10-CM

## 2024-10-04 PROCEDURE — 99214 OFFICE O/P EST MOD 30 MIN: CPT | Performed by: PHYSICIAN ASSISTANT

## 2024-10-04 NOTE — ASSESSMENT & PLAN NOTE
Lab Results   Component Value Date    HGBA1C 6.1 (H) 07/19/2024   On ozempic and doing well. To increase to 2mg   no

## 2024-10-04 NOTE — PROGRESS NOTES
Assessment/Plan:    Morbid obesity with BMI of 45.0-49.9, adult ([facility])  -Patient is pursuing Conservative Program and in bariatric surgery   -Initial weight loss goal of 5-10% weight loss for improved health  -Screening labs A1c has improved in July to 6.1    Initial:335.2  Current:322.2  Change:-13lb      On ozempic 1mg.  Does not feel it is as helpful.  Will increase to 2mg .  Denies any side effects    Continue with dietary changes    Type 2 diabetes mellitus with stage 3b chronic kidney disease, without long-term current use of insulin ([facility])    Lab Results   Component Value Date    HGBA1C 6.1 (H) 07/19/2024   On ozempic and doing well. To increase to 2mg        Return if symptoms worsen or fail to improve.     Diagnoses and all orders for this visit:    Morbid obesity with BMI of 45.0-49.9, adult ([facility])    Type 2 diabetes mellitus with stage 3b chronic kidney disease, without long-term current use of insulin ([facility])  -     semaglutide, 2 mg/dose, (Ozempic, 2 MG/DOSE,) 8 mg/ mL injection pen; Inject 0.75 mL (2 mg total) under the skin every 7 days    Essential hypertension          Subjective:   Chief Complaint   Patient presents with    Follow-up     MWM- 4 mo F/u; Waist-54.5in        Patient ID: Nasir Story Jr.  is a 64 y.o. male with excess weight/obesity here to pursue weight managment.  Patient is pursuing Conservative Program.     HPI  On ozempic 1 mg.currently in the surgical process and had consultation with dr. Glover.  He had to stop ozempic prior to EGD and felt appetiet was better controleld prior.  Denies any side effects.  Blood sugar was 110 yesterday.      He has been doing more vegetables.  Avodis meal skipping and will do protein shake if skippping    Wt Readings from Last 10 Encounters:   10/04/24 (!) 146 kg (322 lb 3.2 oz)   09/18/24 (!) 146 kg (322 lb)   09/13/24 (!) 146 kg (322 lb)   09/12/24 (!) 146 kg (322 lb 6.4 oz)   09/06/24 (!) 147 kg (325 lb)   09/05/24 (!) 147 kg (323 lb)   08/22/24  (!) 147 kg (324 lb)   08/20/24 (!) 148 kg (325 lb 3.2 oz)   08/14/24 (!) 145 kg (320 lb)   08/13/24 (!) 147 kg (324 lb 8 oz)       Food logging:yes -bariatastic  Increased appetite/cravings:  Exercise:walking as much as possible but has knee OA  Hydration:water, 1 coffee      The following portions of the patient's history were reviewed and updated as appropriate: He  has a past medical history of Abnormal CBC (01/20/2020), Acne, Allergic, Anemia (10/28/2019), Anxiety, Arthritis, Back pain, Chronic kidney disease, Chronic pain, Dental cavity (08/10/2017), Depression (06/07/2021), Diabetes mellitus ([facility]), Eczema, Fall (10/16/2023), Flu vaccine need (10/16/2023), Grief reaction (09/10/2019), Hyperlipidemia, Hypertension, Knee pain, Morbid obesity with BMI of 50.0-59.9, adult ([facility]), Obesity, IMELDA on CPAP, Psychiatric disorder, Screening for prostate cancer (11/28/2018), Skin tag, Suspected sleep apnea (08/18/2023), Type 2 diabetes mellitus without complication ([facility]) (07/16/2014), and Urinary retention.  He   Patient Active Problem List    Diagnosis Date Noted    Hypomagnesemia 09/05/2024    BPH with obstruction/lower urinary tract symptoms 08/14/2024    IMELDA (obstructive sleep apnea) 05/24/2024    Chronic diastolic heart failure ([facility]) 05/13/2024    Fatigue 10/16/2023    Other pulmonary embolism without acute cor pulmonale ([facility]) 07/03/2023    Thoracic aortic ectasia ([facility]) 07/03/2023    Continuous opioid dependence ([facility]) 09/12/2022    Dyspnea on exertion 02/07/2022    Type 2 diabetes mellitus with stage 3b chronic kidney disease, without long-term current use of insulin ([facility])     Localized osteoarthritis of right knee 10/23/2019    Stage 3b chronic kidney disease ([facility]) 09/27/2019    It band syndrome, right 06/14/2019    Bilateral primary osteoarthritis of knee 06/14/2019    Persistent proteinuria 09/26/2018    Microalbuminuria 08/07/2017    Localized osteoarthritis of left knee 07/19/2017    Patellofemoral arthritis of  left knee 07/19/2017    Hypokalemia 03/17/2016    Lumbar radiculopathy 01/18/2016    Erectile dysfunction 01/30/2015    Rosacea 01/30/2015    Vitamin D deficiency 09/16/2014    Allergic rhinitis 07/16/2014    Chronic bilateral low back pain with bilateral sciatica 07/16/2014    Mixed hyperlipidemia 07/16/2014    Essential hypertension 07/16/2014    Morbid obesity with BMI of 45.0-49.9, adult ([facility]) 07/16/2014     He  has a past surgical history that includes Lumbar fusion (2002); Lumbar fusion (2004); and Back surgery.  His family history includes Diabetes in his mother; Other in his mother.  He  reports that he quit smoking about 5 months ago. His smoking use included cigars. He started smoking about 6 years ago. He has been exposed to tobacco smoke. He has never used smokeless tobacco. He reports that he does not currently use alcohol. He reports that he does not use drugs.  Current Outpatient Medications   Medication Sig Dispense Refill    alfuzosin (UROXATRAL) 10 mg 24 hr tablet Take 1 tablet (10 mg total) by mouth daily 30 tablet 3    apixaban (Eliquis) 5 mg Take 1 tablet (5 mg total) by mouth 2 (two) times a day 60 tablet 5    atorvastatin (LIPITOR) 20 mg tablet TAKE ONE TABLET BY MOUTH EVERY DAY 90 tablet 1    chlorthalidone 25 mg tablet Take 0.5 tablets (12.5 mg total) by mouth daily      glucose blood (FREESTYLE LITE) test strip Use 1 each in the morning 100 each 0    Lancets (freestyle) lancets Use as instructed 100 each 0    losartan (COZAAR) 100 MG tablet TAKE ONE TABLET BY MOUTH EVERY DAY 90 tablet 1    magnesium Oxide (MAG-OX) 400 mg TABS Take 1 tablet (400 mg total) by mouth 2 (two) times a day      metoprolol tartrate (LOPRESSOR) 100 mg tablet TAKE ONE TABLET BY MOUTH TWICE A  tablet 1    morphine (MS CONTIN) 15 mg 12 hr tablet as needed  0    oxyCODONE (ROXICODONE) 30 MG immediate release tablet if needed  0    polyethylene glycol (MIRALAX) 17 g packet Take 17 g by mouth daily (Patient taking  differently: Take 17 g by mouth as needed) 510 g 5    semaglutide, 2 mg/dose, (Ozempic, 2 MG/DOSE,) 8 mg/ mL injection pen Inject 0.75 mL (2 mg total) under the skin every 7 days 3 mL 3    polyethylene glycol (GOLYTELY) 4000 mL solution Take 4,000 mL by mouth once for 1 dose Colonoscopy prep 4000 mL 0     No current facility-administered medications for this visit.     Current Outpatient Medications on File Prior to Visit   Medication Sig    alfuzosin (UROXATRAL) 10 mg 24 hr tablet Take 1 tablet (10 mg total) by mouth daily    apixaban (Eliquis) 5 mg Take 1 tablet (5 mg total) by mouth 2 (two) times a day    atorvastatin (LIPITOR) 20 mg tablet TAKE ONE TABLET BY MOUTH EVERY DAY    chlorthalidone 25 mg tablet Take 0.5 tablets (12.5 mg total) by mouth daily    glucose blood (FREESTYLE LITE) test strip Use 1 each in the morning    Lancets (freestyle) lancets Use as instructed    losartan (COZAAR) 100 MG tablet TAKE ONE TABLET BY MOUTH EVERY DAY    magnesium Oxide (MAG-OX) 400 mg TABS Take 1 tablet (400 mg total) by mouth 2 (two) times a day    metoprolol tartrate (LOPRESSOR) 100 mg tablet TAKE ONE TABLET BY MOUTH TWICE A DAY    morphine (MS CONTIN) 15 mg 12 hr tablet as needed    oxyCODONE (ROXICODONE) 30 MG immediate release tablet if needed    polyethylene glycol (MIRALAX) 17 g packet Take 17 g by mouth daily (Patient taking differently: Take 17 g by mouth as needed)    [DISCONTINUED] semaglutide, 1 mg/dose, (Ozempic, 1 MG/DOSE,) 4 mg/3 mL injection pen INJECT 1MG (0.75ML) UNDER THE SKIN EVERY 7 DAYS    polyethylene glycol (GOLYTELY) 4000 mL solution Take 4,000 mL by mouth once for 1 dose Colonoscopy prep     No current facility-administered medications on file prior to visit.     He is allergic to gabapentin, pregabalin, and tramadol..    Review of Systems   Constitutional:  Negative for fatigue.   Respiratory:  Negative for shortness of breath.    Cardiovascular:  Negative for chest pain and palpitations.  "  Gastrointestinal:  Negative for abdominal pain, constipation and diarrhea.   Endocrine: Negative for cold intolerance and heat intolerance.   Genitourinary:  Negative for difficulty urinating.   Musculoskeletal:  Positive for arthralgias.   Skin:  Negative for rash.   Neurological:  Negative for headaches.   Psychiatric/Behavioral:  Negative for dysphoric mood. The patient is not nervous/anxious.        Objective:    /70 (BP Location: Left arm, Patient Position: Sitting)   Pulse 79   Temp (!) 96.8 °F (36 °C) (Tympanic)   Resp 16   Ht 5' 9\" (1.753 m)   Wt (!) 146 kg (322 lb 3.2 oz)   BMI 47.58 kg/m²      Physical Exam  Vitals and nursing note reviewed.   Constitutional:       General: He is not in acute distress.     Appearance: He is well-developed. He is obese.   HENT:      Head: Normocephalic and atraumatic.   Eyes:      Conjunctiva/sclera: Conjunctivae normal.   Neck:      Thyroid: No thyromegaly.   Pulmonary:      Effort: Pulmonary effort is normal. No respiratory distress.   Skin:     Findings: No rash (visible).   Neurological:      Mental Status: He is alert and oriented to person, place, and time.   Psychiatric:         Mood and Affect: Mood normal.         Behavior: Behavior normal.         "

## 2024-10-04 NOTE — ASSESSMENT & PLAN NOTE
-Patient is pursuing Conservative Program and in bariatric surgery   -Initial weight loss goal of 5-10% weight loss for improved health  -Screening labs A1c has improved in July to 6.1    Initial:335.2  Current:322.2  Change:-13lb      On ozempic 1mg.  Does not feel it is as helpful.  Will increase to 2mg .  Denies any side effects    Continue with dietary changes

## 2024-10-14 ENCOUNTER — TELEPHONE (OUTPATIENT)
Age: 64
End: 2024-10-14

## 2024-10-14 NOTE — TELEPHONE ENCOUNTER
Sharee from Ortho called in to confirm if the patient is established with  . She will be faxing over a request to have the patient off of his blood thinners medication . Provide sharee with our fax #

## 2024-10-16 ENCOUNTER — CONSULT (OUTPATIENT)
Dept: HEMATOLOGY ONCOLOGY | Facility: CLINIC | Age: 64
End: 2024-10-16
Payer: COMMERCIAL

## 2024-10-16 VITALS
RESPIRATION RATE: 17 BRPM | HEART RATE: 79 BPM | WEIGHT: 315 LBS | DIASTOLIC BLOOD PRESSURE: 72 MMHG | SYSTOLIC BLOOD PRESSURE: 112 MMHG | OXYGEN SATURATION: 96 % | BODY MASS INDEX: 46.65 KG/M2 | HEIGHT: 69 IN | TEMPERATURE: 98.3 F

## 2024-10-16 DIAGNOSIS — G47.33 OSA (OBSTRUCTIVE SLEEP APNEA): ICD-10-CM

## 2024-10-16 DIAGNOSIS — E66.01 MORBID OBESITY WITH BMI OF 45.0-49.9, ADULT (HCC): ICD-10-CM

## 2024-10-16 DIAGNOSIS — I26.99 PULMONARY EMBOLISM, UNSPECIFIED CHRONICITY, UNSPECIFIED PULMONARY EMBOLISM TYPE, UNSPECIFIED WHETHER ACUTE COR PULMONALE PRESENT (HCC): ICD-10-CM

## 2024-10-16 PROCEDURE — 99204 OFFICE O/P NEW MOD 45 MIN: CPT | Performed by: NURSE PRACTITIONER

## 2024-10-16 NOTE — PROGRESS NOTES
[facility] [place]  [address] [address]  [phone]  Hematology Ambulatory Consult  Nasir Story Jr., 1960, [id number]  10/16/2024      Assessment and Plan   1. Morbid obesity with BMI of 45.0-49.9, adult ([facility])  2. IMELDA (obstructive sleep apnea)  3. Pulmonary embolism, unspecified chronicity, unspecified pulmonary embolism type, unspecified whether acute cor pulmonale present ([facility])   Patient is a 64-year-old male with a history of hypertension, pulmonary embolism, chronic diastolic heart failure, IMELDA, type 2 diabetes, bilateral lower back pain, lumbar radiculopathy, stage III CKD, morbid obesity, who was referred for evaluation of previous pulmonary embolism.  In June 2023 patient presented to the emergency room with worsening dyspnea on exertion.  Initial CT PE study was equivocal.  Lower extremity Doppler study showed no evidence of DVT or superficial thrombophlebitis.  Repeat chest CTA showed segmental and subsegmental pulmonary emboli, no evidence of right heart strain.  He was started on a heparin drip then transitioned to Eliquis at discharge.   Etiology of thrombosis was felt to be secondary to sedentary lifestyle.  Patient does not report a previous history of blood clots nor is he aware of family history.  Per pulmonary patient is supposed to remain on Eliquis indefinitely.  Patient continues on Eliquis 5 mg p.o. twice daily.   He is following with bariatric surgery and is expected to undergo weight loss surgery in the near future.  He is having an EGD and colonoscopy next week.  We discussed obtaining a modified thrombosis panel to evaluate whether patient has antiphospholipid syndrome or deficiencies contributing to initial thrombosis.  He is planning to hold the Eliquis prior to EGD and colonoscopy.  I gave him verbal instructions to have the blood work done on 10/22/2024.  He should resume the Eliquis once the scopes are  completed.  I advised him to continue on Eliquis until seen in follow-up by hematology.  Patient will need a surgical clearance from a physician prior to having bariatric surgery.  I suspect patient will need to be on lifelong anticoagulation however if he is undergoing bariatric surgery Eliquis versus Coumadin may be considered secondary to absorption.    - Ambulatory referral to Hematology / Oncology  - Antithrombin III Activity; Future  - Beta-2 glycoprotein antibodies; Future  - Cardiolipin antibody; Future  - CBC and differential; Future  - Comprehensive metabolic panel; Future  - Protein C and S Activity With Reflex To Protein C and/or S Antigen; Future  - Lupus anticoagulant; Future    Patient verbalized understanding and is in agreement to the plan.   Patient knows to call the Hematology/Oncology office with any questions and concerns regarding the above.    Barrier(s) to care: None.   The patient is able to self care.    Annie AMADOR  Medical Oncology/Hematology  [facility]    Subjective     Chief Complaint   Patient presents with    Consult       Referring provider    Noé Glover MD  [address] [place] [address]    History of present illness:   Patient is a 64-year-old male with a history of hypertension, pulmonary embolism, chronic diastolic heart failure, IMELDA, type 2 diabetes, bilateral lower back pain, lumbar radiculopathy, stage III CKD, morbid obesity, who was referred for evaluation of previous pulmonary embolism.    10/16/24: Clinically stable    Review of Systems   Constitutional:  Negative for activity change, appetite change, fatigue, fever and unexpected weight change.   Respiratory:  Negative for cough and shortness of breath.    Cardiovascular:  Negative for chest pain and leg swelling.   Gastrointestinal:  Negative for abdominal pain, constipation, diarrhea and nausea.   Endocrine: Negative for cold intolerance and heat  intolerance.   Musculoskeletal:  Negative for arthralgias and myalgias.   Skin: Negative.    Neurological:  Negative for dizziness, weakness and headaches.   Hematological:  Negative for adenopathy. Does not bruise/bleed easily.       Past Medical History:   Diagnosis Date    Abnormal CBC 2020    Acne     Allergic     Anemia 10/28/2019    Anxiety     Arthritis     Back pain     Chronic kidney disease     Chronic pain     Dental cavity 08/10/2017    Depression 2021    Diabetes mellitus ([facility])     6/19/15 A1C: 5.5%    Eczema     Fall 10/16/2023    Flu vaccine need 10/16/2023    Grief reaction 09/10/2019    Hyperlipidemia     Hypertension     Knee pain     Morbid obesity with BMI of 50.0-59.9, adult ([facility])     Obesity     IMELDA on CPAP     Psychiatric disorder     Screening for prostate cancer 2018    Skin tag     Suspected sleep apnea 2023    Type 2 diabetes mellitus without complication ([facility]) 2014    Urinary retention      Past Surgical History:   Procedure Laterality Date    BACK SURGERY      LUMBAR FUSION      LUMBAR FUSION      L4-5-6, S1 has pump for narcotics     Family History   Problem Relation Age of Onset    Other Mother         colitis    Diabetes Mother      Social History     Socioeconomic History    Marital status: Legally      Spouse name: Not on file    Number of children: Not on file    Years of education: Not on file    Highest education level: Not on file   Occupational History    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      Patient reports that he last smoked a cigar close to 2 months ago.    Vaping Use    Vaping status: Never Used   Substance and Sexual Activity    Alcohol use: Not  Currently     Comment: occasional    Drug use: No     Comment: History of drug use, meena, cocaine, heroind, no IVDA-as per [employer]    Sexual activity: Yes     Partners: Female     Birth control/protection: None   Other Topics Concern    Not on file   Social History Narrative    Patient lives with his ex wife.      Social Determinants of Health     Financial Resource Strain: Low Risk  (10/15/2023)    Overall Financial Resource Strain (CARDIA)     Difficulty of Paying Living Expenses: Not very hard   Food Insecurity: Not on file   Transportation Needs: No Transportation Needs (10/15/2023)    PRAPARE - Transportation     Lack of Transportation (Medical): No     Lack of Transportation (Non-Medical): No   Physical Activity: Not on file   Stress: Not on file   Social Connections: Not on file   Intimate Partner Violence: Not on file   Housing Stability: Not on file         Current Outpatient Medications:     alfuzosin (UROXATRAL) 10 mg 24 hr tablet, Take 1 tablet (10 mg total) by mouth daily, Disp: 30 tablet, Rfl: 3    apixaban (Eliquis) 5 mg, Take 1 tablet (5 mg total) by mouth 2 (two) times a day, Disp: 60 tablet, Rfl: 5    atorvastatin (LIPITOR) 20 mg tablet, TAKE ONE TABLET BY MOUTH EVERY DAY, Disp: 90 tablet, Rfl: 1    chlorthalidone 25 mg tablet, Take 0.5 tablets (12.5 mg total) by mouth daily, Disp: , Rfl:     glucose blood (FREESTYLE LITE) test strip, Use 1 each in the morning, Disp: 100 each, Rfl: 0    Lancets (freestyle) lancets, Use as instructed, Disp: 100 each, Rfl: 0    losartan (COZAAR) 100 MG tablet, TAKE ONE TABLET BY MOUTH EVERY DAY, Disp: 90 tablet, Rfl: 1    magnesium Oxide (MAG-OX) 400 mg TABS, Take 1 tablet (400 mg total) by mouth 2 (two) times a day, Disp: , Rfl:     metoprolol tartrate (LOPRESSOR) 100 mg tablet, TAKE ONE TABLET BY MOUTH TWICE A DAY, Disp: 200 tablet, Rfl: 1    morphine (MS CONTIN) 15 mg 12 hr tablet, as needed, Disp: , Rfl: 0    polyethylene glycol (MIRALAX) 17 g packet, Take 17  "g by mouth daily (Patient taking differently: Take 17 g by mouth as needed), Disp: 510 g, Rfl: 5    semaglutide, 2 mg/dose, (Ozempic, 2 MG/DOSE,) 8 mg/ mL injection pen, Inject 0.75 mL (2 mg total) under the skin every 7 days, Disp: 3 mL, Rfl: 3    oxyCODONE (ROXICODONE) 30 MG immediate release tablet, if needed, Disp: , Rfl: 0    polyethylene glycol (GOLYTELY) 4000 mL solution, Take 4,000 mL by mouth once for 1 dose Colonoscopy prep, Disp: 4000 mL, Rfl: 0  Allergies   Allergen Reactions    Gabapentin      Annotation - [id number]: dizziness    Pregabalin      Annotation - [id number]: vision changes and mood changes    Tramadol Nausea Only       Objective   /72 (BP Location: Left arm, Patient Position: Sitting, Cuff Size: Adult)   Pulse 79   Temp 98.3 °F (36.8 °C)   Resp 17   Ht 5' 9\" (1.753 m)   Wt (!) 147 kg (323 lb)   SpO2 96%   BMI 47.70 kg/m²   Physical Exam  Vitals reviewed.   Constitutional:       Appearance: Normal appearance. He is well-developed.   HENT:      Head: Normocephalic and atraumatic.   Eyes:      Pupils: Pupils are equal, round, and reactive to light.   Pulmonary:      Effort: Pulmonary effort is normal. No respiratory distress.   Musculoskeletal:         General: Normal range of motion.      Cervical back: Normal range of motion.   Lymphadenopathy:      Cervical: No cervical adenopathy.   Skin:     General: Skin is dry.   Neurological:      Mental Status: He is alert and oriented to person, place, and time.   Psychiatric:         Behavior: Behavior normal.         Result Review  Labs:  Lab Results   Component Value Date    WBC 9.38 08/27/2024    HGB 12.2 08/27/2024    HCT 36.7 08/27/2024    MCV 89 08/27/2024     08/27/2024     Lab Results   Component Value Date     09/28/2017     09/28/2017    SODIUM 137 08/27/2024    K 4.7 08/27/2024     08/27/2024    CO2 22 08/27/2024    ANIONGAP 4 06/19/2015    AGAP 12 08/27/2024    BUN 31 (H) 08/27/2024    CREATININE 2.02 " (H) 08/27/2024    GLUC 65 07/19/2024    GLUF 96 08/27/2024    CALCIUM 9.4 08/27/2024    AST 13 08/27/2024    ALT 12 08/27/2024    ALKPHOS 75 08/27/2024    PROT 8.3 (H) 06/19/2015    TP 7.3 08/27/2024    BILITOT 0.5 06/19/2015    TBILI 0.57 08/27/2024    EGFR 33 08/27/2024     Imaging:   EGD    Result Date: 9/18/2024  Narrative: Table formatting from the original result was not included. [facility] [address] [address] [phone] DATE OF SERVICE: 9/18/24 PHYSICIAN(S): Attending: Noé Glover MD Fellow: No Staff Documented INDICATION: Bariatric surgery status POST-OP DIAGNOSIS: See the impression below. PREPROCEDURE: Informed consent was obtained for the procedure, including sedation.  Risks of perforation, hemorrhage, adverse drug reaction and aspiration were discussed. The patient was placed in the left lateral decubitus position. Patient was explained about the risks and benefits of the procedure. Risks including but not limited to bleeding, infection, and perforation were explained in detail. Also explained about less than 100% sensitivity with the exam and other alternatives. PROCEDURE: EGD DETAILS OF PROCEDURE: Patient was taken to the procedure room where a time out was performed to confirm correct patient and correct procedure. The patient underwent monitored anesthesia care, which was administered by an anesthesia professional. The patient's blood pressure, heart rate, level of consciousness, respirations, oxygen, ECG and ETCO2 were monitored throughout the procedure. The scope was introduced through the mouth and advanced to the second part of the duodenum. Retroflexion was performed in the fundus. Prior to the procedure, the patient's H. Pylori status was unknown. The patient experienced no blood loss. The procedure was not difficult. The patient tolerated the procedure well. There were no apparent adverse events. ANESTHESIA INFORMATION: ASA: III Anesthesia  Type: General MEDICATIONS: sodium chloride 0.9 % infusion 400 mL*  *From user-documented volume (Totals for administrations occurring from 0750 to 0758 on 09/18/24) FINDINGS: Mild, generalized erythematous mucosa in the stomach; performed 6 cold forceps biopsies to rule out H. pylori Regular Z-line 45 cm from the incisors SPECIMENS: ID Type Source Tests Collected by Time Destination 1 : r/o h.pylori Tissue Stomach TISSUE EXAM Noé Glover MD 9/18/2024 [id number]      Impression: Gastritis with mild erythematous mucosa in the stomach; performed cold forceps biopsies. Some retained gastric contents suggestive of gastroparesis RECOMMENDATION: Continue with the bariatric program process and follow up in the office. Biopsy results pending. Must be on a strict liquid diet prior to the surgery   Noé Glover MD       Please note:  This report has been generated by a voice recognition software system. Therefore there may be syntax, spelling, and/or grammatical errors. Please call if you have any questions.

## 2024-10-22 ENCOUNTER — TELEPHONE (OUTPATIENT)
Dept: BARIATRICS | Facility: CLINIC | Age: 64
End: 2024-10-22

## 2024-10-22 ENCOUNTER — LAB (OUTPATIENT)
Dept: LAB | Facility: CLINIC | Age: 64
End: 2024-10-22
Payer: COMMERCIAL

## 2024-10-22 DIAGNOSIS — G47.33 OSA (OBSTRUCTIVE SLEEP APNEA): ICD-10-CM

## 2024-10-22 DIAGNOSIS — N18.32 STAGE 3B CHRONIC KIDNEY DISEASE (HCC): ICD-10-CM

## 2024-10-22 DIAGNOSIS — E83.42 HYPOMAGNESEMIA: ICD-10-CM

## 2024-10-22 DIAGNOSIS — I26.99 PULMONARY EMBOLISM, UNSPECIFIED CHRONICITY, UNSPECIFIED PULMONARY EMBOLISM TYPE, UNSPECIFIED WHETHER ACUTE COR PULMONALE PRESENT (HCC): ICD-10-CM

## 2024-10-22 DIAGNOSIS — I10 ESSENTIAL HYPERTENSION: ICD-10-CM

## 2024-10-22 DIAGNOSIS — E66.01 MORBID OBESITY WITH BMI OF 45.0-49.9, ADULT (HCC): ICD-10-CM

## 2024-10-22 LAB
ALBUMIN SERPL BCG-MCNC: 3.9 G/DL (ref 3.5–5)
ALP SERPL-CCNC: 70 U/L (ref 34–104)
ALT SERPL W P-5'-P-CCNC: 9 U/L (ref 7–52)
ANION GAP SERPL CALCULATED.3IONS-SCNC: 9 MMOL/L (ref 4–13)
AST SERPL W P-5'-P-CCNC: 12 U/L (ref 13–39)
BASOPHILS # BLD AUTO: 0.05 THOUSANDS/ΜL (ref 0–0.1)
BASOPHILS NFR BLD AUTO: 1 % (ref 0–1)
BILIRUB SERPL-MCNC: 0.53 MG/DL (ref 0.2–1)
BUN SERPL-MCNC: 33 MG/DL (ref 5–25)
CALCIUM SERPL-MCNC: 9.4 MG/DL (ref 8.4–10.2)
CHLORIDE SERPL-SCNC: 100 MMOL/L (ref 96–108)
CO2 SERPL-SCNC: 27 MMOL/L (ref 21–32)
CREAT SERPL-MCNC: 1.84 MG/DL (ref 0.6–1.3)
EOSINOPHIL # BLD AUTO: 0.37 THOUSAND/ΜL (ref 0–0.61)
EOSINOPHIL NFR BLD AUTO: 4 % (ref 0–6)
ERYTHROCYTE [DISTWIDTH] IN BLOOD BY AUTOMATED COUNT: 13.1 % (ref 11.6–15.1)
GFR SERPL CREATININE-BSD FRML MDRD: 37 ML/MIN/1.73SQ M
GLUCOSE SERPL-MCNC: 112 MG/DL (ref 65–140)
HCT VFR BLD AUTO: 40.8 % (ref 36.5–49.3)
HGB BLD-MCNC: 13.1 G/DL (ref 12–17)
IMM GRANULOCYTES # BLD AUTO: 0.12 THOUSAND/UL (ref 0–0.2)
IMM GRANULOCYTES NFR BLD AUTO: 1 % (ref 0–2)
LYMPHOCYTES # BLD AUTO: 1.59 THOUSANDS/ΜL (ref 0.6–4.47)
LYMPHOCYTES NFR BLD AUTO: 18 % (ref 14–44)
MAGNESIUM SERPL-MCNC: 2.1 MG/DL (ref 1.9–2.7)
MCH RBC QN AUTO: 28.4 PG (ref 26.8–34.3)
MCHC RBC AUTO-ENTMCNC: 32.1 G/DL (ref 31.4–37.4)
MCV RBC AUTO: 88 FL (ref 82–98)
MONOCYTES # BLD AUTO: 0.54 THOUSAND/ΜL (ref 0.17–1.22)
MONOCYTES NFR BLD AUTO: 6 % (ref 4–12)
NEUTROPHILS # BLD AUTO: 6.28 THOUSANDS/ΜL (ref 1.85–7.62)
NEUTS SEG NFR BLD AUTO: 70 % (ref 43–75)
NRBC BLD AUTO-RTO: 0 /100 WBCS
PLATELET # BLD AUTO: 237 THOUSANDS/UL (ref 149–390)
PMV BLD AUTO: 11 FL (ref 8.9–12.7)
POTASSIUM SERPL-SCNC: 3.9 MMOL/L (ref 3.5–5.3)
PROT SERPL-MCNC: 7.3 G/DL (ref 6.4–8.4)
RBC # BLD AUTO: 4.62 MILLION/UL (ref 3.88–5.62)
SODIUM SERPL-SCNC: 136 MMOL/L (ref 135–147)
WBC # BLD AUTO: 8.95 THOUSAND/UL (ref 4.31–10.16)

## 2024-10-22 PROCEDURE — 85613 RUSSELL VIPER VENOM DILUTED: CPT

## 2024-10-22 PROCEDURE — 85303 CLOT INHIBIT PROT C ACTIVITY: CPT

## 2024-10-22 PROCEDURE — 85732 THROMBOPLASTIN TIME PARTIAL: CPT

## 2024-10-22 PROCEDURE — 85670 THROMBIN TIME PLASMA: CPT

## 2024-10-22 PROCEDURE — 85705 THROMBOPLASTIN INHIBITION: CPT

## 2024-10-22 PROCEDURE — 86146 BETA-2 GLYCOPROTEIN ANTIBODY: CPT

## 2024-10-22 PROCEDURE — 86147 CARDIOLIPIN ANTIBODY EA IG: CPT

## 2024-10-22 PROCEDURE — 85306 CLOT INHIBIT PROT S FREE: CPT | Performed by: NURSE PRACTITIONER

## 2024-10-22 PROCEDURE — 80053 COMPREHEN METABOLIC PANEL: CPT

## 2024-10-22 PROCEDURE — 36415 COLL VENOUS BLD VENIPUNCTURE: CPT

## 2024-10-22 PROCEDURE — 83735 ASSAY OF MAGNESIUM: CPT

## 2024-10-22 PROCEDURE — 85300 ANTITHROMBIN III ACTIVITY: CPT

## 2024-10-22 PROCEDURE — 85025 COMPLETE CBC W/AUTO DIFF WBC: CPT

## 2024-10-22 NOTE — TELEPHONE ENCOUNTER
Patient called in to reschedule missed appointment on 10/18 with araceli Cazares transferred to office.

## 2024-10-23 ENCOUNTER — HOSPITAL ENCOUNTER (OUTPATIENT)
Dept: GASTROENTEROLOGY | Facility: HOSPITAL | Age: 64
Setting detail: OUTPATIENT SURGERY
Discharge: HOME | End: 2024-10-23
Attending: INTERNAL MEDICINE
Payer: COMMERCIAL

## 2024-10-23 ENCOUNTER — ANESTHESIA (OUTPATIENT)
Dept: GASTROENTEROLOGY | Facility: HOSPITAL | Age: 64
End: 2024-10-23
Payer: COMMERCIAL

## 2024-10-23 ENCOUNTER — ANESTHESIA EVENT (OUTPATIENT)
Dept: GASTROENTEROLOGY | Facility: HOSPITAL | Age: 64
End: 2024-10-23
Payer: COMMERCIAL

## 2024-10-23 VITALS
RESPIRATION RATE: 16 BRPM | WEIGHT: 315 LBS | TEMPERATURE: 97.6 F | DIASTOLIC BLOOD PRESSURE: 75 MMHG | HEIGHT: 69 IN | BODY MASS INDEX: 46.65 KG/M2 | OXYGEN SATURATION: 98 % | SYSTOLIC BLOOD PRESSURE: 148 MMHG | HEART RATE: 85 BPM

## 2024-10-23 DIAGNOSIS — Z12.11 SCREENING FOR COLON CANCER: ICD-10-CM

## 2024-10-23 LAB
DEPRECATED AT III PPP: 98 % OF NORMAL (ref 92–136)
GLUCOSE SERPL-MCNC: 118 MG/DL (ref 65–140)

## 2024-10-23 PROCEDURE — 88305 TISSUE EXAM BY PATHOLOGIST: CPT | Performed by: PATHOLOGY

## 2024-10-23 PROCEDURE — 45380 COLONOSCOPY AND BIOPSY: CPT | Performed by: INTERNAL MEDICINE

## 2024-10-23 PROCEDURE — 82948 REAGENT STRIP/BLOOD GLUCOSE: CPT

## 2024-10-23 PROCEDURE — 45385 COLONOSCOPY W/LESION REMOVAL: CPT | Performed by: INTERNAL MEDICINE

## 2024-10-23 RX ORDER — PROPOFOL 10 MG/ML
INJECTION, EMULSION INTRAVENOUS CONTINUOUS PRN
Status: DISCONTINUED | OUTPATIENT
Start: 2024-10-23 | End: 2024-10-23

## 2024-10-23 RX ORDER — LIDOCAINE HYDROCHLORIDE 10 MG/ML
INJECTION, SOLUTION EPIDURAL; INFILTRATION; INTRACAUDAL; PERINEURAL AS NEEDED
Status: DISCONTINUED | OUTPATIENT
Start: 2024-10-23 | End: 2024-10-23

## 2024-10-23 RX ORDER — PROPOFOL 10 MG/ML
INJECTION, EMULSION INTRAVENOUS AS NEEDED
Status: DISCONTINUED | OUTPATIENT
Start: 2024-10-23 | End: 2024-10-23

## 2024-10-23 RX ORDER — SODIUM CHLORIDE, SODIUM LACTATE, POTASSIUM CHLORIDE, CALCIUM CHLORIDE 600; 310; 30; 20 MG/100ML; MG/100ML; MG/100ML; MG/100ML
INJECTION, SOLUTION INTRAVENOUS CONTINUOUS PRN
Status: DISCONTINUED | OUTPATIENT
Start: 2024-10-23 | End: 2024-10-23

## 2024-10-23 RX ADMIN — PROPOFOL 40 MG: 10 INJECTION, EMULSION INTRAVENOUS at 08:58

## 2024-10-23 RX ADMIN — PROPOFOL 100 MG: 10 INJECTION, EMULSION INTRAVENOUS at 08:55

## 2024-10-23 RX ADMIN — PROPOFOL 40 MG: 10 INJECTION, EMULSION INTRAVENOUS at 09:02

## 2024-10-23 RX ADMIN — SODIUM CHLORIDE, SODIUM LACTATE, POTASSIUM CHLORIDE, AND CALCIUM CHLORIDE: .6; .31; .03; .02 INJECTION, SOLUTION INTRAVENOUS at 08:51

## 2024-10-23 RX ADMIN — LIDOCAINE HYDROCHLORIDE 40 MG: 10 INJECTION, SOLUTION EPIDURAL; INFILTRATION; INTRACAUDAL; PERINEURAL at 08:55

## 2024-10-23 RX ADMIN — PROPOFOL 100 MCG/KG/MIN: 10 INJECTION, EMULSION INTRAVENOUS at 08:56

## 2024-10-23 NOTE — H&P
History and Physical -  [facility]  Nasir Story Jr. 64 y.o. male MRN: [id number]                  HPI: Nasir Story Jr. is a 64 y.o. year old male who presents for colonoscopy for colon cancer screening.      REVIEW OF SYSTEMS: Per the HPI, and otherwise unremarkable.    Historical Information   Past Medical History:   Diagnosis Date    Abnormal CBC 2020    Acne     Allergic     Anemia 10/28/2019    Anxiety     Arthritis     Back pain     Chronic kidney disease     Chronic pain     CPAP (continuous positive airway pressure) dependence     Dental cavity 08/10/2017    Depression 2021    Diabetes mellitus (HCC)     6/19/15 A1C: 5.5%    Eczema     Fall 10/16/2023    Flu vaccine need 10/16/2023    Grief reaction 09/10/2019    Hyperlipidemia     Hypertension     Knee pain     Morbid obesity with BMI of 50.0-59.9, adult ([facility])     Obesity     IMELDA on CPAP     Psychiatric disorder     Screening for prostate cancer 2018    Skin tag     Suspected sleep apnea 2023    Type 2 diabetes mellitus without complication ([facility]) 2014    Urinary retention      Past Surgical History:   Procedure Laterality Date    BACK SURGERY      LUMBAR FUSION  2002    LUMBAR FUSION      L4-5-6, S1 has pump for narcotics     Social History   Social History     Substance and Sexual Activity   Alcohol Use Not Currently    Comment: occasional     Social History     Substance and Sexual Activity   Drug Use No    Comment: History of drug use, meena, cocaine, heroind, no IVDA-as per [employer]     Social History     Tobacco Use   Smoking Status Former    Types: Cigars    Start date:     Quit date: 4/15/2024    Years since quittin.5    Passive exposure: Current   Smokeless Tobacco Never   Tobacco Comments    Quit cigarette smoking in .     Patient reports that he last smoked a cigar close to 2 months ago.      Family History   Problem Relation Age of Onset    Other Mother         colitis     "Diabetes Mother        Meds/Allergies     Not in a hospital admission.    Allergies   Allergen Reactions    Gabapentin      Annotation - [id number]: dizziness    Pregabalin      Annotation - [id number]: vision changes and mood changes    Tramadol Nausea Only       Objective     Blood pressure 146/73, pulse 94, temperature 97.6 °F (36.4 °C), temperature source Temporal, resp. rate 16, height 5' 9\" (1.753 m), weight (!) 146 kg (322 lb), SpO2 96%.      PHYSICAL EXAM    Gen: NAD  CV: RRR  CHEST: Clear  ABD: soft, NT/ND  EXT: no edema      ASSESSMENT/PLAN:  Nasir Story Jr. is a 64 y.o. year old male who presents for colonoscopy for colon cancer screening. The patient is stable and optimized for the procedure, we reviewed risk and benefits. Risk include but not limited to infection, bleeding, perforation and missing a lesion.          "

## 2024-10-23 NOTE — ANESTHESIA POSTPROCEDURE EVALUATION
Post-Op Assessment Note    CV Status:  Stable    Pain management: adequate       Mental Status:  Alert and awake   Hydration Status:  Euvolemic   PONV Controlled:  Controlled   Airway Patency:  Patent     Post Op Vitals Reviewed: Yes    No anethesia notable event occurred.    Staff: CRNA           Last Filed PACU Vitals:  Vitals Value Taken Time   Temp     Pulse 90 10/23/24 0918   /66 10/23/24 0918   Resp 16 10/23/24 0918   SpO2 97 % 10/23/24 0918       Modified Desmond:  Activity: 2 (10/23/2024  7:54 AM)  Respiration: 2 (10/23/2024  7:54 AM)  Circulation: 2 (10/23/2024  7:54 AM)  Consciousness: 2 (10/23/2024  7:54 AM)  Oxygen Saturation: 2 (10/23/2024  7:54 AM)  Modified Desmond Score: 10 (10/23/2024  7:54 AM)

## 2024-10-23 NOTE — RESULT ENCOUNTER NOTE
Hello    Patient normally is followed up by Ms Fadumo Ridley.     Please let the patient know that the creatinine is improving to 1.8.  Electrolytes are appropriate.  - Please find out how his blood pressures have been  - Repeat CMP, magnesium, phosphorus, UA, UPCR in 2 months    Thank you    np

## 2024-10-23 NOTE — ANESTHESIA PREPROCEDURE EVALUATION
Procedure:  COLONOSCOPY    Relevant Problems   CARDIO   (+) Dyspnea on exertion   (+) Essential hypertension   (+) Mixed hyperlipidemia   (+) Thoracic aortic ectasia (HCC)      ENDO   (+) Type 2 diabetes mellitus with stage 3b chronic kidney disease, without long-term current use of insulin (HCC)      /RENAL   (+) BPH with obstruction/lower urinary tract symptoms   (+) Stage 3b chronic kidney disease (HCC)      MUSCULOSKELETAL   (+) Chronic bilateral low back pain with bilateral sciatica   (+) Localized osteoarthritis of left knee   (+) Localized osteoarthritis of right knee   (+) Patellofemoral arthritis of left knee      NEURO/PSYCH   (+) Chronic bilateral low back pain with bilateral sciatica   (+) Continuous opioid dependence (HCC)      PULMONARY   (+) Dyspnea on exertion   (+) IMELDA (obstructive sleep apnea)        Physical Exam    Airway    Mallampati score: I  TM Distance: >3 FB  Neck ROM: full     Dental       Cardiovascular  Cardiovascular exam normal    Pulmonary  Pulmonary exam normal     Other Findings        Anesthesia Plan  ASA Score- 3     Anesthesia Type- IV sedation with anesthesia with ASA Monitors.         Additional Monitors:     Airway Plan:            Plan Factors-Exercise tolerance (METS): >4 METS.    Chart reviewed. EKG reviewed. Imaging results reviewed. Existing labs reviewed. Patient summary reviewed.                  Induction- intravenous.    Postoperative Plan- Plan for postoperative opioid use. Planned trial extubation        Informed Consent- Anesthetic plan and risks discussed with patient.  I personally reviewed this patient with the CRNA. Discussed and agreed on the Anesthesia Plan with the CRNA..

## 2024-10-24 ENCOUNTER — TELEPHONE (OUTPATIENT)
Dept: NEPHROLOGY | Facility: CLINIC | Age: 64
End: 2024-10-24

## 2024-10-24 DIAGNOSIS — N18.32 STAGE 3B CHRONIC KIDNEY DISEASE (HCC): Primary | ICD-10-CM

## 2024-10-24 LAB
APTT SCREEN TO CONFIRM RATIO: 1.09 RATIO (ref 0–1.34)
CONFIRM APTT/NORMAL: 35.2 SEC (ref 0–47.6)
LA PPP-IMP: NORMAL
SCREEN APTT: 32.5 SEC (ref 0–43.5)
SCREEN DRVVT: 37.3 SEC (ref 0–47)
THROMBIN TIME: 16.5 SEC (ref 0–23)

## 2024-10-24 NOTE — TELEPHONE ENCOUNTER
Detailed message left for pt with results and recommendations. Asked that he calls back to let us know about his blood pressure.   Lab orders mailed to pt.

## 2024-10-24 NOTE — TELEPHONE ENCOUNTER
----- Message from Ja Nunez MD sent at 10/23/2024  4:12 PM EDT -----  Hello    Patient normally is followed up by Ms Fadumo Ridley.     Please let the patient know that the creatinine is improving to 1.8.  Electrolytes are appropriate.  - Please find out how his blood pressures have been  - Repeat CMP, magnesium, phosphorus, UA, UPCR in 2 months    Thank you    np

## 2024-10-25 LAB
B2 GLYCOPROT1 IGA SERPL IA-ACNC: 1.2
B2 GLYCOPROT1 IGG SERPL IA-ACNC: <0.8
B2 GLYCOPROT1 IGM SERPL IA-ACNC: <2.4
CARDIOLIPIN IGA SER IA-ACNC: 2.8
CARDIOLIPIN IGG SER IA-ACNC: 2
CARDIOLIPIN IGM SER IA-ACNC: 2.1

## 2024-10-28 NOTE — PROGRESS NOTES
Bariatric Nutrition Assessment Note    Type of surgery    Preop- interested in gastric  bypass   Surgery Date: expected 2024  No program requirement   Surgeon: Dr. Noé Glover    Nutrition Assessment   Nasir Story Jr.  64 y.o.  male     Wt with BMI of 25: 168.8  Pre-Op Excess Wt: 166.4  There were no vitals taken for this visit.     Wt Readings from Last 3 Encounters:   10/23/24 (!) 146 kg (322 lb)   10/16/24 (!) 147 kg (323 lb)   10/04/24 (!) 146 kg (322 lb 3.2 oz)           aKrlee Crawford Equation:    TXI=0878  Weight Maintenance 2699  Estimated calories for weight loss 6793-4363 ( 1-2# per wk wt loss - sedentary )  Estimated protein needs - grams per day (0.6- .8 grams / kg actual body weight  )  CKD stage 3 b   Estimated fluid needs 77-90 oz(30-35 ml/kg IBW )      Weight History   Onset of Obesity: Adult  Family history of obesity: Yes   Wt Loss Attempts: Counseling with  MD  Previous consult with surgeon - but son  and he did not get the surgery   Fasting, healthy eating, gym/ exercise until knee pain , meal replacements   Patient has tried the above for 6 months or more with insufficient weight loss or weight regain, which is why patient has requested to be evaluated for weight loss surgery today  Maximum Wt Lost: 20 to 30 pounds with going to the gym and eating healthy     NAFLD Fibrosis Score is: 1.995    NAFLD Score Correlated Fibrosis Severity   <-1.455 F0-F2   -1.455-0.676 Indeterminate Score   >0.676 F3-F4   **Fibrosis Severity Scale: F0 = no fibrosis; F1= mild fibrosis; F2 = moderate fibrosis; F3 = severe fibrosis; F4 = cirrhosis    NAFLD Score Component Values:  Component Value Date   Age: 64 y.o.     BMI: 47.55 kg/m²    IFG or DM: Yes    AST: 12 U/L 10/22/2024   ALT: 9 U/L 10/22/2024   Platelet: 237 Thousands/uL 10/22/2024   Albumin: 3.9 g/dL 10/22/2024      Pt has been taking Ozempic which has decreased his appetite and provided early satiety.  He reports eating much smaller  portions than usual     Review of History and Medications   Past Medical History:   Diagnosis Date    Abnormal CBC 2020    Acne     Allergic     Anemia 10/28/2019    Anxiety     Arthritis     Back pain     Chronic kidney disease     Chronic pain     CPAP (continuous positive airway pressure) dependence     Dental cavity 08/10/2017    Depression 2021    Diabetes mellitus ([facility])     6/19/15 A1C: 5.5%    Eczema     Fall 10/16/2023    Flu vaccine need 10/16/2023    Grief reaction 09/10/2019    Hyperlipidemia     Hypertension     Knee pain     Morbid obesity with BMI of 50.0-59.9, adult ([facility])     Obesity     IMELDA on CPAP     Psychiatric disorder     Screening for prostate cancer 2018    Skin tag     Suspected sleep apnea 2023    Type 2 diabetes mellitus without complication ([facility]) 2014    Urinary retention      Past Surgical History:   Procedure Laterality Date    BACK SURGERY      LUMBAR FUSION      LUMBAR FUSION      L4-5-6, S1 has pump for narcotics     Social History     Socioeconomic History    Marital status: Legally      Spouse name: Not on file    Number of children: Not on file    Years of education: Not on file    Highest education level: Not on file   Occupational History    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      Patient reports that he last smoked a cigar close to 2 months ago.    Vaping Use    Vaping status: Never Used   Substance and Sexual Activity    Alcohol use: Not Currently     Comment: occasional    Drug use: No     Comment: History of drug use, meena, cocaine, heroind, no IVDA-as per [employer]    Sexual activity: Yes     Partners: Female     Birth control/protection: None   Other  Topics Concern    Not on file   Social History Narrative    Patient lives with his ex wife.      Social Determinants of Health     Financial Resource Strain: Low Risk  (10/15/2023)    Overall Financial Resource Strain (CARDIA)     Difficulty of Paying Living Expenses: Not very hard   Food Insecurity: Not on file   Transportation Needs: No Transportation Needs (10/15/2023)    PRAPARE - Transportation     Lack of Transportation (Medical): No     Lack of Transportation (Non-Medical): No   Physical Activity: Not on file   Stress: Not on file   Social Connections: Not on file   Intimate Partner Violence: Not on file   Housing Stability: Not on file       Current Outpatient Medications:     alfuzosin (UROXATRAL) 10 mg 24 hr tablet, Take 1 tablet (10 mg total) by mouth daily, Disp: 30 tablet, Rfl: 3    apixaban (Eliquis) 5 mg, Take 1 tablet (5 mg total) by mouth 2 (two) times a day, Disp: 60 tablet, Rfl: 5    atorvastatin (LIPITOR) 20 mg tablet, TAKE ONE TABLET BY MOUTH EVERY DAY, Disp: 90 tablet, Rfl: 1    chlorthalidone 25 mg tablet, Take 0.5 tablets (12.5 mg total) by mouth daily, Disp: , Rfl:     glucose blood (FREESTYLE LITE) test strip, Use 1 each in the morning, Disp: 100 each, Rfl: 0    Lancets (freestyle) lancets, Use as instructed, Disp: 100 each, Rfl: 0    losartan (COZAAR) 100 MG tablet, TAKE ONE TABLET BY MOUTH EVERY DAY, Disp: 90 tablet, Rfl: 1    magnesium Oxide (MAG-OX) 400 mg TABS, Take 1 tablet (400 mg total) by mouth 2 (two) times a day, Disp: , Rfl:     metoprolol tartrate (LOPRESSOR) 100 mg tablet, TAKE ONE TABLET BY MOUTH TWICE A DAY, Disp: 200 tablet, Rfl: 1    morphine (MS CONTIN) 15 mg 12 hr tablet, as needed, Disp: , Rfl: 0    oxyCODONE (ROXICODONE) 30 MG immediate release tablet, if needed, Disp: , Rfl: 0    polyethylene glycol (GOLYTELY) 4000 mL solution, Take 4,000 mL by mouth once for 1 dose Colonoscopy prep, Disp: 4000 mL, Rfl: 0    polyethylene glycol (MIRALAX) 17 g packet, Take 17 g by  mouth daily (Patient taking differently: Take 17 g by mouth as needed), Disp: 510 g, Rfl: 5    semaglutide, 2 mg/dose, (Ozempic, 2 MG/DOSE,) 8 mg/ mL injection pen, Inject 0.75 mL (2 mg total) under the skin every 7 days, Disp: 3 mL, Rfl: 3    Food Intake and Lifestyle Assessment   Food Intake Assessment completed via usual diet recall  Breakfast: 10 am half of bagel with coffee or 2 boiled eggs and coffee - four times per week   Snack: 0   Lunch: 0  Snack: 0  Dinner: 5 to 7 pm   Chicken, fish, beef, pork , salads and vegetables and starch   Snack: 0  Beverage intake: water  and coffee/tea  Protein supplement: not currently , has in the past   Estimated protein intake per day: ~60-70 GRAMS   Estimated fluid intake per day: 10 bottles per day of water   Meals eaten away from home: rarely   Typical meal pattern: 2 meals per day and 0 snacks per day  Eating Behaviors: Large portion sizes  Food allergies or intolerances:   Allergies   Allergen Reactions    Gabapentin      Annotation - [id number]: dizziness    Pregabalin      Annotation - [id number]: vision changes and mood changes    Tramadol Nausea Only     Cultural or Presybeterian considerations: Turkmen     Physical Assessment  Physical Activity  Types of exercise: Walking  Mows his lawn, gardens housework   Current physical limitations: knee pain     Psychosocial Assessment   Support systems: family and   Socioeconomic factors: lives with x wife     Nutrition Diagnosis  Diagnosis: Overweight / Obesity (NC-3.3)  Related to: Physical inactivity and Excessive energy intake  As Evidenced by: BMI >25 and Unintentional weight gain     Nutrition Prescription: Recommend the following diet  Per nephrology - pt is on low potassium diet   Protein 90 grams per day ( 0.6 grams /kg actual body weight )  Low sodium   8238-1115 calories per day / 90 grams protein       Interventions and Teaching   Discussed pre-op and post-op nutrition guidelines.       Patient educated and handouts  "provided.  Surgical changes to stomach / GI  Capacity of post-surgery stomach  Diet progression  Adequate hydration  Sugar and fat restriction to decrease \"dumping syndrome\"  Fat restriction to decrease steatorrhea  Expected weight loss  Weight loss plateaus/ possibility of weight regain  Exercise  Suggestions for pre-op diet  Nutrition considerations after surgery  Protein supplements  Meal planning and preparation  Appropriate carbohydrate, protein, and fat intake, and food/fluid choices to maximize safe weight loss, nutrient intake, and tolerance   Dietary and lifestyle changes  Possible problems with poor eating habits  Intuitive eating  Techniques for self monitoring and keeping daily food journal  Potential for food intolerance after surgery, and ways to deal with them including: lactose intolerance, nausea, reflux, vomiting, diarrhea, food intolerance, appetite changes, gas  Vitamin / Mineral supplementation of Multivitamin with minerals and Vitamin D    Pt with CKD stage 3 b- will need renal input prior to surgery - consult placed   Has previous vitamin D deficiency but took loading dose   Nephrology is monitoring.     Education provided to: patient    Barriers to learning: No barriers identified    Readiness to change: preparation    Prior research on procedure: discussed with provider and friends or family    Comprehension: needs reinforcement and verbalizes understanding     Expected Compliance: good  Recommendations  Pt is an appropriate candidate for surgery. Yes  Pt should make the following changes and then be reassessed for weight loss surgery:   Pt needs to be nicotine free prior to surgery.  Pt with negative nicotine test 2024    Workflow: (Incomplete in Bold):  Psych and/or D+A Clearance: n/a  Blood Work: done  PCP letter: done  Surgeon Appt:DONE  EGD: DONE  Cardiac Risk Assessment with EC & echo completed - sent message to Dr Means  Sleep Studies: cpap  Nephrology done  Hematology " : 11/12/2024  [facility]  10/4/2024- on Ozempic   Nicotine test: negative 7/19/2024- will need second test 3 1/2 weeks prior to surgery date   Pre-Operative Program: n/a  NAFLD Score Calculated: yes  Hepatology consult: n/a    Evaluation / Monitoring  Dietitian to Monitor: Eating pattern as discussed Tolerance of nutrition prescription Body weight Lab values Physical activity Bowel pattern  Pt is following low sodium and low potassium diet.  He is drinking a protein shake for lunch. He has been food logging on bariatastic   He is food logging 900-1000 calories/ 70-75 grams protein.  He continues to limit salt in his food . He is practicing the 30/30 to 30/60 rule.  Occasionally will have a sip with meals. He is taking his time to eat his food and chewing his food well.     Goals  Complete lession plans 1-6, Eat 3 meals per day, and Eliminate mindless snacking  Continue to Food log. Do not go below 1000 calories per day while on Ozempic   Time meals to take 15-20 minutes and chew food well.     Time Spent:   15 minutes

## 2024-10-29 ENCOUNTER — CLINICAL SUPPORT (OUTPATIENT)
Dept: BARIATRICS | Facility: CLINIC | Age: 64
End: 2024-10-29

## 2024-10-29 VITALS — WEIGHT: 315 LBS | BODY MASS INDEX: 47.85 KG/M2

## 2024-10-29 DIAGNOSIS — E66.01 MORBID OBESITY WITH BMI OF 45.0-49.9, ADULT (HCC): Primary | ICD-10-CM

## 2024-10-29 LAB — MISCELLANEOUS LAB TEST RESULT: NORMAL

## 2024-10-29 PROCEDURE — RECHECK: Performed by: DIETITIAN, REGISTERED

## 2024-10-30 DIAGNOSIS — G47.33 OSA (OBSTRUCTIVE SLEEP APNEA): ICD-10-CM

## 2024-10-30 DIAGNOSIS — N18.32 STAGE 3B CHRONIC KIDNEY DISEASE (HCC): ICD-10-CM

## 2024-10-30 DIAGNOSIS — I10 ESSENTIAL HYPERTENSION: ICD-10-CM

## 2024-10-30 DIAGNOSIS — N18.32 TYPE 2 DIABETES MELLITUS WITH STAGE 3B CHRONIC KIDNEY DISEASE, WITHOUT LONG-TERM CURRENT USE OF INSULIN (HCC): ICD-10-CM

## 2024-10-30 DIAGNOSIS — Z01.810 ENCOUNTER FOR PRE-OPERATIVE CARDIOVASCULAR CLEARANCE: Primary | ICD-10-CM

## 2024-10-30 DIAGNOSIS — E78.5 HYPERLIPIDEMIA, UNSPECIFIED HYPERLIPIDEMIA TYPE: ICD-10-CM

## 2024-10-30 DIAGNOSIS — E66.01 MORBID OBESITY WITH BMI OF 45.0-49.9, ADULT (HCC): ICD-10-CM

## 2024-10-30 DIAGNOSIS — E11.22 TYPE 2 DIABETES MELLITUS WITH STAGE 3B CHRONIC KIDNEY DISEASE, WITHOUT LONG-TERM CURRENT USE OF INSULIN (HCC): ICD-10-CM

## 2024-10-30 DIAGNOSIS — I50.32 CHRONIC DIASTOLIC HEART FAILURE (HCC): ICD-10-CM

## 2024-10-30 PROCEDURE — 88305 TISSUE EXAM BY PATHOLOGIST: CPT | Performed by: PATHOLOGY

## 2024-11-11 ENCOUNTER — TELEPHONE (OUTPATIENT)
Dept: GASTROENTEROLOGY | Facility: CLINIC | Age: 64
End: 2024-11-11

## 2024-11-11 NOTE — TELEPHONE ENCOUNTER
I called & lvm for the patient to change appt time for 1/24 with Dr. Cormier because he will be unavailable. The patient was asked to call me back to discuss this matter further.

## 2024-11-12 ENCOUNTER — OFFICE VISIT (OUTPATIENT)
Dept: HEMATOLOGY ONCOLOGY | Facility: CLINIC | Age: 64
End: 2024-11-12
Payer: COMMERCIAL

## 2024-11-12 VITALS
TEMPERATURE: 97.5 F | SYSTOLIC BLOOD PRESSURE: 130 MMHG | RESPIRATION RATE: 18 BRPM | WEIGHT: 315 LBS | HEART RATE: 90 BPM | DIASTOLIC BLOOD PRESSURE: 80 MMHG | OXYGEN SATURATION: 94 % | BODY MASS INDEX: 46.65 KG/M2 | HEIGHT: 69 IN

## 2024-11-12 DIAGNOSIS — I27.82 OTHER CHRONIC PULMONARY EMBOLISM WITHOUT ACUTE COR PULMONALE (HCC): Primary | ICD-10-CM

## 2024-11-12 PROCEDURE — 99214 OFFICE O/P EST MOD 30 MIN: CPT | Performed by: INTERNAL MEDICINE

## 2024-11-12 NOTE — ASSESSMENT & PLAN NOTE
64-year-old male with history of hypertension, pulmonary embolism, chronic diastolic heart failure, IMELDA, type 2 diabetes, bilateral lower back pain, lumbar radiculopathy, stage III CKD, morbid obesity, who was referred for evaluation of previous pulmonary embolism.  In June 2023 patient presented to the emergency room with worsening dyspnea on exertion.  Initial CTA study for PE was equivocal.  Lower extremity Doppler study showed no evidence of DVT or superficial thrombophlebitis.  Repeat chest CTA showed segmental and subsegmental pulmonary emboli, no evidence of right heart strain.  He was started on a heparin drip then transitioned to Eliquis at discharge. Etiology of thrombosis was felt to be secondary to sedentary lifestyle.  Patient does not report a previous history of blood clots nor is he aware of family history.     Interim assessment: On 10/22/2024, the patient had extensive diagnostic workup for hypercoagulable syndromes.  All testing was negative including protein C protein S, antithrombin levels; lupus anticoagulant, anticardiolipin antibodies, anti-beta-2 glycoprotein antibodies.  The patient does not have clinical or laboratory evidence of antiphospholipid antibody syndrome or a hereditary hypercoagulable state.  In the near future, the patient will have gastric surgery.  He will hold systemic anticoagulation with apixaban (Eliquis) for a period of 7 days before surgery.  During the time off apixaban, he will not require low molecular weight heparin or other bridge systemic anticoagulation agents with short half life.  Also, he will resume systemic anticoagulation with apixaban 1 day after gastric surgery.  Otherwise, he will continue lifelong systemic anticoagulation. He will return to hematology clinic as needed.      Plan:  No additional diagnostic workup for hypercoagulability indicated  Patient to hold systemic anticoagulation with apixaban for 7 days before gastric surgery.  He will  re-initiate apixaban 1 day after surgery and continue lifelong systemic anticoagulation thereafter  No indication for bridging systemic anticoagulation with agents with a shorter half-life such as enoxaparin during the time off apixaban  Return to hematology clinic as needed

## 2024-11-12 NOTE — PROGRESS NOTES
Ambulatory Visit  Name: Nasir Story Jr.      : 1960      MRN: [id number]  Encounter Provider: Jerry Rodriguez MD  Encounter Date: 2024   Encounter department: [facility] [place]    Assessment & Plan  Other chronic pulmonary embolism without acute cor pulmonale (HCC)  64-year-old male with history of hypertension, pulmonary embolism, chronic diastolic heart failure, IMELDA, type 2 diabetes, bilateral lower back pain, lumbar radiculopathy, stage III CKD, morbid obesity, who was referred for evaluation of previous pulmonary embolism.  In 2023 patient presented to the emergency room with worsening dyspnea on exertion.  Initial CTA study for PE was equivocal.  Lower extremity Doppler study showed no evidence of DVT or superficial thrombophlebitis.  Repeat chest CTA showed segmental and subsegmental pulmonary emboli, no evidence of right heart strain.  He was started on a heparin drip then transitioned to Eliquis at discharge. Etiology of thrombosis was felt to be secondary to sedentary lifestyle.  Patient does not report a previous history of blood clots nor is he aware of family history.     Interim assessment: On 10/22/2024, the patient had extensive diagnostic workup for hypercoagulable syndromes.  All testing was negative including protein C protein S, antithrombin levels; lupus anticoagulant, anticardiolipin antibodies, anti-beta-2 glycoprotein antibodies.  The patient does not have clinical or laboratory evidence of antiphospholipid antibody syndrome or a hereditary hypercoagulable state.  In the near future, the patient will have gastric surgery.  He will hold systemic anticoagulation with apixaban (Eliquis) for a period of 7 days before surgery.  During the time off apixaban, he will not require low molecular weight heparin or other bridge systemic anticoagulation agents with short half life.  Also, he will resume systemic anticoagulation with apixaban 1 day after  gastric surgery.  Otherwise, he will continue lifelong systemic anticoagulation. He will return to hematology clinic as needed.      Plan:  No additional diagnostic workup for hypercoagulability indicated  Patient to hold systemic anticoagulation with apixaban for 7 days before gastric surgery.  He will re-initiate apixaban 1 day after surgery and continue lifelong systemic anticoagulation thereafter  No indication for bridging systemic anticoagulation with agents with a shorter half-life such as enoxaparin during the time off apixaban  Return to hematology clinic as needed         History of Present Illness     Nasir Story Jr. is a 64 y.o. male with history of hypertension, pulmonary embolism, chronic diastolic heart failure, IMELDA, type 2 diabetes, bilateral lower back pain, lumbar radiculopathy, stage III CKD, morbid obesity, who was referred for evaluation of previous pulmonary embolism.  In June 2023 patient presented to the emergency room with worsening dyspnea on exertion.  Initial CT PE study was equivocal.  Lower extremity Doppler study showed no evidence of DVT or superficial thrombophlebitis.  Repeat chest CTA showed segmental and subsegmental pulmonary emboli, no evidence of right heart strain.  He was started on a heparin drip then transitioned to Eliquis at discharge. Etiology of thrombosis was felt to be secondary to sedentary lifestyle.  Patient does not report a previous history of blood clots nor is he aware of family history.  The patient has been advised to remain on apixaban (Eliquis) indefinitely.  Patient takes apixaban 5 mg p.o. twice daily.    Interim history: Today, the patient does not have new complaints.  He does not have recent episodes of venous thromboembolic events.  He continues to take apixaban (Eliquis) 5 mg per mouth daily without serious adverse events or bleeding.  He denies new systemic, cardiorespiratory, gastrointestinal, genitourinary, musculoskeletal, or neurologic  symptoms. On 10/22/2024, Mr. Story had extensive diagnostic workup for hypercoagulable syndromes.  All testing to uncover hypercoagulability was negative, including protein C, protein S, antithrombin III levels; lupus anticoagulant, anti-cardiolipin antibodies, and anti-beta-2 glycoprotein antibodies.  The patient does not have clinical or laboratory evidence of antiphospholipid antibody syndrome or a hereditary hypercoagulable state.  In the near future, he will have gastric surgery.  He will hold systemic anticoagulation with apixaban (Eliquis) for a period of 7 days before surgery.  During the time off apixaban, he will not require low molecular weight heparin or other bridge systemic anticoagulation agents with short half life.                    Review of Systems   Constitutional:  Negative for activity change, appetite change, chills, diaphoresis, fatigue, fever and unexpected weight change.   HENT:  Negative for congestion, dental problem, ear discharge, ear pain, facial swelling, hearing loss, mouth sores, nosebleeds, postnasal drip, rhinorrhea, sinus pain, sneezing, sore throat, tinnitus, trouble swallowing and voice change.    Eyes:  Negative for photophobia, pain, discharge, redness, itching and visual disturbance.   Respiratory:  Negative for apnea, cough, choking, chest tightness, shortness of breath, wheezing and stridor.    Cardiovascular:  Negative for chest pain, palpitations and leg swelling.   Gastrointestinal:  Negative for abdominal distention, abdominal pain, anal bleeding, blood in stool, constipation, diarrhea, nausea, rectal pain and vomiting.   Endocrine: Negative for cold intolerance and heat intolerance.   Genitourinary:  Negative for decreased urine volume, difficulty urinating, dysuria, enuresis, flank pain, frequency, hematuria and urgency.   Musculoskeletal:  Negative for arthralgias, back pain, gait problem, joint swelling, myalgias, neck pain and neck stiffness.   Skin:  Negative  for color change, pallor, rash and wound.   Neurological:  Negative for dizziness, tremors, seizures, syncope, facial asymmetry, speech difficulty, weakness, light-headedness, numbness and headaches.   Hematological:  Negative for adenopathy. Does not bruise/bleed easily.   Psychiatric/Behavioral:  Negative for behavioral problems, confusion, dysphoric mood and sleep disturbance. The patient is not nervous/anxious.        Past Medical History:   Diagnosis Date    Abnormal CBC 2020    Acne     Allergic     Anemia 10/28/2019    Anxiety     Arthritis     Back pain     Chronic kidney disease     Chronic pain     CPAP (continuous positive airway pressure) dependence     Dental cavity 08/10/2017    Depression 2021    Diabetes mellitus ([facility])     6/19/15 A1C: 5.5%    Eczema     Fall 10/16/2023    Flu vaccine need 10/16/2023    Grief reaction 09/10/2019    Hyperlipidemia     Hypertension     Knee pain     Morbid obesity with BMI of 50.0-59.9, adult ([facility])     Obesity     IMELDA on CPAP     Psychiatric disorder     Screening for prostate cancer 2018    Skin tag     Suspected sleep apnea 2023    Type 2 diabetes mellitus without complication ([facility]) 2014    Urinary retention      Past Surgical History:   Procedure Laterality Date    BACK SURGERY      LUMBAR FUSION  2002    LUMBAR FUSION      L4-5-6, S1 has pump for narcotics      Social History     Socioeconomic History    Marital status: Legally      Spouse name: Not on file    Number of children: Not on file    Years of education: Not on file    Highest education level: Not on file   Occupational History    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      Patient reports  that he last smoked a cigar close to 2 months ago.    Vaping Use    Vaping status: Never Used   Substance and Sexual Activity    Alcohol use: Not Currently     Comment: occasional    Drug use: No     Comment: History of drug use, meena, cocaine, heroind, no IVDA-as per [employer]    Sexual activity: Yes     Partners: Female     Birth control/protection: None   Other Topics Concern    Not on file   Social History Narrative    Patient lives with his ex wife.      Social Determinants of Health     Financial Resource Strain: Low Risk  (10/15/2023)    Overall Financial Resource Strain (CARDIA)     Difficulty of Paying Living Expenses: Not very hard   Food Insecurity: Not on file   Transportation Needs: No Transportation Needs (10/15/2023)    PRAPARE - Transportation     Lack of Transportation (Medical): No     Lack of Transportation (Non-Medical): No   Physical Activity: Not on file   Stress: Not on file   Social Connections: Not on file   Intimate Partner Violence: Not on file   Housing Stability: Not on file     Family History   Problem Relation Age of Onset    Other Mother         colitis    Diabetes Mother          Objective     There were no vitals taken for this visit.    Physical Exam  Vitals reviewed.   Constitutional:       General: He is not in acute distress.     Appearance: He is not toxic-appearing.   HENT:      Head: Normocephalic and atraumatic.      Nose: Nose normal. No congestion.      Mouth/Throat:      Mouth: Mucous membranes are moist.      Pharynx: Oropharynx is clear. No oropharyngeal exudate or posterior oropharyngeal erythema.   Eyes:      General: No scleral icterus.     Extraocular Movements: Extraocular movements intact.      Conjunctiva/sclera: Conjunctivae normal.      Pupils: Pupils are equal, round, and reactive to light.   Cardiovascular:      Rate and Rhythm: Normal rate and regular rhythm.      Pulses: Normal pulses.      Heart sounds: Normal heart sounds. No murmur heard.     No  friction rub. No gallop.   Pulmonary:      Effort: Pulmonary effort is normal. No respiratory distress.      Breath sounds: Normal breath sounds. No stridor. No wheezing, rhonchi or rales.   Chest:      Chest wall: No tenderness.   Abdominal:      General: Bowel sounds are normal. There is no distension.      Palpations: Abdomen is soft. There is no mass.      Tenderness: There is no abdominal tenderness. There is no right CVA tenderness, left CVA tenderness, guarding or rebound.      Hernia: No hernia is present.   Musculoskeletal:         General: No swelling, tenderness or deformity. Normal range of motion.      Cervical back: Normal range of motion and neck supple. No rigidity or tenderness.      Right lower leg: No edema.      Left lower leg: No edema.   Lymphadenopathy:      Cervical: No cervical adenopathy.   Skin:     General: Skin is warm and dry.      Capillary Refill: Capillary refill takes less than 2 seconds.      Coloration: Skin is not jaundiced or pale.      Findings: No bruising, erythema, lesion or rash.   Neurological:      General: No focal deficit present.      Mental Status: He is alert and oriented to person, place, and time. Mental status is at baseline.      Cranial Nerves: No cranial nerve deficit.      Sensory: No sensory deficit.      Motor: No weakness.      Coordination: Coordination normal.      Gait: Gait normal.      Deep Tendon Reflexes: Reflexes normal.   Psychiatric:         Mood and Affect: Mood normal.         Behavior: Behavior normal.         Thought Content: Thought content normal.

## 2024-11-14 ENCOUNTER — TELEPHONE (OUTPATIENT)
Dept: NEPHROLOGY | Facility: CLINIC | Age: 64
End: 2024-11-14

## 2024-11-14 ENCOUNTER — TELEPHONE (OUTPATIENT)
Age: 64
End: 2024-11-14

## 2024-11-18 ENCOUNTER — APPOINTMENT (OUTPATIENT)
Dept: LAB | Facility: CLINIC | Age: 64
End: 2024-11-18
Payer: COMMERCIAL

## 2024-11-18 DIAGNOSIS — N18.32 STAGE 3B CHRONIC KIDNEY DISEASE (HCC): ICD-10-CM

## 2024-11-18 LAB
25(OH)D3 SERPL-MCNC: 12.5 NG/ML (ref 30–100)
ANION GAP SERPL CALCULATED.3IONS-SCNC: 10 MMOL/L (ref 4–13)
BACTERIA UR QL AUTO: NORMAL /HPF
BILIRUB UR QL STRIP: NEGATIVE
BUN SERPL-MCNC: 22 MG/DL (ref 5–25)
CALCIUM SERPL-MCNC: 8.9 MG/DL (ref 8.4–10.2)
CHLORIDE SERPL-SCNC: 101 MMOL/L (ref 96–108)
CLARITY UR: CLEAR
CO2 SERPL-SCNC: 27 MMOL/L (ref 21–32)
COLOR UR: NORMAL
CREAT SERPL-MCNC: 1.52 MG/DL (ref 0.6–1.3)
CREAT UR-MCNC: 115.2 MG/DL
CREAT UR-MCNC: 115.2 MG/DL
GFR SERPL CREATININE-BSD FRML MDRD: 47 ML/MIN/1.73SQ M
GLUCOSE SERPL-MCNC: 126 MG/DL (ref 65–140)
GLUCOSE UR STRIP-MCNC: NEGATIVE MG/DL
HGB UR QL STRIP.AUTO: NEGATIVE
KETONES UR STRIP-MCNC: NEGATIVE MG/DL
LEUKOCYTE ESTERASE UR QL STRIP: NEGATIVE
MAGNESIUM SERPL-MCNC: 1.8 MG/DL (ref 1.9–2.7)
MICROALBUMIN UR-MCNC: <7 MG/L
NITRITE UR QL STRIP: NEGATIVE
NON-SQ EPI CELLS URNS QL MICRO: NORMAL /HPF
PH UR STRIP.AUTO: 6 [PH]
PHOSPHATE SERPL-MCNC: 2.6 MG/DL (ref 2.3–4.1)
POTASSIUM SERPL-SCNC: 3.8 MMOL/L (ref 3.5–5.3)
PROT UR STRIP-MCNC: NEGATIVE MG/DL
PROT UR-MCNC: 5.5 MG/DL
PROT/CREAT UR: 0 MG/G{CREAT} (ref 0–0.1)
PTH-INTACT SERPL-MCNC: 65.6 PG/ML (ref 12–88)
RBC #/AREA URNS AUTO: NORMAL /HPF
SODIUM SERPL-SCNC: 138 MMOL/L (ref 135–147)
SP GR UR STRIP.AUTO: 1.02 (ref 1–1.03)
UROBILINOGEN UR STRIP-ACNC: <2 MG/DL
WBC #/AREA URNS AUTO: NORMAL /HPF

## 2024-11-18 PROCEDURE — 82043 UR ALBUMIN QUANTITATIVE: CPT

## 2024-11-18 PROCEDURE — 84156 ASSAY OF PROTEIN URINE: CPT

## 2024-11-18 PROCEDURE — 83970 ASSAY OF PARATHORMONE: CPT

## 2024-11-18 PROCEDURE — 81001 URINALYSIS AUTO W/SCOPE: CPT

## 2024-11-18 PROCEDURE — 83735 ASSAY OF MAGNESIUM: CPT

## 2024-11-18 PROCEDURE — 82570 ASSAY OF URINE CREATININE: CPT

## 2024-11-18 PROCEDURE — 80048 BASIC METABOLIC PNL TOTAL CA: CPT

## 2024-11-18 PROCEDURE — 84100 ASSAY OF PHOSPHORUS: CPT

## 2024-11-18 PROCEDURE — 82306 VITAMIN D 25 HYDROXY: CPT

## 2024-11-19 ENCOUNTER — OFFICE VISIT (OUTPATIENT)
Dept: UROLOGY | Facility: AMBULATORY SURGERY CENTER | Age: 64
End: 2024-11-19

## 2024-11-19 ENCOUNTER — RESULTS FOLLOW-UP (OUTPATIENT)
Dept: OTHER | Facility: HOSPITAL | Age: 64
End: 2024-11-19

## 2024-11-19 VITALS
SYSTOLIC BLOOD PRESSURE: 122 MMHG | HEART RATE: 87 BPM | HEIGHT: 69 IN | DIASTOLIC BLOOD PRESSURE: 68 MMHG | RESPIRATION RATE: 20 BRPM | OXYGEN SATURATION: 95 % | BODY MASS INDEX: 47.96 KG/M2

## 2024-11-19 DIAGNOSIS — E55.9 VITAMIN D DEFICIENCY: Primary | ICD-10-CM

## 2024-11-19 DIAGNOSIS — N40.1 BPH WITH OBSTRUCTION/LOWER URINARY TRACT SYMPTOMS: Primary | ICD-10-CM

## 2024-11-19 DIAGNOSIS — N13.8 BPH WITH OBSTRUCTION/LOWER URINARY TRACT SYMPTOMS: Primary | ICD-10-CM

## 2024-11-19 DIAGNOSIS — N52.9 ERECTILE DYSFUNCTION, UNSPECIFIED ERECTILE DYSFUNCTION TYPE: ICD-10-CM

## 2024-11-19 DIAGNOSIS — Z12.5 SCREENING FOR PROSTATE CANCER: ICD-10-CM

## 2024-11-19 PROBLEM — E87.1 HYPONATREMIA: Status: ACTIVE | Noted: 2024-11-19

## 2024-11-19 LAB — POST-VOID RESIDUAL VOLUME, ML POC: 21 ML

## 2024-11-19 RX ORDER — AMLODIPINE BESYLATE 10 MG/1
1 TABLET ORAL DAILY
COMMUNITY
Start: 2024-10-29

## 2024-11-19 RX ORDER — ERGOCALCIFEROL 1.25 MG/1
50000 CAPSULE ORAL WEEKLY
Qty: 8 CAPSULE | Refills: 0 | Status: SHIPPED | OUTPATIENT
Start: 2024-11-19

## 2024-11-19 RX ORDER — SPIRONOLACTONE 25 MG/1
TABLET ORAL
COMMUNITY
Start: 2024-11-15

## 2024-11-19 NOTE — TELEPHONE ENCOUNTER
Detailed message left for pt with results and recommendations. Asked that he calls back to confirm that message was received and with any questions.

## 2024-11-19 NOTE — PROGRESS NOTES
11/19/2024      Assessment and Plan    64 y.o. male managed by our office    BPH with obstruction/lower urinary tract symptoms  AUA symptom score 6  PVR performed the office today found to be 21 mL  Patient currently well-controlled on alfuzosin 10 mg once daily for treatment of his lower urinary tract symptoms  Patient is currently content with his voiding pattern since the initiation of alfuzosin.  We discussed that if his urination were to worsen then we could consider combination treatment with a 5 alpha reductase inhibitor such as finasteride, or pursue a in office cystoscopy for further bladder outlet obstruction.  Patient defers either these options at this time.  Patient will continue on alfuzosin 10 mg daily and we will continue to monitor for worsening/progression of lower urinary tract symptoms    Screening for prostate cancer  Patient denies a family history of prostate cancer  Patient's most recent PSA was performed 10/27/2023 and found to be 0.63.  Refer to PSA trend below.  KENNETH offered today but patient deferred.  We discussed that the patient is due for a repeat PSA and the patient should undergo PSA testing within the next month.  Our office will call with results.  We discussed that if it remains stable and near his baseline, that we can repeat his PSA in 1 year from his last.    Lab Results   Component Value Date    PSA 0.63 10/27/2023    PSA 1.1 09/09/2022    PSA 1.1 05/13/2021        Erectile dysfunction  Patient was increased to Cialis 20 mg as needed 1 hour prior to sexual activity.  However, patient notes that he has not trialed this medication since her last office visit.  We discussed that further options would include a penile pump with a constriction band or intracavernosal injection therapy with Trimix.  However, the patient defers either of these options at this time.  Patient will continue on Cialis 20 mg as needed 1 hour prior to sexual activity        History of Present  Nery Story Jr. is a 64 y.o. male here for evaluation of BPH with obstruction/lower urinary tract symptoms, erectile dysfunction, and prostate cancer screening.  Patient was last seen in our office on 8/14/2024.  Patient denies a family history of prostate cancer.  At our last office visit, the patient reported weakened urinary stream, intermittent urinary stream, worsening urinary urgency, and postvoid dribbling.  Furthermore, the patient noted the inability to maintain a lasting strong erection.  Patient's last PSA was performed 10/27/2023 and found to be 0.63.  Patient was instructed undergo PSA testing prior to today's office visit, but this was not obtained.  Patient was initiated on alfuzosin 10 mg once daily for treatment of his lower urinary tract symptoms.  AUA symptom score is now 6, which was previously 18.  PVR at her last office visit was noted to be 90 mL, and has improved to 21 mL.  Additionally, the patient was initiated on Cialis 20 mg as needed 1 hour prior to sexual intercourse for treatment of his erectile dysfunction.  Today, the patient reports significant improvement in his urinary stream as well as decrease in his urinary frequency and urgency since initiating therapy with the alfuzosin.  Patient notes that he has not trialed the Cialis since our last office visit.  Patient notes that he does continue to achieve morning erections on occasion.  Otherwise, the patient denies dysuria, hematuria, flank pain, worsening urinary frequency/urgency, feelings of incomplete bladder emptying, or postvoid dribbling.        Review of Systems   Constitutional:  Negative for chills and fever.   HENT:  Negative for ear pain and sore throat.    Eyes:  Negative for pain and visual disturbance.   Respiratory:  Negative for cough and shortness of breath.    Cardiovascular:  Negative for chest pain and palpitations.   Gastrointestinal:  Negative for abdominal pain and vomiting.   Genitourinary:   "Negative for decreased urine volume, difficulty urinating, dysuria, flank pain, frequency, hematuria and urgency.   Musculoskeletal:  Negative for arthralgias and back pain.   Skin:  Negative for color change and rash.   Neurological:  Negative for seizures and syncope.   All other systems reviewed and are negative.          AUA SYMPTOM SCORE      Flowsheet Row Most Recent Value   AUA SYMPTOM SCORE    How often have you had a sensation of not emptying your bladder completely after you finished urinating? 1 (P)     How often have you had to urinate again less than two hours after you finished urinating? 1 (P)     How often have you found you stopped and started again several times when you urinate? 1 (P)     How often have you found it difficult to postpone urination? 1 (P)     How often have you had a weak urinary stream? 1 (P)     How often have you had to push or strain to begin urination? 0 (P)     How many times did you most typically get up to urinate from the time you went to bed at night until the time you got up in the morning? 1 (P)     Quality of Life: If you were to spend the rest of your life with your urinary condition just the way it is now, how would you feel about that? 2 (P)     AUA SYMPTOM SCORE 6 (P)               Vitals  Vitals:    11/19/24 1104   BP: 122/68   BP Location: Left arm   Patient Position: Sitting   Cuff Size: Large   Pulse: 87   Resp: 20   SpO2: 95%   Height: 5' 9\" (1.753 m)       Physical Exam  Vitals reviewed.   Constitutional:       General: He is not in acute distress.     Appearance: Normal appearance. He is not ill-appearing.   HENT:      Head: Normocephalic and atraumatic.      Nose: Nose normal.   Eyes:      General: No scleral icterus.  Pulmonary:      Effort: No respiratory distress.   Abdominal:      General: Abdomen is flat. There is no distension.      Palpations: Abdomen is soft.      Tenderness: There is no abdominal tenderness.   Musculoskeletal:         General: " Normal range of motion.      Cervical back: Normal range of motion.   Skin:     General: Skin is warm.      Coloration: Skin is not jaundiced.   Neurological:      Mental Status: He is alert and oriented to person, place, and time.      Gait: Gait normal.   Psychiatric:         Mood and Affect: Mood normal.         Behavior: Behavior normal.           Past History  Past Medical History:   Diagnosis Date    Abnormal CBC 2020    Acne     Allergic     Anemia 10/28/2019    Anxiety     Arthritis     Back pain     Chronic kidney disease     Chronic pain     CPAP (continuous positive airway pressure) dependence     Dental cavity 08/10/2017    Depression 2021    Diabetes mellitus ([facility])     6/19/15 A1C: 5.5%    Eczema     Fall 10/16/2023    Flu vaccine need 10/16/2023    Grief reaction 09/10/2019    Hyperlipidemia     Hypertension     Knee pain     Morbid obesity with BMI of 50.0-59.9, adult ([facility])     Obesity     IMELDA on CPAP     Psychiatric disorder     Screening for prostate cancer 2018    Skin tag     Suspected sleep apnea 2023    Type 2 diabetes mellitus without complication ([facility]) 2014    Urinary retention      Social History     Socioeconomic History    Marital status: Legally      Spouse name: None    Number of children: None    Years of education: None    Highest education level: None   Occupational History    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      Patient reports that he last smoked a cigar close to 2 months ago.    Vaping Use    Vaping status: Never Used   Substance and Sexual Activity    Alcohol use: Not Currently     Comment: occasional    Drug use: No     Comment: History of drug use, meena, cocaine,  heroind, no IVDA-as per [employer]    Sexual activity: Yes     Partners: Female     Birth control/protection: None   Other Topics Concern    None   Social History Narrative    Patient lives with his ex wife.      Social Drivers of Health     Financial Resource Strain: Low Risk  (10/15/2023)    Overall Financial Resource Strain (CARDIA)     Difficulty of Paying Living Expenses: Not very hard   Food Insecurity: Not on file   Transportation Needs: No Transportation Needs (10/15/2023)    PRAPARE - Transportation     Lack of Transportation (Medical): No     Lack of Transportation (Non-Medical): No   Physical Activity: Not on file   Stress: Not on file   Social Connections: Not on file   Intimate Partner Violence: Not on file   Housing Stability: Not on file     Social History     Tobacco Use   Smoking Status Former    Types: Cigars    Start date:     Quit date: 4/15/2024    Years since quittin.5    Passive exposure: Current   Smokeless Tobacco Never   Tobacco Comments    Quit cigarette smoking in .     Patient reports that he last smoked a cigar close to 2 months ago.      Family History   Problem Relation Age of Onset    Other Mother         colitis    Diabetes Mother        The following portions of the patient's history were reviewed and updated as appropriate: allergies, current medications, past medical history, past social history, past surgical history and problem list.    Results  No results found for this or any previous visit (from the past hour).  ]  Lab Results   Component Value Date    PSA 0.63 10/27/2023    PSA 1.1 2022    PSA 1.1 2021    PSA 1.0 2019     Lab Results   Component Value Date    GLUCOSE 148 (H) 10/14/2016    CALCIUM 8.9 2024     2017     2017    K 3.8 2024    CO2 27 2024     2024    BUN 22 2024    CREATININE 1.52 (H) 2024     Lab Results   Component Value Date    WBC 8.95 10/22/2024    HGB 13.1  10/22/2024    HCT 40.8 10/22/2024    MCV 88 10/22/2024     10/22/2024

## 2024-11-19 NOTE — ASSESSMENT & PLAN NOTE
Lab Results   Component Value Date    EGFR 47 11/18/2024    EGFR 37 10/22/2024    EGFR 33 08/27/2024    CREATININE 1.52 (H) 11/18/2024    CREATININE 1.84 (H) 10/22/2024    CREATININE 2.02 (H) 08/27/2024   Etiology: Suspect secondary to diabetic nephropathy, hypertensive nephrosclerosis, obesity related hyper filtration, and potential nodular secondary sclerosis   Baseline creatinine 1.4-1.7  Most recent creatinine 1.52 with EGFR 47 on 11/18/2024  UACR unable to be calculated, UPCR 0.0 g  On losartan 100 mg daily  No change in current treatment  Avoid NSAIDs -reports does not take  Return in 4 months with updated blood work and urine studies    Orders:    Basic metabolic panel; Future    CBC and Platelet; Future    Albumin / creatinine urine ratio; Future    PTH, intact; Future    Phosphorus; Future    Urinalysis with microscopic; Future    Vitamin D 25 hydroxy; Future    Magnesium; Future

## 2024-11-19 NOTE — ASSESSMENT & PLAN NOTE
Lab Results   Component Value Date    HGBA1C 6.1 (H) 07/19/2024   Having weight loss with Ozempic  A1c has improved  Continue with good glucose control to slow the progression of CKD    Orders:    Albumin / creatinine urine ratio; Future

## 2024-11-19 NOTE — ASSESSMENT & PLAN NOTE
Following for future bariatric surgery  Continues to have weight loss   Patient still requires cardiology clearance  Patient previously seen by Dr. Nunez on 9/6/2024 and recommendations were discussed and noted - continue with following recommendations-reviewed with patient  From a renal standpoint the patient is renally optimized for surgery with the following recommendations:  Fluids to administer: Normal Saline 250 cc total over 2 hours   Medication Recommendations:  Minimize any IV contrast use (If IV contrast is used, please check BMP POD # 1)  Hold NSAIDs for at least 10 days prior to surgery  Hold ACEi/ARB starting on the day of the surgery  Hold chlorthalidone  starting on the day of surgery/procedure  Hemodynamic Recommendations:  Ideally, target MAPs greater than 65 mmHg in the perioperative period, and minimize operative time with MAPs < 65 mmHg.   Avoid intraop hemodynamic instability to decrease risk for NORRIS occurrence.  Other Recommendations:  Please consult the Nephrology team when the patient is admitted

## 2024-11-19 NOTE — ASSESSMENT & PLAN NOTE
Current blood pressure acceptable 132/82  Reports BP has been better and no longer having low BP and no long dizziness  Currently on amlodipine 10 mg daily, chlorthalidone 12.5 mg daily, losartan 100 mg daily, spironolactone 25 mg daily  Current low-sodium diet no more than 2000 mg of salt per day  Home BP reported 122/81  Previously underwent secondary workup which essentially negative-please review Dr. Nunez's last office note for full details

## 2024-11-19 NOTE — ASSESSMENT & PLAN NOTE
Patient denies a family history of prostate cancer  Patient's most recent PSA was performed 10/27/2023 and found to be 0.63.  Refer to PSA trend below.  KENNETH offered today but patient deferred.  We discussed that the patient is due for a repeat PSA and the patient should undergo PSA testing within the next month.  Our office will call with results.  We discussed that if it remains stable and near his baseline, that we can repeat his PSA in 1 year from his last.    Lab Results   Component Value Date    PSA 0.63 10/27/2023    PSA 1.1 09/09/2022    PSA 1.1 05/13/2021

## 2024-11-19 NOTE — ASSESSMENT & PLAN NOTE
Patient was increased to Cialis 20 mg as needed 1 hour prior to sexual activity.  However, patient notes that he has not trialed this medication since her last office visit.  We discussed that further options would include a penile pump with a constriction band or intracavernosal injection therapy with Trimix.  However, the patient defers either of these options at this time.  Patient will continue on Cialis 20 mg as needed 1 hour prior to sexual activity

## 2024-11-19 NOTE — ASSESSMENT & PLAN NOTE
Recent sodium has improved 137- 138 since July 2024  Recent sodium 138  Patient on chlorthalidone 12.5 mg daily  Continue to monitor, sodium continues to drop or worsen may need to consider stopping chlorthalidone

## 2024-11-19 NOTE — ASSESSMENT & PLAN NOTE
On magnesium supplementation  Repeat magnesium level with next office visit  Continue to monitor and treat as needed

## 2024-11-19 NOTE — PROGRESS NOTES
Name: Nasir Story Jr.      : 1960      MRN: [id number]  Encounter Provider: MARJORIE Alaniz  Encounter Date: 2024   Encounter department: [facility] [place]  :  Assessment & Plan  Stage 3b chronic kidney disease (HCC)  Lab Results   Component Value Date    EGFR 47 2024    EGFR 37 10/22/2024    EGFR 33 2024    CREATININE 1.52 (H) 2024    CREATININE 1.84 (H) 10/22/2024    CREATININE 2.02 (H) 2024   Etiology: Suspect secondary to diabetic nephropathy, hypertensive nephrosclerosis, obesity related hyper filtration, and potential nodular secondary sclerosis   Baseline creatinine 1.4-1.7  Most recent creatinine 1.52 with EGFR 47 on 2024  UACR unable to be calculated, UPCR 0.0 g  On losartan 100 mg daily  No change in current treatment  Avoid NSAIDs -reports does not take  Return in 4 months with updated blood work and urine studies    Orders:    Basic metabolic panel; Future    CBC and Platelet; Future    Albumin / creatinine urine ratio; Future    PTH, intact; Future    Phosphorus; Future    Urinalysis with microscopic; Future    Vitamin D 25 hydroxy; Future    Magnesium; Future    Essential hypertension  Current blood pressure acceptable 132/82  Reports BP has been better and no longer having low BP and no long dizziness  Currently on amlodipine 10 mg daily, chlorthalidone 12.5 mg daily, losartan 100 mg daily, spironolactone 25 mg daily  Current low-sodium diet no more than 2000 mg of salt per day  Home BP reported 122/81  Previously underwent secondary workup which essentially negative-please review Dr. Nunez's last office note for full details       Vitamin D deficiency  Recent vitamin D level 12.5 and started on vitamin D2 50,000 units weekly x 12 weeks per Dr. Nunez  Once completed this vitamin D2 course he has been instructed to start vitamin D3 OTC with  2000 iu  Recommend checking vitamin D in 6 months  Orders:    Vitamin D 25 hydroxy;  Future    Morbid obesity with BMI of 45.0-49.9, adult ([facility])  Following for future bariatric surgery  Continues to have weight loss   Patient still requires cardiology clearance  Patient previously seen by Dr. Nunez on 9/6/2024 and recommendations were discussed and noted - continue with following recommendations-reviewed with patient  From a renal standpoint the patient is renally optimized for surgery with the following recommendations:  Fluids to administer: Normal Saline 250 cc total over 2 hours   Medication Recommendations:  Minimize any IV contrast use (If IV contrast is used, please check BMP POD # 1)  Hold NSAIDs for at least 10 days prior to surgery  Hold ACEi/ARB starting on the day of the surgery  Hold chlorthalidone  starting on the day of surgery/procedure  Hemodynamic Recommendations:  Ideally, target MAPs greater than 65 mmHg in the perioperative period, and minimize operative time with MAPs < 65 mmHg.   Avoid intraop hemodynamic instability to decrease risk for NORRIS occurrence.  Other Recommendations:  Please consult the Nephrology team when the patient is admitted       Hypomagnesemia  On magnesium supplementation  Repeat magnesium level with next office visit  Continue to monitor and treat as needed       Hyponatremia  Recent sodium has improved 137- 138 since July 2024  Recent sodium 138  Patient on chlorthalidone 12.5 mg daily  Continue to monitor, sodium continues to drop or worsen may need to consider stopping chlorthalidone       Type 2 diabetes mellitus with stage 3b chronic kidney disease, without long-term current use of insulin ([facility])    Lab Results   Component Value Date    HGBA1C 6.1 (H) 07/19/2024   Having weight loss with Ozempic  A1c has improved  Continue with good glucose control to slow the progression of CKD    Orders:    Albumin / creatinine urine ratio; Future      History of Present Illness     HPI  Nasir Story  is a 64 y.o. male who presents CKD and hypertension follow-up.   He was last seen on 9/6/2024 with Dr. Nunez for renal clearance to undergo bariatric surgery and recommendations were noted.  Please review Dr. Nunez's last office note for full details.  Patient still needs to be evaluated by cardiology prior to moving forward with bariatric surgery.  Since last seen has been no ER visits or hospitalizations.  Most recent blood work on 11/18/2024 has been previously reviewed by Dr. Enrique abdi with patient in office today in full detail.  Patient had already been instructed to start vitamin D2 50,000 units weekly x 12 weeks by vitamin D3 2000 IU daily.  On discussion, feeling well.  Continues to have weight loss.  Needs a few more medical clearance before bariatric surgery.  Reports BP better with prior medication changes         Review of Systems   Constitutional: Negative.  Negative for activity change, appetite change, chills, diaphoresis, fatigue and fever.   HENT: Negative.  Negative for congestion and facial swelling.    Respiratory: Negative.     Cardiovascular: Negative.    Gastrointestinal: Negative.    Endocrine: Negative.    Genitourinary: Negative.    Musculoskeletal: Negative.    Skin: Negative.    Allergic/Immunologic: Negative.    Neurological: Negative.    Hematological: Negative.    Psychiatric/Behavioral: Negative.       Pertinent Medical History       Current Outpatient Medications on File Prior to Visit   Medication Sig Dispense Refill    alfuzosin (UROXATRAL) 10 mg 24 hr tablet Take 1 tablet (10 mg total) by mouth daily 30 tablet 3    amLODIPine (NORVASC) 10 mg tablet Take 1 tablet by mouth in the morning      apixaban (Eliquis) 5 mg Take 1 tablet (5 mg total) by mouth 2 (two) times a day 60 tablet 5    atorvastatin (LIPITOR) 20 mg tablet TAKE ONE TABLET BY MOUTH EVERY DAY 90 tablet 1    chlorthalidone 25 mg tablet Take 0.5 tablets (12.5 mg total) by mouth daily      ergocalciferol (ERGOCALCIFEROL) 1.25 MG (03976 UT) capsule Take 1 capsule (50,000 Units total)  "by mouth once a week 8 capsule 0    glucose blood (FREESTYLE LITE) test strip Use 1 each in the morning 100 each 0    Lancets (freestyle) lancets Use as instructed 100 each 0    losartan (COZAAR) 100 MG tablet TAKE ONE TABLET BY MOUTH EVERY DAY 90 tablet 1    magnesium Oxide (MAG-OX) 400 mg TABS Take 1 tablet (400 mg total) by mouth 2 (two) times a day      metoprolol tartrate (LOPRESSOR) 100 mg tablet TAKE ONE TABLET BY MOUTH TWICE A  tablet 1    morphine (MS CONTIN) 15 mg 12 hr tablet as needed  0    oxyCODONE (ROXICODONE) 30 MG immediate release tablet if needed  0    polyethylene glycol (MIRALAX) 17 g packet Take 17 g by mouth daily 510 g 5    semaglutide, 2 mg/dose, (Ozempic, 2 MG/DOSE,) 8 mg/ mL injection pen Inject 0.75 mL (2 mg total) under the skin every 7 days 3 mL 3    spironolactone (ALDACTONE) 25 mg tablet       polyethylene glycol (GOLYTELY) 4000 mL solution Take 4,000 mL by mouth once for 1 dose Colonoscopy prep 4000 mL 0     No current facility-administered medications on file prior to visit.         Objective   /82 (BP Location: Left arm, Patient Position: Sitting, Cuff Size: Large)   Pulse 78   Ht 5' 9\" (1.753 m)   Wt (!) 145 kg (319 lb)   BMI 47.11 kg/m²      Physical Exam  Vitals reviewed.   Constitutional:       Appearance: Normal appearance. He is obese.   HENT:      Head: Normocephalic and atraumatic.      Nose: Nose normal.      Mouth/Throat:      Mouth: Mucous membranes are moist.      Pharynx: Oropharynx is clear.   Eyes:      Extraocular Movements: Extraocular movements intact.      Conjunctiva/sclera: Conjunctivae normal.   Cardiovascular:      Rate and Rhythm: Normal rate and regular rhythm.      Pulses: Normal pulses.      Heart sounds: Normal heart sounds.   Pulmonary:      Effort: Pulmonary effort is normal.      Breath sounds: Normal breath sounds.   Abdominal:      General: Bowel sounds are normal.      Palpations: Abdomen is soft.   Musculoskeletal:         " General: Normal range of motion.      Cervical back: Normal range of motion and neck supple.   Skin:     General: Skin is warm and dry.   Neurological:      General: No focal deficit present.      Mental Status: He is alert and oriented to person, place, and time.   Psychiatric:         Mood and Affect: Mood normal.         Behavior: Behavior normal.         Administrative Statements   I have spent a total time of 20 minutes in caring for this patient on the day of the visit/encounter including Diagnostic results, Instructions for management, Documenting in the medical record, Reviewing / ordering tests, medicine, procedures  , and Obtaining or reviewing history  .

## 2024-11-19 NOTE — RESULT ENCOUNTER NOTE
Hello    Patient normally is followed up by Ms Fadumo Ridley.     Please of the patient know that her renal function is stable and at baseline.  Creatinine 1.5.  Electrolytes are appropriate.  Vitamin D is low at 12.5.  Please asked patient to start ergocalciferol 50,000 unit tablet 1 tablet once a week for 8 weeks.  I sent this to the pharmacy.  After he completes this he can change to vitamin D over-the-counter 2000 units, 1 tablet once a day.  This    Thank you    np

## 2024-11-19 NOTE — ASSESSMENT & PLAN NOTE
Recent vitamin D level 12.5 and started on vitamin D2 50,000 units weekly x 12 weeks per Dr. Nunez  Once completed this vitamin D2 course he has been instructed to start vitamin D3 OTC with  2000 iu  Recommend checking vitamin D in 6 months  Orders:    Vitamin D 25 hydroxy; Future

## 2024-11-19 NOTE — ASSESSMENT & PLAN NOTE
AUA symptom score 6  PVR performed the office today found to be 21 mL  Patient currently well-controlled on alfuzosin 10 mg once daily for treatment of his lower urinary tract symptoms  Patient is currently content with his voiding pattern since the initiation of alfuzosin.  We discussed that if his urination were to worsen then we could consider combination treatment with a 5 alpha reductase inhibitor such as finasteride, or pursue a in office cystoscopy for further bladder outlet obstruction.  Patient defers either these options at this time.  Patient will continue on alfuzosin 10 mg daily and we will continue to monitor for worsening/progression of lower urinary tract symptoms

## 2024-11-26 ENCOUNTER — OFFICE VISIT (OUTPATIENT)
Dept: NEPHROLOGY | Facility: CLINIC | Age: 64
End: 2024-11-26
Payer: COMMERCIAL

## 2024-11-26 VITALS
BODY MASS INDEX: 46.65 KG/M2 | HEART RATE: 78 BPM | DIASTOLIC BLOOD PRESSURE: 82 MMHG | SYSTOLIC BLOOD PRESSURE: 132 MMHG | HEIGHT: 69 IN | WEIGHT: 315 LBS

## 2024-11-26 DIAGNOSIS — E11.22 TYPE 2 DIABETES MELLITUS WITH STAGE 3B CHRONIC KIDNEY DISEASE, WITHOUT LONG-TERM CURRENT USE OF INSULIN (HCC): ICD-10-CM

## 2024-11-26 DIAGNOSIS — E55.9 VITAMIN D DEFICIENCY: ICD-10-CM

## 2024-11-26 DIAGNOSIS — N18.32 TYPE 2 DIABETES MELLITUS WITH STAGE 3B CHRONIC KIDNEY DISEASE, WITHOUT LONG-TERM CURRENT USE OF INSULIN (HCC): ICD-10-CM

## 2024-11-26 DIAGNOSIS — N18.32 STAGE 3B CHRONIC KIDNEY DISEASE (HCC): Primary | ICD-10-CM

## 2024-11-26 DIAGNOSIS — E83.42 HYPOMAGNESEMIA: ICD-10-CM

## 2024-11-26 DIAGNOSIS — E66.01 MORBID OBESITY WITH BMI OF 45.0-49.9, ADULT (HCC): ICD-10-CM

## 2024-11-26 DIAGNOSIS — E87.1 HYPONATREMIA: ICD-10-CM

## 2024-11-26 DIAGNOSIS — I10 ESSENTIAL HYPERTENSION: ICD-10-CM

## 2024-11-26 PROCEDURE — 99213 OFFICE O/P EST LOW 20 MIN: CPT | Performed by: NURSE PRACTITIONER

## 2024-11-26 NOTE — PATIENT INSTRUCTIONS
All questions asked and answered.  The patient has been instructed to call office at [phone] with any questions or concerns.    Please obtain prescribed blood work and urine studies prior to next appointment  Avoid NSAID products which include: Motrin, Advil, Ibuprofen, Aleve, Naprosyn, Naproxen, Mobic or Celebrex   Low sodium diet no more then 2 gram salt daily  No change in current medication treatments  In preparation for surgery date-hold chlorthalidone and losartan the day of surgery  Return in 4 months with updated blood work and urine studies

## 2024-11-27 ENCOUNTER — CLINICAL SUPPORT (OUTPATIENT)
Dept: BARIATRICS | Facility: CLINIC | Age: 64
End: 2024-11-27

## 2024-11-27 VITALS — BODY MASS INDEX: 46.89 KG/M2 | WEIGHT: 315 LBS

## 2024-11-27 DIAGNOSIS — E66.01 MORBID OBESITY WITH BMI OF 45.0-49.9, ADULT (HCC): Primary | ICD-10-CM

## 2024-11-27 PROCEDURE — RECHECK

## 2024-11-27 NOTE — PROGRESS NOTES
"Behavioral health follow up     On Ozempic     Preop- interested in gastric  bypass   Surgery Date: expected Nov 2024  No program requirement   Surgeon: Dr. Noé Glover / sil     Starting weight:  329 #  Weight 322.4 #       Hydrating with water.  All he drinks is water.  Feels the Ozempic is working.  Breakfast, shake for lunch and dinner.  (Roast beef )   Eating habits have changed with Rx.  Feels full ,  Less hunger.  Resting better.  Improved activity level.     Tracking his food with [employer].   Feels he is getting enough protein.      Hydrating with water.  Tries to walk. Using CPAP    Recall from yesterday:  's apt.  Shopping.  Lunch with a friend. Dinner last night was at home/ shrimp teriyaki (home made)      Thanksgiving going to look different this year.  Going to other home and having some food.  \"Disappointed\"          "

## 2024-11-29 DIAGNOSIS — N13.8 BPH WITH OBSTRUCTION/LOWER URINARY TRACT SYMPTOMS: ICD-10-CM

## 2024-11-29 DIAGNOSIS — N40.1 BPH WITH OBSTRUCTION/LOWER URINARY TRACT SYMPTOMS: ICD-10-CM

## 2024-11-29 RX ORDER — ALFUZOSIN HYDROCHLORIDE 10 MG/1
10 TABLET, EXTENDED RELEASE ORAL DAILY
Qty: 30 TABLET | Refills: 5 | Status: SHIPPED | OUTPATIENT
Start: 2024-11-29

## 2024-12-19 PROBLEM — Z12.5 SCREENING FOR PROSTATE CANCER: Status: RESOLVED | Noted: 2018-11-28 | Resolved: 2024-12-19

## 2025-01-07 ENCOUNTER — CLINICAL SUPPORT (OUTPATIENT)
Dept: BARIATRICS | Facility: CLINIC | Age: 65
End: 2025-01-07

## 2025-01-07 VITALS — BODY MASS INDEX: 46.31 KG/M2 | WEIGHT: 313.6 LBS

## 2025-01-07 DIAGNOSIS — E78.5 HYPERLIPIDEMIA, UNSPECIFIED HYPERLIPIDEMIA TYPE: ICD-10-CM

## 2025-01-07 DIAGNOSIS — F33.40 MAJOR DEPRESSIVE DISORDER, RECURRENT, IN REMISSION, UNSPECIFIED (HCC): ICD-10-CM

## 2025-01-07 DIAGNOSIS — I26.99 PULMONARY EMBOLISM, UNSPECIFIED CHRONICITY, UNSPECIFIED PULMONARY EMBOLISM TYPE, UNSPECIFIED WHETHER ACUTE COR PULMONALE PRESENT (HCC): ICD-10-CM

## 2025-01-07 DIAGNOSIS — E11.22 TYPE 2 DIABETES MELLITUS WITH STAGE 3B CHRONIC KIDNEY DISEASE, WITHOUT LONG-TERM CURRENT USE OF INSULIN (HCC): ICD-10-CM

## 2025-01-07 DIAGNOSIS — N18.32 STAGE 3B CHRONIC KIDNEY DISEASE (HCC): ICD-10-CM

## 2025-01-07 DIAGNOSIS — F11.20 CONTINUOUS OPIOID DEPENDENCE (HCC): ICD-10-CM

## 2025-01-07 DIAGNOSIS — I50.32 CHRONIC DIASTOLIC HEART FAILURE (HCC): ICD-10-CM

## 2025-01-07 DIAGNOSIS — G47.33 OSA (OBSTRUCTIVE SLEEP APNEA): ICD-10-CM

## 2025-01-07 DIAGNOSIS — N18.32 TYPE 2 DIABETES MELLITUS WITH STAGE 3B CHRONIC KIDNEY DISEASE, WITHOUT LONG-TERM CURRENT USE OF INSULIN (HCC): ICD-10-CM

## 2025-01-07 DIAGNOSIS — Z01.818 PRE-OPERATIVE CLEARANCE: Primary | ICD-10-CM

## 2025-01-07 DIAGNOSIS — D64.9 ANEMIA, UNSPECIFIED TYPE: ICD-10-CM

## 2025-01-07 DIAGNOSIS — F32.A MILD DEPRESSION: ICD-10-CM

## 2025-01-07 DIAGNOSIS — E66.01 MORBID OBESITY WITH BMI OF 45.0-49.9, ADULT (HCC): ICD-10-CM

## 2025-01-07 DIAGNOSIS — M17.0 BILATERAL PRIMARY OSTEOARTHRITIS OF KNEE: ICD-10-CM

## 2025-01-07 DIAGNOSIS — F17.291 OTHER TOBACCO PRODUCT NICOTINE DEPENDENCE IN REMISSION: ICD-10-CM

## 2025-01-07 DIAGNOSIS — I10 ESSENTIAL HYPERTENSION: ICD-10-CM

## 2025-01-07 PROCEDURE — RECHECK: Performed by: DIETITIAN, REGISTERED

## 2025-01-07 NOTE — PROGRESS NOTES
Bariatric Nutrition Assessment Note    Type of surgery    Preop- interested in gastric  bypass   Surgery Date: expected 2025  No program requirement   Surgeon: Dr. Noé Glover    Nutrition Assessment   Nasir Story Jr.  64 y.o.  male     Wt with BMI of 25: 168.8  Pre-Op Excess Wt: 166.4  Wt (!) 142 kg (313 lb 9.6 oz)   BMI 46.31 kg/m²      - 21.6 lbs x 6 months   - 4# x 1 month     Wt Readings from Last 3 Encounters:   25 (!) 142 kg (313 lb 9.6 oz)   24 (!) 144 kg (317 lb 8 oz)   24 (!) 145 kg (319 lb)           Karlee Crawford Equation:    LNB=2122  Weight Maintenance 2699  Estimated calories for weight loss 2956-2587 ( 1-2# per wk wt loss - sedentary )  Estimated protein needs - grams per day (0.6- .8 grams / kg actual body weight  )  CKD stage 3 b   Estimated fluid needs 77-90 oz(30-35 ml/kg IBW )      Weight History   Onset of Obesity: Adult  Family history of obesity: Yes   Wt Loss Attempts: Counseling with  MD  Previous consult with surgeon - but son  and he did not get the surgery   Fasting, healthy eating, gym/ exercise until knee pain , meal replacements   Patient has tried the above for 6 months or more with insufficient weight loss or weight regain, which is why patient has requested to be evaluated for weight loss surgery today  Maximum Wt Lost: 20 to 30 pounds with going to the gym and eating healthy     NAFLD Fibrosis Score is: 1.878    NAFLD Score Correlated Fibrosis Severity   <-1.455 F0-F2   -1.455-0.676 Indeterminate Score   >0.676 F3-F4   **Fibrosis Severity Scale: F0 = no fibrosis; F1= mild fibrosis; F2 = moderate fibrosis; F3 = severe fibrosis; F4 = cirrhosis    NAFLD Score Component Values:  Component Value Date   Age: 64 y.o.     BMI: 46.31 kg/m²    IFG or DM: Yes    AST: 12 U/L 10/22/2024   ALT: 9 U/L 10/22/2024   Platelet: 237 Thousands/uL 10/22/2024   Albumin: 3.9 g/dL 10/22/2024      Pt has been taking Ozempic which has decreased his appetite and  provided early satiety.  He reports eating much smaller portions than usual     Review of History and Medications   Past Medical History:   Diagnosis Date    Abnormal CBC 2020    Acne     Allergic     Anemia 10/28/2019    Anxiety     Arthritis     Back pain     Chronic kidney disease     Chronic pain     CPAP (continuous positive airway pressure) dependence     Dental cavity 08/10/2017    Depression 2021    Diabetes mellitus ([facility])     6/19/15 A1C: 5.5%    Eczema     Fall 10/16/2023    Flu vaccine need 10/16/2023    Grief reaction 09/10/2019    Hyperlipidemia     Hypertension     Knee pain     Morbid obesity with BMI of 50.0-59.9, adult ([facility])     Obesity     IMELDA on CPAP     Psychiatric disorder     Screening for prostate cancer 2018    Skin tag     Suspected sleep apnea 2023    Type 2 diabetes mellitus without complication ([facility]) 2014    Urinary retention      Past Surgical History:   Procedure Laterality Date    BACK SURGERY      LUMBAR FUSION  2002    LUMBAR FUSION      L4-5-6, S1 has pump for narcotics     Social History     Socioeconomic History    Marital status: Legally      Spouse name: Not on file    Number of children: Not on file    Years of education: Not on file    Highest education level: Not on file   Occupational History    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      Patient reports that he last smoked a cigar close to 2 months ago.    Vaping Use    Vaping status: Never Used   Substance and Sexual Activity    Alcohol use: Not Currently     Comment: occasional    Drug use: No     Comment: History of drug use, meena, cocaine, heroind, no IVDA-as per [employer]    Sexual activity:  Yes     Partners: Female     Birth control/protection: None   Other Topics Concern    Not on file   Social History Narrative    Patient lives with his ex wife.      Social Drivers of Health     Financial Resource Strain: Low Risk  (10/15/2023)    Overall Financial Resource Strain (CARDIA)     Difficulty of Paying Living Expenses: Not very hard   Food Insecurity: Not on file   Transportation Needs: No Transportation Needs (10/15/2023)    PRAPARE - Transportation     Lack of Transportation (Medical): No     Lack of Transportation (Non-Medical): No   Physical Activity: Not on file   Stress: Not on file   Social Connections: Not on file   Intimate Partner Violence: Not on file   Housing Stability: Not on file       Current Outpatient Medications:     alfuzosin (UROXATRAL) 10 mg 24 hr tablet, TAKE 1 TABLET BY MOUTH DAILY, Disp: 30 tablet, Rfl: 5    amLODIPine (NORVASC) 10 mg tablet, Take 1 tablet by mouth in the morning, Disp: , Rfl:     apixaban (Eliquis) 5 mg, Take 1 tablet (5 mg total) by mouth 2 (two) times a day, Disp: 60 tablet, Rfl: 5    atorvastatin (LIPITOR) 20 mg tablet, TAKE ONE TABLET BY MOUTH EVERY DAY, Disp: 90 tablet, Rfl: 1    chlorthalidone 25 mg tablet, Take 0.5 tablets (12.5 mg total) by mouth daily, Disp: , Rfl:     ergocalciferol (ERGOCALCIFEROL) 1.25 MG (48164 UT) capsule, Take 1 capsule (50,000 Units total) by mouth once a week, Disp: 8 capsule, Rfl: 0    glucose blood (FREESTYLE LITE) test strip, Use 1 each in the morning, Disp: 100 each, Rfl: 0    Lancets (freestyle) lancets, Use as instructed, Disp: 100 each, Rfl: 0    losartan (COZAAR) 100 MG tablet, TAKE ONE TABLET BY MOUTH EVERY DAY, Disp: 90 tablet, Rfl: 1    magnesium Oxide (MAG-OX) 400 mg TABS, Take 1 tablet (400 mg total) by mouth 2 (two) times a day, Disp: , Rfl:     metoprolol tartrate (LOPRESSOR) 100 mg tablet, TAKE ONE TABLET BY MOUTH TWICE A DAY, Disp: 200 tablet, Rfl: 1    morphine (MS CONTIN) 15 mg 12 hr tablet, as needed, Disp: ,  Rfl: 0    oxyCODONE (ROXICODONE) 30 MG immediate release tablet, if needed, Disp: , Rfl: 0    polyethylene glycol (GOLYTELY) 4000 mL solution, Take 4,000 mL by mouth once for 1 dose Colonoscopy prep, Disp: 4000 mL, Rfl: 0    polyethylene glycol (MIRALAX) 17 g packet, Take 17 g by mouth daily, Disp: 510 g, Rfl: 5    semaglutide, 2 mg/dose, (Ozempic, 2 MG/DOSE,) 8 mg/ mL injection pen, Inject 0.75 mL (2 mg total) under the skin every 7 days, Disp: 3 mL, Rfl: 3    spironolactone (ALDACTONE) 25 mg tablet, , Disp: , Rfl:     Food Intake and Lifestyle Assessment   Food Intake Assessment completed via usual diet recall  Breakfast: 10 am oatmeal, yogurt ,  Snack: 0   Lunch: Protein shake   Snack: 0  Dinner: 5 to 7 pm   Chicken, fish, beef, pork , salads and vegetables and starch   Snack: 0  Beverage intake: water  and coffee/tea  Protein supplement: one per day for lunch    Estimated protein intake per day: ~70 grams    Estimated fluid intake per day: 10 bottles per day of water   Meals eaten away from home: rarely   Typical meal pattern: 2 meals per day and 0 snacks per day  Eating Behaviors: Large portion sizes  Food allergies or intolerances:   Allergies   Allergen Reactions    Gabapentin      Annotation - [id number]: dizziness    Pregabalin      Annotation - [id number]: vision changes and mood changes    Tramadol Nausea Only     Cultural or Mu-ism considerations: [place]     Physical Assessment  Physical Activity  Types of exercise: Walking  Mows his lawn, gardens housework   Current physical limitations: knee pain     Psychosocial Assessment   Support systems: family and   Socioeconomic factors: lives with x wife     Nutrition Diagnosis  Diagnosis: Overweight / Obesity (NC-3.3)  Related to: Physical inactivity and Excessive energy intake  As Evidenced by: BMI >25 and Unintentional weight gain     Nutrition Prescription: Recommend the following diet  Per nephrology - pt is on low potassium diet   Protein 90 grams per  "day ( 0.6 grams /kg actual body weight )  Low sodium   2174-9807 calories per day / 90 grams protein     Interventions and Teaching   Discussed pre-op and post-op nutrition guidelines.       Patient educated and handouts provided.  Surgical changes to stomach / GI  Capacity of post-surgery stomach  Diet progression  Adequate hydration  Sugar and fat restriction to decrease \"dumping syndrome\"  Fat restriction to decrease steatorrhea  Expected weight loss  Weight loss plateaus/ possibility of weight regain  Exercise  Suggestions for pre-op diet  Nutrition considerations after surgery  Protein supplements  Meal planning and preparation  Appropriate carbohydrate, protein, and fat intake, and food/fluid choices to maximize safe weight loss, nutrient intake, and tolerance   Dietary and lifestyle changes  Possible problems with poor eating habits  Intuitive eating  Techniques for self monitoring and keeping daily food journal  Potential for food intolerance after surgery, and ways to deal with them including: lactose intolerance, nausea, reflux, vomiting, diarrhea, food intolerance, appetite changes, gas  Vitamin / Mineral supplementation of Multivitamin with minerals and Vitamin D  Has previous vitamin D deficiency but took loading dose   Nephrology is monitoring.     Education provided to: patient    Barriers to learning: No barriers identified    Readiness to change: preparation    Prior research on procedure: discussed with provider and friends or family    Comprehension: needs reinforcement and verbalizes understanding     Expected Compliance: good  Recommendations  Pt is an appropriate candidate for surgery. Yes  Pt should make the following changes and then be reassessed for weight loss surgery:   Pt needs to be nicotine free prior to surgery.  Pt with negative nicotine test 7/19/2024    Workflow: (Incomplete in Bold):  Psych and/or D+A Clearance: n/a  Blood Work:done   PCP letter: done  Surgeon Appt:DONE  EGD: " DONE  Cardiac Risk Assessment with EC2025  Sleep Studies: cpap  Nephrology done  Hematology : 2024  MWM  10/4/2024- on Ozempic   Nicotine test: negative 2024- will need second test 3 1/2 weeks prior to surgery date   Pre-Operative Program: n/a  NAFLD Score Calculated: yes  Hepatology consult: n/a    Evaluation / Monitoring  Dietitian to Monitor: Eating pattern as discussed Tolerance of nutrition prescription Body weight Lab values Physical activity Bowel pattern  Pt is following low sodium and low potassium diet.  He is drinking a protein shake for lunch. He has been food logging on [employer]   He is food logging 900-1000 calories/ 70-75 grams protein.  He continues to limit salt in his food . He is practicing the 30/30 to 30/60 rule.  Occasionally will have a sip with meals. He is taking his time to eat his food and chewing his food well. He is walking outdoors when the weather is good.  Overall , making lifestyle changes in preparation for surgery.     Goals  Complete lession plans 1-6, Eat 3 meals per day, and Eliminate mindless snacking  Continue to Food log. Do not go below 1000 calories per day while on Ozempic   Time meals to take 15-20 minutes and chew food well.   Follow 30/60 rule   Cardiology consult     Time Spent:   15 minutes

## 2025-01-12 DIAGNOSIS — E11.22 TYPE 2 DIABETES MELLITUS WITH STAGE 3B CHRONIC KIDNEY DISEASE, WITHOUT LONG-TERM CURRENT USE OF INSULIN (HCC): ICD-10-CM

## 2025-01-12 DIAGNOSIS — N18.32 TYPE 2 DIABETES MELLITUS WITH STAGE 3B CHRONIC KIDNEY DISEASE, WITHOUT LONG-TERM CURRENT USE OF INSULIN (HCC): ICD-10-CM

## 2025-01-14 RX ORDER — SEMAGLUTIDE 2.68 MG/ML
2 INJECTION, SOLUTION SUBCUTANEOUS
Qty: 3 ML | Refills: 3 | Status: SHIPPED | OUTPATIENT
Start: 2025-01-14

## 2025-01-22 ENCOUNTER — OFFICE VISIT (OUTPATIENT)
Age: 65
End: 2025-01-22
Payer: COMMERCIAL

## 2025-01-22 VITALS
WEIGHT: 315 LBS | DIASTOLIC BLOOD PRESSURE: 70 MMHG | HEART RATE: 80 BPM | BODY MASS INDEX: 46.65 KG/M2 | OXYGEN SATURATION: 96 % | TEMPERATURE: 97.5 F | HEIGHT: 69 IN | SYSTOLIC BLOOD PRESSURE: 120 MMHG

## 2025-01-22 DIAGNOSIS — E66.01 MORBID OBESITY (HCC): ICD-10-CM

## 2025-01-22 DIAGNOSIS — G47.33 OSA (OBSTRUCTIVE SLEEP APNEA): Primary | ICD-10-CM

## 2025-01-22 DIAGNOSIS — I27.82 OTHER CHRONIC PULMONARY EMBOLISM WITHOUT ACUTE COR PULMONALE (HCC): ICD-10-CM

## 2025-01-22 DIAGNOSIS — I10 ESSENTIAL HYPERTENSION: ICD-10-CM

## 2025-01-22 PROCEDURE — 99214 OFFICE O/P EST MOD 30 MIN: CPT | Performed by: PHYSICIAN ASSISTANT

## 2025-01-22 NOTE — ASSESSMENT & PLAN NOTE
Patient presenting for follow-up  Home study 5/24 revealed severe sleep apnea with SAMIR 65.7. Now on Auto CPAP (5-20 cm H2O)  They are using the CPAP and benefitting from it.  Better quality of sleep at night and more energy throughout the day.  Reviewed compliance report with the patient demonstrating residual AHI is acceptable at 0.4 and they are compliant with use, avg ~ 5 hrs a night. Advised him to aim for bit more sleep and use during naps if he takes one.  Will continue CPAP at current pressure settings  Discussed regular cleaning and changing of the supplies  Patient is aware of consequences of untreated sleep apnea including increased risk for cardiac disease and stroke and therefore the need for compliance

## 2025-01-22 NOTE — PROGRESS NOTES
Answers submitted by the patient for this visit:  Pulmonology Questionnaire (Submitted on 1/15/2025)  Chief Complaint: Primary symptoms  Do you have a hoarse voice?: Yes  When did you first notice your symptoms?: more than 1 month ago  How often do your symptoms occur?: intermittently  Since you first noticed this problem, how has it changed?: gradually improving  Do you have shortness of breath that occurs with effort or exertion?: Yes  Do you have ear congestion?: No  Do you have heartburn?: No  Do you have fatigue?: No  Do you have nasal congestion?: No  Do you have shortness of breath when lying flat?: No  Do you have shortness of breath when you wake up?: No  Do you have sweats?: No  Have you experienced weight loss?: Yes  Which of the following makes your symptoms worse?: climbing stairs  Which of the following makes your symptoms better?: rest    Name: Nasir Story Jr.      : 1960      MRN: [id number]  Encounter Provider: Yanira Stoll PA-C  Encounter Date: 2025   Encounter department: [facility] [place]  :  Assessment & Plan  IMELDA (obstructive sleep apnea)  Patient presenting for follow-up  Home study  revealed severe sleep apnea with SAMIR 65.7. Now on Auto CPAP (5-20 cm H2O)  They are using the CPAP and benefitting from it.  Better quality of sleep at night and more energy throughout the day.  Reviewed compliance report with the patient demonstrating residual AHI is acceptable at 0.4 and they are compliant with use, avg ~ 5 hrs a night. Advised him to aim for bit more sleep and use during naps if he takes one.  Will continue CPAP at current pressure settings  Discussed regular cleaning and changing of the supplies  Patient is aware of consequences of untreated sleep apnea including increased risk for cardiac disease and stroke and therefore the need for compliance         Other chronic pulmonary embolism without acute cor pulmonale (HCC)  Plan is to remain on  Eliquis indefinitely       Morbid obesity (HCC)  Following w Bariatrics  Encouraged him to increase his activity level       Essential hypertension  Significant improvement with CPAP  Meds per PCP           History of Present Illness   Nasir Story Jr. is a 64 y.o. male who presents for follow-up. He is doing great. Loves his CPAP. He feels iot is helping him a lot. Offers no new concerns at this time. Admits he gets a little more winded in the winter because he stays more sedentary but he is going to join a gym.        Review of Systems   Constitutional:  Positive for appetite change. Negative for fever.   HENT:  Negative for ear pain, postnasal drip, rhinorrhea, sneezing, sore throat and trouble swallowing.    Respiratory:  Positive for shortness of breath.    Cardiovascular:  Negative for chest pain.   Musculoskeletal:  Negative for myalgias.   Neurological:  Negative for headaches.   All other systems reviewed and are negative.    Past Medical History   Past Medical History:   Diagnosis Date    Abnormal CBC 01/20/2020    Acne     Allergic     Anemia 10/28/2019    Anxiety     Arthritis     Back pain     Chronic kidney disease     Chronic pain     CPAP (continuous positive airway pressure) dependence     Dental cavity 08/10/2017    Depression 06/07/2021    Diabetes mellitus ([facility])     6/19/15 A1C: 5.5%    Eczema     Fall 10/16/2023    Flu vaccine need 10/16/2023    Grief reaction 09/10/2019    Hyperlipidemia     Hypertension     Knee pain     Morbid obesity with BMI of 50.0-59.9, adult (HCC)     Obesity     IMELDA on CPAP     Psychiatric disorder     Screening for prostate cancer 11/28/2018    Skin tag     Suspected sleep apnea 08/18/2023    Type 2 diabetes mellitus without complication ([facility]) 07/16/2014    Urinary retention      Past Surgical History:   Procedure Laterality Date    BACK SURGERY      LUMBAR FUSION  2002    LUMBAR FUSION  2004    L4-5-6, S1 has pump for narcotics     Family History   Problem Relation  Age of Onset    Other Mother         colitis    Diabetes Mother       reports that he quit smoking about 9 months ago. His smoking use included cigars. He started smoking about 7 years ago. He has been exposed to tobacco smoke. He has never used smokeless tobacco. He reports that he does not currently use alcohol. He reports that he does not use drugs.  Current Outpatient Medications on File Prior to Visit   Medication Sig Dispense Refill    alfuzosin (UROXATRAL) 10 mg 24 hr tablet TAKE 1 TABLET BY MOUTH DAILY 30 tablet 5    amLODIPine (NORVASC) 10 mg tablet Take 1 tablet by mouth in the morning      apixaban (Eliquis) 5 mg Take 1 tablet (5 mg total) by mouth 2 (two) times a day 60 tablet 5    atorvastatin (LIPITOR) 20 mg tablet TAKE ONE TABLET BY MOUTH EVERY DAY 90 tablet 1    chlorthalidone 25 mg tablet Take 0.5 tablets (12.5 mg total) by mouth daily      ergocalciferol (ERGOCALCIFEROL) 1.25 MG (94874 UT) capsule Take 1 capsule (50,000 Units total) by mouth once a week 8 capsule 0    glucose blood (FREESTYLE LITE) test strip Use 1 each in the morning 100 each 0    Lancets (freestyle) lancets Use as instructed 100 each 0    losartan (COZAAR) 100 MG tablet TAKE ONE TABLET BY MOUTH EVERY DAY 90 tablet 1    magnesium Oxide (MAG-OX) 400 mg TABS Take 1 tablet (400 mg total) by mouth 2 (two) times a day      metoprolol tartrate (LOPRESSOR) 100 mg tablet TAKE ONE TABLET BY MOUTH TWICE A  tablet 1    morphine (MS CONTIN) 15 mg 12 hr tablet as needed  0    oxyCODONE (ROXICODONE) 30 MG immediate release tablet if needed  0    Ozempic, 2 MG/DOSE, 8 MG/3ML injection pen INJECT 0.75ML UNDER THE SKIN EVERY 7 DAYS 3 mL 3    polyethylene glycol (GOLYTELY) 4000 mL solution Take 4,000 mL by mouth once for 1 dose Colonoscopy prep 4000 mL 0    polyethylene glycol (MIRALAX) 17 g packet Take 17 g by mouth daily 510 g 5    spironolactone (ALDACTONE) 25 mg tablet        No current facility-administered medications on file prior to  "visit.     Allergies   Allergen Reactions    Gabapentin      Annotation - [id number]: dizziness    Pregabalin      Annotation - [id number]: vision changes and mood changes    Tramadol Nausea Only         Historical Information   Tobacco History: in remission - not a candidate for annual screening      Objective   /70 (BP Location: Left arm, Patient Position: Sitting, Cuff Size: Large)   Pulse 80   Temp 97.5 °F (36.4 °C) (Tympanic)   Ht 5' 9\" (1.753 m)   Wt (!) 144 kg (316 lb 9.6 oz)   SpO2 96%   BMI 46.75 kg/m²      Physical Exam  Vitals reviewed.   Constitutional:       General: He is not in acute distress.     Appearance: He is not toxic-appearing.   HENT:      Head: Normocephalic and atraumatic.   Eyes:      General: No scleral icterus.  Cardiovascular:      Rate and Rhythm: Normal rate and regular rhythm.   Pulmonary:      Effort: Pulmonary effort is normal.      Breath sounds: Normal breath sounds.   Musculoskeletal:         General: No signs of injury.   Skin:     General: Skin is warm and dry.   Neurological:      General: No focal deficit present.      Mental Status: He is alert. Mental status is at baseline.   Psychiatric:         Mood and Affect: Mood normal.         Behavior: Behavior normal.           Lab Results: I have reviewed pertinent labs.    Radiology Results Review: I have reviewed radiology reports from 2023 including: CT chest.  Other Study Results: Other Study Results Review : No additional pertinent studies reviewed.  PFT Results Reviewed: na  "

## 2025-01-25 DIAGNOSIS — I26.99 PULMONARY EMBOLI (HCC): ICD-10-CM

## 2025-01-25 DIAGNOSIS — I10 ESSENTIAL HYPERTENSION: Primary | ICD-10-CM

## 2025-01-27 RX ORDER — APIXABAN 5 MG/1
5 TABLET, FILM COATED ORAL 2 TIMES DAILY
Qty: 60 TABLET | Refills: 5 | Status: SHIPPED | OUTPATIENT
Start: 2025-01-27

## 2025-01-28 RX ORDER — AMLODIPINE BESYLATE 10 MG/1
10 TABLET ORAL DAILY
Qty: 90 TABLET | Refills: 3 | Status: SHIPPED | OUTPATIENT
Start: 2025-01-28 | End: 2025-01-31 | Stop reason: ALTCHOICE

## 2025-01-30 ENCOUNTER — TELEPHONE (OUTPATIENT)
Age: 65
End: 2025-01-30

## 2025-01-30 ENCOUNTER — OFFICE VISIT (OUTPATIENT)
Dept: GASTROENTEROLOGY | Facility: CLINIC | Age: 65
End: 2025-01-30
Payer: COMMERCIAL

## 2025-01-30 VITALS — TEMPERATURE: 97.7 F | DIASTOLIC BLOOD PRESSURE: 62 MMHG | SYSTOLIC BLOOD PRESSURE: 124 MMHG

## 2025-01-30 DIAGNOSIS — K59.09 CHRONIC CONSTIPATION: Primary | ICD-10-CM

## 2025-01-30 DIAGNOSIS — Z86.0100 PERSONAL HISTORY OF COLON POLYPS, UNSPECIFIED: ICD-10-CM

## 2025-01-30 PROCEDURE — 99213 OFFICE O/P EST LOW 20 MIN: CPT | Performed by: PHYSICIAN ASSISTANT

## 2025-01-30 NOTE — PROGRESS NOTES
Name: Nasir Story Jr.      : 1960      MRN: [id number]  Encounter Provider: Obdulia Mathur PA-C  Encounter Date: 2025   Encounter department: [facility] [place]  :  Assessment & Plan  Chronic constipation  Colonoscopy noted diverticulosis and hemorrhoids as well as 2 lipomas and 2 small polyps that were removed; the polyps were TA on pathology.  Thankfully, he says he has not been having issues with constipation except for once in a while. He says when this occurs, taking OTC miralax alleviates this.  -continue OTC miralax as needed for constipation          Personal history of colon polyps, unspecified  See above.Colonoscopy noted diverticulosis and hemorrhoids as well as 2 lipomas and 2 small polyps that were removed; the polyps were TA on pathology.   - Repeat colonoscopy in 7 years           History of Present Illness   HPI  Nasir Story Jr. is a 64 y.o. male who presents for follow-up.he says he has not been having issues with constipation except for once in a while. He says when this occurs, taking OTC miralax alleviates this. He otherwise denies n/v, trouble swallowing or eating, heartburn, abdominal pain, diarrhea, bloody or black BM. Pt says he has lost about 40 lbs since being on ozempic. He says he has not noticed side-effects from being on ozempic, but is going to speak to the bariatric team soon about surgical options.   History obtained from: patient    Review of Systems   Constitutional:  Negative for appetite change, chills, diaphoresis, fatigue, fever and unexpected weight change.   HENT:  Negative for trouble swallowing.    Gastrointestinal:  Positive for constipation. Negative for abdominal distention, abdominal pain, anal bleeding, blood in stool, diarrhea, nausea, rectal pain and vomiting.   Neurological:  Negative for dizziness and light-headedness.     Medical History Reviewed by provider this encounter:     .  Past Medical History   Past  Medical History:   Diagnosis Date    Abnormal CBC 01/20/2020    Acne     Allergic     Anemia 10/28/2019    Anxiety     Arthritis     Back pain     Chronic kidney disease     Chronic pain     CPAP (continuous positive airway pressure) dependence     Dental cavity 08/10/2017    Depression 06/07/2021    Diabetes mellitus ([facility])     6/19/15 A1C: 5.5%    Eczema     Fall 10/16/2023    Flu vaccine need 10/16/2023    Grief reaction 09/10/2019    Hyperlipidemia     Hypertension     Knee pain     Morbid obesity with BMI of 50.0-59.9, adult ([facility])     Obesity     IMELDA on CPAP     Psychiatric disorder     Screening for prostate cancer 11/28/2018    Skin tag     Suspected sleep apnea 08/18/2023    Type 2 diabetes mellitus without complication ([facility]) 07/16/2014    Urinary retention      Past Surgical History:   Procedure Laterality Date    BACK SURGERY      COLONOSCOPY      LUMBAR FUSION  2002    LUMBAR FUSION  2004    L4-5-6, S1 has pump for narcotics     Family History   Problem Relation Age of Onset    Other Mother         colitis    Diabetes Mother       reports that he quit smoking about 9 months ago. His smoking use included cigars. He started smoking about 7 years ago. He has been exposed to tobacco smoke. He has never used smokeless tobacco. He reports that he does not currently use alcohol. He reports that he does not use drugs.  Current Outpatient Medications on File Prior to Visit   Medication Sig Dispense Refill    alfuzosin (UROXATRAL) 10 mg 24 hr tablet TAKE 1 TABLET BY MOUTH DAILY 30 tablet 5    amLODIPine (NORVASC) 10 mg tablet TAKE ONE TABLET BY MOUTH EVERY DAY 90 tablet 3    atorvastatin (LIPITOR) 20 mg tablet TAKE ONE TABLET BY MOUTH EVERY DAY 90 tablet 1    chlorthalidone 25 mg tablet Take 0.5 tablets (12.5 mg total) by mouth daily      Eliquis 5 MG TAKE 1 TABLET BY MOUTH TWO TIMES A DAY 60 tablet 5    ergocalciferol (ERGOCALCIFEROL) 1.25 MG (34898 UT) capsule Take 1 capsule (50,000 Units total) by mouth once a  week 8 capsule 0    glucose blood (FREESTYLE LITE) test strip Use 1 each in the morning 100 each 0    Lancets (freestyle) lancets Use as instructed 100 each 0    losartan (COZAAR) 100 MG tablet TAKE ONE TABLET BY MOUTH EVERY DAY 90 tablet 1    magnesium Oxide (MAG-OX) 400 mg TABS Take 1 tablet (400 mg total) by mouth 2 (two) times a day      metoprolol tartrate (LOPRESSOR) 100 mg tablet TAKE ONE TABLET BY MOUTH TWICE A  tablet 1    morphine (MS CONTIN) 15 mg 12 hr tablet as needed  0    oxyCODONE (ROXICODONE) 30 MG immediate release tablet if needed  0    Ozempic, 2 MG/DOSE, 8 MG/3ML injection pen INJECT 0.75ML UNDER THE SKIN EVERY 7 DAYS 3 mL 3    spironolactone (ALDACTONE) 25 mg tablet       polyethylene glycol (GOLYTELY) 4000 mL solution Take 4,000 mL by mouth once for 1 dose Colonoscopy prep 4000 mL 0    polyethylene glycol (MIRALAX) 17 g packet Take 17 g by mouth daily 510 g 5     No current facility-administered medications on file prior to visit.     Allergies   Allergen Reactions    Gabapentin      Annotation - [id number]: dizziness    Pregabalin      Annotation - [id number]: vision changes and mood changes    Tramadol Nausea Only      Current Outpatient Medications on File Prior to Visit   Medication Sig Dispense Refill    alfuzosin (UROXATRAL) 10 mg 24 hr tablet TAKE 1 TABLET BY MOUTH DAILY 30 tablet 5    amLODIPine (NORVASC) 10 mg tablet TAKE ONE TABLET BY MOUTH EVERY DAY 90 tablet 3    atorvastatin (LIPITOR) 20 mg tablet TAKE ONE TABLET BY MOUTH EVERY DAY 90 tablet 1    chlorthalidone 25 mg tablet Take 0.5 tablets (12.5 mg total) by mouth daily      Eliquis 5 MG TAKE 1 TABLET BY MOUTH TWO TIMES A DAY 60 tablet 5    ergocalciferol (ERGOCALCIFEROL) 1.25 MG (45186 UT) capsule Take 1 capsule (50,000 Units total) by mouth once a week 8 capsule 0    glucose blood (FREESTYLE LITE) test strip Use 1 each in the morning 100 each 0    Lancets (freestyle) lancets Use as instructed 100 each 0    losartan  (COZAAR) 100 MG tablet TAKE ONE TABLET BY MOUTH EVERY DAY 90 tablet 1    magnesium Oxide (MAG-OX) 400 mg TABS Take 1 tablet (400 mg total) by mouth 2 (two) times a day      metoprolol tartrate (LOPRESSOR) 100 mg tablet TAKE ONE TABLET BY MOUTH TWICE A  tablet 1    morphine (MS CONTIN) 15 mg 12 hr tablet as needed  0    oxyCODONE (ROXICODONE) 30 MG immediate release tablet if needed  0    Ozempic, 2 MG/DOSE, 8 MG/3ML injection pen INJECT 0.75ML UNDER THE SKIN EVERY 7 DAYS 3 mL 3    spironolactone (ALDACTONE) 25 mg tablet       polyethylene glycol (GOLYTELY) 4000 mL solution Take 4,000 mL by mouth once for 1 dose Colonoscopy prep 4000 mL 0    polyethylene glycol (MIRALAX) 17 g packet Take 17 g by mouth daily 510 g 5     No current facility-administered medications on file prior to visit.      Social History     Tobacco Use    Smoking status: Former     Types: Cigars     Start date:      Quit date: 4/15/2024     Years since quittin.7     Passive exposure: Current    Smokeless tobacco: Never    Tobacco comments:     Quit cigarette smoking in .      Patient reports that he last smoked a cigar close to 2 years ago.    Vaping Use    Vaping status: Never Used   Substance and Sexual Activity    Alcohol use: Not Currently     Comment: occasional    Drug use: No     Comment: History of drug use, meena, cocaine, heroind, no IVDA-as per [employer]    Sexual activity: Yes     Partners: Female     Birth control/protection: None        Objective   There were no vitals taken for this visit.     Physical Exam  Constitutional:       Appearance: Normal appearance.   Cardiovascular:      Rate and Rhythm: Normal rate and regular rhythm.      Heart sounds: Normal heart sounds.   Pulmonary:      Breath sounds: Normal breath sounds.   Abdominal:      General: Abdomen is flat. Bowel sounds are normal. There is no distension.      Palpations: Abdomen is soft. There is no mass.      Tenderness: There is no abdominal  tenderness. There is no right CVA tenderness, left CVA tenderness, guarding or rebound.      Hernia: No hernia is present.   Neurological:      Mental Status: He is alert.         Administrative Statements   I have spent a total time of 30 minutes in caring for this patient on the day of the visit/encounter including Diagnostic results, Prognosis, Risks and benefits of tx options, Instructions for management, Patient and family education, Importance of tx compliance, Risk factor reductions, Impressions, Counseling / Coordination of care, Documenting in the medical record, Reviewing / ordering tests, medicine, procedures  , Obtaining or reviewing history  , and Communicating with other healthcare professionals .

## 2025-01-31 ENCOUNTER — OFFICE VISIT (OUTPATIENT)
Dept: CARDIOLOGY CLINIC | Facility: CLINIC | Age: 65
End: 2025-01-31
Payer: COMMERCIAL

## 2025-01-31 VITALS
HEART RATE: 83 BPM | BODY MASS INDEX: 46 KG/M2 | WEIGHT: 310.6 LBS | HEIGHT: 69 IN | DIASTOLIC BLOOD PRESSURE: 78 MMHG | SYSTOLIC BLOOD PRESSURE: 116 MMHG

## 2025-01-31 DIAGNOSIS — Z01.810 ENCOUNTER FOR PRE-OPERATIVE CARDIOVASCULAR CLEARANCE: ICD-10-CM

## 2025-01-31 DIAGNOSIS — G47.33 OSA (OBSTRUCTIVE SLEEP APNEA): ICD-10-CM

## 2025-01-31 DIAGNOSIS — E78.5 HYPERLIPIDEMIA, UNSPECIFIED HYPERLIPIDEMIA TYPE: ICD-10-CM

## 2025-01-31 DIAGNOSIS — Z01.810 PRE-OPERATIVE CARDIOVASCULAR EXAMINATION: ICD-10-CM

## 2025-01-31 DIAGNOSIS — E11.22 TYPE 2 DIABETES MELLITUS WITH STAGE 3B CHRONIC KIDNEY DISEASE, WITHOUT LONG-TERM CURRENT USE OF INSULIN (HCC): ICD-10-CM

## 2025-01-31 DIAGNOSIS — I77.810 THORACIC AORTIC ECTASIA (HCC): ICD-10-CM

## 2025-01-31 DIAGNOSIS — N18.32 TYPE 2 DIABETES MELLITUS WITH STAGE 3B CHRONIC KIDNEY DISEASE, WITHOUT LONG-TERM CURRENT USE OF INSULIN (HCC): ICD-10-CM

## 2025-01-31 DIAGNOSIS — N18.32 STAGE 3B CHRONIC KIDNEY DISEASE (HCC): ICD-10-CM

## 2025-01-31 DIAGNOSIS — Z01.818 PRE-OP EVALUATION: Primary | ICD-10-CM

## 2025-01-31 DIAGNOSIS — E66.01 MORBID OBESITY WITH BMI OF 45.0-49.9, ADULT (HCC): ICD-10-CM

## 2025-01-31 DIAGNOSIS — I10 ESSENTIAL HYPERTENSION: ICD-10-CM

## 2025-01-31 DIAGNOSIS — I50.32 CHRONIC DIASTOLIC HEART FAILURE (HCC): ICD-10-CM

## 2025-01-31 DIAGNOSIS — I26.99 OTHER ACUTE PULMONARY EMBOLISM WITHOUT ACUTE COR PULMONALE (HCC): ICD-10-CM

## 2025-01-31 PROCEDURE — 99204 OFFICE O/P NEW MOD 45 MIN: CPT | Performed by: INTERNAL MEDICINE

## 2025-01-31 PROCEDURE — 93000 ELECTROCARDIOGRAM COMPLETE: CPT | Performed by: INTERNAL MEDICINE

## 2025-01-31 NOTE — ASSESSMENT & PLAN NOTE
Lab Results   Component Value Date    EGFR 47 11/18/2024    EGFR 37 10/22/2024    EGFR 33 08/27/2024    CREATININE 1.52 (H) 11/18/2024    CREATININE 1.84 (H) 10/22/2024    CREATININE 2.02 (H) 08/27/2024   Improving creatinine

## 2025-01-31 NOTE — ASSESSMENT & PLAN NOTE
Prior to intermediate cardiac risk bariatric surgery.  Without active symptoms or ECG changes to suggest active ischemia, heart failure, or arrhythmias  Patient does have T wave inversions in the anterolateral location, which is chronic and unchanged  To be held for 2 to 3 days prior to surgery and resume when safe from bleeding perspective postoperatively

## 2025-01-31 NOTE — PROGRESS NOTES
Cardiology Follow Up    Nasir Story Jr.  1960  [id number]  [facility] [address] [address] [phone]      Assessment & Plan  Morbid obesity with BMI of 45.0-49.9, adult (Formerly [facility])  For upcoming bariatric surgery  Type 2 diabetes mellitus with stage 3b chronic kidney disease, without long-term current use of insulin (Formerly [facility])  Management as per primary care provider  Lab Results   Component Value Date    HGBA1C 6.1 (H) 07/19/2024     Essential hypertension  Blood pressure very well-controlled currently on losartan, metoprolol, and spironolactone  No changes required today  IMELDA (obstructive sleep apnea)  Encourage CPAP compliance  BP has improved significantly with continued compliance  Hyperlipidemia, unspecified hyperlipidemia type  Continued on Lipitor 20 mg daily  LDL most recently 58 in May 2024  Repeat levels for this year have been ordered in his chart  Stage 3b chronic kidney disease (Formerly [facility])  Lab Results   Component Value Date    EGFR 47 11/18/2024    EGFR 37 10/22/2024    EGFR 33 08/27/2024    CREATININE 1.52 (H) 11/18/2024    CREATININE 1.84 (H) 10/22/2024    CREATININE 2.02 (H) 08/27/2024   Improving creatinine  Chronic diastolic heart failure (HCC)  Wt Readings from Last 3 Encounters:   01/31/25 (!) 141 kg (310 lb 9.6 oz)   01/22/25 (!) 144 kg (316 lb 9.6 oz)   01/07/25 (!) 142 kg (313 lb 9.6 oz)   Relatively euvolemic on exam  Continue blood pressure control  Continued on spironolactone as diuretic therapy  Pre-operative cardiovascular examination  Prior to intermediate cardiac risk bariatric surgery.  Without active symptoms or ECG changes to suggest active ischemia, heart failure, or arrhythmias  Patient does have T wave inversions in the anterolateral location, which is chronic and unchanged  To be held for 2 to 3 days prior to surgery and resume when safe from bleeding perspective postoperatively  Other acute pulmonary embolism  "without acute cor pulmonale (HCC)  Continues on Eliquis for anticoagulation  Thoracic aortic ectasia (HCC)  Continued on blood pressure control with beta-blocker and ARB  Echocardiogram in September 2024 revealed a measurement of 4.7 cm in the ascending portion, which is not significantly different than the CTA chest in June 2023 revealing an ascending thoracic aorta measuring 4.5 cm  Continue routine surveillance    Chief Complaint   Patient presents with    Follow-up     Pt of Dr. Means     Pre-op Clearance     Bariatric surgery        Interval History: Patient is here for cardiac evaluation prior to bariatric surgery.  Patient feels well, without complaints.  No reported chest pain, shortness of breath, palpitations, lightheadedness, syncope, LE edema, orthopnea, PND, or significant weight changes.  Patient remains active without any increased fatigue out of the ordinary.        Blood pressure 116/78, pulse 83, height 5' 9\" (1.753 m), weight (!) 141 kg (310 lb 9.6 oz).    EKG:  Normal sinus rhythm  Right axis deviation  ST and T wave abnormality in anterolateral leads  Abnormal ECG    Review of Systems:  Review of Systems   Constitutional:  Negative for activity change, appetite change, fatigue and fever.   HENT:  Negative for nosebleeds and sore throat.    Eyes:  Negative for photophobia and visual disturbance.   Respiratory:  Negative for cough, chest tightness, shortness of breath and wheezing.    Cardiovascular:  Negative for chest pain, palpitations and leg swelling.   Gastrointestinal:  Negative for abdominal pain, diarrhea, nausea and vomiting.   Endocrine: Negative for polyuria.   Genitourinary:  Negative for dysuria, frequency and hematuria.   Musculoskeletal:  Negative for arthralgias, back pain and gait problem.   Skin:  Negative for pallor and rash.   Neurological:  Negative for dizziness, syncope, speech difficulty and light-headedness.   Hematological:  Does not bruise/bleed easily. "   Psychiatric/Behavioral:  Negative for agitation, behavioral problems and confusion.        Physical Exam:  Physical Exam  Vitals reviewed.   Constitutional:       General: He is not in acute distress.     Appearance: Normal appearance. He is well-developed. He is not diaphoretic.   HENT:      Head: Normocephalic and atraumatic.      Nose: Nose normal.   Eyes:      General: No scleral icterus.     Extraocular Movements: Extraocular movements intact.      Pupils: Pupils are equal, round, and reactive to light.   Neck:      Vascular: No JVD.   Cardiovascular:      Rate and Rhythm: Normal rate and regular rhythm.      Heart sounds: S1 normal and S2 normal. Heart sounds not distant. No murmur heard.     No systolic murmur is present.      No friction rub. No gallop. No S3 sounds.   Pulmonary:      Effort: Pulmonary effort is normal. No respiratory distress.      Breath sounds: Normal breath sounds. No wheezing or rales.   Abdominal:      General: Bowel sounds are normal. There is no distension.      Palpations: Abdomen is soft.   Musculoskeletal:         General: No deformity.      Cervical back: Normal range of motion and neck supple.      Right lower leg: No edema.      Left lower leg: No edema.   Skin:     General: Skin is warm and dry.      Findings: No erythema.   Neurological:      Mental Status: He is alert and oriented to person, place, and time.      Cranial Nerves: No cranial nerve deficit.   Psychiatric:         Mood and Affect: Mood normal.         Behavior: Behavior normal.         Patient Active Problem List   Diagnosis    Hypokalemia    Allergic rhinitis    Chronic bilateral low back pain with bilateral sciatica    Erectile dysfunction    Mixed hyperlipidemia    Essential hypertension    Localized osteoarthritis of left knee    Lumbar radiculopathy    Microalbuminuria    Morbid obesity with BMI of 45.0-49.9, adult (HCC)    Patellofemoral arthritis of left knee    Rosacea    Vitamin D deficiency     Persistent proteinuria    It band syndrome, right    Bilateral primary osteoarthritis of knee    Stage 3b chronic kidney disease (HCC)    Localized osteoarthritis of right knee    Type 2 diabetes mellitus with stage 3b chronic kidney disease, without long-term current use of insulin (HCC)    Dyspnea on exertion    Continuous opioid dependence (HCC)    Other pulmonary embolism without acute cor pulmonale (HCC)    Thoracic aortic ectasia (HCC)    Fatigue    Chronic diastolic heart failure (HCC)    IMELDA (obstructive sleep apnea)    BPH with obstruction/lower urinary tract symptoms    Hypomagnesemia    Hyponatremia    Pre-operative cardiovascular examination     Past Medical History:   Diagnosis Date    Abnormal CBC 2020    Acne     Allergic     Anemia 10/28/2019    Anxiety     Arthritis     Back pain     Chronic kidney disease     Chronic pain     CPAP (continuous positive airway pressure) dependence     Dental cavity 08/10/2017    Depression 2021    Diabetes mellitus ([facility])     6/19/15 A1C: 5.5%    Eczema     Fall 10/16/2023    Flu vaccine need 10/16/2023    Grief reaction 09/10/2019    Hyperlipidemia     Hypertension     Knee pain     Morbid obesity with BMI of 50.0-59.9, adult ([facility])     Obesity     IMELDA on CPAP     Psychiatric disorder     Screening for prostate cancer 2018    Skin tag     Suspected sleep apnea 2023    Type 2 diabetes mellitus without complication (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      Patient reports that he last smoked a cigar close to 2 years ago.    Vaping Use    Vaping status: Never Used   Substance and Sexual Activity    Alcohol use: Not Currently     Comment: occasional    Drug use: No     Comment: History of drug use, meena, cocaine, heroind, no IVDA-as per [employer]    Sexual activity: Yes     Partners: Female     Birth control/protection: None   Other Topics Concern    Not on file   Social History Narrative    Patient lives with his ex wife.      Social Drivers of Health     Financial Resource Strain: Low Risk  (10/15/2023)    Overall Financial Resource Strain (CARDIA)     Difficulty of Paying Living Expenses: Not very hard   Food Insecurity: Not on file   Transportation Needs: No Transportation Needs (10/15/2023)    PRAPARE - Transportation     Lack of Transportation (Medical): No     Lack of Transportation (Non-Medical): No   Physical Activity: Not on file   Stress: Not on file   Social Connections: Not on file   Intimate Partner Violence: Not on file   Housing Stability: Not on file      Family History   Problem Relation Age of Onset    Other Mother         colitis    Diabetes Mother      Past Surgical History:   Procedure Laterality Date    BACK SURGERY      COLONOSCOPY      LUMBAR FUSION  2002    LUMBAR FUSION  2004    L4-5-6, S1 has pump for narcotics       Current Outpatient Medications:     alfuzosin (UROXATRAL) 10 mg 24 hr tablet, TAKE 1 TABLET BY MOUTH DAILY, Disp: 30 tablet, Rfl: 5    atorvastatin (LIPITOR) 20 mg tablet, TAKE ONE TABLET BY MOUTH EVERY DAY, Disp: 90 tablet, Rfl: 1    Eliquis 5 MG, TAKE 1 TABLET BY MOUTH TWO TIMES A DAY, Disp: 60 tablet, Rfl: 5    ergocalciferol (ERGOCALCIFEROL) 1.25 MG (79945 UT) capsule, Take 1 capsule (50,000 Units total) by mouth once a week, Disp: 8 capsule, Rfl: 0    glucose blood (FREESTYLE LITE) test strip, Use 1 each in the morning, Disp: 100 each, Rfl: 0    Lancets  (freestyle) lancets, Use as instructed, Disp: 100 each, Rfl: 0    losartan (COZAAR) 100 MG tablet, TAKE ONE TABLET BY MOUTH EVERY DAY (Patient taking differently: Take 100 mg by mouth daily 50 mg daily), Disp: 90 tablet, Rfl: 1    magnesium Oxide (MAG-OX) 400 mg TABS, Take 1 tablet (400 mg total) by mouth 2 (two) times a day, Disp: , Rfl:     metoprolol tartrate (LOPRESSOR) 100 mg tablet, TAKE ONE TABLET BY MOUTH TWICE A DAY, Disp: 200 tablet, Rfl: 1    morphine (MS CONTIN) 15 mg 12 hr tablet, as needed, Disp: , Rfl: 0    oxyCODONE (ROXICODONE) 30 MG immediate release tablet, if needed, Disp: , Rfl: 0    Ozempic, 2 MG/DOSE, 8 MG/3ML injection pen, INJECT 0.75ML UNDER THE SKIN EVERY 7 DAYS, Disp: 3 mL, Rfl: 3    spironolactone (ALDACTONE) 25 mg tablet, , Disp: , Rfl:     polyethylene glycol (GOLYTELY) 4000 mL solution, Take 4,000 mL by mouth once for 1 dose Colonoscopy prep (Patient not taking: Reported on 1/31/2025), Disp: 4000 mL, Rfl: 0    polyethylene glycol (MIRALAX) 17 g packet, Take 17 g by mouth daily (Patient not taking: Reported on 1/31/2025), Disp: 510 g, Rfl: 5  Allergies   Allergen Reactions    Gabapentin      Annotation - [id number]: dizziness    Pregabalin      Annotation - [id number]: vision changes and mood changes    Tramadol Nausea Only       Labs:  Office Visit on 11/19/2024   Component Date Value    POST-VOID RESIDUAL VOLUM* 11/19/2024 21    Hospital Outpatient Visit on 10/23/2024   Component Date Value    POC Glucose 10/23/2024 118     Case Report 10/23/2024                      Value:Surgical Pathology Report                         Case: [id number]                                  Authorizing Provider:  Arturo Cormier MD         Collected:           10/23/2024 0858              Ordering Location:     [facility] Received:            10/23/2024 1038                                     Heart Endoscopy                                                              Pathologist:            Nancy Joyner MD                                                       Specimens:   A) - Colon, bx, transverse lipoma                                                                   B) - Colon, bx, ascending lipoma                                                                    C) - Colon, cold snare, cecal polyp x1                                                              D) - Colon, cold snare, transverse polyp x1                                                Final Diagnosis 10/23/2024                      Value:A. Colon, transverse, lipoma, biopsy:  - Colonic mucosa with focus of benign submucosal fat, compatible with lipoma     B. Colon, ascending, lipoma, biopsy:  - Colonic mucosa with focus of benign submucosal fat, compatible with lipoma     C. Colon, cold snare, cecal polypectomy x1:  - Fragment of tubular adenoma  - Negative for high-grade dysplasia and malignancy      D. Colon, cold snare, transverse polypectomy x1:  - Fragment of tubular adenoma  - Negative for high-grade dysplasia and malignancy         Additional Information 10/23/2024                      Value:All reported additional testing was performed with appropriately reactive controls.  These tests were developed and their performance characteristics determined by [facility] or appropriate performing facility, though some tests may be performed on tissues which have not been validated for performance characteristics (such as staining performed on alcohol exposed cell blocks and decalcified tissues).  Results should be interpreted with caution and in the context of the patients’ clinical condition. These tests may not be cleared or approved by the U.S. Food and Drug Administration, though the FDA has determined that such clearance or approval is not necessary. These tests are used for clinical purposes and they should not be regarded as investigational or for research. This laboratory has been approved by  "CLIA 88, designated as a high-complexity laboratory and is qualified to perform these tests.    Interpretation performed at [facility], [address] [facility] [address]   .      Synoptic Checklist 10/23/2024                      Value:                            COLON/RECTUM POLYP FORM - GI - C, D                                                                                     :    Adenoma(s)      Gross Description 10/23/2024                      Value:A. The specimen is received in formalin, labeled with the patient's name and hospital number, and is designated \" transverse colon lipoma\".  The specimen consists of 5 tan-brown soft tissue fragments measuring in range of 0.1-0.5 cm in greatest dimension.  Entirely submitted. Screened cassette.  B. The specimen is received in formalin, labeled with the patient's name and hospital number, and is designated \" ascending colon lipoma\".  The specimen consists of 5 tan-brown soft tissue fragments measuring in range of 0.1-0.3 cm in greatest dimension.  Entirely submitted. Screened cassette.  C. The specimen is received in formalin, labeled with the patient's name and hospital number, and is designated \" cecal polyp x 1\".  The specimen consists of a single tan soft tissue fragment measuring 0.5 cm in greatest dimension.  Entirely submitted. Screened cassette.  D. The specimen is received in formalin, labeled with the patient's name and hospital number, and is designated \" transverse polyp x                           1\".  The specimen consists of a single tan soft tissue fragment measuring 0.2 cm in greatest dimension.  Entirely submitted. Screened cassette.    Note: The estimated total formalin fixation time based upon information provided by the submitting clinician and the standard processing schedule is under 72 hours.    [facility]     Lab on 10/22/2024   Component Date Value    Magnesium 10/22/2024 2.1     Creatinine, Ur 11/18/2024 " 115.2     Albumin,U,Random 11/18/2024 <7.0     Albumin Creat Ratio 11/18/2024      Creatinine, Ur 11/18/2024 115.2     Protein Urine Random 11/18/2024 5.5     Prot/Creat Ratio, Ur 11/18/2024 0.0     Color, UA 11/18/2024 Light Yellow     Clarity, UA 11/18/2024 Clear     Specific Gravity, UA 11/18/2024 1.016     pH, UA 11/18/2024 6.0     Leukocytes, UA 11/18/2024 Negative     Nitrite, UA 11/18/2024 Negative     Protein, UA 11/18/2024 Negative     Glucose, UA 11/18/2024 Negative     Ketones, UA 11/18/2024 Negative     Urobilinogen, UA 11/18/2024 <2.0     Bilirubin, UA 11/18/2024 Negative     Occult Blood, UA 11/18/2024 Negative     RBC, UA 11/18/2024 1-2     WBC, UA 11/18/2024 None Seen     Epithelial Cells 11/18/2024 Occasional     Bacteria, UA 11/18/2024 None Seen     AntiThrombIN III Activity 10/22/2024 98     BETA 2 GLYCO 1 IGG 10/22/2024 <0.8     BETA 2 GLYCO 1 IGA 10/22/2024 1.2     BETA 2 GLYCO 1 IGM 10/22/2024 <2.4     ANTICARDIOLIPIN IGG ANTI* 10/22/2024 2.0     ANTICARDIOLIPIN IGA ANTI* 10/22/2024 2.8     ANTICARDIOLIPIN IGM ANTI* 10/22/2024 2.1     WBC 10/22/2024 8.95     RBC 10/22/2024 4.62     Hemoglobin 10/22/2024 13.1     Hematocrit 10/22/2024 40.8     MCV 10/22/2024 88     MCH 10/22/2024 28.4     MCHC 10/22/2024 32.1     RDW 10/22/2024 13.1     MPV 10/22/2024 11.0     Platelets 10/22/2024 237     nRBC 10/22/2024 0     Segmented % 10/22/2024 70     Immature Grans % 10/22/2024 1     Lymphocytes % 10/22/2024 18     Monocytes % 10/22/2024 6     Eosinophils Relative 10/22/2024 4     Basophils Relative 10/22/2024 1     Absolute Neutrophils 10/22/2024 6.28     Absolute Immature Grans 10/22/2024 0.12     Absolute Lymphocytes 10/22/2024 1.59     Absolute Monocytes 10/22/2024 0.54     Eosinophils Absolute 10/22/2024 0.37     Basophils Absolute 10/22/2024 0.05     Sodium 10/22/2024 136     Potassium 10/22/2024 3.9     Chloride 10/22/2024 100     CO2 10/22/2024 27     ANION GAP 10/22/2024 9     BUN 10/22/2024 33  (H)     Creatinine 10/22/2024 1.84 (H)     Glucose 10/22/2024 112     Calcium 10/22/2024 9.4     AST 10/22/2024 12 (L)     ALT 10/22/2024 9     Alkaline Phosphatase 10/22/2024 70     Total Protein 10/22/2024 7.3     Albumin 10/22/2024 3.9     Total Bilirubin 10/22/2024 0.53     eGFR 10/22/2024 37     Miscellaneous Lab Test R* 10/22/2024 See written report from [facility]     PTT Lupus Anticoagulant 10/22/2024 32.5     Dilute Viper Venom Time 10/22/2024 37.3     DILUTE PROTHROMBIN TIME(* 10/22/2024 35.2     THROMBIN TIME (DRVW) 10/22/2024 16.5     DPT CONFIRM RATIO 10/22/2024 1.09     LUPUS REFLEX INTERPRETAT* 10/22/2024 Comment:     Magnesium 11/18/2024 1.8 (L)     Sodium 11/18/2024 138     Potassium 11/18/2024 3.8     Chloride 11/18/2024 101     CO2 11/18/2024 27     ANION GAP 11/18/2024 10     BUN 11/18/2024 22     Creatinine 11/18/2024 1.52 (H)     Glucose 11/18/2024 126     Calcium 11/18/2024 8.9     eGFR 11/18/2024 47     Phosphorus 11/18/2024 2.6     PTH 11/18/2024 65.6     Vit D, 25-Hydroxy 11/18/2024 12.5 (L)    Hospital Outpatient Visit on 09/18/2024   Component Date Value    POC Glucose 09/18/2024 132     Case Report 09/18/2024                      Value:Surgical Pathology Report                         Case: [id number]                                  Authorizing Provider:  Noé Glover MD        Collected:           09/18/2024 0754              Ordering Location:     [facility]        Received:            09/18/2024 1318                                     [facility]                                                          Pathologist:           Alecia Ferguson DO                                                           Specimen:    Stomach, r/o h.pylori                                                                      Final Diagnosis 09/18/2024                      Value:A. Stomach, biopsy:  - Gastric mucosa with mild vascular congestion.  - Negative for intestinal  "metaplasia and dysplasia.   - H. pylori organisms are not identified on H&E slide.        Additional Information 09/18/2024                      Value:All reported additional testing was performed with appropriately reactive controls.  These tests were developed and their performance characteristics determined by [facility] or appropriate performing facility, though some tests may be performed on tissues which have not been validated for performance characteristics (such as staining performed on alcohol exposed cell blocks and decalcified tissues).  Results should be interpreted with caution and in the context of the patients’ clinical condition. These tests may not be cleared or approved by the U.S. Food and Drug Administration, though the FDA has determined that such clearance or approval is not necessary. These tests are used for clinical purposes and they should not be regarded as investigational or for research. This laboratory has been approved by CLIA 88, designated as a high-complexity laboratory and is qualified to perform these tests.    Interpretation performed at [facility], [address] [place] [id number]       Gross Description 09/18/2024                      Value:A. The specimen is received in formalin, labeled with the patient's name and hospital number, and is designated \" stomach\".  The specimen consists of 4 tan soft tissue fragments measuring 0.3-0.5 cm in greatest dimension.  Entirely submitted. One screened cassette.    Note: The estimated total formalin fixation time based upon information provided by the submitting clinician and the standard processing schedule is under 72 hours.      Nasra      Clinical Information 09/18/2024                      Value:· Gastritis with mild erythematous mucosa in the stomach; performed cold forceps biopsies.  · Some retained gastric contents suggestive of gastroparesis   Orders Only on 09/17/2024 "   Component Date Value    Right Eye Diabetic Retin* 09/17/2024 None     Left Eye Diabetic Retino* 09/17/2024 None    Hospital Outpatient Visit on 09/13/2024   Component Date Value    BSA 09/13/2024 2.53     A4C EF 09/13/2024 68     LVOT stroke volume 09/13/2024 117.43     LVOT stroke volume index 09/13/2024 48.20     LVOT Cardiac Output 09/13/2024 8.67     LVOT Cardiac Index 09/13/2024 3.43     LVOT diameter 09/13/2024 2.4     LVOT peak VTI 09/13/2024 25.97     MV E' Tissue Velocity Se* 09/13/2024 7     MV E' Tissue Velocity La* 09/13/2024 6     E/A ratio 09/13/2024 0.86     E wave deceleration time 09/13/2024 208     MV Peak E Eugenio 09/13/2024 66     MV Peak A Eugenio 09/13/2024 0.77     AV LVOT peak gradient 09/13/2024 6     LVOT peak eugenio 09/13/2024 1.25     RVID d 09/13/2024 4.7     Aortic valve peak veloci* 09/13/2024 1.39     Ao VTI 09/13/2024 29.86     AV mean gradient 09/13/2024 4     LVOT mn grad 09/13/2024 4.0     AV peak gradient 09/13/2024 8     AV area by cont VTI 09/13/2024 3.9     AV area peak eugenio 09/13/2024 4.1     Ao root 09/13/2024 4.40     STJ 09/13/2024 3.9     Asc Ao 09/13/2024 4.7     Sinus of Valsalva, 2D 09/13/2024 4.2     Aortic valve mean veloci* 09/13/2024 9.40     Ao STJ 09/13/2024 3.90     LVOT area 09/13/2024 4.52     DVI 09/13/2024 0.90     AV valve area 09/13/2024 3.93     LV EF 09/13/2024 60    Appointment on 08/27/2024   Component Date Value    Magnesium 08/27/2024 1.6 (L)     Phosphorus 08/27/2024 3.7     Creatinine, Ur 08/29/2024 65.2     Protein Urine Random 08/29/2024 5.4     Prot/Creat Ratio, Ur 08/29/2024 0.1     Color, UA 08/29/2024 Light Yellow     Clarity, UA 08/29/2024 Clear     Specific Gravity, UA 08/29/2024 1.011     pH, UA 08/29/2024 6.0     Leukocytes, UA 08/29/2024 Negative     Nitrite, UA 08/29/2024 Negative     Protein, UA 08/29/2024 Negative     Glucose, UA 08/29/2024 Negative     Ketones, UA 08/29/2024 Negative     Urobilinogen, UA 08/29/2024 <2.0     Bilirubin, UA  08/29/2024 Negative     Occult Blood, UA 08/29/2024 Negative     RBC, UA 08/29/2024 None Seen     WBC, UA 08/29/2024 None Seen     Epithelial Cells 08/29/2024 Occasional     Bacteria, UA 08/29/2024 None Seen     Sodium 08/27/2024 137     Potassium 08/27/2024 4.7     Chloride 08/27/2024 103     CO2 08/27/2024 22     ANION GAP 08/27/2024 12     BUN 08/27/2024 31 (H)     Creatinine 08/27/2024 2.02 (H)     Glucose, Fasting 08/27/2024 96     Calcium 08/27/2024 9.4     AST 08/27/2024 13     ALT 08/27/2024 12     Alkaline Phosphatase 08/27/2024 75     Total Protein 08/27/2024 7.3     Albumin 08/27/2024 4.0     Total Bilirubin 08/27/2024 0.57     eGFR 08/27/2024 33     WBC 08/27/2024 9.38     RBC 08/27/2024 4.12     Hemoglobin 08/27/2024 12.2     Hematocrit 08/27/2024 36.7     MCV 08/27/2024 89     MCH 08/27/2024 29.6     MCHC 08/27/2024 33.2     RDW 08/27/2024 13.8     Platelets 08/27/2024 228     MPV 08/27/2024 11.2    Office Visit on 08/14/2024   Component Date Value    POST-VOID RESIDUAL VOLUM* 08/14/2024 90      Lab Results   Component Value Date    CHOL 109 10/14/2016    TRIG 66 05/28/2024    TRIG 142 09/10/2019    HDL 35 (L) 05/28/2024    HDL 37 (L) 09/10/2019     Imaging: No results found.

## 2025-01-31 NOTE — ASSESSMENT & PLAN NOTE
Management as per primary care provider  Lab Results   Component Value Date    HGBA1C 6.1 (H) 07/19/2024

## 2025-01-31 NOTE — ASSESSMENT & PLAN NOTE
Blood pressure very well-controlled currently on losartan, metoprolol, and spironolactone  No changes required today

## 2025-01-31 NOTE — ASSESSMENT & PLAN NOTE
Wt Readings from Last 3 Encounters:   01/31/25 (!) 141 kg (310 lb 9.6 oz)   01/22/25 (!) 144 kg (316 lb 9.6 oz)   01/07/25 (!) 142 kg (313 lb 9.6 oz)   Relatively euvolemic on exam  Continue blood pressure control  Continued on spironolactone as diuretic therapy

## 2025-01-31 NOTE — ASSESSMENT & PLAN NOTE
Continued on blood pressure control with beta-blocker and ARB  Echocardiogram in September 2024 revealed a measurement of 4.7 cm in the ascending portion, which is not significantly different than the CTA chest in June 2023 revealing an ascending thoracic aorta measuring 4.5 cm  Continue routine surveillance

## 2025-02-04 ENCOUNTER — CLINICAL SUPPORT (OUTPATIENT)
Dept: BARIATRICS | Facility: CLINIC | Age: 65
End: 2025-02-04

## 2025-02-04 VITALS — WEIGHT: 311.1 LBS | BODY MASS INDEX: 45.94 KG/M2

## 2025-02-04 DIAGNOSIS — E66.01 OBESITY, CLASS III, BMI 40-49.9 (MORBID OBESITY) (HCC): Primary | ICD-10-CM

## 2025-02-04 PROCEDURE — RECHECK

## 2025-02-04 NOTE — PROGRESS NOTES
Behavioral Health Follow Up Note:      Weight Check:  Surgeon: Dr. Noé Glover  RNY  On Ozempic     Starting weight 324.5 # (discussed need to be (at minimum) at or below starting weight at time case is submitted to insurance)  Today's weight 311.1 #    Surgery month:  OCT-NOV    Mental health and wellness - Patient is appropriately making changes based off the information contained in the bariatric manual.  Patient is modifying behaviors and demonstrating adapting to change.   Reports MH is good. Is reviewing his bariatric manual-no questions at this time. Continues to be nicotine free. Compliant with CPAP machine.     Eating behaviors - limited appetite due to Ozempic, 2-3 meals daily, water- at least 64 oz, no sugar drinks. Eats slow, chews well. Is mindful of portion sizes. Following bariatric recommendations. Practicing 30/60 rule.     Activity -  limited due to knee issues, would like to join a gym after talking to cardiologist yesterday.     Progress toward program requirements    Workflow: (Incomplete in Bold):  Psych and/or D+A Clearance: n/a  Blood Work:done   PCP letter: done  Surgeon Appt:DONE  EGD: DONE  Cardiac Risk Assessment with EC2025  Sleep Studies: cpap  Nephrology done  Hematology : 2024  MWM  10/4/2024- on Ozempic   Nicotine test: negative 2024- will need second test 3 1/2 weeks prior to surgery date      Next weight check scheduled with .    Goals: Continue following the bariatric recommendations to prepare for bariatric surgery

## 2025-02-20 DIAGNOSIS — I10 ESSENTIAL HYPERTENSION: ICD-10-CM

## 2025-02-21 RX ORDER — METOPROLOL TARTRATE 100 MG/1
100 TABLET ORAL 2 TIMES DAILY
Qty: 200 TABLET | Refills: 1 | Status: SHIPPED | OUTPATIENT
Start: 2025-02-21

## 2025-02-26 ENCOUNTER — APPOINTMENT (OUTPATIENT)
Dept: LAB | Facility: CLINIC | Age: 65
End: 2025-02-26
Payer: COMMERCIAL

## 2025-02-26 LAB
ALBUMIN SERPL BCG-MCNC: 4.1 G/DL (ref 3.5–5)
ALP SERPL-CCNC: 77 U/L (ref 34–104)
ALT SERPL W P-5'-P-CCNC: 13 U/L (ref 7–52)
ANION GAP SERPL CALCULATED.3IONS-SCNC: 9 MMOL/L (ref 4–13)
AST SERPL W P-5'-P-CCNC: 13 U/L (ref 13–39)
BILIRUB SERPL-MCNC: 0.49 MG/DL (ref 0.2–1)
BUN SERPL-MCNC: 29 MG/DL (ref 5–25)
CALCIUM SERPL-MCNC: 9.6 MG/DL (ref 8.4–10.2)
CHLORIDE SERPL-SCNC: 100 MMOL/L (ref 96–108)
CO2 SERPL-SCNC: 29 MMOL/L (ref 21–32)
CREAT SERPL-MCNC: 1.86 MG/DL (ref 0.6–1.3)
GFR SERPL CREATININE-BSD FRML MDRD: 37 ML/MIN/1.73SQ M
GLUCOSE P FAST SERPL-MCNC: 128 MG/DL (ref 65–99)
MAGNESIUM SERPL-MCNC: 1.9 MG/DL (ref 1.9–2.7)
PHOSPHATE SERPL-MCNC: 3.7 MG/DL (ref 2.3–4.1)
POTASSIUM SERPL-SCNC: 4.4 MMOL/L (ref 3.5–5.3)
PROT SERPL-MCNC: 7.1 G/DL (ref 6.4–8.4)
SODIUM SERPL-SCNC: 138 MMOL/L (ref 135–147)

## 2025-02-27 ENCOUNTER — TELEPHONE (OUTPATIENT)
Dept: NEPHROLOGY | Facility: CLINIC | Age: 65
End: 2025-02-27

## 2025-02-27 ENCOUNTER — OFFICE VISIT (OUTPATIENT)
Dept: NEPHROLOGY | Facility: CLINIC | Age: 65
End: 2025-02-27
Payer: COMMERCIAL

## 2025-02-27 VITALS
SYSTOLIC BLOOD PRESSURE: 118 MMHG | DIASTOLIC BLOOD PRESSURE: 82 MMHG | BODY MASS INDEX: 46.65 KG/M2 | WEIGHT: 315 LBS | HEIGHT: 69 IN

## 2025-02-27 DIAGNOSIS — N18.32 STAGE 3B CHRONIC KIDNEY DISEASE (HCC): Primary | ICD-10-CM

## 2025-02-27 DIAGNOSIS — E83.42 HYPOMAGNESEMIA: ICD-10-CM

## 2025-02-27 DIAGNOSIS — R73.03 PRE-DIABETES: ICD-10-CM

## 2025-02-27 DIAGNOSIS — I10 ESSENTIAL HYPERTENSION: ICD-10-CM

## 2025-02-27 DIAGNOSIS — E55.9 VITAMIN D DEFICIENCY: ICD-10-CM

## 2025-02-27 PROCEDURE — 99213 OFFICE O/P EST LOW 20 MIN: CPT | Performed by: INTERNAL MEDICINE

## 2025-02-27 PROCEDURE — 96372 THER/PROPH/DIAG INJ SC/IM: CPT | Performed by: INTERNAL MEDICINE

## 2025-02-27 RX ORDER — LOSARTAN POTASSIUM 100 MG/1
50 TABLET ORAL DAILY
Start: 2025-02-27

## 2025-02-27 NOTE — PATIENT INSTRUCTIONS
1) Avoid NSAIDS - (Example - motrin, advil, ibuprofen, aleve, exederin, etc)  2) Always follow a low salt diet  3) If you have any issues with trouble with nausea, vomiting, urinating, blood in the urine, food tasting like metal, confusion, weakness, fatigue that is worsening, or any other questions, please call right away.  4) Please continue to follow regularly with your family physician and any other specialist that you are advised to see and continue to follow their recommendations  5) If you have any emergencies, please go to the nearest urgent care or emergency room and please inform your family physician and our office once you are able to.  6) please hold spironolactone and losartan on the day of your surgery. Ok to stop starting day before if your surgeons request this  7) labwork in 2 months  8) no changes to your medications today  9) appointment in 4 months

## 2025-02-27 NOTE — PROGRESS NOTES
Name: Nasir Story Jr.      : 1960      MRN: [id number]  Encounter Provider: Ja Nunez MD  Encounter Date: 2025   Encounter department: [facility]  :  Assessment & Plan  Essential hypertension  Overall, patient presents for follow-up appointment 2025 - pt is overall feeling well. From CKD standpoint, renal function labwork reviewed 2025 and creat is at baseline 1.8 mg/dL. Electrolytes are appropriate. He is maintained on spironolactone 12.5 mg once daily, losartan 50 mg once daily and metoprolol 100 mg twice daily. BP is well controlled. We reviewed CKD and NORRIS risk in periop period and pt understands.      Plan:    - renal u/s early  to f/u renal cyst  - cont losartan and spironolactone  - Check CMP and magnesium in 1 to 2 weeks  - I have updated the patient's bariatric surgeon with renal risk assessment  - labwork in 2 months  - we reviewed hyperkalemia risk with current meds, but BP stable     I have personally discussed the risks and benefits of the surgery from a renal standpoint with the patient in depth, and advised the patient about the risk of NORRIS and the course of NORRIS if it occurs with the small probability of the need for renal replacement therapy in the worse case scenario. Patient voiced understanding.     From a renal standpoint the patient is renally optimized for surgery with the following recommendations:  Fluids to administer: Normal Saline 250 cc total over 2 hours   Medication Recommendations:  Minimize any IV contrast use (If IV contrast is used, please check BMP POD # 1)  Hold NSAIDs for at least 10 days prior to surgery  Hold losartan starting on the day of the surgery  Hold spironolactone  starting on the day of surgery/procedure  Hemodynamic Recommendations:  Ideally, target MAPs greater than 65 mmHg in the perioperative period, and minimize operative time with MAPs < 65 mmHg.   Avoid intraop hemodynamic instability to decrease  risk for NORRIS occurrence.  Other Recommendations:  Please consult the Nephrology team when the patient is admitted   Orders:    losartan (COZAAR) 100 MG tablet; Take 0.5 tablets (50 mg total) by mouth daily    Stage 3b chronic kidney disease (HCC)  Lab Results   Component Value Date    EGFR 37 02/26/2025    EGFR 47 11/18/2024    EGFR 37 10/22/2024    CREATININE 1.86 (H) 02/26/2025    CREATININE 1.52 (H) 11/18/2024    CREATININE 1.84 (H) 10/22/2024       Orders:    Basic metabolic panel; Future    Albumin / creatinine urine ratio; Future    CBC; Future    Magnesium; Future    Phosphorus; Future    Protein / creatinine ratio, urine; Future    Urinalysis with microscopic; Future    PTH, intact; Future    Lipid panel; Future    Hemoglobin A1C; Future    Vitamin D deficiency         Hypomagnesemia         Pre-diabetes    Orders:    Hemoglobin A1C; Future        History of Present Illness   HPI  Nasir Story Jr. is a 64 y.o. male who presents for f/u visit for CKD. Pt denies complaints. No fevers, chills, nausea, vomiting. Feeling well overall. States his hypotensive symptoms resolved since meds were reduced in August/sept when we last saw the patient.   History obtained from: patient    Review of Systems   Constitutional: Negative.  Negative for appetite change and fatigue.   HENT: Negative.     Eyes: Negative.    Respiratory: Negative.  Negative for shortness of breath.    Cardiovascular: Negative.  Negative for leg swelling.   Gastrointestinal: Negative.    Endocrine: Negative.    Genitourinary: Negative.  Negative for difficulty urinating.   Musculoskeletal: Negative.    Allergic/Immunologic: Negative.    Neurological: Negative.    Hematological: Negative.    Psychiatric/Behavioral: Negative.     All other systems reviewed and are negative.    Pertinent Medical History     63 yo male retired , formerly from [place], HTN (15 years), DM (years), back surgery 10 years ago, diastolic CHF, pulmonary embolism  diagnosed in June 2023, who presents for follow up evaluation of hypertension.  patient has had sporadic follow-up but has been slightly more consistent with follow-up since end of last year.  I last saw the patient in August 2018 and then patient followed up with our advanced practitioner.  Patient returns today for follow-up visit      Patient has stopped using NSAIDs since 2019      Since last being seen, patient was seen by her practitioner in June 2023.  Blood pressures were controlled.  Creatinine was at baseline.     Patient was admitted to the hospital worsening shortness of breath in June 2023.  There was concern for possible URI.  Patient was found to have pulmonary embolism on repeat CT scan and was started on Eliquis     Saw pulmonary team August 2023 and was advised to continue 6 months full dose Eliquis.  It was felt to be provoked in the setting of inactivity.  Was also advised to have sleep study..     1) HTN - patient is on losartan, metoprolol, chlorthalidone, alfuzosin     -prior renin and aldosterone level with a renin level suppressed, aldosterone level is not significantly elevated  - prior metanephrine unrevealing  - prior CT scan with unremarkable adrenals  - renal Doppler study in November 2019 with no significant occlusive disease.  - during visit in march clonidine was tapered off, chlorthalidone was increased chlorthalidone to 25 mg daily  -  Creatinine stable in May 2021 at 1.3 mg/dL.  Blood pressures are appropriate.  Patient is still gaining weight.  -February 2022 appointment-no lab work since October.  Creatinine in October was rising and patient has not gone for follow-up lab work.  -September 2022-hold doxazosin due to hypotension  - February 2023-no changes to antihypertensives.  Add tamsulosin.  Renal ultrasound with slightly increased cyst size but otherwise benign appearing.  - December 2023-creatinine stable 1.7 mg/dL.  Electrolytes are stable and appropriate with low vitamin  D level.  Parathyroid level is appropriate.  Protein creatinine ratio is stable at 21.  - May 2024-spironolactone reduced to 12 and half milligrams per day due to hyperkalemia  - August 2024-patient was starting to have low blood pressures to as low as 98 systolic.  We held amlodipine completely at that time.  - August 26, 2024-losartan reduced to 50 mg daily from 100 mg daily due to continued marginal blood pressures.  - 2/2025 - pt reports that he has not been on chlorthalidone. When I reviewed prior records, last visit we were to stop the spironolactone but it appears pt has held chlorthalidone and maintained spironolactone at 12.5 mg once daily and losartan 50 mg once daily and metoprolol 100 BID. BP well controlled.      Overall, patient presents for follow-up appointment 2/2025 - pt is overall feeling well. From CKD standpoint, renal function labwork reviewed 2/2025 and creat is at baseline 1.8 mg/dL. Electrolytes are appropriate. He is maintained on spironolactone 12.5 mg once daily, losartan 50 mg once daily and metoprolol 100 mg twice daily. BP is well controlled. We reviewed CKD and NORRIS risk in periop period and pt understands.      Plan:    - renal u/s early 2026 to f/u renal cyst  - cont losartan and spironolactone  - Check CMP and magnesium in 1 to 2 weeks  - I have updated the patient's bariatric surgeon with renal risk assessment  - labwork in 2 months  - - we reviewed hyperkalemia risk with current meds, but BP stable     I have personally discussed the risks and benefits of the surgery from a renal standpoint with the patient in depth, and advised the patient about the risk of NORRIS and the course of NORRIS if it occurs with the small probability of the need for renal replacement therapy in the worse case scenario. Patient voiced understanding.     From a renal standpoint the patient is renally optimized for surgery with the following recommendations:  Fluids to administer: Normal Saline 250 cc total  over 2 hours   Medication Recommendations:  Minimize any IV contrast use (If IV contrast is used, please check BMP POD # 1)  Hold NSAIDs for at least 10 days prior to surgery  Hold losartan starting on the day of the surgery  Hold spironolactone  starting on the day of surgery/procedure  Hemodynamic Recommendations:  Ideally, target MAPs greater than 65 mmHg in the perioperative period, and minimize operative time with MAPs < 65 mmHg.   Avoid intraop hemodynamic instability to decrease risk for NORRIS occurrence.  Other Recommendations:  Please consult the Nephrology team when the patient is admitted      2) proteinuria -  Stable U PCR 0.1     - if elevated, will check SPEP, UPEP, FLC     3) CKD III with baseline creatinine 1.4 to 1.7 mg/dL and has now progressed to approximately 2 mg/dL-etiology likely multifactorial in the setting of hypertensive nephropathy, elevated BMI and potential nodular secondary sclerosis     -renal ultrasound in September 2018 was septated cyst bilaterally with mildly increased on the right kidney.  And a calcified cystic foci in the left kidney  - Repeat renal ultrasound April 2024 bilateral renal cysts without suspicious features.  - creat 2.2 in august. Held losartan for 2 days and restarted. Inc fluid intake.   - 8/17 - chlorthalidone reduced to 12.5 mg daily as creat improving but not back to baseline  -  05/2021-creatinine improving to baseline 1.3 mg/dL.  -September 2022-creatinine 1.6 mg/dL.  Holding doxazosin.  - February 2023-creatinine remains relatively stable 1.7 mg/dL  - 12/2023 - creat 1.7 mg/dL, stable. Electrolytes stable.  - May 2024-creatinine was rising above baseline to 2 mg/dL possibly in the setting of Ozempic and trial of holding Ozempic was recommended and subsequently restarted  - September 6, 2024-lab work from August 29 shows stable creatinine likely at new baseline.  Last hemoglobin A1c is improved 6.1%  - 2/2025 - creat stable at baseline 1.8 mg/dL     4) grief  counseling-     -patient's mood is improving     5) Vit D deficiency     - vit D 11.7 12/2023  - PTH - 76.2 December 2023  - recheck Vit D low 11/2024 12.5 so was started on ergocalciferol 47324 units for 8 weeks one time weekly and then 2000 units once daily  - Recheck PTH and vitamin D levels after next visit     6) osteoarthritis of knees - sees Ortho team. PT and cortisone inj started 1/2020     - not candidate per ortho for knee surgery due to elevated BMI  - I have referred pt to [facility] bariatric for evaluation for elevated BMI     7) DM     - A1c improving as of August 2024     8)  Weight gain -     -Patient reestablished with bariatric team since last visit  - Was advised for Ozempic  - Also saw bariatric surgeon and bariatric program.  Patient is interested in surgical intervention.  - weight peak 346 8/2023 and weight is now 316 lbs. Pt states knee pain has improved.   - completed testing  - completed colonoscopy, EGD, Cardio eval, Pulm eval.      9)  renal cysts-monitor with repeat ultrasound early 2026     10-nocturia-possibly in setting of BPH.  Worsened after holding doxazosin.  pt did not tolerate tamsulosin? But now believe its more due to side effect of abx. So restart tamsulosin 12/13/2023.      11 - hyponatremia     - stable  - appropriate. Was on chlorthalidone prior. Tolerated it well. But we held it when pt became hypotensive. And has not needed to restart it since.      12-bilateral subsegmental PE June 2023-follows with pulmonary team.  Completed 6 months of Eliquis.  Saw pulmonary team March 2024.  Will continue Eliquis but okay to hold for procedures per review of pulmonary team notes     13-sleep apnea-saw pulmonary team.  Is now using CPAP in July 2024     14- poor dentition     -cont dental f/u     15-age-appropriate cancer screening-was advised to complete colonoscopy at GI visit July 2024     21-CFR-pammteh with urology team did not tolerate Flomax in the past.  On August 14  appointment was trialed on alpha Zosyn.     29-[id number]-nb magnesium supplement starting September 5, 2024.          Medical History Reviewed by provider this encounter:     .  Past Medical History   Past Medical History:   Diagnosis Date    Abnormal CBC 01/20/2020    Acne     Allergic     Anemia 10/28/2019    Anxiety     Arthritis     Back pain     Chronic kidney disease     Chronic pain     CPAP (continuous positive airway pressure) dependence     Dental cavity 08/10/2017    Depression 06/07/2021    Diabetes mellitus ([facility])     6/19/15 A1C: 5.5%    Eczema     Fall 10/16/2023    Flu vaccine need 10/16/2023    Grief reaction 09/10/2019    Hyperlipidemia     Hypertension     Knee pain     Morbid obesity with BMI of 50.0-59.9, adult ([facility])     Obesity     IMELDA on CPAP     Psychiatric disorder     Screening for prostate cancer 11/28/2018    Skin tag     Suspected sleep apnea 08/18/2023    Type 2 diabetes mellitus without complication ([facility]) 07/16/2014    Urinary retention      Past Surgical History:   Procedure Laterality Date    BACK SURGERY      COLONOSCOPY      LUMBAR FUSION  2002    LUMBAR FUSION  2004    L4-5-6, S1 has pump for narcotics     Family History   Problem Relation Age of Onset    Other Mother         colitis    Diabetes Mother       reports that he quit smoking about 10 months ago. His smoking use included cigars. He started smoking about 7 years ago. He has been exposed to tobacco smoke. He has never used smokeless tobacco. He reports that he does not currently use alcohol. He reports that he does not use drugs.  Current Outpatient Medications   Medication Instructions    alfuzosin (UROXATRAL) 10 mg, Oral, Daily    atorvastatin (LIPITOR) 20 mg, Oral, Daily    Eliquis 5 mg, Oral, 2 times daily    ergocalciferol (ERGOCALCIFEROL) 50,000 Units, Oral, Weekly    glucose blood (FREESTYLE LITE) test strip 1 each, Other, Daily    Lancets (freestyle) lancets Use as instructed    losartan (COZAAR) 100 mg, Oral,  Daily    magnesium Oxide (MAG-OX) 400 mg, Oral, 2 times daily    metoprolol tartrate (LOPRESSOR) 100 mg, Oral, 2 times daily    morphine (MS CONTIN) 15 mg 12 hr tablet As needed    oxyCODONE (ROXICODONE) 30 MG immediate release tablet As needed    Ozempic (2 MG/DOSE) 2 mg, Subcutaneous, Every 7 days    polyethylene glycol (GOLYTELY) 4000 mL solution 4,000 mL, Oral, Once, Colonoscopy prep    polyethylene glycol (MIRALAX) 17 g, Oral, Daily    spironolactone (ALDACTONE) 25 mg tablet      Allergies   Allergen Reactions    Gabapentin      Annotation - [id number]: dizziness    Pregabalin      Annotation - [id number]: vision changes and mood changes    Tramadol Nausea Only      Current Outpatient Medications on File Prior to Visit   Medication Sig Dispense Refill    alfuzosin (UROXATRAL) 10 mg 24 hr tablet TAKE 1 TABLET BY MOUTH DAILY 30 tablet 5    atorvastatin (LIPITOR) 20 mg tablet TAKE ONE TABLET BY MOUTH EVERY DAY 90 tablet 1    Eliquis 5 MG TAKE 1 TABLET BY MOUTH TWO TIMES A DAY 60 tablet 5    ergocalciferol (ERGOCALCIFEROL) 1.25 MG (74026 UT) capsule Take 1 capsule (50,000 Units total) by mouth once a week 8 capsule 0    glucose blood (FREESTYLE LITE) test strip Use 1 each in the morning 100 each 0    Lancets (freestyle) lancets Use as instructed 100 each 0    losartan (COZAAR) 100 MG tablet TAKE ONE TABLET BY MOUTH EVERY DAY 90 tablet 1    magnesium Oxide (MAG-OX) 400 mg TABS Take 1 tablet (400 mg total) by mouth 2 (two) times a day      metoprolol tartrate (LOPRESSOR) 100 mg tablet TAKE ONE TABLET BY MOUTH TWICE A  tablet 1    morphine (MS CONTIN) 15 mg 12 hr tablet as needed  0    oxyCODONE (ROXICODONE) 30 MG immediate release tablet if needed  0    Ozempic, 2 MG/DOSE, 8 MG/3ML injection pen INJECT 0.75ML UNDER THE SKIN EVERY 7 DAYS 3 mL 3    spironolactone (ALDACTONE) 25 mg tablet       polyethylene glycol (GOLYTELY) 4000 mL solution Take 4,000 mL by mouth once for 1 dose Colonoscopy prep (Patient not  "taking: Reported on 2025) 4000 mL 0    polyethylene glycol (MIRALAX) 17 g packet Take 17 g by mouth daily (Patient not taking: Reported on 2025) 510 g 5     No current facility-administered medications on file prior to visit.      Social History     Tobacco Use    Smoking status: Former     Types: Cigars     Start date:      Quit date: 4/15/2024     Years since quittin.8     Passive exposure: Current    Smokeless tobacco: Never    Tobacco comments:     Quit cigarette smoking in .      Patient reports that he last smoked a cigar close to 2 years ago.    Vaping Use    Vaping status: Never Used   Substance and Sexual Activity    Alcohol use: Not Currently     Comment: occasional    Drug use: No     Comment: History of drug use, meena, cocaine, heroind, no IVDA-as per [employer]    Sexual activity: Yes     Partners: Female     Birth control/protection: None        Objective   /82 (BP Location: Left arm, Patient Position: Sitting, Cuff Size: Large)   Ht 5' 9\" (1.753 m)   Wt (!) 143 kg (316 lb)   BMI 46.67 kg/m²      Physical Exam  Vitals and nursing note reviewed.   Constitutional:       General: He is not in acute distress.     Appearance: He is well-developed. He is not diaphoretic.   HENT:      Head: Normocephalic and atraumatic.   Eyes:      General: No scleral icterus.        Right eye: No discharge.         Left eye: No discharge.      Conjunctiva/sclera: Conjunctivae normal.   Cardiovascular:      Rate and Rhythm: Normal rate and regular rhythm.      Heart sounds: Normal heart sounds. No murmur heard.     No friction rub. No gallop.   Pulmonary:      Effort: Pulmonary effort is normal. No respiratory distress.      Breath sounds: Normal breath sounds. No wheezing or rales.   Chest:      Chest wall: No tenderness.   Abdominal:      General: Bowel sounds are normal. There is no distension.      Palpations: Abdomen is soft.      Tenderness: There is no abdominal tenderness. There is no " rebound.   Musculoskeletal:         General: No tenderness or deformity. Normal range of motion.      Cervical back: Normal range of motion and neck supple.      Right lower leg: No edema.      Left lower leg: No edema.   Skin:     General: Skin is warm and dry.      Coloration: Skin is not pale.      Findings: No erythema or rash.   Neurological:      Mental Status: He is alert and oriented to person, place, and time.      Cranial Nerves: No cranial nerve deficit.      Coordination: Coordination normal.   Psychiatric:         Behavior: Behavior normal.         Thought Content: Thought content normal.         Judgment: Judgment normal.

## 2025-02-27 NOTE — ASSESSMENT & PLAN NOTE
Lab Results   Component Value Date    EGFR 37 02/26/2025    EGFR 47 11/18/2024    EGFR 37 10/22/2024    CREATININE 1.86 (H) 02/26/2025    CREATININE 1.52 (H) 11/18/2024    CREATININE 1.84 (H) 10/22/2024       Orders:    Basic metabolic panel; Future    Albumin / creatinine urine ratio; Future    CBC; Future    Magnesium; Future    Phosphorus; Future    Protein / creatinine ratio, urine; Future    Urinalysis with microscopic; Future    PTH, intact; Future    Lipid panel; Future    Hemoglobin A1C; Future

## 2025-02-27 NOTE — LETTER
2025     Keo Mora MD  [address]  [address]  [facility] [address]    Patient: Nasir Story Jr.   YOB: 1960   Date of Visit: 2025       Dear Dr. Mora:    Thank you for referring Nasir Story to me for evaluation. Below are my notes for this consultation.    If you have questions, please do not hesitate to call me. I look forward to following your patient along with you.         Sincerely,        Ja Nunez MD        CC: No Recipients    Ja Nunez MD  2025  1:50 PM  Sign when Signing Visit  Name: Nasir Story Jr.      : 1960      MRN: [id number]  Encounter Provider: Ja Nunez MD  Encounter Date: 2025   Encounter department: [facility]  :  Assessment & Plan  Essential hypertension  Overall, patient presents for follow-up appointment 2025 - pt is overall feeling well. From CKD standpoint, renal function labwork reviewed 2025 and creat is at baseline 1.8 mg/dL. Electrolytes are appropriate. He is maintained on spironolactone 12.5 mg once daily, losartan 50 mg once daily and metoprolol 100 mg twice daily. BP is well controlled. We reviewed CKD and NORRIS risk in periop period and pt understands.      Plan:    - renal u/s early  to f/u renal cyst  - cont losartan and spironolactone  - Check CMP and magnesium in 1 to 2 weeks  - I have updated the patient's bariatric surgeon with renal risk assessment  - labwork in 2 months  - we reviewed hyperkalemia risk with current meds, but BP stable     I have personally discussed the risks and benefits of the surgery from a renal standpoint with the patient in depth, and advised the patient about the risk of NORRIS and the course of NORRIS if it occurs with the small probability of the need for renal replacement therapy in the worse case scenario. Patient voiced understanding.     From a renal standpoint the patient is renally optimized for surgery  with the following recommendations:  Fluids to administer: Normal Saline 250 cc total over 2 hours   Medication Recommendations:  Minimize any IV contrast use (If IV contrast is used, please check BMP POD # 1)  Hold NSAIDs for at least 10 days prior to surgery  Hold losartan starting on the day of the surgery  Hold spironolactone  starting on the day of surgery/procedure  Hemodynamic Recommendations:  Ideally, target MAPs greater than 65 mmHg in the perioperative period, and minimize operative time with MAPs < 65 mmHg.   Avoid intraop hemodynamic instability to decrease risk for NORRIS occurrence.  Other Recommendations:  Please consult the Nephrology team when the patient is admitted   Orders:  •  losartan (COZAAR) 100 MG tablet; Take 0.5 tablets (50 mg total) by mouth daily    Stage 3b chronic kidney disease (HCC)  Lab Results   Component Value Date    EGFR 37 02/26/2025    EGFR 47 11/18/2024    EGFR 37 10/22/2024    CREATININE 1.86 (H) 02/26/2025    CREATININE 1.52 (H) 11/18/2024    CREATININE 1.84 (H) 10/22/2024       Orders:  •  Basic metabolic panel; Future  •  Albumin / creatinine urine ratio; Future  •  CBC; Future  •  Magnesium; Future  •  Phosphorus; Future  •  Protein / creatinine ratio, urine; Future  •  Urinalysis with microscopic; Future  •  PTH, intact; Future  •  Lipid panel; Future  •  Hemoglobin A1C; Future    Vitamin D deficiency         Hypomagnesemia         Pre-diabetes    Orders:  •  Hemoglobin A1C; Future        History of Present Illness  HPI  Nasir Story Jr. is a 64 y.o. male who presents for f/u visit for CKD. Pt denies complaints. No fevers, chills, nausea, vomiting. Feeling well overall. States his hypotensive symptoms resolved since meds were reduced in August/sept when we last saw the patient.   History obtained from: patient    Review of Systems   Constitutional: Negative.  Negative for appetite change and fatigue.   HENT: Negative.     Eyes: Negative.    Respiratory: Negative.   Negative for shortness of breath.    Cardiovascular: Negative.  Negative for leg swelling.   Gastrointestinal: Negative.    Endocrine: Negative.    Genitourinary: Negative.  Negative for difficulty urinating.   Musculoskeletal: Negative.    Allergic/Immunologic: Negative.    Neurological: Negative.    Hematological: Negative.    Psychiatric/Behavioral: Negative.     All other systems reviewed and are negative.    Pertinent Medical History    63 yo male retired , formerly from [place], HTN (15 years), DM (years), back surgery 10 years ago, diastolic CHF, pulmonary embolism diagnosed in June 2023, who presents for follow up evaluation of hypertension.  patient has had sporadic follow-up but has been slightly more consistent with follow-up since end of last year.  I last saw the patient in August 2018 and then patient followed up with our advanced practitioner.  Patient returns today for follow-up visit      Patient has stopped using NSAIDs since 2019      Since last being seen, patient was seen by her practitioner in June 2023.  Blood pressures were controlled.  Creatinine was at baseline.     Patient was admitted to the hospital worsening shortness of breath in June 2023.  There was concern for possible URI.  Patient was found to have pulmonary embolism on repeat CT scan and was started on Eliquis     Saw pulmonary team August 2023 and was advised to continue 6 months full dose Eliquis.  It was felt to be provoked in the setting of inactivity.  Was also advised to have sleep study..     1) HTN - patient is on losartan, metoprolol, chlorthalidone, alfuzosin     -prior renin and aldosterone level with a renin level suppressed, aldosterone level is not significantly elevated  - prior metanephrine unrevealing  - prior CT scan with unremarkable adrenals  - renal Doppler study in November 2019 with no significant occlusive disease.  - during visit in march clonidine was tapered off, chlorthalidone was  increased chlorthalidone to 25 mg daily  -  Creatinine stable in May 2021 at 1.3 mg/dL.  Blood pressures are appropriate.  Patient is still gaining weight.  -February 2022 appointment-no lab work since October.  Creatinine in October was rising and patient has not gone for follow-up lab work.  -September 2022-hold doxazosin due to hypotension  - February 2023-no changes to antihypertensives.  Add tamsulosin.  Renal ultrasound with slightly increased cyst size but otherwise benign appearing.  - December 2023-creatinine stable 1.7 mg/dL.  Electrolytes are stable and appropriate with low vitamin D level.  Parathyroid level is appropriate.  Protein creatinine ratio is stable at 21.  - May 2024-spironolactone reduced to 12 and half milligrams per day due to hyperkalemia  - August 2024-patient was starting to have low blood pressures to as low as 98 systolic.  We held amlodipine completely at that time.  - August 26, 2024-losartan reduced to 50 mg daily from 100 mg daily due to continued marginal blood pressures.  - 2/2025 - pt reports that he has not been on chlorthalidone. When I reviewed prior records, last visit we were to stop the spironolactone but it appears pt has held chlorthalidone and maintained spironolactone at 12.5 mg once daily and losartan 50 mg once daily and metoprolol 100 BID. BP well controlled.      Overall, patient presents for follow-up appointment 2/2025 - pt is overall feeling well. From CKD standpoint, renal function labwork reviewed 2/2025 and creat is at baseline 1.8 mg/dL. Electrolytes are appropriate. He is maintained on spironolactone 12.5 mg once daily, losartan 50 mg once daily and metoprolol 100 mg twice daily. BP is well controlled. We reviewed CKD and NORRIS risk in periop period and pt understands.      Plan:    - renal u/s early 2026 to f/u renal cyst  - cont losartan and spironolactone  - Check CMP and magnesium in 1 to 2 weeks  - I have updated the patient's bariatric surgeon with  renal risk assessment  - labwork in 2 months  - - we reviewed hyperkalemia risk with current meds, but BP stable     I have personally discussed the risks and benefits of the surgery from a renal standpoint with the patient in depth, and advised the patient about the risk of NORRIS and the course of NORRIS if it occurs with the small probability of the need for renal replacement therapy in the worse case scenario. Patient voiced understanding.     From a renal standpoint the patient is renally optimized for surgery with the following recommendations:  Fluids to administer: Normal Saline 250 cc total over 2 hours   Medication Recommendations:  Minimize any IV contrast use (If IV contrast is used, please check BMP POD # 1)  Hold NSAIDs for at least 10 days prior to surgery  Hold losartan starting on the day of the surgery  Hold spironolactone  starting on the day of surgery/procedure  Hemodynamic Recommendations:  Ideally, target MAPs greater than 65 mmHg in the perioperative period, and minimize operative time with MAPs < 65 mmHg.   Avoid intraop hemodynamic instability to decrease risk for NORRIS occurrence.  Other Recommendations:  Please consult the Nephrology team when the patient is admitted      2) proteinuria -  Stable U PCR 0.1     - if elevated, will check SPEP, UPEP, FLC     3) CKD III with baseline creatinine 1.4 to 1.7 mg/dL and has now progressed to approximately 2 mg/dL-etiology likely multifactorial in the setting of hypertensive nephropathy, elevated BMI and potential nodular secondary sclerosis     -renal ultrasound in September 2018 was septated cyst bilaterally with mildly increased on the right kidney.  And a calcified cystic foci in the left kidney  - Repeat renal ultrasound April 2024 bilateral renal cysts without suspicious features.  - creat 2.2 in august. Held losartan for 2 days and restarted. Inc fluid intake.   - 8/17 - chlorthalidone reduced to 12.5 mg daily as creat improving but not back to  baseline  -  05/2021-creatinine improving to baseline 1.3 mg/dL.  -September 2022-creatinine 1.6 mg/dL.  Holding doxazosin.  - February 2023-creatinine remains relatively stable 1.7 mg/dL  - 12/2023 - creat 1.7 mg/dL, stable. Electrolytes stable.  - May 2024-creatinine was rising above baseline to 2 mg/dL possibly in the setting of Ozempic and trial of holding Ozempic was recommended and subsequently restarted  - September 6, 2024-lab work from August 29 shows stable creatinine likely at new baseline.  Last hemoglobin A1c is improved 6.1%  - 2/2025 - creat stable at baseline 1.8 mg/dL     4) grief counseling-     -patient's mood is improving     5) Vit D deficiency     - vit D 11.7 12/2023  - PTH - 76.2 December 2023  - recheck Vit D low 11/2024 12.5 so was started on ergocalciferol 17918 units for 8 weeks one time weekly and then 2000 units once daily  - Recheck PTH and vitamin D levels after next visit     6) osteoarthritis of knees - sees Ortho team. PT and cortisone inj started 1/2020     - not candidate per ortho for knee surgery due to elevated BMI  - I have referred pt to [facility] bariatric for evaluation for elevated BMI     7) DM     - A1c improving as of August 2024     8)  Weight gain -     -Patient reestablished with bariatric team since last visit  - Was advised for Ozempic  - Also saw bariatric surgeon and bariatric program.  Patient is interested in surgical intervention.  - weight peak 346 8/2023 and weight is now 316 lbs. Pt states knee pain has improved.      9)  renal cysts-monitor with repeat ultrasound early 2026     10-nocturia-possibly in setting of BPH.  Worsened after holding doxazosin.  pt did not tolerate tamsulosin? But now believe its more due to side effect of abx. So restart tamsulosin 12/13/2023.      11 - hyponatremia     - stable  - appropriate. Was on chlorthalidone prior. Tolerated it well. But we held it when pt became hypotensive. And has not needed to restart it since.       12-bilateral subsegmental PE June 2023-follows with pulmonary team.  Completed 6 months of Eliquis.  Saw pulmonary team March 2024.  Will continue Eliquis but okay to hold for procedures per review of pulmonary team notes     13-sleep apnea-saw pulmonary team.  Is now using CPAP in July 2024     14- poor dentition     -cont dental f/u     15-age-appropriate cancer screening-was advised to complete colonoscopy at GI visit July 2024     30-MTQ-pyyvswe with urology team did not tolerate Flomax in the past.  On August 14 appointment was trialed on alpha Zosyn.     20-hhrkckxfhpioza-as magnesium supplement starting September 5, 2024.          Medical History Reviewed by provider this encounter:     .  Past Medical History  Past Medical History:   Diagnosis Date   • Abnormal CBC 01/20/2020   • Acne    • Allergic    • Anemia 10/28/2019   • Anxiety    • Arthritis    • Back pain    • Chronic kidney disease    • Chronic pain    • CPAP (continuous positive airway pressure) dependence    • Dental cavity 08/10/2017   • Depression 06/07/2021   • Diabetes mellitus ([facility])     6/19/15 A1C: 5.5%   • Eczema    • Fall 10/16/2023   • Flu vaccine need 10/16/2023   • Grief reaction 09/10/2019   • Hyperlipidemia    • Hypertension    • Knee pain    • Morbid obesity with BMI of 50.0-59.9, adult (Former[facility])    • Obesity    • IMELDA on CPAP    • Psychiatric disorder    • Screening for prostate cancer 11/28/2018   • Skin tag    • Suspected sleep apnea 08/18/2023   • Type 2 diabetes mellitus without complication ([facility]) 07/16/2014   • Urinary retention      Past Surgical History:   Procedure Laterality Date   • BACK SURGERY     • COLONOSCOPY     • LUMBAR FUSION  2002   • LUMBAR FUSION  2004    L4-5-6, S1 has pump for narcotics     Family History   Problem Relation Age of Onset   • Other Mother         colitis   • Diabetes Mother       reports that he quit smoking about 10 months ago. His smoking use included cigars. He started smoking about 7 years ago.  He has been exposed to tobacco smoke. He has never used smokeless tobacco. He reports that he does not currently use alcohol. He reports that he does not use drugs.  Current Outpatient Medications   Medication Instructions   • alfuzosin (UROXATRAL) 10 mg, Oral, Daily   • atorvastatin (LIPITOR) 20 mg, Oral, Daily   • Eliquis 5 mg, Oral, 2 times daily   • ergocalciferol (ERGOCALCIFEROL) 50,000 Units, Oral, Weekly   • glucose blood (FREESTYLE LITE) test strip 1 each, Other, Daily   • Lancets (freestyle) lancets Use as instructed   • losartan (COZAAR) 100 mg, Oral, Daily   • magnesium Oxide (MAG-OX) 400 mg, Oral, 2 times daily   • metoprolol tartrate (LOPRESSOR) 100 mg, Oral, 2 times daily   • morphine (MS CONTIN) 15 mg 12 hr tablet As needed   • oxyCODONE (ROXICODONE) 30 MG immediate release tablet As needed   • Ozempic (2 MG/DOSE) 2 mg, Subcutaneous, Every 7 days   • polyethylene glycol (GOLYTELY) 4000 mL solution 4,000 mL, Oral, Once, Colonoscopy prep   • polyethylene glycol (MIRALAX) 17 g, Oral, Daily   • spironolactone (ALDACTONE) 25 mg tablet      Allergies   Allergen Reactions   • Gabapentin      Annotation - [id number]: dizziness   • Pregabalin      Annotation - [id number]: vision changes and mood changes   • Tramadol Nausea Only      Current Outpatient Medications on File Prior to Visit   Medication Sig Dispense Refill   • alfuzosin (UROXATRAL) 10 mg 24 hr tablet TAKE 1 TABLET BY MOUTH DAILY 30 tablet 5   • atorvastatin (LIPITOR) 20 mg tablet TAKE ONE TABLET BY MOUTH EVERY DAY 90 tablet 1   • Eliquis 5 MG TAKE 1 TABLET BY MOUTH TWO TIMES A DAY 60 tablet 5   • ergocalciferol (ERGOCALCIFEROL) 1.25 MG (46888 UT) capsule Take 1 capsule (50,000 Units total) by mouth once a week 8 capsule 0   • glucose blood (FREESTYLE LITE) test strip Use 1 each in the morning 100 each 0   • Lancets (freestyle) lancets Use as instructed 100 each 0   • losartan (COZAAR) 100 MG tablet TAKE ONE TABLET BY MOUTH EVERY DAY 90 tablet 1   •  "magnesium Oxide (MAG-OX) 400 mg TABS Take 1 tablet (400 mg total) by mouth 2 (two) times a day     • metoprolol tartrate (LOPRESSOR) 100 mg tablet TAKE ONE TABLET BY MOUTH TWICE A  tablet 1   • morphine (MS CONTIN) 15 mg 12 hr tablet as needed  0   • oxyCODONE (ROXICODONE) 30 MG immediate release tablet if needed  0   • Ozempic, 2 MG/DOSE, 8 MG/3ML injection pen INJECT 0.75ML UNDER THE SKIN EVERY 7 DAYS 3 mL 3   • spironolactone (ALDACTONE) 25 mg tablet      • polyethylene glycol (GOLYTELY) 4000 mL solution Take 4,000 mL by mouth once for 1 dose Colonoscopy prep (Patient not taking: Reported on 2025) 4000 mL 0   • polyethylene glycol (MIRALAX) 17 g packet Take 17 g by mouth daily (Patient not taking: Reported on 2025) 510 g 5     No current facility-administered medications on file prior to visit.      Social History     Tobacco Use   • Smoking status: Former     Types: Cigars     Start date:      Quit date: 4/15/2024     Years since quittin.8     Passive exposure: Current   • Smokeless tobacco: Never   • Tobacco comments:     Quit cigarette smoking in .      Patient reports that he last smoked a cigar close to 2 years ago.    Vaping Use   • Vaping status: Never Used   Substance and Sexual Activity   • Alcohol use: Not Currently     Comment: occasional   • Drug use: No     Comment: History of drug use, meena, cocaine, heroind, no IVDA-as per [employer]   • Sexual activity: Yes     Partners: Female     Birth control/protection: None        Objective  /82 (BP Location: Left arm, Patient Position: Sitting, Cuff Size: Large)   Ht 5' 9\" (1.753 m)   Wt (!) 143 kg (316 lb)   BMI 46.67 kg/m²      Physical Exam  Vitals and nursing note reviewed.   Constitutional:       General: He is not in acute distress.     Appearance: He is well-developed. He is not diaphoretic.   HENT:      Head: Normocephalic and atraumatic.   Eyes:      General: No scleral icterus.        Right eye: No discharge.   "       Left eye: No discharge.      Conjunctiva/sclera: Conjunctivae normal.   Cardiovascular:      Rate and Rhythm: Normal rate and regular rhythm.      Heart sounds: Normal heart sounds. No murmur heard.     No friction rub. No gallop.   Pulmonary:      Effort: Pulmonary effort is normal. No respiratory distress.      Breath sounds: Normal breath sounds. No wheezing or rales.   Chest:      Chest wall: No tenderness.   Abdominal:      General: Bowel sounds are normal. There is no distension.      Palpations: Abdomen is soft.      Tenderness: There is no abdominal tenderness. There is no rebound.   Musculoskeletal:         General: No tenderness or deformity. Normal range of motion.      Cervical back: Normal range of motion and neck supple.      Right lower leg: No edema.      Left lower leg: No edema.   Skin:     General: Skin is warm and dry.      Coloration: Skin is not pale.      Findings: No erythema or rash.   Neurological:      Mental Status: He is alert and oriented to person, place, and time.      Cranial Nerves: No cranial nerve deficit.      Coordination: Coordination normal.   Psychiatric:         Behavior: Behavior normal.         Thought Content: Thought content normal.         Judgment: Judgment normal.

## 2025-02-27 NOTE — ASSESSMENT & PLAN NOTE
Overall, patient presents for follow-up appointment 2/2025 - pt is overall feeling well. From CKD standpoint, renal function labwork reviewed 2/2025 and creat is at baseline 1.8 mg/dL. Electrolytes are appropriate. He is maintained on spironolactone 12.5 mg once daily, losartan 50 mg once daily and metoprolol 100 mg twice daily. BP is well controlled. We reviewed CKD and NORRIS risk in periop period and pt understands.      Plan:    - renal u/s early 2026 to f/u renal cyst  - cont losartan and spironolactone  - Check CMP and magnesium in 1 to 2 weeks  - I have updated the patient's bariatric surgeon with renal risk assessment  - labwork in 2 months  - we reviewed hyperkalemia risk with current meds, but BP stable     I have personally discussed the risks and benefits of the surgery from a renal standpoint with the patient in depth, and advised the patient about the risk of NORRIS and the course of NORRIS if it occurs with the small probability of the need for renal replacement therapy in the worse case scenario. Patient voiced understanding.     From a renal standpoint the patient is renally optimized for surgery with the following recommendations:  Fluids to administer: Normal Saline 250 cc total over 2 hours   Medication Recommendations:  Minimize any IV contrast use (If IV contrast is used, please check BMP POD # 1)  Hold NSAIDs for at least 10 days prior to surgery  Hold losartan starting on the day of the surgery  Hold spironolactone  starting on the day of surgery/procedure  Hemodynamic Recommendations:  Ideally, target MAPs greater than 65 mmHg in the perioperative period, and minimize operative time with MAPs < 65 mmHg.   Avoid intraop hemodynamic instability to decrease risk for NORRIS occurrence.  Other Recommendations:  Please consult the Nephrology team when the patient is admitted   Orders:    losartan (COZAAR) 100 MG tablet; Take 0.5 tablets (50 mg total) by mouth daily

## 2025-02-28 NOTE — PROGRESS NOTES
Behavioral health follow up     On Ozempic 2mg  Feels he is plateau for weight loss.         Preop- interested in gastric  bypass   Surgery Date: expected Nov 2024  No program requirement   Surgeon: Dr. Noé Glover / sleeve     Starting weight:  329 #  Today's Weight 316.7 #     Hydrating with water.  All he drinks is water.  Feels the Ozempic is working.  Breakfast, shake/or food for lunch and dinner.  (Roast beef )     Eating habits have changed with Rx.  Feels full more ,  Less hunger.  Resting better.  Improved activity level.      Still Tracking his food with [employer].   Feels he is getting enough protein.       Tries to walk for movement(knee pain)..  Using CPAP

## 2025-03-04 ENCOUNTER — CLINICAL SUPPORT (OUTPATIENT)
Dept: BARIATRICS | Facility: CLINIC | Age: 65
End: 2025-03-04

## 2025-03-04 VITALS — BODY MASS INDEX: 46.77 KG/M2 | WEIGHT: 315 LBS

## 2025-03-04 DIAGNOSIS — E66.01 MORBID OBESITY WITH BMI OF 45.0-49.9, ADULT (HCC): Primary | ICD-10-CM

## 2025-03-04 PROCEDURE — RECHECK

## 2025-03-05 ENCOUNTER — TELEPHONE (OUTPATIENT)
Dept: BARIATRICS | Facility: CLINIC | Age: 65
End: 2025-03-05

## 2025-03-05 ENCOUNTER — TELEPHONE (OUTPATIENT)
Age: 65
End: 2025-03-05

## 2025-03-05 NOTE — TELEPHONE ENCOUNTER
I called cardiology asked for a written clearance or risk assessment to have bariatric surgery either with an addendum to consult or under letters.

## 2025-03-05 NOTE — TELEPHONE ENCOUNTER
I contacted patient left a message surgery was approved however after contacting his insurance company I was told that case had been closed the authorization date was for 2/28 since that date has past a new case had to be created pending auth# [id number]

## 2025-03-05 NOTE — TELEPHONE ENCOUNTER
Caller: Weight Management     Doctor: Dr. Keith     Reason for call: weight management calling to make sure formal cardiac clearance letter for weight loss surgery to be put in epid.    Call back#: [phone]

## 2025-03-11 ENCOUNTER — TELEPHONE (OUTPATIENT)
Dept: BARIATRICS | Facility: CLINIC | Age: 65
End: 2025-03-11

## 2025-03-11 ENCOUNTER — HOSPITAL ENCOUNTER (OUTPATIENT)
Facility: HOSPITAL | Age: 65
Setting detail: SURGERY ADMIT
End: 2025-03-11
Attending: SURGERY | Admitting: SURGERY
Payer: COMMERCIAL

## 2025-03-11 ENCOUNTER — PREP FOR PROCEDURE (OUTPATIENT)
Dept: BARIATRICS | Facility: CLINIC | Age: 65
End: 2025-03-11

## 2025-03-11 DIAGNOSIS — I10 ESSENTIAL HYPERTENSION, BENIGN: ICD-10-CM

## 2025-03-11 DIAGNOSIS — E11.9 DIABETES MELLITUS (HCC): ICD-10-CM

## 2025-03-11 DIAGNOSIS — G47.33 OSA ON CPAP: ICD-10-CM

## 2025-03-11 DIAGNOSIS — E66.01 MORBID OBESITY (HCC): Primary | ICD-10-CM

## 2025-03-12 ENCOUNTER — TELEPHONE (OUTPATIENT)
Dept: BARIATRICS | Facility: CLINIC | Age: 65
End: 2025-03-12

## 2025-03-12 NOTE — TELEPHONE ENCOUNTER
Called patient to schedule pre op modified  diet education on pre op diet.   As pt with CKD 3b- will need to limit protein intake and prevent ketosis . Concern is patient may become acidotic with the ketogenci diet.   Scheduled for tomorrow at 2:30 pm to review the modified pre op diet.   Pt will follow prior to surgery, then transition to total liquid diet 48 hours prior to surgery

## 2025-03-13 ENCOUNTER — CLINICAL SUPPORT (OUTPATIENT)
Dept: BARIATRICS | Facility: CLINIC | Age: 65
End: 2025-03-13

## 2025-03-13 DIAGNOSIS — E66.01 MORBID OBESITY WITH BMI OF 45.0-49.9, ADULT (HCC): Primary | ICD-10-CM

## 2025-03-13 DIAGNOSIS — N18.32 STAGE 3B CHRONIC KIDNEY DISEASE (HCC): ICD-10-CM

## 2025-03-13 PROCEDURE — RECHECK: Performed by: DIETITIAN, REGISTERED

## 2025-03-13 NOTE — PROGRESS NOTES
Pt. educated on the modified  pre operative liver shrinking diet.  Pt will start diet on 3/17/2025 for 2 weeks.  Due to CKD stage 3B, patient will limit protein to less than 115 grams per day.  Advised to not go into ketosis due to kidney disease.   Pt will  Mix one scoop  of protein powder  with water and follow the meal plan with snacks and one meal.   Two days before  surgery date - stop all solid food.  Mix  protein shakes with milk  ( 1 scoop = 30 grams protein )   Drink 3 per day.  Continue with sugar free Jello and sugar free popsicles and calorie free beverages     Emphasized the need to drink 80 ounces of fluid per day while on the diet. Patient will return for a pre op weight check at final history and physical   Patient was able to verbalize basic diet (protein, fluid, vitamin and mineral) recommendations and possible nutrition-related complications. Yes   Contact information for any questions/concerns

## 2025-03-18 ENCOUNTER — TELEPHONE (OUTPATIENT)
Age: 65
End: 2025-03-18

## 2025-03-20 ENCOUNTER — OFFICE VISIT (OUTPATIENT)
Dept: BARIATRICS | Facility: CLINIC | Age: 65
End: 2025-03-20
Payer: COMMERCIAL

## 2025-03-20 ENCOUNTER — CLINICAL SUPPORT (OUTPATIENT)
Dept: BARIATRICS | Facility: CLINIC | Age: 65
End: 2025-03-20

## 2025-03-20 VITALS
WEIGHT: 314 LBS | HEART RATE: 79 BPM | HEIGHT: 69 IN | DIASTOLIC BLOOD PRESSURE: 64 MMHG | OXYGEN SATURATION: 98 % | SYSTOLIC BLOOD PRESSURE: 118 MMHG | BODY MASS INDEX: 46.51 KG/M2 | TEMPERATURE: 97.8 F

## 2025-03-20 DIAGNOSIS — I27.82 OTHER CHRONIC PULMONARY EMBOLISM WITHOUT ACUTE COR PULMONALE (HCC): ICD-10-CM

## 2025-03-20 DIAGNOSIS — G47.33 OSA (OBSTRUCTIVE SLEEP APNEA): ICD-10-CM

## 2025-03-20 DIAGNOSIS — N18.32 TYPE 2 DIABETES MELLITUS WITH STAGE 3B CHRONIC KIDNEY DISEASE, WITHOUT LONG-TERM CURRENT USE OF INSULIN (HCC): ICD-10-CM

## 2025-03-20 DIAGNOSIS — E66.01 MORBID OBESITY WITH BMI OF 45.0-49.9, ADULT (HCC): Primary | ICD-10-CM

## 2025-03-20 DIAGNOSIS — E11.22 TYPE 2 DIABETES MELLITUS WITH STAGE 3B CHRONIC KIDNEY DISEASE, WITHOUT LONG-TERM CURRENT USE OF INSULIN (HCC): ICD-10-CM

## 2025-03-20 DIAGNOSIS — I10 ESSENTIAL HYPERTENSION: ICD-10-CM

## 2025-03-20 DIAGNOSIS — E78.2 MIXED HYPERLIPIDEMIA: ICD-10-CM

## 2025-03-20 PROCEDURE — 99213 OFFICE O/P EST LOW 20 MIN: CPT | Performed by: SURGERY

## 2025-03-20 PROCEDURE — RECHECK: Performed by: DIETITIAN, REGISTERED

## 2025-03-20 NOTE — ASSESSMENT & PLAN NOTE
Currently taking Lipitor.  I have discussed with the patient that there is a significant chance of improvement or even resolution of this condition after metabolic/bariatric surgery.  Continue management as per primary care team.

## 2025-03-20 NOTE — ASSESSMENT & PLAN NOTE
Currently on metoprolol, chloralidone, and losartan.  I have discussed with the patient that there is a significant chance of improvement or even resolution of this condition after metabolic/bariatric surgery.  Continue management as per primary care team.

## 2025-03-20 NOTE — ASSESSMENT & PLAN NOTE
Is currently on Eliquis.  The instruction was to stop the Eliquis 2 to 3 days prior to the surgery and we will resume postoperatively as soon as it is safe

## 2025-03-20 NOTE — ASSESSMENT & PLAN NOTE
Patient has IMELDA and wears a CPAP machine.  I have discussed with the patient that there is a significant chance of improvement or even resolution of this condition after metabolic/bariatric surgery.  Continue management as per primary care team.    Routing to [employer] pool for forms.     Socrates Hussein RN

## 2025-03-20 NOTE — H&P
BARIATRIC H&P - BARIATRIC SURGERY  Nasir Story Jr. 64 y.o. male MRN: [id number]  Unit/Bed#:  Encounter: [id number]      HPI:  Nasir Story Jr. is a 64 y.o. male who presents with a long-standing history of morbid obesity.      He was found to be a good candidate to undergo a bariatric operation upon being enrolled [facility].      He is here today to discuss details of his surgery.    Review of Systems   All other systems reviewed and are negative.      Historical Information   Past Medical History:   Diagnosis Date    Abnormal CBC 2020    Acne     Allergic     Anemia 10/28/2019    Anxiety     Arthritis     Back pain     Chronic kidney disease     Chronic pain     CPAP (continuous positive airway pressure) dependence     Dental cavity 08/10/2017    Depression 2021    Diabetes mellitus (HCC)     6/19/15 A1C: 5.5%    Eczema     Fall 10/16/2023    Flu vaccine need 10/16/2023    Grief reaction 09/10/2019    Hyperlipidemia     Hypertension     Knee pain     Morbid obesity with BMI of 50.0-59.9, adult ([facility])     Obesity     IMELDA on CPAP     Psychiatric disorder     Screening for prostate cancer 2018    Skin tag     Suspected sleep apnea 2023    Type 2 diabetes mellitus without complication ([facility]) 2014    Urinary retention      Past Surgical History:   Procedure Laterality Date    BACK SURGERY      COLONOSCOPY      LUMBAR FUSION  2002    LUMBAR FUSION  2004    L4-5-6, S1 has pump for narcotics     Social History   Social History     Substance and Sexual Activity   Alcohol Use Not Currently    Comment: occasional     Social History     Substance and Sexual Activity   Drug Use No    Comment: History of drug use, meena, cocaine, heroind, no IVDA-as per [employer]     Social History     Tobacco Use   Smoking Status Former    Types: Cigars    Start date:     Quit date: 4/15/2024    Years since quittin.9    Passive exposure: Current   Smokeless Tobacco Never   Tobacco  "Comments    Quit cigarette smoking in 2004.     Patient reports that he last smoked a cigar close to 2 years ago.      Family History: Family history non-contributory    Meds/Allergies   all medications and allergies reviewed  Allergies   Allergen Reactions    Gabapentin      Annotation - [id number]: dizziness    Pregabalin      Annotation - [id number]: vision changes and mood changes    Tramadol Nausea Only       Objective     Current Vitals:   Temp Source: Tympanic (03/20/25 1313)  Height: 5' 9\" (175.3 cm) (03/20/25 1313)      Invasive Devices       None                   Physical Exam  Vitals and nursing note reviewed.   Constitutional:       Appearance: Normal appearance. He is well-developed.   HENT:      Head: Normocephalic and atraumatic.      Nose: Nose normal.   Eyes:      General: No scleral icterus.        Right eye: No discharge.         Left eye: No discharge.      Conjunctiva/sclera: Conjunctivae normal.   Cardiovascular:      Rate and Rhythm: Normal rate and regular rhythm.      Heart sounds: Normal heart sounds.   Pulmonary:      Effort: Pulmonary effort is normal.      Breath sounds: Normal breath sounds. No stridor. No wheezing or rales.   Chest:      Chest wall: No tenderness.   Abdominal:      General: Bowel sounds are normal.      Palpations: Abdomen is soft.      Tenderness: There is no abdominal tenderness. There is no guarding or rebound.      Comments: Abdomen is obese, soft and benign.   Musculoskeletal:         General: Normal range of motion.      Cervical back: Normal range of motion and neck supple.   Lymphadenopathy:      Cervical: No cervical adenopathy.   Skin:     General: Skin is warm and dry.      Findings: No erythema or rash.   Neurological:      Mental Status: He is alert and oriented to person, place, and time.   Psychiatric:         Behavior: Behavior normal.         Thought Content: Thought content normal.         Judgment: Judgment normal.         Lab Results: I have " personally reviewed pertinent lab results.    Imaging: Results Review Statement: No pertinent imaging studies reviewed.  EKG, Pathology, and Other Studies: Results Review Statement: No pertinent imaging studies reviewed.  The endoscopy showed gastritis and some retained gastric contents suggestive of gastroparesis.  The biopsies revealed  Final Diagnosis  A. Stomach, biopsy:  - Gastric mucosa with mild vascular congestion.  - Negative for intestinal metaplasia and dysplasia.   - H. pylori organisms are not identified on H&E slide.         Assessment/PLAN:    IMELDA (obstructive sleep apnea)  Patient has IMELDA and wears a CPAP machine.  I have discussed with the patient that there is a significant chance of improvement or even resolution of this condition after metabolic/bariatric surgery.  Continue management as per primary care team.     Essential hypertension  Currently on metoprolol, chloralidone, and losartan.  I have discussed with the patient that there is a significant chance of improvement or even resolution of this condition after metabolic/bariatric surgery.  Continue management as per primary care team.      Type 2 diabetes mellitus with stage 3b chronic kidney disease, without long-term current use of insulin (HCC)  I have discussed with the patient that there is a significant chance of improvement or even resolution of this condition after metabolic/bariatric surgery.  Continue management as per primary care team.    Lab Results   Component Value Date    HGBA1C 6.1 (H) 07/19/2024       Mixed hyperlipidemia  Currently taking Lipitor.  I have discussed with the patient that there is a significant chance of improvement or even resolution of this condition after metabolic/bariatric surgery.  Continue management as per primary care team.      Other pulmonary embolism without acute cor pulmonale (HCC)  Is currently on Eliquis.  The instruction was to stop the Eliquis 2 to 3 days prior to the surgery and we will  resume postoperatively as soon as it is safe    Morbid obesity with BMI of 45.0-49.9, adult (HCC)  64 y.o. male morbidly obese found to be a good candidate to undergo a weight loss operation upon being enrolled here at the [facility].      Patient has a long history of morbid obesity and is presenting to discuss the surgical weight loss options. Despite the patient best efforts patient was unable to lose any meaningful or sustainable weight using nonsurgical means.We had a long discussion regarding all the surgical weight-loss options at our disposal at this point and reviewed the risks and benefits of each procedure in details as it relates to his age, BMI and medical conditions.    He has been pre certified to undergo a Laparoscopic sleeve gastrectomy.    We have discussed the 14%-20% incidence of post op heartburn after the sleeve gastrectomy and the fact that if this cannot be controlled with medication or conservative measures it might need to be converted to a Farooq-en-Y gastric bypass.    We have also discussed the fact that the patient needs to be followed up postoperatively and potentially get screening endoscopies in the future to rule out any development of pathology as a result of the sleeve gastrectomy.    Here today to review his pre op test results.      Has been medically cleared for the procedure.    +++++++++++++++++++  He is to stop his Eliquis 2 to 3 days prior to the surgery and we will resume postoperatively once we know that he is not bleeding.  The renal team and Dr. Nunez recommended for him to have a consultation postoperatively.  +++++++++++++++++++    I have discussed with him at length the risks and benefits of the operation and reiterated the components of our multidisciplinary program and the importance of compliance and follow up in the post operative period. Although there is a great statistical chance of improvement or even resolution of most of his associated  comorbidities, the results vary from patient to patient and they largely depend on his commitment.     The patient was also instructed with regards to the importance of behavior modification, nutritional counseling, support meeting attendance and lifestyle changes that are important to ensure success.    I have explained and reviewed the instructions for stopping or tapering anti-obesity medications prior to surgery.  He was given the opportunity to ask questions and I have answered all of them.    I have addressed with the patient the level of CODE STATUS for this hospital stay and after explaining the different options currently he wishes to be a Level I.     He understands and wishes to proceed.    He has lost all the weight required prior to surgery continue with the liquid diet to lose some more and to make sure that his stomach is empty prior to the surgery.

## 2025-03-20 NOTE — ASSESSMENT & PLAN NOTE
64 y.o. male morbidly obese found to be a good candidate to undergo a weight loss operation upon being enrolled here at the [facility].      Patient has a long history of morbid obesity and is presenting to discuss the surgical weight loss options. Despite the patient best efforts patient was unable to lose any meaningful or sustainable weight using nonsurgical means.We had a long discussion regarding all the surgical weight-loss options at our disposal at this point and reviewed the risks and benefits of each procedure in details as it relates to his age, BMI and medical conditions.    He has been pre certified to undergo a Laparoscopic sleeve gastrectomy.    We have discussed the 14%-20% incidence of post op heartburn after the sleeve gastrectomy and the fact that if this cannot be controlled with medication or conservative measures it might need to be converted to a Farooq-en-Y gastric bypass.    We have also discussed the fact that the patient needs to be followed up postoperatively and potentially get screening endoscopies in the future to rule out any development of pathology as a result of the sleeve gastrectomy.    Here today to review his pre op test results.      Has been medically cleared for the procedure.    +++++++++++++++++++  He is to stop his Eliquis 2 to 3 days prior to the surgery and we will resume postoperatively once we know that he is not bleeding.  +++++++++++++++++++    I have discussed with him at length the risks and benefits of the operation and reiterated the components of our multidisciplinary program and the importance of compliance and follow up in the post operative period. Although there is a great statistical chance of improvement or even resolution of most of his associated comorbidities, the results vary from patient to patient and they largely depend on his commitment.     The patient was also instructed with regards to the importance of behavior modification,  nutritional counseling, support meeting attendance and lifestyle changes that are important to ensure success.    I have explained and reviewed the instructions for stopping or tapering anti-obesity medications prior to surgery.  He was given the opportunity to ask questions and I have answered all of them.    I have addressed with the patient the level of CODE STATUS for this hospital stay and after explaining the different options currently he wishes to be a Level I.     He understands and wishes to proceed.    He has lost all the weight required prior to surgery continue with the liquid diet to lose some more and to make sure that his stomach is empty prior to the surgery.

## 2025-03-20 NOTE — ASSESSMENT & PLAN NOTE
I have discussed with the patient that there is a significant chance of improvement or even resolution of this condition after metabolic/bariatric surgery.  Continue management as per primary care team.    Lab Results   Component Value Date    HGBA1C 6.1 (H) 07/19/2024

## 2025-03-21 DIAGNOSIS — E66.01 MORBID OBESITY (HCC): Primary | ICD-10-CM

## 2025-03-21 DIAGNOSIS — I10 ESSENTIAL HYPERTENSION: ICD-10-CM

## 2025-03-24 ENCOUNTER — TELEPHONE (OUTPATIENT)
Dept: BARIATRICS | Facility: CLINIC | Age: 65
End: 2025-03-24

## 2025-03-24 ENCOUNTER — APPOINTMENT (OUTPATIENT)
Dept: LAB | Facility: CLINIC | Age: 65
End: 2025-03-24
Payer: COMMERCIAL

## 2025-03-24 RX ORDER — OMEPRAZOLE 20 MG/1
20 CAPSULE, DELAYED RELEASE ORAL DAILY
Qty: 90 CAPSULE | Refills: 1 | Status: SHIPPED | OUTPATIENT
Start: 2025-04-08

## 2025-03-24 RX ORDER — LOSARTAN POTASSIUM 100 MG/1
100 TABLET ORAL DAILY
Qty: 90 TABLET | Refills: 1 | Status: SHIPPED | OUTPATIENT
Start: 2025-03-24

## 2025-03-24 NOTE — TELEPHONE ENCOUNTER
Left a message he was to get his nicotine test done over the weekend if he did not go he must go today or tomorrow results take 7-10 days to see and we must have those results before his surgery date.

## 2025-03-27 DIAGNOSIS — N17.9 AKI (ACUTE KIDNEY INJURY) (HCC): Primary | ICD-10-CM

## 2025-03-27 NOTE — PRE-PROCEDURE INSTRUCTIONS
Pre-Surgery Instructions:   Medication Instructions    alfuzosin (UROXATRAL) 10 mg 24 hr tablet Take day of surgery.    atorvastatin (LIPITOR) 20 mg tablet Take day of surgery.    Eliquis 5 MG Instructions provided by MD Hold 2 days prior to surgery. Per Cardiology and surgeon's office.    ergocalciferol (ERGOCALCIFEROL) 1.25 MG (38791 UT) capsule Hold day of surgery.-Takes on Friday.    glucose blood (FREESTYLE LITE) test strip Take day of surgery.    Lancets (freestyle) lancets Take day of surgery.    losartan (COZAAR) 100 MG tablet Stop taking 1 day prior to surgery.    magnesium Oxide (MAG-OX) 400 mg TABS Hold day of surgery.    metoprolol tartrate (LOPRESSOR) 100 mg tablet Take day of surgery.    morphine (MS CONTIN) 15 mg 12 hr tablet Uses PRN- OK to take day of surgery    oxyCODONE (ROXICODONE) 30 MG immediate release tablet Uses PRN- OK to take day of surgery    Ozempic, 2 MG/DOSE, 8 MG/3ML injection pen Instructions provided by MD-Surgeon's office LD 3/30/25    spironolactone (ALDACTONE) 25 mg tablet Hold day of surgery.   Pt has a pain pump in place- Per pt no longer works. Pt no longer has Rep contact info. No remote. Advised to bring CPAP DOS.    Medication instructions for day of surgery reviewed. Please take all instructed medications with only a sip of water.       You will receive a call one business day prior to surgery with an arrival time and hospital directions. If your surgery is scheduled on a Monday, the hospital will be calling you on the Friday prior to your surgery. If you have not heard from anyone by 8pm, please call the hospital supervisor through the hospital  at [phone]. ([place] [phone] or [place] [phone]).    Do not eat or drink anything after midnight the night before your surgery, including candy, mints, lifesavers, or chewing gum. Do not drink alcohol 24hrs before your surgery. Try not to smoke at least 24hrs before your surgery.       Follow the pre  surgery showering instructions as listed in the “My Surgical Experience Booklet” or otherwise provided by your surgeon's office. Do not use a blade to shave the surgical area 1 week before surgery. It is okay to use a clean electric clippers up to 24 hours before surgery. Do not apply any lotions, creams, including makeup, cologne, deodorant, or perfumes after showering on the day of your surgery. Do not use dry shampoo, hair spray, hair gel, or any type of hair products.     No contact lenses, eye make-up, or artificial eyelashes. Remove nail polish, including gel polish, and any artificial, gel, or acrylic nails if possible. Remove all jewelry including rings and body piercing jewelry.     Wear causal clothing that is easy to take on and off. Consider your type of surgery.    Keep any valuables, jewelry, piercings at home. Please bring any specially ordered equipment (sling, braces) if indicated.    Arrange for a responsible person to drive you to and from the hospital on the day of your surgery. Please confirm the visitor policy for the day of your procedure when you receive your phone call with an arrival time.     Call the surgeon's office with any new illnesses, exposures, or additional questions prior to surgery.    Please reference your “My Surgical Experience Booklet” for additional information to prepare for your upcoming surgery.

## 2025-04-01 ENCOUNTER — TELEPHONE (OUTPATIENT)
Dept: BARIATRICS | Facility: CLINIC | Age: 65
End: 2025-04-01

## 2025-04-01 NOTE — TELEPHONE ENCOUNTER
Pre-op call was made to patient  to follow up on how they are doing and to remind them to continue with all medical and dietary directions that were given at pre-op class regarding liver shrinking diet and hydration. Advised to stop eating all vegetables 48hrs prior to surgery unless directed otherwise by surgeon or RD. They were encouraged to purchase all vitamins and protein shakes for post op use as well as to begin Miralax three days prior to surgery as directed in Section 6 of their manual.  They were reminded of the Ensure Pre-surgery drinks protocol and to bring their completed yellow form with them to surgery as well as their CPAP-BiPAP machine if they use one.  Lastly, they were informed that they would be weighed the morning of surgery and to give the office a call if they had any further questions or concerns.

## 2025-04-04 RX ORDER — MEPERIDINE HYDROCHLORIDE 25 MG/ML
12.5 INJECTION INTRAMUSCULAR; INTRAVENOUS; SUBCUTANEOUS ONCE AS NEEDED
OUTPATIENT
Start: 2025-04-04

## 2025-04-04 RX ORDER — FENTANYL CITRATE/PF 50 MCG/ML
50 SYRINGE (ML) INJECTION
Refills: 0 | OUTPATIENT
Start: 2025-04-04

## 2025-04-04 RX ORDER — SODIUM CHLORIDE, SODIUM LACTATE, POTASSIUM CHLORIDE, CALCIUM CHLORIDE 600; 310; 30; 20 MG/100ML; MG/100ML; MG/100ML; MG/100ML
125 INJECTION, SOLUTION INTRAVENOUS CONTINUOUS
OUTPATIENT
Start: 2025-04-07

## 2025-04-04 RX ORDER — PROMETHAZINE HYDROCHLORIDE 25 MG/ML
6.25 INJECTION, SOLUTION INTRAMUSCULAR; INTRAVENOUS ONCE AS NEEDED
OUTPATIENT
Start: 2025-04-04

## 2025-04-04 RX ORDER — HYDROMORPHONE HCL/PF 1 MG/ML
0.5 SYRINGE (ML) INJECTION
Refills: 0 | OUTPATIENT
Start: 2025-04-04

## 2025-04-04 RX ORDER — ONDANSETRON 2 MG/ML
4 INJECTION INTRAMUSCULAR; INTRAVENOUS ONCE AS NEEDED
OUTPATIENT
Start: 2025-04-04

## 2025-04-06 ENCOUNTER — NURSE TRIAGE (OUTPATIENT)
Dept: OTHER | Facility: OTHER | Age: 65
End: 2025-04-06

## 2025-04-06 NOTE — TELEPHONE ENCOUNTER
"Regarding: Not feeling well/nasal infection symptoms/ bariatric surgery scheduled for tomorrow  ----- Message from Mariana IVERSON sent at 4/6/2025  1:08 PM EDT -----  Patient stated, \"I have bariatric surgery scheduled for tomorrow morning, but I'm not feeling well. I think I have a nasal infection. I have pressure in my head and nose, I'm coughing up phlegm, and I have a tickle in my throat. Should I continue with my surgery or do I have to cancel? Can you help me with my current symptoms.\"    "

## 2025-04-06 NOTE — TELEPHONE ENCOUNTER
"FOLLOW UP: Patient would like to reschedule procedure.    REASON FOR CONVERSATION: Cold Like Symptoms    SYMPTOMS: cold symptoms of head congestion with cough    OTHER: Patient requesting to cancel procedure for bariatric surgery tomorrow 4/7 at 7:45am. On call Provider and Hospital Supervisor paged.     DISPOSITION: Home Care      Reason for Disposition   Care advice for mild cough, questions about   [1] Sore throat with cough/cold symptoms AND [2] present < 5 days    Additional Information   Severe sore throat    Answer Assessment - Initial Assessment Questions  1. ONSET: \"When did the nasal discharge start?\"         This afternoon    2. AMOUNT: \"How much discharge is there?\"           3. COUGH: \"Do you have a cough?\" If Yes, ask: \"Describe the color of your mucus.\" (e.g., clear, white, yellow, green)        Mucus in back of throat, clogged nose, positive for cough    4. RESPIRATORY DISTRESS: \"Describe your breathing.\"           5. FEVER: \"Do you have a fever?\" If Yes, ask: \"What is your temperature, how was it measured, and when did it start?\"        Feels like he has the chills. Temperature is 96.9 F    7. OTHER SYMPTOMS: \"Do you have any other symptoms?\" (e.g., earache, mouth sores, sore throat, wheezing)        Sore throat and scratchy and pressure in head    Protocols used: Common Cold-Adult-AH, Sore Throat-Adult-AH    "

## 2025-04-09 ENCOUNTER — OFFICE VISIT (OUTPATIENT)
Dept: URGENT CARE | Facility: CLINIC | Age: 65
End: 2025-04-09
Payer: COMMERCIAL

## 2025-04-09 VITALS
HEART RATE: 78 BPM | DIASTOLIC BLOOD PRESSURE: 64 MMHG | TEMPERATURE: 98.2 F | BODY MASS INDEX: 45.74 KG/M2 | RESPIRATION RATE: 18 BRPM | SYSTOLIC BLOOD PRESSURE: 118 MMHG | HEIGHT: 69 IN | OXYGEN SATURATION: 96 % | WEIGHT: 308.8 LBS

## 2025-04-09 DIAGNOSIS — J20.8 ACUTE BACTERIAL BRONCHITIS: Primary | ICD-10-CM

## 2025-04-09 DIAGNOSIS — B96.89 ACUTE BACTERIAL BRONCHITIS: Primary | ICD-10-CM

## 2025-04-09 PROCEDURE — 99213 OFFICE O/P EST LOW 20 MIN: CPT | Performed by: PHYSICIAN ASSISTANT

## 2025-04-09 PROCEDURE — G0463 HOSPITAL OUTPT CLINIC VISIT: HCPCS | Performed by: PHYSICIAN ASSISTANT

## 2025-04-09 RX ORDER — BENZONATATE 200 MG/1
200 CAPSULE ORAL
Qty: 20 CAPSULE | Refills: 0 | Status: SHIPPED | OUTPATIENT
Start: 2025-04-09

## 2025-04-09 RX ORDER — AZITHROMYCIN 250 MG/1
TABLET, FILM COATED ORAL
Qty: 6 TABLET | Refills: 0 | Status: SHIPPED | OUTPATIENT
Start: 2025-04-09 | End: 2025-04-13

## 2025-04-09 NOTE — PROGRESS NOTES
[facility]      NAME: Nasir Story Jr. is a 64 y.o. male  : 1960    MRN: [id number]  DATE: 2025  TIME: 4:32 PM    Assessment and Plan   Acute bacterial bronchitis [J20.8, B96.89]  1. Acute bacterial bronchitis  azithromycin (ZITHROMAX) 250 mg tablet    benzonatate (TESSALON) 200 MG capsule          Patient Instructions   I have prescribed an antibiotic for the infection.  Please take the antibiotic as prescribed and finish the entire prescription.  I recommend that the patient takes an over the counter probiotic or eats yogurt with live cultures in it (activia) to keep good bacteria in the gut and help prevent diarrhea.  Wash hands frequently to prevent the spread of infection.  Can use over the counter cough and cold medications to help with symptoms.  Ibuprofen and/or tylenol as needed for pain or fever.  If not improving over the next 3-5 days, follow up with PCP.    Risks and benefits discussed. Patient understands and agrees with the plan.      If tests have been performed at [facility], our office will contact you with results if changes need to be made to the care plan discussed with you at the visit.  You can review your full results on [facility]'s MyChart.     Follow up with PCP in 3-5 days.      If any of the following occur, please report to your nearest ED for evaluation or call 911.   Difficultly breathing or shortness of breath  Chest pain  Acutely worsening symptoms.   To present to the ER if symptoms worsen.  Chief Complaint     Chief Complaint   Patient presents with    Cough     Started  with sore throat, coughing up yellowish-green mucus, nasal congestion, headache, nasal pain, chest congestion, and he's been managing with Robitussin and Nyquil          History of Present Illness   Nasir Story Jr. presents to the clinic with wife c/o    URI   This is a new problem. The current episode started in the past 7 days. The problem has been unchanged. There has been no fever.  Associated symptoms include congestion, coughing, headaches, sinus pain and a sore throat. Pertinent negatives include no abdominal pain, chest pain, ear pain, rash or wheezing. He has tried decongestant for the symptoms. The treatment provided no relief.       Review of Systems   Review of Systems   Constitutional:  Positive for fatigue. Negative for chills, diaphoresis and fever.   HENT:  Positive for congestion, postnasal drip, sinus pain and sore throat. Negative for ear discharge, ear pain and facial swelling.    Eyes:  Negative for photophobia, pain, discharge, redness, itching and visual disturbance.   Respiratory:  Positive for cough. Negative for apnea, chest tightness, shortness of breath and wheezing.    Cardiovascular:  Negative for chest pain and palpitations.   Gastrointestinal:  Negative for abdominal pain.   Skin:  Negative for color change, rash and wound.   Neurological:  Positive for headaches. Negative for dizziness.   Hematological:  Negative for adenopathy.         Current Medications     Long-Term Medications   Medication Sig Dispense Refill    alfuzosin (UROXATRAL) 10 mg 24 hr tablet TAKE 1 TABLET BY MOUTH DAILY 30 tablet 5    atorvastatin (LIPITOR) 20 mg tablet TAKE ONE TABLET BY MOUTH EVERY DAY 90 tablet 1    Eliquis 5 MG TAKE 1 TABLET BY MOUTH TWO TIMES A DAY 60 tablet 5    ergocalciferol (ERGOCALCIFEROL) 1.25 MG (53051 UT) capsule Take 1 capsule (50,000 Units total) by mouth once a week (Patient taking differently: Take 50,000 Units by mouth once a week friday's) 8 capsule 0    Lancets (freestyle) lancets Use as instructed 100 each 0    losartan (COZAAR) 100 MG tablet TAKE ONE TABLET BY MOUTH EVERY DAY (Patient taking differently: Take 100 mg by mouth daily Take 1/2 tab 50 mg daily) 90 tablet 1    magnesium Oxide (MAG-OX) 400 mg TABS Take 1 tablet (400 mg total) by mouth 2 (two) times a day      metoprolol tartrate (LOPRESSOR) 100 mg tablet TAKE ONE TABLET BY MOUTH TWICE A  tablet  1    omeprazole (PriLOSEC) 20 mg delayed release capsule Take 1 capsule (20 mg total) by mouth daily Do not start before April 8, 2025. (Patient not taking: Reported on 3/27/2025 Do not start before April 8, 2025.) 90 capsule 1    polyethylene glycol (GOLYTELY) 4000 mL solution Take 4,000 mL by mouth once for 1 dose Colonoscopy prep (Patient not taking: Reported on 1/31/2025) 4000 mL 0    polyethylene glycol (MIRALAX) 17 g packet Take 17 g by mouth daily (Patient not taking: Reported on 1/31/2025) 510 g 5       Current Allergies     Allergies as of 04/09/2025 - Reviewed 04/09/2025   Allergen Reaction Noted    Gabapentin  11/16/2015    Pregabalin  11/16/2015    Tramadol Nausea Only 01/18/2016            The following portions of the patient's history were reviewed and updated as appropriate: allergies, current medications, past family history, past medical history, past social history, past surgical history and problem list.  Past Medical History:   Diagnosis Date    Abnormal CBC 01/20/2020    Acne     Allergic     Anemia 10/28/2019    Anxiety     Arthritis     Back pain     Chronic kidney disease     Chronic pain     Colon polyp     CPAP (continuous positive airway pressure) dependence     Dental cavity 08/10/2017    Depression 06/07/2021    Diabetes mellitus (HCC)     6/19/15 A1C: 5.5%    Eczema     Fall 10/16/2023    Flu vaccine need 10/16/2023    Grief reaction 09/10/2019    Hyperlipidemia     Hypertension     Knee pain     Morbid obesity with BMI of 50.0-59.9, adult (HCC)     Obesity     IMELDA on CPAP     Pain of intrathecal infusion pump pocket after insertion (HCC)     Per pt no longer works. Having insurance issues so it remains in place.    Psychiatric disorder     Screening for prostate cancer 11/28/2018    Skin tag     Suspected sleep apnea 08/18/2023    Type 2 diabetes mellitus without complication (HCC) 07/16/2014    Urinary retention      Past Surgical History:   Procedure Laterality Date    BACK SURGERY   "    COLONOSCOPY      LUMBAR FUSION  2002    LUMBAR FUSION      L4-5-6, S1 has pump for narcotics- per pt pain pump no longer (since ) works but it remains in place.     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      Patient reports that he last smoked a cigar close to 2 years ago.    Vaping Use    Vaping status: Never Used   Substance and Sexual Activity    Alcohol use: Not Currently     Comment: occasional    Drug use: No     Comment: History of drug use, meena, cocaine, heroind, no IVDA-as per [employer]    Sexual activity: Yes     Partners: Female     Birth control/protection: None   Other Topics Concern    Not on file   Social History Narrative    Patient lives with his ex wife.      Social Drivers of Health     Financial Resource Strain: Low Risk  (10/15/2023)    Overall Financial Resource Strain (CARDIA)     Difficulty of Paying Living Expenses: Not very hard   Food Insecurity: Not on file   Transportation Needs: No Transportation Needs (10/15/2023)    PRAPARE - Transportation     Lack of Transportation (Medical): No     Lack of Transportation (Non-Medical): No   Physical Activity: Not on file   Stress: Not on file   Social Connections: Not on file   Intimate Partner Violence: Not on file   Housing Stability: Not on file       Objective   /64 (BP Location: Right arm, Patient Position: Sitting, Cuff Size: Large)   Pulse 78   Temp 98.2 °F (36.8 °C) (Tympanic)   Resp 18   Ht 5' 9\" (1.753 m) "   Wt (!) 140 kg (308 lb 12.8 oz)   SpO2 96%   BMI 45.60 kg/m²      Physical Exam     Physical Exam  Vitals and nursing note reviewed.   Constitutional:       General: He is not in acute distress.     Appearance: He is well-developed. He is not diaphoretic.   HENT:      Head: Normocephalic and atraumatic.      Right Ear: Tympanic membrane and external ear normal.      Left Ear: Tympanic membrane and external ear normal.      Nose: Nose normal.      Mouth/Throat:      Mouth: Mucous membranes are moist.      Pharynx: No oropharyngeal exudate or posterior oropharyngeal erythema.   Eyes:      General: No scleral icterus.        Right eye: No discharge.         Left eye: No discharge.      Conjunctiva/sclera: Conjunctivae normal.   Cardiovascular:      Rate and Rhythm: Normal rate and regular rhythm.      Heart sounds: Normal heart sounds. No murmur heard.     No friction rub. No gallop.   Pulmonary:      Effort: Pulmonary effort is normal. No respiratory distress.      Breath sounds: Examination of the right-lower field reveals rhonchi. Examination of the left-lower field reveals rhonchi. Rhonchi present. No decreased breath sounds, wheezing or rales.   Skin:     General: Skin is warm and dry.      Coloration: Skin is not pale.      Findings: No erythema or rash.   Neurological:      Mental Status: He is alert and oriented to person, place, and time.   Psychiatric:         Behavior: Behavior normal.         Thought Content: Thought content normal.         Judgment: Judgment normal.         Marium Howard PA-C

## 2025-04-16 ENCOUNTER — TELEPHONE (OUTPATIENT)
Age: 65
End: 2025-04-16

## 2025-04-16 ENCOUNTER — PREP FOR PROCEDURE (OUTPATIENT)
Dept: BARIATRICS | Facility: CLINIC | Age: 65
End: 2025-04-16

## 2025-04-16 DIAGNOSIS — E66.01 MORBID OBESITY (HCC): Primary | ICD-10-CM

## 2025-04-16 DIAGNOSIS — G47.33 OBSTRUCTIVE SLEEP APNEA (ADULT) (PEDIATRIC): ICD-10-CM

## 2025-04-16 DIAGNOSIS — E11.9 DIABETES MELLITUS (HCC): ICD-10-CM

## 2025-04-16 DIAGNOSIS — I10 ESSENTIAL HYPERTENSION, MALIGNANT: ICD-10-CM

## 2025-04-17 ENCOUNTER — OFFICE VISIT (OUTPATIENT)
Dept: BARIATRICS | Facility: CLINIC | Age: 65
End: 2025-04-17
Payer: COMMERCIAL

## 2025-04-17 ENCOUNTER — ANESTHESIA EVENT (INPATIENT)
Dept: PERIOP | Facility: HOSPITAL | Age: 65
DRG: 620 | End: 2025-04-17
Payer: COMMERCIAL

## 2025-04-17 ENCOUNTER — CLINICAL SUPPORT (OUTPATIENT)
Dept: BARIATRICS | Facility: CLINIC | Age: 65
End: 2025-04-17
Payer: COMMERCIAL

## 2025-04-17 VITALS
WEIGHT: 306.2 LBS | SYSTOLIC BLOOD PRESSURE: 108 MMHG | HEIGHT: 69 IN | DIASTOLIC BLOOD PRESSURE: 72 MMHG | HEART RATE: 78 BPM | BODY MASS INDEX: 45.35 KG/M2 | TEMPERATURE: 97.8 F

## 2025-04-17 VITALS — HEIGHT: 69 IN | BODY MASS INDEX: 45.35 KG/M2 | WEIGHT: 306.2 LBS

## 2025-04-17 DIAGNOSIS — E66.01 MORBID (SEVERE) OBESITY DUE TO EXCESS CALORIES (HCC): Primary | ICD-10-CM

## 2025-04-17 DIAGNOSIS — G47.33 OSA (OBSTRUCTIVE SLEEP APNEA): ICD-10-CM

## 2025-04-17 DIAGNOSIS — E66.01 MORBID OBESITY WITH BMI OF 45.0-49.9, ADULT (HCC): Primary | ICD-10-CM

## 2025-04-17 DIAGNOSIS — I26.99 PULMONARY EMBOLUS (HCC): ICD-10-CM

## 2025-04-17 DIAGNOSIS — N18.32 TYPE 2 DIABETES MELLITUS WITH STAGE 3B CHRONIC KIDNEY DISEASE, WITHOUT LONG-TERM CURRENT USE OF INSULIN (HCC): ICD-10-CM

## 2025-04-17 DIAGNOSIS — I10 ESSENTIAL HYPERTENSION: ICD-10-CM

## 2025-04-17 DIAGNOSIS — E78.2 MIXED HYPERLIPIDEMIA: ICD-10-CM

## 2025-04-17 DIAGNOSIS — N18.32 STAGE 3B CHRONIC KIDNEY DISEASE (HCC): ICD-10-CM

## 2025-04-17 DIAGNOSIS — E11.22 TYPE 2 DIABETES MELLITUS WITH STAGE 3B CHRONIC KIDNEY DISEASE, WITHOUT LONG-TERM CURRENT USE OF INSULIN (HCC): ICD-10-CM

## 2025-04-17 PROCEDURE — 99213 OFFICE O/P EST LOW 20 MIN: CPT | Performed by: SURGERY

## 2025-04-17 PROCEDURE — RECHECK: Performed by: DIETITIAN, REGISTERED

## 2025-04-17 RX ORDER — ACETAMINOPHEN 10 MG/ML
1000 INJECTION, SOLUTION INTRAVENOUS ONCE
OUTPATIENT
Start: 2025-04-28 | End: 2025-04-17

## 2025-04-17 RX ORDER — ACETAMINOPHEN 10 MG/ML
1000 INJECTION, SOLUTION INTRAVENOUS ONCE
Status: CANCELLED | OUTPATIENT
Start: 2025-04-17 | End: 2025-04-17

## 2025-04-17 RX ORDER — ENOXAPARIN SODIUM 100 MG/ML
40 INJECTION SUBCUTANEOUS ONCE
OUTPATIENT
Start: 2025-04-28 | End: 2025-04-17

## 2025-04-17 NOTE — ASSESSMENT & PLAN NOTE
Currently on losartan, Aldactone and Lopressor.  As per Dr. Keith note from cardiology in 1/2025  Prior to intermediate cardiac risk bariatric surgery.  Without active symptoms or ECG changes to suggest active ischemia, heart failure, or arrhythmias  Patient does have T wave inversions in the anterolateral location, which is chronic and unchanged  To be held for 2 to 3 days prior to surgery and resume when safe from bleeding perspective postoperatively

## 2025-04-17 NOTE — ASSESSMENT & PLAN NOTE
Patient has IMELDA and wears a CPAP machine.  I have discussed with the patient that there is a significant chance of improvement or even resolution of this condition after metabolic/bariatric surgery.  Continue management as per primary care team.

## 2025-04-17 NOTE — PROGRESS NOTES
"Bariatric Nutrition Assessment Note  -surgery was canceled due to patient being ill, rescheduled for 25  - reviewed pre-op liver shrinking diet with patient and instructed patient to begin immediately as he is within 2 weeks of his surgery date.    Type of surgery    Preop- interested in gastric  bypass   Surgery Date: expected 2025  No program requirement   Surgeon: Dr. Noé Glovre    Nutrition Assessment   Nasir Story   64 y.o.  male     Wt with BMI of 25: 168.8  Pre-Op Excess Wt: 166.4  Ht 5' 9\" (1.753 m)   Wt (!) 139 kg (306 lb 3.2 oz)   BMI 45.22 kg/m²      7.8# weight loss x 1 month  -overall 29.1# weight loss    Wt Readings from Last 3 Encounters:   25 (!) 139 kg (306 lb 3.2 oz)   25 (!) 140 kg (308 lb 12.8 oz)   25 (!) 142 kg (314 lb)      Karlee Crawford Equation:    [id number]  Weight Maintenance 2699  Estimated calories for weight loss 0330-0620 ( 1-2# per wk wt loss - sedentary )  Estimated protein needs - grams per day (0.6- .8 grams / kg actual body weight  )  CKD stage 3 b   Estimated fluid needs 77-90 oz(30-35 ml/kg IBW )      Weight History   Onset of Obesity: Adult  Family history of obesity: Yes   Wt Loss Attempts: Counseling with  MD  Previous consult with surgeon - but son  and he did not get the surgery   Fasting, healthy eating, gym/ exercise until knee pain , meal replacements   Patient has tried the above for 6 months or more with insufficient weight loss or weight regain, which is why patient has requested to be evaluated for weight loss surgery today  Maximum Wt Lost: 20 to 30 pounds with going to the gym and eating healthy     NAFLD Fibrosis Score is: 1.314    NAFLD Score Correlated Fibrosis Severity   <-1.455 F0-F2   -1.455-0.676 Indeterminate Score   >0.676 F3-F4   **Fibrosis Severity Scale: F0 = no fibrosis; F1= mild fibrosis; F2 = moderate fibrosis; F3 = severe fibrosis; F4 = cirrhosis    NAFLD Score Component Values:  Component Value " Date   Age: 64 y.o.     BMI: 45.22 kg/m²    IFG or DM: Yes    AST: 13 U/L 2025   ALT: 13 U/L 2025   Platelet: 237 Thousands/uL 10/22/2024   Albumin: 4.1 g/dL 2025      Continues on ozempic, aware of pre-op protocol and needs to stop prior to surgery    Review of History and Medications   Past Medical History:   Diagnosis Date    Abnormal CBC 2020    Acne     Allergic     Anemia 10/28/2019    Anxiety     Arthritis     Back pain     Chronic kidney disease     Chronic pain     Colon polyp     CPAP (continuous positive airway pressure) dependence     Dental cavity 08/10/2017    Depression 2021    Diabetes mellitus (HCC)     6/19/15 A1C: 5.5%    Eczema     Fall 10/16/2023    Flu vaccine need 10/16/2023    Grief reaction 09/10/2019    Hyperlipidemia     Hypertension     Knee pain     Morbid obesity with BMI of 50.0-59.9, adult (HCC)     Obesity     IMELDA on CPAP     Pain of intrathecal infusion pump pocket after insertion (HCC)     Per pt no longer works. Having insurance issues so it remains in place.    Psychiatric disorder     Screening for prostate cancer 2018    Skin tag     Suspected sleep apnea 2023    Type 2 diabetes mellitus without complication (HCC) 2014    Urinary retention      Past Surgical History:   Procedure Laterality Date    BACK SURGERY      COLONOSCOPY      LUMBAR FUSION      LUMBAR FUSION      L4-5-6, S1 has pump for narcotics- per pt pain pump no longer (since ) works but it remains in place.     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      Patient reports that he last smoked a cigar close to 2 years ago.    Vaping Use    Vaping status: Never Used   Substance and Sexual Activity    Alcohol use: Not Currently     Comment: occasional    Drug use: No     Comment: History of drug use, meena, cocaine, heroind, no IVDA-as per [employer]    Sexual activity: Yes     Partners: Female     Birth control/protection: None   Other Topics Concern    Not on file   Social History Narrative    Patient lives with his ex wife.      Social Drivers of Health     Financial Resource Strain: Low Risk  (10/15/2023)    Overall Financial Resource Strain (CARDIA)     Difficulty of Paying Living Expenses: Not very hard   Food Insecurity: Not on file   Transportation Needs: No Transportation Needs (10/15/2023)    PRAPARE - Transportation     Lack of Transportation (Medical): No     Lack of Transportation (Non-Medical): No   Physical Activity: Not on file   Stress: Not on file   Social Connections: Not on file   Intimate Partner Violence: Not on file   Housing Stability: Not on file       Current Outpatient Medications:     alfuzosin (UROXATRAL) 10 mg 24 hr tablet, TAKE 1 TABLET BY MOUTH DAILY, Disp: 30 tablet, Rfl: 5    atorvastatin (LIPITOR) 20 mg tablet, TAKE ONE TABLET BY MOUTH EVERY DAY, Disp: 90 tablet, Rfl: 1    benzonatate (TESSALON) 200 MG capsule, Take 1 capsule (200 mg total) by mouth daily at bedtime as needed for cough, Disp: 20 capsule, Rfl: 0    Eliquis 5 MG, TAKE 1 TABLET BY MOUTH TWO TIMES A DAY, Disp: 60 tablet, Rfl: 5    ergocalciferol (ERGOCALCIFEROL) 1.25 MG (81173 UT) capsule, Take 1 capsule (50,000 Units total) by mouth once a week (Patient taking differently: Take 50,000 Units by mouth once a week friday's), Disp: 8 capsule, Rfl: 0    glucose blood (FREESTYLE LITE) test strip, Use 1 each in the morning, Disp: 100 each, Rfl: 0    Lancets (freestyle)  lancets, Use as instructed, Disp: 100 each, Rfl: 0    losartan (COZAAR) 100 MG tablet, TAKE ONE TABLET BY MOUTH EVERY DAY (Patient taking differently: Take 100 mg by mouth daily Take 1/2 tab 50 mg daily), Disp: 90 tablet, Rfl: 1    magnesium Oxide (MAG-OX) 400 mg TABS, Take 1 tablet (400 mg total) by mouth 2 (two) times a day, Disp: , Rfl:     metoprolol tartrate (LOPRESSOR) 100 mg tablet, TAKE ONE TABLET BY MOUTH TWICE A DAY, Disp: 200 tablet, Rfl: 1    morphine (MS CONTIN) 15 mg 12 hr tablet, as needed, Disp: , Rfl: 0    omeprazole (PriLOSEC) 20 mg delayed release capsule, Take 1 capsule (20 mg total) by mouth daily Do not start before April 8, 2025. (Patient not taking: Reported on 3/27/2025 Do not start before April 8, 2025.), Disp: 90 capsule, Rfl: 1    oxyCODONE (ROXICODONE) 30 MG immediate release tablet, if needed, Disp: , Rfl: 0    Ozempic, 2 MG/DOSE, 8 MG/3ML injection pen, INJECT 0.75ML UNDER THE SKIN EVERY 7 DAYS (Patient taking differently: Inject 2 mg under the skin every 7 days Sunday's), Disp: 3 mL, Rfl: 3    polyethylene glycol (GOLYTELY) 4000 mL solution, Take 4,000 mL by mouth once for 1 dose Colonoscopy prep (Patient not taking: Reported on 1/31/2025), Disp: 4000 mL, Rfl: 0    polyethylene glycol (MIRALAX) 17 g packet, Take 17 g by mouth daily (Patient not taking: Reported on 1/31/2025), Disp: 510 g, Rfl: 5    spironolactone (ALDACTONE) 25 mg tablet, Take 12.5 mg by mouth daily, Disp: , Rfl:     Food Intake and Lifestyle Assessment   Food Intake Assessment completed via usual diet recall  Breakfast: 10 am oatmeal, yogurt ,  Snack: 0   Lunch: Protein shake   Snack: 0  Dinner: 5 to 7 pm   Chicken, fish, beef, pork , salads and vegetables and starch   Snack: 0  Beverage intake: water  and coffee/tea  Protein supplement: one per day for lunch    Estimated protein intake per day: ~70 grams    Estimated fluid intake per day: 10 bottles per day of water   Meals eaten away from home: rarely   Typical  "meal pattern: 2 meals per day and 0 snacks per day  Eating Behaviors: Large portion sizes  Food allergies or intolerances:   Allergies   Allergen Reactions    Gabapentin      Annotation - [id number]: dizziness    Pregabalin      Annotation - [id number]: vision changes and mood changes    Tramadol Nausea Only     Cultural or Scientology considerations: Maori     Physical Assessment  Physical Activity  Types of exercise: Walking  Mows his lawn, gardens housework   Current physical limitations: knee pain     Psychosocial Assessment   Support systems: family and   Socioeconomic factors: lives with x wife     Nutrition Diagnosis  Diagnosis: Overweight / Obesity (NC-3.3)  Related to: Physical inactivity and Excessive energy intake  As Evidenced by: BMI >25 and Unintentional weight gain     Nutrition Prescription: Recommend the following diet  Per nephrology - pt is on low potassium diet   Protein 90 grams per day ( 0.6 grams /kg actual body weight )  Low sodium   0227-6149 calories per day / 90 grams protein     Interventions and Teaching   Discussed pre-op and post-op nutrition guidelines.       Patient educated and handouts provided.  Surgical changes to stomach / GI  Capacity of post-surgery stomach  Diet progression  Adequate hydration  Sugar and fat restriction to decrease \"dumping syndrome\"  Fat restriction to decrease steatorrhea  Expected weight loss  Weight loss plateaus/ possibility of weight regain  Exercise  Suggestions for pre-op diet  Nutrition considerations after surgery  Protein supplements  Meal planning and preparation  Appropriate carbohydrate, protein, and fat intake, and food/fluid choices to maximize safe weight loss, nutrient intake, and tolerance   Dietary and lifestyle changes  Possible problems with poor eating habits  Intuitive eating  Techniques for self monitoring and keeping daily food journal  Potential for food intolerance after surgery, and ways to deal with them including: lactose " intolerance, nausea, reflux, vomiting, diarrhea, food intolerance, appetite changes, gas  Vitamin / Mineral supplementation of Multivitamin with minerals and Vitamin D  Has previous vitamin D deficiency but took loading dose   Nephrology is monitoring.     Education provided to: patient    Barriers to learning: No barriers identified    Readiness to change: preparation    Prior research on procedure: discussed with provider and friends or family    Comprehension: needs reinforcement and verbalizes understanding     Expected Compliance: good  Recommendations  Pt is an appropriate candidate for surgery. Yes  Pt should make the following changes and then be reassessed for weight loss surgery:   Pt needs to be nicotine free prior to surgery.  Pt with negative nicotine test 2024    Workflow: (Incomplete in Bold):  Psych and/or D+A Clearance: n/a  Blood Work:done   PCP letter: done  Surgeon Appt:DONE  EGD: DONE  Cardiac Risk Assessment with [id number]  Sleep Studies: cpap  Nephrology done  Hematology : 2024  MWM  10/4/2024- on Ozempic   Nicotine test: negative 2024- will need second test 3 1/2 weeks prior to surgery date   Pre-Operative Program: n/a  NAFLD Score Calculated: yes  Hepatology consult: n/a    Evaluation / Monitoring  Dietitian to Monitor: Eating pattern as discussed Tolerance of nutrition prescription Body weight Lab values Physical activity Bowel pattern    Goals  Start pre-op liver shrinking diet    Time Spent:   20 minutes

## 2025-04-17 NOTE — H&P
BARIATRIC H&P - BARIATRIC SURGERY  Nasir Story Jr. 64 y.o. male MRN: [id number]  Unit/Bed#:  Encounter: [id number]      HPI:  Nasir Story Jr. is a 64 y.o. male who presents with a long-standing history of morbid obesity.      He was found to be a good candidate to undergo a bariatric operation upon being enrolled here at [facility].      He is here today to discuss details of his surgery.    Review of Systems   All other systems reviewed and are negative.      Historical Information   Past Medical History:   Diagnosis Date    Abnormal CBC 01/20/2020    Acne     Allergic     Anemia 10/28/2019    Anxiety     Arthritis     Back pain     Chronic kidney disease     Chronic pain     Colon polyp     CPAP (continuous positive airway pressure) dependence     Dental cavity 08/10/2017    Depression 06/07/2021    Diabetes mellitus (HCC)     6/19/15 A1C: 5.5%    Eczema     Fall 10/16/2023    Flu vaccine need 10/16/2023    Grief reaction 09/10/2019    Hyperlipidemia     Hypertension     Knee pain     Morbid obesity with BMI of 50.0-59.9, adult (HCC)     Obesity     IMELDA on CPAP     Pain of intrathecal infusion pump pocket after insertion (HCC)     Per pt no longer works. Having insurance issues so it remains in place.    Psychiatric disorder     Screening for prostate cancer 11/28/2018    Skin tag     Suspected sleep apnea 08/18/2023    Type 2 diabetes mellitus without complication (HCC) 07/16/2014    Urinary retention      Past Surgical History:   Procedure Laterality Date    BACK SURGERY      COLONOSCOPY      LUMBAR FUSION  2002    LUMBAR FUSION  2004    L4-5-6, S1 has pump for narcotics- per pt pain pump no longer (since 2020) works but it remains in place.     Social History   Social History     Substance and Sexual Activity   Alcohol Use Not Currently    Comment: occasional     Social History     Substance and Sexual Activity   Drug Use No    Comment: History of drug use, meena, cocaine, heroind, no  "IVDA-as per [employer]     Social History     Tobacco Use   Smoking Status Former    Types: Cigars    Start date:     Quit date: 4/15/2024    Years since quittin.0    Passive exposure: Current   Smokeless Tobacco Never   Tobacco Comments    Quit cigarette smoking in .     Patient reports that he last smoked a cigar close to 2 years ago.      Family History: Family history non-contributory    Meds/Allergies   all medications and allergies reviewed  Allergies   Allergen Reactions    Gabapentin      Annotation - 2015: dizziness    Pregabalin      Annotation - 2015: vision changes and mood changes    Tramadol Nausea Only       Objective     Current Vitals:   Blood Pressure: 108/72 (25 1532)  Pulse: 78 (25 153)  Temperature: 97.8 °F (36.6 °C) (25)  Temp Source: Tympanic (25)  Height: 5' 9\" (175.3 cm) (25)  Weight - Scale: (!) 139 kg (306 lb 3.2 oz) (25)      Invasive Devices       None                   Physical Exam  Vitals and nursing note reviewed.   Constitutional:       Appearance: Normal appearance. He is well-developed.   HENT:      Head: Normocephalic and atraumatic.      Nose: Nose normal.   Eyes:      General: No scleral icterus.        Right eye: No discharge.         Left eye: No discharge.      Conjunctiva/sclera: Conjunctivae normal.   Cardiovascular:      Rate and Rhythm: Normal rate and regular rhythm.      Heart sounds: Normal heart sounds.   Pulmonary:      Effort: Pulmonary effort is normal.      Breath sounds: Normal breath sounds. No stridor. No wheezing or rales.   Chest:      Chest wall: No tenderness.   Abdominal:      General: Bowel sounds are normal.      Palpations: Abdomen is soft.      Tenderness: There is no abdominal tenderness. There is no guarding or rebound.      Comments: Abdomen is obese, soft and benign.     Musculoskeletal:         General: Normal range of motion.      Cervical back: Normal range of motion " and neck supple.   Lymphadenopathy:      Cervical: No cervical adenopathy.   Skin:     General: Skin is warm and dry.      Findings: No erythema or rash.   Neurological:      Mental Status: He is alert and oriented to person, place, and time.   Psychiatric:         Behavior: Behavior normal.         Thought Content: Thought content normal.         Judgment: Judgment normal.         Lab Results: I have personally reviewed pertinent lab results.    Imaging: Results Review Statement: No pertinent imaging studies reviewed.  EKG, Pathology, and Other Studies: Results Review Statement: No pertinent imaging studies reviewed.  The endoscopy showed gastritis with some retained gastric contents..  The biopsies revealed  Final Diagnosis  A. Stomach, biopsy:  - Gastric mucosa with mild vascular congestion.  - Negative for intestinal metaplasia and dysplasia.   - H. pylori organisms are not identified on H&E slide        Assessment/PLAN:    64 y.o. male morbidly obese found to be a good candidate to undergo a weight loss operation upon being enrolled here at the [facility].      Patient has a long history of morbid obesity and is presenting to discuss the surgical weight loss options. Despite the patient best efforts patient was unable to lose any meaningful or sustainable weight using nonsurgical means.We had a long discussion regarding all the surgical weight-loss options at our disposal at this point and reviewed the risks and benefits of each procedure in details as it relates to his age, BMI and medical conditions.    He has been pre certified to undergo a Laparoscopic sleeve gastrectomy.  We have discussed the 14%-20% incidence of post op heartburn after the sleeve gastrectomy and the fact that if this cannot be controlled with medication or conservative measures it might need to be converted to a Farooq-en-Y gastric bypass.    We have also discussed the fact that the patient needs to be followed up  postoperatively and potentially get screening endoscopies in the future to rule out any development of pathology as a result of the sleeve gastrectomy.      Here today to review his pre op test results.      Has been medically cleared for the procedure.    ++++++++++++++++++++++  Patient will stop his Eliquis 5-7 days prior to surgery  Patient has IMELDA and wears a CPAP machine.  ++++++++++++++++++++++    I have discussed with him at length the risks and benefits of the operation and reiterated the components of our multidisciplinary program and the importance of compliance and follow up in the post operative period. Although there is a great statistical chance of improvement or even resolution of most of his associated comorbidities, the results vary from patient to patient and they largely depend on his commitment.     The patient was also instructed with regards to the importance of behavior modification, nutritional counseling, support meeting attendance and lifestyle changes that are important to ensure success.    I have explained and reviewed the instructions for stopping or tapering anti-obesity medications prior to surgery.  He was given the opportunity to ask questions and I have answered all of them.    I have addressed with the patient the level of CODE STATUS for this hospital stay and after explaining the different options currently he wishes to be a Level I.     He understands and wishes to proceed.    He has lost all the weight required prior to surgery.

## 2025-04-17 NOTE — ASSESSMENT & PLAN NOTE
Currently on Eliquis  As per Dr. Jerry Rodriguez on 11/2024  Plan:  No additional diagnostic workup for hypercoagulability indicated  Patient to hold systemic anticoagulation with apixaban for 7 days before gastric surgery.  He will re-initiate apixaban 1 day after surgery and continue lifelong systemic anticoagulation thereafter  No indication for bridging systemic anticoagulation with agents with a shorter half-life such as enoxaparin during the time off apixaban  Return to hematology clinic as needed

## 2025-04-17 NOTE — ASSESSMENT & PLAN NOTE
64 y.o. male morbidly obese found to be a good candidate to undergo a weight loss operation upon being enrolled here at the [facility].      Patient has a long history of morbid obesity and is presenting to discuss the surgical weight loss options. Despite the patient best efforts patient was unable to lose any meaningful or sustainable weight using nonsurgical means.We had a long discussion regarding all the surgical weight-loss options at our disposal at this point and reviewed the risks and benefits of each procedure in details as it relates to his age, BMI and medical conditions.    He has been pre certified to undergo a Laparoscopic sleeve gastrectomy.  We have discussed the 14%-20% incidence of post op heartburn after the sleeve gastrectomy and the fact that if this cannot be controlled with medication or conservative measures it might need to be converted to a Farooq-en-Y gastric bypass.    We have also discussed the fact that the patient needs to be followed up postoperatively and potentially get screening endoscopies in the future to rule out any development of pathology as a result of the sleeve gastrectomy.      Here today to review his pre op test results.      Has been medically cleared for the procedure.    ++++++++++++++++++++++  Patient has IMELDA and wears a CPAP machine.  ++++++++++++++++++++++    I have discussed with him at length the risks and benefits of the operation and reiterated the components of our multidisciplinary program and the importance of compliance and follow up in the post operative period. Although there is a great statistical chance of improvement or even resolution of most of his associated comorbidities, the results vary from patient to patient and they largely depend on his commitment.     The patient was also instructed with regards to the importance of behavior modification, nutritional counseling, support meeting attendance and lifestyle changes that are  important to ensure success.    I have explained and reviewed the instructions for stopping or tapering anti-obesity medications prior to surgery.  He was given the opportunity to ask questions and I have answered all of them.    I have addressed with the patient the level of CODE STATUS for this hospital stay and after explaining the different options currently he wishes to be a Level I.     He understands and wishes to proceed.    He has lost all the weight required prior to surgery.

## 2025-04-17 NOTE — H&P (VIEW-ONLY)
BARIATRIC H&P - BARIATRIC SURGERY  Nasir Story Jr. 64 y.o. male MRN: [id number]  Unit/Bed#:  Encounter: [id number]      HPI:  Nasir Stoyr Jr. is a 64 y.o. male who presents with a long-standing history of morbid obesity.      He was found to be a good candidate to undergo a bariatric operation upon being enrolled here [facility].      He is here today to discuss details of his surgery.    Review of Systems   All other systems reviewed and are negative.      Historical Information   Past Medical History:   Diagnosis Date    Abnormal CBC 01/20/2020    Acne     Allergic     Anemia 10/28/2019    Anxiety     Arthritis     Back pain     Chronic kidney disease     Chronic pain     Colon polyp     CPAP (continuous positive airway pressure) dependence     Dental cavity 08/10/2017    Depression 06/07/2021    Diabetes mellitus (HCC)     6/19/15 A1C: 5.5%    Eczema     Fall 10/16/2023    Flu vaccine need 10/16/2023    Grief reaction 09/10/2019    Hyperlipidemia     Hypertension     Knee pain     Morbid obesity with BMI of 50.0-59.9, adult (HCC)     Obesity     IMELDA on CPAP     Pain of intrathecal infusion pump pocket after insertion (HCC)     Per pt no longer works. Having insurance issues so it remains in place.    Psychiatric disorder     Screening for prostate cancer 11/28/2018    Skin tag     Suspected sleep apnea 08/18/2023    Type 2 diabetes mellitus without complication (HCC) 07/16/2014    Urinary retention      Past Surgical History:   Procedure Laterality Date    BACK SURGERY      COLONOSCOPY      LUMBAR FUSION  2002    LUMBAR FUSION  2004    L4-5-6, S1 has pump for narcotics- per pt pain pump no longer (since 2020) works but it remains in place.     Social History   Social History     Substance and Sexual Activity   Alcohol Use Not Currently    Comment: occasional     Social History     Substance and Sexual Activity   Drug Use No    Comment: History of drug use, meena, cocaine, heroind, no  "IVDA-as per [employer]     Social History     Tobacco Use   Smoking Status Former    Types: Cigars    Start date:     Quit date: 4/15/2024    Years since quittin.0    Passive exposure: Current   Smokeless Tobacco Never   Tobacco Comments    Quit cigarette smoking in .     Patient reports that he last smoked a cigar close to 2 years ago.      Family History: Family history non-contributory    Meds/Allergies   all medications and allergies reviewed  Allergies   Allergen Reactions    Gabapentin      Annotation - 2015: dizziness    Pregabalin      Annotation - 2015: vision changes and mood changes    Tramadol Nausea Only       Objective     Current Vitals:   Blood Pressure: 108/72 (25 1532)  Pulse: 78 (25 153)  Temperature: 97.8 °F (36.6 °C) (25)  Temp Source: Tympanic (25)  Height: 5' 9\" (175.3 cm) (25)  Weight - Scale: (!) 139 kg (306 lb 3.2 oz) (25)      Invasive Devices       None                   Physical Exam  Vitals and nursing note reviewed.   Constitutional:       Appearance: Normal appearance. He is well-developed.   HENT:      Head: Normocephalic and atraumatic.      Nose: Nose normal.   Eyes:      General: No scleral icterus.        Right eye: No discharge.         Left eye: No discharge.      Conjunctiva/sclera: Conjunctivae normal.   Cardiovascular:      Rate and Rhythm: Normal rate and regular rhythm.      Heart sounds: Normal heart sounds.   Pulmonary:      Effort: Pulmonary effort is normal.      Breath sounds: Normal breath sounds. No stridor. No wheezing or rales.   Chest:      Chest wall: No tenderness.   Abdominal:      General: Bowel sounds are normal.      Palpations: Abdomen is soft.      Tenderness: There is no abdominal tenderness. There is no guarding or rebound.      Comments: Abdomen is obese, soft and benign.     Musculoskeletal:         General: Normal range of motion.      Cervical back: Normal range of motion " and neck supple.   Lymphadenopathy:      Cervical: No cervical adenopathy.   Skin:     General: Skin is warm and dry.      Findings: No erythema or rash.   Neurological:      Mental Status: He is alert and oriented to person, place, and time.   Psychiatric:         Behavior: Behavior normal.         Thought Content: Thought content normal.         Judgment: Judgment normal.         Lab Results: I have personally reviewed pertinent lab results.    Imaging: Results Review Statement: No pertinent imaging studies reviewed.  EKG, Pathology, and Other Studies: Results Review Statement: No pertinent imaging studies reviewed.  The endoscopy showed gastritis with some retained gastric contents..  The biopsies revealed  Final Diagnosis  A. Stomach, biopsy:  - Gastric mucosa with mild vascular congestion.  - Negative for intestinal metaplasia and dysplasia.   - H. pylori organisms are not identified on H&E slide        Assessment/PLAN:    64 y.o. male morbidly obese found to be a good candidate to undergo a weight loss operation upon being enrolled here at the [facility].      Patient has a long history of morbid obesity and is presenting to discuss the surgical weight loss options. Despite the patient best efforts patient was unable to lose any meaningful or sustainable weight using nonsurgical means.We had a long discussion regarding all the surgical weight-loss options at our disposal at this point and reviewed the risks and benefits of each procedure in details as it relates to his age, BMI and medical conditions.    He has been pre certified to undergo a Laparoscopic sleeve gastrectomy.  We have discussed the 14%-20% incidence of post op heartburn after the sleeve gastrectomy and the fact that if this cannot be controlled with medication or conservative measures it might need to be converted to a Farooq-en-Y gastric bypass.    We have also discussed the fact that the patient needs to be followed up  postoperatively and potentially get screening endoscopies in the future to rule out any development of pathology as a result of the sleeve gastrectomy.      Here today to review his pre op test results.      Has been medically cleared for the procedure.    ++++++++++++++++++++++  Patient will stop his Eliquis 5-7 days prior to surgery  Patient has IMELDA and wears a CPAP machine.  ++++++++++++++++++++++    I have discussed with him at length the risks and benefits of the operation and reiterated the components of our multidisciplinary program and the importance of compliance and follow up in the post operative period. Although there is a great statistical chance of improvement or even resolution of most of his associated comorbidities, the results vary from patient to patient and they largely depend on his commitment.     The patient was also instructed with regards to the importance of behavior modification, nutritional counseling, support meeting attendance and lifestyle changes that are important to ensure success.    I have explained and reviewed the instructions for stopping or tapering anti-obesity medications prior to surgery.  He was given the opportunity to ask questions and I have answered all of them.    I have addressed with the patient the level of CODE STATUS for this hospital stay and after explaining the different options currently he wishes to be a Level I.     He understands and wishes to proceed.    He has lost all the weight required prior to surgery.

## 2025-04-17 NOTE — ASSESSMENT & PLAN NOTE
Currently on Ozempic  I have discussed with the patient that there is a significant chance of improvement or even resolution of this condition after metabolic/bariatric surgery.  Continue management as per primary care team.    Lab Results   Component Value Date    HGBA1C 6.1 (H) 07/19/2024

## 2025-04-17 NOTE — ASSESSMENT & PLAN NOTE
Asper Dr. Nunez's note from 2/27/2025  From a renal standpoint the patient is renally optimized for surgery with the following recommendations:  Fluids to administer: Normal Saline 250 cc total over 2 hours   Medication Recommendations:  Minimize any IV contrast use (If IV contrast is used, please check BMP POD # 1)  Hold NSAIDs for at least 10 days prior to surgery  Hold losartan starting on the day of the surgery  Hold spironolactone  starting on the day of surgery/procedure  Hemodynamic Recommendations:  Ideally, target MAPs greater than 65 mmHg in the perioperative period, and minimize operative time with MAPs < 65 mmHg.   Avoid intraop hemodynamic instability to decrease risk for NORRIS occurrence.  Other Recommendations:  Please consult the Nephrology team when the patient is admitted   Lab Results   Component Value Date    EGFR 37 02/26/2025    EGFR 47 11/18/2024    EGFR 37 10/22/2024    CREATININE 1.86 (H) 02/26/2025    CREATININE 1.52 (H) 11/18/2024    CREATININE 1.84 (H) 10/22/2024

## 2025-04-18 DIAGNOSIS — E66.01 MORBID OBESITY WITH BMI OF 45.0-49.9, ADULT (HCC): Primary | ICD-10-CM

## 2025-04-18 NOTE — TELEPHONE ENCOUNTER
PO Meds for for  Gastrectomy  Sleeve  Laparoscopic with  Intraoperative  EGD  on 04/28/2025  with Dr Glover

## 2025-04-19 RX ORDER — OMEPRAZOLE 20 MG/1
20 CAPSULE, DELAYED RELEASE ORAL DAILY
Qty: 90 CAPSULE | Refills: 1 | Status: SHIPPED | OUTPATIENT
Start: 2025-04-19

## 2025-04-21 ENCOUNTER — APPOINTMENT (OUTPATIENT)
Dept: LAB | Facility: CLINIC | Age: 65
End: 2025-04-21
Payer: COMMERCIAL

## 2025-04-21 DIAGNOSIS — N18.32 STAGE 3B CHRONIC KIDNEY DISEASE (HCC): ICD-10-CM

## 2025-04-21 DIAGNOSIS — R73.03 PRE-DIABETES: ICD-10-CM

## 2025-04-21 LAB
BACTERIA UR QL AUTO: NORMAL /HPF
BILIRUB UR QL STRIP: NEGATIVE
CLARITY UR: CLEAR
COLOR UR: NORMAL
CREAT UR-MCNC: 76.6 MG/DL
GLUCOSE UR STRIP-MCNC: NEGATIVE MG/DL
HGB UR QL STRIP.AUTO: NEGATIVE
KETONES UR STRIP-MCNC: NEGATIVE MG/DL
LEUKOCYTE ESTERASE UR QL STRIP: NEGATIVE
NITRITE UR QL STRIP: NEGATIVE
NON-SQ EPI CELLS URNS QL MICRO: NORMAL /HPF
PH UR STRIP.AUTO: 5.5 [PH]
PROT UR STRIP-MCNC: NEGATIVE MG/DL
PROT UR-MCNC: 5 MG/DL
PROT/CREAT UR: 0.1 MG/G{CREAT}
RBC #/AREA URNS AUTO: NORMAL /HPF
SP GR UR STRIP.AUTO: 1.02 (ref 1–1.03)
UROBILINOGEN UR STRIP-ACNC: <2 MG/DL
WBC #/AREA URNS AUTO: NORMAL /HPF

## 2025-04-22 ENCOUNTER — RESULTS FOLLOW-UP (OUTPATIENT)
Dept: UROLOGY | Facility: AMBULATORY SURGERY CENTER | Age: 65
End: 2025-04-22

## 2025-04-22 DIAGNOSIS — R97.20 INCREASED PROSTATE SPECIFIC ANTIGEN (PSA) VELOCITY: Primary | ICD-10-CM

## 2025-04-23 ENCOUNTER — OFFICE VISIT (OUTPATIENT)
Dept: NEPHROLOGY | Facility: CLINIC | Age: 65
End: 2025-04-23
Payer: COMMERCIAL

## 2025-04-23 VITALS
HEIGHT: 69 IN | WEIGHT: 300 LBS | HEART RATE: 82 BPM | BODY MASS INDEX: 44.43 KG/M2 | SYSTOLIC BLOOD PRESSURE: 122 MMHG | DIASTOLIC BLOOD PRESSURE: 74 MMHG

## 2025-04-23 DIAGNOSIS — N18.32 STAGE 3B CHRONIC KIDNEY DISEASE (HCC): Primary | ICD-10-CM

## 2025-04-23 PROCEDURE — 99214 OFFICE O/P EST MOD 30 MIN: CPT | Performed by: INTERNAL MEDICINE

## 2025-04-23 NOTE — TELEPHONE ENCOUNTER
Ok per consent to leave v/m    Message left relaying note below and office number to call with any questions or concerns         ----- Message from Collins Power PA-C sent at 4/22/2025  5:07 PM EDT -----  Please call the patient advise him I reviewed his most recent PSA returned at a value of 1.982.  This is not greater than a whole point increase in 1 year.  We Concern with this increase in PSA velocity.  Should plan for repeat PSA in 3 months to further trend PSA.  I placed a new PSA in the patient's chart to be completed in 3 months.

## 2025-04-23 NOTE — ASSESSMENT & PLAN NOTE
Lab Results   Component Value Date    EGFR 42 04/21/2025    EGFR 37 02/26/2025    EGFR 47 11/18/2024    CREATININE 1.66 (H) 04/21/2025    CREATININE 1.86 (H) 02/26/2025    CREATININE 1.52 (H) 11/18/2024       Orders:    Basic metabolic panel; Future    Phosphorus; Future    Magnesium; Future    CBC; Future

## 2025-04-23 NOTE — LETTER
2025     Keo Mora MD  [address]  [address]  [facility] [address]    Patient: Nasir Story Jr.   YOB: 1960   Date of Visit: 2025       Dear MD Noé Ramirez MD:    Thank you for referring Nasir Story to me for evaluation. Below are my notes for this consultation.    If you have questions, please do not hesitate to call me. I look forward to following your patient along with you.         Sincerely,        Ja Nunez MD        CC: MD Ja Marks MD  2025  3:08 PM  Sign when Signing Visit  Name: Nasir Story Jr.      : 1960      MRN: [id number]  Encounter Provider: Ja Nunez MD  Encounter Date: 2025   Encounter department: [facility]  :  Assessment & Plan  Stage 3b chronic kidney disease (HCC)  Lab Results   Component Value Date    EGFR 42 2025    EGFR 37 2025    EGFR 47 2024    CREATININE 1.66 (H) 2025    CREATININE 1.86 (H) 2025    CREATININE 1.52 (H) 2024       Orders:  •  Basic metabolic panel; Future  •  Phosphorus; Future  •  Magnesium; Future  •  CBC; Future        History of Present Illness  HPI  Nasir Story Jr. is a 64 y.o. male who presents for follow-up visit for CKD stage III.  Patient is preparing for his bariatric sleeve surgery next week.  Has been on a strict diet given by bariatric team.  Mainly vegetables, liquids, protein shakes.. Eating more broccoli to get ready for bariatric surgery next week. This could explain the higher phos.  And we reviewed this.  He otherwise feels well overall.  He states as he has been losing weight with the current dietary program with bariatric team he has been feeling much better overall.  Over this past year has been having more energy.  Able to ambulate better.  History obtained from: patient    Review of Systems   Constitutional: Negative.  Negative for  appetite change and fatigue.   HENT: Negative.     Eyes: Negative.    Respiratory: Negative.  Negative for shortness of breath.    Cardiovascular: Negative.  Negative for leg swelling.   Gastrointestinal: Negative.    Endocrine: Negative.    Genitourinary: Negative.  Negative for difficulty urinating.   Musculoskeletal: Negative.    Allergic/Immunologic: Negative.    Neurological: Negative.    Hematological: Negative.    Psychiatric/Behavioral: Negative.     All other systems reviewed and are negative.    Pertinent Medical History    63 yo male retired , formerly from [place], HTN (15 years), DM (years), back surgery 10 years ago, diastolic CHF, pulmonary embolism diagnosed in June 2023, who presents for follow up evaluation of hypertension.  patient has had sporadic follow-up but has been slightly more consistent with follow-up since end of last year.  I last saw the patient in August 2018 and then patient followed up with our advanced practitioner.  Patient returns today for follow-up visit      Patient has stopped using NSAIDs since 2019      Since last being seen, patient was seen by her practitioner in June 2023.  Blood pressures were controlled.  Creatinine was at baseline.     Patient was admitted to the hospital worsening shortness of breath in June 2023.  There was concern for possible URI.  Patient was found to have pulmonary embolism on repeat CT scan and was started on Eliquis     Saw pulmonary team August 2023 and was advised to continue 6 months full dose Eliquis.  It was felt to be provoked in the setting of inactivity.  Was also advised to have sleep study..     1) HTN - patient is on losartan, metoprolol, spironolactone, alfuzosin     -prior renin and aldosterone level with a renin level suppressed, aldosterone level is not significantly elevated  - prior metanephrine unrevealing  - prior CT scan with unremarkable adrenals  - renal Doppler study in November 2019 with no significant  occlusive disease.  - during visit in march clonidine was tapered off, chlorthalidone was increased chlorthalidone to 25 mg daily  -  Creatinine stable in May 2021 at 1.3 mg/dL.  Blood pressures are appropriate.  Patient is still gaining weight.  -February 2022 appointment-no lab work since October.  Creatinine in October was rising and patient has not gone for follow-up lab work.  -September 2022-hold doxazosin due to hypotension  - February 2023-no changes to antihypertensives.  Add tamsulosin.  Renal ultrasound with slightly increased cyst size but otherwise benign appearing.  - December 2023-creatinine stable 1.7 mg/dL.  Electrolytes are stable and appropriate with low vitamin D level.  Parathyroid level is appropriate.  Protein creatinine ratio is stable at 21.  - May 2024-spironolactone reduced to 12 and half milligrams per day due to hyperkalemia  - August 2024-patient was starting to have low blood pressures to as low as 98 systolic.  We held amlodipine completely at that time.  - August 26, 2024-losartan reduced to 50 mg daily from 100 mg daily due to continued marginal blood pressures.  - 2/2025 - pt reports that he has not been on chlorthalidone. When I reviewed prior records, last visit we were to stop the spironolactone but it appears pt has held chlorthalidone and maintained spironolactone at 12.5 mg once daily and losartan 50 mg once daily and metoprolol 100 BID. BP well controlled.  - 4/23/2025-patient's blood pressures are well-controlled on current regimen     Overall, patient presents for follow-up appointment on April 23, 2025.  Creatinine stable at baseline.  Phosphorus borderline above goal but this is likely due to dietary intake.  This is due to bariatric diet that he is currently requiring for surgery next week.  From renal standpoint patient is renal risk optimized for surgery next week and we had sent the clearance to his surgery team after I saw him last visit.  I will notify the  bariatric team again after this visit.  For now no changes in the prior recommendations     Plan:    - PSA In 3 month per urology team  - renal u/s early 2026 to f/u renal cyst  - cont losartan and spironolactone  - Can hold losartan, spironolactone, magnesium on the day of surgery  - Asked the patient to lower vitamin D supplement to 1 tablet once a week.  He states that he was taking 45,000 units once a week.  We have prescribed him 50,000 units weekly in the past but he states that when he ran out, he had found online prescription for 15,000 unit tablet and he had combine these for 3 tablets a week.  His vitamin D level has appropriately improved to 29.  - I asked him to reduce starting next week to 1 tablet of the vitamin D supplement per week.  It is unclear if it is truly 15,000 units and he will go home and check and will send [employer] message to me.  Ultimately the bariatric vitamin does have vitamin D in it and therefore we need to monitor for overload with vitamin D and therefore empirically reducing his supplement until we can confirm what dose he is actually taking  - Repeat lab work 1 week after bariatric surgery  - I have updated bariatric surgeon from renal standpoint    2) proteinuria -  Stable U PCR 0.1     - if elevated, will check SPEP, UPEP, FLC     3) CKD III with baseline creatinine 1.4 to 1.7 mg/dL and has now progressed to approximately 2 mg/dL-etiology likely multifactorial in the setting of hypertensive nephropathy, elevated BMI and potential nodular secondary sclerosis     -renal ultrasound in September 2018 was septated cyst bilaterally with mildly increased on the right kidney.  And a calcified cystic foci in the left kidney  - Repeat renal ultrasound April 2024 bilateral renal cysts without suspicious features.  - creat 2.2 in august. Held losartan for 2 days and restarted. Inc fluid intake.   - 8/17 - chlorthalidone reduced to 12.5 mg daily as creat improving but not back to baseline  -   05/2021-creatinine improving to baseline 1.3 mg/dL.  -September 2022-creatinine 1.6 mg/dL.  Holding doxazosin.  - February 2023-creatinine remains relatively stable 1.7 mg/dL  - 12/2023 - creat 1.7 mg/dL, stable. Electrolytes stable.  - May 2024-creatinine was rising above baseline to 2 mg/dL possibly in the setting of Ozempic and trial of holding Ozempic was recommended and subsequently restarted  - September 6, 2024-lab work from August 29 shows stable creatinine likely at new baseline.  Last hemoglobin A1c is improved 6.1%  - 2/2025 - creat stable at baseline 1.8 mg/dL  - April 2025-creatinine remains at baseline 1.6 mg/dL.  A1c is improving.  Is losing weight.     4) grief counseling-     -patient's mood is improving overall     5) Vit D deficiency     - vit D 11.7 12/2023  - PTH - 76.2 December 2023  - recheck Vit D low 11/2024 12.5 so was started on ergocalciferol 73403 units for 8 weeks one time weekly and then 2000 units once daily  - Vitamin D is improved April 20 25-29  - Patient was post to start 2000 units once a day after finishing 8 weeks of vitamin D.  He inadvertently was taking 15,000 units 3 tablets a week?  That he found at another pharmacy over-the-counter?  For now I have asked him to reduce to 1 tablet a week until we can confirm what dose he is actually taking.  His vitamin D level is improved appropriately to 29.     6) osteoarthritis of knees - sees Ortho team. PT and cortisone inj started 1/2020     - not candidate per ortho for knee surgery due to elevated BMI  - I have referred pt to [facility] bariatric for evaluation for elevated BMI     7) DM     - A1c improving     8) elevated BMI--     -Patient reestablished with bariatric team since last visit  - Was advised for Ozempic  - Also saw bariatric surgeon and bariatric program.  Patient is interested in surgical intervention.  - weight peak 346 8/2023 and weight is now 300 lbs. Pt states knee pain has improved.   - completed testing  -  completed colonoscopy, EGD, Cardio eval, Pulm eval.  - Pending bariatric surgery next week  - Weight has improved significantly with dietary changes     9)  renal cysts-monitor with repeat ultrasound early 2026     10-nocturia-possibly in setting of BPH.  Worsened after holding doxazosin.  pt did not tolerate tamsulosin? But now believe its more due to side effect of abx. So restart tamsulosin 12/13/2023.  Is now transition to alpha Zosyn by urology     11 - hyponatremia     - stable and appropriate  - appropriate. Was on chlorthalidone prior. Tolerated it well. But we held it when pt became hypotensive. And has not needed to restart it since.      12-bilateral subsegmental PE June 2023-follows with pulmonary team.  Completed 6 months of Eliquis.  Saw pulmonary team March 2024.  Will continue Eliquis but okay to hold for procedures per review of pulmonary team notes     13-sleep apnea-saw pulmonary team.  Is now using CPAP in July 2024     14- poor dentition     -cont dental f/u     14-age-appropriate cancer screening-was advised to complete colonoscopy at GI visit July 2024     53-ANM-aeyoelb with urology team did not tolerate Flomax in the past.  On August 14 appointment was trialed on alpha Zosyn.     51-ioofemkzquzeok-zo magnesium supplement starting September 5, 2024.  And magnesium appropriate he will check lab work 1 week after surgery            Medical History Reviewed by provider this encounter:     .  Past Medical History  Past Medical History:   Diagnosis Date   • Abnormal CBC 01/20/2020   • Acne    • Allergic    • Anemia 10/28/2019   • Anxiety    • Arthritis    • Back pain    • Chronic kidney disease    • Chronic pain    • Colon polyp    • CPAP (continuous positive airway pressure) dependence    • Dental cavity 08/10/2017   • Depression 06/07/2021   • Diabetes mellitus (HCC)     6/19/15 A1C: 5.5%   • Eczema    • Fall 10/16/2023   • Flu vaccine need 10/16/2023   • Grief reaction 09/10/2019   •  Hyperlipidemia    • Hypertension    • Knee pain    • Morbid obesity with BMI of 50.0-59.9, adult ([facility])    • Neuropathy in diabetes ([facility])    • Obesity    • IMELDA on CPAP    • Pain of intrathecal infusion pump pocket after insertion ([facility])     Per pt no longer works. Having insurance issues so it remains in place.   • Psychiatric disorder    • Screening for prostate cancer 11/28/2018   • Skin tag    • Suspected sleep apnea 08/18/2023   • Type 2 diabetes mellitus without complication ([facility]) 07/16/2014   • Urinary retention      Past Surgical History:   Procedure Laterality Date   • BACK SURGERY     • COLONOSCOPY     • LUMBAR FUSION  2002   • LUMBAR FUSION  2004    L4-5-6, S1 has pump for narcotics- per pt pain pump no longer (since 2020) works but it remains in place.     Family History   Problem Relation Age of Onset   • Other Mother         colitis   • Diabetes Mother       reports that he quit smoking about a year ago. His smoking use included cigars. He started smoking about 7 years ago. He has been exposed to tobacco smoke. He has never used smokeless tobacco. He reports that he does not currently use alcohol. He reports that he does not use drugs.  Current Outpatient Medications   Medication Instructions   • alfuzosin (UROXATRAL) 10 mg, Oral, Daily   • atorvastatin (LIPITOR) 20 mg, Oral, Daily   • benzonatate (TESSALON) 200 mg, Oral, Daily at bedtime PRN   • Eliquis 5 mg, Oral, 2 times daily   • ergocalciferol (ERGOCALCIFEROL) 50,000 Units, Oral, Weekly   • glucose blood (FREESTYLE LITE) test strip 1 each, Other, Daily   • Lancets (freestyle) lancets Use as instructed   • losartan (COZAAR) 100 mg, Oral, Daily   • magnesium Oxide (MAG-OX) 400 mg, Oral, 2 times daily   • metoprolol tartrate (LOPRESSOR) 100 mg, Oral, 2 times daily   • morphine (MS CONTIN) 15 mg 12 hr tablet As needed   • omeprazole (PRILOSEC) 20 mg, Oral, Daily   • omeprazole (PRILOSEC) 20 mg, Oral, Daily   • oxyCODONE (ROXICODONE) 30 MG immediate  release tablet As needed   • Ozempic (2 MG/DOSE) 2 mg, Subcutaneous, Every 7 days   • polyethylene glycol (GOLYTELY) 4000 mL solution 4,000 mL, Oral, Once, Colonoscopy prep   • polyethylene glycol (MIRALAX) 17 g, Oral, Daily   • spironolactone (ALDACTONE) 12.5 mg, Daily     Allergies   Allergen Reactions   • Gabapentin      Annotation - [id number]: dizziness   • Pregabalin      Annotation - [id number]: vision changes and mood changes   • Tramadol Nausea Only      Current Outpatient Medications on File Prior to Visit   Medication Sig Dispense Refill   • alfuzosin (UROXATRAL) 10 mg 24 hr tablet TAKE 1 TABLET BY MOUTH DAILY 30 tablet 5   • atorvastatin (LIPITOR) 20 mg tablet TAKE ONE TABLET BY MOUTH EVERY DAY 90 tablet 1   • Eliquis 5 MG TAKE 1 TABLET BY MOUTH TWO TIMES A DAY 60 tablet 5   • ergocalciferol (ERGOCALCIFEROL) 1.25 MG (05926 UT) capsule Take 1 capsule (50,000 Units total) by mouth once a week 8 capsule 0   • glucose blood (FREESTYLE LITE) test strip Use 1 each in the morning 100 each 0   • Lancets (freestyle) lancets Use as instructed 100 each 0   • losartan (COZAAR) 100 MG tablet TAKE ONE TABLET BY MOUTH EVERY DAY 90 tablet 1   • magnesium Oxide (MAG-OX) 400 mg TABS Take 1 tablet (400 mg total) by mouth 2 (two) times a day     • morphine (MS CONTIN) 15 mg 12 hr tablet as needed  0   • omeprazole (PriLOSEC) 20 mg delayed release capsule Take 1 capsule (20 mg total) by mouth daily Do not start before April 8, 2025. 90 capsule 1   • omeprazole (PriLOSEC) 20 mg delayed release capsule Take 1 capsule (20 mg total) by mouth daily 90 capsule 1   • oxyCODONE (ROXICODONE) 30 MG immediate release tablet if needed  0   • Ozempic, 2 MG/DOSE, 8 MG/3ML injection pen INJECT 0.75ML UNDER THE SKIN EVERY 7 DAYS 3 mL 3   • spironolactone (ALDACTONE) 25 mg tablet Take 12.5 mg by mouth daily     • benzonatate (TESSALON) 200 MG capsule Take 1 capsule (200 mg total) by mouth daily at bedtime as needed for cough (Patient not  "taking: Reported on 2025) 20 capsule 0   • metoprolol tartrate (LOPRESSOR) 100 mg tablet TAKE ONE TABLET BY MOUTH TWICE A  tablet 1   • polyethylene glycol (GOLYTELY) 4000 mL solution Take 4,000 mL by mouth once for 1 dose Colonoscopy prep (Patient not taking: Reported on 2025) 4000 mL 0   • polyethylene glycol (MIRALAX) 17 g packet Take 17 g by mouth daily (Patient not taking: Reported on 2025) 510 g 5     No current facility-administered medications on file prior to visit.      Social History     Tobacco Use   • Smoking status: Former     Types: Cigars     Start date:      Quit date: 4/15/2024     Years since quittin.0     Passive exposure: Current   • Smokeless tobacco: Never   • Tobacco comments:     Quit cigarette smoking in .      Patient reports that he last smoked a cigar close to 2 years ago.    Vaping Use   • Vaping status: Never Used   Substance and Sexual Activity   • Alcohol use: Not Currently     Comment: occasional   • Drug use: No     Comment: History of drug use, meena, cocaine, heroind, no IVDA-as per [employer]   • Sexual activity: Yes     Partners: Female     Birth control/protection: None        Objective  /74 (BP Location: Left arm, Patient Position: Sitting, Cuff Size: Large)   Pulse 82   Ht 5' 9\" (1.753 m)   Wt 136 kg (300 lb)   BMI 44.30 kg/m²      Physical Exam  Vitals and nursing note reviewed.   Constitutional:       General: He is not in acute distress.     Appearance: He is well-developed. He is not diaphoretic.   HENT:      Head: Normocephalic and atraumatic.   Eyes:      General: No scleral icterus.        Right eye: No discharge.         Left eye: No discharge.      Conjunctiva/sclera: Conjunctivae normal.   Cardiovascular:      Rate and Rhythm: Normal rate and regular rhythm.      Heart sounds: Normal heart sounds. No murmur heard.     No friction rub. No gallop.   Pulmonary:      Effort: Pulmonary effort is normal. No respiratory distress. "      Breath sounds: Normal breath sounds. No wheezing or rales.   Chest:      Chest wall: No tenderness.   Abdominal:      General: Bowel sounds are normal. There is no distension.      Palpations: Abdomen is soft.      Tenderness: There is no abdominal tenderness. There is no rebound.      Comments: Improved large pannus   Musculoskeletal:         General: No tenderness or deformity. Normal range of motion.      Cervical back: Normal range of motion and neck supple.      Right lower leg: No edema.      Left lower leg: No edema.   Skin:     General: Skin is warm and dry.      Coloration: Skin is not pale.      Findings: No erythema or rash.   Neurological:      Mental Status: He is alert and oriented to person, place, and time.      Cranial Nerves: No cranial nerve deficit.      Coordination: Coordination normal.   Psychiatric:         Behavior: Behavior normal.         Thought Content: Thought content normal.         Judgment: Judgment normal.

## 2025-04-23 NOTE — PROGRESS NOTES
Name: Nasir Story Jr.      : 1960      MRN: [id number]  Encounter Provider: Ja Nunez MD  Encounter Date: 2025   Encounter department: [facility]  :  Assessment & Plan  Stage 3b chronic kidney disease (HCC)  Lab Results   Component Value Date    EGFR 42 2025    EGFR 37 2025    EGFR 47 2024    CREATININE 1.66 (H) 2025    CREATININE 1.86 (H) 2025    CREATININE 1.52 (H) 2024       Orders:    Basic metabolic panel; Future    Phosphorus; Future    Magnesium; Future    CBC; Future        History of Present Illness   HPI  Nasir Story Jr. is a 64 y.o. male who presents for follow-up visit for CKD stage III.  Patient is preparing for his bariatric sleeve surgery next week.  Has been on a strict diet given by bariatric team.  Mainly vegetables, liquids, protein shakes.. Eating more broccoli to get ready for bariatric surgery next week. This could explain the higher phos.  And we reviewed this.  He otherwise feels well overall.  He states as he has been losing weight with the current dietary program with bariatric team he has been feeling much better overall.  Over this past year has been having more energy.  Able to ambulate better.  History obtained from: patient    Review of Systems   Constitutional: Negative.  Negative for appetite change and fatigue.   HENT: Negative.     Eyes: Negative.    Respiratory: Negative.  Negative for shortness of breath.    Cardiovascular: Negative.  Negative for leg swelling.   Gastrointestinal: Negative.    Endocrine: Negative.    Genitourinary: Negative.  Negative for difficulty urinating.   Musculoskeletal: Negative.    Allergic/Immunologic: Negative.    Neurological: Negative.    Hematological: Negative.    Psychiatric/Behavioral: Negative.     All other systems reviewed and are negative.    Pertinent Medical History     65 yo male retired , formerly from [place], HTN (15 years),  DM (years), back surgery 10 years ago, diastolic CHF, pulmonary embolism diagnosed in June 2023, who presents for follow up evaluation of hypertension.  patient has had sporadic follow-up but has been slightly more consistent with follow-up since end of last year.  I last saw the patient in August 2018 and then patient followed up with our advanced practitioner.  Patient returns today for follow-up visit      Patient has stopped using NSAIDs since 2019      Since last being seen, patient was seen by her practitioner in June 2023.  Blood pressures were controlled.  Creatinine was at baseline.     Patient was admitted to the hospital worsening shortness of breath in June 2023.  There was concern for possible URI.  Patient was found to have pulmonary embolism on repeat CT scan and was started on Eliquis     Saw pulmonary team August 2023 and was advised to continue 6 months full dose Eliquis.  It was felt to be provoked in the setting of inactivity.  Was also advised to have sleep study..     1) HTN - patient is on losartan, metoprolol, spironolactone, alfuzosin     -prior renin and aldosterone level with a renin level suppressed, aldosterone level is not significantly elevated  - prior metanephrine unrevealing  - prior CT scan with unremarkable adrenals  - renal Doppler study in November 2019 with no significant occlusive disease.  - during visit in march clonidine was tapered off, chlorthalidone was increased chlorthalidone to 25 mg daily  -  Creatinine stable in May 2021 at 1.3 mg/dL.  Blood pressures are appropriate.  Patient is still gaining weight.  -February 2022 appointment-no lab work since October.  Creatinine in October was rising and patient has not gone for follow-up lab work.  -September 2022-hold doxazosin due to hypotension  - February 2023-no changes to antihypertensives.  Add tamsulosin.  Renal ultrasound with slightly increased cyst size but otherwise benign appearing.  - December 2023-creatinine  stable 1.7 mg/dL.  Electrolytes are stable and appropriate with low vitamin D level.  Parathyroid level is appropriate.  Protein creatinine ratio is stable at 21.  - May 2024-spironolactone reduced to 12 and half milligrams per day due to hyperkalemia  - August 2024-patient was starting to have low blood pressures to as low as 98 systolic.  We held amlodipine completely at that time.  - August 26, 2024-losartan reduced to 50 mg daily from 100 mg daily due to continued marginal blood pressures.  - 2/2025 - pt reports that he has not been on chlorthalidone. When I reviewed prior records, last visit we were to stop the spironolactone but it appears pt has held chlorthalidone and maintained spironolactone at 12.5 mg once daily and losartan 50 mg once daily and metoprolol 100 BID. BP well controlled.  - 4/23/2025-patient's blood pressures are well-controlled on current regimen     Overall, patient presents for follow-up appointment on April 23, 2025.  Creatinine stable at baseline.  Phosphorus borderline above goal but this is likely due to dietary intake.  This is due to bariatric diet that he is currently requiring for surgery next week.  From renal standpoint patient is renal risk optimized for surgery next week and we had sent the clearance to his surgery team after I saw him last visit.  I will notify the bariatric team again after this visit.  For now no changes in the prior recommendations     Plan:    - PSA In 3 month per urology team  - renal u/s early 2026 to f/u renal cyst  - cont losartan and spironolactone  - Can hold losartan, spironolactone, magnesium on the day of surgery  - Asked the patient to lower vitamin D supplement to 1 tablet once a week.  He states that he was taking 45,000 units once a week.  We have prescribed him 50,000 units weekly in the past but he states that when he ran out, he had found online prescription for 15,000 unit tablet and he had combine these for 3 tablets a week.  His vitamin  D level has appropriately improved to 29.  - I asked him to reduce starting next week to 1 tablet of the vitamin D supplement per week.  It is unclear if it is truly 15,000 units and he will go home and check and will send Co[employer]Import[employer] message to me.  Ultimately the bariatric vitamin does have vitamin D in it and therefore we need to monitor for overload with vitamin D and therefore empirically reducing his supplement until we can confirm what dose he is actually taking  - Repeat lab work 1 week after bariatric surgery  - I have updated bariatric surgeon from renal standpoint    2) proteinuria -  Stable U PCR 0.1     - if elevated, will check SPEP, UPEP, FLC     3) CKD III with baseline creatinine 1.4 to 1.7 mg/dL and has now progressed to approximately 2 mg/dL-etiology likely multifactorial in the setting of hypertensive nephropathy, elevated BMI and potential nodular secondary sclerosis     -renal ultrasound in September 2018 was septated cyst bilaterally with mildly increased on the right kidney.  And a calcified cystic foci in the left kidney  - Repeat renal ultrasound April 2024 bilateral renal cysts without suspicious features.  - creat 2.2 in august. Held losartan for 2 days and restarted. Inc fluid intake.   - 8/17 - chlorthalidone reduced to 12.5 mg daily as creat improving but not back to baseline  -  05/2021-creatinine improving to baseline 1.3 mg/dL.  -September 2022-creatinine 1.6 mg/dL.  Holding doxazosin.  - February 2023-creatinine remains relatively stable 1.7 mg/dL  - 12/2023 - creat 1.7 mg/dL, stable. Electrolytes stable.  - May 2024-creatinine was rising above baseline to 2 mg/dL possibly in the setting of Ozempic and trial of holding Ozempic was recommended and subsequently restarted  - September 6, 2024-lab work from August 29 shows stable creatinine likely at new baseline.  Last hemoglobin A1c is improved 6.1%  - 2/2025 - creat stable at baseline 1.8 mg/dL  - April 2025-creatinine remains at  baseline 1.6 mg/dL.  A1c is improving.  Is losing weight.     4) grief counseling-     -patient's mood is improving overall     5) Vit D deficiency     - vit D 11.7 12/2023  - PTH - 76.2 December 2023  - recheck Vit D low 11/2024 12.5 so was started on ergocalciferol 20613 units for 8 weeks one time weekly and then 2000 units once daily  - Vitamin D is improved April 20 25-29  - Patient was post to start 2000 units once a day after finishing 8 weeks of vitamin D.  He inadvertently was taking 15,000 units 3 tablets a week?  That he found at another pharmacy over-the-counter?  For now I have asked him to reduce to 1 tablet a week until we can confirm what dose he is actually taking.  His vitamin D level is improved appropriately to 29.     6) osteoarthritis of knees - sees Ortho team. PT and cortisone inj started 1/2020     - not candidate per ortho for knee surgery due to elevated BMI  - I have referred pt to [facility] bariatric for evaluation for elevated BMI     7) DM     - A1c improving     8) elevated BMI--     -Patient reestablished with bariatric team since last visit  - Was advised for Ozempic  - Also saw bariatric surgeon and bariatric program.  Patient is interested in surgical intervention.  - weight peak 346 8/2023 and weight is now 300 lbs. Pt states knee pain has improved.   - completed testing  - completed colonoscopy, EGD, Cardio eval, Pulm eval.  - Pending bariatric surgery next week  - Weight has improved significantly with dietary changes     9)  renal cysts-monitor with repeat ultrasound early 2026     10-nocturia-possibly in setting of BPH.  Worsened after holding doxazosin.  pt did not tolerate tamsulosin? But now believe its more due to side effect of abx. So restart tamsulosin 12/13/2023.  Is now transition to alpha Zosyn by urology     11 - hyponatremia     - stable and appropriate  - appropriate. Was on chlorthalidone prior. Tolerated it well. But we held it when pt became hypotensive. And has  not needed to restart it since.      12-bilateral subsegmental PE June 2023-follows with pulmonary team.  Completed 6 months of Eliquis.  Saw pulmonary team March 2024.  Will continue Eliquis but okay to hold for procedures per review of pulmonary team notes     13-sleep apnea-saw pulmonary team.  Is now using CPAP in July 2024     14- poor dentition     -cont dental f/u     14-age-appropriate cancer screening-was advised to complete colonoscopy at GI visit July 2024     56-XQP-nqmimjr with urology team did not tolerate Flomax in the past.  On August 14 appointment was trialed on alpha Zosyn.     48-aioafnopmodzuk-lc magnesium supplement starting September 5, 2024.  And magnesium appropriate he will check lab work 1 week after surgery            Medical History Reviewed by provider this encounter:     .  Past Medical History   Past Medical History:   Diagnosis Date    Abnormal CBC 01/20/2020    Acne     Allergic     Anemia 10/28/2019    Anxiety     Arthritis     Back pain     Chronic kidney disease     Chronic pain     Colon polyp     CPAP (continuous positive airway pressure) dependence     Dental cavity 08/10/2017    Depression 06/07/2021    Diabetes mellitus (HCC)     6/19/15 A1C: 5.5%    Eczema     Fall 10/16/2023    Flu vaccine need 10/16/2023    Grief reaction 09/10/2019    Hyperlipidemia     Hypertension     Knee pain     Morbid obesity with BMI of 50.0-59.9, adult (HCC)     Neuropathy in diabetes (HCC)     Obesity     IMELDA on CPAP     Pain of intrathecal infusion pump pocket after insertion (HCC)     Per pt no longer works. Having insurance issues so it remains in place.    Psychiatric disorder     Screening for prostate cancer 11/28/2018    Skin tag     Suspected sleep apnea 08/18/2023    Type 2 diabetes mellitus without complication (HCC) 07/16/2014    Urinary retention      Past Surgical History:   Procedure Laterality Date    BACK SURGERY      COLONOSCOPY      LUMBAR FUSION  2002    LUMBAR FUSION  2004     L4-5-6, S1 has pump for narcotics- per pt pain pump no longer (since 2020) works but it remains in place.     Family History   Problem Relation Age of Onset    Other Mother         colitis    Diabetes Mother       reports that he quit smoking about a year ago. His smoking use included cigars. He started smoking about 7 years ago. He has been exposed to tobacco smoke. He has never used smokeless tobacco. He reports that he does not currently use alcohol. He reports that he does not use drugs.  Current Outpatient Medications   Medication Instructions    alfuzosin (UROXATRAL) 10 mg, Oral, Daily    atorvastatin (LIPITOR) 20 mg, Oral, Daily    benzonatate (TESSALON) 200 mg, Oral, Daily at bedtime PRN    Eliquis 5 mg, Oral, 2 times daily    ergocalciferol (ERGOCALCIFEROL) 50,000 Units, Oral, Weekly    glucose blood (FREESTYLE LITE) test strip 1 each, Other, Daily    Lancets (freestyle) lancets Use as instructed    losartan (COZAAR) 100 mg, Oral, Daily    magnesium Oxide (MAG-OX) 400 mg, Oral, 2 times daily    metoprolol tartrate (LOPRESSOR) 100 mg, Oral, 2 times daily    morphine (MS CONTIN) 15 mg 12 hr tablet As needed    omeprazole (PRILOSEC) 20 mg, Oral, Daily    omeprazole (PRILOSEC) 20 mg, Oral, Daily    oxyCODONE (ROXICODONE) 30 MG immediate release tablet As needed    Ozempic (2 MG/DOSE) 2 mg, Subcutaneous, Every 7 days    polyethylene glycol (GOLYTELY) 4000 mL solution 4,000 mL, Oral, Once, Colonoscopy prep    polyethylene glycol (MIRALAX) 17 g, Oral, Daily    spironolactone (ALDACTONE) 12.5 mg, Daily     Allergies   Allergen Reactions    Gabapentin      Annotation - [id number]: dizziness    Pregabalin      Annotation - [id number]: vision changes and mood changes    Tramadol Nausea Only      Current Outpatient Medications on File Prior to Visit   Medication Sig Dispense Refill    alfuzosin (UROXATRAL) 10 mg 24 hr tablet TAKE 1 TABLET BY MOUTH DAILY 30 tablet 5    atorvastatin (LIPITOR) 20 mg tablet TAKE ONE TABLET  BY MOUTH EVERY DAY 90 tablet 1    Eliquis 5 MG TAKE 1 TABLET BY MOUTH TWO TIMES A DAY 60 tablet 5    ergocalciferol (ERGOCALCIFEROL) 1.25 MG (00130 UT) capsule Take 1 capsule (50,000 Units total) by mouth once a week 8 capsule 0    glucose blood (FREESTYLE LITE) test strip Use 1 each in the morning 100 each 0    Lancets (freestyle) lancets Use as instructed 100 each 0    losartan (COZAAR) 100 MG tablet TAKE ONE TABLET BY MOUTH EVERY DAY 90 tablet 1    magnesium Oxide (MAG-OX) 400 mg TABS Take 1 tablet (400 mg total) by mouth 2 (two) times a day      morphine (MS CONTIN) 15 mg 12 hr tablet as needed  0    omeprazole (PriLOSEC) 20 mg delayed release capsule Take 1 capsule (20 mg total) by mouth daily Do not start before 2025. 90 capsule 1    omeprazole (PriLOSEC) 20 mg delayed release capsule Take 1 capsule (20 mg total) by mouth daily 90 capsule 1    oxyCODONE (ROXICODONE) 30 MG immediate release tablet if needed  0    Ozempic, 2 MG/DOSE, 8 MG/3ML injection pen INJECT 0.75ML UNDER THE SKIN EVERY 7 DAYS 3 mL 3    spironolactone (ALDACTONE) 25 mg tablet Take 12.5 mg by mouth daily      benzonatate (TESSALON) 200 MG capsule Take 1 capsule (200 mg total) by mouth daily at bedtime as needed for cough (Patient not taking: Reported on 2025) 20 capsule 0    metoprolol tartrate (LOPRESSOR) 100 mg tablet TAKE ONE TABLET BY MOUTH TWICE A  tablet 1    polyethylene glycol (GOLYTELY) 4000 mL solution Take 4,000 mL by mouth once for 1 dose Colonoscopy prep (Patient not taking: Reported on 2025) 4000 mL 0    polyethylene glycol (MIRALAX) 17 g packet Take 17 g by mouth daily (Patient not taking: Reported on 2025) 510 g 5     No current facility-administered medications on file prior to visit.      Social History     Tobacco Use    Smoking status: Former     Types: Cigars     Start date:      Quit date: 4/15/2024     Years since quittin.0     Passive exposure: Current    Smokeless tobacco: Never  "   Tobacco comments:     Quit cigarette smoking in 2004.      Patient reports that he last smoked a cigar close to 2 years ago.    Vaping Use    Vaping status: Never Used   Substance and Sexual Activity    Alcohol use: Not Currently     Comment: occasional    Drug use: No     Comment: History of drug use, meena, cocaine, heroind, no IVDA-as per [employer]    Sexual activity: Yes     Partners: Female     Birth control/protection: None        Objective   /74 (BP Location: Left arm, Patient Position: Sitting, Cuff Size: Large)   Pulse 82   Ht 5' 9\" (1.753 m)   Wt 136 kg (300 lb)   BMI 44.30 kg/m²      Physical Exam  Vitals and nursing note reviewed.   Constitutional:       General: He is not in acute distress.     Appearance: He is well-developed. He is not diaphoretic.   HENT:      Head: Normocephalic and atraumatic.   Eyes:      General: No scleral icterus.        Right eye: No discharge.         Left eye: No discharge.      Conjunctiva/sclera: Conjunctivae normal.   Cardiovascular:      Rate and Rhythm: Normal rate and regular rhythm.      Heart sounds: Normal heart sounds. No murmur heard.     No friction rub. No gallop.   Pulmonary:      Effort: Pulmonary effort is normal. No respiratory distress.      Breath sounds: Normal breath sounds. No wheezing or rales.   Chest:      Chest wall: No tenderness.   Abdominal:      General: Bowel sounds are normal. There is no distension.      Palpations: Abdomen is soft.      Tenderness: There is no abdominal tenderness. There is no rebound.      Comments: Improved large pannus   Musculoskeletal:         General: No tenderness or deformity. Normal range of motion.      Cervical back: Normal range of motion and neck supple.      Right lower leg: No edema.      Left lower leg: No edema.   Skin:     General: Skin is warm and dry.      Coloration: Skin is not pale.      Findings: No erythema or rash.   Neurological:      Mental Status: He is alert and oriented to person, " place, and time.      Cranial Nerves: No cranial nerve deficit.      Coordination: Coordination normal.   Psychiatric:         Behavior: Behavior normal.         Thought Content: Thought content normal.         Judgment: Judgment normal.

## 2025-04-23 NOTE — PATIENT INSTRUCTIONS
1) Avoid NSAIDS - (Example - motrin, advil, ibuprofen, aleve, exederin, etc)  2) Always follow a low salt diet  3) If you have any issues with trouble with nausea, vomiting, urinating, blood in the urine, food tasting like metal, confusion, weakness, fatigue that is worsening, or any other questions, please call right away.  4) Please continue to follow regularly with your family physician and any other specialist that you are advised to see and continue to follow their recommendations  5) If you have any emergencies, please go to the nearest urgent care or emergency room and please inform your family physician and our office once you are able to.  6) please mychart the exact dose amount of vitamin D tablet you have. After surgery, go to one tablet once a week (but we will confirm your dose)  7) hold losartan, spironolactone, and magnesium supplement on AM of surgery. Can restart the next day  8) please have labwork in one week  9) appointment in 3-4 months  10) no changes for now to your medications  11) IF YOUR BLOOD PRESSURES AT HOME ARE LESS THAN 110 (TOP NUMBER) PLEASE CALL

## 2025-04-28 ENCOUNTER — ANESTHESIA (INPATIENT)
Dept: PERIOP | Facility: HOSPITAL | Age: 65
DRG: 620 | End: 2025-04-28
Payer: COMMERCIAL

## 2025-04-28 ENCOUNTER — HOSPITAL ENCOUNTER (INPATIENT)
Facility: HOSPITAL | Age: 65
LOS: 1 days | Discharge: HOME | DRG: 620 | End: 2025-04-29
Attending: SURGERY | Admitting: SURGERY
Payer: COMMERCIAL

## 2025-04-28 DIAGNOSIS — I10 ESSENTIAL HYPERTENSION, MALIGNANT: ICD-10-CM

## 2025-04-28 DIAGNOSIS — N18.32 STAGE 3B CHRONIC KIDNEY DISEASE (HCC): Primary | ICD-10-CM

## 2025-04-28 DIAGNOSIS — G47.33 OBSTRUCTIVE SLEEP APNEA (ADULT) (PEDIATRIC): ICD-10-CM

## 2025-04-28 DIAGNOSIS — E66.01 MORBID OBESITY (HCC): ICD-10-CM

## 2025-04-28 DIAGNOSIS — E11.9 DIABETES MELLITUS (HCC): ICD-10-CM

## 2025-04-28 LAB
GLUCOSE SERPL-MCNC: 156 MG/DL (ref 65–140)
GLUCOSE SERPL-MCNC: 96 MG/DL (ref 65–140)

## 2025-04-28 PROCEDURE — 0DB64Z3 EXCISION OF STOMACH, PERCUTANEOUS ENDOSCOPIC APPROACH, VERTICAL: ICD-10-PCS | Performed by: SURGERY

## 2025-04-28 PROCEDURE — 88307 TISSUE EXAM BY PATHOLOGIST: CPT | Performed by: PATHOLOGY

## 2025-04-28 PROCEDURE — 82948 REAGENT STRIP/BLOOD GLUCOSE: CPT

## 2025-04-28 PROCEDURE — C1781 MESH (IMPLANTABLE): HCPCS | Performed by: SURGERY

## 2025-04-28 PROCEDURE — 99222 1ST HOSP IP/OBS MODERATE 55: CPT | Performed by: INTERNAL MEDICINE

## 2025-04-28 PROCEDURE — 43775 LAP SLEEVE GASTRECTOMY: CPT | Performed by: SURGERY

## 2025-04-28 PROCEDURE — 43775 LAP SLEEVE GASTRECTOMY: CPT | Performed by: STUDENT IN AN ORGANIZED HEALTH CARE EDUCATION/TRAINING PROGRAM

## 2025-04-28 PROCEDURE — 0DJ08ZZ INSPECTION OF UPPER INTESTINAL TRACT, VIA NATURAL OR ARTIFICIAL OPENING ENDOSCOPIC: ICD-10-PCS | Performed by: SURGERY

## 2025-04-28 DEVICE — SEAMGUARD STPL REINF ENDO GIA ULTRA UNV 60 BLACK: Type: IMPLANTABLE DEVICE | Site: STOMACH | Status: FUNCTIONAL

## 2025-04-28 DEVICE — SEAMGUARD STPL REINF ENDO GIA ULTRA UNIV 60 PURPLE: Type: IMPLANTABLE DEVICE | Site: STOMACH | Status: FUNCTIONAL

## 2025-04-28 RX ORDER — MAGNESIUM HYDROXIDE 1200 MG/15ML
LIQUID ORAL AS NEEDED
Status: DISCONTINUED | OUTPATIENT
Start: 2025-04-28 | End: 2025-04-28 | Stop reason: HOSPADM

## 2025-04-28 RX ORDER — ONDANSETRON 2 MG/ML
4 INJECTION INTRAMUSCULAR; INTRAVENOUS ONCE AS NEEDED
Status: DISCONTINUED | OUTPATIENT
Start: 2025-04-28 | End: 2025-04-29 | Stop reason: SDUPTHER

## 2025-04-28 RX ORDER — PROMETHAZINE HYDROCHLORIDE 25 MG/ML
25 INJECTION, SOLUTION INTRAMUSCULAR; INTRAVENOUS EVERY 6 HOURS PRN
Status: DISCONTINUED | OUTPATIENT
Start: 2025-04-28 | End: 2025-04-29

## 2025-04-28 RX ORDER — FAMOTIDINE 10 MG/ML
20 INJECTION, SOLUTION INTRAVENOUS 2 TIMES DAILY
Status: DISCONTINUED | OUTPATIENT
Start: 2025-04-28 | End: 2025-04-29 | Stop reason: HOSPADM

## 2025-04-28 RX ORDER — LIDOCAINE HYDROCHLORIDE 20 MG/ML
INJECTION, SOLUTION EPIDURAL; INFILTRATION; INTRACAUDAL; PERINEURAL AS NEEDED
Status: DISCONTINUED | OUTPATIENT
Start: 2025-04-28 | End: 2025-04-28

## 2025-04-28 RX ORDER — ONDANSETRON 2 MG/ML
4 INJECTION INTRAMUSCULAR; INTRAVENOUS ONCE AS NEEDED
Status: DISCONTINUED | OUTPATIENT
Start: 2025-04-28 | End: 2025-04-28 | Stop reason: HOSPADM

## 2025-04-28 RX ORDER — SODIUM CHLORIDE, SODIUM LACTATE, POTASSIUM CHLORIDE, CALCIUM CHLORIDE 600; 310; 30; 20 MG/100ML; MG/100ML; MG/100ML; MG/100ML
20 INJECTION, SOLUTION INTRAVENOUS CONTINUOUS
Status: DISCONTINUED | OUTPATIENT
Start: 2025-04-28 | End: 2025-04-29 | Stop reason: HOSPADM

## 2025-04-28 RX ORDER — PROPOFOL 10 MG/ML
INJECTION, EMULSION INTRAVENOUS AS NEEDED
Status: DISCONTINUED | OUTPATIENT
Start: 2025-04-28 | End: 2025-04-28

## 2025-04-28 RX ORDER — FENTANYL CITRATE/PF 50 MCG/ML
50 SYRINGE (ML) INJECTION
Refills: 0 | Status: DISCONTINUED | OUTPATIENT
Start: 2025-04-28 | End: 2025-04-29 | Stop reason: HOSPADM

## 2025-04-28 RX ORDER — KETAMINE HCL IN NACL, ISO-OSM 100MG/10ML
SYRINGE (ML) INJECTION AS NEEDED
Status: DISCONTINUED | OUTPATIENT
Start: 2025-04-28 | End: 2025-04-28

## 2025-04-28 RX ORDER — EPHEDRINE SULFATE 50 MG/ML
INJECTION INTRAVENOUS AS NEEDED
Status: DISCONTINUED | OUTPATIENT
Start: 2025-04-28 | End: 2025-04-28

## 2025-04-28 RX ORDER — ENOXAPARIN SODIUM 100 MG/ML
40 INJECTION SUBCUTANEOUS ONCE
Status: COMPLETED | OUTPATIENT
Start: 2025-04-28 | End: 2025-04-28

## 2025-04-28 RX ORDER — MIDAZOLAM HYDROCHLORIDE 2 MG/2ML
INJECTION, SOLUTION INTRAMUSCULAR; INTRAVENOUS AS NEEDED
Status: DISCONTINUED | OUTPATIENT
Start: 2025-04-28 | End: 2025-04-28

## 2025-04-28 RX ORDER — SODIUM CHLORIDE 9 MG/ML
INJECTION, SOLUTION INTRAVENOUS CONTINUOUS PRN
Status: DISCONTINUED | OUTPATIENT
Start: 2025-04-28 | End: 2025-04-28

## 2025-04-28 RX ORDER — MORPHINE SULFATE 4 MG/ML
4 INJECTION, SOLUTION INTRAMUSCULAR; INTRAVENOUS EVERY 4 HOURS PRN
Status: DISCONTINUED | OUTPATIENT
Start: 2025-04-28 | End: 2025-04-29 | Stop reason: HOSPADM

## 2025-04-28 RX ORDER — HYDROMORPHONE HCL/PF 1 MG/ML
0.5 SYRINGE (ML) INJECTION
Status: DISCONTINUED | OUTPATIENT
Start: 2025-04-28 | End: 2025-04-28 | Stop reason: HOSPADM

## 2025-04-28 RX ORDER — SODIUM CHLORIDE, SODIUM LACTATE, POTASSIUM CHLORIDE, CALCIUM CHLORIDE 600; 310; 30; 20 MG/100ML; MG/100ML; MG/100ML; MG/100ML
100 INJECTION, SOLUTION INTRAVENOUS CONTINUOUS
Status: DISCONTINUED | OUTPATIENT
Start: 2025-04-28 | End: 2025-04-29 | Stop reason: HOSPADM

## 2025-04-28 RX ORDER — SODIUM CHLORIDE 9 MG/ML
INJECTION, SOLUTION INTRAVENOUS AS NEEDED
Status: DISCONTINUED | OUTPATIENT
Start: 2025-04-28 | End: 2025-04-28 | Stop reason: HOSPADM

## 2025-04-28 RX ORDER — ONDANSETRON 2 MG/ML
INJECTION INTRAMUSCULAR; INTRAVENOUS AS NEEDED
Status: DISCONTINUED | OUTPATIENT
Start: 2025-04-28 | End: 2025-04-28

## 2025-04-28 RX ORDER — LOSARTAN POTASSIUM 50 MG/1
100 TABLET ORAL DAILY
Status: DISCONTINUED | OUTPATIENT
Start: 2025-04-28 | End: 2025-04-29 | Stop reason: HOSPADM

## 2025-04-28 RX ORDER — ONDANSETRON 2 MG/ML
4 INJECTION INTRAMUSCULAR; INTRAVENOUS EVERY 6 HOURS PRN
Status: DISCONTINUED | OUTPATIENT
Start: 2025-04-28 | End: 2025-04-29 | Stop reason: HOSPADM

## 2025-04-28 RX ORDER — SODIUM CHLORIDE, SODIUM LACTATE, POTASSIUM CHLORIDE, CALCIUM CHLORIDE 600; 310; 30; 20 MG/100ML; MG/100ML; MG/100ML; MG/100ML
125 INJECTION, SOLUTION INTRAVENOUS CONTINUOUS
Status: DISCONTINUED | OUTPATIENT
Start: 2025-04-28 | End: 2025-04-29 | Stop reason: HOSPADM

## 2025-04-28 RX ORDER — HYDROMORPHONE HCL/PF 1 MG/ML
0.5 SYRINGE (ML) INJECTION
Refills: 0 | Status: DISCONTINUED | OUTPATIENT
Start: 2025-04-28 | End: 2025-04-29 | Stop reason: HOSPADM

## 2025-04-28 RX ORDER — ROCURONIUM BROMIDE 10 MG/ML
INJECTION, SOLUTION INTRAVENOUS AS NEEDED
Status: DISCONTINUED | OUTPATIENT
Start: 2025-04-28 | End: 2025-04-28

## 2025-04-28 RX ORDER — OXYCODONE HCL 5 MG/5 ML
10 SOLUTION, ORAL ORAL EVERY 4 HOURS PRN
Status: DISCONTINUED | OUTPATIENT
Start: 2025-04-28 | End: 2025-04-29 | Stop reason: HOSPADM

## 2025-04-28 RX ORDER — SIMETHICONE 80 MG
80 TABLET,CHEWABLE ORAL 4 TIMES DAILY PRN
Status: DISCONTINUED | OUTPATIENT
Start: 2025-04-28 | End: 2025-04-29 | Stop reason: HOSPADM

## 2025-04-28 RX ORDER — PHENYLEPHRINE HCL IN 0.9% NACL 1 MG/10 ML
SYRINGE (ML) INTRAVENOUS AS NEEDED
Status: DISCONTINUED | OUTPATIENT
Start: 2025-04-28 | End: 2025-04-28

## 2025-04-28 RX ORDER — METOPROLOL TARTRATE 50 MG
100 TABLET ORAL 2 TIMES DAILY
Status: DISCONTINUED | OUTPATIENT
Start: 2025-04-28 | End: 2025-04-29 | Stop reason: HOSPADM

## 2025-04-28 RX ORDER — FENTANYL CITRATE/PF 50 MCG/ML
25 SYRINGE (ML) INJECTION
Status: DISCONTINUED | OUTPATIENT
Start: 2025-04-28 | End: 2025-04-28 | Stop reason: HOSPADM

## 2025-04-28 RX ORDER — ACETAMINOPHEN 10 MG/ML
1000 INJECTION, SOLUTION INTRAVENOUS ONCE
Status: COMPLETED | OUTPATIENT
Start: 2025-04-28 | End: 2025-04-28

## 2025-04-28 RX ORDER — BUPIVACAINE HYDROCHLORIDE 5 MG/ML
INJECTION, SOLUTION EPIDURAL; INTRACAUDAL; PERINEURAL AS NEEDED
Status: DISCONTINUED | OUTPATIENT
Start: 2025-04-28 | End: 2025-04-28 | Stop reason: HOSPADM

## 2025-04-28 RX ORDER — DIPHENHYDRAMINE HCL 25 MG
25 TABLET ORAL EVERY 8 HOURS PRN
Status: DISCONTINUED | OUTPATIENT
Start: 2025-04-28 | End: 2025-04-29 | Stop reason: HOSPADM

## 2025-04-28 RX ORDER — DEXAMETHASONE SODIUM PHOSPHATE 10 MG/ML
INJECTION, SOLUTION INTRAMUSCULAR; INTRAVENOUS AS NEEDED
Status: DISCONTINUED | OUTPATIENT
Start: 2025-04-28 | End: 2025-04-28

## 2025-04-28 RX ORDER — PROMETHAZINE HYDROCHLORIDE 25 MG/ML
6.25 INJECTION, SOLUTION INTRAMUSCULAR; INTRAVENOUS ONCE AS NEEDED
Status: DISCONTINUED | OUTPATIENT
Start: 2025-04-28 | End: 2025-04-29 | Stop reason: SDUPTHER

## 2025-04-28 RX ORDER — MEPERIDINE HYDROCHLORIDE 25 MG/ML
12.5 INJECTION INTRAMUSCULAR; INTRAVENOUS; SUBCUTANEOUS ONCE AS NEEDED
Status: DISCONTINUED | OUTPATIENT
Start: 2025-04-28 | End: 2025-04-29 | Stop reason: HOSPADM

## 2025-04-28 RX ORDER — FENTANYL CITRATE 50 UG/ML
INJECTION, SOLUTION INTRAMUSCULAR; INTRAVENOUS AS NEEDED
Status: DISCONTINUED | OUTPATIENT
Start: 2025-04-28 | End: 2025-04-28

## 2025-04-28 RX ORDER — GLYCOPYRROLATE 0.2 MG/ML
INJECTION INTRAMUSCULAR; INTRAVENOUS AS NEEDED
Status: DISCONTINUED | OUTPATIENT
Start: 2025-04-28 | End: 2025-04-28

## 2025-04-28 RX ORDER — ACETAMINOPHEN 10 MG/ML
1000 INJECTION, SOLUTION INTRAVENOUS EVERY 6 HOURS SCHEDULED
Status: DISCONTINUED | OUTPATIENT
Start: 2025-04-28 | End: 2025-04-29 | Stop reason: HOSPADM

## 2025-04-28 RX ORDER — OXYCODONE HCL 5 MG/5 ML
5 SOLUTION, ORAL ORAL EVERY 4 HOURS PRN
Status: DISCONTINUED | OUTPATIENT
Start: 2025-04-28 | End: 2025-04-29 | Stop reason: HOSPADM

## 2025-04-28 RX ORDER — SODIUM CHLORIDE 9 MG/ML
INJECTION INTRAVENOUS AS NEEDED
Status: DISCONTINUED | OUTPATIENT
Start: 2025-04-28 | End: 2025-04-28 | Stop reason: HOSPADM

## 2025-04-28 RX ADMIN — SODIUM CHLORIDE, SODIUM LACTATE, POTASSIUM CHLORIDE, AND CALCIUM CHLORIDE: .6; .31; .03; .02 INJECTION, SOLUTION INTRAVENOUS at 07:20

## 2025-04-28 RX ADMIN — FENTANYL CITRATE 100 MCG: 50 INJECTION INTRAMUSCULAR; INTRAVENOUS at 07:36

## 2025-04-28 RX ADMIN — PROPOFOL 200 MG: 10 INJECTION, EMULSION INTRAVENOUS at 07:36

## 2025-04-28 RX ADMIN — FOSAPREPITANT DIMEGLUMINE 150 MG: 150 INJECTION, POWDER, LYOPHILIZED, FOR SOLUTION INTRAVENOUS at 05:56

## 2025-04-28 RX ADMIN — METOPROLOL TARTRATE 100 MG: 50 TABLET, FILM COATED ORAL at 17:23

## 2025-04-28 RX ADMIN — ONDANSETRON 4 MG: 2 INJECTION INTRAMUSCULAR; INTRAVENOUS at 08:50

## 2025-04-28 RX ADMIN — ROCURONIUM 60 MG: 50 INJECTION, SOLUTION INTRAVENOUS at 07:36

## 2025-04-28 RX ADMIN — PROPOFOL 50 MG: 10 INJECTION, EMULSION INTRAVENOUS at 09:08

## 2025-04-28 RX ADMIN — SIMETHICONE 80 MG: 80 TABLET, CHEWABLE ORAL at 10:57

## 2025-04-28 RX ADMIN — LIDOCAINE HYDROCHLORIDE 100 MG: 20 INJECTION, SOLUTION EPIDURAL; INFILTRATION; INTRACAUDAL at 07:36

## 2025-04-28 RX ADMIN — FENTANYL CITRATE 25 MCG: 50 INJECTION INTRAMUSCULAR; INTRAVENOUS at 09:42

## 2025-04-28 RX ADMIN — Medication 100 MCG: at 08:06

## 2025-04-28 RX ADMIN — SODIUM CHLORIDE: 0.9 INJECTION, SOLUTION INTRAVENOUS at 08:24

## 2025-04-28 RX ADMIN — FAMOTIDINE 20 MG: 10 INJECTION, SOLUTION INTRAVENOUS at 21:19

## 2025-04-28 RX ADMIN — Medication 3000 MG: at 07:31

## 2025-04-28 RX ADMIN — SODIUM CHLORIDE, SODIUM LACTATE, POTASSIUM CHLORIDE, AND CALCIUM CHLORIDE 100 ML/HR: .6; .31; .03; .02 INJECTION, SOLUTION INTRAVENOUS at 19:35

## 2025-04-28 RX ADMIN — PHENYLEPHRINE HYDROCHLORIDE 30 MCG/MIN: 10 INJECTION INTRAVENOUS at 08:08

## 2025-04-28 RX ADMIN — Medication 200 MCG: at 08:14

## 2025-04-28 RX ADMIN — ACETAMINOPHEN 1000 MG: 10 INJECTION INTRAVENOUS at 15:07

## 2025-04-28 RX ADMIN — PHENYLEPHRINE HYDROCHLORIDE 50 MCG: 10 INJECTION INTRAVENOUS at 09:11

## 2025-04-28 RX ADMIN — Medication 100 MCG: at 08:09

## 2025-04-28 RX ADMIN — Medication 25 MG: at 08:26

## 2025-04-28 RX ADMIN — GLYCOPYRROLATE 0.1 MG: 0.2 INJECTION, SOLUTION INTRAMUSCULAR; INTRAVENOUS at 07:55

## 2025-04-28 RX ADMIN — SUGAMMADEX 300 MG: 100 INJECTION, SOLUTION INTRAVENOUS at 09:04

## 2025-04-28 RX ADMIN — ENOXAPARIN SODIUM 40 MG: 40 INJECTION SUBCUTANEOUS at 05:57

## 2025-04-28 RX ADMIN — SODIUM CHLORIDE, SODIUM LACTATE, POTASSIUM CHLORIDE, AND CALCIUM CHLORIDE 100 ML/HR: .6; .31; .03; .02 INJECTION, SOLUTION INTRAVENOUS at 10:21

## 2025-04-28 RX ADMIN — DEXAMETHASONE SODIUM PHOSPHATE 10 MG: 10 INJECTION INTRAMUSCULAR; INTRAVENOUS at 07:45

## 2025-04-28 RX ADMIN — SUGAMMADEX 100 MG: 100 INJECTION, SOLUTION INTRAVENOUS at 09:11

## 2025-04-28 RX ADMIN — Medication 25 MG: at 07:44

## 2025-04-28 RX ADMIN — ACETAMINOPHEN 1000 MG: 10 INJECTION INTRAVENOUS at 08:50

## 2025-04-28 RX ADMIN — EPHEDRINE SULFATE 5 MG: 50 INJECTION INTRAVENOUS at 08:15

## 2025-04-28 RX ADMIN — FAMOTIDINE 20 MG: 10 INJECTION, SOLUTION INTRAVENOUS at 10:57

## 2025-04-28 RX ADMIN — FENTANYL CITRATE 25 MCG: 50 INJECTION INTRAMUSCULAR; INTRAVENOUS at 09:48

## 2025-04-28 RX ADMIN — MIDAZOLAM 2 MG: 1 INJECTION INTRAMUSCULAR; INTRAVENOUS at 07:28

## 2025-04-28 RX ADMIN — Medication 100 MCG: at 07:48

## 2025-04-28 NOTE — ASSESSMENT & PLAN NOTE
Baseline creatinine 1.4-1.7 and follows with Dr. Nunez.  Last creatinine 1.66 on 4/21  Hold losartan and spironolactone postoperatively  Continue IV fluids until tolerating p.o. well  Avoid NSAIDs, IV dye or other nephrotoxins  Check a.m. BMP

## 2025-04-28 NOTE — OP NOTE
[facility]   [address] [place] [address], [address], [address] [phone] (Fax)      Operative Report  GASTRECTOMY SLEEVE LAPAROSCOPIC WITH INTRAOPERATIVE EGD     Patient Name: Nasir Story Jr.    :  1960  MRN: [id number]  Patient Location: [facility] OR ROOM 06  Surgery Date : 2025  Surgeons:  Surgeons and Role:     * Noé Glover MD - Primary     * Karthikeyan Rocha MD - Assisting    Diagnosis:    Pre-Op Diagnosis Codes:  Morbid obesity (HCC) [E66.01]  Body mass index is 43.56 kg/m².  Diabetes mellitus (HCC) [E11.9]  Obstructive sleep apnea (adult) (pediatric) [G47.33]  Essential hypertension, malignant [I10]    Post-Op Diagnosis Codes:     * Morbid obesity (HCC) [E66.01]     * Diabetes mellitus (HCC) [E11.9]     * Obstructive sleep apnea (adult) (pediatric) [G47.33]     * Essential hypertension, malignant [I10]     * Body mass index is 43.56 kg/m².      Procedure  Laparoscopic Sleeve Gastrectomy  Intraoperative Endoscopy    Specimen(s):  ID Type Source Tests Collected by Time Destination   1 : Portion of stomach Tissue Stomach TISSUE EXAM Noé Glover MD 2025 0835        Estimated Blood Loss:    20 mL * No LDAs found *    Anesthesia Type:     General    Operative Indications:    Morbid obesity (HCC) [E66.01]  Diabetes mellitus (HCC) [E11.9]  Obstructive sleep apnea (adult) (pediatric) [G47.33]  Essential hypertension, malignant [I10]  Body mass index is 43.56 kg/m².      Operative Findings:    Normal anatomy    Complications:     None    Procedure and Technique:    INDICATION    Nasir Story Jr. is a 64 y.o. male with a Body mass index is 43.56 kg/m². and a long standing history of morbid obesity and inability to lose a significant amount of weight on its own.  This patient was found to be a good candidate to undergo a bariatric procedure upon being enrolled here at the [facility].    OPERATIVE TECHNIQUE    The patient was  taken to the operating room and placed in a supine position. A dose of IV antibiotic prophylaxis that consisted of Ancef 3g was given.   Also 40 mg of subcutaneous Enoxaparin to prevent deep vein thrombosis were administered.  Sequential compression devices were placed on both lower extremities.    After satisfactory general anesthesia induction and endotracheal intubation was achieved, the extremities were placed and properly secured to prevent neuromuscular damage as best as possible.    Subsequently, the abdominal wall was prepped and draped in a surgical standard sterile fashion. After a timeout was done and the patient was properly identified and the type of procedure was confirmed a supra-umbilical transverse skin incision was made and the subcutaneous tissues dissected. Access to the peritoneal cavity was gained with the Visiport trocar. With this device, we were able to visualize the layers of the abdominal wall, and enter the peritoneal cavity under direct visualization. Pneumoperitoneum was then established with CO2 insufflation.     A four quadrant transversus abdominis plane block was performed under direct laparoscopic vision.  After this was completed four additional trocars were placed: a 12 mm in the right upper quadrant subcostal position in the anterior axillary line, a 15-mm port was placed in the right flank midclavicular line, a 12-mm port was placed in the left upper quadrant subcostal position in the midclavicular line and another 12-mm port was placed in the left quadrant anterior axillary line lateral to the supraumbilical port.    The Henrique liver retractor was placed in the subxiphoid position through the use of a 5-mm trocar incision.  The patient was repositioned to a reverse Trendelenburg position.    With the trocars in place, the dissection was begun. We divided the gastrocolic ligament with the energy device to enter the lesser sac. We continued to divide this ligament along the  greater curvature of the stomach towards the angle of His. Special care was taken while dividing the short gastric vessels close to the spleen. This process was completely hemostatic.    We then turned to the creation of an elongated and thin gastric pouch. A 36 [place]ian calibration tube was placed by the anesthesia staff into the stomach under our laparoscopic surveillance. Once the tip of the bougie was confirmed to be next to the pylorus, serial firings of the laparoscopic stapler with 60-mm cartridges were utilized. We started in a point inferior to the incisura angularis and the Crows foot nerve looking to preserve the gastric emptying. This was 5 to 6 centimeters proximal to the pylorus. The staple lines were reinforced with buttressing material. We created a pouch based on the lesser curve, and in vertical orientation. We continued the vertical serial firings of the stapler to the angle of His gently cinching the bougie with our laparoscopic stapler looking to create a thin pouch. As we approached the fundus of the stomach and the angle of His, the stapler loads were changed appropriately according to the variable thickness of the tissue. This completely  the pouch from the remnant stomach.     We then turned our attention to the newly created pouch and examined it for bleeding or obvious defects on the staple lines and none were found.    The distal stomach pouch was occluded with a Vito clamp, and an EGD as well as an air insufflation test was performed. Neither intraoperative bleeding nor leaks were detected.     The periumbilical trocar site was dilated and the gastric remnant was externalized through it and passed off the surgical field to be sent to pathology.    I then covered the proximal staple line with a tongue of omentum in a Patel patch fashion and secured it in place with a single 2/0 Vicryl stitch.    The sponge, needle and instrument count was reported complete.    The previously  dilated trocar site and the 15 mm were then closed with use of a suture closure device and a figure-of-eight with absorbable suture.  The pneumoperitoneum was evacuated using the [place] filter and smoke evacuator. The liver retractor and the remainder ports were then removed under direct laparoscopic visualization and no back bleeding was noted. The skin incisions were all closed with 4-0 absorbable subcuticular suture.     The patient tolerated the procedure well, was extubated uneventfully and was transferred to the recovery room in stable condition.     I was present for the entire length of the procedure as the attending of record.  No qualified resident was available to assist.  The presence of an assistant was necessary for camera holding, traction and counter traction and for help with suturing and stapling in addition to performing the intraop-EGD.    Patient Disposition:    PACU     Signature: Noé Glover MD  Date: April 28, 2025  Time: 8:58 AM

## 2025-04-28 NOTE — ANESTHESIA PREPROCEDURE EVALUATION
Procedure:  GASTRECTOMY SLEEVE LAPAROSCOPIC WITH INTRAOPERATIVE EGD (Abdomen)    Relevant Problems   CARDIO   (+) Dyspnea on exertion   (+) Essential hypertension   (+) Mixed hyperlipidemia   (+) Pulmonary embolus (HCC)   (+) Thoracic aortic ectasia (HCC)      ENDO   (+) Type 2 diabetes mellitus with stage 3b chronic kidney disease, without long-term current use of insulin (HCC)      /RENAL   (+) BPH with obstruction/lower urinary tract symptoms   (+) Stage 3b chronic kidney disease (HCC)      MUSCULOSKELETAL   (+) Chronic bilateral low back pain with bilateral sciatica   (+) Localized osteoarthritis of left knee   (+) Localized osteoarthritis of right knee   (+) Patellofemoral arthritis of left knee      NEURO/PSYCH   (+) Chronic bilateral low back pain with bilateral sciatica   (+) Continuous opioid dependence (HCC)      PULMONARY   (+) Dyspnea on exertion   (+) IMELDA (obstructive sleep apnea)        Physical Exam    Airway    Mallampati score: III  TM Distance: >3 FB       Dental   Comment: Teeth lower left front are chipped and rotten     Cardiovascular  Rhythm: regular, Rate: normal, Cardiovascular exam normal    Pulmonary  Pulmonary exam normal Breath sounds clear to auscultation    Other Findings        Anesthesia Plan  ASA Score- 3     Anesthesia Type- general with ASA Monitors.         Additional Monitors:     Airway Plan: ETT.    Comment: Pt is aware that his lower left front teeth may fracture, loosen, or break off while under anesthesia.  No GLP-1 x 2 weeks.       Plan Factors-    Chart reviewed. EKG reviewed. Imaging results reviewed. Existing labs reviewed. Patient summary reviewed.                  Induction- intravenous.    Postoperative Plan- Plan for postoperative opioid use.         Informed Consent- Anesthetic plan and risks discussed with patient.        NPO Status:  Vitals Value Taken Time   Date of last liquid 04/28/25 04/28/25 0535   Time of last liquid 0430 04/28/25 0535   Date of last solid  04/20/25 04/28/25 0535   Time of last solid 1900 04/28/25 0550

## 2025-04-28 NOTE — ASSESSMENT & PLAN NOTE
Blood pressure currently acceptable  Monitor off losartan and spironolactone temporarily  Continue home metoprolol 100 mg twice daily

## 2025-04-28 NOTE — PLAN OF CARE
Problem: GASTROINTESTINAL - ADULT  Goal: Minimal or absence of nausea and/or vomiting  Description: INTERVENTIONS:- Administer IV fluids if ordered to ensure adequate hydration- Maintain NPO status until nausea and vomiting are resolved- Nasogastric tube if ordered- Administer ordered antiemetic medications as needed- Provide nonpharmacologic comfort measures as appropriate- Advance diet as tolerated, if ordered- Consider nutrition services referral to assist patient with adequate nutrition and appropriate food choices  Outcome: Progressing  Goal: Maintains or returns to baseline bowel function  Description: INTERVENTIONS:- Assess bowel function- Encourage oral fluids to ensure adequate hydration- Administer IV fluids if ordered to ensure adequate hydration- Administer ordered medications as needed- Encourage mobilization and activity- Consider nutritional services referral to assist patient with adequate nutrition and appropriate food choices  Outcome: Progressing     Problem: PAIN - ADULT  Goal: Verbalizes/displays adequate comfort level or baseline comfort level  Description: Interventions:- Encourage patient to monitor pain and request assistance- Assess pain using appropriate pain scale- Administer analgesics based on type and severity of pain and evaluate response- Implement non-pharmacological measures as appropriate and evaluate response- Consider cultural and social influences on pain and pain management- Notify physician/advanced practitioner if interventions unsuccessful or patient reports new pain  Outcome: Progressing     Problem: DISCHARGE PLANNING  Goal: Discharge to home or other facility with appropriate resources  Description: INTERVENTIONS:- Identify barriers to discharge w/patient and caregiver- Arrange for needed discharge resources and transportation as appropriate- Identify discharge learning needs (meds, wound care, etc.)- Arrange for interpretive services to assist at discharge as needed-  Refer to Case Management Department for coordinating discharge planning if the patient needs post-hospital services based on physician/advanced practitioner order or complex needs related to functional status, cognitive ability, or social support system  Outcome: Progressing

## 2025-04-28 NOTE — INTERVAL H&P NOTE
H&P reviewed. After examining the patient I find no changes in the patients condition since the H&P had been written.    Vitals:    04/28/25 0536   BP: 101/50   Pulse: 70   Resp: 18   Temp: 97.8 °F (36.6 °C)   SpO2: 95%

## 2025-04-28 NOTE — ANESTHESIA POSTPROCEDURE EVALUATION
Post-Op Assessment Note    CV Status:  Stable  Pain Score: 0    Pain management: adequate       Mental Status:  Sleepy and arousable   Hydration Status:  Euvolemic   PONV Controlled:  Controlled   Airway Patency:  Patent  Two or more mitigation strategies used for obstructive sleep apnea   Post Op Vitals Reviewed: Yes    No anethesia notable event occurred.    Staff: CRNA, Anesthesiologist           Last Filed PACU Vitals:  Vitals Value Taken Time   Temp 97.8    Pulse 74 04/28/25 0921   /70 04/28/25 0920   Resp 16    SpO2 92 % 04/28/25 0921   Vitals shown include unfiled device data.

## 2025-04-28 NOTE — CONSULTS
Consultation - Nephrology   Nasir Story Jr. 64 y.o. male MRN: [id number]  Unit/Bed#: E5 -01 Encounter: [id number]    ASSESSMENT/PLAN:   Assessment & Plan  Stage 3b chronic kidney disease (HCC)  Baseline creatinine 1.4-1.7 and follows with Dr. Nunez.  Last creatinine 1.66 on 4/21  Hold losartan and spironolactone postoperatively  Continue IV fluids until tolerating p.o. well  Avoid NSAIDs, IV dye or other nephrotoxins  Check a.m. BMP  Morbid obesity with BMI of 45.0-49.9, adult (HCC)  Status post sleeve gastrectomy 4/28  Essential hypertension  Blood pressure currently acceptable  Monitor off losartan and spironolactone temporarily  Continue home metoprolol 100 mg twice daily  Chronic diastolic heart failure (HCC)  Appears euvolemic  Holding losartan as above      Summary of Plan:   Hold losartan and spironolactone  Continue LR surgical team  Check a.m. BMP    HISTORY OF PRESENT ILLNESS:  Requesting Physician: Noé Glover MD  Reason for Consult: Perioperative NORRIS prevention protocol    Nasir Story Jr. is a 64 y.o. year old male who was admitted to [facility] for elective bariatric surgery.  He underwent laparoscopic sleeve gastrectomy earlier today.  He has a history of CKD stage III so nephrology was consulted postoperatively as part of the NORRIS prevention protocol.  Patient is currently feeling tired and sore from surgery but otherwise okay.  He denies chest pain or shortness of breath.  No recent nausea, vomiting or diarrhea.  No recent urinary complaints.      PAST MEDICAL HISTORY:  Past Medical History:   Diagnosis Date    Abnormal CBC 01/20/2020    Acne     Allergic     Anemia 10/28/2019    Anxiety     Arthritis     Back pain     Chronic kidney disease     Chronic pain     Colon polyp     CPAP (continuous positive airway pressure) dependence     Dental cavity 08/10/2017    Depression 06/07/2021    Diabetes mellitus (HCC)     6/19/15 A1C: 5.5%    Eczema     Fall 10/16/2023    Flu  vaccine need 10/16/2023    Grief reaction 09/10/2019    Hyperlipidemia     Hypertension     Knee pain     Morbid obesity with BMI of 50.0-59.9, adult (HCC)     Neuropathy in diabetes ([facility])     Obesity     IMELDA on CPAP     Pain of intrathecal infusion pump pocket after insertion (HCC)     Per pt no longer works. Having insurance issues so it remains in place.    Psychiatric disorder     Screening for prostate cancer 2018    Skin tag     Suspected sleep apnea 2023    Type 2 diabetes mellitus without complication ([facility]) 2014    Urinary retention        PAST SURGICAL HISTORY:  Past Surgical History:   Procedure Laterality Date    BACK SURGERY      COLONOSCOPY      LUMBAR FUSION  2002    LUMBAR FUSION      L4-5-6, S1 has pump for narcotics- per pt pain pump no longer (since ) works but it remains in place.       ALLERGIES:  Allergies   Allergen Reactions    Gabapentin      Annotation - [id number]: dizziness    Pregabalin      Annotation - [id number]: vision changes and mood changes    Tramadol Nausea Only       SOCIAL HISTORY:  Social History     Substance and Sexual Activity   Alcohol Use Not Currently    Comment: occasional     Social History     Substance and Sexual Activity   Drug Use No    Comment: History of drug use, meena, cocaine, heroind, no IVDA-as per [employer]     Social History     Tobacco Use   Smoking Status Former    Types: Cigars    Start date:     Quit date: 4/15/2024    Years since quittin.0    Passive exposure: Current   Smokeless Tobacco Never   Tobacco Comments    Quit cigarette smoking in .     Patient reports that he last smoked a cigar close to 2 years ago.        FAMILY HISTORY:  Family History   Problem Relation Age of Onset    Other Mother         colitis    Diabetes Mother        MEDICATIONS:  Scheduled Meds:  Current Facility-Administered Medications   Medication Dose Route Frequency Provider Last Rate    acetaminophen  1,000 mg Intravenous Q6H WOLF Napier  Ney Rocha MD      bupivacaine liposomal  20 mL Infiltration Once Noé Glover MD      diphenhydrAMINE  25 mg Oral Q8H PRN Karthikeyan Rocha MD      famotidine  20 mg Intravenous BID Karthikeyan Rocha MD      fentaNYL  50 mcg Intravenous Q5 Min PRN Gordy Edward Ileana, DO      HYDROmorphone  0.5 mg Intravenous Q5 Min PRN Gordy Edward Ileana, DO      lactated ringers  125 mL/hr Intravenous Continuous Roni Parlin,  mL/hr (04/28/25 0935)    lactated ringers  20 mL/hr Intravenous Continuous Roni Parlin, DO Stopped (04/28/25 1038)    lactated ringers  100 mL/hr Intravenous Continuous Karthikeyan Rocha  mL/hr (04/28/25 1021)    lactated ringers  125 mL/hr Intravenous Continuous Gordy Edward Ileana, DO      [Held by provider] losartan  100 mg Oral Daily Noé Glover MD      meperidine  12.5 mg Intravenous Once PRN Gordy Edward Ileana, DO      metoprolol tartrate  100 mg Oral BID Karthikeyan Rocha MD      morphine injection  4 mg Intravenous Q4H PRN Karthikeyan Rocha MD      ondansetron  4 mg Intravenous Q6H PRN Karthikeyan Rocha MD      ondansetron  4 mg Intravenous Once PRN Gordy Edward Ileana, DO      oxyCODONE  10 mg Oral Q4H PRN Karthikeyan Rocha MD      oxyCODONE  5 mg Oral Q4H PRN Karthikeyan Rocha MD      phenol  2 spray Mouth/Throat Q2H PRN Karthikeyan Rocha MD      promethazine  25 mg Intravenous Q6H PRN Karthikeyan Rocha MD      promethazine  6.25 mg Intravenous Once PRN Gordy Edward Ileana, DO      simethicone  80 mg Oral 4x Daily PRN Karthikeyan Rocha MD         PRN Meds:.  diphenhydrAMINE    fentaNYL    HYDROmorphone    meperidine    morphine injection    ondansetron    ondansetron    oxyCODONE    oxyCODONE    phenol    promethazine    promethazine    simethicone    Continuous Infusions:lactated ringers, 125 mL/hr, Last Rate: 125 mL/hr (04/28/25 0935)  lactated ringers, 20 mL/hr, Last Rate: Stopped (04/28/25 1038)  lactated ringers, 100 mL/hr, Last  Rate: 100 mL/hr (04/28/25 1021)  lactated ringers, 125 mL/hr        REVIEW OF SYSTEMS:  A complete review of systems was done. Pertinent positives and negatives noted in the HPI but otherwise the review of systems is negative.    PHYSICAL EXAM:  Current Weight: Weight - Scale: 133 kg (294 lb)  First Weight: Weight - Scale: 134 kg (294 lb 15.6 oz)  Vitals:    04/28/25 1102   BP: 137/81   Pulse: 70   Resp:    Temp:    SpO2: 93%       Intake/Output Summary (Last 24 hours) at 4/28/2025 1112  Last data filed at 4/28/2025 0854  Gross per 24 hour   Intake 1350 ml   Output 120 ml   Net 1230 ml     General:  appears comfortable and in no acute distress   Skin:  No rash  Eyes:  Sclerae anicteric, no periorbital edema   ENT:  Moist mucous membranes  Neck:  Trachea midline, symmetric   Chest:  Clear to auscultation bilaterally with no wheezes, rales or rhonchi  CVS:  Regular rate and rhythm  Abdomen:  Soft, nontender, nondistended  Neuro:  Awake and alert  Psych:  Appropriate affect  Extremities: no lower extremity edema     Lab Results:   Results from last 7 days   Lab Units 04/21/25  1114   WBC Thousand/uL 7.05   HEMOGLOBIN g/dL 14.0   HEMATOCRIT % 41.9   PLATELETS Thousands/uL 192   SODIUM mmol/L 137   POTASSIUM mmol/L 4.7   CHLORIDE mmol/L 101   CO2 mmol/L 25   BUN mg/dL 56*   CREATININE mg/dL 1.66*   CALCIUM mg/dL 9.8   MAGNESIUM mg/dL 2.3   PHOSPHORUS mg/dL 4.3*       Radiology Results:   No orders to display

## 2025-04-29 ENCOUNTER — TELEPHONE (OUTPATIENT)
Dept: NEPHROLOGY | Facility: CLINIC | Age: 65
End: 2025-04-29

## 2025-04-29 VITALS
DIASTOLIC BLOOD PRESSURE: 88 MMHG | SYSTOLIC BLOOD PRESSURE: 149 MMHG | OXYGEN SATURATION: 93 % | TEMPERATURE: 98.9 F | WEIGHT: 294 LBS | RESPIRATION RATE: 16 BRPM | HEART RATE: 68 BPM | BODY MASS INDEX: 43.55 KG/M2 | HEIGHT: 69 IN

## 2025-04-29 LAB
ANION GAP SERPL CALCULATED.3IONS-SCNC: 6 MMOL/L (ref 4–13)
BUN SERPL-MCNC: 43 MG/DL (ref 5–25)
CALCIUM SERPL-MCNC: 9.5 MG/DL (ref 8.4–10.2)
CHLORIDE SERPL-SCNC: 105 MMOL/L (ref 96–108)
CO2 SERPL-SCNC: 25 MMOL/L (ref 21–32)
CREAT SERPL-MCNC: 1.68 MG/DL (ref 0.6–1.3)
ERYTHROCYTE [DISTWIDTH] IN BLOOD BY AUTOMATED COUNT: 14.9 % (ref 11.6–15.1)
GFR SERPL CREATININE-BSD FRML MDRD: 41 ML/MIN/1.73SQ M
GLUCOSE SERPL-MCNC: 129 MG/DL (ref 65–140)
HCT VFR BLD AUTO: 40.3 % (ref 36.5–49.3)
HGB BLD-MCNC: 13.4 G/DL (ref 12–17)
MCH RBC QN AUTO: 28.3 PG (ref 26.8–34.3)
MCHC RBC AUTO-ENTMCNC: 33.3 G/DL (ref 31.4–37.4)
MCV RBC AUTO: 85 FL (ref 82–98)
PLATELET # BLD AUTO: 200 THOUSANDS/UL (ref 149–390)
PMV BLD AUTO: 9.7 FL (ref 8.9–12.7)
POTASSIUM SERPL-SCNC: 5.3 MMOL/L (ref 3.5–5.3)
RBC # BLD AUTO: 4.74 MILLION/UL (ref 3.88–5.62)
SODIUM SERPL-SCNC: 136 MMOL/L (ref 135–147)
WBC # BLD AUTO: 8.62 THOUSAND/UL (ref 4.31–10.16)

## 2025-04-29 PROCEDURE — 99024 POSTOP FOLLOW-UP VISIT: CPT | Performed by: STUDENT IN AN ORGANIZED HEALTH CARE EDUCATION/TRAINING PROGRAM

## 2025-04-29 PROCEDURE — 99232 SBSQ HOSP IP/OBS MODERATE 35: CPT | Performed by: INTERNAL MEDICINE

## 2025-04-29 PROCEDURE — 85027 COMPLETE CBC AUTOMATED: CPT | Performed by: STUDENT IN AN ORGANIZED HEALTH CARE EDUCATION/TRAINING PROGRAM

## 2025-04-29 PROCEDURE — 80048 BASIC METABOLIC PNL TOTAL CA: CPT | Performed by: STUDENT IN AN ORGANIZED HEALTH CARE EDUCATION/TRAINING PROGRAM

## 2025-04-29 PROCEDURE — NC001 PR NO CHARGE: Performed by: PHYSICIAN ASSISTANT

## 2025-04-29 RX ORDER — PROMETHAZINE HYDROCHLORIDE 25 MG/ML
25 INJECTION, SOLUTION INTRAMUSCULAR; INTRAVENOUS EVERY 6 HOURS PRN
Status: DISCONTINUED | OUTPATIENT
Start: 2025-04-29 | End: 2025-04-29 | Stop reason: HOSPADM

## 2025-04-29 RX ADMIN — OXYCODONE HYDROCHLORIDE 5 MG: 5 SOLUTION ORAL at 08:56

## 2025-04-29 RX ADMIN — METOPROLOL TARTRATE 100 MG: 50 TABLET, FILM COATED ORAL at 08:47

## 2025-04-29 RX ADMIN — ACETAMINOPHEN 1000 MG: 10 INJECTION INTRAVENOUS at 00:31

## 2025-04-29 RX ADMIN — FAMOTIDINE 20 MG: 10 INJECTION, SOLUTION INTRAVENOUS at 08:47

## 2025-04-29 RX ADMIN — SODIUM CHLORIDE, SODIUM LACTATE, POTASSIUM CHLORIDE, AND CALCIUM CHLORIDE 100 ML/HR: .6; .31; .03; .02 INJECTION, SOLUTION INTRAVENOUS at 06:10

## 2025-04-29 RX ADMIN — ACETAMINOPHEN 1000 MG: 10 INJECTION INTRAVENOUS at 06:09

## 2025-04-29 NOTE — TELEPHONE ENCOUNTER
----- Message from Ja Nunez MD sent at 4/29/2025 12:23 PM EDT -----  Hello    Thank you very much    MA team-please remind the patient to obtain the BMP.  Orders are in the system and please remind the patient to continue holding spironolactone    Can check blood pressures once a day and he can send a log in 1 week    Thank you  ----- Message -----  From: Janice Aviles PA-C  Sent: 4/29/2025  10:59 AM EDT  To: Ja Nunez MD    Hi,   Your patient is being d/c from [facility] today s/p laparoscopic sleeve gastrectomy. Creatinine stable at d/c. K was up to 5.3 but he was on K containing fluids. I told him to resume his losartan at d/c but to keep holding spironolactone as he had no edema or shortness of breath and I felt his BP may start to trend down as he loses weight post op. He is getting repeat BMP in 1 week.   Thanks,   Janice

## 2025-04-29 NOTE — PROGRESS NOTES
NEPHROLOGY PROGRESS NOTE   Nasir Story Jr. 64 y.o. male MRN: [id number]  Unit/Bed#: E5 -01 Encounter: [id number]      Assessment & Plan  Stage 3b chronic kidney disease (HCC)  Baseline creatinine 1.4-1.7 and follows with Dr. Nunez.  Creatinine stable at 1.68 today   Holding losartan and spironolactone postoperatively-- can resume losartan at d/c but would continue to hold spironolactone   Continue IV fluids until tolerating p.o. well  Avoid NSAIDs, IV dye or other nephrotoxins  K up to 5.3 -- low K diet   Check a.m. BMP  Morbid obesity with BMI of 45.0-49.9, adult (HCC)  Status post sleeve gastrectomy 4/28  Essential hypertension  Blood pressure slightly above goal   Monitor off losartan and spironolactone temporarily  Continue home metoprolol 100 mg twice daily  Resume losartan at d/c   Chronic diastolic heart failure (HCC)  Appears euvolemic      Plan Summary:   Resume losartan at d/c   Low K diet   Avoid spironolactone for now   Check BMP in 1 week   Discussed with primary team who agree with the above plan summary     SUBJECTIVE:  Feeling much better today and hoping to be d/c. No shortness of breath or edema     OBJECTIVE:  Current Weight: Weight - Scale: 133 kg (294 lb)  Vitals:    04/29/25 0825   BP: 149/88   Pulse: 68   Resp: 16   Temp: 98.9 °F (37.2 °C)   SpO2: 93%       Intake/Output Summary (Last 24 hours) at 4/29/2025 1055  Last data filed at 4/29/2025 0610  Gross per 24 hour   Intake 0 ml   Output 2930 ml   Net -2930 ml       General:  appears comfortable and in no acute distress   Skin:  No rash  Eyes:  Sclerae anicteric, no periorbital edema   ENT:  Moist mucous membranes  Neck:  Trachea midline, symmetric   Chest:  Clear to auscultation bilaterally with no wheezes, rales or rhonchi  CVS:  Regular rate and rhythm  Abdomen:  Soft, nontender, nondistended  Neuro:  Awake and alert  Psych:  Appropriate affect  Extremities: no lower extremity edema       Medications:  Scheduled Meds:  Current  Facility-Administered Medications   Medication Dose Route Frequency Provider Last Rate    acetaminophen  1,000 mg Intravenous Q6H WOLF Karthikeyan Rocha MD 1,000 mg (04/29/25 0609)    bupivacaine liposomal  20 mL Infiltration Once Noé Glover MD      diphenhydrAMINE  25 mg Oral Q8H PRN Karthikyean Rocha MD      famotidine  20 mg Intravenous BID Karthikeyan Rocha MD      fentaNYL  50 mcg Intravenous Q5 Min PRN Gordy Edward Ileana, DO      HYDROmorphone  0.5 mg Intravenous Q5 Min PRN Gordy Edward Ileana, DO      lactated ringers  125 mL/hr Intravenous Continuous Roni Parlin,  mL/hr (04/28/25 0935)    lactated ringers  20 mL/hr Intravenous Continuous Roni Parlin, DO Stopped (04/28/25 1038)    lactated ringers  100 mL/hr Intravenous Continuous Karthikeyan Rocha  mL/hr (04/29/25 0610)    lactated ringers  125 mL/hr Intravenous Continuous Gordy Edward Ileana, DO      [Held by provider] losartan  100 mg Oral Daily Noé Glover MD      meperidine  12.5 mg Intravenous Once PRN Gordy Edward Ileana, DO      metoprolol tartrate  100 mg Oral BID Karthikeyan Rocha MD      morphine injection  4 mg Intravenous Q4H PRN Karthikeyan Rocha MD      ondansetron  4 mg Intravenous Q6H PRN Karthikeyan Rocha MD      oxyCODONE  10 mg Oral Q4H PRN Karthikeyan Rocha MD      oxyCODONE  5 mg Oral Q4H PRN Karthikeyan Rocha MD      phenol  2 spray Mouth/Throat Q2H PRN Karthikeyan Rocha MD      promethazine  25 mg Intravenous Q6H PRN Gordy Edward Ileana, DO      simethicone  80 mg Oral 4x Daily PRN Karthikeyan Rocha MD         PRN Meds:.  diphenhydrAMINE    fentaNYL    HYDROmorphone    meperidine    morphine injection    ondansetron    oxyCODONE    oxyCODONE    phenol    promethazine    simethicone    Continuous Infusions:lactated ringers, 125 mL/hr, Last Rate: 125 mL/hr (04/28/25 0935)  lactated ringers, 20 mL/hr, Last Rate: Stopped (04/28/25 1038)  lactated ringers, 100 mL/hr, Last Rate: 100  mL/hr (04/29/25 0610)  lactated ringers, 125 mL/hr        Laboratory Results:  Results from last 7 days   Lab Units 04/29/25  0625   WBC Thousand/uL 8.62   HEMOGLOBIN g/dL 13.4   HEMATOCRIT % 40.3   PLATELETS Thousands/uL 200   SODIUM mmol/L 136   POTASSIUM mmol/L 5.3   CHLORIDE mmol/L 105   CO2 mmol/L 25   BUN mg/dL 43*   CREATININE mg/dL 1.68*   CALCIUM mg/dL 9.5

## 2025-04-29 NOTE — PROGRESS NOTES
"Progress Note - Bariatric Surgery   Nasir Story Jr. 64 y.o. male MRN: [id number]  Unit/Bed#: E5 -01 Encounter: [id number]      Subjective/Objective     Subjective:  Patient with morbid obesity POD1 s/p laparoscopic sleeve gastrectomy.  Patient denies fevers, chills, sweats, SOB, CP, calf pain.  Pain adequately controlled on oral pain medication.  Ambulating without assistance, voiding well, and using incentive spirometer.  Tolerating liquid diet without nausea or vomiting today.  Vital signs stable.  CBC today shows appropriate Hgb and WBC counts.  BMP obtained today appropriate.  Using CPAP.      Objective:    /85 (BP Location: Left arm)   Pulse 74   Temp 98.5 °F (36.9 °C) (Oral)   Resp 18   Ht 5' 9\" (1.753 m)   Wt 133 kg (294 lb)   SpO2 93%   BMI 43.42 kg/m²       Intake/Output Summary (Last 24 hours) at 4/29/2025 0714  Last data filed at 4/29/2025 0610  Gross per 24 hour   Intake 1350 ml   Output 3050 ml   Net -1700 ml       Invasive Devices       Peripheral Intravenous Line  Duration             Peripheral IV 04/28/25 Right Forearm 1 day                    ROS: 10-point system completed. All negative except see HPI.    Physical Exam    General Appearance:    Alert, cooperative, no distress, appears stated age   Head:    Normocephalic, without obvious abnormality, atraumatic   Lungs:     Respirations unlabored   Heart:    Regular rate and rhythm   Abdomen:     Soft, appropriate tenderness, no masses, no organomegaly, non-distended   Extremities:   Extremities normal, atraumatic, no cyanosis or edema   Neurologic:  Incision:  Psych:   Normal strength and sensation    Clean, dry, and intact, no evidence of bleeding    Normal mood and affect       Lab, Imaging and other studies:I have personally reviewed pertinent lab results.  , CBC:   Lab Results   Component Value Date    WBC 8.62 04/29/2025    HGB 13.4 04/29/2025    HCT 40.3 04/29/2025    MCV 85 04/29/2025     04/29/2025    RBC 4.74 " 04/29/2025    MCH 28.3 04/29/2025    MCHC 33.3 04/29/2025    RDW 14.9 04/29/2025    MPV 9.7 04/29/2025   , CMP:   Lab Results   Component Value Date    SODIUM 136 04/29/2025    K 5.3 04/29/2025     04/29/2025    CO2 25 04/29/2025    BUN 43 (H) 04/29/2025    CREATININE 1.68 (H) 04/29/2025    CALCIUM 9.5 04/29/2025    EGFR 41 04/29/2025        VTE Mechanical Prophylaxis: sequential compression device    Assessment/Plan  1)  Patient with morbid Obesity s/p laparoscopic Sleeve Gastrectomy with stable post op course.  Patient afebrile and hemodynamically stable.     Patient with stage IIIb chronic kidney disease and essential hypertension. Baseline creatinine between 1.4 and 2.2.    - Encourage PO fluids  - Recommend ambulation, use of SCDs when not ambulating, and incentive spirometry.   - Patient will resume Eliquis at home. VTE prophylaxis is recommended per the VTE risk calculator score of 1.75%  - Nephrology recommendations appreciated  - Plan to D/C patient home today pending anticipated progression    Plan of care was discussed with patient.  Care plan discussed with Dr. Belen Rocha MD  Bariatric Surgery  4/29/2025  7:14 AM

## 2025-04-29 NOTE — DISCHARGE INSTR - AVS FIRST PAGE
Bariatric/Weight Loss Surgery  Hospital Discharge Instructions  ACTIVITY:  Progress as feels comfortable - a good rule is:  if you are doing something and it begins to hurt, stop doing the activity. Walk every hour while at home.  You may walk stairs if you do so slowly  You may shower 48 hours after surgery.  Do not scrub incision sites.  Blot gently with clean towel to dry incisions. (see #4 below)   Use your incentive spirometer 10 times per hour while awake for 1 week after surgery.  Do NOT drive for 48 hours after surgery. No driving 24 hours after taking certain prescription pain medications. Examples of such medication are Percocet, Darvocet, Oxycodone, Tylenol #3, and Tylenol with Codeine.     DIET  Bariatric patients: Stay on a liquid diet for 7 days after your surgery date, sipping slowly. Refer to your manual for examples of choices. Remember to keep your liquids sugar free or low calorie. You may have protein drinks. Make sure to drink 48 to 64 ounces per day of fluids. You may advance to a pureed diet one week after surgery as instructed by your diet progression pamphlet. Once you get approval from your surgeon at your first post operative visit, you may advance to the soft diet and remain on soft diet for 8 weeks unless otherwise instructed.  Hernia patients: Hernia diet as instructed    MEDICATIONS:  The abdominal nerve block will wear off during the first 1-2 days that you are home, and you may become sore (especially over incision site/sites where abdominal wall is sutured). This may create a pulling sensation, especially while moving around, and will fade over time.  Continue to take your Tylenol and your pain medication as instructed.   Bariatric patients: Start vitamins and minerals one week after surgery or when you start stage 3/puree diet.   Anti-acid Medication as per prescription.  Other medications as indicated on the Physician Patient Discharge Instructions form given to you at the time of  discharge.  You will need to consult with your Family Doctor in regards to all your prescribed medication, particularly those for blood pressure and diabetes.  As you lose weight, medical conditions may change, requiring an alteration or elimination of the drug dose. Monitor blood pressure closely and call PCP with any concerns.   Lovenox injections daily ONLY if indicated   Females: DO NOT TAKE BIRTH CONTROL(BC) MEDICATIONS, INSERT BC VAGINAL RINGS, OR PLACE IUD OR ANY OTHER BC METHODS UNTIL 31 DAYS FROM DAY OF DISCHARGE FROM HOSPITAL. THIS PLACES YOU AT HIGH RISK FOR A POTENTIALLY LIFE THREATENING BLOOD CLOT. Remember to always use barrier methods for birth control and speak to your GYN about using two forms of birth control to start 31 days after surgery. It is very important to avoid pregnancy until at least 18-24 months after surgery.     INCISION CARE  You may shower and get incisions wet 2 days after surgery. No soaking tub baths or swimming for 30 days after surgery. Keep abdominal area and incisions clean. Use soap and water to create a good lather and rinse off.  Do not scrub incisions.   If you have a drain, empty the drain as the nurses instructed.    FOLLOW-UP APPOINTMENT should be made for one week after discharge. Call surgeon’s office at [phone] to schedule an appointment.    CALL YOUR DOCTOR FOR:  pain not controlled by pain medications, a temperature greater than 101.5° F, any increase or change in drainage or redness from any incision, any vomiting or inability to keep liquids down, shortness of breath, shoulder pain, or bleeding

## 2025-04-29 NOTE — UTILIZATION REVIEW
Initial Clinical Review    Elective IP surgical procedure  Age/Sex: 64 y.o. male  Surgery Date: 4/28/25 @ 0758   Procedure: Laparoscopic Sleeve Gastrectomy , Intraoperative Endoscopy  Anesthesia: General  , exparel   Operative Findings: Normal anatomy      POD #0 Nephrology consult - elective bariatric surgery Hx IIIb chronic kidney disease. Baseline creatinine 1.4-1.7   Continue acute kidney injury NORRIS risk reduction measures . IVF  until huong po well . BMP in am . Avoid NSAID's .  Essential hypertension-monitor off losartan and spironolactone post op .Continue home metoprolol 100 mg BID.  BP currently acceptable , pt appears euvolemic . .     POD#1 Progress Note: 4/29/25    Incision C/D/I w/o bleeding noted . Abdomen soft, appropriate tenderness,  non-distended . Pt afebrile . Pain adequately controlled . Hemodynamics stable . Ambulating without assistance, voiding well, and using incentive spirometer. Tolerating liquid diet without nausea or vomiting today. CBC today shows appropriate Hgb and WBC counts   Creat 1.68 today , within baseline . Pt to resume Eliquis . Encourage po fluids, ambulation .     Admission Orders: Date/Time/Statement:   Admission Orders (From admission, onward)       Ordered        04/28/25 0727  Inpatient Admission  Once            Pending  Inpatient Admission  Once                          Orders Placed This Encounter   Procedures    Inpatient Admission     Standing Status:   Standing     Number of Occurrences:   1     Level of Care:   Med Surg [16]     Bed Type:   Bariatric [1]     Estimated length of stay:   Inpatient Only Surgery     Diet: Bariatric CL diet  Mobility: OOB ambulation   DVT Prophylaxis: SCD, ambulation        Medications/Pain Control:   Scheduled Medications:  acetaminophen, 1,000 mg, Intravenous, Q6H WOLF  bupivacaine liposomal, 20 mL, Infiltration, Once  famotidine, 20 mg, Intravenous, BID  [Held by provider] losartan, 100 mg, Oral, Daily  metoprolol tartrate, 100 mg,  Oral, BID    fosaprepitant (EMEND) 150 mg in sodium chloride 0.9 % 250 mL IVPB  Dose: 150 mg  Freq: Once Route: IV  Last Dose: Stopped (04/28/25 0714)  Start: 04/28/25 0530 End: 04/28/25 0714  enoxaparin (LOVENOX) subcutaneous injection 40 mg  Dose: 40 mg  Freq: Once Route: SC  Start: 04/28/25 0530 End: 04/28/25 0557  ceFAZolin (ANCEF) 3,000 mg in dextrose 5% 100 ml IVPB  Dose: 3,000 mg  Freq: Once Route: IV  Indications of Use: PROPHYLAXIS  Start: 04/28/25 0530 End: 04/28/25 0731    Continuous IV Infusions:  lactated ringers, 100 mL/hr, Intravenous, Continuous  lactated ringers infusion  Rate: 125 mL/hr Dose: 125 mL/hr  Freq: Continuous Route: IV  Last Dose: 125 mL/hr (04/28/25 0935)    PRN Meds:  diphenhydrAMINE, 25 mg, Oral, Q8H PRN  fentaNYL, 50 mcg, Intravenous, Q5 Min PRN  HYDROmorphone, 0.5 mg, Intravenous, Q5 Min PRN  meperidine, 12.5 mg, Intravenous, Once PRN  morphine injection, 4 mg, Intravenous, Q4H PRN  ondansetron, 4 mg, Intravenous, Q6H PRN  oxyCODONE, 10 mg, Oral, Q4H PRN  oxyCODONE, 5 mg, Oral, Q4H PRN  phenol, 2 spray, Mouth/Throat, Q2H PRN  promethazine, 25 mg, Intravenous, Q6H PRN  simethicone, 80 mg, Oral, 4x Daily PRN x1 4/28   fentaNYL (SUBLIMAZE) injection 25 mcg  Dose: 25 mcg  Freq: Every 5 minutes PRN Route: IV  PRN Reason: Pain - PACU  PRN Comment: Breakthrough - first line  Start: 04/28/25 0920 End: 04/28/25 1010 x2 4/28     Vital Signs (last 3 days)       Date/Time Temp Pulse Resp BP MAP (mmHg) SpO2 Calculated FIO2 (%) - Nasal Cannula O2 Flow Rate (L/min) Nasal Cannula O2 Flow Rate (L/min) O2 Device Cardiac (WDL) Patient Position - Orthostatic VS Pain    04/29/25 0321 -- -- 18 -- -- -- -- -- -- CPAP -- Lying --    04/28/25 23:22:47 98.5 °F (36.9 °C) 74 18 143/85 104 93 % -- -- -- CPAP -- Lying --    04/28/25 2000 -- -- -- -- -- -- -- -- -- None (Room air) -- -- No Pain    04/28/25 18:48:24 98 °F (36.7 °C) 78 18 143/86 105 94 % -- -- -- None (Room air) -- Sitting --    04/28/25 17:22:02  -- 81 -- 139/80 100 95 % -- -- -- -- -- -- --    04/28/25 1412 -- 71 -- -- -- 93 % -- -- -- -- -- -- --    04/28/25 1357 -- 72 -- -- -- 93 % -- -- -- -- -- -- --    04/28/25 1342 -- 94 -- -- -- 99 % -- -- -- -- -- -- --    04/28/25 1327 -- 72 -- -- -- 91 % -- -- -- -- -- -- --    04/28/25 1312 -- 71 -- 127/74 92 94 % -- -- -- -- -- -- --    04/28/25 12:57:42 97.5 °F (36.4 °C) 75 18 140/78 99 93 % -- -- -- -- -- Sitting --    04/28/25 1257 -- 74 -- 140/78 99 93 % -- -- -- -- -- -- --    04/28/25 11:02:46 -- 70 18 137/81 100 93 % -- -- -- None (Room air) -- Sitting --    04/28/25 1045 -- -- -- 122/81 95 -- -- -- -- -- -- -- --    04/28/25 10:15:01 98.4 °F (36.9 °C) 69 19 131/71 91 94 % -- -- -- None (Room air) -- Sitting --    04/28/25 0951 97.7 °F (36.5 °C) -- 17 119/61 84 96 % -- -- -- None (Room air) -- -- 5 04/28/25 0948 -- -- -- -- -- -- -- -- -- -- -- -- 5 04/28/25 0942 -- -- -- -- -- -- -- -- -- -- -- -- 5 04/28/25 0935 -- 72 16 119/63 87 95 % 28 -- 2 L/min Nasal cannula -- -- No Pain    04/28/25 0920 97.6 °F (36.4 °C) 76 17 129/70 91 95 % -- 5 L/min -- Simple mask WDL -- --    04/28/25 0711 -- -- -- -- -- -- -- -- -- -- -- -- 6    04/28/25 0536 97.8 °F (36.6 °C) 70 18 101/50 -- 95 % -- -- -- None (Room air) -- Lying No Pain          Weight (last 2 days)       Date/Time Weight    04/28/25 10:15:01 133 (294)    04/28/25 0536 134 (294.98)            Pertinent Labs/Diagnostic Test Results:       Results from last 7 days   Lab Units 04/29/25  0625   WBC Thousand/uL 8.62   HEMOGLOBIN g/dL 13.4   HEMATOCRIT % 40.3   PLATELETS Thousands/uL 200         Results from last 7 days   Lab Units 04/29/25  0625   SODIUM mmol/L 136   POTASSIUM mmol/L 5.3   CHLORIDE mmol/L 105   CO2 mmol/L 25   ANION GAP mmol/L 6   BUN mg/dL 43*   CREATININE mg/dL 1.68*   EGFR ml/min/1.73sq m 41   CALCIUM mg/dL 9.5         Results from last 7 days   Lab Units 04/28/25  0924 04/28/25  0558   POC GLUCOSE mg/dl 96 156*     Results from  last 7 days   Lab Units 04/29/25  0625   GLUCOSE RANDOM mg/dL 129             Network Utilization Review Department  ATTENTION: Please call with any questions or concerns to [phone] and carefully listen to the prompts so that you are directed to the right person. All voicemails are confidential.   For Discharge needs, contact Care Management DC Support Team at [phone] opt. 2  Send all requests for admission clinical reviews, approved or denied determinations and any other requests to dedicated fax number below belonging to the campus where the patient is receiving treatment. List of dedicated fax numbers for the Facilities:  FACILITY NAME UR FAX NUMBER   ADMISSION DENIALS (Administrative/Medical Necessity) [phone]   DISCHARGE SUPPORT TEAM (NETWORK) [phone]   PARENT CHILD HEALTH (Maternity/NICU/Pediatrics) [phone]   [facility] [phone]   [facility] [phone]   [facility] [phone]   [facility] [phone]   [facility] [phone]   [facility] [phone]   [facility] [phone]   [facility] [phone]   [facility] [phone]   [facility] [phone]   [facility] [phone]   [facility] [phone]

## 2025-04-29 NOTE — DISCHARGE SUMMARY
Discharge Summary - Nasir Story Jr. 64 y.o. male MRN: [id number]    Unit/Bed#: E5 -01 Encounter: [id number]      Pre-Operative Diagnosis: Pre-Op Diagnosis Codes:      * Morbid obesity (HCC) [E66.01]     * Diabetes mellitus (HCC) [E11.9]     * Obstructive sleep apnea (adult) (pediatric) [G47.33]     * Essential hypertension, malignant [I10]    Post-Operative Diagnosis: Post-Op Diagnosis Codes:     * Morbid obesity (HCC) [E66.01]     * Diabetes mellitus (HCC) [E11.9]     * Obstructive sleep apnea (adult) (pediatric) [G47.33]     * Essential hypertension, malignant [I10]    Procedures Performed:  Procedure(s):  GASTRECTOMY SLEEVE LAPAROSCOPIC WITH INTRAOPERATIVE EGD    Surgeon: Noé Glover MD    See H & P for full details of admission and Operative Note for full details of operations performed.     Patient tolerated surgery well without complications. In the morning postoperative Day 1, the patient had mild nausea and abdominal pain. Tolerated a clear liquid diet without vomiting. Able to ambulate and voiding independently. Patient was deemed ready for discharge home. No lovenox, patient will resume Eliquis.     Patient was seen and examined prior to discharge.      Provisions for Follow-Up Care:  See After Visit Summary/Discharge Instructions for information related to follow-up care and home orders.      Disposition: Home, in stable condition.     Planned Readmission: No    Discharge Medications:  See After Visit Summary/Discharge Instructions for reconciled discharge medications provided to patient and family.      Post Operative instructions: Reviewed with patient and/or family.    Signature:   Gissell Hamilton PA-C  Date: 4/29/2025 Time: 10:29 AM

## 2025-04-29 NOTE — UTILIZATION REVIEW
NOTIFICATION OF INPATIENT ADMISSION   AUTHORIZATION REQUEST   SERVICING FACILITY:   [place] [address]  Tax ID: [id number]  NPI: [id number] ATTENDING PROVIDER:  Attending Name and NPI#: Noé Glover Md [[id number]]  Address: [address] [place] [address]  Phone: [phone]     ADMISSION INFORMATION:  Place of Service: Inpatient [facility]  Place of Service Code: 21  Inpatient Admission Date/Time: 4/28/25  7:27 AM  Discharge Date/Time: No discharge date for patient encounter.  Admitting Diagnosis Code/Description:  Morbid obesity (HCC) [E66.01]  Diabetes mellitus (HCC) [E11.9]  Obstructive sleep apnea (adult) (pediatric) [G47.33]  Essential hypertension, malignant [I10]     UTILIZATION REVIEW CONTACT:  Ksenia Vann, Utilization   Network Utilization Review Department  Phone: [phone]  Fax [phone]  Email: [email or web address]  Contact for approvals/pending authorizations, clinical reviews, and discharge.     PHYSICIAN ADVISORY SERVICES:  Medical Necessity Denial & [employer] Review  Phone: [phone]  Fax: [phone]  Email: [email or web address]     DISCHARGE SUPPORT TEAM:  For Patients Discharge Needs & Updates  Phone: [phone] opt. 2 Fax: [phone]  Email: [email or web address]

## 2025-04-29 NOTE — ASSESSMENT & PLAN NOTE
Blood pressure slightly above goal   Monitor off losartan and spironolactone temporarily  Continue home metoprolol 100 mg twice daily  Resume losartan at d/c

## 2025-04-29 NOTE — ASSESSMENT & PLAN NOTE
Baseline creatinine 1.4-1.7 and follows with Dr. Nunez.  Creatinine stable at 1.68 today   Holding losartan and spironolactone postoperatively-- can resume losartan at d/c but would continue to hold spironolactone   Continue IV fluids until tolerating p.o. well  Avoid NSAIDs, IV dye or other nephrotoxins  K up to 5.3 -- low K diet   Check a.m. BMP

## 2025-04-30 ENCOUNTER — TELEPHONE (OUTPATIENT)
Dept: MEDSURG UNIT | Facility: HOSPITAL | Age: 65
End: 2025-04-30

## 2025-04-30 NOTE — UTILIZATION REVIEW
NOTIFICATION OF ADMISSION DISCHARGE   This is a Notification of Discharge from [facility]. Please be advised that this patient has been discharge from our facility. Below you will find the admission and discharge date and time including the patient’s disposition.   UTILIZATION REVIEW CONTACT:  Utilization Review Assistants  Network Utilization Review Department  Phone: [phone] x carefully listen to the prompts. All voicemails are confidential.  Email: [email or web address]     ADMISSION INFORMATION  PRESENTATION DATE: 4/28/2025  5:09 AM  OBERVATION ADMISSION DATE: N/A  INPATIENT ADMISSION DATE: 4/28/25  7:27 AM   DISCHARGE DATE: 4/29/2025 12:18 PM   DISPOSITION:Home/Self Care    Network Utilization Review Department  ATTENTION: Please call with any questions or concerns to [phone] and carefully listen to the prompts so that you are directed to the right person. All voicemails are confidential.   For Discharge needs, contact Care Management DC Support Team at [phone] opt. 2  Send all requests for admission clinical reviews, approved or denied determinations and any other requests to dedicated fax number below belonging to the campus where the patient is receiving treatment. List of dedicated fax numbers for the Facilities:  FACILITY NAME UR FAX NUMBER   ADMISSION DENIALS (Administrative/Medical Necessity) [phone]   DISCHARGE SUPPORT TEAM ([facility]) [phone]   PARENT CHILD HEALTH (Maternity/NICU/Pediatrics) [phone]   [facility] [phone]   [facility] [phone]   [facility] [phone]   [facility] [phone]   [facility] [phone]   [facility] [phone]   [facility] [phone]   [facility] [phone]   [facility]  [facility] [phone]   [facility] [phone]   [facility] [phone]   [facility] [phone]

## 2025-04-30 NOTE — TELEPHONE ENCOUNTER
Post op follow up phone call completed.  Pt is sipping liquids and has consumed about 16 oz so far today.  Using IS as instructed, reinforced importance of using IS to help prevent pneumonia. Ambulating about home without difficulty.  Pain controlled with analgesia.  Reaffirmed examples of liquid diet over the next week.  Started miralax, passing gas, no BM yet.  Pt stated understanding about discharge instructions and medication adjustments.  Follow up appt with surgeon scheduled for next week.   Instructed to call with any additional questions or concerns.

## 2025-04-30 NOTE — ANESTHESIA POSTPROCEDURE EVALUATION
Post-Op Assessment Note    CV Status:  Stable    Pain management: adequate       Mental Status:  Alert and awake   Hydration Status:  Stable   PONV Controlled:  Controlled   Airway Patency:  Patent  Airway: intubated     Post Op Vitals Reviewed: Yes    No anethesia notable event occurred.    Staff: Anesthesiologist           Last Filed PACU Vitals:  Vitals Value Taken Time   Temp 97.7 °F (36.5 °C) 04/28/25 0951   Pulse 68 04/28/25 0958   /61 04/28/25 0951   Resp 17 04/28/25 0951   SpO2 93 % 04/28/25 0958   Vitals shown include unfiled device data.    Modified Desmond:     Vitals Value Taken Time   Activity 2 04/28/25 0951   Respiration 2 04/28/25 0951   Circulation 2 04/28/25 0951   Consciousness 1 04/28/25 0951   Oxygen Saturation 2 04/28/25 0951     Modified Desmond Score: 9

## 2025-04-30 NOTE — TELEPHONE ENCOUNTER
Spoke pt pt, he knows to hold Spironolactone, will get blood work on Friday and record BP and sent readings.

## 2025-05-01 PROCEDURE — 88307 TISSUE EXAM BY PATHOLOGIST: CPT | Performed by: PATHOLOGY

## 2025-05-02 ENCOUNTER — APPOINTMENT (OUTPATIENT)
Dept: LAB | Facility: CLINIC | Age: 65
End: 2025-05-02
Payer: COMMERCIAL

## 2025-05-02 DIAGNOSIS — N18.32 STAGE 3B CHRONIC KIDNEY DISEASE (HCC): ICD-10-CM

## 2025-05-02 LAB
ANION GAP SERPL CALCULATED.3IONS-SCNC: 9 MMOL/L (ref 4–13)
BUN SERPL-MCNC: 33 MG/DL (ref 5–25)
CALCIUM SERPL-MCNC: 9.7 MG/DL (ref 8.4–10.2)
CHLORIDE SERPL-SCNC: 101 MMOL/L (ref 96–108)
CO2 SERPL-SCNC: 26 MMOL/L (ref 21–32)
CREAT SERPL-MCNC: 1.5 MG/DL (ref 0.6–1.3)
ERYTHROCYTE [DISTWIDTH] IN BLOOD BY AUTOMATED COUNT: 15.1 % (ref 11.6–15.1)
GFR SERPL CREATININE-BSD FRML MDRD: 48 ML/MIN/1.73SQ M
GLUCOSE P FAST SERPL-MCNC: 99 MG/DL (ref 65–99)
HCT VFR BLD AUTO: 42.6 % (ref 36.5–49.3)
HGB BLD-MCNC: 13.4 G/DL (ref 12–17)
MAGNESIUM SERPL-MCNC: 2.2 MG/DL (ref 1.9–2.7)
MCH RBC QN AUTO: 27.1 PG (ref 26.8–34.3)
MCHC RBC AUTO-ENTMCNC: 31.5 G/DL (ref 31.4–37.4)
MCV RBC AUTO: 86 FL (ref 82–98)
PHOSPHATE SERPL-MCNC: 3.5 MG/DL (ref 2.3–4.1)
PLATELET # BLD AUTO: 210 THOUSANDS/UL (ref 149–390)
PMV BLD AUTO: 10.6 FL (ref 8.9–12.7)
POTASSIUM SERPL-SCNC: 5.1 MMOL/L (ref 3.5–5.3)
RBC # BLD AUTO: 4.94 MILLION/UL (ref 3.88–5.62)
SODIUM SERPL-SCNC: 136 MMOL/L (ref 135–147)
WBC # BLD AUTO: 7.92 THOUSAND/UL (ref 4.31–10.16)

## 2025-05-02 PROCEDURE — 83735 ASSAY OF MAGNESIUM: CPT

## 2025-05-02 PROCEDURE — 85027 COMPLETE CBC AUTOMATED: CPT

## 2025-05-02 PROCEDURE — 36415 COLL VENOUS BLD VENIPUNCTURE: CPT

## 2025-05-02 PROCEDURE — 84100 ASSAY OF PHOSPHORUS: CPT

## 2025-05-02 PROCEDURE — 80048 BASIC METABOLIC PNL TOTAL CA: CPT

## 2025-05-04 ENCOUNTER — RESULTS FOLLOW-UP (OUTPATIENT)
Dept: NEPHROLOGY | Facility: CLINIC | Age: 65
End: 2025-05-04

## 2025-05-04 ENCOUNTER — PATIENT MESSAGE (OUTPATIENT)
Dept: NEPHROLOGY | Facility: CLINIC | Age: 65
End: 2025-05-04

## 2025-05-04 DIAGNOSIS — N18.32 STAGE 3B CHRONIC KIDNEY DISEASE (HCC): Primary | ICD-10-CM

## 2025-05-04 NOTE — RESULT ENCOUNTER NOTE
Hello    please let the patient know that his creatinine is stable 1.5.  Potassium improving to 5.1.  But slightly higher than goal at 5.3 in the hospital.  - Please confirm that he is currently holding his spironolactone-he should continue to hold  -Please check BMP in 2 weeks  -No other changes for now  - Check blood pressures once a day at home and if his blood pressures are less than 105, or greater than 140 systolic, he should call so we can review    Thank you    np

## 2025-05-05 NOTE — TELEPHONE ENCOUNTER
Pt informed with results and recommendations. Spironolactone and Losartan currently on hold. He will get Bmp in 2 weeks, order ion the system. Will continue to monitor BP and will call if less than 105, or greater than 140 systolic.

## 2025-05-07 ENCOUNTER — OFFICE VISIT (OUTPATIENT)
Dept: FAMILY MEDICINE CLINIC | Facility: HOSPITAL | Age: 65
End: 2025-05-07
Payer: COMMERCIAL

## 2025-05-07 VITALS
DIASTOLIC BLOOD PRESSURE: 72 MMHG | WEIGHT: 287 LBS | BODY MASS INDEX: 42.51 KG/M2 | OXYGEN SATURATION: 93 % | SYSTOLIC BLOOD PRESSURE: 116 MMHG | HEIGHT: 69 IN | HEART RATE: 76 BPM

## 2025-05-07 DIAGNOSIS — I50.32 CHRONIC DIASTOLIC HEART FAILURE (HCC): ICD-10-CM

## 2025-05-07 DIAGNOSIS — N18.32 STAGE 3B CHRONIC KIDNEY DISEASE (HCC): ICD-10-CM

## 2025-05-07 DIAGNOSIS — E11.22 TYPE 2 DIABETES MELLITUS WITH STAGE 3B CHRONIC KIDNEY DISEASE, WITHOUT LONG-TERM CURRENT USE OF INSULIN (HCC): Primary | ICD-10-CM

## 2025-05-07 DIAGNOSIS — I10 ESSENTIAL HYPERTENSION: ICD-10-CM

## 2025-05-07 DIAGNOSIS — F11.20 CONTINUOUS OPIOID DEPENDENCE (HCC): ICD-10-CM

## 2025-05-07 DIAGNOSIS — N40.1 BPH WITH OBSTRUCTION/LOWER URINARY TRACT SYMPTOMS: ICD-10-CM

## 2025-05-07 DIAGNOSIS — Z00.00 MEDICARE ANNUAL WELLNESS VISIT, SUBSEQUENT: ICD-10-CM

## 2025-05-07 DIAGNOSIS — E78.5 HYPERLIPIDEMIA, UNSPECIFIED HYPERLIPIDEMIA TYPE: ICD-10-CM

## 2025-05-07 DIAGNOSIS — E55.9 VITAMIN D DEFICIENCY: ICD-10-CM

## 2025-05-07 DIAGNOSIS — Z98.84 S/P BARIATRIC SURGERY: ICD-10-CM

## 2025-05-07 DIAGNOSIS — N13.8 BPH WITH OBSTRUCTION/LOWER URINARY TRACT SYMPTOMS: ICD-10-CM

## 2025-05-07 DIAGNOSIS — I27.82 CHRONIC PULMONARY EMBOLISM, UNSPECIFIED PULMONARY EMBOLISM TYPE, UNSPECIFIED WHETHER ACUTE COR PULMONALE PRESENT (HCC): ICD-10-CM

## 2025-05-07 DIAGNOSIS — N18.32 TYPE 2 DIABETES MELLITUS WITH STAGE 3B CHRONIC KIDNEY DISEASE, WITHOUT LONG-TERM CURRENT USE OF INSULIN (HCC): Primary | ICD-10-CM

## 2025-05-07 PROBLEM — E83.42 HYPOMAGNESEMIA: Status: RESOLVED | Noted: 2024-09-05 | Resolved: 2025-05-07

## 2025-05-07 PROBLEM — E87.1 HYPONATREMIA: Status: RESOLVED | Noted: 2024-11-19 | Resolved: 2025-05-07

## 2025-05-07 PROCEDURE — 99214 OFFICE O/P EST MOD 30 MIN: CPT | Performed by: FAMILY MEDICINE

## 2025-05-07 PROCEDURE — G0439 PPPS, SUBSEQ VISIT: HCPCS | Performed by: FAMILY MEDICINE

## 2025-05-07 RX ORDER — ATORVASTATIN CALCIUM 20 MG/1
20 TABLET, FILM COATED ORAL DAILY
Qty: 100 TABLET | Refills: 3 | Status: SHIPPED | OUTPATIENT
Start: 2025-05-07

## 2025-05-07 NOTE — PROGRESS NOTES
Name: Nasir Story Jr.      : 1960      MRN: [id number]  Encounter Provider: Keo Mora MD  Encounter Date: 2025   Encounter department: [facility] CARE [address]   :  Assessment & Plan  Type 2 diabetes mellitus with stage 3b chronic kidney disease, without long-term current use of insulin (HCC)    Lab Results   Component Value Date    HGBA1C 5.9 (H) 2025   Well controlled.     Off of medications.     Now s/p bariatric surgery.     Utd with eye md.     Referral to podiatry for cotninued nail care.     Orders:  •  Ambulatory Referral to Podiatry; Future    Continuous opioid dependence (HCC)  Stable.   Long hisotry of lumbar disc disease.     Continue witih Dr. Arevalo, Pain mgt.   Continue with opioid medications.        BPH with obstruction/lower urinary tract symptoms  Stable. On   Alfuzosin.        Chronic diastolic heart failure (HCC)  Wt Readings from Last 3 Encounters:   25 130 kg (287 lb)   25 133 kg (294 lb)   25 136 kg (300 lb)   Stable.   Fuw ith cardiology.   Continue with weight lss efforts.   Salt restrcition.   Bp stable.   On statin.                  Essential hypertension  Undercontrol.   No longer on aldactone or losartan.   On metoprolol.        Chronic pulmonary embolism, unspecified pulmonary embolism type, unspecified whether acute cor pulmonale present (HCC)    On eliquis,   Possible secondary to being somewhat immobile during that time but etiology was not clear.        S/P bariatric surgery  2 weeks post op.   Upgraded to pureed diet.   Fu w bariatrics.        Vitamin D deficiency  On vitamin D 1000 iu daily.        Stage 3b chronic kidney disease (HCC)  Lab Results   Component Value Date    EGFR 48 2025    EGFR 41 2025    EGFR 42 2025    CREATININE 1.50 (H) 2025    CREATININE 1.68 (H) 2025    CREATININE 1.66 (H) 2025     Stable. Ongoing fu with nephrology.        Hyperlipidemia, unspecified  hyperlipidemia type  Lipid panel reviewed.   Cotninue with lipitor.       Orders:  •  atorvastatin (LIPITOR) 20 mg tablet; Take 1 tablet (20 mg total) by mouth daily    Medicare annual wellness visit, subsequent            Preventive health issues were discussed with patient, and age appropriate screening tests were ordered as noted in patient's After Visit Summary. Personalized health advice and appropriate referrals for health education or preventive services given if needed, as noted in patient's After Visit Summary.    History of Present Illness     Here to establish care in our office.   Multiple medical conditions.     He had bariaric surgery 2 weeks ago.   Doing well post op.   Now upgraded to pureed diet.   Lost about 10 pounds since then.     Has been working on weight loss ofr a while now.   Ozempic did help him. Off of that now.     Continues to fu with nephrology for ckd 3.   Continues with cardiologgy as well.     Humphrey. Fu with pulmo/sleep medicine.   Has been doing well.            Patient Care Team:  Keo Mora MD as PCP - General (Family Medicine)  Keo Mora MD as PCP - PCP-[facility] ([facility])  DO Bakari Ding MD Nikunjkumar T Patel, MD (Nephrology)  Janice Aviles PA-C (Nephrology)  Marjorie Reyna DPM as Resident (Podiatry)  MARJORIE Alaniz (Nephrology)    Review of Systems   Constitutional: Negative.  Negative for activity change, appetite change, chills and diaphoresis.   HENT:  Negative for congestion and dental problem.    Respiratory: Negative.  Negative for apnea, chest tightness, shortness of breath and wheezing.    Cardiovascular: Negative.  Negative for chest pain, palpitations and leg swelling.   Gastrointestinal: Negative.  Negative for abdominal distention, abdominal pain, constipation, diarrhea and nausea.   Genitourinary: Negative.  Negative for difficulty urinating, dysuria and frequency.     Medical History Reviewed by provider  this encounter:  Problems       Annual Wellness Visit Questionnaire   Nasir is here for his Subsequent Wellness visit.     Health Risk Assessment:   Patient rates overall health as good. Patient feels that their physical health rating is much better. Patient is very satisfied with their life. Eyesight was rated as same. Hearing was rated as same. Patient feels that their emotional and mental health rating is same. Patients states they are never, rarely angry. Patient states they are never, rarely unusually tired/fatigued. Pain experienced in the last 7 days has been a lot. Patient's pain rating has been 7/10. Patient states that he has experienced no weight loss or gain in last 6 months.     Depression Screening:   PHQ-2 Score: 0      Fall Risk Screening:   In the past year, patient has experienced: no history of falling in past year      Home Safety:  Patient has trouble with stairs inside or outside of their home. Patient has working smoke alarms and has working carbon monoxide detector. Home safety hazards include: none.     Nutrition:   Current diet is Regular, Diabetic, Low Saturated Fat, Low Cholesterol, Low Carb and Other (please comment). High protein , low sugar , LOTS of water    Medications:   Patient is currently taking over-the-counter supplements. OTC medications include: see medication list. Patient is able to manage medications. Patient has been clean for over 30 years    Activities of Daily Living (ADLs)/Instrumental Activities of Daily Living (IADLs):   Walk and transfer into and out of bed and chair?: Yes  Dress and groom yourself?: Yes    Bathe or shower yourself?: Yes    Feed yourself? Yes  Do your laundry/housekeeping?: Yes  Manage your money, pay your bills and track your expenses?: Yes  Make your own meals?: Yes    Do your own shopping?: Yes    Previous Hospitalizations:   Any hospitalizations or ED visits within the last 12 months?: Yes    How many hospitalizations have you had in the  last year?: 1-2    Hospitalization Comments: 4/28/2025 - 4/29/2025 (31 hours)  [facility]  Morbid obesity with BMI of 45.0-49.9, adult (HCC)        Advance Care Planning:   Living will: No    Durable POA for healthcare: No    Advanced directive: No    Advanced directive counseling given: Yes    ACP document given: Yes      Cognitive Screening:   Provider or family/friend/caregiver concerned regarding cognition?: No    Preventive Screenings      Cardiovascular Screening:    General: Screening Not Indicated and History Lipid Disorder      Diabetes Screening:     General: Screening Not Indicated and History Diabetes      Colorectal Cancer Screening:     General: Screening Current      Prostate Cancer Screening:    General: Screening Current      Osteoporosis Screening:    General: Screening Not Indicated      Abdominal Aortic Aneurysm (AAA) Screening:    Risk factors include: age between 65-74 yo and tobacco use        Lung Cancer Screening:     General: Screening Not Indicated      Hepatitis C Screening:    General: Screening Current      Preventive Screening Comments: Cta done in 2016: no AAA    Immunizations:  - Immunizations due: Prevnar 20 and Zoster (Shingrix)  - Risks/benefits immunizations discussed    - The patient declines recommended vaccines currently despite my recommendations      Screening, Brief Intervention, and Referral to Treatment (SBIRT)     Screening      Single Item Drug Screening:  How often have you used an illegal drug (including marijuana) or a prescription medication for non-medical reasons in the past year? never    Single Item Drug Screen Score: 0  Interpretation: Negative screen for possible drug use disorder    Brief Intervention  Alcohol & drug use screenings were reviewed. No concerns regarding substance use disorder identified.     Review of Current Opioid Use  Opioid Risk Tool (ORT) Score: 4  Opioid Risk Tool (ORT) Interpretation: Score 4-7: Moderate risk  "for opioid misuse    Social Drivers of Health     Financial Resource Strain: Low Risk  (10/15/2023)    Overall Financial Resource Strain (CARDIA)    • Difficulty of Paying Living Expenses: Not very hard   Food Insecurity: No Food Insecurity (5/7/2025)    Hunger Vital Sign    • Worried About Running Out of Food in the Last Year: Never true    • Ran Out of Food in the Last Year: Never true   Transportation Needs: No Transportation Needs (5/7/2025)    PRAPARE - Transportation    • Lack of Transportation (Medical): No    • Lack of Transportation (Non-Medical): No   Housing Stability: Low Risk  (5/7/2025)    Housing Stability Vital Sign    • Unable to Pay for Housing in the Last Year: No    • Number of Times Moved in the Last Year: 0    • Homeless in the Last Year: No   Utilities: Not At Risk (5/7/2025)    [facility] Utilities    • Threatened with loss of utilities: No     No results found.    Objective   /72 (BP Location: Left arm, Patient Position: Sitting)   Pulse 76   Ht 5' 8.5\" (1.74 m)   Wt 130 kg (287 lb)   SpO2 93%   BMI 43.00 kg/m²     Physical Exam  Vitals reviewed.   Constitutional:       General: He is not in acute distress.     Appearance: He is well-developed. He is not ill-appearing.   HENT:      Head: Normocephalic and atraumatic.      Right Ear: Tympanic membrane, ear canal and external ear normal.      Left Ear: Tympanic membrane, ear canal and external ear normal.      Mouth/Throat:      Mouth: Mucous membranes are moist.      Pharynx: Oropharynx is clear.   Eyes:      Extraocular Movements: Extraocular movements intact.      Conjunctiva/sclera: Conjunctivae normal.      Pupils: Pupils are equal, round, and reactive to light.   Cardiovascular:      Rate and Rhythm: Normal rate and regular rhythm.      Pulses: no weak pulses.           Dorsalis pedis pulses are 2+ on the right side and 2+ on the left side.        Posterior tibial pulses are 2+ on the right side and 2+ on the left side.      Heart " sounds: Normal heart sounds. No murmur heard.  Pulmonary:      Effort: Pulmonary effort is normal.      Breath sounds: Normal breath sounds.   Abdominal:      General: Bowel sounds are normal. There is no distension.      Palpations: Abdomen is soft. There is no mass.      Tenderness: There is no abdominal tenderness. There is no guarding.      Hernia: No hernia is present.   Musculoskeletal:         General: Normal range of motion.      Cervical back: Normal range of motion and neck supple.   Feet:      Right foot:      Skin integrity: No ulcer, skin breakdown, erythema, warmth, callus or dry skin.      Toenail Condition: Right toenails are abnormally thick. Fungal disease present.     Left foot:      Skin integrity: No ulcer, skin breakdown, erythema, warmth, callus or dry skin.      Toenail Condition: Left toenails are abnormally thick. Fungal disease present.  Skin:     General: Skin is warm and dry.      Capillary Refill: Capillary refill takes less than 2 seconds.   Neurological:      General: No focal deficit present.      Mental Status: He is alert and oriented to person, place, and time.   Psychiatric:         Mood and Affect: Mood normal.         Behavior: Behavior normal.     Diabetic Foot Exam    Patient's shoes and socks removed.    Right Foot/Ankle   Right Foot Inspection  Skin Exam: skin normal and skin intact. No dry skin, no warmth, no callus, no erythema, no maceration, no abnormal color, no pre-ulcer, no ulcer and no callus.     Toe Exam: ROM and strength within normal limits.     Sensory   Vibration: intact  Proprioception: intact  Monofilament testing: intact    Vascular  Capillary refills: < 3 seconds  The right DP pulse is 2+. The right PT pulse is 2+.     Left Foot/Ankle  Left Foot Inspection  Skin Exam: skin normal and skin intact. No dry skin, no warmth, no erythema, no maceration, normal color, no pre-ulcer, no ulcer and no callus.     Toe Exam: ROM and strength within normal limits.      Sensory   Vibration: intact  Proprioception: intact  Monofilament testing: intact    Vascular  Capillary refills: < 3 seconds  The left DP pulse is 2+. The left PT pulse is 2+.     Assign Risk Category  Deformity present  No loss of protective sensation  No weak pulses  Risk: 0

## 2025-05-07 NOTE — ASSESSMENT & PLAN NOTE
Stable.   Long hisotry of lumbar disc disease.     Continue [facility] Dr. Arevalo, Pain mgt.   Continue with opioid medications.

## 2025-05-07 NOTE — ASSESSMENT & PLAN NOTE
Lab Results   Component Value Date    HGBA1C 5.9 (H) 04/21/2025   Well controlled.     Off of medications.     Now s/p bariatric surgery.     Utd with eye md.     Referral to podiatry for cotninued nail care.     Orders:  •  Ambulatory Referral to Podiatry; Future

## 2025-05-07 NOTE — ASSESSMENT & PLAN NOTE
On eliquis,   Possible secondary to being somewhat immobile during that time but etiology was not clear.

## 2025-05-07 NOTE — PATIENT INSTRUCTIONS
Medicare Preventive Visit Patient Instructions  Thank you for completing your Welcome to Medicare Visit or Medicare Annual Wellness Visit today. Your next wellness visit will be due in one year (5/8/2026).  The screening/preventive services that you may require over the next 5-10 years are detailed below. Some tests may not apply to you based off risk factors and/or age. Screening tests ordered at today's visit but not completed yet may show as past due. Also, please note that scanned in results may not display below.  Preventive Screenings:  Service Recommendations Previous Testing/Comments   Colorectal Cancer Screening  Colonoscopy    Fecal Occult Blood Test (FOBT)/Fecal Immunochemical Test (FIT)  Fecal DNA/Cologuard Test  Flexible Sigmoidoscopy Age: 45-75 years old   Colonoscopy: every 10 years (May be performed more frequently if at higher risk)  OR  FOBT/FIT: every 1 year  OR  Cologuard: every 3 years  OR  Sigmoidoscopy: every 5 years  Screening may be recommended earlier than age 45 if at higher risk for colorectal cancer. Also, an individualized decision between you and your healthcare provider will decide whether screening between the ages of 76-85 would be appropriate. Colonoscopy: 10/23/2024  FOBT/FIT: Not on file  Cologuard: Not on file  Sigmoidoscopy: Not on file    Screening Current     Prostate Cancer Screening Individualized decision between patient and health care provider in men between ages of 55-69   Medicare will cover every 12 months beginning on the day after your 50th birthday PSA: 1.982 ng/mL     Screening Current     Hepatitis C Screening Once for adults born between 1945 and 1965  More frequently in patients at high risk for Hepatitis C Hep C Antibody: 01/08/2020    Screening Current   Diabetes Screening 1-2 times per year if you're at risk for diabetes or have pre-diabetes Fasting glucose: 99 mg/dL (5/2/2025)  A1C: 5.9 % (4/21/2025)  Screening Not Indicated  History Diabetes   Cholesterol  Screening Once every 5 years if you don't have a lipid disorder. May order more often based on risk factors. Lipid panel: 04/21/2025  Screening Not Indicated  History Lipid Disorder      Other Preventive Screenings Covered by Medicare:  Abdominal Aortic Aneurysm (AAA) Screening: covered once if your at risk. You're considered to be at risk if you have a family history of AAA or a male between the age of 65-75 who smoking at least 100 cigarettes in your lifetime.  Lung Cancer Screening: covers low dose CT scan once per year if you meet all of the following conditions: (1) Age 55-77; (2) No signs or symptoms of lung cancer; (3) Current smoker or have quit smoking within the last 15 years; (4) You have a tobacco smoking history of at least 20 pack years (packs per day x number of years you smoked); (5) You get a written order from a healthcare provider.  Glaucoma Screening: covered annually if you're considered high risk: (1) You have diabetes OR (2) Family history of glaucoma OR (3)  aged 50 and older OR (4)  American aged 65 and older  Osteoporosis Screening: covered every 2 years if you meet one of the following conditions: (1) Have a vertebral abnormality; (2) On glucocorticoid therapy for more than 3 months; (3) Have primary hyperparathyroidism; (4) On osteoporosis medications and need to assess response to drug therapy.  HIV Screening: covered annually if you're between the age of 15-65. Also covered annually if you are younger than 15 and older than 65 with risk factors for HIV infection. For pregnant patients, it is covered up to 3 times per pregnancy.    Immunizations:  Immunization Recommendations   Influenza Vaccine Annual influenza vaccination during flu season is recommended for all persons aged >= 6 months who do not have contraindications   Pneumococcal Vaccine   * Pneumococcal conjugate vaccine = PCV13 (Prevnar 13), PCV15 (Vaxneuvance), PCV20 (Prevnar 20)  * Pneumococcal  polysaccharide vaccine = PPSV23 (Pneumovax) Adults 19-65 yo with certain risk factors or if 65+ yo  If never received any pneumonia vaccine: recommend Prevnar 20 (PCV20)  Give PCV20 if previously received 1 dose of PCV13 or PPSV23   Hepatitis B Vaccine 3 dose series if at intermediate or high risk (ex: diabetes, end stage renal disease, liver disease)   Respiratory syncytial virus (RSV) Vaccine - COVERED BY MEDICARE PART D  * RSVPreF3 (Arexvy) CDC recommends that adults 60 years of age and older may receive a single dose of RSV vaccine using shared clinical decision-making (SCDM)   Tetanus (Td) Vaccine - COST NOT COVERED BY MEDICARE PART B Following completion of primary series, a booster dose should be given every 10 years to maintain immunity against tetanus. Td may also be given as tetanus wound prophylaxis.   Tdap Vaccine - COST NOT COVERED BY MEDICARE PART B Recommended at least once for all adults. For pregnant patients, recommended with each pregnancy.   Shingles Vaccine (Shingrix) - COST NOT COVERED BY MEDICARE PART B  2 shot series recommended in those 19 years and older who have or will have weakened immune systems or those 50 years and older     Health Maintenance Due:      Topic Date Due   • HIV Screening  09/05/2026 (Originally 9/15/1975)   • Colorectal Cancer Screening  10/22/2031   • Hepatitis C Screening  Completed     Immunizations Due:      Topic Date Due   • Pneumococcal Vaccine: 65+ Years (2 of 2 - PCV) 11/16/2016   • COVID-19 Vaccine (1 - 2024-25 season) Never done     Advance Directives   What are advance directives?  Advance directives are legal documents that state your wishes and plans for medical care. These plans are made ahead of time in case you lose your ability to make decisions for yourself. Advance directives can apply to any medical decision, such as the treatments you want, and if you want to donate organs.   What are the types of advance directives?  There are many types of  advance directives, and each state has rules about how to use them. You may choose a combination of any of the following:  Living will:  This is a written record of the treatment you want. You can also choose which treatments you do not want, which to limit, and which to stop at a certain time. This includes surgery, medicine, IV fluid, and tube feedings.   Durable power of  for healthcare (DPAHC):  This is a written record that states who you want to make healthcare choices for you when you are unable to make them for yourself. This person, called a proxy, is usually a family member or a friend. You may choose more than 1 proxy.  Do not resuscitate (DNR) order:  A DNR order is used in case your heart stops beating or you stop breathing. It is a request not to have certain forms of treatment, such as CPR. A DNR order may be included in other types of advance directives.  Medical directive:  This covers the care that you want if you are in a coma, near death, or unable to make decisions for yourself. You can list the treatments you want for each condition. Treatment may include pain medicine, surgery, blood transfusions, dialysis, IV or tube feedings, and a ventilator (breathing machine).  Values history:  This document has questions about your views, beliefs, and how you feel and think about life. This information can help others choose the care that you would choose.  Why are advance directives important?  An advance directive helps you control your care. Although spoken wishes may be used, it is better to have your wishes written down. Spoken wishes can be misunderstood, or not followed. Treatments may be given even if you do not want them. An advance directive may make it easier for your family to make difficult choices about your care.   Weight Management   Why it is important to manage your weight:  Being overweight increases your risk of health conditions such as heart disease, high blood pressure,  type 2 diabetes, and certain types of cancer. It can also increase your risk for osteoarthritis, sleep apnea, and other respiratory problems. Aim for a slow, steady weight loss. Even a small amount of weight loss can lower your risk of health problems.  How to lose weight safely:  A safe and healthy way to lose weight is to eat fewer calories and get regular exercise. You can lose up about 1 pound a week by decreasing the number of calories you eat by 500 calories each day.   Healthy meal plan for weight management:  A healthy meal plan includes a variety of foods, contains fewer calories, and helps you stay healthy. A healthy meal plan includes the following:  Eat whole-grain foods more often.  A healthy meal plan should contain fiber. Fiber is the part of grains, fruits, and vegetables that is not broken down by your body. Whole-grain foods are healthy and provide extra fiber in your diet. Some examples of whole-grain foods are whole-wheat breads and pastas, oatmeal, brown rice, and bulgur.  Eat a variety of vegetables every day.  Include dark, leafy greens such as spinach, kale, christy greens, and mustard greens. Eat yellow and orange vegetables such as carrots, sweet potatoes, and winter squash.   Eat a variety of fruits every day.  Choose fresh or canned fruit (canned in its own juice or light syrup) instead of juice. Fruit juice has very little or no fiber.  Eat low-fat dairy foods.  Drink fat-free (skim) milk or 1% milk. Eat fat-free yogurt and low-fat cottage cheese. Try low-fat cheeses such as mozzarella and other reduced-fat cheeses.  Choose meat and other protein foods that are low in fat.  Choose beans or other legumes such as split peas or lentils. Choose fish, skinless poultry (chicken or turkey), or lean cuts of red meat (beef or pork). Before you cook meat or poultry, cut off any visible fat.   Use less fat and oil.  Try baking foods instead of frying them. Add less fat, such as margarine, sour  cream, regular salad dressing and mayonnaise to foods. Eat fewer high-fat foods. Some examples of high-fat foods include french fries, doughnuts, ice cream, and cakes.  Eat fewer sweets.  Limit foods and drinks that are high in sugar. This includes candy, cookies, regular soda, and sweetened drinks.  Exercise:  Exercise at least 30 minutes per day on most days of the week. Some examples of exercise include walking, biking, dancing, and swimming. You can also fit in more physical activity by taking the stairs instead of the elevator or parking farther away from stores. Ask your healthcare provider about the best exercise plan for you.   Narcotic (Opioid) Safety    Use narcotics safely:  Take prescribed narcotics exactly as directed  Do not give narcotics to others or take narcotics that belong to someone else  Do not mix narcotics without medicines or alcohol  Do not drive or operate heavy machinery after you take the narcotic  Monitor for side effects and notify your healthcare provider if you experienced side effects such as nausea, sleepiness, itching, or trouble thinking clearly.    Manage constipation:    Constipation is the most common side effect of narcotic medicine. Constipation is when you have hard, dry bowel movements, or you go longer than usual between bowel movements. Tell your healthcare provider about all changes in your bowel movements while you are taking narcotics. He or she may recommend laxative medicine to help you have a bowel movement. He or she may also change the kind of narcotic you are taking, or change when you take it. The following are more ways you can prevent or relieve constipation:    Drink liquids as directed.  You may need to drink extra liquids to help soften and move your bowels. Ask how much liquid to drink each day and which liquids are best for you.  Eat high-fiber foods.  This may help decrease constipation by adding bulk to your bowel movements. High-fiber foods include  fruits, vegetables, whole-grain breads and cereals, and beans. Your healthcare provider or dietitian can help you create a high-fiber meal plan. Your provider may also recommend a fiber supplement if you cannot get enough fiber from food.  Exercise regularly.  Regular physical activity can help stimulate your intestines. Walking is a good exercise to prevent or relieve constipation. Ask which exercises are best for you.  Schedule a time each day to have a bowel movement.  This may help train your body to have regular bowel movements. Bend forward while you are on the toilet to help move the bowel movement out. Sit on the toilet for at least 10 minutes, even if you do not have a bowel movement.    Store narcotics safely:   Store narcotics where others cannot easily get them.  Keep them in a locked cabinet or secure area. Do not  keep them in a purse or other bag you carry with you. A person may be looking for something else and find the narcotics.  Make sure narcotics are stored out of the reach of children.  A child can easily overdose on narcotics. Narcotics may look like candy to a small child.    The best way to dispose of narcotics:      The laws vary by country and area. In the [place], the best way is to return the narcotics through a take-back program. This program is offered by the [place] Drug Enforcement Agency ([employer]). The following are options for using the program:  Take the narcotics to a [employer] collection site.  The site is often a law enforcement center. Call your local law enforcement center for scheduled take-back days in your area. You will be given information on where to go if the collection site is in a different location.  Take the narcotics to an approved pharmacy or hospital.  A pharmacy or hospital may be set up as a collection site. You will need to ask if it is a [employer] collection site if you were not directed there. A pharmacy or doctor's office may not be able to take back narcotics unless it  is a [employer] site.  Use a mail-back system.  This means you are given containers to put the narcotics into. You will then mail them in the containers.  Use a take-back drop box.  This is a place to leave the narcotics at any time. People and animals will not be able to get into the box. Your local law enforcement agency can tell you where to find a drop box in your area.    Other ways to manage pain:   Ask your healthcare provider about non-narcotic medicines to control pain.  Nonprescription medicines include NSAIDs (such as ibuprofen) and acetaminophen. Prescription medicines include muscle relaxers, antidepressants, and steroids.  Pain may be managed without any medicines.  Some ways to relieve pain include massage, aromatherapy, or meditation. Physical or occupational therapy may also help.    For more information:   Drug Enforcement Administration  [address] [place] [address]  Phone: 8- 765 - 269-6173  Web Address: https://www.deadiversion.[facility] [place] [facility].gov/drug_disposal/    US Food and Drug Administration  [address] [place] [address]  Phone: 2- 583 - 623-0575  Web Address: http://www.fda.gov     © Copyright [employer] 2018 Information is for End User's use only and may not be sold, redistributed or otherwise used for commercial purposes. All illustrations and images included in CareNotes® are the copyrighted property of A.D.A.M., Inc. or [employer]

## 2025-05-07 NOTE — ASSESSMENT & PLAN NOTE
Wt Readings from Last 3 Encounters:   05/07/25 130 kg (287 lb)   04/28/25 133 kg (294 lb)   04/23/25 136 kg (300 lb)   Stable.   Fuw ith cardiology.   Continue with weight lss efforts.   Salt restrcition.   Bp stable.   On statin.

## 2025-05-07 NOTE — ASSESSMENT & PLAN NOTE
Lab Results   Component Value Date    EGFR 48 05/02/2025    EGFR 41 04/29/2025    EGFR 42 04/21/2025    CREATININE 1.50 (H) 05/02/2025    CREATININE 1.68 (H) 04/29/2025    CREATININE 1.66 (H) 04/21/2025     Stable. Ongoing fu with nephrology.

## 2025-05-08 ENCOUNTER — OFFICE VISIT (OUTPATIENT)
Dept: BARIATRICS | Facility: CLINIC | Age: 65
End: 2025-05-08

## 2025-05-08 ENCOUNTER — CLINICAL SUPPORT (OUTPATIENT)
Dept: BARIATRICS | Facility: CLINIC | Age: 65
End: 2025-05-08

## 2025-05-08 VITALS
HEART RATE: 75 BPM | HEIGHT: 69 IN | WEIGHT: 285.5 LBS | SYSTOLIC BLOOD PRESSURE: 128 MMHG | TEMPERATURE: 96.4 F | DIASTOLIC BLOOD PRESSURE: 84 MMHG | BODY MASS INDEX: 42.29 KG/M2

## 2025-05-08 DIAGNOSIS — Z48.89 POSTOPERATIVE VISIT: Primary | ICD-10-CM

## 2025-05-08 DIAGNOSIS — E66.01 MORBID (SEVERE) OBESITY DUE TO EXCESS CALORIES (HCC): ICD-10-CM

## 2025-05-08 PROCEDURE — RECHECK: Performed by: DIETITIAN, REGISTERED

## 2025-05-08 PROCEDURE — 99024 POSTOP FOLLOW-UP VISIT: CPT | Performed by: SURGERY

## 2025-05-08 NOTE — PROGRESS NOTES
POST OP UP VISIT - BARIATRIC SURGERY  Nasir Story Jr. 64 y.o. male MRN: [id number]  Unit/Bed#:  Encounter: [id number]      HPI:  Nasir Story Jr. is a 64 y.o. male status post laparoscopic sleeve gastrectomy performed by Dr. Glover on 4/28 returning to office for first post op visit since surgery.    Tolerating diet.  Denies nausea and vomiting.  Taking multivitamins and PPI daily.      Review of Systems   Constitutional:  Negative for chills and fever.   HENT:  Negative for ear pain and sore throat.    Eyes:  Negative for pain and visual disturbance.   Respiratory:  Negative for cough and shortness of breath.    Cardiovascular:  Negative for chest pain and palpitations.   Gastrointestinal:  Negative for abdominal pain and vomiting.   Genitourinary:  Negative for dysuria and hematuria.   Musculoskeletal:  Negative for arthralgias and back pain.   Skin:  Negative for color change and rash.   Neurological:  Negative for seizures and syncope.   All other systems reviewed and are negative.      Historical Information   Past Medical History:   Diagnosis Date    Abnormal CBC 01/20/2020    Acne     Allergic     Anemia 10/28/2019    Anxiety     Arthritis     Back pain     Chronic kidney disease     Chronic pain     Colon polyp     CPAP (continuous positive airway pressure) dependence     Dental cavity 08/10/2017    Depression 06/07/2021    Diabetes mellitus (HCC)     6/19/15 A1C: 5.5%    Eczema     Fall 10/16/2023    Flu vaccine need 10/16/2023    Grief reaction 09/10/2019    Hyperlipidemia     Hypertension     Hypokalemia 03/17/2016    Hypomagnesemia 09/05/2024    Hyponatremia 11/19/2024    Knee pain     Morbid obesity with BMI of 50.0-59.9, adult (HCC)     Neuropathy in diabetes (HCC)     Obesity     IMELDA on CPAP     Pain of intrathecal infusion pump pocket after insertion (HCC)     Per pt no longer works. Having insurance issues so it remains in place.    Psychiatric disorder     Screening for prostate cancer 11/28/2018  "   Skin tag     Suspected sleep apnea 2023    Type 2 diabetes mellitus without complication (HCC) 2014    Urinary retention      Past Surgical History:   Procedure Laterality Date    BACK SURGERY      COLONOSCOPY      LUMBAR FUSION  2002    LUMBAR FUSION      L4-5-6, S1 has pump for narcotics- per pt pain pump no longer (since ) works but it remains in place.    NE LAPS GSTRC RSTRICTIV PX LONGITUDINAL GASTRECTOMY N/A 2025    Procedure: GASTRECTOMY SLEEVE LAPAROSCOPIC WITH INTRAOPERATIVE EGD;  Surgeon: Noé Glover MD;  Location: [facility];  Service: Bariatrics     Social History   Social History     Substance and Sexual Activity   Alcohol Use Not Currently    Comment: occasional     Social History     Substance and Sexual Activity   Drug Use No    Comment: History of drug use, meena, cocaine, heroind, no IVDA-as per [employer]     Social History     Tobacco Use   Smoking Status Former    Types: Cigars    Start date:     Quit date: 4/15/2024    Years since quittin.0    Passive exposure: Current   Smokeless Tobacco Never   Tobacco Comments    Quit cigarette smoking in .     Patient reports that he last smoked a cigar close to 2 years ago.      Family History:   Family History   Problem Relation Age of Onset    Other Mother         colitis    Diabetes Mother        Meds/Allergies   PTA meds:   Cannot display prior to admission medications because the patient has not been admitted in this contact.     Allergies   Allergen Reactions    Gabapentin      Annotation - [id number]: dizziness    Pregabalin      Annotation - [id number]: vision changes and mood changes    Tramadol Nausea Only       Objective       Current Vitals:   Blood Pressure: 128/84 (25 1106)  Pulse: 75 (25 1106)  Temperature: (!) 96.4 °F (35.8 °C) (25 1106)  Temp Source: Tympanic (25 1106)  Height: 5' 9\" (175.3 cm) (25 1106)  Weight - Scale: 130 kg (285 lb 8 oz) (25 " 1106)      Invasive Devices       None                   Physical Exam  Constitutional:       General: He is not in acute distress.     Appearance: He is obese. He is not ill-appearing or toxic-appearing.   HENT:      Head: Normocephalic and atraumatic.      Right Ear: External ear normal.      Left Ear: External ear normal.      Nose: Nose normal.      Mouth/Throat:      Mouth: Mucous membranes are moist.      Pharynx: Oropharynx is clear.   Eyes:      Extraocular Movements: Extraocular movements intact.      Conjunctiva/sclera: Conjunctivae normal.   Cardiovascular:      Rate and Rhythm: Normal rate.   Pulmonary:      Effort: Pulmonary effort is normal. No respiratory distress.      Breath sounds: No wheezing.   Abdominal:      General: There is no distension.      Palpations: Abdomen is soft.      Tenderness: There is no abdominal tenderness. There is no guarding or rebound.      Comments: Incisions appear clean/dry/intact without evidence of local infection, bleeding, or drainage     Musculoskeletal:         General: No deformity.      Cervical back: Normal range of motion and neck supple.   Skin:     General: Skin is warm and dry.   Neurological:      General: No focal deficit present.      Mental Status: He is alert and oriented to person, place, and time.   Psychiatric:         Mood and Affect: Mood normal.         Behavior: Behavior normal.             Assessment/PLAN:    64 y.o. male status post laparoscopic sleeve gastrectomy done on 4/28/2025 by Dr. Glover, doing well post op. No major issues and healing well.     The pathology report was reviewed with the patient and the results were within normal limits.     Pathology, and Other Studies: Results Review Statement: No pertinent imaging studies reviewed.  Lab Results   Component Value Date    FINALDX  04/28/2025     A. Stomach, Portion of stomach:  Portion of congested stomach wall with benign mucosa  No H. Pylori identified on H&E stained slides              Increase physical activity slowly as tolerated and instructed.  Advance diet as instructed by our dietitians today and as indicated in the binder.  Continue PPI    Follow up in one month as scheduled.        Karthikeyan Rocha MD  Bariatric Surgery   5/8/2025  11:10 AM      .

## 2025-05-08 NOTE — PROGRESS NOTES
Date of surgery:  Procedure:  Performing surgeon:    Initial Weight -   Current Weight -  Russel Weight -   Total Body Weight Loss (EWL)-   EWL% -   TWB % -

## 2025-05-08 NOTE — PROGRESS NOTES
Weight Management Nutrition Class     Diagnosis: Morbid Obesity    Bariatric Surgeon: Dr. Noé Glover    Surgery: Vertical Sleeve Gastrectomy    Class: first post op note    Topics discussed today include:     fluid goals post op, protein goals post op, constipation, chew food well, exercise, diet progression, hypoglycemia, dumping syndrome, protein supplems, vitamin/mineral supplements, calcium supplements, additional vitamin B12, iron supplements, and fat soluble vitamins     Patient was able to verbalize basic diet (protein, fluid, vitamin and mineral) recommendations and possible nutrition-related complications. Yes

## 2025-05-09 ENCOUNTER — TELEPHONE (OUTPATIENT)
Age: 65
End: 2025-05-09

## 2025-05-09 NOTE — TELEPHONE ENCOUNTER
Pt calling to to ask when he is supposed to go to soft foods from the puree diet.  Advised stage 4 soft diet is 15 days after surgery. Pt verbalized understanding.

## 2025-05-13 NOTE — PATIENT COMMUNICATION
Spoke to pt, went over recommendations. He confirmed that he is off Spironolactone and Losartan. He takes metoprolol 100 mg bid. Will get repeat labs in 4 weeks.

## 2025-05-15 ENCOUNTER — TELEPHONE (OUTPATIENT)
Age: 65
End: 2025-05-15

## 2025-05-19 DIAGNOSIS — N40.1 BPH WITH OBSTRUCTION/LOWER URINARY TRACT SYMPTOMS: ICD-10-CM

## 2025-05-19 DIAGNOSIS — N13.8 BPH WITH OBSTRUCTION/LOWER URINARY TRACT SYMPTOMS: ICD-10-CM

## 2025-05-20 RX ORDER — ALFUZOSIN HYDROCHLORIDE 10 MG/1
10 TABLET, EXTENDED RELEASE ORAL DAILY
Qty: 30 TABLET | Refills: 5 | Status: SHIPPED | OUTPATIENT
Start: 2025-05-20

## 2025-05-30 ENCOUNTER — TELEPHONE (OUTPATIENT)
Age: 65
End: 2025-05-30

## 2025-05-30 NOTE — TELEPHONE ENCOUNTER
Lvm for recall.  Pt has seen vinnie and keaton.   F/u for 7/26.   Keaton does not have any appt s in [place].  Vinnie wanted him to f/u with keaton if possible. Or any AP.

## 2025-06-04 ENCOUNTER — OFFICE VISIT (OUTPATIENT)
Dept: PODIATRY | Facility: CLINIC | Age: 65
End: 2025-06-04
Attending: FAMILY MEDICINE
Payer: COMMERCIAL

## 2025-06-04 VITALS — BODY MASS INDEX: 40.73 KG/M2 | WEIGHT: 275 LBS | HEIGHT: 69 IN

## 2025-06-04 DIAGNOSIS — B35.1 TINEA UNGUIUM: ICD-10-CM

## 2025-06-04 DIAGNOSIS — N18.32 TYPE 2 DIABETES MELLITUS WITH STAGE 3B CHRONIC KIDNEY DISEASE, WITHOUT LONG-TERM CURRENT USE OF INSULIN (HCC): Primary | ICD-10-CM

## 2025-06-04 DIAGNOSIS — E11.22 TYPE 2 DIABETES MELLITUS WITH STAGE 3B CHRONIC KIDNEY DISEASE, WITHOUT LONG-TERM CURRENT USE OF INSULIN (HCC): Primary | ICD-10-CM

## 2025-06-04 PROCEDURE — 99203 OFFICE O/P NEW LOW 30 MIN: CPT | Performed by: PODIATRIST

## 2025-06-04 NOTE — PROGRESS NOTES
"Name: Nasir Story Jr.      : 1960      MRN: [id number]  Encounter Provider: Karthikeyan Alonso DPM  Encounter Date: 2025   Encounter department: [facility]  :  Assessment & Plan  Type 2 diabetes mellitus with stage 3b chronic kidney disease, without long-term current use of insulin ([facility])  Diagnosis and options discussed with patient  Patient agreeable to the plan as stated below    -DM foot risk is low. Recommend annual DM foot risk  -Discussed DM risk to lower extremities, proper shoe gear, and daily monitoring of feet.   -Discussed weight loss and suitable exercise regiment.   -Reviewed most recent PCP visit on 2025. He is off DM medications and controlled with diet and exercise which is great.   -Educated on A1C and the risks of poorly controlled Diabetes. Reviewed recent A1C:  Lab Results   Component Value Date    HGBA1C 5.9 (H) 2025    HGBA1C 6.1 (H) 09/10/2019   .     Lab Results   Component Value Date    HGBA1C 5.9 (H) 2025            Tinea unguium  Not a candidate for oral lamisil due to drug interaction. Can treat with debridements as needed. He does not have any risk factors which is good and currently diet controlled with his DM. He's lost over 100 pounds as well and is exercising which is great.            History of Present Illness   HPI  Nasir Story Jr. is a 64 y.o. male who presents for DM foot check. His nails are thick and fungal. He tried drops, creams and paints in the past, they didn't work. His PCP told him can't take the pill for it (kidney issues, elequis, medication interaction).       Review of Systems  As stated in HPI, otherwise normal    Medical History Reviewed by provider this encounter:  Tobacco  Allergies  Meds  Problems  Med Hx  Surg Hx  Fam Hx           Objective   Ht 5' 9\" (1.753 m)   Wt 125 kg (275 lb)   BMI 40.61 kg/m²      Physical Exam  Vitals reviewed.   Constitutional:       Appearance: He is obese. He is " not ill-appearing.     Cardiovascular:      Pulses: Normal pulses. no weak pulses.           Dorsalis pedis pulses are 2+ on the right side and 2+ on the left side.        Posterior tibial pulses are 2+ on the right side and 2+ on the left side.     Musculoskeletal:         General: No deformity.      Right foot: No deformity.      Left foot: No deformity.   Feet:      Right foot:      Protective Sensation: 10 sites tested.  10 sites sensed.      Skin integrity: Dry skin present. No skin breakdown.      Toenail Condition: Right toenails are abnormally thick. Fungal disease present.     Left foot:      Protective Sensation: 10 sites tested.  10 sites sensed.      Skin integrity: Dry skin present. No skin breakdown.      Toenail Condition: Left toenails are abnormally thick. Fungal disease present.    Skin:     Capillary Refill: Capillary refill takes less than 2 seconds.      Findings: No erythema.     Neurological:      Mental Status: He is alert.      Sensory: No sensory deficit.       Diabetic Foot Exam    Patient's shoes and socks removed.    Right Foot/Ankle   Right Foot Inspection  Skin Exam: skin intact and dry skin. No maceration and no abnormal color.     Toe Exam: No tenderness and  no right toe deformity    Sensory   Vibration: intact  Monofilament testing: intact    Vascular  Capillary refills: < 3 seconds  The right DP pulse is 2+. The right PT pulse is 2+.     Left Foot/Ankle  Left Foot Inspection  Skin Exam: skin intact and dry skin. No maceration and normal color.     Toe Exam: No tenderness and no left toe deformity.     Sensory   Vibration: intact  Monofilament testing: intact    Vascular  Capillary refills: < 3 seconds  The left DP pulse is 2+. The left PT pulse is 2+.     Assign Risk Category  No deformity present  No loss of protective sensation  No weak pulses  Risk: 0

## 2025-06-04 NOTE — ASSESSMENT & PLAN NOTE
Diagnosis and options discussed with patient  Patient agreeable to the plan as stated below    -DM foot risk is low. Recommend annual DM foot risk  -Discussed DM risk to lower extremities, proper shoe gear, and daily monitoring of feet.   -Discussed weight loss and suitable exercise regiment.   -Reviewed most recent PCP visit on 5/7/2025. He is off DM medications and controlled with diet and exercise which is great.   -Educated on A1C and the risks of poorly controlled Diabetes. Reviewed recent A1C:  Lab Results   Component Value Date    HGBA1C 5.9 (H) 04/21/2025    HGBA1C 6.1 (H) 09/10/2019   .     Lab Results   Component Value Date    HGBA1C 5.9 (H) 04/21/2025

## 2025-06-11 ENCOUNTER — CLINICAL SUPPORT (OUTPATIENT)
Dept: BARIATRICS | Facility: CLINIC | Age: 65
End: 2025-06-11

## 2025-06-11 DIAGNOSIS — Z98.84 BARIATRIC SURGERY STATUS: Primary | ICD-10-CM

## 2025-06-11 PROCEDURE — RECHECK: Performed by: DIETITIAN, REGISTERED

## 2025-06-11 NOTE — PROGRESS NOTES
Patient attended virtual 5 week post op class via [employer].  Reviewed diet progression, vitamin and mineral recommendations, 30/60 rules, volume of foods, protein supplements, hydration needs, antacid  guidelines, recommendation to f/u with pcp concerning med adjustments, exercise guidelines, hair shedding, contraception guidelines, constipation prevention and treatment, alcohol avoidance and recommendations of when to call the office. Patient was also encouraged to fill  out form for program .  Time was left for discussion and to answer questions.

## 2025-06-13 ENCOUNTER — TELEPHONE (OUTPATIENT)
Dept: NEPHROLOGY | Facility: CLINIC | Age: 65
End: 2025-06-13

## 2025-06-13 NOTE — TELEPHONE ENCOUNTER
Enrique July follow up/ Please offer pt a follow up appointment with an AP. Ok per Dr. Nunez.     Called and left a voicemail to schedule.     Availability in [place] 7/24 with Gus and [place] 7/14 with Ana ADAMS

## 2025-06-13 NOTE — TELEPHONE ENCOUNTER
----- Message from Fabiana SCHAFFER sent at 6/12/2025  2:09 PM EDT -----  Please offer pt a follow up appointment with an AP. Ok per Dr. Nunez.

## 2025-07-11 ENCOUNTER — TELEPHONE (OUTPATIENT)
Dept: NEPHROLOGY | Facility: CLINIC | Age: 65
End: 2025-07-11

## 2025-07-11 ENCOUNTER — VBI (OUTPATIENT)
Dept: ADMINISTRATIVE | Facility: OTHER | Age: 65
End: 2025-07-11

## 2025-07-11 NOTE — TELEPHONE ENCOUNTER
07/11/25 1:38 PM    Patient was flagged by a Third Party Payer report as being due for a refill on the following medications, OZEMPIC INJ 8MG/3ML. Medication was/ Medications were not listed as active within the patient's chart. Message sent to the  Pharmacy Department for follow-up.     Thank you.  Gisselle Santiago MA  [facility]

## 2025-07-14 ENCOUNTER — APPOINTMENT (OUTPATIENT)
Dept: LAB | Facility: CLINIC | Age: 65
End: 2025-07-14
Payer: COMMERCIAL

## 2025-07-14 DIAGNOSIS — N18.32 STAGE 3B CHRONIC KIDNEY DISEASE (HCC): ICD-10-CM

## 2025-07-14 LAB
ANION GAP SERPL CALCULATED.3IONS-SCNC: 8 MMOL/L (ref 4–13)
BUN SERPL-MCNC: 20 MG/DL (ref 5–25)
CALCIUM SERPL-MCNC: 9.3 MG/DL (ref 8.4–10.2)
CHLORIDE SERPL-SCNC: 103 MMOL/L (ref 96–108)
CO2 SERPL-SCNC: 29 MMOL/L (ref 21–32)
CREAT SERPL-MCNC: 1.11 MG/DL (ref 0.6–1.3)
GFR SERPL CREATININE-BSD FRML MDRD: 69 ML/MIN/1.73SQ M
GLUCOSE P FAST SERPL-MCNC: 93 MG/DL (ref 65–99)
POTASSIUM SERPL-SCNC: 4.5 MMOL/L (ref 3.5–5.3)
SODIUM SERPL-SCNC: 140 MMOL/L (ref 135–147)

## 2025-07-14 PROCEDURE — 80048 BASIC METABOLIC PNL TOTAL CA: CPT

## 2025-07-14 NOTE — PROGRESS NOTES
Name: Nasir Story Jr.      : 1960      MRN: [id number]  Encounter Provider: MARJORIE Li  Encounter Date: 7/15/2025   Encounter department: [facility] [place]  :  Assessment & Plan  Type 2 diabetes mellitus with stage 3b chronic kidney disease, without long-term current use of insulin (HCC)    Lab Results   Component Value Date    HGBA1C 5.9 (H) 2025   Most recent A1c from 2025 with improvement from 6.1 as of 2024  Not on insulin         Stage 3b chronic kidney disease (HCC)  Most recent creatinine with blood work 1.11 with EGFR of 69.  Kidney function stable, improved  Baseline creatinine appears to be between 1.6-1.8 since  with noted peaks into the 2s.   Not on ACEi or ARB  Electrolytes stable.    UA [place]  No significant proteinuria noted  UPCR of 2025: 0.1  Renal ultrasound as of 2024: Bilateral renal cyst without suspicious features.  No significant interval change.  No hydronephrosis  Kidney Smart Class: Will think about it for next time   Patiend avoids NSAIDS for kidneys as well as s/p bariatric surgery. Tylenol okay   Adequate hydration with water    Orders:    Basic metabolic panel; Future    CBC; Future    Magnesium; Future    Protein / creatinine ratio, urine; Future    Urinalysis with microscopic; Future    Albumin / creatinine urine ratio; Future    Essential hypertension  Current blood pressure: 128/86  Volume status: euvolemic  On Lopressor 100 mg 2 times a day  Goal blood pressure under 130/80  Recommend low salt/sodium diet       Chronic kidney disease-mineral and bone disorder (CKD-MBD)  Most recent PTH 45.6  Recent phosphorus on 2020 5-3.5  Calcium stable at 9.3    Orders:    Phosphorus; Future    Vitamin D 25 hydroxy; Future    PTH, intact; Future    Vitamin D deficiency  Most recent vitamin D level as of  25-29.4  Will recheck vitamin D with next appointment        Class 3 severe obesity with serious comorbidity  "and body mass index (BMI) of 40.0 to 44.9 in adult, unspecified obesity type  Follows with bariatric surgery and weight management  4/28/2025 patient had gastric sleeve  Lost almost 100lbs since surgery  Follows with [facility] weight management      Plan:  RTO in 6 months  Labs and urine prior to next appt  Continue with medications as prescribed     History of Present Illness   HPI  Nasir Story Jr. is a 64 y.o. male who presents [facility] nephrology office for follow-up.  Patient follows with Dr. Nunez.  Was last seen in office April 2025.     Patient in office today feeling well.  Patient had gastric sleeve in April 2025 and has lost almost 100 pounds since. Post weight loss surgery patient states he feels amazing.  No recent hospitalizations, illnesses or changes in health other than weight loss.  Patient has chronic back pain otherwise denies pain.  No shortness of breath or chest pain.  Kidney function stable.  Euvolemic.  No further questions at this time.   History obtained from: patient    Review of Systems   Constitutional:  Negative for appetite change, chills, fatigue and fever.   Respiratory:  Negative for cough and shortness of breath.    Cardiovascular:  Negative for chest pain, palpitations and leg swelling.   Gastrointestinal:  Negative for abdominal pain, constipation, diarrhea, nausea and vomiting.   Genitourinary:  Negative for difficulty urinating, dysuria, flank pain and hematuria.   Musculoskeletal:  Positive for back pain.        Chronic back pain   Skin:  Negative for color change.   Neurological:  Negative for dizziness, light-headedness and headaches.     Medications Ordered Prior to Encounter[1]      Objective   /86   Pulse 65   Ht 5' 9\" (1.753 m)   Wt 118 kg (259 lb 6.4 oz)   SpO2 99%   BMI 38.31 kg/m²      Physical Exam  Vitals reviewed.   Constitutional:       General: He is not in acute distress.    Cardiovascular:      Rate and Rhythm: Normal rate. "   Pulmonary:      Effort: No respiratory distress.      Breath sounds: Normal breath sounds. No wheezing or rales.   Abdominal:      General: There is no distension.      Palpations: Abdomen is soft.      Tenderness: There is no abdominal tenderness.     Musculoskeletal:      Right lower leg: No edema.      Left lower leg: No edema.     Skin:     General: Skin is warm and dry.     Neurological:      Mental Status: He is alert and oriented to person, place, and time. Mental status is at baseline.                [1]   Current Outpatient Medications on File Prior to Visit   Medication Sig Dispense Refill    alfuzosin (UROXATRAL) 10 mg 24 hr tablet TAKE 1 TABLET BY MOUTH DAILY 30 tablet 5    atorvastatin (LIPITOR) 20 mg tablet Take 1 tablet (20 mg total) by mouth daily 100 tablet 3    Eliquis 5 MG TAKE 1 TABLET BY MOUTH TWO TIMES A DAY 60 tablet 5    glucose blood (FREESTYLE LITE) test strip Use 1 each in the morning 100 each 0    Lancets (freestyle) lancets Use as instructed 100 each 0    magnesium Oxide (MAG-OX) 400 mg TABS Take 1 tablet (400 mg total) by mouth 2 (two) times a day      metoprolol tartrate (LOPRESSOR) 100 mg tablet TAKE ONE TABLET BY MOUTH TWICE A  tablet 1    morphine (MS CONTIN) 15 mg 12 hr tablet as needed  0    oxyCODONE (ROXICODONE) 30 MG immediate release tablet if needed  0     No current facility-administered medications on file prior to visit.

## 2025-07-15 ENCOUNTER — OFFICE VISIT (OUTPATIENT)
Dept: NEPHROLOGY | Facility: HOSPITAL | Age: 65
End: 2025-07-15
Payer: COMMERCIAL

## 2025-07-15 ENCOUNTER — RESULTS FOLLOW-UP (OUTPATIENT)
Dept: UROLOGY | Facility: AMBULATORY SURGERY CENTER | Age: 65
End: 2025-07-15

## 2025-07-15 VITALS
SYSTOLIC BLOOD PRESSURE: 128 MMHG | OXYGEN SATURATION: 99 % | BODY MASS INDEX: 38.42 KG/M2 | HEART RATE: 65 BPM | DIASTOLIC BLOOD PRESSURE: 86 MMHG | WEIGHT: 259.4 LBS | HEIGHT: 69 IN

## 2025-07-15 DIAGNOSIS — N18.9 CHRONIC KIDNEY DISEASE-MINERAL AND BONE DISORDER (CKD-MBD): ICD-10-CM

## 2025-07-15 DIAGNOSIS — E83.9 CHRONIC KIDNEY DISEASE-MINERAL AND BONE DISORDER (CKD-MBD): ICD-10-CM

## 2025-07-15 DIAGNOSIS — D63.1 ANEMIA DUE TO STAGE 3B CHRONIC KIDNEY DISEASE  (HCC): ICD-10-CM

## 2025-07-15 DIAGNOSIS — N18.32 TYPE 2 DIABETES MELLITUS WITH STAGE 3B CHRONIC KIDNEY DISEASE, WITHOUT LONG-TERM CURRENT USE OF INSULIN (HCC): ICD-10-CM

## 2025-07-15 DIAGNOSIS — E55.9 VITAMIN D DEFICIENCY: ICD-10-CM

## 2025-07-15 DIAGNOSIS — N18.32 ANEMIA DUE TO STAGE 3B CHRONIC KIDNEY DISEASE  (HCC): ICD-10-CM

## 2025-07-15 DIAGNOSIS — I10 ESSENTIAL HYPERTENSION: ICD-10-CM

## 2025-07-15 DIAGNOSIS — E11.22 TYPE 2 DIABETES MELLITUS WITH STAGE 3B CHRONIC KIDNEY DISEASE, WITHOUT LONG-TERM CURRENT USE OF INSULIN (HCC): ICD-10-CM

## 2025-07-15 DIAGNOSIS — N18.32 STAGE 3B CHRONIC KIDNEY DISEASE (HCC): Primary | ICD-10-CM

## 2025-07-15 DIAGNOSIS — E66.813 CLASS 3 SEVERE OBESITY WITH SERIOUS COMORBIDITY AND BODY MASS INDEX (BMI) OF 40.0 TO 44.9 IN ADULT, UNSPECIFIED OBESITY TYPE: ICD-10-CM

## 2025-07-15 DIAGNOSIS — M89.9 CHRONIC KIDNEY DISEASE-MINERAL AND BONE DISORDER (CKD-MBD): ICD-10-CM

## 2025-07-15 PROCEDURE — 99214 OFFICE O/P EST MOD 30 MIN: CPT

## 2025-07-21 DIAGNOSIS — I26.99 PULMONARY EMBOLI (HCC): ICD-10-CM

## 2025-07-22 RX ORDER — APIXABAN 5 MG/1
5 TABLET, FILM COATED ORAL 2 TIMES DAILY
Qty: 60 TABLET | Refills: 2 | Status: SHIPPED | OUTPATIENT
Start: 2025-07-22

## 2025-07-23 ENCOUNTER — TELEPHONE (OUTPATIENT)
Dept: BARIATRICS | Facility: CLINIC | Age: 65
End: 2025-07-23

## 2025-07-23 DIAGNOSIS — Z98.84 BARIATRIC SURGERY STATUS: Primary | ICD-10-CM

## 2025-07-23 RX ORDER — OMEPRAZOLE 20 MG/1
20 CAPSULE, DELAYED RELEASE ORAL DAILY
Qty: 90 CAPSULE | Refills: 1 | Status: SHIPPED | OUTPATIENT
Start: 2025-07-23

## 2025-07-23 NOTE — TELEPHONE ENCOUNTER
Patient called the RX Refill Line. Message is being forwarded to the office.     Patient is requesting a refill for omeprazole (PriLOSEC) 20 mg delayed release capsule. The medication is not on patients current medication list. Patient stated that the pharmacy told him the prescription was cancelled. Patient stated that he is still taking the medication and has no idea why it would of been cancelled. Please review. Patient uses [address] - [facility] [place]       Please contact patient at [phone] with any further questions

## 2025-07-24 ENCOUNTER — OFFICE VISIT (OUTPATIENT)
Age: 65
End: 2025-07-24
Payer: COMMERCIAL

## 2025-07-24 VITALS
HEIGHT: 69 IN | SYSTOLIC BLOOD PRESSURE: 118 MMHG | BODY MASS INDEX: 35.99 KG/M2 | TEMPERATURE: 97.8 F | DIASTOLIC BLOOD PRESSURE: 80 MMHG | OXYGEN SATURATION: 98 % | HEART RATE: 52 BPM | WEIGHT: 243 LBS

## 2025-07-24 DIAGNOSIS — J06.9 VIRAL URI WITH COUGH: ICD-10-CM

## 2025-07-24 DIAGNOSIS — I27.82 OTHER CHRONIC PULMONARY EMBOLISM WITHOUT ACUTE COR PULMONALE (HCC): ICD-10-CM

## 2025-07-24 DIAGNOSIS — G47.33 OSA (OBSTRUCTIVE SLEEP APNEA): Primary | ICD-10-CM

## 2025-07-24 PROCEDURE — 99213 OFFICE O/P EST LOW 20 MIN: CPT

## 2025-07-24 RX ORDER — B-COMPLEX WITH VITAMIN C
250 TABLET ORAL DAILY
COMMUNITY

## 2025-07-24 RX ORDER — MULTIVITAMIN
1 TABLET ORAL DAILY
COMMUNITY

## 2025-07-24 NOTE — ASSESSMENT & PLAN NOTE
They are using the auto CPAP and benefitting from it.  Better quality of sleep at night and more energy throughout the day.  Reviewed compliance report 4/17/25 - 7/15/25 with the patient, demonstrating residual AHI is acceptable at 0.5 and they are 99% compliant with use  Will continue CPAP at current pressure settings  Discussed regular cleaning and changing of the supplies  Patient is aware of consequences of untreated sleep apnea including increased risk for cardiac disease and stroke and therefore the need for compliance

## 2025-07-24 NOTE — PROGRESS NOTES
Follow-up  Visit - Pulmonary Medicine   Name: Nasir Story Jr.      : 1960      MRN: [id number]  Encounter Provider: MARJORIE Crisostomo  Encounter Date: 2025   Encounter department: [facility] [place]  :  Assessment & Plan  IMELDA (obstructive sleep apnea)  They are using the auto CPAP and benefitting from it.  Better quality of sleep at night and more energy throughout the day.  Reviewed compliance report 25 - 7/15/25 with the patient, demonstrating residual AHI is acceptable at 0.5 and they are 99% compliant with use  Will continue CPAP at current pressure settings  Discussed regular cleaning and changing of the supplies  Patient is aware of consequences of untreated sleep apnea including increased risk for cardiac disease and stroke and therefore the need for compliance       Other chronic pulmonary embolism without acute cor pulmonale (HCC)  Continue on lifelong eliquis       Viral URI with cough  Patient notes recent rhinorrhea with increased productive cough. Rhinorrhea has resolved, however cough has persisted past few days. Bringing up yellow/green sputum. Likely r/t viral URI  Encouraged use of OTC expectorant with cough suppressant. If he notes worsening symptoms such as chest pain, fevers or chills, he should follow up with us or his PCP         Return in about 1 year (around 2026) for Next scheduled follow up.    History of Present Illness   Nasir Story Jr. is a 64 y.o. male who presents for follow up.    He states he is very happy with his CPAP, he sleeps better at night using it, and feels more energy throughout the day. On average wears 7hrs nightly (entire duration of sleep). He has nasal pillows and denies any significant air leaks. He is cleaning his equipment daily.    He notes a recent increase in his cough over the past few days. Had a coughing fit several nights ago. Coughing up yellow/green sputum. Denies any fevers, chills, chest pains or  "wheezing. Has some rhinorrhea which has now resolved. Likely secondary to viral infection. Was recently around his granddaughter.     He is maintained on lifelong eliquis for hx PE. He denies any s/s overt bleeding including hemoptysis, hematuria, hematochezia.     Review of Systems   Constitutional:  Negative for appetite change and fever.   HENT:  Negative for ear pain, postnasal drip, rhinorrhea, sneezing, sore throat and trouble swallowing.    Respiratory:  Positive for cough.    Cardiovascular:  Negative for chest pain.   Musculoskeletal:  Negative for myalgias.   Neurological:  Negative for headaches.       Aside from what is mentioned in the HPI, ROS is otherwise negative    Medical History Reviewed by provider this encounter:  Tobacco  Allergies  Meds  Problems  Med Hx  Surg Hx  Fam Hx     .  Medications Ordered Prior to Encounter[1]   Social History[2]     Medical History Reviewed by provider this encounter:  Tobacco  Allergies  Meds  Problems  Med Hx  Surg Hx  Fam Hx     .    Objective   /80 (BP Location: Left arm, Patient Position: Sitting, Cuff Size: Large)   Pulse (!) 52   Temp 97.8 °F (36.6 °C) (Tympanic)   Ht 5' 9\" (1.753 m)   Wt 110 kg (243 lb)   SpO2 98%   BMI 35.88 kg/m²     Physical Exam  Vitals and nursing note reviewed.   Constitutional:       General: He is not in acute distress.     Appearance: He is well-developed.   HENT:      Head: Normocephalic and atraumatic.      Nose: Nose normal.     Eyes:      Conjunctiva/sclera: Conjunctivae normal.       Cardiovascular:      Rate and Rhythm: Normal rate.   Pulmonary:      Effort: Pulmonary effort is normal. No respiratory distress.      Breath sounds: Normal breath sounds. No wheezing or rhonchi.     Musculoskeletal:         General: No swelling. Normal range of motion.      Cervical back: Normal range of motion and neck supple.     Skin:     General: Skin is warm and dry.     Neurological:      General: No focal deficit " "present.      Mental Status: He is alert and oriented to person, place, and time.     Psychiatric:         Mood and Affect: Mood normal.           Diagnostic Data:  Labs: I personally reviewed the most recent laboratory data pertinent to today's visit.      Radiology results:  Radiology Results Review : No pertinent imaging studies reviewed.      PFT/spirometry results:  No results found for: \"FEV1\", \"FVC\", \"WUL8MLI\", \"TLC\", \"DLCO\"       Answers submitted by the patient for this visit:  Pulmonology Questionnaire (Submitted on 7/20/2025)  Chief Complaint: Primary symptoms  Do you have shortness of breath that occurs with effort or exertion?: No  Do you have ear congestion?: No  Do you have heartburn?: No  Do you have fatigue?: No  Do you have nasal congestion?: No  Do you have shortness of breath when lying flat?: No  Do you have shortness of breath when you wake up?: No  Do you have sweats?: No  Have you experienced weight loss?: Yes      JIAN Robertson RN FNP-BC  Nurse Practitioner  [facility]       [1]   Current Outpatient Medications on File Prior to Visit   Medication Sig Dispense Refill    alfuzosin (UROXATRAL) 10 mg 24 hr tablet TAKE 1 TABLET BY MOUTH DAILY 30 tablet 5    apixaban (Eliquis) 5 mg TAKE 1 TABLET BY MOUTH TWO TIMES A DAY 60 tablet 2    atorvastatin (LIPITOR) 20 mg tablet Take 1 tablet (20 mg total) by mouth daily 100 tablet 3    glucose blood (FREESTYLE LITE) test strip Use 1 each in the morning 100 each 0    Lancets (freestyle) lancets Use as instructed 100 each 0    magnesium Oxide (MAG-OX) 400 mg TABS Take 1 tablet (400 mg total) by mouth 2 (two) times a day      metoprolol tartrate (LOPRESSOR) 100 mg tablet TAKE ONE TABLET BY MOUTH TWICE A  tablet 1    morphine (MS CONTIN) 15 mg 12 hr tablet as needed  0    Multiple Vitamin (multivitamin) tablet Take 1 tablet by mouth daily      omeprazole (PriLOSEC) 20 mg delayed release capsule Take 1 capsule (20 " mg total) by mouth daily 90 capsule 1    oxyCODONE (ROXICODONE) 30 MG immediate release tablet if needed  0    Thiamine HCl (vitamin B-1) 250 MG tablet Take 250 mg by mouth daily       No current facility-administered medications on file prior to visit.   [2]   Social History  Tobacco Use    Smoking status: Former     Types: Cigars     Start date:      Quit date: 4/15/2024     Years since quittin.2     Passive exposure: Current    Smokeless tobacco: Never    Tobacco comments:     Quit cigarette smoking in .      Patient reports that he last smoked a cigar close to 2 years ago.    Vaping Use    Vaping status: Never Used   Substance and Sexual Activity    Alcohol use: Not Currently     Comment: occasional    Drug use: No     Comment: History of drug use, meena, cocaine, heroind, no IVDA-as per [employer]    Sexual activity: Yes     Partners: Female     Birth control/protection: Condom Male

## 2025-07-28 ENCOUNTER — OFFICE VISIT (OUTPATIENT)
Dept: GASTROENTEROLOGY | Facility: CLINIC | Age: 65
End: 2025-07-28
Payer: COMMERCIAL

## 2025-07-28 VITALS
BODY MASS INDEX: 37.92 KG/M2 | DIASTOLIC BLOOD PRESSURE: 70 MMHG | TEMPERATURE: 98.9 F | SYSTOLIC BLOOD PRESSURE: 110 MMHG | WEIGHT: 256 LBS | HEIGHT: 69 IN

## 2025-07-28 DIAGNOSIS — Z90.3 STATUS POST SLEEVE GASTRECTOMY: ICD-10-CM

## 2025-07-28 DIAGNOSIS — Z86.0100 HISTORY OF COLON POLYPS: ICD-10-CM

## 2025-07-28 DIAGNOSIS — K59.09 CHRONIC CONSTIPATION: Primary | ICD-10-CM

## 2025-07-28 PROCEDURE — 99214 OFFICE O/P EST MOD 30 MIN: CPT | Performed by: PHYSICIAN ASSISTANT

## 2025-08-06 ENCOUNTER — OFFICE VISIT (OUTPATIENT)
Dept: FAMILY MEDICINE CLINIC | Facility: HOSPITAL | Age: 65
End: 2025-08-06
Payer: COMMERCIAL

## 2025-08-06 VITALS
HEART RATE: 97 BPM | BODY MASS INDEX: 36.29 KG/M2 | HEIGHT: 69 IN | DIASTOLIC BLOOD PRESSURE: 68 MMHG | SYSTOLIC BLOOD PRESSURE: 130 MMHG | TEMPERATURE: 59 F | WEIGHT: 245 LBS | OXYGEN SATURATION: 98 %

## 2025-08-06 DIAGNOSIS — Z23 ENCOUNTER FOR IMMUNIZATION: ICD-10-CM

## 2025-08-06 DIAGNOSIS — Z90.3 S/P GASTRIC SLEEVE PROCEDURE: ICD-10-CM

## 2025-08-06 DIAGNOSIS — E11.22 TYPE 2 DIABETES MELLITUS WITH STAGE 3B CHRONIC KIDNEY DISEASE, WITHOUT LONG-TERM CURRENT USE OF INSULIN (HCC): ICD-10-CM

## 2025-08-06 DIAGNOSIS — B96.89 BACTERIAL RESPIRATORY INFECTION: Primary | ICD-10-CM

## 2025-08-06 DIAGNOSIS — G47.33 OSA (OBSTRUCTIVE SLEEP APNEA): ICD-10-CM

## 2025-08-06 DIAGNOSIS — J98.8 BACTERIAL RESPIRATORY INFECTION: Primary | ICD-10-CM

## 2025-08-06 DIAGNOSIS — N18.32 TYPE 2 DIABETES MELLITUS WITH STAGE 3B CHRONIC KIDNEY DISEASE, WITHOUT LONG-TERM CURRENT USE OF INSULIN (HCC): ICD-10-CM

## 2025-08-06 PROCEDURE — 99214 OFFICE O/P EST MOD 30 MIN: CPT | Performed by: FAMILY MEDICINE

## 2025-08-06 PROCEDURE — G0009 ADMIN PNEUMOCOCCAL VACCINE: HCPCS

## 2025-08-06 PROCEDURE — 90677 PCV20 VACCINE IM: CPT

## 2025-08-06 RX ORDER — DOXYCYCLINE HYCLATE 100 MG
100 TABLET ORAL 2 TIMES DAILY
Qty: 14 TABLET | Refills: 0 | Status: SHIPPED | OUTPATIENT
Start: 2025-08-06 | End: 2025-08-13

## 2025-08-13 ENCOUNTER — TELEPHONE (OUTPATIENT)
Age: 65
End: 2025-08-13

## 2025-08-15 ENCOUNTER — APPOINTMENT (OUTPATIENT)
Dept: RADIOLOGY | Facility: CLINIC | Age: 65
End: 2025-08-15
Payer: COMMERCIAL

## 2025-08-15 ENCOUNTER — OFFICE VISIT (OUTPATIENT)
Dept: URGENT CARE | Facility: CLINIC | Age: 65
End: 2025-08-15
Payer: COMMERCIAL

## 2025-08-17 ENCOUNTER — RESULTS FOLLOW-UP (OUTPATIENT)
Dept: URGENT CARE | Facility: CLINIC | Age: 65
End: 2025-08-17

## 2025-08-21 VITALS — WEIGHT: 228 LBS | HEIGHT: 69 IN | BODY MASS INDEX: 33.77 KG/M2

## 2025-08-21 DIAGNOSIS — M25.062 HEMARTHROSIS OF LEFT KNEE: ICD-10-CM

## 2025-08-21 DIAGNOSIS — S82.045A NONDISPLACED COMMINUTED FRACTURE OF LEFT PATELLA, INITIAL ENCOUNTER FOR CLOSED FRACTURE: Primary | ICD-10-CM

## 2025-08-21 PROCEDURE — 27520 TREAT KNEECAP FRACTURE: CPT | Performed by: ORTHOPAEDIC SURGERY

## 2025-08-21 PROCEDURE — 99214 OFFICE O/P EST MOD 30 MIN: CPT | Performed by: ORTHOPAEDIC SURGERY

## (undated) DEVICE — BLACK INTELLIGENT RELOAD: Brand: TRI-STAPLE 2.0

## (undated) DEVICE — EXOFIN PRECISION PEN HIGH VISCOSITY TOPICAL SKIN ADHESIVE: Brand: EXOFIN PRECISION PEN, 1G

## (undated) DEVICE — TROCAR VISIPORT

## (undated) DEVICE — TROCARS: Brand: KII® OPTICAL ACCESS SYSTEM

## (undated) DEVICE — VIOLET BRAIDED (POLYGLACTIN 910), SYNTHETIC ABSORBABLE SUTURE: Brand: COATED VICRYL

## (undated) DEVICE — [HIGH FLOW INSUFFLATOR,  DO NOT USE IF PACKAGE IS DAMAGED,  KEEP DRY,  KEEP AWAY FROM SUNLIGHT,  PROTECT FROM HEAT AND RADIOACTIVE SOURCES.]: Brand: PNEUMOSURE

## (undated) DEVICE — TROCAR: Brand: KII FIOS FIRST ENTRY

## (undated) DEVICE — NEEDLE SPINAL18G X 3.5 IN QUINCKE

## (undated) DEVICE — POWER SHELL: Brand: SIGNIA

## (undated) DEVICE — URETERAL CATHETER ADAPTOR TIP

## (undated) DEVICE — SYRINGE 30ML LL

## (undated) DEVICE — METZENBAUM ADTEC SINGLE USE DISSECTING SCISSORS, SHAFT ONLY, MONOPOLAR, CURVED TO LEFT, WORKING LENGTH: 12 1/4", (310 MM), DIAM. 5 MM, INSULATED, DOUBLE ACTION, STERILE, DISPOSABLE, PACKAGE OF 10 PIECES: Brand: AESCULAP

## (undated) DEVICE — REM POLYHESIVE ADULT PATIENT RETURN ELECTRODE: Brand: VALLEYLAB

## (undated) DEVICE — GLOVE SRG BIOGEL 8

## (undated) DEVICE — SCD SEQUENTIAL COMPRESSION COMFORT SLEEVE MEDIUM KNEE LENGTH: Brand: KENDALL SCD

## (undated) DEVICE — GLOVE SRG BIOGEL 6.5

## (undated) DEVICE — ARTICULATING RELOAD WITH TRI-STAPLE TECHNOLOGY: Brand: ENDO GIA

## (undated) DEVICE — SUT MONOCRYL 4-0 PS-2 27 IN Y426H

## (undated) DEVICE — TUBING SUCTION 5MM X 12 FT

## (undated) DEVICE — LAPAROSCOPIC TROCAR SLEEVE/SINGLE USE: Brand: KII® SLEEVE

## (undated) DEVICE — CURVED JAW CORDLESS ULTRASONIC DISSECTOR: Brand: SONICISION 7

## (undated) DEVICE — VISIGI 3D®  CALIBRATION SYSTEM  SIZE 36FR SLEEVE/STD: Brand: BOEHRINGER® VISIGI 3D™ SLEEVE GASTRECTOMY CALIBRATION SYSTEM, SIZE 36FR

## (undated) DEVICE — ALLENTOWN LAP CHOLE APP PACK: Brand: CARDINAL HEALTH

## (undated) DEVICE — SYRINGE 20ML LL

## (undated) DEVICE — ASTOUND STANDARD SURGICAL GOWN, XL: Brand: CONVERTORS

## (undated) DEVICE — TUBING SMOKE EVAC W/FILTRATION DEVICE PLUMEPORT ACTIV

## (undated) DEVICE — VISUALIZATION SYSTEM: Brand: CLEARIFY

## (undated) DEVICE — DISPOSABLE OR TOWEL: Brand: CARDINAL HEALTH

## (undated) DEVICE — DEFENDO AIR WATER SUCTION AND BIOPSY VALVE KIT FOR  OLYMPUS: Brand: DEFENDO AIR/WATER/SUCTION AND BIOPSY VALVE

## (undated) DEVICE — GLOVE SRG BIOGEL 7.5

## (undated) DEVICE — TRAVELKIT CONTAINS FIRST STEP KIT (200ML EP-4 KIT) AND SOILED SCOPE BAG - 1 KIT: Brand: TRAVELKIT CONTAINS FIRST STEP KIT AND SOILED SCOPE BAG

## (undated) DEVICE — PMI DISPOSABLE PUNCTURE CLOSURE DEVICE / SUTURE GRASPER: Brand: PMI

## (undated) DEVICE — WEBRIL 6 IN UNSTERILE

## (undated) DEVICE — TIBURON LAPAROSCOPIC ABDOMINAL DRAPE: Brand: CONVERTORS

## (undated) DEVICE — INTENDED FOR TISSUE SEPARATION, AND OTHER PROCEDURES THAT REQUIRE A SHARP SURGICAL BLADE TO PUNCTURE OR CUT.: Brand: BARD-PARKER SAFETY BLADES SIZE 15, STERILE

## (undated) DEVICE — NEPTUNE E-SEP SMOKE EVACUATION PENCIL, COATED, 70MM BLADE, PUSH BUTTON SWITCH: Brand: NEPTUNE E-SEP